# Patient Record
Sex: FEMALE | Race: WHITE | Employment: OTHER | URBAN - METROPOLITAN AREA
[De-identification: names, ages, dates, MRNs, and addresses within clinical notes are randomized per-mention and may not be internally consistent; named-entity substitution may affect disease eponyms.]

---

## 2017-01-24 DIAGNOSIS — E78.5 HYPERLIPIDEMIA: ICD-10-CM

## 2017-01-24 DIAGNOSIS — I25.10 CAD (CORONARY ARTERY DISEASE): Primary | ICD-10-CM

## 2017-01-24 RX ORDER — ROSUVASTATIN CALCIUM 20 MG/1
20 TABLET, COATED ORAL DAILY
Qty: 90 TABLET | Refills: 3 | Status: SHIPPED | OUTPATIENT
Start: 2017-01-24 | End: 2018-01-22

## 2017-01-24 RX ORDER — CLOPIDOGREL BISULFATE 75 MG/1
75 TABLET ORAL DAILY
Qty: 90 TABLET | Refills: 3 | Status: SHIPPED | OUTPATIENT
Start: 2017-01-24 | End: 2018-01-22

## 2017-02-24 ENCOUNTER — TELEPHONE (OUTPATIENT)
Dept: NEUROLOGY | Facility: CLINIC | Age: 77
End: 2017-02-24

## 2017-02-24 NOTE — TELEPHONE ENCOUNTER
Pt called and left a message that she wanted to make an appointment with Dr. Randolph. Called pt and arranged an appointment for May 5th at 9:30am. She said [facility]. She said she has used a walker for about a year. She has problems walking and speaking. She has been to the [facility] but they can't help her. She will bring her MRI disc and clinic notes from the [facility].

## 2017-02-28 ENCOUNTER — OFFICE VISIT (OUTPATIENT)
Dept: FAMILY MEDICINE | Facility: CLINIC | Age: 77
End: 2017-02-28

## 2017-02-28 VITALS
OXYGEN SATURATION: 98 % | HEART RATE: 64 BPM | DIASTOLIC BLOOD PRESSURE: 60 MMHG | BODY MASS INDEX: 29.7 KG/M2 | SYSTOLIC BLOOD PRESSURE: 128 MMHG | TEMPERATURE: 98.1 F | WEIGHT: 184 LBS

## 2017-02-28 DIAGNOSIS — L98.491 CHRONIC SKIN ULCER, LIMITED TO BREAKDOWN OF SKIN (H): Primary | ICD-10-CM

## 2017-02-28 PROCEDURE — 99214 OFFICE O/P EST MOD 30 MIN: CPT | Performed by: FAMILY MEDICINE

## 2017-02-28 RX ORDER — SULFAMETHOXAZOLE/TRIMETHOPRIM 800-160 MG
1 TABLET ORAL 2 TIMES DAILY
Qty: 20 TABLET | Refills: 0 | Status: SHIPPED | OUTPATIENT
Start: 2017-02-28 | End: 2017-03-13

## 2017-02-28 NOTE — MR AVS SNAPSHOT
"              After Visit Summary   2/28/2017    Katherin Fletcher    MRN: [id number]           Patient Information     Date Of Birth          1940        Visit Information        Provider Department      2/28/2017 4:15 PM Airs Del Real MD [facility]        Today's Diagnoses     Chronic skin ulcer, limited to breakdown of skin (H)    -  1       Follow-ups after your visit        Your next 10 appointments already scheduled     Apr 17, 2017  9:45 AM CDT   Return Visit with Renato Erickson MD   [facility] [place] ([facility])    [address] [place] [id number] [phone]            May 05, 2017  9:30 AM CDT   (Arrive by 9:15 AM)   New Ataxia with Joe Randolph MD   [facility] Neu[facility] ([facility] [place])    [address] [address] [phone]              Who to contact     If you have questions or need follow up information about today's clinic visit or your schedule please contact [facility] directly at [phone].  Normal or non-critical lab and imaging results will be communicated to you by Chideohart, letter or phone within 4 business days after the clinic has received the results. If you do not hear from us within 7 days, please contact the clinic through Plut[employer] or phone. If you have a critical or abnormal lab result, we will notify you by phone as soon as possible.  Submit refill requests through XING or call your pharmacy and they will forward the refill request to us. Please allow 3 business days for your refill to be completed.          Additional Information About Your Visit        MyChart Information     XING lets you send messages to your doctor, view your test results, renew your prescriptions, schedule appointments and more. To sign up, go to [email or web address] . Click on \"Log in\" on the left side of the screen, which will " "take you to the Welcome page. Then click on \"Sign up Now\" on the right side of the page.     You will be asked to enter the access code listed below, as well as some personal information. Please follow the directions to create your username and password.     Your access code is: [email or web address]  Expires: 2017  4:38 PM     Your access code will  in 90 days. If you need help or a new code, please call your [place] clinic or [phone].        Care EveryWhere ID     This is your Care EveryWhere ID. This could be used by other organizations to access your [place] medical records  [id number]        Your Vitals Were     Pulse Temperature Pulse Oximetry BMI (Body Mass Index)          64 98.1  F (36.7  C) (Oral) 98% 29.7 kg/m2         Blood Pressure from Last 3 Encounters:   17 128/60   16 160/78   10/18/16 118/70    Weight from Last 3 Encounters:   17 83.5 kg (184 lb)   16 81.2 kg (179 lb)   10/18/16 79.8 kg (176 lb)              Today, you had the following     No orders found for display         Today's Medication Changes          These changes are accurate as of: 17  4:38 PM.  If you have any questions, ask your nurse or doctor.               Start taking these medicines.        Dose/Directions    sulfamethoxazole-trimethoprim 800-160 MG per tablet   Commonly known as:  BACTRIM DS/SEPTRA DS   Used for:  Chronic skin ulcer, limited to breakdown of skin (H)   Started by:  Aris Del Real MD        Dose:  1 tablet   Take 1 tablet by mouth 2 times daily   Quantity:  20 tablet   Refills:  0            Where to get your medicines      These medications were sent to [facility] [address] [place] [address]  [address] [facility] [address]     Phone:  [phone]     sulfamethoxazole-trimethoprim 800-160 MG per tablet                Primary Care Provider Office Phone # Fax #    Aris Del Real [phone]       [place] [facility] [address]  [facility] [address]        Thank you!     Thank you for choosing [facility]  for your care. Our goal is always to provide you with excellent care. Hearing back from our patients is one way we can continue to improve our services. Please take a few minutes to complete the written survey that you may receive in the mail after your visit with us. Thank you!             Your Updated Medication List - Protect others around you: Learn how to safely use, store and throw away your medicines at www.disposemymeds.org.          This list is accurate as of: 2/28/17  4:38 PM.  Always use your most recent med list.                   Brand Name Dispense Instructions for use    Alfalfa 250 MG Tabs      Take by mouth daily       aspirin 81 MG tablet      Take 81 mg by mouth daily       calcium + D 600-200 MG-UNIT Tabs   Generic drug:  calcium carbonate-vitamin D      Take 1 tablet by mouth 2 times daily.       carbidopa-levodopa  MG per tablet    SINEMET    720 tablet    Take 2 tablets by mouth 4 times daily       carvedilol 6.25 MG tablet    COREG    180 tablet    Take 1 tablet (6.25 mg) by mouth 2 times daily (with meals)       clopidogrel 75 MG tablet    PLAVIX    90 tablet    Take 1 tablet (75 mg) by mouth daily       DAILY MULTI Tabs      Take by mouth daily       gabapentin 100 MG capsule    NEURONTIN     Take 100 mg by mouth daily       isosorbide mononitrate 30 MG 24 hr tablet    IMDUR    90 tablet    Take 1 tablet (30 mg) by mouth daily       levothyroxine 112 MCG tablet    SYNTHROID/LEVOTHROID    90 tablet    TAKE 1 TABLET DAILY       nitroglycerin 0.4 MG sublingual tablet    NITROSTAT    25 tablet    DISSOLVE ONE TABLET UNDER THE TONGUE EVERY 5 MINUTES AS NEEDED FOR CHEST PAIN.  DO NOT EXCEED A TOTAL OF 3 DOSES IN 15 MINUTES       omeprazole 20 MG CR capsule    priLOSEC    180 capsule    TAKE 2 CAPSULES DAILY 30 TO 60 MINUTES BEFORE A MEAL       rosuvastatin 20 MG  tablet    CRESTOR    90 tablet    Take 1 tablet (20 mg) by mouth daily       sulfamethoxazole-trimethoprim 800-160 MG per tablet    BACTRIM DS/SEPTRA DS    20 tablet    Take 1 tablet by mouth 2 times daily

## 2017-02-28 NOTE — PROGRESS NOTES
Problem(s) Oriented visit        SUBJECTIVE:                                                    Katherin Fletcher is a 76 year old female who presents to clinic today for the following health issues :      1. Chronic skin ulcer, limited to breakdown of skin (H)  Left ankle anterior has re-opened a lesion ulcer on the skin 1cm by 2.5 cm  For 1 week,  No pain  Getting worse.  Previous MRSA in the foot post surgery         Problem list, Medication list, Allergies, and Medical/Social/Surgical histories reviewed in EPIC and updated as appropriate.   Additional history: as documented    ROS:  General and CV completed and negative except as noted above    Histories:   Patient Active Problem List   Diagnosis     Atrial fibrillation (H)     Long term current use of anticoagulant therapy     CAD (coronary artery disease)     Hyperlipidemia     Hypothyroidism     Health Care Home     MRSA (methicillin resistant Staphylococcus aureus)     Dysarthria     Disturbances of sensation of smell and taste     Neurologic gait disorder     Cerebellar disease     Essential hypertension     Ischemic cardiomyopathy     Past Surgical History   Procedure Laterality Date     Hysterectomy, jesus  32 yo     Cardiac surgery  1992     CABG,stents x2     Carpal tunnel release rt/lt       Release trigger finger       Bunionectomy rt/lt       Open reduction internal fixation ankle       Left Ankle     Arthroscopy shoulder rt/lt       Stent  2006     Stent(aka cardiac)  2008     C cabg, artery-vein, three  1/2009     Vascular surgery       C mra upper extremity wo&w cont         Social History   Substance Use Topics     Smoking status: Former Smoker     Quit date: 1/1/1992     Smokeless tobacco: Never Used     Alcohol use 0.0 - 0.5 oz/week     0 - 1 Standard drinks or equivalent per week      Comment: very rare     Family History   Problem Relation Age of Onset     Unknown/Adopted Mother      Unknown/Adopted Father            OBJECTIVE:                                                     /60  Pulse 64  Temp 98.1  F (36.7  C) (Oral)  Wt 83.5 kg (184 lb)  SpO2 98%  BMI 29.7 kg/m2  Body mass index is 29.7 kg/(m^2).   APPEARANCE: = Relaxed and in no distress  Conj/Eyelids = noninjected and lids and lashes are without inflammation  PERRLA/Irises = Pupils Round Reactive to Light and Irisis without inflammation  Neck = No anterior or posterior adenopathy appreciated.  Thyroid = Not enlarged and no masses felt  Resp effort = Calm regular breathing  Breath Sounds = Good air movement with no rales or rhonchi in any lung fields  Heart Rate, Rythym, & sounds (no Murm)  = Regular rate and rythym with no S3, S4, or murmer appreciated.  Carotid Art's = Pulses full and equal and no bruits appreciated  Abdomen = Soft, nontender, no masses, & bowel sounds in all quadrants  Liver/Spleen = Normal span and no splenomegaly noted  Digits and Nails = FROM in all finger joints, no nail dystrophy  Ext (edema) = No pretibial edema noted or elsewhere  Musculsktl =  Strength and ROM of major joints are within normal limits  SKIN: lesion on the left as above  Recent/Remote Memory = Alert and Oriented x 3  Mood/Affect = Cooperative and interested     ASSESSMENT/PLAN:                                                        Katherin was seen today for other.    Diagnoses and all orders for this visit:    Chronic skin ulcer, limited to breakdown of skin (H)  -     sulfamethoxazole-trimethoprim (BACTRIM DS/SEPTRA DS) 800-160 MG per tablet; Take 1 tablet by mouth 2 times daily    >25 min spent with patient, greater than 50% spent on discussion/education/planning, etc. About The encounter diagnosis was Chronic skin ulcer, limited to breakdown of skin (H).        See Patient Instructions    The following health maintenance items are reviewed in Epic and correct as of today:  Health Maintenance   Topic Date Due     PNEUMOCOCCAL (1 of 2 - PCV13) 05/27/2005     MEDICARE ANNUAL WELLNESS VISIT   05/27/2006     INFLUENZA VACCINE (SYSTEM ASSIGNED)  09/01/2016     FALL RISK ASSESSMENT  01/05/2017     ADVANCE DIRECTIVE PLANNING Q5 YRS (NO INBASKET)  05/22/2017     TETANUS IMMUNIZATION (SYSTEM ASSIGNED)  03/07/2018     LIPID SCREEN Q5 YR FEMALE (SYSTEM ASSIGNED)  07/15/2021     DEXA SCAN SCREENING (SYSTEM ASSIGNED)  Completed       Aris Del Real MD  [facility]  Family Practice  [facility]  [phone]    For any issues my office # is [phone]

## 2017-03-07 ENCOUNTER — TELEPHONE (OUTPATIENT)
Dept: NEUROLOGY | Facility: CLINIC | Age: 77
End: 2017-03-07

## 2017-03-07 NOTE — TELEPHONE ENCOUNTER
Called pt and rescheduled her appointment from May 5th to April 14 at 9:30am.  She also wanted Dr. Randolph to know that [facility] Her last name was Deb.

## 2017-03-13 ENCOUNTER — OFFICE VISIT (OUTPATIENT)
Dept: FAMILY MEDICINE | Facility: CLINIC | Age: 77
End: 2017-03-13

## 2017-03-13 VITALS
BODY MASS INDEX: 28.57 KG/M2 | OXYGEN SATURATION: 98 % | WEIGHT: 177 LBS | HEART RATE: 79 BPM | SYSTOLIC BLOOD PRESSURE: 108 MMHG | DIASTOLIC BLOOD PRESSURE: 62 MMHG

## 2017-03-13 DIAGNOSIS — L98.492 CHRONIC SKIN ULCER, WITH FAT LAYER EXPOSED (H): Primary | ICD-10-CM

## 2017-03-13 PROCEDURE — 99213 OFFICE O/P EST LOW 20 MIN: CPT | Performed by: FAMILY MEDICINE

## 2017-03-13 NOTE — MR AVS SNAPSHOT
After Visit Summary   3/13/2017    Katherin Fletcher    MRN: [id number]           Patient Information     Date Of Birth          1940        Visit Information        Provider Department      3/13/2017 3:30 PM Aris Del Real MD [facility]        Today's Diagnoses     Chronic skin ulcer, with fat layer exposed (H)    -  1       Follow-ups after your visit        Your next 10 appointments already scheduled     Mar 20, 2017  2:45 PM CDT   SHORT with Aris Del Real MD   [facility]    [address] [place] [phone]            Apr 14, 2017  9:30 AM CDT   (Arrive by 9:15 AM)   New Ataxia with Joe Randolph MD   [facility] Neurology ([facility] [place])    [address] [phone]            Apr 17, 2017  9:45 AM CDT   Return Visit with Renato Erickson MD   [facility] ([facility])    [address] [place] [id number] [phone]              Who to contact     If you have questions or need follow up information about today's clinic visit or your schedule please contact [facility] directly at [phone].  Normal or non-critical lab and imaging results will be communicated to you by Hotspur Technologieshart, letter or phone within 4 business days after the clinic has received the results. If you do not hear from us within 7 days, please contact the clinic through MyChart or phone. If you have a critical or abnormal lab result, we will notify you by phone as soon as possible.  Submit refill requests through Shareable Social or call your pharmacy and they will forward the refill request to us. Please allow 3 business days for your refill to be completed.          Additional Information About Your Visit        Shareable Social Information     Shareable Social lets you send messages to your doctor, view  "your test results, renew your prescriptions, schedule appointments and more. To sign up, go to [email or web address] . Click on \"Log in\" on the left side of the screen, which will take you to the Welcome page. Then click on \"Sign up Now\" on the right side of the page.     You will be asked to enter the access code listed below, as well as some personal information. Please follow the directions to create your username and password.     Your access code is: [email or web address]  Expires: 2017  5:38 PM     Your access code will  in 90 days. If you need help or a new code, please call your [place] clinic or [phone].        Care EveryWhere ID     This is your Care EveryWhere ID. This could be used by other organizations to access your [place] medical records  [id number]        Your Vitals Were     Pulse Pulse Oximetry BMI (Body Mass Index)             79 98% 28.57 kg/m2          Blood Pressure from Last 3 Encounters:   17 108/62   17 128/60   16 160/78    Weight from Last 3 Encounters:   17 80.3 kg (177 lb)   17 83.5 kg (184 lb)   16 81.2 kg (179 lb)              Today, you had the following     No orders found for display       Primary Care Provider Office Phone # Fax #    Aris Del Real [phone]       [facility] [address] [place] [address]        Thank you!     Thank you for choosing [facility]  for your care. Our goal is always to provide you with excellent care. Hearing back from our patients is one way we can continue to improve our services. Please take a few minutes to complete the written survey that you may receive in the mail after your visit with us. Thank you!             Your Updated Medication List - Protect others around you: Learn how to safely use, store and throw away your medicines at www.disposemymeds.org.          This list is accurate as of: 3/13/17  5:54 PM.  Always use your most " recent med list.                   Brand Name Dispense Instructions for use    Alfalfa 250 MG Tabs      Take by mouth daily       aspirin 81 MG tablet      Take 81 mg by mouth daily       calcium + D 600-200 MG-UNIT Tabs   Generic drug:  calcium carbonate-vitamin D      Take 1 tablet by mouth 2 times daily.       carbidopa-levodopa  MG per tablet    SINEMET    720 tablet    Take 2 tablets by mouth 4 times daily       carvedilol 6.25 MG tablet    COREG    180 tablet    Take 1 tablet (6.25 mg) by mouth 2 times daily (with meals)       clopidogrel 75 MG tablet    PLAVIX    90 tablet    Take 1 tablet (75 mg) by mouth daily       DAILY MULTI Tabs      Take by mouth daily       gabapentin 100 MG capsule    NEURONTIN     Take 100 mg by mouth daily       isosorbide mononitrate 30 MG 24 hr tablet    IMDUR    90 tablet    Take 1 tablet (30 mg) by mouth daily       levothyroxine 112 MCG tablet    SYNTHROID/LEVOTHROID    90 tablet    TAKE 1 TABLET DAILY       nitroglycerin 0.4 MG sublingual tablet    NITROSTAT    25 tablet    DISSOLVE ONE TABLET UNDER THE TONGUE EVERY 5 MINUTES AS NEEDED FOR CHEST PAIN.  DO NOT EXCEED A TOTAL OF 3 DOSES IN 15 MINUTES       omeprazole 20 MG CR capsule    priLOSEC    180 capsule    TAKE 2 CAPSULES DAILY 30 TO 60 MINUTES BEFORE A MEAL       rosuvastatin 20 MG tablet    CRESTOR    90 tablet    Take 1 tablet (20 mg) by mouth daily

## 2017-03-13 NOTE — PROGRESS NOTES
Problem(s) Oriented visit        SUBJECTIVE:                                                    Katherin Fletcher is a 76 year old female who presents to clinic today for the following health issues :    1. Atrial fibrillation (H)      2. Chronic skin ulcer, with fat layer exposed (H)  Recheck, is a little better, pink surrounding the wound and          Problem list, Medication list, Allergies, and Medical/Social/Surgical histories reviewed in EPIC and updated as appropriate.   Additional history: as documented    ROS:  General and CV completed and negative except as noted above    Histories:   Patient Active Problem List   Diagnosis     Atrial fibrillation (H)     Long term current use of anticoagulant therapy     CAD (coronary artery disease)     Hyperlipidemia     Hypothyroidism     Health Care Home     MRSA (methicillin resistant Staphylococcus aureus)     Dysarthria     Disturbances of sensation of smell and taste     Neurologic gait disorder     Cerebellar disease     Essential hypertension     Ischemic cardiomyopathy     Past Surgical History   Procedure Laterality Date     Hysterectomy, jesus  32 yo     Cardiac surgery  1992     CABG,stents x2     Carpal tunnel release rt/lt       Release trigger finger       Bunionectomy rt/lt       Open reduction internal fixation ankle       Left Ankle     Arthroscopy shoulder rt/lt       Stent  2006     Stent(aka cardiac)  2008     C cabg, artery-vein, three  1/2009     Vascular surgery       C mra upper extremity wo&w cont         Social History   Substance Use Topics     Smoking status: Former Smoker     Quit date: 1/1/1992     Smokeless tobacco: Never Used     Alcohol use 0.0 - 0.5 oz/week     0 - 1 Standard drinks or equivalent per week      Comment: very rare     Family History   Problem Relation Age of Onset     Unknown/Adopted Mother      Unknown/Adopted Father            OBJECTIVE:                                                    /62  Pulse 79  Wt 80.3  kg (177 lb)  SpO2 98%  BMI 28.57 kg/m2  Body mass index is 28.57 kg/(m^2).   APPEARANCE: = Relaxed and in no distress  SKIN: as above, smaller open ulcer     ASSESSMENT/PLAN:                                                        Katherin was seen today for recheck.    Diagnoses and all orders for this visit:    Atrial fibrillation (H)    Chronic skin ulcer, with fat layer exposed (H)        Bandaged with honey based covering and recheck one week.      The following health maintenance items are reviewed in Epic and correct as of today:  Health Maintenance   Topic Date Due     PNEUMOCOCCAL (1 of 2 - PCV13) 05/27/2005     MEDICARE ANNUAL WELLNESS VISIT  05/27/2006     ADVANCE DIRECTIVE PLANNING Q5 YRS (NO INBASKET)  05/22/2017     INFLUENZA VACCINE (SYSTEM ASSIGNED)  09/01/2017     FALL RISK ASSESSMENT  02/28/2018     TETANUS IMMUNIZATION (SYSTEM ASSIGNED)  03/07/2018     LIPID SCREEN Q5 YR FEMALE (SYSTEM ASSIGNED)  07/15/2021     DEXA SCAN SCREENING (SYSTEM ASSIGNED)  Completed       Aris Del Real MD  [facility]  Family Practice  [facility]  [phone]    For any issues my office # is [phone]

## 2017-03-20 ENCOUNTER — OFFICE VISIT (OUTPATIENT)
Dept: FAMILY MEDICINE | Facility: CLINIC | Age: 77
End: 2017-03-20

## 2017-03-20 VITALS
OXYGEN SATURATION: 99 % | BODY MASS INDEX: 29.21 KG/M2 | DIASTOLIC BLOOD PRESSURE: 60 MMHG | SYSTOLIC BLOOD PRESSURE: 128 MMHG | WEIGHT: 181 LBS | HEART RATE: 61 BPM

## 2017-03-20 DIAGNOSIS — I73.9 PERIPHERAL VASCULAR DISEASE OF LOWER EXTREMITY WITH ULCERATION (H): ICD-10-CM

## 2017-03-20 DIAGNOSIS — G62.9 PERIPHERAL POLYNEUROPATHY: Primary | ICD-10-CM

## 2017-03-20 DIAGNOSIS — L97.909 PERIPHERAL VASCULAR DISEASE OF LOWER EXTREMITY WITH ULCERATION (H): ICD-10-CM

## 2017-03-20 DIAGNOSIS — I48.20 CHRONIC ATRIAL FIBRILLATION (H): ICD-10-CM

## 2017-03-20 PROCEDURE — 99213 OFFICE O/P EST LOW 20 MIN: CPT | Performed by: FAMILY MEDICINE

## 2017-03-20 NOTE — PROGRESS NOTES
Problem(s) Oriented visit        SUBJECTIVE:                                                    Katherin Fletcher is a 76 year old female who presents to clinic today for the following health issues :    1. Peripheral polyneuropathy (H)  Ongoing     2. Peripheral vascular disease of lower extremity with ulceration (H)  Here for recheck of wound and has been in hydrocolloidal dressing    3. Chronic atrial fibrillation (H)  stable         Problem list, Medication list, Allergies, and Medical/Social/Surgical histories reviewed in [facility] and updated as appropriate.   Additional history: as documented    ROS:  General and CV completed and negative except as noted above    Histories:   Patient Active Problem List   Diagnosis     Long term current use of anticoagulant therapy     CAD (coronary artery disease)     Hyperlipidemia     Hypothyroidism     Health Care Home     MRSA (methicillin resistant Staphylococcus aureus)     Dysarthria     Disturbances of sensation of smell and taste     Neurologic gait disorder     Cerebellar disease     Essential hypertension     Ischemic cardiomyopathy     Peripheral polyneuropathy (H)     Past Surgical History   Procedure Laterality Date     Hysterectomy, jesus  32 yo     Cardiac surgery  1992     CABG,stents x2     Carpal tunnel release rt/lt       Release trigger finger       Bunionectomy rt/lt       Open reduction internal fixation ankle       Left Ankle     Arthroscopy shoulder rt/lt       Stent  2006     Stent(aka cardiac)  2008     C cabg, artery-vein, three  1/2009     Vascular surgery       C mra upper extremity wo&w cont         Social History   Substance Use Topics     Smoking status: Former Smoker     Quit date: 1/1/1992     Smokeless tobacco: Never Used     Alcohol use 0.0 - 0.5 oz/week     0 - 1 Standard drinks or equivalent per week      Comment: very rare     Family History   Problem Relation Age of Onset     Unknown/Adopted Mother      Unknown/Adopted Father             OBJECTIVE:                                                    /60  Pulse 61  Wt 82.1 kg (181 lb)  SpO2 99%  BMI 29.21 kg/m2  Body mass index is 29.21 kg/(m^2).   Musculsktl =  Strength and ROM of major joints are within normal limits  SKIN: wound is now much smaller   Recent/Remote Memory = Alert and Oriented x 3  NEURO = CN II-XII are tested and no deficits found  Mood/Affect = Cooperative and interested     ASSESSMENT/PLAN:                                                        Katherin was seen today for recheck.    Diagnoses and all orders for this visit:    Peripheral polyneuropathy (H)    Peripheral vascular disease of lower extremity with ulceration (H)    Chronic atrial fibrillation (H)    Other orders  -     Cancel: PNEUMOCOCCAL CONJ VACCINE 13 VALENT IM (PREVNAR 13)        Continue colloidal dressing    The following health maintenance items are reviewed in Epic and correct as of today:  Health Maintenance   Topic Date Due     PNEUMOCOCCAL (1 of 2 - PCV13) 05/27/2005     MEDICARE ANNUAL WELLNESS VISIT  05/27/2006     ADVANCE DIRECTIVE PLANNING Q5 YRS (NO INBASKET)  05/22/2017     INFLUENZA VACCINE (SYSTEM ASSIGNED)  09/01/2017     FALL RISK ASSESSMENT  02/28/2018     TETANUS IMMUNIZATION (SYSTEM ASSIGNED)  03/07/2018     LIPID SCREEN Q5 YR FEMALE (SYSTEM ASSIGNED)  07/15/2021     DEXA SCAN SCREENING (SYSTEM ASSIGNED)  Completed       Airs Del Real MD  [facility]  [facility]  [phone]    For any issues my office # is [phone]

## 2017-03-20 NOTE — MR AVS SNAPSHOT
After Visit Summary   3/20/2017    Katherin Fletcher    MRN: [id number]           Patient Information     Date Of Birth          1940        Visit Information        Provider Department      3/20/2017 2:45 PM Aris Del Real MD [facility]        Today's Diagnoses     Peripheral polyneuropathy (H)    -  1    Peripheral vascular disease of lower extremity with ulceration (H)        Chronic atrial fibrillation (H)           Follow-ups after your visit        Your next 10 appointments already scheduled     Apr 14, 2017  9:30 AM CDT   (Arrive by 9:15 AM)   New Ataxia with Joe Randolph MD   [facility] ([facility])    [address]  3rd Floor  [facility] [id number] [phone]            Apr 17, 2017  9:45 AM CDT   Return Visit with Renato Erickson MD   [facility] PHYSICIANS [facility] ([facility])    [address] [place] [id number] [phone]              Who to contact     If you have questions or need follow up information about today's clinic visit or your schedule please contact [facility] directly at [phone].  Normal or non-critical lab and imaging results will be communicated to you by MyChart, letter or phone within 4 business days after the clinic has received the results. If you do not hear from us within 7 days, please contact the clinic through [employer] or phone. If you have a critical or abnormal lab result, we will notify you by phone as soon as possible.  Submit refill requests through [employer] or call your pharmacy and they will forward the refill request to us. Please allow 3 business days for your refill to be completed.          Additional Information About Your Visit        MyChart Information     [employer] lets you send messages to your doctor, view your test results, renew your prescriptions, schedule appointments and more. To sign up, go  "to [email or web address] . Click on \"Log in\" on the left side of the screen, which will take you to the Welcome page. Then click on \"Sign up Now\" on the right side of the page.     You will be asked to enter the access code listed below, as well as some personal information. Please follow the directions to create your username and password.     Your access code is: [email or web address]  Expires: 2017  5:38 PM     Your access code will  in 90 days. If you need help or a new code, please call your [place] clinic or [phone].        Care EveryWhere ID     This is your Care EveryWhere ID. This could be used by other organizations to access your [place] medical records  [id number]        Your Vitals Were     Pulse Pulse Oximetry BMI (Body Mass Index)             61 99% 29.21 kg/m2          Blood Pressure from Last 3 Encounters:   17 128/60   17 108/62   17 128/60    Weight from Last 3 Encounters:   17 82.1 kg (181 lb)   17 80.3 kg (177 lb)   17 83.5 kg (184 lb)              Today, you had the following     No orders found for display       Primary Care Provider Office Phone # Fax #    Aris Del Real [phone]       [facility] [address] [place] [address]        Thank you!     Thank you for choosing [facility]  for your care. Our goal is always to provide you with excellent care. Hearing back from our patients is one way we can continue to improve our services. Please take a few minutes to complete the written survey that you may receive in the mail after your visit with us. Thank you!             Your Updated Medication List - Protect others around you: Learn how to safely use, store and throw away your medicines at www.disposemymeds.org.          This list is accurate as of: 3/20/17  5:56 PM.  Always use your most recent med list.                   Brand Name Dispense Instructions for use    Alfalfa 250 MG " Tabs      Take by mouth daily       aspirin 81 MG tablet      Take 81 mg by mouth daily       calcium + D 600-200 MG-UNIT Tabs   Generic drug:  calcium carbonate-vitamin D      Take 1 tablet by mouth 2 times daily.       carbidopa-levodopa  MG per tablet    SINEMET    720 tablet    Take 2 tablets by mouth 4 times daily       carvedilol 6.25 MG tablet    COREG    180 tablet    Take 1 tablet (6.25 mg) by mouth 2 times daily (with meals)       clopidogrel 75 MG tablet    PLAVIX    90 tablet    Take 1 tablet (75 mg) by mouth daily       DAILY MULTI Tabs      Take by mouth daily       gabapentin 100 MG capsule    NEURONTIN     Take 100 mg by mouth daily       isosorbide mononitrate 30 MG 24 hr tablet    IMDUR    90 tablet    Take 1 tablet (30 mg) by mouth daily       levothyroxine 112 MCG tablet    SYNTHROID/LEVOTHROID    90 tablet    TAKE 1 TABLET DAILY       nitroglycerin 0.4 MG sublingual tablet    NITROSTAT    25 tablet    DISSOLVE ONE TABLET UNDER THE TONGUE EVERY 5 MINUTES AS NEEDED FOR CHEST PAIN.  DO NOT EXCEED A TOTAL OF 3 DOSES IN 15 MINUTES       omeprazole 20 MG CR capsule    priLOSEC    180 capsule    TAKE 2 CAPSULES DAILY 30 TO 60 MINUTES BEFORE A MEAL       rosuvastatin 20 MG tablet    CRESTOR    90 tablet    Take 1 tablet (20 mg) by mouth daily

## 2017-04-03 ENCOUNTER — PRE VISIT (OUTPATIENT)
Dept: NEUROLOGY | Facility: CLINIC | Age: 77
End: 2017-04-03

## 2017-04-03 NOTE — TELEPHONE ENCOUNTER
1.  Date/reason for appt:4/14/17, Ataxia   2.  Referring provider: Self  3.  Call to patient (Yes / No - short description): No, referred  4.  Previous care at / records requested from:   [facility]- faxed cover sheet.

## 2017-04-04 ENCOUNTER — OFFICE VISIT (OUTPATIENT)
Dept: FAMILY MEDICINE | Facility: CLINIC | Age: 77
End: 2017-04-04

## 2017-04-04 VITALS
BODY MASS INDEX: 29.38 KG/M2 | DIASTOLIC BLOOD PRESSURE: 58 MMHG | HEART RATE: 66 BPM | SYSTOLIC BLOOD PRESSURE: 110 MMHG | OXYGEN SATURATION: 99 % | WEIGHT: 182 LBS

## 2017-04-04 DIAGNOSIS — I73.9 PERIPHERAL VASCULAR DISEASE OF LOWER EXTREMITY WITH ULCERATION (H): Primary | ICD-10-CM

## 2017-04-04 DIAGNOSIS — L97.909 PERIPHERAL VASCULAR DISEASE OF LOWER EXTREMITY WITH ULCERATION (H): Primary | ICD-10-CM

## 2017-04-04 PROCEDURE — 99213 OFFICE O/P EST LOW 20 MIN: CPT | Performed by: FAMILY MEDICINE

## 2017-04-04 RX ORDER — SULFAMETHOXAZOLE/TRIMETHOPRIM 800-160 MG
1 TABLET ORAL 2 TIMES DAILY
Qty: 14 TABLET | Refills: 0 | Status: SHIPPED | OUTPATIENT
Start: 2017-04-04 | End: 2017-04-17

## 2017-04-04 NOTE — MR AVS SNAPSHOT
After Visit Summary   4/4/2017    Katherin Fletcher    MRN: [id number]           Patient Information     Date Of Birth          1940        Visit Information        Provider Department      4/4/2017 1:45 PM Aris Del Real MD [facility]        Today's Diagnoses     Peripheral vascular disease of lower extremity with ulceration (H)    -  1      Care Instructions    Has seen MADHU MARIE MD  In the past.        Follow-ups after your visit        Additional Services     WOUND CARE REFERRAL       Your provider has referred you to: [facility] [place] [phone]   [email or web address]    Reason for referral: Wound care      1. WOC Wound Consult appointment is related to what kind of wound: ulcer of left ankle    2. Location of wound: Lower extremity    3. Reason for referral: Assess and treat as indicated    4. Desired treatment if any:      Please be aware that coverage of these services is subject to the terms and limitations of your health insurance plan.  Call member services at your health plan with any benefit or coverage questions.      Please bring the following with you to your appointment:    (1) Any X-Rays, CTs or MRIs which have been performed.  Contact the facility where they were done to arrange for  prior to your scheduled appointment.    (2) List of current medications   (3) This referral request   (4) Any documents/labs given to you for this referral                  Your next 10 appointments already scheduled     Apr 14, 2017  9:30 AM CDT   (Arrive by 9:15 AM)   New Ataxia with Joe Randolph MD   [facility] ([facility])    [address] [place] [id number] [phone]            Apr 17, 2017  9:45 AM CDT   Return Visit with Renato Erickson MD   [facility] ([facility]  "Clinics)    [address] [place] [id number] [phone]              Who to contact     If you have questions or need follow up information about today's clinic visit or your schedule please contact [facility] directly at [phone].  Normal or non-critical lab and imaging results will be communicated to you by MyChart, letter or phone within 4 business days after the clinic has received the results. If you do not hear from us within 7 days, please contact the clinic through MyChart or phone. If you have a critical or abnormal lab result, we will notify you by phone as soon as possible.  Submit refill requests through [employer] or call your pharmacy and they will forward the refill request to us. Please allow 3 business days for your refill to be completed.          Additional Information About Your Visit        MyChart Information     [employer] lets you send messages to your doctor, view your test results, renew your prescriptions, schedule appointments and more. To sign up, go to [email or web address] . Click on \"Log in\" on the left side of the screen, which will take you to the Welcome page. Then click on \"Sign up Now\" on the right side of the page.     You will be asked to enter the access code listed below, as well as some personal information. Please follow the directions to create your username and password.     Your access code is: [email or web address]  Expires: 2017  5:38 PM     Your access code will  in 90 days. If you need help or a new code, please call your [place] clinic or [phone].        Care EveryWhere ID     This is your Care EveryWhere ID. This could be used by other organizations to access your [place] medical records  [id number]        Your Vitals Were     Pulse Pulse Oximetry BMI (Body Mass Index)             66 99% 29.38 kg/m2          Blood Pressure from Last 3 Encounters:   17 110/58   17 128/60   17 108/62    Weight from " Last 3 Encounters:   04/04/17 82.6 kg (182 lb)   03/20/17 82.1 kg (181 lb)   03/13/17 80.3 kg (177 lb)              We Performed the Following     WOUND CARE REFERRAL          Today's Medication Changes          These changes are accurate as of: 4/4/17  2:14 PM.  If you have any questions, ask your nurse or doctor.               Start taking these medicines.        Dose/Directions    sulfamethoxazole-trimethoprim 800-160 MG per tablet   Commonly known as:  BACTRIM DS/SEPTRA DS   Used for:  Peripheral vascular disease of lower extremity with ulceration (H)   Started by:  Aris Del Real MD        Dose:  1 tablet   Take 1 tablet by mouth 2 times daily   Quantity:  14 tablet   Refills:  0            Where to get your medicines      These medications were sent to [facility] [address] [place] [address] [address] [facility]  [address] [facility] [address]     Phone:  [phone]     sulfamethoxazole-trimethoprim 800-160 MG per tablet                Primary Care Provider Office Phone # Fax #    Aris Del Real [phone]       [facility] [address]  [facility] [address]        Thank you!     Thank you for choosing [facility]  for your care. Our goal is always to provide you with excellent care. Hearing back from our patients is one way we can continue to improve our services. Please take a few minutes to complete the written survey that you may receive in the mail after your visit with us. Thank you!             Your Updated Medication List - Protect others around you: Learn how to safely use, store and throw away your medicines at www.disposemymeds.org.          This list is accurate as of: 4/4/17  2:14 PM.  Always use your most recent med list.                   Brand Name Dispense Instructions for use    Alfalfa 250 MG Tabs      Take by mouth daily       aspirin 81 MG tablet      Take 81 mg by mouth daily       calcium + D 600-200  MG-UNIT Tabs   Generic drug:  calcium carbonate-vitamin D      Take 1 tablet by mouth 2 times daily.       carbidopa-levodopa  MG per tablet    SINEMET    720 tablet    Take 2 tablets by mouth 4 times daily       carvedilol 6.25 MG tablet    COREG    180 tablet    Take 1 tablet (6.25 mg) by mouth 2 times daily (with meals)       clopidogrel 75 MG tablet    PLAVIX    90 tablet    Take 1 tablet (75 mg) by mouth daily       DAILY MULTI Tabs      Take by mouth daily       gabapentin 100 MG capsule    NEURONTIN     Take 100 mg by mouth daily       isosorbide mononitrate 30 MG 24 hr tablet    IMDUR    90 tablet    Take 1 tablet (30 mg) by mouth daily       levothyroxine 112 MCG tablet    SYNTHROID/LEVOTHROID    90 tablet    TAKE 1 TABLET DAILY       nitroglycerin 0.4 MG sublingual tablet    NITROSTAT    25 tablet    DISSOLVE ONE TABLET UNDER THE TONGUE EVERY 5 MINUTES AS NEEDED FOR CHEST PAIN.  DO NOT EXCEED A TOTAL OF 3 DOSES IN 15 MINUTES       omeprazole 20 MG CR capsule    priLOSEC    180 capsule    TAKE 2 CAPSULES DAILY 30 TO 60 MINUTES BEFORE A MEAL       rosuvastatin 20 MG tablet    CRESTOR    90 tablet    Take 1 tablet (20 mg) by mouth daily       sulfamethoxazole-trimethoprim 800-160 MG per tablet    BACTRIM DS/SEPTRA DS    14 tablet    Take 1 tablet by mouth 2 times daily

## 2017-04-04 NOTE — TELEPHONE ENCOUNTER
Records received from [facility].   Included  Office notes: 6/9/16, 5/17/16, 5/12/16(speech pathology consult), 5/11/16, 5/1/16  Radiology reports:MRI thoracic on 6/6/16   MRI cervical on 5/16/16   MRI face orbit neck on 5/16/16   MR spine on 5/12/16  Other: labs, EMG on 5/16/16

## 2017-04-04 NOTE — PROGRESS NOTES
Wound now with a bit of a smell and not heally great with the honey dressing with increased drainage.  Wet to dry dressing applied and wound referal  Assessment/Plan:  Katherin was seen today for wound check and imm/inj.    Diagnoses and all orders for this visit:    Peripheral vascular disease of lower extremity with ulceration (H)  -     WOUND CARE REFERRAL  -     sulfamethoxazole-trimethoprim (BACTRIM DS/SEPTRA DS) 800-160 MG per tablet; Take 1 tablet by mouth 2 times daily    Other orders  -     Cancel: PNEUMOCOCCAL CONJ VACCINE 13 VALENT IM      Aris Del Real MD  [facility]  [phone]

## 2017-04-07 ENCOUNTER — OFFICE VISIT (OUTPATIENT)
Dept: FAMILY MEDICINE | Facility: CLINIC | Age: 77
End: 2017-04-07

## 2017-04-07 VITALS
DIASTOLIC BLOOD PRESSURE: 60 MMHG | BODY MASS INDEX: 29.38 KG/M2 | HEART RATE: 64 BPM | WEIGHT: 182 LBS | SYSTOLIC BLOOD PRESSURE: 124 MMHG

## 2017-04-07 DIAGNOSIS — I73.9 PERIPHERAL VASCULAR DISEASE OF LOWER EXTREMITY WITH ULCERATION (H): Primary | ICD-10-CM

## 2017-04-07 DIAGNOSIS — L97.909 PERIPHERAL VASCULAR DISEASE OF LOWER EXTREMITY WITH ULCERATION (H): Primary | ICD-10-CM

## 2017-04-07 PROCEDURE — 99213 OFFICE O/P EST LOW 20 MIN: CPT | Performed by: FAMILY MEDICINE

## 2017-04-07 NOTE — MR AVS SNAPSHOT
After Visit Summary   4/7/2017    Katherin Fletcher    MRN: [id number]           Patient Information     Date Of Birth          1940        Visit Information        Provider Department      4/7/2017 8:00 AM Aris Del Real MD [facility]        Today's Diagnoses     Peripheral vascular disease of lower extremity with ulceration (H)    -  1       Follow-ups after your visit        Your next 10 appointments already scheduled     Apr 13, 2017  1:00 PM CDT   (Arrive by 12:45 PM)   New Patient Visit with Adrian Arroyo DPM   [facility] ([facility])    [address]  4th Floor  [facility] [address]   [phone]            Apr 14, 2017  9:30 AM CDT   (Arrive by 9:15 AM)   New Ataxia with Joe Randolph MD   [facility] ([facility])    [address]  3rd Floor  [facility] [address] [phone]            Apr 17, 2017  9:45 AM CDT   Return Visit with Renato Erickson MD   [facility] PHYSICIANS [facility] ([facility])    [address] [place] [id number] [phone]              Who to contact     If you have questions or need follow up information about today's clinic visit or your schedule please contact [facility] directly at [phone].  Normal or non-critical lab and imaging results will be communicated to you by MyChart, letter or phone within 4 business days after the clinic has received the results. If you do not hear from us within 7 days, please contact the clinic through MyChart or phone. If you have a critical or abnormal lab result, we will notify you by phone as soon as possible.  Submit refill requests through [employer] or call your pharmacy and they will forward the refill request to us. Please allow 3 business days for your refill to be completed.          Additional Information About Your Visit       "  MyChart Information     [email or web address] lets you send messages to your doctor, view your test results, renew your prescriptions, schedule appointments and more. To sign up, go to [email or web address] . Click on \"Log in\" on the left side of the screen, which will take you to the Welcome page. Then click on \"Sign up Now\" on the right side of the page.     You will be asked to enter the access code listed below, as well as some personal information. Please follow the directions to create your username and password.     Your access code is: [email or web address]  Expires: 2017  5:38 PM     Your access code will  in 90 days. If you need help or a new code, please call your [place] clinic or [phone].        Care EveryWhere ID     This is your Care EveryWhere ID. This could be used by other organizations to access your [place] medical records  [id number]        Your Vitals Were     Pulse BMI (Body Mass Index)                64 29.38 kg/m2           Blood Pressure from Last 3 Encounters:   17 124/60   17 110/58   17 128/60    Weight from Last 3 Encounters:   17 82.6 kg (182 lb)   17 82.6 kg (182 lb)   17 82.1 kg (181 lb)              Today, you had the following     No orders found for display       Primary Care Provider Office Phone # Fax #    Aris Del Real [phone]       [facility] [address]  [facility] [address]        Thank you!     Thank you for choosing [facility]  for your care. Our goal is always to provide you with excellent care. Hearing back from our patients is one way we can continue to improve our services. Please take a few minutes to complete the written survey that you may receive in the mail after your visit with us. Thank you!             Your Updated Medication List - Protect others around you: Learn how to safely use, store and throw away your medicines at www.disposemymeds.org.          This list " is accurate as of: 4/7/17  9:09 AM.  Always use your most recent med list.                   Brand Name Dispense Instructions for use    Alfalfa 250 MG Tabs      Take by mouth daily       aspirin 81 MG tablet      Take 81 mg by mouth daily       calcium + D 600-200 MG-UNIT Tabs   Generic drug:  calcium carbonate-vitamin D      Take 1 tablet by mouth 2 times daily.       carbidopa-levodopa  MG per tablet    SINEMET    720 tablet    TAKE 2 TABLETS FOUR TIMES A DAY       carvedilol 6.25 MG tablet    COREG    180 tablet    Take 1 tablet (6.25 mg) by mouth 2 times daily (with meals)       clopidogrel 75 MG tablet    PLAVIX    90 tablet    Take 1 tablet (75 mg) by mouth daily       DAILY MULTI Tabs      Take by mouth daily       gabapentin 100 MG capsule    NEURONTIN     Take 100 mg by mouth daily       isosorbide mononitrate 30 MG 24 hr tablet    IMDUR    90 tablet    Take 1 tablet (30 mg) by mouth daily       levothyroxine 112 MCG tablet    SYNTHROID/LEVOTHROID    90 tablet    TAKE 1 TABLET DAILY       nitroglycerin 0.4 MG sublingual tablet    NITROSTAT    25 tablet    DISSOLVE ONE TABLET UNDER THE TONGUE EVERY 5 MINUTES AS NEEDED FOR CHEST PAIN.  DO NOT EXCEED A TOTAL OF 3 DOSES IN 15 MINUTES       omeprazole 20 MG CR capsule    priLOSEC    180 capsule    TAKE 2 CAPSULES DAILY 30 TO 60 MINUTES BEFORE A MEAL       rosuvastatin 20 MG tablet    CRESTOR    90 tablet    Take 1 tablet (20 mg) by mouth daily       sulfamethoxazole-trimethoprim 800-160 MG per tablet    BACTRIM DS/SEPTRA DS    14 tablet    Take 1 tablet by mouth 2 times daily

## 2017-04-07 NOTE — PROGRESS NOTES
Wound check until can get into wound care in 6 days.  Wound is full thickness and much  from last time.  We will continue with daily wet to dry until experts can weight in with care.  intructed patient and gave supplies to get through to that visit,  If any worsening she will return in between.  KN

## 2017-04-13 ENCOUNTER — OFFICE VISIT (OUTPATIENT)
Dept: WOUND CARE | Facility: CLINIC | Age: 77
End: 2017-04-13

## 2017-04-13 VITALS
TEMPERATURE: 98.4 F | OXYGEN SATURATION: 94 % | WEIGHT: 181.3 LBS | DIASTOLIC BLOOD PRESSURE: 74 MMHG | HEART RATE: 70 BPM | BODY MASS INDEX: 29.14 KG/M2 | HEIGHT: 66 IN | SYSTOLIC BLOOD PRESSURE: 144 MMHG

## 2017-04-13 DIAGNOSIS — L97.909 ARTERIAL LEG ULCER (H): Primary | ICD-10-CM

## 2017-04-13 DIAGNOSIS — I73.9 PAD (PERIPHERAL ARTERY DISEASE) (H): ICD-10-CM

## 2017-04-13 ASSESSMENT — PAIN SCALES - GENERAL: PAINLEVEL: NO PAIN (0)

## 2017-04-13 NOTE — PROGRESS NOTES
Date of Service: 4/13/2017    Chief Complaint:   Chief Complaint   Patient presents with     WOUND CARE     New Wound Consultation        HPI: Katherin is a 76 year old female who presents today for further evaluation of ulcer of the left anterior leg. Was following with Dr. Aris Del Real who was most recently performing wet-to-dry dressings. He prescribed Katherin Bactrim for signs of local infection.  She relates that this all started a 7 years ago after a total ankle replacement on the left. A nurse at the facility she was recovering in pulled off a dressing and created a wound at the same area. That ultimately healed after 2 years, however the area has opened on and off since then. This latest wound has been open for about a month. She has tried Medihoney on the wound with no success.  I have reviewed cardio's and IM's most recent notes. She has an appointment with cardio next week.     Review of Systems: No n/v/d/f/c/ns/sob/cp    PMH:   Past Medical History:   Diagnosis Date     CAD (coronary artery disease)     s/p CABG 1992 and redo CABG 2009 (LIMA to LAD, SVG to OM and SVG to posterolateral branch of RCA)     Essential hypertension, benign      GERD (gastroesophageal reflux disease)      Hyperlipidaemia      Hyperlipidaemia LDL goal < 130      Ischemic cardiomyopathy      MI (myocardial infarction) (H) 11/1996     Mixed hyperlipidemia      PAD (peripheral artery disease) (H)      Restless legs syndrome      Unspecified hypothyroidism        PSxH:   Past Surgical History:   Procedure Laterality Date     ARTHROSCOPY SHOULDER RT/LT       BUNIONECTOMY RT/LT       C CABG, ARTERY-VEIN, THREE  1/2009     C MRA UPPER EXTREMITY WO&W CONT       CARDIAC SURGERY  1992    CABG,stents x2     CARPAL TUNNEL RELEASE RT/LT       HYSTERECTOMY, DAMIÁN  32 yo     OPEN REDUCTION INTERNAL FIXATION ANKLE      Left Ankle     RELEASE TRIGGER FINGER       STENT  2006     STENT(aka CARDIAC)  2008     VASCULAR SURGERY         Allergies: Ace  "inhibitors; Baclofen; Penicillins; and Nickel    SH:   Social History     Social History     Marital status:      Spouse name: N/A     Number of children: N/A     Years of education: N/A     Occupational History     Not on file.     Social History Main Topics     Smoking status: Former Smoker     Quit date: 1/1/1992     Smokeless tobacco: Never Used     Alcohol use 0.0 - 0.5 oz/week     0 - 1 Standard drinks or equivalent per week      Comment: very rare     Drug use: No     Sexual activity: Not on file     Other Topics Concern     Caffeine Concern Yes     1-2 cups      Sleep Concern No     Stress Concern No     Weight Concern Yes     Special Diet No     Exercise Yes     stairs, walking      Social History Narrative       FH:   Family History   Problem Relation Age of Onset     Unknown/Adopted Mother      Unknown/Adopted Father        Objective:  98.4 70 Data Unavailable 144/74 5' 6\" 181 lbs 4.8 oz  DP pulses are 1/4 on the left and PT is non-palpable on the left. CRT is >3 seconds. Absent pedal hair.   Gross sensation is intact bilaterally.   Equinus not tested bilaterally. No pain with active or passive ROM of the ankle, MTJ, 1st ray, or halluces bilaterally. Decreased left ankle ROM      An arterial wound is noted at left  anterior ankle measuring 2.5cm x 0.4cm x 0.1cm.    Kumar Classification: II - to subcutaneous    Wound base: Pink/Granulation s/p debridement. 100% fibrotic before debridement.      Edges: non-infectious erythema    Drainage: small/serous    Odor: no    Undermining: no    Bone Exposure: No    Clinical Signs of Infection: No    After obtaining patient consent, the wound was irrigated with copious amounts of saline. A curette was then used to debride the wound into subcutaneous tissue. The wound edges were debrided back to healthy, bleeding tissue. The wound base exhibited minimal healthy bleeding. No anesthesia was necessary for the procedure.    Ct angiogram:       IMPRESSION: Moderate " stenoses in the common femoral arteries  bilaterally, moderate stenosis in the distal right superficial femoral  artery, moderate to severe stenoses in the above-knee right popliteal  artery, severe stenosis in the left above knee popliteal artery.    DOMINIK 8/8/16  IMPRESSION:  1. Moderately reduced right ankle-brachial index (0.62) consistent  with moderate arterial insufficiency of the right  2. Mildly reduced ankle brachial index (0.86) on the left consistent  with mild arterial insufficiency  3. The patient was not exercised due to inability to walk. Compared to  the last DOMINIK study on February 26, 2014, there has been significant  change on the right leg      Assessment:   - Peripheral neuropathy  - Peripheral vascular disease  - Arterial wound on the anterior ankle      Plan:  - Pt seen and evaluated  - The fibrotic tissue on the base of the wound was debrided away. Sepideh was applied to the wound base today. She should keep this intact until Sunday. I wrote her instructions. If this is successful, we can continue with this. If not, we can switch to Santyl as an enzymatic debrider to release some more of the fibrotic tissue. Will reassess in a week.   - Cardio is holding off on invasive procedures for now. Will await for their input to see if any revascularization procedures are recommended. Any intervention that increases left AT flow from the popliteal will be beneficial for her healing. However, we can try conventional wound treatments to see if the area will heal.

## 2017-04-13 NOTE — LETTER
4/13/2017       RE: Katherin Fletcher  [address]  [facility] [address]     Dear Colleague,    Thank you for referring your patient, Katherin Fletcher, to the [facility] WOUND C[facility] at [facility]. Please see a copy of my visit note below.    Date of Service: 4/13/2017    Chief Complaint:   Chief Complaint   Patient presents with     WOUND CARE     New Wound Consultation        HPI: Katherin is a 76 year old female who presents today for further evaluation of ulcer of the left anterior leg. Was following with Dr. Aris Del Real who was most recently performing wet-to-dry dressings. He prescribed Katherin Bactrim for signs of local infection.  She relates that this all started a 7 years ago after a total ankle replacement on the left. A nurse at the facility she was recovering in pulled off a dressing and created a wound at the same area. That ultimately healed after 2 years, however the area has opened on and off since then. This latest wound has been open for about a month. She has tried Medihoney on the wound with no success.  I have reviewed cardio's and IM's most recent notes. She has an appointment with cardio next week.     Review of Systems: No n/v/d/f/c/ns/sob/cp    PMH:   Past Medical History:   Diagnosis Date     CAD (coronary artery disease)     s/p CABG 1992 and redo CABG 2009 (LIMA to LAD, SVG to OM and SVG to posterolateral branch of RCA)     Essential hypertension, benign      GERD (gastroesophageal reflux disease)      Hyperlipidaemia      Hyperlipidaemia LDL goal < 130      Ischemic cardiomyopathy      MI (myocardial infarction) (H) 11/1996     Mixed hyperlipidemia      PAD (peripheral artery disease) (H)      Restless legs syndrome      Unspecified hypothyroidism        PSxH:   Past Surgical History:   Procedure Laterality Date     ARTHROSCOPY SHOULDER RT/LT       BUNIONECTOMY RT/LT       C CABG, ARTERY-VEIN, THREE  1/2009     C MRA UPPER EXTREMITY  "WO&W CONT       CARDIAC SURGERY  1992    CABG,stents x2     CARPAL TUNNEL RELEASE RT/LT       HYSTERECTOMY, DAMIÁN  32 yo     OPEN REDUCTION INTERNAL FIXATION ANKLE      Left Ankle     RELEASE TRIGGER FINGER       STENT  2006     STENT(aka CARDIAC)  2008     VASCULAR SURGERY         Allergies: Ace inhibitors; Baclofen; Penicillins; and Nickel    SH:   Social History     Social History     Marital status:      Spouse name: N/A     Number of children: N/A     Years of education: N/A     Occupational History     Not on file.     Social History Main Topics     Smoking status: Former Smoker     Quit date: 1/1/1992     Smokeless tobacco: Never Used     Alcohol use 0.0 - 0.5 oz/week     0 - 1 Standard drinks or equivalent per week      Comment: very rare     Drug use: No     Sexual activity: Not on file     Other Topics Concern     Caffeine Concern Yes     1-2 cups      Sleep Concern No     Stress Concern No     Weight Concern Yes     Special Diet No     Exercise Yes     stairs, walking      Social History Narrative       FH:   Family History   Problem Relation Age of Onset     Unknown/Adopted Mother      Unknown/Adopted Father        Objective:  98.4 70 Data Unavailable 144/74 5' 6\" 181 lbs 4.8 oz  DP pulses are 1/4 on the left and PT is non-palpable on the left. CRT is >3 seconds. Absent pedal hair.   Gross sensation is intact bilaterally.   Equinus not tested bilaterally. No pain with active or passive ROM of the ankle, MTJ, 1st ray, or halluces bilaterally. Decreased left ankle ROM      An arterial wound is noted at left  anterior ankle measuring 2.5cm x 0.4cm x 0.1cm.    Kumar Classification: II - to subcutaneous    Wound base: Pink/Granulation s/p debridement. 100% fibrotic before debridement.      Edges: non-infectious erythema    Drainage: small/serous    Odor: no    Undermining: no    Bone Exposure: No    Clinical Signs of Infection: No    After obtaining patient consent, the wound was irrigated with copious " amounts of saline. A curette was then used to debride the wound into subcutaneous tissue. The wound edges were debrided back to healthy, bleeding tissue. The wound base exhibited minimal healthy bleeding. No anesthesia was necessary for the procedure.    Ct angiogram:       IMPRESSION: Moderate stenoses in the common femoral arteries  bilaterally, moderate stenosis in the distal right superficial femoral  artery, moderate to severe stenoses in the above-knee right popliteal  artery, severe stenosis in the left above knee popliteal artery.    DOMINIK 8/8/16  IMPRESSION:  1. Moderately reduced right ankle-brachial index (0.62) consistent  with moderate arterial insufficiency of the right  2. Mildly reduced ankle brachial index (0.86) on the left consistent  with mild arterial insufficiency  3. The patient was not exercised due to inability to walk. Compared to  the last DOMINIK study on February 26, 2014, there has been significant  change on the right leg      Assessment:   - Peripheral neuropathy  - Peripheral vascular disease  - Arterial wound on the anterior ankle      Plan:  - Pt seen and evaluated  - The fibrotic tissue on the base of the wound was debrided away. Sepideh was applied to the wound base today. She should keep this intact until Sunday. I wrote her instructions. If this is successful, we can continue with this. If not, we can switch to Santyl as an enzymatic debrider to release some more of the fibrotic tissue. Will reassess in a week.   - Cardio is holding off on invasive procedures for now. Will await for their input to see if any revascularization procedures are recommended. Any intervention that increases left AT flow from the popliteal will be beneficial for her healing. However, we can try conventional wound treatments to see if the area will heal.              Again, thank you for allowing me to participate in the care of your patient.      Sincerely,    Adrian Arroyo DPM

## 2017-04-13 NOTE — MR AVS SNAPSHOT
After Visit Summary   4/13/2017    Katherin Fletcher    MRN: [id number]           Patient Information     Date Of Birth          1940        Visit Information        Provider Department      4/13/2017 1:00 PM Adrian Arroyo DPM [facility] Wound Care        Care Instructions    We put a piece of collagen onto the wound today called Sepideh. Keep the Sepideh and the non-stick gauze on top of it intact until Sunday. You can change the outer dressing daily. Keep the area dry  On Sunday, you can shower normally starting Sunday. After the shower, spray the wound with the MicroKlenz and dab dry. After that, apply a wet-to-moist dressing to the area and change this daily.         Follow-ups after your visit        Your next 10 appointments already scheduled     Apr 14, 2017  9:30 AM CDT   (Arrive by 9:15 AM)   New Ataxia with Joe Randolph MD   [facility] Neurology ([facility] [place])    [address] [place] [address]  3rd [facility] [address] [phone]            Apr 17, 2017  9:45 AM CDT   Return Visit with Renato Erickson MD   [facility] PHYSICIANS [facility] ([facility])    [address] [place] [id number] [phone]            Apr 20, 2017  9:00 AM CDT   (Arrive by 8:45 AM)   Return Visit with Adrian Arroyo DPM   [facility] Wound Care ([facility] [place])    [address] [address] [phone]            Apr 27, 2017  3:30 PM CDT   (Arrive by 3:15 PM)   Return Visit with Adrian Arroyo DPM   [facility] Wound Care ([facility] [place])    [address] [place] [address] [phone]              Who to contact     Please call your clinic at [phone] to:    Ask questions about your health    Make or cancel appointments    Discuss your medicines    Learn about your test results    Speak to your  "doctor   If you have compliments or concerns about an experience at your clinic, or if you wish to file a complaint, please contact [facility] Patient Relations at [phone] or email us at [email or web address]         Additional Information About Your Visit        [employer] Information     [employer] is an electronic gateway that provides easy, online access to your medical records. With [employer], you can request a clinic appointment, read your test results, renew a prescription or communicate with your care team.     To sign up for K[employer] visit the website at [email or web address]   You will be asked to enter the access code listed below, as well as some personal information. Please follow the directions to create your username and password.     Your access code is: [email or web address]  Expires: 2017  5:38 PM     Your access code will  in 90 days. If you need help or a new code, please contact your [facility] or call [phone] for assistance.        Care EveryWhere ID     This is your Care EveryWhere ID. This could be used by other organizations to access your [place] medical records  [id number]        Your Vitals Were     Pulse Temperature Height Pulse Oximetry BMI (Body Mass Index)       70 98.4  F (36.9  C) 5' 6\" 94% 29.26 kg/m2        Blood Pressure from Last 3 Encounters:   17 144/74   17 124/60   17 110/58    Weight from Last 3 Encounters:   17 181 lb 4.8 oz   17 182 lb   17 182 lb              Today, you had the following     No orders found for display       Primary Care Provider Office Phone # Fax #    Aris Del Real [phone]       [facility] [address]  [facility] [address]        Thank you!     Thank you for choosing [facility]  for your care. Our goal is always to provide you with excellent care. Hearing back from our patients is " one way we can continue to improve our services. Please take a few minutes to complete the written survey that you may receive in the mail after your visit with us. Thank you!             Your Updated Medication List - Protect others around you: Learn how to safely use, store and throw away your medicines at www.disposemymeds.org.          This list is accurate as of: 4/13/17  1:33 PM.  Always use your most recent med list.                   Brand Name Dispense Instructions for use    Alfalfa 250 MG Tabs      Take by mouth daily       aspirin 81 MG tablet      Take 81 mg by mouth daily       calcium + D 600-200 MG-UNIT Tabs   Generic drug:  calcium carbonate-vitamin D      Take 1 tablet by mouth 2 times daily.       carbidopa-levodopa  MG per tablet    SINEMET    720 tablet    TAKE 2 TABLETS FOUR TIMES A DAY       carvedilol 6.25 MG tablet    COREG    180 tablet    Take 1 tablet (6.25 mg) by mouth 2 times daily (with meals)       clopidogrel 75 MG tablet    PLAVIX    90 tablet    Take 1 tablet (75 mg) by mouth daily       DAILY MULTI Tabs      Take by mouth daily       gabapentin 100 MG capsule    NEURONTIN     Take 100 mg by mouth daily       isosorbide mononitrate 30 MG 24 hr tablet    IMDUR    90 tablet    Take 1 tablet (30 mg) by mouth daily       levothyroxine 112 MCG tablet    SYNTHROID/LEVOTHROID    90 tablet    TAKE 1 TABLET DAILY       nitroglycerin 0.4 MG sublingual tablet    NITROSTAT    25 tablet    DISSOLVE ONE TABLET UNDER THE TONGUE EVERY 5 MINUTES AS NEEDED FOR CHEST PAIN.  DO NOT EXCEED A TOTAL OF 3 DOSES IN 15 MINUTES       omeprazole 20 MG CR capsule    priLOSEC    180 capsule    TAKE 2 CAPSULES DAILY 30 TO 60 MINUTES BEFORE A MEAL       rosuvastatin 20 MG tablet    CRESTOR    90 tablet    Take 1 tablet (20 mg) by mouth daily       sulfamethoxazole-trimethoprim 800-160 MG per tablet    BACTRIM DS/SEPTRA DS    14 tablet    Take 1 tablet by mouth 2 times daily

## 2017-04-13 NOTE — NURSING NOTE
"Chief Complaint   Patient presents with     WOUND CARE     New Wound Consultation       Vitals:    04/13/17 1247   BP: 144/74   BP Location: Left arm   Patient Position: Chair   Cuff Size: Adult Regular   Pulse: 70   Temp: 98.4  F (36.9  C)   SpO2: 94%   Weight: 181 lb 4.8 oz   Height: 5' 6\"       Body mass index is 29.26 kg/(m^2).      Janny Story                          "

## 2017-04-13 NOTE — PATIENT INSTRUCTIONS
We put a piece of collagen onto the wound today called Sepideh. Keep the Sepideh and the non-stick gauze on top of it intact until Sunday. You can change the outer dressing daily. Keep the area dry  On Sunday, you can shower normally starting Sunday. After the shower, spray the wound with the MicroKlenz and dab dry. After that, apply a wet-to-moist dressing to the area and change this daily.

## 2017-04-14 ENCOUNTER — OFFICE VISIT (OUTPATIENT)
Dept: NEUROLOGY | Facility: CLINIC | Age: 77
End: 2017-04-14

## 2017-04-14 VITALS
RESPIRATION RATE: 20 BRPM | HEART RATE: 66 BPM | HEIGHT: 65 IN | BODY MASS INDEX: 30.32 KG/M2 | TEMPERATURE: 97.6 F | DIASTOLIC BLOOD PRESSURE: 65 MMHG | WEIGHT: 182 LBS | SYSTOLIC BLOOD PRESSURE: 133 MMHG | OXYGEN SATURATION: 99 %

## 2017-04-14 DIAGNOSIS — G11.8 SPINOCEREBELLAR ATAXIA (H): Primary | ICD-10-CM

## 2017-04-14 ASSESSMENT — PAIN SCALES - GENERAL: PAINLEVEL: NO PAIN (0)

## 2017-04-14 NOTE — NURSING NOTE
Chief Complaint   Patient presents with     Consult     UMP- ATAXIA, DYSARTRIA AND NEUROPATHY

## 2017-04-14 NOTE — LETTER
"4/14/2017       RE: Katherin Fletcher  [address]  [facility] [address]     Dear Colleague,    Thank you for referring your patient, Katherin Fletcher, to the [facility] NEUROLOGY at [facility]. Please see a copy of my visit note below.    Katherin Fletcher is a 76-year-old woman who is self-referred and is seeking a neurological opinion from the [facility] at the [facility].  She is coherent and her history is given in a chronologic order which is appropriate to the past medical records I had a chance to review.      CHIEF COMPLAINT:  \"I have had trouble with my speech and balance beginning about 2-1/2 or 3 years ago and have worsened slowly since then.  Also, I have fallen more than 30 times in the past 2 years.  I have been told I have dysarthria.\"      HISTORY OF PRESENT ILLNESS:  She provides history, which I will breakdown in a separate categories as follows:   1.  Speech difficulty.  This seemed to occur after she saw a dental hygienist.  She recalls feeling numb \"like Novocain coming out\" on the right side of her face.  Coincidentally, she also lost taste on the right side.  She has been left with a paraesthesia, which is somewhat variable but is always there to some extent.  It is characterized by any sort of pinprick sensation.  She thinks that this difficulty heralded difficulty with her speech.  It has been slow and deliberate, although she states that people have no trouble understanding her.  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  This history goes back to 3-1/2 years ago.   2.  Katherin states that she has had spasms in her legs during which they would stiffen up or jerk suddenly.  This still occurs on an intermittent basis and the event may last nearly 10 minutes or up to 2 hours.  Baclofen has provided no help in this regard.   3.  Imbalance.  This also began about the time that the speech difficulty " "occurred, although seemed to come a little later, perhaps a couple of months.  She has had to go from a cane which she started using 2 years ago down to a walker which she has been using the past year.  She has had a previous injury to her left ankle and has had surgery and so it is quite stiff, which of course interferes with her walking and balance somewhat.  The patient denies any numbness or pain other than at the ankle.  She denies any back or neck problem.   4.  Katherin states that her writing has been slower but still quite legible.   5.  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      The patient is not aware of any inciting factor other than as stated above that might have caused the walking and speech difficulties.  There is no history of any toxic exposure or infectious process or trauma other than to her left ankle.      PAST SURGICAL HISTORY:   1.  Hysterectomy at age 32.   2.  CABG on 2 occasions, the first in 1992 when 2 vessels were bypassed (1 was clogged completely) and then again in 2008 when 3 vessels were stenotic.   3.  Ankle \"replacement\" on the left side 7 years ago.   4.  Carpal tunnel surgery bilaterally.   5.  Stent into one of her left leg arteries accessed via the groin.   6.  Bilateral cataract removal 2 years ago done by Dr. Huggins in [place], [place].      PAST MEDICAL HISTORY:   1.  Mixed hyperlipidemia.   2.  Essential hypertension.   3.  Peripheral artery disease greater in the left lower extremity.   4.  Gastrointestinal reflux disease (GERD).   5.  Hypothyroidism.   6.  MRSA after ankle surgery.  Apparently the resistant bacteria was acquired when she was in the nursing home following the surgery.   7.  Ischemic cardiomyopathy (she had a myocardial infarction in 1996).   8.  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   9.  Peripheral neuropathy, apparently diagnosed at [facility].      ALLERGIES:  Penicillin causes a rash.  Nickel causes for a local reaction.  She also states that she is allergic to baclofen.      TRAUMA:   Fracture of her left ankle which was sustained when she slipped on ice.      HABITS:  She smoked starting at age 19 off and on for about 38-40 years.  She can recall the exact date that she stopped, which was 04/14/1992.  She smoked less than 1 pack a day.  She has infrequent alcohol intake and she has not been using any illicit drugs.      REVIEW OF SYSTEMS:     Cranium:  Denies headaches.  Has no scars.     EYES:   History of cataracts bilaterally, the surgery done 2 years ago.  Denies diplopia.   ENT:  Hearing is fine.  She does have a plugged up feeling in her nose and considerable postnasal drip.  Has had diminished taste, stating that chicken and beef \"taste like straw\".  Swallowing is no problem for her.  She does have some increased salivation.  Interestingly, she has had a dry cough for about 6 years without any history of any infection to precipitate that.     CHEST:   Chest including heart and lungs denies chest pain, palpitations, shortness of breath or hemoptysis.   GASTROINTESTINAL:   Has a history of some GERD.  No ulcer.  She becomes constipated with some medications.  Prunes provide good remedy for that.   GENITOURINARY:  No female problems, no incontinence.   BACK AND NECK:   Denies any difficulties.   EXTREMITIES:  She has a history of narrowing of an artery in her left lower extremity.  Her feet are cold quite a bit of the time, mostly on the left side at night.  She had problems with charley horse during pregnancy.      FAMILY HISTORY:  The patient was adopted and has not been able to obtain any history regarding her family of origin, although she states that if she comes up with $500 she will be able to get more information.      SOCIAL HISTORY:    Katherin was born in a hospital in [place], [place] and she does know her original name " which was Kiko Karan Arvizu.  She went to [employer] in [place] and attended [employer] for 2 years.  At age 20 she moved to the [facility] area in [place].  In , she was  for the first time and her , whose last name was Marjorie, was killed in a car accident at age 27.  Katherin remarried in , a person by the name of Deb (last name) and he  of a heart attack.  Her third marriage was to Caio Fletcher who is 91 years old.  He was in the armed forces doing duty in the Army during World War II, the [place] War and [place] for 4 tours.  He is 100% disabled due to head injury.  He was a sergeant major in the special forces command.  Katherin started ??  at the [employer]  until .  She has had a full snf package including the [employer] and the [employer] Service.      PREVIOUS STUDIES:   The patient was seen at the [facility] approximately a year ago in the Neurology Service.  A thorough workup was done at that time.  Laboratory studies were normal, and this included a CBC with differential.  Sed rate was 15 mm per hour.  B12, folate, iron and vitamin E levels were all normal.  Creatinine was normal at 0.7.  Ceruloplasmin, electrophoresis and serum protein were normal.  Immunofixation was likewise normal.  TSH was normal.  Immunological studies including rheumatoid factor, myeloperoxidase, proteinase, and cyclic citrullinated peptide and ZAIN cascade were all normal.  Finally, heavy metal for copper and zinc was normal.  MRI of the thoracic spine revealed no cord compression or signal change.  Cervical spine showed mild central canal stenosis, especially at C5-C6 but no cord compression.  Brain MRI revealed a mild leukoaraiosis and mild diffuse atrophy but more so in the bilateral temporal parietal lobes.  Somatosensory evoked potentials (SEP) showed impaired conduction in the central  "proprioceptive pathways.      Speech Pathology was consulted and they reported \"ataxic dysarthria manifested by slow rate and irregular articulatory breakdown\".  The impression noted by the [facility] neurologist included gait instability, bilateral lower extremity flexure spasms, spasticity, parkinsonism, ataxia, dysarthria and spondylytic cervical spine stenosis.      PHYSICAL EXAMINATION:   GENERAL PHYSICAL EXAM:     CRANIUM:   No deformities or evidence of trauma.   EYES:  No arcus senilis.  Pupils are equal and reactive.  Visual acuity is 20/20 on the left, but markedly diminished on the right.  The right optic fundus shows a change in the fovea, which is somewhat of a pigmentary change.  I believe that the optic nerves show a mild atrophy.   ENT:  No active inflammation or discharge.   NECK:  Supple, without any areas of tenderness.  Carotid pulsations are equal and there are no bruits.   CHEST:  Heart reveals a regular rhythm without murmur.  Lungs are clear without evidence of rales or rhonchi.   ABDOMEN:  No hepatosplenomegaly.  Aorta does not feel distended.   GENITALIA:  Deferred.   EXTREMITIES:  The left ankle has a small bandage over the anterior aspect.  The patient states that there is a small chronic ulcer there.  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  I cannot detect a left dorsalis pedis pulse, but the right is present at about 2+.  Left ankle range of motion is considerably reduced.   BACK:  No areas of tenderness.  No paraspinal muscle spasm.   SKIN:  Normal except for the ankle area which I did not view personally.   NEUROLOGIC:  Cranial nerves II-XII reveal the following:   I:  There is somewhat diminished smell ability.   II:  Mild uptake atrophy, which is subjective on my part.     III, IV and VI:  Extraocular muscles are normal.  Gaze is normal in all directions.  She has no saccades.  There is a slight decrease in convergence.   V:   Paresthesias on rubbing the skin " of her right cheek.  Otherwise, sensation and muscle strength of the pterygoids is normal.     VII:   No facial weakness.  No asymmetry.     VIII:   Hearing is intact bilaterally.     IX and X:   Gag reflex is good.  The cough is strong.     XI:   Shoulder shrug is normal.     XII:   There is no tongue atrophy nor fibrillations.  There is no tongue weakness.      REFLEXES:  Active and equal at 2+ in the upper extremities and 1+ at the knees and right ankle, but absent on the left.  There is an upgoing toe to plantar stimulation on the left.   MUSCLE TESTING:  Generally normal except for mild weakness of the deltoids bilaterally, 4/5, and the psoas at 4/5 bilaterally.  Tone is normal.  There are no areas of atrophy.   COORDINATION:     Alternating movements in 15 seconds:    TEST:      RIGHT:    LEFT:   Supination/pronation of the forearms  13  14   Finger tapping      26   31    Foot tapping      15  15   Eye blink      18   18      GAIT:   Gait was performed only with the help of a 4-wheeled walker.  She traversed 25 feet in 11 seconds.  Is unable to stand on 1 foot or tandem walk or even walk unassisted.   SENSATION:  Light touch normal throughout except for the right cheek.  Pinprick perception is normal throughout.  Temperature sensation is somewhat variable in the feet, but I do not think this is really reliable.     VIBRATORY SENSE:   Perception of vibration in a 128 Hz tuning fork was 11 seconds at the right great toe and 11 seconds on the left.  Position sense is intact bilaterally.      IMPRESSION:   1.  Spinocerebellar ataxia, uncertain type.   2.  Mild dysarthria with scanning speech and distortion of vowels secondary to #1.   3.  Imbalance with frequent falls secondary to #1.   4.  History of flexure spasms.     5.  I could not confirm Parkinsonism.   6.  Status post bilateral carpal tunnel release.      DISCUSSION:  Mrs. Fletcher has a history fairly typical for a sporadic ataxia, although it could be  hereditary in that she has no family history available to her as she was adopted.  Her children are in their 20s and both are too young probably for any of the ataxia symptoms to appear.  I think that the right facial paresthesias are not related to the ataxic condition and likely her loss of sense of smell and taste are not related.  However, her frequent dry cough is sometimes seen in the ataxias, especially if there is some sensory involvement.  The patient has accommodated her ataxia quite well and is in good spirits and has adjusted to the disability that has resulted from her condition.      PLAN:   1.  I will have Boris Manley, genetic counselor, see the patient when he is available.  He may have to call her by phone.   2.  Molecular testing for a possible mutation and this time I would only do number 5, 8, 11 and 17.   3.  No medications are prescribed as I feel that since she is already using a walker that there is no need for some of the drugs which I have used in the past such as Symmetrel, BuSpar and Chantix.   4.  A note has been written to physical therapy to add balance and gait training to her present program for cardiac rehabilitation.   5.  [facility] to [facility] in 10/2017.  My nurse will be in touch with her about this.      A total of 90 minutes was spent with this patient, with a vast majority dedicated to discussing her symptoms and relationship to the ataxia as well as to setting up a program for managing her disease also.  I also talked with her about psychological adjustment.  She seemed to be satisfied with the encounter.        Smitha Xiong MD      cc:   Aris Del Real MD   [facility]    [address] [place] [address]         SMITHA XIONG MD             D: 2017 18:09   T: 2017 12:14   MT: LINA      Name:     ANA RODRIGUEZ   MRN:      [id number]        Account:      [id number]   :      1940           Service Date: 2017       Document: [id number]

## 2017-04-14 NOTE — MR AVS SNAPSHOT
After Visit Summary   4/14/2017    Katherin Fletcher    MRN: [id number]           Patient Information     Date Of Birth          1940        Visit Information        Provider Department      4/14/2017 9:30 AM Joe Randolph MD [facility] Neurology        Today's Diagnoses     Spinocerebellar ataxia (H)    -  1       Follow-ups after your visit        Additional Services     PT Referral ([facility])       *This therapy referral will be filtered to a centralized scheduling office at [facility] and the patient will receive a call to schedule an appointment at a [facility] location most convenient for them. *     [facility] provides Physical Therapy evaluation and treatment and many specialty services across the [facility] system.  If requesting a specialty program, please choose from the list below.    If you have not heard from the scheduling office within 2 business days, please call [phone] for all locations, with the exception of [place], please call [phone].  Treatment: Evaluation & Treatment  Special Instructions/Modalities: Pt is already going to physical therapy at [facility]. Dr. Ayo Randolph would like to add balance and gait training. Pt has problems falling.   Special Programs: None    Please be aware that coverage of these services is subject to the terms and limitations of your health insurance plan.  Call member services at your health plan with any benefit or coverage questions.      **Note to Provider:  If you are referring outside of [facility] for the therapy appointment, please list the name of the location in the  special instructions  above, print the referral and give to the patient to schedule the appointment.                  Your next 10 appointments already scheduled     Apr 27, 2017  3:30 PM CDT   (Arrive by 3:15 PM)   Return Visit with Adrian Arroyo DPM   [facility] ([facility]  [place])    [address]  [address] [facility] [address]   [phone]            May 03, 2017  3:15 PM CDT   Treatment 45 with Amber Adrian, PT   [facility] Physical Therapy ([facility])    [address]  [address]  [place] [address]   [phone]            May 04, 2017  8:30 AM CDT   Peripheral Angiogram with SHCVR2   [facility] ([facility])    [address]  [place] [address]   [phone]            May 10, 2017 11:00 AM CDT   Treatment 45 with Amber Adrian, PT   [facility] Physical Therapy ([facility])    [address]  [place] [address]   [phone]            May 11, 2017 10:50 AM CDT   Return Visit with Charline Joyner PA-C   [facility]ICI[facility] [place] ([facility])    [address]  [place] [address]   [phone]            May 11, 2017  2:00 PM CDT   (Arrive by 1:45 PM)   Return Visit with Adrian Arroyo DPM   [facility] ([facility])    [address] [address]   [phone]            May 17, 2017  2:15 PM CDT   Treatment 45 with Amber Adrian, PT   [facility] Physical Therapy ([facility])    [address]  [address]  [place] [address]   [phone]            May 24, 2017 11:45 AM CDT   Treatment 45 with Amber Adrian, PT   [facility] Physical Therapy ([facility])    [address]  [address]  [place] [address]   [phone]              Who to contact     Please call your clinic at [phone] to:    Ask questions about your health    Make or cancel appointments    Discuss your medicines    Learn about your test results    Speak to your doctor   If you have compliments or concerns about an experience at your clinic, or if you wish to file a complaint, please contact [facility]  "[place] [facility] Patient Relations at [phone] or email us at [email or web address]         Additional Information About Your Visit        [employer] Information     [employer] is an electronic gateway that provides easy, online access to your medical records. With [employer], you can request a clinic appointment, read your test results, renew a prescription or communicate with your care team.     To sign up for [employer] visit the website at [email or web address]   You will be asked to enter the access code listed below, as well as some personal information. Please follow the directions to create your username and password.     Your access code is: [email or web address]  Expires: 2017  5:38 PM     Your access code will  in 90 days. If you need help or a new code, please contact your [facility] or call [phone] for assistance.        Care EveryWhere ID     This is your Care EveryWhere ID. This could be used by other organizations to access your [facility] medical records  [id number]        Your Vitals Were     Pulse Temperature Respirations Height Pulse Oximetry Breastfeeding?    66 97.6  F (36.4  C) (Oral) 20 1.638 m (5' 4.5\") 99% No    BMI (Body Mass Index)                   30.76 kg/m2            Blood Pressure from Last 3 Encounters:   17 122/64   17 133/65   17 144/74    Weight from Last 3 Encounters:   17 80.7 kg (178 lb)   17 82.6 kg (182 lb)   17 82.2 kg (181 lb 4.8 oz)              We Performed the Following     PT Referral ([place])        Primary Care Provider Office Phone # Fax #    Aris Del Real [phone]       [facility] [address]  [facility] [address]        Thank you!     Thank you for choosing [facility] NEUROLOGY  for your care. Our goal is always to provide you with excellent care. Hearing back from our patients is one way we can continue to improve our services. " Please take a few minutes to complete the written survey that you may receive in the mail after your visit with us. Thank you!             Your Updated Medication List - Protect others around you: Learn how to safely use, store and throw away your medicines at www.disposemymeds.org.          This list is accurate as of: 4/14/17 11:59 PM.  Always use your most recent med list.                   Brand Name Dispense Instructions for use    Alfalfa 250 MG Tabs      Take by mouth daily       aspirin 81 MG tablet      Take 81 mg by mouth daily       calcium + D 600-200 MG-UNIT Tabs   Generic drug:  calcium carbonate-vitamin D      Take 1 tablet by mouth 2 times daily.       carbidopa-levodopa  MG per tablet    SINEMET    720 tablet    TAKE 2 TABLETS FOUR TIMES A DAY       carvedilol 6.25 MG tablet    COREG    180 tablet    Take 1 tablet (6.25 mg) by mouth 2 times daily (with meals)       clopidogrel 75 MG tablet    PLAVIX    90 tablet    Take 1 tablet (75 mg) by mouth daily       DAILY MULTI Tabs      Take by mouth daily       gabapentin 100 MG capsule    NEURONTIN     Take 100 mg by mouth daily       isosorbide mononitrate 30 MG 24 hr tablet    IMDUR    90 tablet    Take 1 tablet (30 mg) by mouth daily       levothyroxine 112 MCG tablet    SYNTHROID/LEVOTHROID    90 tablet    TAKE 1 TABLET DAILY       nitroglycerin 0.4 MG sublingual tablet    NITROSTAT    25 tablet    DISSOLVE ONE TABLET UNDER THE TONGUE EVERY 5 MINUTES AS NEEDED FOR CHEST PAIN.  DO NOT EXCEED A TOTAL OF 3 DOSES IN 15 MINUTES       omeprazole 20 MG CR capsule    priLOSEC    180 capsule    TAKE 2 CAPSULES DAILY 30 TO 60 MINUTES BEFORE A MEAL       rosuvastatin 20 MG tablet    CRESTOR    90 tablet    Take 1 tablet (20 mg) by mouth daily

## 2017-04-17 ENCOUNTER — OFFICE VISIT (OUTPATIENT)
Dept: CARDIOLOGY | Facility: CLINIC | Age: 77
End: 2017-04-17
Attending: INTERNAL MEDICINE
Payer: MEDICARE

## 2017-04-17 VITALS
WEIGHT: 178 LBS | HEIGHT: 65 IN | HEART RATE: 68 BPM | SYSTOLIC BLOOD PRESSURE: 122 MMHG | DIASTOLIC BLOOD PRESSURE: 64 MMHG | BODY MASS INDEX: 29.66 KG/M2

## 2017-04-17 DIAGNOSIS — I25.5 ISCHEMIC CARDIOMYOPATHY: Primary | ICD-10-CM

## 2017-04-17 PROCEDURE — 99214 OFFICE O/P EST MOD 30 MIN: CPT | Performed by: INTERNAL MEDICINE

## 2017-04-17 NOTE — LETTER
4/17/2017    Aris Del Real MD  [facility]   [address]  [facility] [address]    RE: Katherin Duncann Fletcher       Dear Colleague,    I had the pleasure of seeing Katherin Fletcher in the [facility].  Ms. Fletcher is a pleasant 76-year-old female who presents to Cardiology office today for followup after a recent visit to the [facility] up with Dr. Erickson for the evaluation of abnormal ABIs.  At that time, he recommended a peripheral arterial CTA and asked her to follow up in the office following that.      The patient has a quite extensive cardiovascular history.  Her cardiac history includes coronary artery disease with history of 2 prior coronary artery bypass surgeries, 1 in 1992 with a redo in 2009.  She also has known peripheral arterial disease and due to nonhealing wound in 2010, she underwent peripheral angiography initially in January at which time she had an angioplasty to multiple vessels in the left lower extremity along with stenting of the distal left superficial femoral and above-the-knee popliteal artery.  She underwent repeat angiography with PTA to the left below-the-knee popliteal artery and anterior tibial artery in 07/2010.  Unfortunately, she has had a functional decline over the past few years due to nonspecific neurologic  disorder.  Her exertional level has been limited due to this disorder.  Recently, the patient was considering undergoing a surgery on her right foot for hammertoe.  As part of the evaluation, she did undergo ABIs.  This showed a resting DOMINIK of 0.86 on the left side; however, the right side showed an DOMINIK of 0.62.       She does not appear to have any claudication with usual daily activities; however, if she exerts herself a bit more she does notice some cramping in her calves bilaterally.  She also states that she is prone to have charley horses.       I reviewed with her the results of her CT abdominal  "aorta, bilateral iliofemoral system with runoff.  This showed normal abdominal aorta without significant stenosis or any aneurysmal dilatation.  The iliac arteries were patent without any significant stenosis.  She was noted to have moderate stenosis in the common femoral arteries bilaterally, moderate stenosis in the distal right superficial femoral artery, moderate to severe stenosis in the above-the-knee right popliteal artery and severe stenosis in the left popliteal artery.  She was noted to have 3-vessel runoff in the tibial arteries bilaterally. Signal dropout on left AT vs lesion proximally?      For ~ 1 month has had ulcer left anterior shin.  Went to would clinic with minimal improvement.  Returns for evaluation.      PHYSICAL EXAMINATION:   GENERAL:  The patient is a pleasant 76-year-old female who appears her stated age.  She is in no apparent distress.   VITAL SIGNS:  Blood pressure 122/64, pulse 68, height 1.638 m (5' 4.5\"), weight 80.7 kg (178 lb), not currently breastfeeding.  LUNGS:  Clear to auscultation bilaterally.   CARDIAC:  Reveals a regular rate and rhythm, no murmurs appreciated.   EXTREMITIES:  Lower extremities show no evidence of edema.  Her posterior tibial pulses are diminished bilaterally. There is an ulcerated lesion seen on anterior shin near ankle.  Various stages of healing with defined borders.    NEUROLOGIC:  Alert and oriented.     Outpatient Encounter Prescriptions as of 4/17/2017   Medication Sig Dispense Refill     carbidopa-levodopa (SINEMET)  MG per tablet TAKE 2 TABLETS FOUR TIMES A  tablet 0     omeprazole (PRILOSEC) 20 MG CR capsule TAKE 2 CAPSULES DAILY 30 TO 60 MINUTES BEFORE A MEAL 180 capsule 1     clopidogrel (PLAVIX) 75 MG tablet Take 1 tablet (75 mg) by mouth daily 90 tablet 3     rosuvastatin (CRESTOR) 20 MG tablet Take 1 tablet (20 mg) by mouth daily 90 tablet 3     levothyroxine (SYNTHROID, LEVOTHROID) 112 MCG tablet TAKE 1 TABLET DAILY 90 tablet 2 "     aspirin 81 MG tablet Take 81 mg by mouth daily       gabapentin (NEURONTIN) 100 MG capsule Take 100 mg by mouth daily       Multiple Vitamins-Minerals (DAILY MULTI) TABS Take by mouth daily       Alfalfa 250 MG TABS Take by mouth daily       isosorbide mononitrate (IMDUR) 30 MG 24 hr tablet Take 1 tablet (30 mg) by mouth daily 90 tablet 3     carvedilol (COREG) 6.25 MG tablet Take 1 tablet (6.25 mg) by mouth 2 times daily (with meals) 180 tablet 3     nitroglycerin (NITROSTAT) 0.4 MG SL tablet DISSOLVE ONE TABLET UNDER THE TONGUE EVERY 5 MINUTES AS NEEDED FOR CHEST PAIN.  DO NOT EXCEED A TOTAL OF 3 DOSES IN 15 MINUTES 25 tablet 3     calcium carbonate-vitamin D (CALCIUM + D) 600-200 MG-UNIT TABS Take 1 tablet by mouth 2 times daily.       [DISCONTINUED] sulfamethoxazole-trimethoprim (BACTRIM DS/SEPTRA DS) 800-160 MG per tablet Take 1 tablet by mouth 2 times daily 14 tablet 0     No facility-administered encounter medications on file as of 4/17/2017.       PLAN:   1.  The patient will continue her current cardiac medication regimen unchanged.   2.  Discussed various options with patient.  Due to non healing would left shin, have advised angiography and possible PTA of left anterior tibial artery.  Risks/benefits/potential complications discussed and she is willing to proceed.  3.  Return as outpatient on May 4 for Peripheral angiogram and PTA below the knee left side.      Thank you for allowing me to participate in the care of your patient.    Sincerely,     Renato Erickson MD     [facility]

## 2017-04-17 NOTE — MR AVS SNAPSHOT
After Visit Summary   4/17/2017    Katherin Fletcher    MRN: [id number]           Patient Information     Date Of Birth          1940        Visit Information        Provider Department      4/17/2017 9:45 AM Renato Erickson MD [facility] [place]        Today's Diagnoses     PAD (peripheral artery disease) (H)           Follow-ups after your visit        Your next 10 appointments already scheduled     Apr 20, 2017  9:00 AM CDT   (Arrive by 8:45 AM)   Return Visit with Adrian Arroyo DPM   [facility] ([facility])    [address] [place] [id number]   [phone]            Apr 27, 2017  3:30 PM CDT   (Arrive by 3:15 PM)   Return Visit with Adrian Arroyo DPM   [facility] ([facility])    [address] [place] [phone]   [phone]            May 04, 2017  8:30 AM CDT   Peripheral Angiogram with SHCVR2   [facility] ([facility])    [address] [place] [id number] [phone]            May 11, 2017 10:50 AM CDT   Return Visit with Charline Joyner PA-C   [facility] [place] ([facility])    [address] [place] [address] [address] [place] [id number] [phone]              Future tests that were ordered for you today     Open Future Orders        Priority Expected Expires Ordered    Peripheral Angiogram Routine 5/4/2017 4/17/2018 4/17/2017            Who to contact     If you have questions or need follow up information about today's clinic visit or your schedule please contact [facility] [place] directly at [phone].  Normal or non-critical lab and imaging results will be communicated to you by MyChart, letter or phone within 4 business days after the clinic has received the  "results. If you do not hear from us within 7 days, please contact the clinic through [employer] or phone. If you have a critical or abnormal lab result, we will notify you by phone as soon as possible.  Submit refill requests through [employer] or call your pharmacy and they will forward the refill request to us. Please allow 3 business days for your refill to be completed.          Additional Information About Your Visit        [employer] Information     [employer] lets you send messages to your doctor, view your test results, renew your prescriptions, schedule appointments and more. To sign up, go to [email or web address] . Click on \"Log in\" on the left side of the screen, which will take you to the Welcome page. Then click on \"Sign up Now\" on the right side of the page.     You will be asked to enter the access code listed below, as well as some personal information. Please follow the directions to create your username and password.     Your access code is: [email or web address]  Expires: 2017  5:38 PM     Your access code will  in 90 days. If you need help or a new code, please call your [place] clinic or [phone].        Care EveryWhere ID     This is your Care EveryWhere ID. This could be used by other organizations to access your [place] medical records  [id number]        Your Vitals Were     Pulse Height BMI (Body Mass Index)             68 1.638 m (5' 4.5\") 30.08 kg/m2          Blood Pressure from Last 3 Encounters:   17 122/64   17 133/65   17 144/74    Weight from Last 3 Encounters:   17 80.7 kg (178 lb)   17 82.6 kg (182 lb)   17 82.2 kg (181 lb 4.8 oz)              We Performed the Following     Follow-Up with Cardiologist        Primary Care Provider Office Phone # Fax #    Aris Del Real [phone]       [facility] [address]  [facility] [phone]        Thank you!     Thank you for choosing [facility] " [facility] [place]  for your care. Our goal is always to provide you with excellent care. Hearing back from our patients is one way we can continue to improve our services. Please take a few minutes to complete the written survey that you may receive in the mail after your visit with us. Thank you!             Your Updated Medication List - Protect others around you: Learn how to safely use, store and throw away your medicines at www.disposemymeds.org.          This list is accurate as of: 4/17/17  2:22 PM.  Always use your most recent med list.                   Brand Name Dispense Instructions for use    Alfalfa 250 MG Tabs      Take by mouth daily       aspirin 81 MG tablet      Take 81 mg by mouth daily       calcium + D 600-200 MG-UNIT Tabs   Generic drug:  calcium carbonate-vitamin D      Take 1 tablet by mouth 2 times daily.       carbidopa-levodopa  MG per tablet    SINEMET    720 tablet    TAKE 2 TABLETS FOUR TIMES A DAY       carvedilol 6.25 MG tablet    COREG    180 tablet    Take 1 tablet (6.25 mg) by mouth 2 times daily (with meals)       clopidogrel 75 MG tablet    PLAVIX    90 tablet    Take 1 tablet (75 mg) by mouth daily       DAILY MULTI Tabs      Take by mouth daily       gabapentin 100 MG capsule    NEURONTIN     Take 100 mg by mouth daily       isosorbide mononitrate 30 MG 24 hr tablet    IMDUR    90 tablet    Take 1 tablet (30 mg) by mouth daily       levothyroxine 112 MCG tablet    SYNTHROID/LEVOTHROID    90 tablet    TAKE 1 TABLET DAILY       nitroglycerin 0.4 MG sublingual tablet    NITROSTAT    25 tablet    DISSOLVE ONE TABLET UNDER THE TONGUE EVERY 5 MINUTES AS NEEDED FOR CHEST PAIN.  DO NOT EXCEED A TOTAL OF 3 DOSES IN 15 MINUTES       omeprazole 20 MG CR capsule    priLOSEC    180 capsule    TAKE 2 CAPSULES DAILY 30 TO 60 MINUTES BEFORE A MEAL       rosuvastatin 20 MG tablet    CRESTOR    90 tablet    Take 1 tablet (20 mg) by mouth daily

## 2017-04-17 NOTE — PROGRESS NOTES
Cardiology    HISTORY OF PRESENT ILLNESS:  Ms. Fletcher is a pleasant 76-year-old female who presents to Cardiology office today for followup after a recent visit to the [facility] up with Dr. Erickson for the evaluation of abnormal ABIs.  At that time, he recommended a peripheral arterial CTA and asked her to follow up in the office following that.      The patient has a quite extensive cardiovascular history.  Her cardiac history includes coronary artery disease with history of 2 prior coronary artery bypass surgeries, 1 in 1992 with a redo in 2009.  She also has known peripheral arterial disease and due to nonhealing wound in 2010, she underwent peripheral angiography initially in January at which time she had an angioplasty to multiple vessels in the left lower extremity along with stenting of the distal left superficial femoral and above-the-knee popliteal artery.  She underwent repeat angiography with PTA to the left below-the-knee popliteal artery and anterior tibial artery in 07/2010.  Unfortunately, she has had a functional decline over the past few years due to nonspecific neurologic  disorder.  Her exertional level has been limited due to this disorder.  Recently, the patient was considering undergoing a surgery on her right foot for hammertoe.  As part of the evaluation, she did undergo ABIs.  This showed a resting DOMINIK of 0.86 on the left side; however, the right side showed an DOMINIK of 0.62.       She does not appear to have any claudication with usual daily activities; however, if she exerts herself a bit more she does notice some cramping in her calves bilaterally.  She also states that she is prone to have charley horses.       I reviewed with her the results of her CT abdominal aorta, bilateral iliofemoral system with runoff.  This showed normal abdominal aorta without significant stenosis or any aneurysmal dilatation.  The iliac arteries were patent without any significant stenosis.  She was  "noted to have moderate stenosis in the common femoral arteries bilaterally, moderate stenosis in the distal right superficial femoral artery, moderate to severe stenosis in the above-the-knee right popliteal artery and severe stenosis in the left popliteal artery.  She was noted to have 3-vessel runoff in the tibial arteries bilaterally. Signal dropout on left AT vs lesion proximally?      For ~ 1 month has had ulcer left anterior shin.  Went to would clinic with minimal improvement.  Returns for evaluation.      PHYSICAL EXAMINATION:   GENERAL:  The patient is a pleasant 76-year-old female who appears her stated age.  She is in no apparent distress.   VITAL SIGNS:  Blood pressure 122/64, pulse 68, height 1.638 m (5' 4.5\"), weight 80.7 kg (178 lb), not currently breastfeeding.  LUNGS:  Clear to auscultation bilaterally.   CARDIAC:  Reveals a regular rate and rhythm, no murmurs appreciated.   EXTREMITIES:  Lower extremities show no evidence of edema.  Her posterior tibial pulses are diminished bilaterally. There is an ulcerated lesion seen on anterior shin near ankle.  Various stages of healing with defined borders.    NEUROLOGIC:  Alert and oriented.      PLAN:   1.  The patient will continue her current cardiac medication regimen unchanged.   2.  Discussed various options with patient.  Due to non healing would left shin, have advised angiography and possible PTA of left anterior tibial artery.  Risks/benefits/potential complications discussed and she is willing to proceed.  3.  Return as outpatient on May 4 for Peripheral angiogram and PTA below the knee left side.      Renato Erickson MD      "

## 2017-04-19 NOTE — PROGRESS NOTES
"Katherin Fletcher is a 76-year-old woman who is self-referred and is seeking a neurological opinion from the [facility] at the [facility].  She is coherent and her history is given in a chronologic order which is appropriate to the past medical records I had a chance to review.      CHIEF COMPLAINT:  \"I have had trouble with my speech and balance beginning about 2-1/2 or 3 years ago and have worsened slowly since then.  Also, I have fallen more than 30 times in the past 2 years.  I have been told I have dysarthria.\"      HISTORY OF PRESENT ILLNESS:  She provides history, which I will breakdown in a separate categories as follows:   1.  Speech difficulty.  This seemed to occur after she saw a dental hygienist.  She recalls feeling numb \"like Novocain coming out\" on the right side of her face.  Coincidentally, she also lost taste on the right side.  She has been left with a paraesthesia, which is somewhat variable but is always there to some extent.  It is characterized by any sort of pinprick sensation.  She thinks that this difficulty heralded difficulty with her speech.  It has been slow and deliberate, although she states that people have no trouble understanding her.  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  This history goes back to 3-1/2 years ago.   2.  Katherin states that she has had spasms in her legs during which they would stiffen up or jerk suddenly.  This still occurs on an intermittent basis and the event may last nearly 10 minutes or up to 2 hours.  Baclofen has provided no help in this regard.   3.  Imbalance.  This also began about the time that the speech difficulty occurred, although seemed to come a little later, perhaps a couple of months.  She has had to go from a cane which she started using 2 years ago down to a walker which she has been using the past year.  She has had a previous injury to her left ankle and has had surgery and so it is quite stiff, " "which of course interferes with her walking and balance somewhat.  The patient denies any numbness or pain other than at the ankle.  She denies any back or neck problem.   4.  Katherin states that her writing has been slower but still quite legible.   5.  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      The patient is not aware of any inciting factor other than as stated above that might have caused the walking and speech difficulties.  There is no history of any toxic exposure or infectious process or trauma other than to her left ankle.      PAST SURGICAL HISTORY:   1.  Hysterectomy at age 32.   2.  CABG on 2 occasions, the first in 1992 when 2 vessels were bypassed (1 was clogged completely) and then again in 2008 when 3 vessels were stenotic.   3.  Ankle \"replacement\" on the left side 7 years ago.   4.  Carpal tunnel surgery bilaterally.   5.  Stent into one of her left leg arteries accessed via the groin.   6.  Bilateral cataract removal 2 years ago done by Dr. Huggins in [place], [place].      PAST MEDICAL HISTORY:   1.  Mixed hyperlipidemia.   2.  Essential hypertension.   3.  Peripheral artery disease greater in the left lower extremity.   4.  Gastrointestinal reflux disease (GERD).   5.  Hypothyroidism.   6.  MRSA after ankle surgery.  Apparently the resistant bacteria was acquired when she was in the nursing home following the surgery.   7.  Ischemic cardiomyopathy (she had a myocardial infarction in 1996).   8.  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   9.  Peripheral neuropathy, apparently diagnosed at [facility].      ALLERGIES:  Penicillin causes a rash.  Nickel causes for a local reaction.  She also states that she is allergic to baclofen.      TRAUMA:   Fracture of her left ankle which was sustained when she slipped on ice.      HABITS:  She smoked starting at age 19 off and on for about 38-40 " "years.  She can recall the exact date that she stopped, which was 04/14/1992.  She smoked less than 1 pack a day.  She has infrequent alcohol intake and she has not been using any illicit drugs.      REVIEW OF SYSTEMS:     Cranium:  Denies headaches.  Has no scars.     EYES:   History of cataracts bilaterally, the surgery done 2 years ago.  Denies diplopia.   ENT:  Hearing is fine.  She does have a plugged up feeling in her nose and considerable postnasal drip.  Has had diminished taste, stating that chicken and beef \"taste like straw\".  Swallowing is no problem for her.  She does have some increased salivation.  Interestingly, she has had a dry cough for about 6 years without any history of any infection to precipitate that.     CHEST:   Chest including heart and lungs denies chest pain, palpitations, shortness of breath or hemoptysis.   GASTROINTESTINAL:   Has a history of some GERD.  No ulcer.  She becomes constipated with some medications.  Prunes provide good remedy for that.   GENITOURINARY:  No female problems, no incontinence.   BACK AND NECK:   Denies any difficulties.   EXTREMITIES:  She has a history of narrowing of an artery in her left lower extremity.  Her feet are cold quite a bit of the time, mostly on the left side at night.  She had problems with charley horse during pregnancy.      FAMILY HISTORY:  The patient was adopted and has not been able to obtain any history regarding her family of origin, although she states that if she comes up with $500 she will be able to get more information.      SOCIAL HISTORY:    Katherin was born in a hospital in [place], [place] and she does know her original name which was Kiko Arvizu.  She went to [employer] in [place] and attended [employer] for 2 years.  At age 20 she moved [place] the [place] in [place].  In 1962, she was  for the first time and her , whose last name was Marjorie, was " "killed in a car accident at age 27.  Katherin remarried in , a person by the name of Deb (last name) and he  of a heart attack.  Her third marriage was to Caio Fletcher who is 91 years old.  He was in the armed forces doing duty in the [employer] during World War II, the [place] War and [place] for 4 tours.  He is 100% disabled due to head injury.  He was a sergeant major in the special forces command.  Katherin started ??  at the [facility] [employer]  until .  She has had a full group home package including the [employer] and the [employer].      PREVIOUS STUDIES:   The patient was seen at the [facility] approximately a year ago in the Neurology Service.  A thorough workup was done at that time.  Laboratory studies were normal, and this included a CBC with differential.  Sed rate was 15 mm per hour.  B12, folate, iron and vitamin E levels were all normal.  Creatinine was normal at 0.7.  Ceruloplasmin, electrophoresis and serum protein were normal.  Immunofixation was likewise normal.  TSH was normal.  Immunological studies including rheumatoid factor, myeloperoxidase, proteinase, and cyclic citrullinated peptide and ZAIN cascade were all normal.  Finally, heavy metal for copper and zinc was normal.  MRI of the thoracic spine revealed no cord compression or signal change.  Cervical spine showed mild central canal stenosis, especially at C5-C6 but no cord compression.  Brain MRI revealed a mild leukoaraiosis and mild diffuse atrophy but more so in the bilateral temporal parietal lobes.  Somatosensory evoked potentials (SEP) showed impaired conduction in the central proprioceptive pathways.      Speech Pathology was consulted and they reported \"ataxic dysarthria manifested by slow rate and irregular articulatory breakdown\".  The impression noted by the [facility] neurologist included gait instability, bilateral lower extremity flexure spasms, spasticity, " parkinsonism, ataxia, dysarthria and spondylytic cervical spine stenosis.      PHYSICAL EXAMINATION:   GENERAL PHYSICAL EXAM:     CRANIUM:   No deformities or evidence of trauma.   EYES:  No arcus senilis.  Pupils are equal and reactive.  Visual acuity is 20/20 on the left, but markedly diminished on the right.  The right optic fundus shows a change in the fovea, which is somewhat of a pigmentary change.  I believe that the optic nerves show a mild atrophy.   ENT:  No active inflammation or discharge.   NECK:  Supple, without any areas of tenderness.  Carotid pulsations are equal and there are no bruits.   CHEST:  Heart reveals a regular rhythm without murmur.  Lungs are clear without evidence of rales or rhonchi.   ABDOMEN:  No hepatosplenomegaly.  Aorta does not feel distended.   GENITALIA:  Deferred.   EXTREMITIES:  The left ankle has a small bandage over the anterior aspect.  The patient states that there is a small chronic ulcer there.  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  I cannot detect a left dorsalis pedis pulse, but the right is present at about 2+.  Left ankle range of motion is considerably reduced.   BACK:  No areas of tenderness.  No paraspinal muscle spasm.   SKIN:  Normal except for the ankle area which I did not view personally.   NEUROLOGIC:  Cranial nerves II-XII reveal the following:   I:  There is somewhat diminished smell ability.   II:  Mild uptake atrophy, which is subjective on my part.     III, IV and VI:  Extraocular muscles are normal.  Gaze is normal in all directions.  She has no saccades.  There is a slight decrease in convergence.   V:   Paresthesias on rubbing the skin of her right cheek.  Otherwise, sensation and muscle strength of the pterygoids is normal.     VII:   No facial weakness.  No asymmetry.     VIII:   Hearing is intact bilaterally.     IX and X:   Gag reflex is good.  The cough is strong.     XI:   Shoulder shrug is normal.     XII:   There is no  tongue atrophy nor fibrillations.  There is no tongue weakness.      REFLEXES:  Active and equal at 2+ in the upper extremities and 1+ at the knees and right ankle, but absent on the left.  There is an upgoing toe to plantar stimulation on the left.   MUSCLE TESTING:  Generally normal except for mild weakness of the deltoids bilaterally, 4/5, and the psoas at 4/5 bilaterally.  Tone is normal.  There are no areas of atrophy.   COORDINATION:     Alternating movements in 15 seconds:    TEST:      RIGHT:    LEFT:   Supination/pronation of the forearms  13  14   Finger tapping      26   31    Foot tapping      15  15   Eye blink      18   18      GAIT:   Gait was performed only with the help of a 4-wheeled walker.  She traversed 25 feet in 11 seconds.  Is unable to stand on 1 foot or tandem walk or even walk unassisted.   SENSATION:  Light touch normal throughout except for the right cheek.  Pinprick perception is normal throughout.  Temperature sensation is somewhat variable in the feet, but I do not think this is really reliable.     VIBRATORY SENSE:   Perception of vibration in a 128 Hz tuning fork was 11 seconds at the right great toe and 11 seconds on the left.  Position sense is intact bilaterally.      IMPRESSION:   1.  Spinocerebellar ataxia, uncertain type.   2.  Mild dysarthria with scanning speech and distortion of vowels secondary to #1.   3.  Imbalance with frequent falls secondary to #1.   4.  History of flexure spasms.     5.  I could not confirm Parkinsonism.   6.  Status post bilateral carpal tunnel release.      DISCUSSION:  Mrs. Fletcher has a history fairly typical for a sporadic ataxia, although it could be hereditary in that she has no family history available to her as she was adopted.  Her children are in their 20s and both are too young probably for any of the ataxia symptoms to appear.  I think that the right facial paresthesias are not related to the ataxic condition and likely her loss of sense of  smell and taste are not related.  However, her frequent dry cough is sometimes seen in the ataxias, especially if there is some sensory involvement.  The patient has accommodated her ataxia quite well and is in good spirits and has adjusted to the disability that has resulted from her condition.      PLAN:   1.  I will have Boris Manley, genetic counselor, see the patient when he is available.  He may have to call her by phone.   2.  Molecular testing for a possible mutation and this time I would only do number 5, 8, 11 and 17.   3.  No medications are prescribed as I feel that since she is already using a walker that there is no need for some of the drugs which I have used in the past such as Symmetrel, BuSpar and Chantix.   4.  A note has been written to physical therapy to add balance and gait training to her present program for cardiac rehabilitation.   5.  Return to [facility] in 10/2017.  My nurse will be in touch with her about this.      A total of 90 minutes was spent with this patient, with a vast majority dedicated to discussing her symptoms and relationship to the ataxia as well as to setting up a program for managing her disease also.  I also talked with her about psychological adjustment.  She seemed to be satisfied with the encounter.        Smitha Xiong MD      cc:   Aris Del Real MD   [facility]    [address] [place] [address]         SMITHA XIONG MD             D: 2017 18:09   T: 2017 12:14   MT: LINA      Name:     ANA RODRIGUEZ   MRN:      [id number]        Account:      [id number]   :      1940           Service Date: 2017      Document: [id number]

## 2017-04-20 ENCOUNTER — OFFICE VISIT (OUTPATIENT)
Dept: WOUND CARE | Facility: CLINIC | Age: 77
End: 2017-04-20

## 2017-04-20 DIAGNOSIS — L97.909 ARTERIAL LEG ULCER (H): Primary | ICD-10-CM

## 2017-04-20 DIAGNOSIS — G62.9 PERIPHERAL POLYNEUROPATHY: ICD-10-CM

## 2017-04-20 DIAGNOSIS — I25.10 CORONARY ARTERY DISEASE INVOLVING NATIVE CORONARY ARTERY OF NATIVE HEART WITHOUT ANGINA PECTORIS: ICD-10-CM

## 2017-04-20 NOTE — LETTER
4/20/2017       RE: Katherin Fletcher  [address]  [facility] [address]     Dear Colleague,    Thank you for referring your patient, Katherin Fletcher, to the [facility] WOUND [facility]ARE at [facility]. Please see a copy of my visit note below.    Chief Complaint:   Chief Complaint   Patient presents with     WOUND CARE     wound care and check           Allergies   Allergen Reactions     Ace Inhibitors Cough     Baclofen      Penicillins      10/7/14 Hives per patient -- many many years ago       Nickel Swelling and Rash     Other reaction(s): Unknown         Subjective: Katherin is a 76 year old female who presents to the clinic today for a follow up of left leg wound. She has seen cardio since her last visit with me, and a revascularization procedure is scheduled for a couple of weeks from now. She relates that she has been changing the dressing as directed. No new concerns today.     Objective      An arterial wound is noted at left  anterior ankle measuring 2cm x 0.5cm x 0.4cm.    Kumar Classification: II to subcutaneous    Wound base: Yellow/Slough    Edges: intact    Drainage: none/wound bed is dry    Odor: no    Undermining: no    Bone Exposure: No    Clinical Signs of Infection: No    No debridement was indicated on this wound today. The fibrotic tissue was tightly adhered and could not be removed.      Assessment: left anterior ankle arterial wound    Plan:   - Pt seen and evaluated  - The wound could not be debrided today. I rx'sussy Rose to help with the enzymatic debridement between visits. I discussed with her how to use this and she relates that she will be able to perform this. She is scheduled for a revascular procedure. This will be beneficial for wound healing.   - Pt to return to clinic in 1 week      Again, thank you for allowing me to participate in the care of your patient.      Sincerely,    Adrian Arroyo DPM

## 2017-04-20 NOTE — PROGRESS NOTES
Chief Complaint:   Chief Complaint   Patient presents with     WOUND CARE     wound care and check           Allergies   Allergen Reactions     Ace Inhibitors Cough     Baclofen      Penicillins      10/7/14 Hives per patient -- many many years ago       Nickel Swelling and Rash     Other reaction(s): Unknown         Subjective: Katherin is a 76 year old female who presents to the clinic today for a follow up of left leg wound. She has seen cardio since her last visit with me, and a revascularization procedure is scheduled for a couple of weeks from now. She relates that she has been changing the dressing as directed. No new concerns today.     Objective      An arterial wound is noted at left  anterior ankle measuring 2cm x 0.5cm x 0.4cm.    Kumar Classification: II to subcutaneous    Wound base: Yellow/Slough    Edges: intact    Drainage: none/wound bed is dry    Odor: no    Undermining: no    Bone Exposure: No    Clinical Signs of Infection: No    No debridement was indicated on this wound today. The fibrotic tissue was tightly adhered and could not be removed.      Assessment: left anterior ankle arterial wound    Plan:   - Pt seen and evaluated  - The wound could not be debrided today. I rx'sussy Rose to help with the enzymatic debridement between visits. I discussed with her how to use this and she relates that she will be able to perform this. She is scheduled for a revascular procedure. This will be beneficial for wound healing.   - Pt to return to clinic in 1 week

## 2017-04-20 NOTE — MR AVS SNAPSHOT
After Visit Summary   4/20/2017    Katherin Fletcher    MRN: [id number]           Patient Information     Date Of Birth          1940        Visit Information        Provider Department      4/20/2017 9:00 AM Adrian Arroyo DPM [facility]        Today's Diagnoses     Arterial leg ulcer (H)    -  1    Peripheral polyneuropathy (H)        Coronary artery disease involving native coronary artery of native heart without angina pectoris           Follow-ups after your visit        Your next 10 appointments already scheduled     Apr 21, 2017 11:00 AM CDT   Evaluation with Amber Adrian, PT   [facility] ([facility])    [address]  [address]  [place] [address]   [phone]            Apr 27, 2017  3:30 PM CDT   (Arrive by 3:15 PM)   Return Visit with JAYASHREE [facility] ([facility])    [address]  [address] [facility] [id number] [phone]            May 04, 2017  8:30 AM CDT   Peripheral Angiogram with SHCVR2   [facility] ([facility])    [address]  [place] [address]   [phone]            May 11, 2017 10:50 AM CDT   Return Visit with Charline Joyner PA-C   [facility] ([facility])    [address]  [place] [address]   [phone]            May 11, 2017  2:00 PM CDT   (Arrive by 1:45 PM)   [facility] ([facility])    [address]  4th Floor  [facility] [id number] [phone]              Who to contact     Please call your clinic at [phone] to:    Ask questions about your health    Make or cancel appointments    Discuss your medicines    Learn about your test results    Speak to your doctor   If you have compliments or concerns  about an experience at your clinic, or if you wish to file a complaint, please contact [facility] Patient Relations at [phone] or email us at [email or web address]         Additional Information About Your Visit        [employer] Information     [employer] is an electronic gateway that provides easy, online access to your medical records. With [employer], you can request a clinic appointment, read your test results, renew a prescription or communicate with your care team.     To sign up for [employer] visit the website at [email or web address]   You will be asked to enter the access code listed below, as well as some personal information. Please follow the directions to create your username and password.     Your access code is: [email or web address]  Expires: 2017  5:38 PM     Your access code will  in 90 days. If you need help or a new code, please contact your [facility] Clinic or call [phone] for assistance.        Care EveryWhere ID     This is your Care EveryWhere ID. This could be used by other organizations to access your [place] medical records  [id number]         Blood Pressure from Last 3 Encounters:   17 122/64   17 133/65   17 144/74    Weight from Last 3 Encounters:   17 178 lb   17 182 lb   17 181 lb 4.8 oz              Today, you had the following     No orders found for display         Today's Medication Changes          These changes are accurate as of: 17  9:31 AM.  If you have any questions, ask your nurse or doctor.               Start taking these medicines.        Dose/Directions    collagenase ointment   Used for:  Arterial leg ulcer (H)        Apply topically daily Apply a nickel-thickness amount to the wound and cover with a saline moistened gauze. Wrap in a dry gauze. Change daily.   Quantity:  30 g   Refills:  3            Where to get your medicines      Some of these will need  a paper prescription and others can be bought over the counter.  Ask your nurse if you have questions.     Bring a paper prescription for each of these medications     collagenase ointment                Primary Care Provider Office Phone # Fax #    Aris Del Real [phone]       [facility] [address]  [facility] [address]        Thank you!     Thank you for choosing [facility]  for your care. Our goal is always to provide you with excellent care. Hearing back from our patients is one way we can continue to improve our services. Please take a few minutes to complete the written survey that you may receive in the mail after your visit with us. Thank you!             Your Updated Medication List - Protect others around you: Learn how to safely use, store and throw away your medicines at www.disposemymeds.org.          This list is accurate as of: 4/20/17  9:31 AM.  Always use your most recent med list.                   Brand Name Dispense Instructions for use    Alfalfa 250 MG Tabs      Take by mouth daily       aspirin 81 MG tablet      Take 81 mg by mouth daily       calcium + D 600-200 MG-UNIT Tabs   Generic drug:  calcium carbonate-vitamin D      Take 1 tablet by mouth 2 times daily.       carbidopa-levodopa  MG per tablet    SINEMET    720 tablet    TAKE 2 TABLETS FOUR TIMES A DAY       carvedilol 6.25 MG tablet    COREG    180 tablet    Take 1 tablet (6.25 mg) by mouth 2 times daily (with meals)       clopidogrel 75 MG tablet    PLAVIX    90 tablet    Take 1 tablet (75 mg) by mouth daily       collagenase ointment     30 g    Apply topically daily Apply a nickel-thickness amount to the wound and cover with a saline moistened gauze. Wrap in a dry gauze. Change daily.       DAILY MULTI Tabs      Take by mouth daily       gabapentin 100 MG capsule    NEURONTIN     Take 100 mg by mouth daily       isosorbide mononitrate 30 MG 24 hr tablet    IMDUR    90  tablet    Take 1 tablet (30 mg) by mouth daily       levothyroxine 112 MCG tablet    SYNTHROID/LEVOTHROID    90 tablet    TAKE 1 TABLET DAILY       nitroglycerin 0.4 MG sublingual tablet    NITROSTAT    25 tablet    DISSOLVE ONE TABLET UNDER THE TONGUE EVERY 5 MINUTES AS NEEDED FOR CHEST PAIN.  DO NOT EXCEED A TOTAL OF 3 DOSES IN 15 MINUTES       omeprazole 20 MG CR capsule    priLOSEC    180 capsule    TAKE 2 CAPSULES DAILY 30 TO 60 MINUTES BEFORE A MEAL       rosuvastatin 20 MG tablet    CRESTOR    90 tablet    Take 1 tablet (20 mg) by mouth daily

## 2017-04-21 ENCOUNTER — HOSPITAL ENCOUNTER (OUTPATIENT)
Dept: PHYSICAL THERAPY | Facility: CLINIC | Age: 77
Setting detail: THERAPIES SERIES
End: 2017-04-21
Attending: PSYCHIATRY & NEUROLOGY
Payer: MEDICARE

## 2017-04-21 PROCEDURE — 97161 PT EVAL LOW COMPLEX 20 MIN: CPT | Mod: GP | Performed by: PHYSICAL THERAPIST

## 2017-04-21 PROCEDURE — G8978 MOBILITY CURRENT STATUS: HCPCS | Mod: GP,CK | Performed by: PHYSICAL THERAPIST

## 2017-04-21 PROCEDURE — 40000719 ZZHC STATISTIC PT DEPARTMENT NEURO VISIT: Performed by: PHYSICAL THERAPIST

## 2017-04-21 PROCEDURE — G8979 MOBILITY GOAL STATUS: HCPCS | Mod: GP,CK | Performed by: PHYSICAL THERAPIST

## 2017-04-21 PROCEDURE — 97110 THERAPEUTIC EXERCISES: CPT | Mod: GP | Performed by: PHYSICAL THERAPIST

## 2017-04-21 ASSESSMENT — 6 MINUTE WALK TEST (6MWT): TOTAL DISTANCE WALKED (FT): 665

## 2017-04-21 NOTE — PROGRESS NOTES
[facility]        OUTPATIENT PHYSICAL THERAPY FUNCTIONAL EVALUATION  PLAN OF TREATMENT FOR OUTPATIENT REHABILITATION  (COMPLETE FOR INITIAL CLAIMS ONLY)  Patient's Last Name, First Name, M.I.  YOB: 1940  Katherin Fletcher     Provider's Name   [facility]   Medical Record No.  [id number]     Start of Care Date:  04/21/17   Onset Date:   (diagnosed in 2015)   Type:     _X__PT   ____OT  ____SLP Medical Diagnosis:  Spinocerebellar ataxia     PT Diagnosis:  Difficulty with gait Visits from SOC:  1                              __________________________________________________________________________________  Plan of Treatment/Functional Goals:  balance training, gait training, strengthening           GOALS  6MWT  1. Patient will improve distance ambulated by 150 feet with 4WW in order to increase distance to be at least the distance she ambulated at her last PT session in 2015.  06/05/17    WHITMAN  2. Patient will increase score on [facility] Whitman balance assessment by 5 points in order to improve safety and balance during mobility.  06/05/17    Therapy Frequency:  1 time/week   Predicted Duration of Therapy Intervention:  45 days    Amber Adrian, PT                                    I CERTIFY THE NEED FOR THESE SERVICES FURNISHED UNDER        THIS PLAN OF TREATMENT AND WHILE UNDER MY CARE     (Physician co-signature of this document indicates review and certification of the therapy plan).                Certification Date From:  04/21/17   Certification Date To:  06/05/17    Referring Provider:  Dr. Joe Randolph    Initial Assessment  See Epic Evaluation- Start of Care Date: 04/21/17

## 2017-04-21 NOTE — PROGRESS NOTES
04/21/17 1100   Quick Adds   Type of Visit Initial OP PT Evaluation   General Information   Start of Care Date 04/21/17   Referring Physician Dr. Joe Randolph   Orders Evaluate and Treat as Indicated   Order Date 04/14/17   Medical Diagnosis Spinocerebellar ataxia   Onset of illness/injury or Date of Surgery (diagnosed in 2015)   Precautions/Limitations fall precautions   Surgical/Medical history reviewed Yes   Pertinent history of current problem (include personal factors and/or comorbidities that impact the POC) Pt is known to the clinic and therapist, having worked together in 2015 prior to her diagnosis. Pt reports in the past 2 years, 30 falls with her last fall being in the fall of 2016. Symptoms include dysarthria, decreased vision from childhood, leg spasms, TAR 7 years ago, MI 1996 and peripheral neuropathy. Pt referred by neurologist, indicates she has tried to keep up with her exercises but isn't consistent. She now uses a 4WW full time and this has helped her balance dramatically.   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   Prior level of function comment Pt believes she is more safe since using the 4WW full time.   Current Community Support Family/friend caregiver   Patient role/Employment history Retired   Living environment [facility]   Home/Community Accessibility Comments stairs up to bedroom, doesn't go down to basement per 's request.    Current Assistive Devices Four Wheeled Walker   Assistive Devices Comments Uses 4WW at all times except in bathroom becase it doesn't fit through her doorway. She's able to reach for her vanity to assist with balance   Patient/Family Goals Statement I want to feel safer when I move.   Fall Risk Screen   Fall screen completed by PT   Per patient - Fall 2 or more times in past year? Yes   Per patient - Fall with injury in past year? No   Timed Up and Go score (seconds) 19.5   Is patient a fall risk? Yes   Fall screen comments  Reports 30 falls in past 2 years, last fall was last fall 2016, fall when in closet and turned too fast.    System Outcome Measures   AM-PAC  Basic Mobility Score Level  (Lower scores equate to lower levels of function) 56.1   AM-PAC  Daily Activity Score Level  (Lower scores equate to lower levels of function) 60.06   AM-PAC  Applied Cognitive Score Level  (Lower scores equate to lower levels of function) 47.7   Pain   Patient currently in pain No   Cognitive Status Examination   Orientation orientation to person, place and time   Level of Consciousness alert   Follows Commands and Answers Questions 100% of the time   Personal Safety and Judgment intact   Memory intact   Integumentary   Integumentary Comments bilateral edema in legs, going for angiogram on May 4th on legs.    Range of Motion (ROM)   ROM Comment left ankle limited by previous replacement, also dealing with healing wound    Strength   Strength Comments hip ABD B 2/5, hip Ext B 3/5, Ankle PF/DF 5/5 B, Knee Flexion B 3+/5, Knee Ext 5/5, Hip Flexion 3+/5   Bed Mobility   Bed Mobility Comments independent   Transfer Skills   Transfer Comments Heavy reliance on UE and Legs for support   Gait   Gait Comments ambulates with slight step through gait pattern with 4WW, decreased john and pt moves slowly and cautiously.   Gait Special Tests Six Minute Walk Test   Feet 665 Feet   Comments with 4WW. (Decreased from 715 feet at last session on 2015 with SPC)   Balance   Balance Comments Impaired; see Vail score   Balance Special Tests Vail Balance   Score out of 56 14   Balance Special Tests Sit to Stand Reps in 30 Seconds   Reps in 30 seconds 6   Comments with heavy reliance on UEs   Sensory Examination   Sensory Perception Comments Raynauds in fingers, neuropathy in legs up to knees- reports light touch/pin prick impaired/.   Planned Therapy Interventions   Planned Therapy Interventions balance training;gait training;strengthening   Clinical Impression    Criteria for Skilled Therapeutic Interventions Met yes, treatment indicated   PT Diagnosis Difficulty with gait   Influenced by the following impairments fall history, decreased vision   Functional limitations due to impairments balance difficutlies with mobility, dependence on walker for safety   Clinical Presentation Stable/Uncomplicated   Clinical Presentation Rationale progressing as expected   Clinical Decision Making (Complexity) Low complexity   Therapy Frequency 1 time/week   Predicted Duration of Therapy Intervention (days/wks) 45 days   Risk & Benefits of therapy have been explained Yes   Patient, Family & other staff in agreement with plan of care Yes   Goal 1   Goal Identifier 6MWT   Goal Description 1. Patient will improve distance ambulated by 150 feet with 4WW in order to increase distance to be at least the distance she ambulated at her last PT session in 2015.   Target Date 06/05/17   Goal 2   Goal Identifier WHITMAN   Goal Description 2. Patient will increase score on [facility] Whitman balance assessment by 5 points in order to improve safety and balance during mobility.   Target Date 06/05/17   Total Evaluation Time   Total Evaluation Time (Minutes) 25   Therapy Certification   Certification date from 04/21/17   Certification date to 06/05/17   Medical Diagnosis Spinocerebellar ataxia

## 2017-04-27 ENCOUNTER — OFFICE VISIT (OUTPATIENT)
Dept: WOUND CARE | Facility: CLINIC | Age: 77
End: 2017-04-27

## 2017-04-27 DIAGNOSIS — L97.909 ARTERIAL LEG ULCER (H): Primary | ICD-10-CM

## 2017-04-27 DIAGNOSIS — G62.9 PERIPHERAL POLYNEUROPATHY: ICD-10-CM

## 2017-04-27 DIAGNOSIS — I73.9 PAD (PERIPHERAL ARTERY DISEASE) (H): ICD-10-CM

## 2017-04-27 NOTE — PROGRESS NOTES
Chief Complaint:   No chief complaint on file.         Allergies   Allergen Reactions     Ace Inhibitors Cough     Baclofen      Penicillins      10/7/14 Hives per patient -- many many years ago       Nickel Swelling and Rash     Other reaction(s): Unknown       Subjective: Katherin is a 76 year old female who presents to the clinic today for a follow up of left leg wound. She is schedule for ant tibial artery angiography and possible PTA on May 4, 2017. She has been changing the dressing as directed. No new concerns today.     Objective  An arterial wound is noted at left  anterior ankle measuring 1.7cm x 0.3cm x 0.2cm.    Kumar Classification: II to subcutaneous    Wound base: Yellow/Slough  Edges: intact  Drainage: none/wound bed is dry  Odor: no  Undermining: no  Bone Exposure: No  Clinical Signs of Infection: No    No debridement was indicated on this wound today. The fibrotic tissue was tightly adhered and could not be removed.    Assessment: left anterior ankle arterial wound    Plan:   - Pt seen and evaluated  - been performing Santyl daily (enzymatic debridement), which will continue.    Hydrogel and soft dressing applied in clinic today. No debridement necessary  - proceed with vascular procedure (scheduled 5/4/17)  - Pt to return to clinic in 2 week

## 2017-04-27 NOTE — LETTER
4/27/2017       RE: Katherin Fletcher  [address]  [facility] [address]     Dear Colleague,    Thank you for referring your patient, Katherin Fletcher, to the [facility]ARE at [facility]. Please see a copy of my visit note below.    Chief Complaint:   No chief complaint on file.    Allergies   Allergen Reactions     Ace Inhibitors Cough     Baclofen      Penicillins      10/7/14 Hives per patient -- many many years ago       Nickel Swelling and Rash     Other reaction(s): Unknown       Subjective: Katherin is a 76 year old female who presents to the clinic today for a follow up of left leg wound. She is schedule for ant tibial artery angiography and possible PTA on May 4, 2017. She has been changing the dressing as directed. No new concerns today.     Objective  An arterial wound is noted at left  anterior ankle measuring 1.7cm x 0.3cm x 0.2cm.    Kumar Classification: II to subcutaneous    Wound base: Yellow/Slough  Edges: intact  Drainage: none/wound bed is dry  Odor: no  Undermining: no  Bone Exposure: No  Clinical Signs of Infection: No    No debridement was indicated on this wound today. The fibrotic tissue was tightly adhered and could not be removed.    Assessment: left anterior ankle arterial wound    Plan:   - Pt seen and evaluated  - been performing Santyl daily (enzymatic debridement), which will continue.    Hydrogel and soft dressing applied in clinic today. No debridement necessary  - proceed with vascular procedure (scheduled 5/4/17)  - Pt to return to clinic in 2 week    Again, thank you for allowing me to participate in the care of your patient.      Sincerely,    Adrian Arroyo DPM

## 2017-04-27 NOTE — MR AVS SNAPSHOT
After Visit Summary   4/27/2017    Katherin Fletcher    MRN: [id number]           Patient Information     Date Of Birth          1940        Visit Information        Provider Department      4/27/2017 3:30 PM Adrian Arroyo DPM [facility]ound [facility]        Today's Diagnoses     Arterial leg ulcer (H)    -  1    Peripheral polyneuropathy (H)        PAD (peripheral artery disease) (H)           Follow-ups after your visit        Your next 10 appointments already scheduled     May 03, 2017  3:15 PM CDT   Treatment 45 with Amber Adrian, PT   [facility] ([facility])    [address]  [address]  [place] [address]   [phone]            May 04, 2017  8:30 AM CDT   Peripheral Angiogram with SHCVR2   [facility] ([facility])    [address] [place] [address]  [place] [address]   [phone]            May 10, 2017 11:00 AM CDT   Treatment 45 with Amber Adrian, PT   [facility] ([facility])    [address]  [address]  [place] [address]   [phone]            May 11, 2017 10:50 AM CDT   Return Visit with Charline Joyner PA-C   [facility] [place] ([facility])    [address] [place] [address] [address]  [place] [address]   [phone]            May 11, 2017  2:00 PM CDT   (Arrive by 1:45 PM)   Return Visit with JAYASHREE Warren [facility] ([facility])    [address] [facility]  4th Floor  [place] [address]   [phone]            May 17, 2017  2:15 PM CDT   Treatment 45 with Amber Adrian, PT   [facility] ([facility])    [address] [place] [address]  [address]  [place] [address]   [phone]            May 24, 2017 11:45 AM CDT   Treatment 45 with Amber Adrian, PT   [facility] ([facility])    [address] [facility]  [place]  [address]  [facility] [id number] [phone]              Who to contact     Please call your clinic at [phone] to:    Ask questions about your health    Make or cancel appointments    Discuss your medicines    Learn about your test results    Speak to your doctor   If you have compliments or concerns about an experience at your clinic, or if you wish to file a complaint, please contact [facility] Patient Relations at [phone] or email us at [email or web address]         Additional Information About Your Visit        Flashnotes Information     Flashnotes is an electronic gateway that provides easy, online access to your medical records. With Flashnotes, you can request a clinic appointment, read your test results, renew a prescription or communicate with your care team.     To sign up for Flashnotes visit the website at [email or web address]   You will be asked to enter the access code listed below, as well as some personal information. Please follow the directions to create your username and password.     Your access code is: [email or web address]  Expires: 2017  5:38 PM     Your access code will  in 90 days. If you need help or a new code, please contact your [facility] or call [phone] for assistance.        Care EveryWhere ID     This is your Care EveryWhere ID. This could be used by other organizations to access your [place] medical records  [id number]         Blood Pressure from Last 3 Encounters:   17 122/64   17 133/65   17 144/74    Weight from Last 3 Encounters:   17 178 lb   17 182 lb   17 181 lb 4.8 oz              Today, you had the following     No orders found for display       Primary Care Provider Office Phone # Fax #    Aris Del Real [phone]       [place] [facility] [address]  [facility] [address]        Thank you!     Thank you for  choosing [facility]  for your care. Our goal is always to provide you with excellent care. Hearing back from our patients is one way we can continue to improve our services. Please take a few minutes to complete the written survey that you may receive in the mail after your visit with us. Thank you!             Your Updated Medication List - Protect others around you: Learn how to safely use, store and throw away your medicines at www.disposemymeds.org.          This list is accurate as of: 4/27/17  6:29 PM.  Always use your most recent med list.                   Brand Name Dispense Instructions for use    Alfalfa 250 MG Tabs      Take by mouth daily       aspirin 81 MG tablet      Take 81 mg by mouth daily       calcium + D 600-200 MG-UNIT Tabs   Generic drug:  calcium carbonate-vitamin D      Take 1 tablet by mouth 2 times daily.       carbidopa-levodopa  MG per tablet    SINEMET    720 tablet    TAKE 2 TABLETS FOUR TIMES A DAY       carvedilol 6.25 MG tablet    COREG    180 tablet    Take 1 tablet (6.25 mg) by mouth 2 times daily (with meals)       clopidogrel 75 MG tablet    PLAVIX    90 tablet    Take 1 tablet (75 mg) by mouth daily       collagenase ointment     30 g    Apply topically daily Apply a nickel-thickness amount to the wound and cover with a saline moistened gauze. Wrap in a dry gauze. Change daily.       DAILY MULTI Tabs      Take by mouth daily       gabapentin 100 MG capsule    NEURONTIN     Take 100 mg by mouth daily       isosorbide mononitrate 30 MG 24 hr tablet    IMDUR    90 tablet    Take 1 tablet (30 mg) by mouth daily       levothyroxine 112 MCG tablet    SYNTHROID/LEVOTHROID    90 tablet    TAKE 1 TABLET DAILY       nitroglycerin 0.4 MG sublingual tablet    NITROSTAT    25 tablet    DISSOLVE ONE TABLET UNDER THE TONGUE EVERY 5 MINUTES AS NEEDED FOR CHEST PAIN.  DO NOT EXCEED A TOTAL OF 3 DOSES IN 15 MINUTES       omeprazole 20 MG CR capsule    priLOSEC    180 capsule     TAKE 2 CAPSULES DAILY 30 TO 60 MINUTES BEFORE A MEAL       rosuvastatin 20 MG tablet    CRESTOR    90 tablet    Take 1 tablet (20 mg) by mouth daily

## 2017-05-03 ENCOUNTER — HOSPITAL ENCOUNTER (OUTPATIENT)
Dept: PHYSICAL THERAPY | Facility: CLINIC | Age: 77
Setting detail: THERAPIES SERIES
End: 2017-05-03
Attending: PSYCHIATRY & NEUROLOGY
Payer: MEDICARE

## 2017-05-03 DIAGNOSIS — I73.9 PVD (PERIPHERAL VASCULAR DISEASE) (H): Primary | ICD-10-CM

## 2017-05-03 PROCEDURE — 97112 NEUROMUSCULAR REEDUCATION: CPT | Mod: GP | Performed by: PHYSICAL THERAPIST

## 2017-05-03 PROCEDURE — 97110 THERAPEUTIC EXERCISES: CPT | Mod: GP | Performed by: PHYSICAL THERAPIST

## 2017-05-03 PROCEDURE — 40000719 ZZHC STATISTIC PT DEPARTMENT NEURO VISIT: Performed by: PHYSICAL THERAPIST

## 2017-05-03 RX ORDER — POTASSIUM CHLORIDE 1500 MG/1
20 TABLET, EXTENDED RELEASE ORAL
Status: CANCELLED | OUTPATIENT
Start: 2017-05-03

## 2017-05-03 RX ORDER — LIDOCAINE 40 MG/G
CREAM TOPICAL
Status: CANCELLED | OUTPATIENT
Start: 2017-05-03

## 2017-05-03 RX ORDER — SODIUM CHLORIDE 9 MG/ML
INJECTION, SOLUTION INTRAVENOUS CONTINUOUS
Status: CANCELLED | OUTPATIENT
Start: 2017-05-03

## 2017-05-04 ENCOUNTER — HOSPITAL ENCOUNTER (OUTPATIENT)
Facility: CLINIC | Age: 77
Discharge: HOME | End: 2017-05-04
Attending: INTERNAL MEDICINE | Admitting: INTERNAL MEDICINE
Payer: MEDICARE

## 2017-05-04 ENCOUNTER — APPOINTMENT (OUTPATIENT)
Dept: CARDIOLOGY | Facility: CLINIC | Age: 77
End: 2017-05-04
Attending: INTERNAL MEDICINE
Payer: MEDICARE

## 2017-05-04 VITALS
RESPIRATION RATE: 20 BRPM | OXYGEN SATURATION: 97 % | WEIGHT: 183.2 LBS | BODY MASS INDEX: 31.28 KG/M2 | DIASTOLIC BLOOD PRESSURE: 70 MMHG | HEIGHT: 64 IN | SYSTOLIC BLOOD PRESSURE: 139 MMHG | HEART RATE: 80 BPM | TEMPERATURE: 97.7 F

## 2017-05-04 DIAGNOSIS — I25.10 CORONARY ARTERY DISEASE INVOLVING NATIVE CORONARY ARTERY OF NATIVE HEART WITHOUT ANGINA PECTORIS: Primary | ICD-10-CM

## 2017-05-04 LAB
ANION GAP SERPL CALCULATED.3IONS-SCNC: 8 MMOL/L (ref 3–14)
BUN SERPL-MCNC: 12 MG/DL (ref 7–30)
CALCIUM SERPL-MCNC: 8.9 MG/DL (ref 8.5–10.1)
CHLORIDE SERPL-SCNC: 109 MMOL/L (ref 94–109)
CO2 SERPL-SCNC: 26 MMOL/L (ref 20–32)
CREAT SERPL-MCNC: 0.62 MG/DL (ref 0.52–1.04)
ERYTHROCYTE [DISTWIDTH] IN BLOOD BY AUTOMATED COUNT: 14 % (ref 10–15)
GFR SERPL CREATININE-BSD FRML MDRD: NORMAL ML/MIN/1.7M2
GLUCOSE SERPL-MCNC: 91 MG/DL (ref 70–99)
HCT VFR BLD AUTO: 35.9 % (ref 35–47)
HGB BLD-MCNC: 12.2 G/DL (ref 11.7–15.7)
KCT BLD-ACNC: 170 SEC (ref 105–167)
KCT BLD-ACNC: 196 SEC (ref 105–167)
KCT BLD-ACNC: 218 SEC (ref 105–167)
KCT BLD-ACNC: 240 SEC (ref 105–167)
MCH RBC QN AUTO: 29.2 PG (ref 26.5–33)
MCHC RBC AUTO-ENTMCNC: 34 G/DL (ref 31.5–36.5)
MCV RBC AUTO: 86 FL (ref 78–100)
PLATELET # BLD AUTO: 189 10E9/L (ref 150–450)
POTASSIUM SERPL-SCNC: 3.8 MMOL/L (ref 3.4–5.3)
RBC # BLD AUTO: 4.18 10E12/L (ref 3.8–5.2)
SODIUM SERPL-SCNC: 143 MMOL/L (ref 133–144)
WBC # BLD AUTO: 4.7 10E9/L (ref 4–11)

## 2017-05-04 PROCEDURE — 27210910 ZZH NEEDLE CR6

## 2017-05-04 PROCEDURE — 27210998 ZZH ACCESS HEART CATH CR6

## 2017-05-04 PROCEDURE — C1725 CATH, TRANSLUMIN NON-LASER: HCPCS

## 2017-05-04 PROCEDURE — 25000128 H RX IP 250 OP 636: Performed by: INTERNAL MEDICINE

## 2017-05-04 PROCEDURE — C1769 GUIDE WIRE: HCPCS

## 2017-05-04 PROCEDURE — 25000125 ZZHC RX 250: Performed by: INTERNAL MEDICINE

## 2017-05-04 PROCEDURE — 93005 ELECTROCARDIOGRAM TRACING: CPT

## 2017-05-04 PROCEDURE — 27210742 ZZH CATH CR1

## 2017-05-04 PROCEDURE — 99152 MOD SED SAME PHYS/QHP 5/>YRS: CPT

## 2017-05-04 PROCEDURE — 27210894 ZZH CATH CR6

## 2017-05-04 PROCEDURE — A9270 NON-COVERED ITEM OR SERVICE: HCPCS | Mod: GY | Performed by: INTERNAL MEDICINE

## 2017-05-04 PROCEDURE — 37228 ZZHC REVASC TIB/PERON ART, ANGIOPLASTY INIT VESSEL: CPT

## 2017-05-04 PROCEDURE — 40000852 ZZH STATISTIC HEART CATH LAB OR EP LAB

## 2017-05-04 PROCEDURE — 75716 ARTERY X-RAYS ARMS/LEGS: CPT | Mod: 26 | Performed by: INTERNAL MEDICINE

## 2017-05-04 PROCEDURE — 27210787 ZZH MANIFOLD CR2

## 2017-05-04 PROCEDURE — 27210946 ZZH KIT HC TOTES DISP CR8

## 2017-05-04 PROCEDURE — 25000132 ZZH RX MED GY IP 250 OP 250 PS 637: Mod: GY | Performed by: INTERNAL MEDICINE

## 2017-05-04 PROCEDURE — C2623 CATH, TRANSLUMIN, DRUG-COAT: HCPCS

## 2017-05-04 PROCEDURE — 27210890 ZZ H BALLOON (NON-PTA) CR5

## 2017-05-04 PROCEDURE — 27210807 ZZH SHEATH CR6

## 2017-05-04 PROCEDURE — 27211182 ZZH CATH CR15

## 2017-05-04 PROCEDURE — 27210856 ZZH ACCESS HEART CATH CR2

## 2017-05-04 PROCEDURE — 047N3Z1 DILATION OF LEFT POPLITEAL ARTERY USING DRUG-COATED BALLOON, PERCUTANEOUS APPROACH: ICD-10-PCS | Performed by: INTERNAL MEDICINE

## 2017-05-04 PROCEDURE — 75710 ARTERY X-RAYS ARM/LEG: CPT

## 2017-05-04 PROCEDURE — 27210845 ZZH DEVICE INFLATION CR5

## 2017-05-04 PROCEDURE — 85347 COAGULATION TIME ACTIVATED: CPT

## 2017-05-04 PROCEDURE — 85027 COMPLETE CBC AUTOMATED: CPT | Performed by: INTERNAL MEDICINE

## 2017-05-04 PROCEDURE — 99153 MOD SED SAME PHYS/QHP EA: CPT

## 2017-05-04 PROCEDURE — 37224 ZZHC REVASCULARIZE FEM/POP ARTERY, ANGIOPLASTY: CPT | Mod: LT | Performed by: INTERNAL MEDICINE

## 2017-05-04 PROCEDURE — 80048 BASIC METABOLIC PNL TOTAL CA: CPT | Performed by: INTERNAL MEDICINE

## 2017-05-04 PROCEDURE — 40000065 ZZH STATISTIC EKG NON-CHARGEABLE

## 2017-05-04 PROCEDURE — 93010 ELECTROCARDIOGRAM REPORT: CPT | Mod: 76 | Performed by: INTERNAL MEDICINE

## 2017-05-04 PROCEDURE — B41G1ZZ FLUOROSCOPY OF LEFT LOWER EXTREMITY ARTERIES USING LOW OSMOLAR CONTRAST: ICD-10-PCS | Performed by: INTERNAL MEDICINE

## 2017-05-04 PROCEDURE — 27211089 ZZH KIT ACIST INJECTOR CR3

## 2017-05-04 RX ORDER — EPTIFIBATIDE 2 MG/ML
180 INJECTION, SOLUTION INTRAVENOUS EVERY 10 MIN PRN
Status: DISCONTINUED | OUTPATIENT
Start: 2017-05-04 | End: 2017-05-04 | Stop reason: HOSPADM

## 2017-05-04 RX ORDER — NALOXONE HYDROCHLORIDE 0.4 MG/ML
.2-.4 INJECTION, SOLUTION INTRAMUSCULAR; INTRAVENOUS; SUBCUTANEOUS
Status: DISCONTINUED | OUTPATIENT
Start: 2017-05-04 | End: 2017-05-04 | Stop reason: HOSPADM

## 2017-05-04 RX ORDER — CLOPIDOGREL BISULFATE 75 MG/1
75 TABLET ORAL DAILY
Qty: 30 TABLET | Refills: 0 | Status: SHIPPED | OUTPATIENT
Start: 2017-05-05 | End: 2017-05-11

## 2017-05-04 RX ORDER — METOPROLOL TARTRATE 1 MG/ML
5 INJECTION, SOLUTION INTRAVENOUS EVERY 5 MIN PRN
Status: DISCONTINUED | OUTPATIENT
Start: 2017-05-04 | End: 2017-05-04 | Stop reason: HOSPADM

## 2017-05-04 RX ORDER — CARBIDOPA AND LEVODOPA 25; 100 MG/1; MG/1
2 TABLET ORAL 4 TIMES DAILY
Status: DISCONTINUED | OUTPATIENT
Start: 2017-05-04 | End: 2017-05-04 | Stop reason: HOSPADM

## 2017-05-04 RX ORDER — NIFEDIPINE 10 MG/1
10 CAPSULE ORAL
Status: DISCONTINUED | OUTPATIENT
Start: 2017-05-04 | End: 2017-05-04 | Stop reason: HOSPADM

## 2017-05-04 RX ORDER — PROMETHAZINE HYDROCHLORIDE 25 MG/ML
6.25-25 INJECTION, SOLUTION INTRAMUSCULAR; INTRAVENOUS EVERY 4 HOURS PRN
Status: DISCONTINUED | OUTPATIENT
Start: 2017-05-04 | End: 2017-05-04 | Stop reason: HOSPADM

## 2017-05-04 RX ORDER — NITROGLYCERIN 0.4 MG/1
0.4 TABLET SUBLINGUAL EVERY 5 MIN PRN
Status: DISCONTINUED | OUTPATIENT
Start: 2017-05-04 | End: 2017-05-04 | Stop reason: HOSPADM

## 2017-05-04 RX ORDER — PRASUGREL 10 MG/1
10-60 TABLET, FILM COATED ORAL
Status: DISCONTINUED | OUTPATIENT
Start: 2017-05-04 | End: 2017-05-04 | Stop reason: HOSPADM

## 2017-05-04 RX ORDER — HYDROCODONE BITARTRATE AND ACETAMINOPHEN 5; 325 MG/1; MG/1
1-2 TABLET ORAL EVERY 4 HOURS PRN
Status: DISCONTINUED | OUTPATIENT
Start: 2017-05-04 | End: 2017-05-04 | Stop reason: HOSPADM

## 2017-05-04 RX ORDER — PHENYLEPHRINE HCL IN 0.9% NACL 1 MG/10 ML
20-100 SYRINGE (ML) INTRAVENOUS
Status: DISCONTINUED | OUTPATIENT
Start: 2017-05-04 | End: 2017-05-04 | Stop reason: HOSPADM

## 2017-05-04 RX ORDER — DIPHENHYDRAMINE HYDROCHLORIDE 50 MG/ML
25-50 INJECTION INTRAMUSCULAR; INTRAVENOUS
Status: DISCONTINUED | OUTPATIENT
Start: 2017-05-04 | End: 2017-05-04 | Stop reason: HOSPADM

## 2017-05-04 RX ORDER — PROTAMINE SULFATE 10 MG/ML
1-5 INJECTION, SOLUTION INTRAVENOUS
Status: DISCONTINUED | OUTPATIENT
Start: 2017-05-04 | End: 2017-05-04 | Stop reason: HOSPADM

## 2017-05-04 RX ORDER — DEXTROSE MONOHYDRATE 25 G/50ML
12.5-5 INJECTION, SOLUTION INTRAVENOUS EVERY 30 MIN PRN
Status: DISCONTINUED | OUTPATIENT
Start: 2017-05-04 | End: 2017-05-04 | Stop reason: HOSPADM

## 2017-05-04 RX ORDER — HEPARIN SODIUM 1000 [USP'U]/ML
1000-10000 INJECTION, SOLUTION INTRAVENOUS; SUBCUTANEOUS EVERY 5 MIN PRN
Status: DISCONTINUED | OUTPATIENT
Start: 2017-05-04 | End: 2017-05-04 | Stop reason: HOSPADM

## 2017-05-04 RX ORDER — ASPIRIN 81 MG/1
81-324 TABLET, CHEWABLE ORAL
Status: DISCONTINUED | OUTPATIENT
Start: 2017-05-04 | End: 2017-05-04 | Stop reason: HOSPADM

## 2017-05-04 RX ORDER — ONDANSETRON 2 MG/ML
4 INJECTION INTRAMUSCULAR; INTRAVENOUS EVERY 4 HOURS PRN
Status: DISCONTINUED | OUTPATIENT
Start: 2017-05-04 | End: 2017-05-04 | Stop reason: HOSPADM

## 2017-05-04 RX ORDER — VERAPAMIL HYDROCHLORIDE 2.5 MG/ML
1-2.5 INJECTION, SOLUTION INTRAVENOUS
Status: DISCONTINUED | OUTPATIENT
Start: 2017-05-04 | End: 2017-05-04 | Stop reason: HOSPADM

## 2017-05-04 RX ORDER — IODIXANOL 320 MG/ML
220 INJECTION, SOLUTION INTRAVASCULAR ONCE
Status: COMPLETED | OUTPATIENT
Start: 2017-05-04 | End: 2017-05-04

## 2017-05-04 RX ORDER — ATROPINE SULFATE 0.1 MG/ML
.5-1 INJECTION INTRAVENOUS
Status: DISCONTINUED | OUTPATIENT
Start: 2017-05-04 | End: 2017-05-04 | Stop reason: HOSPADM

## 2017-05-04 RX ORDER — GABAPENTIN 100 MG/1
100 CAPSULE ORAL DAILY
Status: DISCONTINUED | OUTPATIENT
Start: 2017-05-04 | End: 2017-05-04 | Stop reason: HOSPADM

## 2017-05-04 RX ORDER — METHYLPREDNISOLONE SODIUM SUCCINATE 125 MG/2ML
125 INJECTION, POWDER, LYOPHILIZED, FOR SOLUTION INTRAMUSCULAR; INTRAVENOUS
Status: DISCONTINUED | OUTPATIENT
Start: 2017-05-04 | End: 2017-05-04 | Stop reason: HOSPADM

## 2017-05-04 RX ORDER — DOPAMINE HYDROCHLORIDE 160 MG/100ML
2-20 INJECTION, SOLUTION INTRAVENOUS CONTINUOUS PRN
Status: DISCONTINUED | OUTPATIENT
Start: 2017-05-04 | End: 2017-05-04 | Stop reason: HOSPADM

## 2017-05-04 RX ORDER — NITROGLYCERIN 5 MG/ML
100-200 VIAL (ML) INTRAVENOUS
Status: DISCONTINUED | OUTPATIENT
Start: 2017-05-04 | End: 2017-05-04 | Stop reason: HOSPADM

## 2017-05-04 RX ORDER — NITROGLYCERIN 20 MG/100ML
.07-2 INJECTION INTRAVENOUS CONTINUOUS PRN
Status: DISCONTINUED | OUTPATIENT
Start: 2017-05-04 | End: 2017-05-04 | Stop reason: HOSPADM

## 2017-05-04 RX ORDER — SODIUM CHLORIDE 9 MG/ML
INJECTION, SOLUTION INTRAVENOUS CONTINUOUS
Status: DISCONTINUED | OUTPATIENT
Start: 2017-05-04 | End: 2017-05-04 | Stop reason: HOSPADM

## 2017-05-04 RX ORDER — LIDOCAINE HYDROCHLORIDE 10 MG/ML
1-10 INJECTION, SOLUTION EPIDURAL; INFILTRATION; INTRACAUDAL; PERINEURAL
Status: COMPLETED | OUTPATIENT
Start: 2017-05-04 | End: 2017-05-04

## 2017-05-04 RX ORDER — POTASSIUM CHLORIDE 29.8 MG/ML
20 INJECTION INTRAVENOUS
Status: DISCONTINUED | OUTPATIENT
Start: 2017-05-04 | End: 2017-05-04 | Stop reason: HOSPADM

## 2017-05-04 RX ORDER — NICARDIPINE HYDROCHLORIDE 2.5 MG/ML
100 INJECTION INTRAVENOUS
Status: DISCONTINUED | OUTPATIENT
Start: 2017-05-04 | End: 2017-05-04 | Stop reason: HOSPADM

## 2017-05-04 RX ORDER — ISOSORBIDE MONONITRATE 30 MG/1
30 TABLET, EXTENDED RELEASE ORAL DAILY
Status: DISCONTINUED | OUTPATIENT
Start: 2017-05-04 | End: 2017-05-04 | Stop reason: HOSPADM

## 2017-05-04 RX ORDER — LIDOCAINE HYDROCHLORIDE 10 MG/ML
30 INJECTION, SOLUTION EPIDURAL; INFILTRATION; INTRACAUDAL; PERINEURAL
Status: DISCONTINUED | OUTPATIENT
Start: 2017-05-04 | End: 2017-05-04 | Stop reason: HOSPADM

## 2017-05-04 RX ORDER — PROTAMINE SULFATE 10 MG/ML
25-100 INJECTION, SOLUTION INTRAVENOUS EVERY 5 MIN PRN
Status: DISCONTINUED | OUTPATIENT
Start: 2017-05-04 | End: 2017-05-04 | Stop reason: HOSPADM

## 2017-05-04 RX ORDER — FUROSEMIDE 10 MG/ML
20-100 INJECTION INTRAMUSCULAR; INTRAVENOUS
Status: DISCONTINUED | OUTPATIENT
Start: 2017-05-04 | End: 2017-05-04 | Stop reason: HOSPADM

## 2017-05-04 RX ORDER — FLUMAZENIL 0.1 MG/ML
0.2 INJECTION, SOLUTION INTRAVENOUS
Status: DISCONTINUED | OUTPATIENT
Start: 2017-05-04 | End: 2017-05-04 | Stop reason: HOSPADM

## 2017-05-04 RX ORDER — LIDOCAINE 40 MG/G
CREAM TOPICAL
Status: DISCONTINUED | OUTPATIENT
Start: 2017-05-04 | End: 2017-05-04 | Stop reason: HOSPADM

## 2017-05-04 RX ORDER — CLOPIDOGREL BISULFATE 75 MG/1
75 TABLET ORAL
Status: DISCONTINUED | OUTPATIENT
Start: 2017-05-04 | End: 2017-05-04 | Stop reason: HOSPADM

## 2017-05-04 RX ORDER — MORPHINE SULFATE 2 MG/ML
1-2 INJECTION, SOLUTION INTRAMUSCULAR; INTRAVENOUS EVERY 5 MIN PRN
Status: DISCONTINUED | OUTPATIENT
Start: 2017-05-04 | End: 2017-05-04 | Stop reason: HOSPADM

## 2017-05-04 RX ORDER — DOBUTAMINE HYDROCHLORIDE 200 MG/100ML
2-20 INJECTION INTRAVENOUS CONTINUOUS PRN
Status: DISCONTINUED | OUTPATIENT
Start: 2017-05-04 | End: 2017-05-04 | Stop reason: HOSPADM

## 2017-05-04 RX ORDER — ALFALFA 250 MG
TABLET ORAL DAILY
Status: DISCONTINUED | OUTPATIENT
Start: 2017-05-04 | End: 2017-05-04

## 2017-05-04 RX ORDER — LEVOTHYROXINE SODIUM 112 UG/1
112 TABLET ORAL DAILY
Status: DISCONTINUED | OUTPATIENT
Start: 2017-05-05 | End: 2017-05-04 | Stop reason: HOSPADM

## 2017-05-04 RX ORDER — SODIUM CHLORIDE 9 MG/ML
INJECTION, SOLUTION INTRAVENOUS CONTINUOUS
Status: ACTIVE | OUTPATIENT
Start: 2017-05-04 | End: 2017-05-04

## 2017-05-04 RX ORDER — NALOXONE HYDROCHLORIDE 0.4 MG/ML
0.4 INJECTION, SOLUTION INTRAMUSCULAR; INTRAVENOUS; SUBCUTANEOUS EVERY 5 MIN PRN
Status: DISCONTINUED | OUTPATIENT
Start: 2017-05-04 | End: 2017-05-04 | Stop reason: HOSPADM

## 2017-05-04 RX ORDER — NALOXONE HYDROCHLORIDE 0.4 MG/ML
.1-.4 INJECTION, SOLUTION INTRAMUSCULAR; INTRAVENOUS; SUBCUTANEOUS
Status: DISCONTINUED | OUTPATIENT
Start: 2017-05-04 | End: 2017-05-04 | Stop reason: HOSPADM

## 2017-05-04 RX ORDER — CLOPIDOGREL BISULFATE 75 MG/1
75 TABLET ORAL DAILY
Status: DISCONTINUED | OUTPATIENT
Start: 2017-05-04 | End: 2017-05-04 | Stop reason: HOSPADM

## 2017-05-04 RX ORDER — POTASSIUM CHLORIDE 7.45 MG/ML
10 INJECTION INTRAVENOUS
Status: DISCONTINUED | OUTPATIENT
Start: 2017-05-04 | End: 2017-05-04 | Stop reason: HOSPADM

## 2017-05-04 RX ORDER — ACETAMINOPHEN 325 MG/1
325-650 TABLET ORAL EVERY 4 HOURS PRN
Status: DISCONTINUED | OUTPATIENT
Start: 2017-05-04 | End: 2017-05-04 | Stop reason: HOSPADM

## 2017-05-04 RX ORDER — ATROPINE SULFATE 0.1 MG/ML
0.5 INJECTION INTRAVENOUS EVERY 5 MIN PRN
Status: DISCONTINUED | OUTPATIENT
Start: 2017-05-04 | End: 2017-05-04 | Stop reason: HOSPADM

## 2017-05-04 RX ORDER — NITROGLYCERIN 5 MG/ML
100-500 VIAL (ML) INTRAVENOUS
Status: DISCONTINUED | OUTPATIENT
Start: 2017-05-04 | End: 2017-05-04 | Stop reason: HOSPADM

## 2017-05-04 RX ORDER — ADENOSINE 3 MG/ML
12-12000 INJECTION, SOLUTION INTRAVENOUS
Status: DISCONTINUED | OUTPATIENT
Start: 2017-05-04 | End: 2017-05-04 | Stop reason: HOSPADM

## 2017-05-04 RX ORDER — LORAZEPAM 2 MG/ML
.5-2 INJECTION INTRAMUSCULAR EVERY 4 HOURS PRN
Status: DISCONTINUED | OUTPATIENT
Start: 2017-05-04 | End: 2017-05-04 | Stop reason: HOSPADM

## 2017-05-04 RX ORDER — FENTANYL CITRATE 50 UG/ML
25-50 INJECTION, SOLUTION INTRAMUSCULAR; INTRAVENOUS
Status: DISCONTINUED | OUTPATIENT
Start: 2017-05-04 | End: 2017-05-04 | Stop reason: HOSPADM

## 2017-05-04 RX ORDER — ASPIRIN 325 MG
325 TABLET ORAL
Status: DISCONTINUED | OUTPATIENT
Start: 2017-05-04 | End: 2017-05-04 | Stop reason: HOSPADM

## 2017-05-04 RX ORDER — EPTIFIBATIDE 2 MG/ML
2 INJECTION, SOLUTION INTRAVENOUS CONTINUOUS PRN
Status: DISCONTINUED | OUTPATIENT
Start: 2017-05-04 | End: 2017-05-04 | Stop reason: HOSPADM

## 2017-05-04 RX ORDER — CLOPIDOGREL BISULFATE 75 MG/1
300-600 TABLET ORAL
Status: COMPLETED | OUTPATIENT
Start: 2017-05-04 | End: 2017-05-04

## 2017-05-04 RX ORDER — ASPIRIN 81 MG/1
81 TABLET ORAL DAILY
Status: DISCONTINUED | OUTPATIENT
Start: 2017-05-04 | End: 2017-05-04 | Stop reason: HOSPADM

## 2017-05-04 RX ORDER — CARVEDILOL 6.25 MG/1
6.25 TABLET ORAL 2 TIMES DAILY WITH MEALS
Status: DISCONTINUED | OUTPATIENT
Start: 2017-05-04 | End: 2017-05-04 | Stop reason: HOSPADM

## 2017-05-04 RX ORDER — POTASSIUM CHLORIDE 1500 MG/1
20 TABLET, EXTENDED RELEASE ORAL
Status: DISCONTINUED | OUTPATIENT
Start: 2017-05-04 | End: 2017-05-04 | Stop reason: HOSPADM

## 2017-05-04 RX ORDER — ENALAPRILAT 1.25 MG/ML
1.25-2.5 INJECTION INTRAVENOUS
Status: DISCONTINUED | OUTPATIENT
Start: 2017-05-04 | End: 2017-05-04 | Stop reason: HOSPADM

## 2017-05-04 RX ORDER — BUPIVACAINE HYDROCHLORIDE 2.5 MG/ML
1-10 INJECTION, SOLUTION EPIDURAL; INFILTRATION; INTRACAUDAL
Status: DISCONTINUED | OUTPATIENT
Start: 2017-05-04 | End: 2017-05-04 | Stop reason: HOSPADM

## 2017-05-04 RX ORDER — SODIUM NITROPRUSSIDE 25 MG/ML
100-200 INJECTION INTRAVENOUS
Status: DISCONTINUED | OUTPATIENT
Start: 2017-05-04 | End: 2017-05-04 | Stop reason: HOSPADM

## 2017-05-04 RX ORDER — ROSUVASTATIN CALCIUM 20 MG/1
20 TABLET, COATED ORAL DAILY
Status: DISCONTINUED | OUTPATIENT
Start: 2017-05-04 | End: 2017-05-04 | Stop reason: HOSPADM

## 2017-05-04 RX ORDER — HYDRALAZINE HYDROCHLORIDE 20 MG/ML
10-20 INJECTION INTRAMUSCULAR; INTRAVENOUS
Status: DISCONTINUED | OUTPATIENT
Start: 2017-05-04 | End: 2017-05-04 | Stop reason: HOSPADM

## 2017-05-04 RX ADMIN — CARBIDOPA AND LEVODOPA 2 TABLET: 25; 100 TABLET ORAL at 11:57

## 2017-05-04 RX ADMIN — Medication 2000 UNITS: at 09:42

## 2017-05-04 RX ADMIN — GABAPENTIN 100 MG: 100 CAPSULE ORAL at 11:56

## 2017-05-04 RX ADMIN — IODIXANOL 220 ML: 320 INJECTION, SOLUTION INTRAVASCULAR at 10:30

## 2017-05-04 RX ADMIN — ISOSORBIDE MONONITRATE 30 MG: 30 TABLET, EXTENDED RELEASE ORAL at 14:06

## 2017-05-04 RX ADMIN — Medication 6000 UNITS: at 09:29

## 2017-05-04 RX ADMIN — FENTANYL CITRATE 50 MCG: 50 INJECTION INTRAMUSCULAR; INTRAVENOUS at 12:59

## 2017-05-04 RX ADMIN — CARBIDOPA AND LEVODOPA 2 TABLET: 25; 100 TABLET ORAL at 19:27

## 2017-05-04 RX ADMIN — CLOPIDOGREL BISULFATE 300 MG: 75 TABLET ORAL at 10:10

## 2017-05-04 RX ADMIN — SODIUM CHLORIDE: 9 INJECTION, SOLUTION INTRAVENOUS at 07:51

## 2017-05-04 RX ADMIN — MIDAZOLAM HYDROCHLORIDE 1 MG: 1 INJECTION, SOLUTION INTRAMUSCULAR; INTRAVENOUS at 12:06

## 2017-05-04 RX ADMIN — FENTANYL CITRATE 50 MCG: 50 INJECTION INTRAMUSCULAR; INTRAVENOUS at 14:11

## 2017-05-04 RX ADMIN — FENTANYL CITRATE 50 MCG: 50 INJECTION INTRAMUSCULAR; INTRAVENOUS at 12:27

## 2017-05-04 RX ADMIN — CARVEDILOL 6.25 MG: 6.25 TABLET, FILM COATED ORAL at 14:06

## 2017-05-04 RX ADMIN — FENTANYL CITRATE 50 MCG: 50 INJECTION, SOLUTION INTRAMUSCULAR; INTRAVENOUS at 08:58

## 2017-05-04 RX ADMIN — LIDOCAINE HYDROCHLORIDE 8 ML: 10 INJECTION, SOLUTION EPIDURAL; INFILTRATION; INTRACAUDAL; PERINEURAL at 09:03

## 2017-05-04 RX ADMIN — MIDAZOLAM HYDROCHLORIDE 1 MG: 1 INJECTION, SOLUTION INTRAMUSCULAR; INTRAVENOUS at 08:58

## 2017-05-04 RX ADMIN — MIDAZOLAM HYDROCHLORIDE 1 MG: 1 INJECTION, SOLUTION INTRAMUSCULAR; INTRAVENOUS at 13:46

## 2017-05-04 RX ADMIN — FENTANYL CITRATE 50 MCG: 50 INJECTION INTRAMUSCULAR; INTRAVENOUS at 11:01

## 2017-05-04 RX ADMIN — HYDRALAZINE HYDROCHLORIDE 10 MG: 20 INJECTION INTRAMUSCULAR; INTRAVENOUS at 10:04

## 2017-05-04 NOTE — PROGRESS NOTES
Late Entry shift assessment-Pt has had continuous leg spasms with inability to control movement throughout recovery time post procedure.  MD aware and states may give Versed and Fentanyl prn. Femoral sheath pulled at 1349 after second ACT--now 170. 30 minutes manual pressure held. Pt sobbing, crying and restless in bed throughout line pull and manual pressure hold.  Given po scheduled medications and Iv sedation to complete pressure hold. Site stable after 30 minutes.

## 2017-05-04 NOTE — DISCHARGE INSTRUCTIONS
AngioplastyDischarge Instructions - Femoral    After you go home:      Have an adult stay with you until tomorrow.    Drink extra fluids for 2 days.    You may resume your normal diet.    No smoking       For 24 hours - due to the sedation you received:    Relax and take it easy.    Do NOT make any important or legal decisions.    Do NOT drive or operate machines at home or at work.    Do NOT drink alcohol.    Care of Groin Puncture Site:      For the first 24 hrs - check the puncture site every 1-2 hours while awake.    For 2 days, when you cough, sneeze, laugh or move your bowels, hold your hand over the puncture site and press firmly.    Remove the bandaid after 24 hours. If there is minor oozing, apply another bandaid and remove it after 12 hours.    It is normal to have a small bruise or pea size lump at the site.    You may shower tomorrow. Do NOT take a bath, or use a hot tub or pool for at least 3 days. Do NOT scrub the site. Do not use lotion or powder near the puncture site.     Activity:            For 2 days:    No stooping or squatting    Do NOT do any heavy activity such as exercise, lifting, or straining.     No housework, yard work or any activity that make you sweat    Do NOT lift more than 10 pounds    Bleeding:      If you start bleeding from the site in your groin, lie down flat and press firmly on/above the site for 10 minutes.     Once bleeding stops, lay flat for 2 hours.     Call [facility] as soon as you can.       Call 911 right away if you have heavy bleeding or bleeding that does not stop.      Medicines:      If you are taking antiplatelet medications such as Plavix, do not stop taking it until you talk to your cardiologist.        Take your medications, unless your provider tells you not to.     If you have stopped any other medicines, check with your provider about when to restart them.    Follow Up Appointments:      Follow up with [facility] Nurse Practitioner at [facility] of  patient preference in 7-10 days.    Cardiac Rehab will contact you for follow up care.    Call the clinic if:      You have increased pain or a large or growing hard lump around the site.    The site is red, swollen, hot or tender.    Blood or fluid is draining from the site.    You have chills or a fever greater than 101 F (38 C).    Your leg turns feels numb, cool or changes color.    You have hives, a rash or unusual itching.    New pain in the back or belly that you cannot control with Tylenol.    Any questions or concerns.      [facility] at [place]:    [phone] UM (7 days a week)

## 2017-05-04 NOTE — PHARMACY-PHARMACOTHERAPY NOTE
"The following home medications were NOT continued on inpatient admission per \"Discontinuation of nonessential home medications during hospitalization\" policy: alfalfa    If a therapeutic holiday is deemed inappropriate per the prescriber, please notify the pharmacist regarding the medication order.    The pharmacist is available to answer any questions and/or concerns the patient may have regarding discontinuation of non-essential medications.    Please ensure that these medications are restarted as needed upon discharge via the medication reconciliation discharge process and included on the discharge medication reconciliation report.    Thank you,  [facility]  "

## 2017-05-04 NOTE — PROGRESS NOTES
Pt returned to [facility] via bed at 1100..complained of back and right groin pain. Attempting to void via bedpan- unable at this time. Pt then began sobbing and crying with leg spasms that are severe and uncontrollable. Given Fentanyl IV and rafal to Dr. Erickson.  Requested MD to reorder home meds for leg spasms. Verbal order received for Fentanyl and Versed IV prn for leg spasms.

## 2017-05-04 NOTE — PROGRESS NOTES
Care Suites Arrival    Reason for Visit: peripheral angiogram  Arrival interventions:none    Pt resting in bed/recyliner, denies additional needs at this time, call light in reach.  Waiting for procedure.

## 2017-05-04 NOTE — IP AVS SNAPSHOT
[place] [address]    [place] [address]    Phone:  [phone]                                       After Visit Summary   5/4/2017    Katherin Fletcher    MRN: [id number]           After Visit Summary Signature Page     I have received my discharge instructions, and my questions have been answered. I have discussed any challenges I see with this plan with the nurse or doctor.    ..........................................................................................................................................  Patient/Patient Representative Signature      ..........................................................................................................................................  Patient Representative Print Name and Relationship to Patient    ..................................................               ................................................  Date                                            Time    ..........................................................................................................................................  Reviewed by Signature/Title    ...................................................              ..............................................  Date                                                            Time

## 2017-05-04 NOTE — IP AVS SNAPSHOT
MRN:[id number]                      After Visit Summary   5/4/2017    Katherin Fletcher    MRN: [id number]           Visit Information        Department      5/4/2017  6:33 AM [facility]          Review of your medicines      UNREVIEWED medicines. Ask your doctor about these medicines        Dose / Directions    Alfalfa 250 MG Tabs        Take by mouth daily   Refills:  0       aspirin 81 MG tablet        Dose:  81 mg   Take 81 mg by mouth daily   Refills:  0       calcium + D 600-200 MG-UNIT Tabs   Used for:  Preventative health care   Generic drug:  calcium carbonate-vitamin D        Dose:  1 tablet   Take 1 tablet by mouth daily   Refills:  0       carbidopa-levodopa  MG per tablet   Commonly known as:  SINEMET   Used for:  Restless legs syndrome, Neurologic gait disorder, Dysarthria, Cerebellar disease        TAKE 2 TABLETS FOUR TIMES A DAY   Quantity:  720 tablet   Refills:  0       carvedilol 6.25 MG tablet   Commonly known as:  COREG   Used for:  Atrial fibrillation (H), HTN (hypertension)        Dose:  6.25 mg   Take 1 tablet (6.25 mg) by mouth 2 times daily (with meals)   Quantity:  180 tablet   Refills:  3       * clopidogrel 75 MG tablet   Commonly known as:  PLAVIX   This may have changed:  Another medication with the same name was added. Make sure you understand how and when to take each.   Used for:  CAD (coronary artery disease)   Ask about: Which instructions should I use?        Dose:  75 mg   Take 1 tablet (75 mg) by mouth daily   Quantity:  90 tablet   Refills:  3       * clopidogrel 75 MG tablet   Commonly known as:  PLAVIX   This may have changed:  You were already taking a medication with the same name, and this prescription was added. Make sure you understand how and when to take each.   Used for:  Coronary artery disease involving native coronary artery of native heart without angina pectoris   Ask about: Which instructions should I use?         Dose:  75 mg   Start taking on:  5/5/2017   Take 1 tablet (75 mg) by mouth daily   Quantity:  30 tablet   Refills:  0       collagenase ointment   Used for:  Arterial leg ulcer (H)        Apply topically daily Apply a nickel-thickness amount to the wound and cover with a saline moistened gauze. Wrap in a dry gauze. Change daily.   Quantity:  30 g   Refills:  3       DAILY MULTI Tabs        Take by mouth daily   Refills:  0       gabapentin 100 MG capsule   Commonly known as:  NEURONTIN        Dose:  100 mg   Take 100 mg by mouth daily   Refills:  0       isosorbide mononitrate 30 MG 24 hr tablet   Commonly known as:  IMDUR   Used for:  CAD (coronary artery disease)        Dose:  30 mg   Take 1 tablet (30 mg) by mouth daily   Quantity:  90 tablet   Refills:  3       levothyroxine 112 MCG tablet   Commonly known as:  SYNTHROID/LEVOTHROID   Used for:  Encounter for medication refill        TAKE 1 TABLET DAILY   Quantity:  90 tablet   Refills:  2       nitroglycerin 0.4 MG sublingual tablet   Commonly known as:  NITROSTAT   Used for:  CAD (coronary artery disease)        DISSOLVE ONE TABLET UNDER THE TONGUE EVERY 5 MINUTES AS NEEDED FOR CHEST PAIN.  DO NOT EXCEED A TOTAL OF 3 DOSES IN 15 MINUTES   Quantity:  25 tablet   Refills:  3       omeprazole 20 MG CR capsule   Commonly known as:  priLOSEC   Used for:  Encounter for medication refill        TAKE 2 CAPSULES DAILY 30 TO 60 MINUTES BEFORE A MEAL   Quantity:  180 capsule   Refills:  1       rosuvastatin 20 MG tablet   Commonly known as:  CRESTOR   Used for:  Hyperlipidemia        Dose:  20 mg   Take 1 tablet (20 mg) by mouth daily   Quantity:  90 tablet   Refills:  3       * Notice:  This list has 2 medication(s) that are the same as other medications prescribed for you. Read the directions carefully, and ask your doctor or other care provider to review them with you.         Where to get your medicines      Some of these will need a paper prescription and others can  be bought over the counter. Ask your nurse if you have questions.     Bring a paper prescription for each of these medications     clopidogrel 75 MG tablet               Prescriptions were sent or printed at these locations (1 Prescription)                   Other Prescriptions                Printed at Department/Unit printer (1 of 1)         clopidogrel (PLAVIX) 75 MG tablet                 Protect others around you: Learn how to safely use, store and throw away your medicines at www.disposemymeds.org.         Follow-ups after your visit        Your next 10 appointments already scheduled     May 10, 2017 11:00 AM CDT   Treatment 45 with Amber Adrian, PT   [facility] ([facility])    [address]  [address]  [facility] [address]   [phone]            May 11, 2017 10:50 AM CDT   Return Visit with Charline Joyner PA-C   [facility] [place] ([facility])    [address] [place] [address] [address]  [facility] [address]   [phone]            May 11, 2017  2:00 PM CDT   (Arrive by 1:45 PM)   Return Visit with Adrian Arroyo DPM   [facility] ([facility])    [address]  4th Floor  [facility] [address] [phone]            May 17, 2017  2:15 PM CDT   Treatment 45 with Amber Adrian, PT   [facility] ([facility])    [address]  [address]  [facility] [address]   [phone]            May 24, 2017 11:45 AM CDT   Treatment 45 with Amber Adrian, PT   [facility] ([facility])    [address] [place] [address]  [address]  [facility] [address]   [phone]               Care Instructions        After Care Instructions     Discharge Instructions - Follow up with [facility] Nurse Practitioner        Follow up with [facility] Nurse Practitioner at [facility] of patient preference in 7-10 days.            Discharge  Instructions - IF on Metformin (Glucophage or Glucovance) or Metformin containing medications       IF on Metformin (Glucophage or Glucovance) or Metformin containing medications , schedule a Basic Metabolic Panel at [facility] or Primary Clinic in 48 - 72 hours post procedure and PRIOR TO resuming the Metformin or Metformin containing medications.  Hold Metformin (Glucophage or Glucovance) or Metformin containing medications until after the Basic Metabolic Panel on the 2nd or 3rd day following the procedure.  May resume after blood draw is complete.                  Further instructions from your care team        AngioplastyDischarge Instructions - Femoral    After you go home:      Have an adult stay with you until tomorrow.    Drink extra fluids for 2 days.    You may resume your normal diet.    No smoking       For 24 hours - due to the sedation you received:    Relax and take it easy.    Do NOT make any important or legal decisions.    Do NOT drive or operate machines at home or at work.    Do NOT drink alcohol.    Care of Groin Puncture Site:      For the first 24 hrs - check the puncture site every 1-2 hours while awake.    For 2 days, when you cough, sneeze, laugh or move your bowels, hold your hand over the puncture site and press firmly.    Remove the bandaid after 24 hours. If there is minor oozing, apply another bandaid and remove it after 12 hours.    It is normal to have a small bruise or pea size lump at the site.    You may shower tomorrow. Do NOT take a bath, or use a hot tub or pool for at least 3 days. Do NOT scrub the site. Do not use lotion or powder near the puncture site.     Activity:            For 2 days:    No stooping or squatting    Do NOT do any heavy activity such as exercise, lifting, or straining.     No housework, yard work or any activity that make you sweat    Do NOT lift more than 10 pounds    Bleeding:      If you start bleeding from the site in your groin, lie down flat and  "press firmly on/above the site for 10 minutes.     Once bleeding stops, lay flat for 2 hours.     Call [facility] as soon as you can.       Call 911 right away if you have heavy bleeding or bleeding that does not stop.      Medicines:      If you are taking antiplatelet medications such as Plavix, do not stop taking it until you talk to your cardiologist.        Take your medications, unless your provider tells you not to.     If you have stopped any other medicines, check with your provider about when to restart them.    Follow Up Appointments:      Follow up with [facility] Nurse Practitioner at [facility] of patient preference in 7-10 days.    Cardiac Rehab will contact you for follow up care.    Call the clinic if:      You have increased pain or a large or growing hard lump around the site.    The site is red, swollen, hot or tender.    Blood or fluid is draining from the site.    You have chills or a fever greater than 101 F (38 C).    Your leg turns feels numb, cool or changes color.    You have hives, a rash or unusual itching.    New pain in the back or belly that you cannot control with Tylenol.    Any questions or concerns.      [facility] [place]:    [phone] [facility] (7 days a week)         Additional Information About Your Visit        [employer] Information     [employer] lets you send messages to your doctor, view your test results, renew your prescriptions, schedule appointments and more. To sign up, go to [email or web address] . Click on \"Log in\" on the left side of the screen, which will take you to the Welcome page. Then click on \"Sign up Now\" on the right side of the page.     You will be asked to enter the access code listed below, as well as some personal information. Please follow the directions to create your username and password.     Your access code is: [email or web address]  Expires: 2017  5:38 PM     Your access code will  in 90 days. If you need help " "or a new code, please call your [facility] clinic or [phone].        Care EveryWhere ID     This is your Care EveryWhere ID. This could be used by other organizations to access your [facility] medical records  [id number]        Your Vitals Were     Blood Pressure Pulse Temperature Respirations Height Weight    139/70 80 97.7  F (36.5  C) (Oral) 20 1.626 m (5' 4\") 83.1 kg (183 lb 3.2 oz)    Pulse Oximetry BMI (Body Mass Index)                100% 31.45 kg/m2           Primary Care Provider Office Phone # Fax #    Aris Del Real [phone]      Thank you!     Thank you for choosing [facility] for your care. Our goal is always to provide you with excellent care. Hearing back from our patients is one way we can continue to improve our services. Please take a few minutes to complete the written survey that you may receive in the mail after you visit with us. Thank you!             Medication List: This is a list of all your medications and when to take them. Check marks below indicate your daily home schedule. Keep this list as a reference.      Medications           Morning Afternoon Evening Bedtime As Needed    Alfalfa 250 MG Tabs   Take by mouth daily                                aspirin 81 MG tablet   Take 81 mg by mouth daily                                calcium + D 600-200 MG-UNIT Tabs   Take 1 tablet by mouth daily   Generic drug:  calcium carbonate-vitamin D                                carbidopa-levodopa  MG per tablet   Commonly known as:  SINEMET   TAKE 2 TABLETS FOUR TIMES A DAY   Last time this was given:  2 tablets on 5/4/2017 11:57 AM                                carvedilol 6.25 MG tablet   Commonly known as:  COREG   Take 1 tablet (6.25 mg) by mouth 2 times daily (with meals)   Last time this was given:  6.25 mg on 5/4/2017  2:06 PM                                collagenase ointment   Apply topically daily Apply a nickel-thickness amount to the wound and cover with a " saline moistened gauze. Wrap in a dry gauze. Change daily.                                DAILY MULTI Tabs   Take by mouth daily                                gabapentin 100 MG capsule   Commonly known as:  NEURONTIN   Take 100 mg by mouth daily   Last time this was given:  100 mg on 5/4/2017 11:56 AM                                isosorbide mononitrate 30 MG 24 hr tablet   Commonly known as:  IMDUR   Take 1 tablet (30 mg) by mouth daily   Last time this was given:  30 mg on 5/4/2017  2:06 PM                                levothyroxine 112 MCG tablet   Commonly known as:  SYNTHROID/LEVOTHROID   TAKE 1 TABLET DAILY                                nitroglycerin 0.4 MG sublingual tablet   Commonly known as:  NITROSTAT   DISSOLVE ONE TABLET UNDER THE TONGUE EVERY 5 MINUTES AS NEEDED FOR CHEST PAIN.  DO NOT EXCEED A TOTAL OF 3 DOSES IN 15 MINUTES                                omeprazole 20 MG CR capsule   Commonly known as:  priLOSEC   TAKE 2 CAPSULES DAILY 30 TO 60 MINUTES BEFORE A MEAL                                rosuvastatin 20 MG tablet   Commonly known as:  CRESTOR   Take 1 tablet (20 mg) by mouth daily                                  ASK your doctor about these medications           Morning Afternoon Evening Bedtime As Needed    * clopidogrel 75 MG tablet   Commonly known as:  PLAVIX   Take 1 tablet (75 mg) by mouth daily   Last time this was given:  300 mg on 5/4/2017 10:10 AM   Ask about: Which instructions should I use?                                * clopidogrel 75 MG tablet   Commonly known as:  PLAVIX   Take 1 tablet (75 mg) by mouth daily   Start taking on:  5/5/2017   Last time this was given:  300 mg on 5/4/2017 10:10 AM   Ask about: Which instructions should I use?                                * Notice:  This list has 2 medication(s) that are the same as other medications prescribed for you. Read the directions carefully, and ask your doctor or other care provider to review them with you.

## 2017-05-04 NOTE — PROGRESS NOTES
Pre procedure plan of care reviewed with pt and spouse prior to sedation.  All questions answered and pt appears to accept and understand.

## 2017-05-05 ENCOUNTER — TELEPHONE (OUTPATIENT)
Dept: CARDIOLOGY | Facility: CLINIC | Age: 77
End: 2017-05-05

## 2017-05-05 LAB — INTERPRETATION ECG - MUSE: NORMAL

## 2017-05-05 NOTE — PROGRESS NOTES
Pt up and ambulating in the halls with stand by assist to bathroom and tolerated activity well.  Denies pain, nausea or lightheadedness. Voiding good amounts post procedure. Right groin site remains stable with activity- no bleeding or hematoma. D/C instructions reviewed and pt appears to accept and understand. All questions answered. Pt aware of medications to take when home and given renewal Plavix prescription for future refills.

## 2017-05-05 NOTE — TELEPHONE ENCOUNTER
Called pt morning after her PTA left popliteal artery with LAURA.  No complications.  Pt has plavix.  Reviewed post-procedure activity restrictions and follow-up appts.

## 2017-05-05 NOTE — PROGRESS NOTES
Care Suites Discharge Summary    Discharge Criteria:   Discharge Criteria met per MD orders: Yes.   Vital signs stable.     Pt demonstrates ability to ambulate safely: Yes.  (See discharge questionnaire for additional information)    Discharge instructions & education:   Discharge instructions reviewed with patient . Patient verbalizes  understanding.   Additional patient education provided:  none.     Medications:   Patient will be discharging on new medications- No. Patient verbalizes reason for use, start date, and side effects NA.    Items returned to patient:   Home and hospital acquired medications returned to patient NA   Listed belongings gathered and returned to patient: Yes    Patient discharged to home via wheelchair with  driving.    Linda Hrebert

## 2017-05-06 LAB — INTERPRETATION ECG - MUSE: NORMAL

## 2017-05-11 ENCOUNTER — OFFICE VISIT (OUTPATIENT)
Dept: CARDIOLOGY | Facility: CLINIC | Age: 77
End: 2017-05-11
Payer: MEDICARE

## 2017-05-11 ENCOUNTER — OFFICE VISIT (OUTPATIENT)
Dept: WOUND CARE | Facility: CLINIC | Age: 77
End: 2017-05-11

## 2017-05-11 VITALS
WEIGHT: 178 LBS | DIASTOLIC BLOOD PRESSURE: 78 MMHG | HEART RATE: 72 BPM | HEIGHT: 65 IN | SYSTOLIC BLOOD PRESSURE: 142 MMHG | BODY MASS INDEX: 29.66 KG/M2

## 2017-05-11 DIAGNOSIS — I25.10 CORONARY ARTERY DISEASE INVOLVING NATIVE CORONARY ARTERY OF NATIVE HEART WITHOUT ANGINA PECTORIS: ICD-10-CM

## 2017-05-11 DIAGNOSIS — I73.9 PAD (PERIPHERAL ARTERY DISEASE) (H): Primary | ICD-10-CM

## 2017-05-11 DIAGNOSIS — I73.9 PAD (PERIPHERAL ARTERY DISEASE) (H): ICD-10-CM

## 2017-05-11 DIAGNOSIS — L97.909 ARTERIAL LEG ULCER (H): Primary | ICD-10-CM

## 2017-05-11 PROCEDURE — 99214 OFFICE O/P EST MOD 30 MIN: CPT | Performed by: PHYSICIAN ASSISTANT

## 2017-05-11 RX ORDER — ISOSORBIDE MONONITRATE 30 MG/1
30 TABLET, EXTENDED RELEASE ORAL DAILY
Qty: 90 TABLET | Refills: 3 | Status: SHIPPED | OUTPATIENT
Start: 2017-05-11 | End: 2018-06-22

## 2017-05-11 NOTE — PROGRESS NOTES
Chief Complaint: No chief complaint on file.         Allergies   Allergen Reactions     Ace Inhibitors Cough     Baclofen      Penicillins      10/7/14 Hives per patient -- many many years ago       Nickel Swelling and Rash     Other reaction(s): Unknown     Subjective: Katherin is a 76 year old female who presents to the clinic today for a follow up of left leg wound. She has been using Santyl daily as instructed. She had a PTA left popliteal artery with drug coated balloon performed on 5/4 by Dr. Erickson. According to patient, he discovered that her previous stent was 90% occluded. No new concerns today.     Objective  An arterial wound is noted at left  anterior ankle measuring 1.8cm x 0.3cm x 0.2cm.     Kumar Classification: II to subcutaneous     Wound base: Yellow/Slough  Edges: intact  Drainage: none/wound bed is dry  Odor: no  Undermining: no  Bone Exposure: No  Clinical Signs of Infection: No     After obtaining patient consent, the wound was irrigated with copious amounts of saline. A curette was then used to debride the wound into subcutaneous tissue of the loose fibrotic tissue. The wound edges were debrided back to healthy, bleeding tissue. The wound base exhibited healthy bleeding.     Assessment: left anterior ankle arterial wound      Plan:   - Pt seen and evaluated  - Wound debrided as described.  - Wound care instructions: After cleaning the wound like normal and patting dry, switch between applying Santyl and MediHoney every other day (ie. Day 1 - Santyl, Day 2 - Medihoney, Day 3 - Santyl, etc.). Wrap with a gauze dressing.  - Pt to return to clinic in 2 weeks

## 2017-05-11 NOTE — NURSING NOTE
Chief Complaint   Patient presents with     WOUND CARE     F/U Wound Check       There were no vitals filed for this visit.    There is no height or weight on file to calculate BMI.      Janny Story

## 2017-05-11 NOTE — PROGRESS NOTES
Please see separate dictation for HPI, PHYSICAL EXAM AND IMPRESSION/PLAN.    CURRENT MEDICATIONS:  Current Outpatient Prescriptions   Medication Sig Dispense Refill     collagenase ointment Apply topically daily Apply a nickel-thickness amount to the wound and cover with a saline moistened gauze. Wrap in a dry gauze. Change daily. 30 g 3     carbidopa-levodopa (SINEMET)  MG per tablet TAKE 2 TABLETS FOUR TIMES A  tablet 0     omeprazole (PRILOSEC) 20 MG CR capsule TAKE 2 CAPSULES DAILY 30 TO 60 MINUTES BEFORE A MEAL (Patient taking differently: one capsule before meals) 180 capsule 1     clopidogrel (PLAVIX) 75 MG tablet Take 1 tablet (75 mg) by mouth daily 90 tablet 3     rosuvastatin (CRESTOR) 20 MG tablet Take 1 tablet (20 mg) by mouth daily 90 tablet 3     levothyroxine (SYNTHROID, LEVOTHROID) 112 MCG tablet TAKE 1 TABLET DAILY 90 tablet 2     aspirin 81 MG tablet Take 81 mg by mouth daily       gabapentin (NEURONTIN) 100 MG capsule Take 100 mg by mouth daily       Multiple Vitamins-Minerals (DAILY MULTI) TABS Take by mouth daily       Alfalfa 250 MG TABS Take by mouth daily       carvedilol (COREG) 6.25 MG tablet Take 1 tablet (6.25 mg) by mouth 2 times daily (with meals) 180 tablet 3     nitroglycerin (NITROSTAT) 0.4 MG SL tablet DISSOLVE ONE TABLET UNDER THE TONGUE EVERY 5 MINUTES AS NEEDED FOR CHEST PAIN.  DO NOT EXCEED A TOTAL OF 3 DOSES IN 15 MINUTES 25 tablet 3     calcium carbonate-vitamin D (CALCIUM + D) 600-200 MG-UNIT TABS Take 1 tablet by mouth daily        [DISCONTINUED] clopidogrel (PLAVIX) 75 MG tablet Take 1 tablet (75 mg) by mouth daily 30 tablet 0     isosorbide mononitrate (IMDUR) 30 MG 24 hr tablet Take 1 tablet (30 mg) by mouth daily (Patient not taking: Reported on 5/11/2017) 90 tablet 3       ALLERGIES:     Allergies   Allergen Reactions     Ace Inhibitors Cough     Baclofen      Penicillins      10/7/14 Hives per patient -- many many years ago       Nickel Swelling and Rash      Other reaction(s): Unknown       PAST MEDICAL HISTORY:  Past Medical History:   Diagnosis Date     CAD (coronary artery disease)     s/p CABG 1992 and redo CABG 2009 (LIMA to LAD, SVG to OM and SVG to posterolateral branch of RCA)     Essential hypertension, benign      GERD (gastroesophageal reflux disease)      Hyperlipidaemia      Hyperlipidaemia LDL goal < 130      Ischemic cardiomyopathy      MI (myocardial infarction) (H) 11/1996     Mixed hyperlipidemia      PAD (peripheral artery disease) (H)      Restless legs syndrome      Unspecified hypothyroidism        PAST SURGICAL HISTORY:  Past Surgical History:   Procedure Laterality Date     ARTHROSCOPY SHOULDER RT/LT       BUNIONECTOMY RT/LT       C CABG, ARTERY-VEIN, THREE  1/2009     C MRA UPPER EXTREMITY WO&W CONT       CARDIAC SURGERY  1992    CABG,stents x2     CARPAL TUNNEL RELEASE RT/LT       HYSTERECTOMY, DAMIÁN  34 yo     OPEN REDUCTION INTERNAL FIXATION ANKLE      Left Ankle     RELEASE TRIGGER FINGER       STENT  2006     STENT(aka CARDIAC)  2008     VASCULAR SURGERY         SOCIAL HISTORY:  Social History     Social History     Marital status:      Spouse name: N/A     Number of children: N/A     Years of education: N/A     Social History Main Topics     Smoking status: Former Smoker     Types: Cigarettes     Quit date: 1/1/1992     Smokeless tobacco: Former User     Alcohol use 0.6 oz/week     1 Standard drinks or equivalent per week      Comment: MAYBE ONCE A YEAR     Drug use: No     Sexual activity: Not Currently     Other Topics Concern     Caffeine Concern Yes     1-2 cups      Sleep Concern No     Stress Concern No     Weight Concern Yes     Special Diet No     Exercise Yes     stairs, walking      Social History Narrative       FAMILY HISTORY:  Family History   Problem Relation Age of Onset     Unknown/Adopted Mother      Unknown/Adopted Father        Review of Systems:  Skin:  Positive for bruising     Eyes:  Positive for glasses     ENT:  Negative      Respiratory:  Positive for cough     Cardiovascular:    Positive for;edema;fatigue    Gastroenterology: Negative      Genitourinary:  Negative      Musculoskeletal:  Positive for arthritis;joint pain;nocturnal cramping neuropathy  Neurologic:  Positive for incoordination;speech problems;stroke not diagnosed  Psychiatric:  Positive for excessive stress;anxiety    Heme/Lymph/Imm:  Positive for allergies    Endocrine:  Positive for thyroid disorder       Reviewed. Remainder of the note dictated.    Charline Joyner PA-C

## 2017-05-11 NOTE — PATIENT INSTRUCTIONS
Thank you for your [facility] visit today. Your provider has recommended the following:    Medication Changes:  No changes. I did refill your Imdur  Recommendations:  ABIs in about 2 months  Follow-up:  See Dr Grayson for cardiology follow up in July or Aug for your annual visit.  See Dr Erickson for vascular follow up in July.    Reminder:  Please bring in all current medications, over the counter supplements and vitamin bottles to your next appointment.            [facility]

## 2017-05-11 NOTE — MR AVS SNAPSHOT
After Visit Summary   5/11/2017    Katherin Fletcher    MRN: [id number]           Patient Information     Date Of Birth          1940        Visit Information        Provider Department      5/11/2017 10:50 AM Charline Joyner PA-C [facility] AT [place]        Today's Diagnoses     PAD (peripheral artery disease) (H)    -  1    Coronary artery disease involving native coronary artery of native heart without angina pectoris          Care Instructions    Thank you for your [facility] visit today. Your provider has recommended the following:    Medication Changes:  No changes. I did refill your Imdur  Recommendations:  ABIs in about 2 months  Follow-up:  See Dr Grayson for cardiology follow up in July or Aug for your annual visit.  See Dr Erickson for vascular follow up in July.    Reminder:  Please bring in all current medications, over the counter supplements and vitamin bottles to your next appointment.            [facility] HEART CARE            Follow-ups after your visit        Additional Services     Follow-Up with Cardiology Specialty Clinic           Follow-Up with Cardiologist                 Your next 10 appointments already scheduled     May 11, 2017  2:00 PM CDT   (Arrive by 1:45 PM)   Return Visit with Adrian Arroyo DPM   [facility] ([facility])    [address]  4th Floor  [facility] [address]   [phone]            May 17, 2017  2:15 PM CDT   Treatment 45 with Amber Adrian, PT   [facility] ([facility])    [address]  [address]  [facility] [address]   [phone]            May 24, 2017 11:45 AM CDT   Treatment 45 with Amber Adrian, PT   [facility] ([facility])    [address] [place] [address]  [address]  [facility] [address]   [phone]              Future tests that were ordered for you today     Open  "Future Orders        Priority Expected Expires Ordered    Follow-Up with Cardiologist Routine 2017    Follow-Up with Cardiology Specialty Clinic Routine 7/3/2017 2019 2017    US DOMINIK Doppler No Exercise Routine 7/3/2017 2018 2017            Who to contact     If you have questions or need follow up information about today's clinic visit or your schedule please contact [facility] PHYS[facility] AT [place] directly at [phone].  Normal or non-critical lab and imaging results will be communicated to you by [employer], letter or phone within 4 business days after the clinic has received the results. If you do not hear from us within 7 days, please contact the clinic through [employer] or phone. If you have a critical or abnormal lab result, we will notify you by phone as soon as possible.  Submit refill requests through [employer] or call your pharmacy and they will forward the refill request to us. Please allow 3 business days for your refill to be completed.          Additional Information About Your Visit        [employer] Information     [employer] lets you send messages to your doctor, view your test results, renew your prescriptions, schedule appointments and more. To sign up, go to [email or web address] . Click on \"Log in\" on the left side of the screen, which will take you to the Welcome page. Then click on \"Sign up Now\" on the right side of the page.     You will be asked to enter the access code listed below, as well as some personal information. Please follow the directions to create your username and password.     Your access code is: [email or web address]  Expires: 2017  5:38 PM     Your access code will  in 90 days. If you need help or a new code, please call your [facility] clinic or [phone].        Care EveryWhere ID     This is your Care EveryWhere ID. This could be used by other organizations to access your [facility] medical records  [id number]   " "     Your Vitals Were     Pulse Height BMI (Body Mass Index)             72 1.638 m (5' 4.5\") 30.08 kg/m2          Blood Pressure from Last 3 Encounters:   05/11/17 142/78   05/04/17 139/70   04/17/17 122/64    Weight from Last 3 Encounters:   05/11/17 80.7 kg (178 lb)   05/04/17 83.1 kg (183 lb 3.2 oz)   04/17/17 80.7 kg (178 lb)                 Today's Medication Changes          These changes are accurate as of: 5/11/17 11:21 AM.  If you have any questions, ask your nurse or doctor.               These medicines have changed or have updated prescriptions.        Dose/Directions    omeprazole 20 MG CR capsule   Commonly known as:  priLOSEC   This may have changed:  See the new instructions.   Used for:  Encounter for medication refill        TAKE 2 CAPSULES DAILY 30 TO 60 MINUTES BEFORE A MEAL   Quantity:  180 capsule   Refills:  1            Where to get your medicines      These medications were sent to [facility] - [address] [address]     Phone:  [phone]     isosorbide mononitrate 30 MG 24 hr tablet                Primary Care Provider Office Phone # Fax #    Aris Del Real [phone]       [facility] [address] [place] [address]        Thank you!     Thank you for choosing [facility] PHYSICIANS HEART AT [place]  for your care. Our goal is always to provide you with excellent care. Hearing back from our patients is one way we can continue to improve our services. Please take a few minutes to complete the written survey that you may receive in the mail after your visit with us. Thank you!             Your Updated Medication List - Protect others around you: Learn how to safely use, store and throw away your medicines at www.disposemymeds.org.          This list is accurate as of: 5/11/17 11:21 AM.  Always use your most recent med list.                   Brand Name " Dispense Instructions for use    Alfalfa 250 MG Tabs      Take by mouth daily       aspirin 81 MG tablet      Take 81 mg by mouth daily       calcium + D 600-200 MG-UNIT Tabs   Generic drug:  calcium carbonate-vitamin D      Take 1 tablet by mouth daily       carbidopa-levodopa  MG per tablet    SINEMET    720 tablet    TAKE 2 TABLETS FOUR TIMES A DAY       carvedilol 6.25 MG tablet    COREG    180 tablet    Take 1 tablet (6.25 mg) by mouth 2 times daily (with meals)       clopidogrel 75 MG tablet    PLAVIX    90 tablet    Take 1 tablet (75 mg) by mouth daily       collagenase ointment     30 g    Apply topically daily Apply a nickel-thickness amount to the wound and cover with a saline moistened gauze. Wrap in a dry gauze. Change daily.       DAILY MULTI Tabs      Take by mouth daily       gabapentin 100 MG capsule    NEURONTIN     Take 100 mg by mouth daily       isosorbide mononitrate 30 MG 24 hr tablet    IMDUR    90 tablet    Take 1 tablet (30 mg) by mouth daily       levothyroxine 112 MCG tablet    SYNTHROID/LEVOTHROID    90 tablet    TAKE 1 TABLET DAILY       nitroglycerin 0.4 MG sublingual tablet    NITROSTAT    25 tablet    DISSOLVE ONE TABLET UNDER THE TONGUE EVERY 5 MINUTES AS NEEDED FOR CHEST PAIN.  DO NOT EXCEED A TOTAL OF 3 DOSES IN 15 MINUTES       omeprazole 20 MG CR capsule    priLOSEC    180 capsule    TAKE 2 CAPSULES DAILY 30 TO 60 MINUTES BEFORE A MEAL       rosuvastatin 20 MG tablet    CRESTOR    90 tablet    Take 1 tablet (20 mg) by mouth daily

## 2017-05-11 NOTE — MR AVS SNAPSHOT
After Visit Summary   5/11/2017    Katherin Fletcher    MRN: [id number]           Patient Information     Date Of Birth          1940        Visit Information        Provider Department      5/11/2017 2:00 PM Adrian Arroyo DPM [facility]        Today's Diagnoses     Arterial leg ulcer (H)    -  1    PAD (peripheral artery disease) (H)           Follow-ups after your visit        Your next 10 appointments already scheduled     May 17, 2017  2:15 PM CDT   Treatment 45 with Amber Adrian, PT   [facility] ([facility])    [address]  [address]  [facility] [address]   [phone]            May 24, 2017 11:45 AM CDT   Treatment 45 with Amber Adrian, PT   [facility] ([facility])    [address]  [facility] [address]   [phone]            Jun 01, 2017 11:00 AM CDT   (Arrive by 10:45 AM)   Return Visit with Adiran Arroyo DPM   [facility] ([facility])    [address]  4th Floor  [facility] [address]   [phone]            Jun 27, 2017 10:00 AM CDT   US DOMINIK DOPPLER NO EXERCISE with SHVUS4   [facility] ([facility])    [address] [place] [address]. [address]  [facility] [address]   [phone]           Please bring a list of your medicines (including vitamins, minerals and over-the-counter drugs). Also, tell your doctor about any allergies you may have. Wear comfortable clothes and leave your valuables at home.  No caffeine or tobacco for 1 hour prior to exam.  Please call the Imaging Department at your exam site with any questions.            Jul 13, 2017 10:15 AM CDT   Return Vascular Visit with Renato Erickson MD   [facility] ([facility])    [address]  [facility] [address]   [phone]            Aug 11,    2:15 PM CDT   Return Visit with Kenton Grayson MD   [facility]YSICI[facility] HE[facility] AT [place] ([facility])    [address] [place] [id number] [phone]              Future tests that were ordered for you today     Open Future Orders        Priority Expected Expires Ordered    Follow-Up with Cardiologist Routine 2017    Follow-Up with Cardiology Specialty Clinic Routine 7/3/2017 2019 2017    US DOMINIK Doppler No Exercise Routine 7/3/2017 2018 2017            Who to contact     Please call your clinic at [phone] to:    Ask questions about your health    Make or cancel appointments    Discuss your medicines    Learn about your test results    Speak to your doctor   If you have compliments or concerns about an experience at your clinic, or if you wish to file a complaint, please contact [facility] Patient Relations at [phone] or email us at [email or web address]         Additional Information About Your Visit        [employer] Information     [employer] is an electronic gateway that provides easy, online access to your medical records. With [employer], you can request a clinic appointment, read your test results, renew a prescription or communicate with your care team.     To sign up for [employer] visit the website at [email or web address]   You will be asked to enter the access code listed below, as well as some personal information. Please follow the directions to create your username and password.     Your access code is: [email or web address]  Expires: 2017  5:38 PM     Your access code will  in 90 days. If you need help or a new code, please contact your [facility] Clinic or call [phone] for assistance.        Care EveryWhere ID     This is your Care EveryWhere ID. This could be used by other organizations to access your [facility]  records  [id number]         Blood Pressure from Last 3 Encounters:   05/11/17 142/78   05/04/17 139/70   04/17/17 122/64    Weight from Last 3 Encounters:   05/11/17 178 lb   05/04/17 183 lb 3.2 oz   04/17/17 178 lb              Today, you had the following     No orders found for display         Today's Medication Changes          These changes are accurate as of: 5/11/17  3:11 PM.  If you have any questions, ask your nurse or doctor.               These medicines have changed or have updated prescriptions.        Dose/Directions    omeprazole 20 MG CR capsule   Commonly known as:  priLOSEC   This may have changed:  See the new instructions.   Used for:  Encounter for medication refill        TAKE 2 CAPSULES DAILY 30 TO 60 MINUTES BEFORE A MEAL   Quantity:  180 capsule   Refills:  1            Where to get your medicines      These medications were sent to [facility] - [address] [address]     Phone:  [phone]     isosorbide mononitrate 30 MG 24 hr tablet                Primary Care Provider Office Phone # Fax #    Aris Del Real [phone]       [facility] [address] [place] [address]        Thank you!     Thank you for choosing [facility]  for your care. Our goal is always to provide you with excellent care. Hearing back from our patients is one way we can continue to improve our services. Please take a few minutes to complete the written survey that you may receive in the mail after your visit with us. Thank you!             Your Updated Medication List - Protect others around you: Learn how to safely use, store and throw away your medicines at www.disposemymeds.org.          This list is accurate as of: 5/11/17  3:11 PM.  Always use your most recent med list.                   Brand Name Dispense Instructions for use    Alfalfa 250 MG Tabs      Take by mouth daily        aspirin 81 MG tablet      Take 81 mg by mouth daily       calcium + D 600-200 MG-UNIT Tabs   Generic drug:  calcium carbonate-vitamin D      Take 1 tablet by mouth daily       carbidopa-levodopa  MG per tablet    SINEMET    720 tablet    TAKE 2 TABLETS FOUR TIMES A DAY       carvedilol 6.25 MG tablet    COREG    180 tablet    Take 1 tablet (6.25 mg) by mouth 2 times daily (with meals)       clopidogrel 75 MG tablet    PLAVIX    90 tablet    Take 1 tablet (75 mg) by mouth daily       collagenase ointment     30 g    Apply topically daily Apply a nickel-thickness amount to the wound and cover with a saline moistened gauze. Wrap in a dry gauze. Change daily.       DAILY MULTI Tabs      Take by mouth daily       gabapentin 100 MG capsule    NEURONTIN     Take 100 mg by mouth daily       isosorbide mononitrate 30 MG 24 hr tablet    IMDUR    90 tablet    Take 1 tablet (30 mg) by mouth daily       levothyroxine 112 MCG tablet    SYNTHROID/LEVOTHROID    90 tablet    TAKE 1 TABLET DAILY       nitroglycerin 0.4 MG sublingual tablet    NITROSTAT    25 tablet    DISSOLVE ONE TABLET UNDER THE TONGUE EVERY 5 MINUTES AS NEEDED FOR CHEST PAIN.  DO NOT EXCEED A TOTAL OF 3 DOSES IN 15 MINUTES       omeprazole 20 MG CR capsule    priLOSEC    180 capsule    TAKE 2 CAPSULES DAILY 30 TO 60 MINUTES BEFORE A MEAL       rosuvastatin 20 MG tablet    CRESTOR    90 tablet    Take 1 tablet (20 mg) by mouth daily

## 2017-05-11 NOTE — LETTER
5/11/2017    Aris Del Real MD  [facility]  [address]  [facility] [address]    RE: Katherin Angelesy       Dear Colleague,    I had the pleasure of seeing Katherni Fletcher in the [facility].    Ms. Fletcher is a pleasant 76-year-old female who presents to Cardiology office today for followup after recent lower extremity PTA.      The patient has extensive cardiovascular history.  Her cardiac history includes coronary artery disease with history of 2 prior coronary artery bypass surgeries.  The first was in 1992.  The second was in 2009.  She also has peripheral arterial disease and underwent peripheral angiography with angioplasty to multiple vessels in the left lower extremity along with stenting of the distal left superficial femoral and above-knee popliteal artery in early 2010.  Then she underwent repeat angiography with PTA to the left below-the-knee popliteal artery and anterior tibial artery in 01/2010.  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  She was evaluated by Dr. Erickson last month after she was noted to have a nonhealing left anterior shin ulcer.  He suggested that she undergo repeat lower extremity angiography with possible PTA.      She underwent peripheral angiography on 05/04.  Unfortunately, the complete procedure note is not available to me, but there is a brief procedure note from that date stating that the patient had PTA of the left popliteal artery with a drug-coated balloon.  She presents today for followup.      Overall, she is feeling well.  She continues to have some uncontrolled movements in her lower legs.  She is unsure if her claudication in the left lower extremity has improved as she really has not pushed herself.  She is following with the Wound Clinic today.  She really does not have any new questions or concerns.  She is taking her  medications as directed but did ask for a refill on her isosorbide today.      CURRENT CARDIAC MEDICATIONS:   1.  Imdur 30 mg daily.   2.  Plavix 75 mg daily.   3.  Crestor 20 mg daily.   4.  Aspirin 81 mg daily.   5.  Carvedilol 6.25 mg b.i.d.   6.  Sublingual nitro p.r.n.      The remainder of her medications, allergies and review of systems were reviewed and as are noted above.      PHYSICAL EXAMINATION:   GENERAL:  The patient is a pleasant 76-year-old female who appears her stated age, in no apparent distress.   VITAL SIGNS:  Her blood pressure is 142/78, pulse 72, weight is 178 pounds, which is stable.   LUNGS:  Clear to auscultation bilaterally.   CARDIAC:  Revealed a regular rate and rhythm, no murmurs appreciated.   ABDOMEN:  Soft, nontender, nondistended.   LOWER EXTREMITIES:  Her left lower extremity is quite swollen.  She has 1-2+ pitting edema in that leg.  I did not unwrap her wound.  The right lower extremity shows no edema.   NEUROLOGIC:  Alert and oriented.      ASSESSMENT:   1.  Peripheral arterial disease with recent PTA to the left popliteal artery with a drug-coated balloon.  Again, the angiogram report is not available to me at this time.  She is on dual antiplatelet therapy.  I did recommend a followup resting DOMINIK and office visit with Dr. Erickson in about 2 months.  She will continue her dual antiplatelet therapy through that time and we will look for recommendations from him in regard to how long he would like her to be on the clopidogrel.   2.  Coronary artery disease with prior coronary artery bypass surgery, initially 1992 with redo in 2009.  She is not having any anginal symptoms.  She continues on an excellent cardiovascular medication regimen.  She has been out of her Imdur for a couple of weeks.  I did refill this today.   3.  Hypertension.  Her blood pressure is borderline elevated today.  Again, she has been out of her Imdur for a couple of weeks.  We will restart this and can  reevaluate her blood pressure.      PLAN:   1.  The patient will continue her current cardiac medication regimen unchanged.   2.  I recommended repeat resting ABIs (the patient cannot exercise due to her ataxia) in approximately 6-8 weeks and follow up with Dr. Erickson.   3.  The patient will also schedule an annual followup with Dr. Grayson, her primary cardiologist, for later this summer.      Thank you for allowing me to participate in the care of this pleasant patient.     Sincerely,    Charline Joyner PA-C     [facility]

## 2017-05-11 NOTE — LETTER
5/11/2017       RE: Katherin Fletcher  [address]  [facility] [address]     Dear Colleague,    Thank you for referring your patient, Katherin Fletcher, to the [facility] WOUND CARE at [facility]. Please see a copy of my visit note below.    Chief Complaint: No chief complaint on file.         Allergies   Allergen Reactions     Ace Inhibitors Cough     Baclofen      Penicillins      10/7/14 Hives per patient -- many many years ago       Nickel Swelling and Rash     Other reaction(s): Unknown     Subjective: Katherin is a 76 year old female who presents to the clinic today for a follow up of left leg wound. She has been using Santyl daily as instructed. She had a PTA left popliteal artery with drug coated balloon performed on 5/4 by Dr. Erickson. According to patient, he discovered that her previous stent was 90% occluded. No new concerns today.     Objective  An arterial wound is noted at left  anterior ankle measuring 1.8cm x 0.3cm x 0.2cm.     Kumar Classification: II to subcutaneous     Wound base: Yellow/Slough  Edges: intact  Drainage: none/wound bed is dry  Odor: no  Undermining: no  Bone Exposure: No  Clinical Signs of Infection: No     After obtaining patient consent, the wound was irrigated with copious amounts of saline. A curette was then used to debride the wound into subcutaneous tissue of the loose fibrotic tissue. The wound edges were debrided back to healthy, bleeding tissue. The wound base exhibited healthy bleeding.     Assessment: left anterior ankle arterial wound      Plan:   - Pt seen and evaluated  - Wound debrided as described.  - Wound care instructions: After cleaning the wound like normal and patting dry, switch between applying Santyl and MediHoney every other day (ie. Day 1 - Santyl, Day 2 - Medihoney, Day 3 - Santyl, etc.). Wrap with a gauze dressing.  - Pt to return to clinic in 2 weeks         Again, thank you for allowing me to  participate in the care of your patient.      Sincerely,    Adrian Arroyo DPM

## 2017-05-12 DIAGNOSIS — Z76.0 ENCOUNTER FOR MEDICATION REFILL: ICD-10-CM

## 2017-05-12 RX ORDER — LEVOTHYROXINE SODIUM 112 UG/1
TABLET ORAL
Qty: 90 TABLET | Refills: 1 | Status: SHIPPED | OUTPATIENT
Start: 2017-05-12 | End: 2017-12-22

## 2017-05-12 NOTE — PROGRESS NOTES
HISTORY OF PRESENT ILLNESS:  Ms. Fletcher is a pleasant 76-year-old female who presents to Cardiology office today for followup after recent lower extremity PTA.      The patient has extensive cardiovascular history.  Her cardiac history includes coronary artery disease with history of 2 prior coronary artery bypass surgeries.  The first was in 1992.  The second was in 2009.  She also has peripheral arterial disease and underwent peripheral angiography with angioplasty to multiple vessels in the left lower extremity along with stenting of the distal left superficial femoral and above-knee popliteal artery in early 2010.  Then she underwent repeat angiography with PTA to the left below-the-knee popliteal artery and anterior tibial artery in 01/2010.  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  She was evaluated by Dr. Erickson last month after she was noted to have a nonhealing left anterior shin ulcer.  He suggested that she undergo repeat lower extremity angiography with possible PTA.      She underwent peripheral angiography on 05/04.  Unfortunately, the complete procedure note is not available to me, but there is a brief procedure note from that date stating that the patient had PTA of the left popliteal artery with a drug-coated balloon.  She presents today for followup.      Overall, she is feeling well.  She continues to have some uncontrolled movements in her lower legs.  She is unsure if her claudication in the left lower extremity has improved as she really has not pushed herself.  She is following with the [facility] today.  She really does not have any new questions or concerns.  She is taking her medications as directed but did ask for a refill on her isosorbide today.      CURRENT CARDIAC MEDICATIONS:   1.  Imdur 30 mg daily.   2.  Plavix 75 mg daily.   3.  Crestor 20 mg daily.   4.  Aspirin 81 mg daily.   5.  Carvedilol 6.25  mg b.i.d.   6.  Sublingual nitro p.r.n.      The remainder of her medications, allergies and review of systems were reviewed and as are noted above.      PHYSICAL EXAMINATION:   GENERAL:  The patient is a pleasant 76-year-old female who appears her stated age, in no apparent distress.   VITAL SIGNS:  Her blood pressure is 142/78, pulse 72, weight is 178 pounds, which is stable.   LUNGS:  Clear to auscultation bilaterally.   CARDIAC:  Revealed a regular rate and rhythm, no murmurs appreciated.   ABDOMEN:  Soft, nontender, nondistended.   LOWER EXTREMITIES:  Her left lower extremity is quite swollen.  She has 1-2+ pitting edema in that leg.  I did not unwrap her wound.  The right lower extremity shows no edema.   NEUROLOGIC:  Alert and oriented.      ASSESSMENT:   1.  Peripheral arterial disease with recent PTA to the left popliteal artery with a drug-coated balloon.  Again, the angiogram report is not available to me at this time.  She is on dual antiplatelet therapy.  I did recommend a followup resting DOMINIK and office visit with Dr. Erickson in about 2 months.  She will continue her dual antiplatelet therapy through that time and we will look for recommendations from him in regard to how long he would like her to be on the clopidogrel.   2.  Coronary artery disease with prior coronary artery bypass surgery, initially 1992 with redo in 2009.  She is not having any anginal symptoms.  She continues on an excellent cardiovascular medication regimen.  She has been out of her Imdur for a couple of weeks.  I did refill this today.   3.  Hypertension.  Her blood pressure is borderline elevated today.  Again, she has been out of her Imdur for a couple of weeks.  We will restart this and can reevaluate her blood pressure.      PLAN:   1.  The patient will continue her current cardiac medication regimen unchanged.   2.  I recommended repeat resting ABIs (the patient cannot exercise due to her ataxia) in approximately 6-8 weeks  and follow up with Dr. Erickson.   3.  The patient will also schedule an annual followup with Dr. Grayson, her primary cardiologist, for later this summer.      Thank you for allowing me to participate in the care of this pleasant patient.      cc:   Aris Del Real MD   [facility] [address] [place]  [address]         TRINY QUIÑONEZ PA-C             D: 2017 12:23   T: 2017 09:35   MT: siva      Name:     ANA RODRIGUEZ   MRN:      [id number]        Account:      [id number]   :      1940           Service Date: 2017      Document: [id number]

## 2017-05-16 DIAGNOSIS — I10 HTN (HYPERTENSION): ICD-10-CM

## 2017-05-16 DIAGNOSIS — I48.91 ATRIAL FIBRILLATION (H): ICD-10-CM

## 2017-05-16 RX ORDER — CARVEDILOL 6.25 MG/1
6.25 TABLET ORAL 2 TIMES DAILY WITH MEALS
Qty: 180 TABLET | Refills: 3 | Status: SHIPPED | OUTPATIENT
Start: 2017-05-16 | End: 2018-05-15

## 2017-05-17 ENCOUNTER — HOSPITAL ENCOUNTER (OUTPATIENT)
Dept: PHYSICAL THERAPY | Facility: CLINIC | Age: 77
Setting detail: THERAPIES SERIES
End: 2017-05-17
Attending: PSYCHIATRY & NEUROLOGY
Payer: MEDICARE

## 2017-05-17 PROCEDURE — 97110 THERAPEUTIC EXERCISES: CPT | Mod: GP | Performed by: PHYSICAL THERAPIST

## 2017-05-17 PROCEDURE — 40000719 ZZHC STATISTIC PT DEPARTMENT NEURO VISIT: Performed by: PHYSICAL THERAPIST

## 2017-05-17 PROCEDURE — 97112 NEUROMUSCULAR REEDUCATION: CPT | Mod: GP | Performed by: PHYSICAL THERAPIST

## 2017-06-01 ENCOUNTER — OFFICE VISIT (OUTPATIENT)
Dept: WOUND CARE | Facility: CLINIC | Age: 77
End: 2017-06-01

## 2017-06-01 DIAGNOSIS — I73.9 PAD (PERIPHERAL ARTERY DISEASE) (H): ICD-10-CM

## 2017-06-01 DIAGNOSIS — L97.909 ARTERIAL LEG ULCER (H): Primary | ICD-10-CM

## 2017-06-01 DIAGNOSIS — G62.9 PERIPHERAL POLYNEUROPATHY: ICD-10-CM

## 2017-06-01 NOTE — LETTER
6/1/2017       RE: Katherin Fletcher  [address]  [facility] [address]     Dear Colleague,    Thank you for referring your patient, Katherin Fletcher, to the [facility]OUND [facility] at [facility]. Please see a copy of my visit note below.    Chief Complaint:   Chief Complaint   Patient presents with     WOUND CARE     Wound Care     Allergies   Allergen Reactions     Ace Inhibitors Cough     Baclofen      Penicillins      10/7/14 Hives per patient -- many many years ago       Nickel Swelling and Rash     Other reaction(s): Unknown     Subjective: Katherin is a 76 year old female who presents to the clinic today for a follow up of left leg wound. She has been using Santyl or medihoney daily as instructed. Minimal drainage. No new concerns today.      Objective  An arterial wound is noted at left  anterior ankle measuring 1.3cm x 0.3cm x 0.2cm.      Kumar Classification: II to subcutaneous  Wound base: Yellow/Slough that is tightly adhered  Edges: intact  Drainage: none/wound bed is dry  Odor: no  Undermining: no  Bone Exposure: No  Clinical Signs of Infection: No     No debridement needed today.     Assessment: left anterior ankle arterial wound       Plan:   - Pt seen and evaluated  - Wound care instructions: After cleaning the wound with saline and patting dry, apply Santyl to the wound, covered by a saline moistened gauze. Wrap with a gauze dressing and secure in place. Perform daily  - Okay to use OTC hydrocortisone cream around the wound for itching, up to one time per day  - Pt to return to clinic in 2 weeks      Again, thank you for allowing me to participate in the care of your patient.      Sincerely,    Adrian Arroyo DPM

## 2017-06-01 NOTE — PROGRESS NOTES
Chief Complaint:   Chief Complaint   Patient presents with     WOUND CARE     Wound Care          Allergies   Allergen Reactions     Ace Inhibitors Cough     Baclofen      Penicillins      10/7/14 Hives per patient -- many many years ago       Nickel Swelling and Rash     Other reaction(s): Unknown     Subjective: Katherin is a 76 year old female who presents to the clinic today for a follow up of left leg wound. She has been using Santyl or medihoney daily as instructed. Minimal drainage. No new concerns today.      Objective  An arterial wound is noted at left  anterior ankle measuring 1.3cm x 0.3cm x 0.2cm.      Kumar Classification: II to subcutaneous  Wound base: Yellow/Slough that is tightly adhered  Edges: intact  Drainage: none/wound bed is dry  Odor: no  Undermining: no  Bone Exposure: No  Clinical Signs of Infection: No     No debridement needed today.     Assessment: left anterior ankle arterial wound       Plan:   - Pt seen and evaluated  - Wound care instructions: After cleaning the wound with saline and patting dry, apply Santyl to the wound, covered by a saline moistened gauze. Wrap with a gauze dressing and secure in place. Perform daily  - Okay to use OTC hydrocortisone cream around the wound for itching, up to one time per day  - Pt to return to clinic in 2 weeks

## 2017-06-01 NOTE — MR AVS SNAPSHOT
After Visit Summary   6/1/2017    Katherin Fletcher    MRN: [id number]           Patient Information     Date Of Birth          1940        Visit Information        Provider Department      6/1/2017 11:00 AM Adrian Arroyo DPM [facility]        Today's Diagnoses     Arterial leg ulcer (H)    -  1    Peripheral polyneuropathy (H)        PAD (peripheral artery disease) (H)           Follow-ups after your visit        Your next 10 appointments already scheduled     Cipriano 15, 2017  3:30 PM CDT   (Arrive by 3:15 PM)   Return Visit with J[facility] ([facility])    [address]  4th [address]  [place] [id number] [phone]            Jun 27, 2017 10:00 AM CDT   US DOMINIK DOPPLER NO EXERCISE with SHVUS4   [facility]    [address]  [address]  [place] [address]   [phone]           Please bring a list of your medicines (including vitamins, minerals and over-the-counter drugs). Also, tell your doctor about any allergies you may have. Wear comfortable clothes and leave your valuables at home.  No caffeine or tobacco for 1 hour prior to exam.  Please call the Imaging Department at your exam site with any questions.            Jul 13, 2017 10:15 AM CDT   Return Vascular Visit with Renato Erickson MD   [facility] [place] ([facility])    [address]  [place] [address] [phone]            Aug 11, 2017  2:15 PM CDT   Return Visit with Kenton Grayson MD   [facility] [place] ([facility])    [address]  [place] [address] [phone]              Who to contact     Please call your clinic at [phone] to:    Ask questions about your health    Make or cancel appointments    Discuss your  medicines    Learn about your test results    Speak to your doctor   If you have compliments or concerns about an experience at your clinic, or if you wish to file a complaint, please contact [facility] Patient Relations at [phone] or email us at [email or web address]         Additional Information About Your Visit        [employer] Information     [employer] is an electronic gateway that provides easy, online access to your medical records. With [employer], you can request a clinic appointment, read your test results, renew a prescription or communicate with your care team.     To sign up for [employer] visit the website at [email or web address]   You will be asked to enter the access code listed below, as well as some personal information. Please follow the directions to create your username and password.     Your access code is: [email or web address]  Expires: 2017  2:27 PM     Your access code will  in 90 days. If you need help or a new code, please contact your [facility] or call [phone] for assistance.        Care EveryWhere ID     This is your Care EveryWhere ID. This could be used by other organizations to access your [facility] medical records  [id number]         Blood Pressure from Last 3 Encounters:   17 142/78   17 139/70   17 122/64    Weight from Last 3 Encounters:   17 178 lb   17 183 lb 3.2 oz   17 178 lb              Today, you had the following     No orders found for display         Today's Medication Changes          These changes are accurate as of: 17  2:27 PM.  If you have any questions, ask your nurse or doctor.               These medicines have changed or have updated prescriptions.        Dose/Directions    omeprazole 20 MG CR capsule   Commonly known as:  priLOSEC   This may have changed:  See the new instructions.   Used for:  Encounter for medication refill        TAKE 2  CAPSULES DAILY 30 TO 60 MINUTES BEFORE A MEAL   Quantity:  180 capsule   Refills:  1                Primary Care Provider Office Phone # Fax #    Aris Del Real [phone]       [facility] [address]  [facility] [address]        Thank you!     Thank you for choosing [facility]  for your care. Our goal is always to provide you with excellent care. Hearing back from our patients is one way we can continue to improve our services. Please take a few minutes to complete the written survey that you may receive in the mail after your visit with us. Thank you!             Your Updated Medication List - Protect others around you: Learn how to safely use, store and throw away your medicines at www.disposemymeds.org.          This list is accurate as of: 6/1/17  2:27 PM.  Always use your most recent med list.                   Brand Name Dispense Instructions for use    Alfalfa 250 MG Tabs      Take by mouth daily       aspirin 81 MG tablet      Take 81 mg by mouth daily       calcium + D 600-200 MG-UNIT Tabs   Generic drug:  calcium carbonate-vitamin D      Take 1 tablet by mouth daily       carbidopa-levodopa  MG per tablet    SINEMET    720 tablet    TAKE 2 TABLETS FOUR TIMES A DAY       carvedilol 6.25 MG tablet    COREG    180 tablet    Take 1 tablet (6.25 mg) by mouth 2 times daily (with meals)       clopidogrel 75 MG tablet    PLAVIX    90 tablet    Take 1 tablet (75 mg) by mouth daily       collagenase ointment     30 g    Apply topically daily Apply a nickel-thickness amount to the wound and cover with a saline moistened gauze. Wrap in a dry gauze. Change daily.       DAILY MULTI Tabs      Take by mouth daily       gabapentin 100 MG capsule    NEURONTIN     Take 100 mg by mouth daily       isosorbide mononitrate 30 MG 24 hr tablet    IMDUR    90 tablet    Take 1 tablet (30 mg) by mouth daily       levothyroxine 112 MCG tablet    SYNTHROID/LEVOTHROID    90  tablet    TAKE 1 TABLET DAILY       nitroglycerin 0.4 MG sublingual tablet    NITROSTAT    25 tablet    DISSOLVE ONE TABLET UNDER THE TONGUE EVERY 5 MINUTES AS NEEDED FOR CHEST PAIN.  DO NOT EXCEED A TOTAL OF 3 DOSES IN 15 MINUTES       omeprazole 20 MG CR capsule    priLOSEC    180 capsule    TAKE 2 CAPSULES DAILY 30 TO 60 MINUTES BEFORE A MEAL       rosuvastatin 20 MG tablet    CRESTOR    90 tablet    Take 1 tablet (20 mg) by mouth daily

## 2017-06-01 NOTE — NURSING NOTE
Chief Complaint   Patient presents with     WOUND CARE     Wound Care       There were no vitals filed for this visit.    There is no height or weight on file to calculate BMI.      Janny Story

## 2017-06-08 ENCOUNTER — ALLIED HEALTH/NURSE VISIT (OUTPATIENT)
Dept: CARDIAC REHAB | Facility: CLINIC | Age: 77
End: 2017-06-08

## 2017-06-15 ENCOUNTER — OFFICE VISIT (OUTPATIENT)
Dept: WOUND CARE | Facility: CLINIC | Age: 77
End: 2017-06-15

## 2017-06-15 DIAGNOSIS — L97.909 ARTERIAL LEG ULCER (H): Primary | ICD-10-CM

## 2017-06-15 DIAGNOSIS — I87.9 VENOUS DISEASE: ICD-10-CM

## 2017-06-15 NOTE — LETTER
6/15/2017       RE: Katherin Fletcher  [address]  [facility] [address]     Dear Colleague,    Thank you for referring your patient, Katherin Fletcher, to the [facility]OUND [facility] at [facility]. Please see a copy of my visit note below.    Chief Complaint:   Chief Complaint   Patient presents with     RECHECK     f/u          Allergies   Allergen Reactions     Ace Inhibitors Cough     Baclofen      Penicillins      10/7/14 Hives per patient -- many many years ago       Nickel Swelling and Rash     Other reaction(s): Unknown     Subjective: Katherin is a 76 year old female who presents to the clinic today for a follow up of left leg wound. She has been using Santyl daily as instructed. Minimal drainage. Notices that both legs are more swollen than usual lately. Has an appointment for DOMINIK coming up soon ordered by Vascular/Cardiology.      Objective  An arterial wound is noted at left  anterior ankle measuring 0.7cm x 0.2cm x 0.2cm.      Kumar Classification: II to subcutaneous  Wound base: Yellow/Slough that is tightly adhered  Edges: intact  Drainage: none/wound bed is dry  Odor: no  Undermining: no  Bone Exposure: No  Clinical Signs of Infection: No      No debridement needed today. Proximal portion of the wound is covered with thin, translucent epidermis.       Assessment: left anterior ankle arterial wound       Plan:   - Pt seen and evaluated  - Wound care instructions: After cleaning the wound with saline and patting dry, apply Santyl to the wound, covered by a saline moistened gauze. Wrap with a gauze dressing and secure in place. Perform daily  - Ordered Venous competency exam, patient to call [facility] to schedule the same day she has her DOMINIK, if possible.  - Pt to return to clinic in 2 weeks      Again, thank you for allowing me to participate in the care of your patient.      Sincerely,    Adrian Arroyo DPM

## 2017-06-15 NOTE — PROGRESS NOTES
Chief Complaint:   Chief Complaint   Patient presents with     RECHECK     f/u          Allergies   Allergen Reactions     Ace Inhibitors Cough     Baclofen      Penicillins      10/7/14 Hives per patient -- many many years ago       Nickel Swelling and Rash     Other reaction(s): Unknown     Subjective: Katherin is a 76 year old female who presents to the clinic today for a follow up of left leg wound. She has been using Santyl daily as instructed. Minimal drainage. Notices that both legs are more swollen than usual lately. Has an appointment for DOMINIK coming up soon ordered by Vascular/Cardiology.      Objective  An arterial wound is noted at left  anterior ankle measuring 0.7cm x 0.2cm x 0.2cm.      Kumar Classification: II to subcutaneous  Wound base: Yellow/Slough that is tightly adhered  Edges: intact  Drainage: none/wound bed is dry  Odor: no  Undermining: no  Bone Exposure: No  Clinical Signs of Infection: No      No debridement needed today. Proximal portion of the wound is covered with thin, translucent epidermis.       Assessment: left anterior ankle arterial wound       Plan:   - Pt seen and evaluated  - Wound care instructions: After cleaning the wound with saline and patting dry, apply Santyl to the wound, covered by a saline moistened gauze. Wrap with a gauze dressing and secure in place. Perform daily  - Ordered Venous competency exam, patient to call [facility] to schedule the same day she has her DOMINIK, if possible.  - Pt to return to clinic in 2 weeks

## 2017-06-15 NOTE — MR AVS SNAPSHOT
After Visit Summary   6/15/2017    Katherin Fletcher    MRN: [id number]           Patient Information     Date Of Birth          1940        Visit Information        Provider Department      6/15/2017 3:30 PM Adrian Arroyo DPM [facility]        Today's Diagnoses     Arterial leg ulcer (H)    -  1    Venous disease           Follow-ups after your visit        Your next 10 appointments already scheduled     Jun 27, 2017 10:00 AM CDT   US DOMINIK DOPPLER NO EXERCISE with SHVUS4   [facility] Ultrasound ([facility])    [address]  [place] [address]   [phone]           Please bring a list of your medicines (including vitamins, minerals and over-the-counter drugs). Also, tell your doctor about any allergies you may have. Wear comfortable clothes and leave your valuables at home.  No caffeine or tobacco for 1 hour prior to exam.  Please call the Imaging Department at your exam site with any questions.            Jun 29, 2017 11:30 AM CDT   (Arrive by 11:15 AM)   Return Visit with JAYASHREE Warren [facility] ([facility] [place])    [address] [place] [address]  [address] [facility] [address]   [phone]            Jul 13, 2017 10:15 AM CDT   Return Vascular Visit with Renato Erickson MD   [facility] PHYSICIANS [facility] [place] ([facility])    [address]  [place] [address]   [phone]            Jul 20, 2017  1:00 PM CDT   (Arrive by 12:45 PM)   Return Visit with JAYASHREE [facility] ([facility] [place])    [address] [place] [address]   [phone]            Aug 03, 2017  1:00 PM CDT   (Arrive by 12:45 PM)   Return Visit with JAYASHREE [facility] ([facility] [place])    [address] [place] [address]  Floor  [facility] [address] [phone]            Aug 11, 2017  2:15 PM CDT   Return Visit with Kenton Grayson MD   [facility] PHYSICIANS HEART AT [place] ([facility])    [address]  [place] [id number] [phone]              Future tests that were ordered for you today     Open Future Orders        Priority Expected Expires Ordered    US Venous Competency Bilateral Routine  6/15/2018 6/15/2017            Who to contact     Please call your clinic at [phone] to:    Ask questions about your health    Make or cancel appointments    Discuss your medicines    Learn about your test results    Speak to your doctor   If you have compliments or concerns about an experience at your clinic, or if you wish to file a complaint, please contact [facility] Patient Relations at [phone] or email us at [email or web address]         Additional Information About Your Visit        [employer] Information     Skype is an electronic gateway that provides easy, online access to your medical records. With Skype, you can request a clinic appointment, read your test results, renew a prescription or communicate with your care team.     To sign up for Skype visit the website at [email or web address]   You will be asked to enter the access code listed below, as well as some personal information. Please follow the directions to create your username and password.     Your access code is: [email or web address]  Expires: 2017  2:27 PM     Your access code will  in 90 days. If you need help or a new code, please contact your [facility] Clinic or call [phone] for assistance.        Care EveryWhere ID     This is your Care EveryWhere ID. This could be used by other organizations to access your [facility] medical records  [id number]         Blood Pressure from Last 3 Encounters:   17 142/78   17  139/70   04/17/17 122/64    Weight from Last 3 Encounters:   05/11/17 178 lb   05/04/17 183 lb 3.2 oz   04/17/17 178 lb                 Today's Medication Changes          These changes are accurate as of: 6/15/17  4:12 PM.  If you have any questions, ask your nurse or doctor.               These medicines have changed or have updated prescriptions.        Dose/Directions    omeprazole 20 MG CR capsule   Commonly known as:  priLOSEC   This may have changed:  See the new instructions.   Used for:  Encounter for medication refill        TAKE 2 CAPSULES DAILY 30 TO 60 MINUTES BEFORE A MEAL   Quantity:  180 capsule   Refills:  1                Primary Care Provider Office Phone # Fax #    Aris Del Real [phone]       [facility] [address]  [facility] [place] [place] [address]        Thank you!     Thank you for choosing [facility]  for your care. Our goal is always to provide you with excellent care. Hearing back from our patients is one way we can continue to improve our services. Please take a few minutes to complete the written survey that you may receive in the mail after your visit with us. Thank you!             Your Updated Medication List - Protect others around you: Learn how to safely use, store and throw away your medicines at www.disposemymeds.org.          This list is accurate as of: 6/15/17  4:12 PM.  Always use your most recent med list.                   Brand Name Dispense Instructions for use    Alfalfa 250 MG Tabs      Take by mouth daily       aspirin 81 MG tablet      Take 81 mg by mouth daily       calcium + D 600-200 MG-UNIT Tabs   Generic drug:  calcium carbonate-vitamin D      Take 1 tablet by mouth daily       carbidopa-levodopa  MG per tablet    SINEMET    720 tablet    TAKE 2 TABLETS FOUR TIMES A DAY       carvedilol 6.25 MG tablet    COREG    180 tablet    Take 1 tablet (6.25 mg) by mouth 2 times daily (with meals)       clopidogrel 75 MG  tablet    PLAVIX    90 tablet    Take 1 tablet (75 mg) by mouth daily       collagenase ointment     30 g    Apply topically daily Apply a nickel-thickness amount to the wound and cover with a saline moistened gauze. Wrap in a dry gauze. Change daily.       DAILY MULTI Tabs      Take by mouth daily       gabapentin 100 MG capsule    NEURONTIN     Take 100 mg by mouth daily       isosorbide mononitrate 30 MG 24 hr tablet    IMDUR    90 tablet    Take 1 tablet (30 mg) by mouth daily       levothyroxine 112 MCG tablet    SYNTHROID/LEVOTHROID    90 tablet    TAKE 1 TABLET DAILY       nitroglycerin 0.4 MG sublingual tablet    NITROSTAT    25 tablet    DISSOLVE ONE TABLET UNDER THE TONGUE EVERY 5 MINUTES AS NEEDED FOR CHEST PAIN.  DO NOT EXCEED A TOTAL OF 3 DOSES IN 15 MINUTES       omeprazole 20 MG CR capsule    priLOSEC    180 capsule    TAKE 2 CAPSULES DAILY 30 TO 60 MINUTES BEFORE A MEAL       rosuvastatin 20 MG tablet    CRESTOR    90 tablet    Take 1 tablet (20 mg) by mouth daily

## 2017-06-26 DIAGNOSIS — R26.9 NEUROLOGIC GAIT DISORDER: ICD-10-CM

## 2017-06-26 DIAGNOSIS — R47.1 DYSARTHRIA: ICD-10-CM

## 2017-06-26 DIAGNOSIS — G25.81 RESTLESS LEGS SYNDROME: ICD-10-CM

## 2017-06-26 DIAGNOSIS — G93.9: ICD-10-CM

## 2017-06-26 RX ORDER — CARBIDOPA AND LEVODOPA 25; 100 MG/1; MG/1
TABLET ORAL
Qty: 720 TABLET | Refills: 3 | Status: SHIPPED | OUTPATIENT
Start: 2017-06-26 | End: 2017-12-20

## 2017-06-27 ENCOUNTER — HOSPITAL ENCOUNTER (OUTPATIENT)
Dept: ULTRASOUND IMAGING | Facility: CLINIC | Age: 77
Discharge: HOME | End: 2017-06-27
Attending: PHYSICIAN ASSISTANT | Admitting: PHYSICIAN ASSISTANT
Payer: MEDICARE

## 2017-06-27 ENCOUNTER — RADIANT APPOINTMENT (OUTPATIENT)
Dept: VASCULAR ULTRASOUND | Facility: CLINIC | Age: 77
End: 2017-06-27
Attending: PODIATRIST
Payer: MEDICARE

## 2017-06-27 DIAGNOSIS — I87.9 VENOUS DISEASE: ICD-10-CM

## 2017-06-27 DIAGNOSIS — I73.9 PAD (PERIPHERAL ARTERY DISEASE) (H): ICD-10-CM

## 2017-06-27 PROCEDURE — 93970 EXTREMITY STUDY: CPT | Mod: 26 | Performed by: INTERNAL MEDICINE

## 2017-06-27 PROCEDURE — 93922 UPR/L XTREMITY ART 2 LEVELS: CPT

## 2017-06-27 PROCEDURE — 93922 UPR/L XTREMITY ART 2 LEVELS: CPT | Mod: 26 | Performed by: INTERNAL MEDICINE

## 2017-06-29 ENCOUNTER — OFFICE VISIT (OUTPATIENT)
Dept: WOUND CARE | Facility: CLINIC | Age: 77
End: 2017-06-29

## 2017-06-29 DIAGNOSIS — I73.9 PAD (PERIPHERAL ARTERY DISEASE) (H): ICD-10-CM

## 2017-06-29 DIAGNOSIS — L97.909 ARTERIAL LEG ULCER (H): Primary | ICD-10-CM

## 2017-06-29 DIAGNOSIS — G62.9 PERIPHERAL POLYNEUROPATHY: ICD-10-CM

## 2017-06-29 NOTE — PROGRESS NOTES
Chief Complaint:   Chief Complaint   Patient presents with     Wound Check     wound check anterior left ankle          Allergies   Allergen Reactions     Ace Inhibitors Cough     Baclofen      Penicillins      10/7/14 Hives per patient -- many many years ago       Nickel Swelling and Rash     Other reaction(s): Unknown         Subjective: Katherin is a 77 year old female who presents to the clinic today for a follow up of left leg wound. She relates to feeling as doing well. She is using the walker as an aide. She continues to change the dressing daily with Santyl. She has her vascular studies performed, however these were not read at the time of this writing.     Objective      An arterial wound is noted at left  anterior leg measuring 3mm x 1mm x 1mm.    Kumar Classification: II    Wound base: Yellow/Slough    Edges: non-infectious erythema    Drainage: slight/serous    Odor: no    Undermining: no    Bone Exposure: No    Clinical Signs of Infection: No    No debridement indicated on this wound today.        Assessment: Left leg arterial wound - healing well.  PAD    Plan:   - Pt seen and evaluated  - The wound continues to contract well with no complications. She should continue using the Santyl and covering the wound until completely healed.   - Pt to return to clinic in 3 weeks.

## 2017-06-29 NOTE — LETTER
6/29/2017       RE: Katherin Fletcher  [address]  [facility] [address]     Dear Colleague,    Thank you for referring your patient, Katherin Fletcher, to the [facility] at [facility]. Please see a copy of my visit note below.    Chief Complaint:   Chief Complaint   Patient presents with     Wound Check     wound check anterior left ankle     Allergies   Allergen Reactions     Ace Inhibitors Cough     Baclofen      Penicillins      10/7/14 Hives per patient -- many many years ago       Nickel Swelling and Rash     Other reaction(s): Unknown     Subjective: Katherin is a 77 year old female who presents to the clinic today for a follow up of left leg wound. She relates to feeling as doing well. She is using the walker as an aide. She continues to change the dressing daily with Santyl. She has her vascular studies performed, however these were not read at the time of this writing.     Objective    An arterial wound is noted at left  anterior leg measuring 3mm x 1mm x 1mm.    Kumar Classification: II    Wound base: Yellow/Slough    Edges: non-infectious erythema    Drainage: slight/serous    Odor: no    Undermining: no    Bone Exposure: No    Clinical Signs of Infection: No    No debridement indicated on this wound today.        Assessment: Left leg arterial wound - healing well.  PAD    Plan:   - Pt seen and evaluated  - The wound continues to contract well with no complications. She should continue using the Santyl and covering the wound until completely healed.   - Pt to return to clinic in 3 weeks.       Again, thank you for allowing me to participate in the care of your patient.      Sincerely,    Adrian Arroyo DPM

## 2017-06-29 NOTE — MR AVS SNAPSHOT
After Visit Summary   6/29/2017    Katherin Fletcher    MRN: [id number]           Patient Information     Date Of Birth          1940        Visit Information        Provider Department      6/29/2017 11:30 AM [facility] DPM [facility]        Today's Diagnoses     Arterial leg ulcer (H)    -  1    Peripheral polyneuropathy (H)        PAD (peripheral artery disease) (H)           Follow-ups after your visit        Your next 10 appointments already scheduled     Jul 13, 2017 10:15 AM CDT   Return Vascular Visit with Renato Erickson MD   [facility] PHYSICIANS HEART AT [place] ([email or web address])    [address] [place] [address] [phone]            Jul 20, 2017  1:00 PM CDT   (Arrive by 12:45 PM)   [facility] ([facility] [place])    [address] [facility]  [address] [facility] [id number] [phone]            Aug 03, 2017  1:00 PM CDT   (Arrive by 12:45 PM)   Return Visit with JAYASHREE Warren [facility] ([facility])    [address]  [address] [facility] [address] [phone]            Aug 11, 2017  2:15 PM CDT   Return Visit with Kenton Grayson MD   [facility] ([email or web address])    [address] [place] [id number] [phone]              Who to contact     Please call your clinic at [phone] to:    Ask questions about your health    Make or cancel appointments    Discuss your medicines    Learn about your test results    Speak to your doctor   If you have compliments or concerns about an experience at your clinic, or if you wish to file a complaint, please contact [facility] Patient Relations at [phone] or email us at [email or web address]         Additional Information  About Your Visit        [employer] Information     [employer] is an electronic gateway that provides easy, online access to your medical records. With [employer], you can request a clinic appointment, read your test results, renew a prescription or communicate with your care team.     To sign up for [employer] visit the website at [email or web address]   You will be asked to enter the access code listed below, as well as some personal information. Please follow the directions to create your username and password.     Your access code is: [email or web address]  Expires: 2017  2:27 PM     Your access code will  in 90 days. If you need help or a new code, please contact your [facility] or call [phone] for assistance.        Care EveryWhere ID     This is your Care EveryWhere ID. This could be used by other organizations to access your [facility] medical records  [id number]         Blood Pressure from Last 3 Encounters:   17 142/78   17 139/70   17 122/64    Weight from Last 3 Encounters:   17 178 lb   17 183 lb 3.2 oz   17 178 lb              Today, you had the following     No orders found for display         Today's Medication Changes          These changes are accurate as of: 17  2:12 PM.  If you have any questions, ask your nurse or doctor.               These medicines have changed or have updated prescriptions.        Dose/Directions    omeprazole 20 MG CR capsule   Commonly known as:  priLOSEC   This may have changed:  See the new instructions.   Used for:  Encounter for medication refill        TAKE 2 CAPSULES DAILY 30 TO 60 MINUTES BEFORE A MEAL   Quantity:  180 capsule   Refills:  1                Primary Care Provider Office Phone # Fax #    Aris Del Real [phone]       [facility] [address]  [facility] [place] [address]        Equal Access to Services     JOSELUIS MADRIGAL AH: Yuly farnsworth  Soceceali, wabellada luqadaha, qaybta kajess epps, carlo winstonstan matthew. So [facility] [phone].    ATENCIÓN: Si harry becerril, tiene a taylor disposición servicios gratuitos de asistencia lingüística. Karen al [phone].    We comply with applicable federal civil rights laws and [place] laws. We do not discriminate on the basis of race, color, national origin, age, disability sex, sexual orientation or gender identity.            Thank you!     Thank you for choosing [facility]  for your care. Our goal is always to provide you with excellent care. Hearing back from our patients is one way we can continue to improve our services. Please take a few minutes to complete the written survey that you may receive in the mail after your visit with us. Thank you!             Your Updated Medication List - Protect others around you: Learn how to safely use, store and throw away your medicines at www.disposemymeds.org.          This list is accurate as of: 6/29/17  2:12 PM.  Always use your most recent med list.                   Brand Name Dispense Instructions for use Diagnosis    Alfalfa 250 MG Tabs      Take by mouth daily        aspirin 81 MG tablet      Take 81 mg by mouth daily        calcium + D 600-200 MG-UNIT Tabs   Generic drug:  calcium carbonate-vitamin D      Take 1 tablet by mouth daily    Preventative health care       carbidopa-levodopa  MG per tablet    SINEMET    720 tablet    TAKE 2 TABLETS FOUR TIMES A DAY    Restless legs syndrome, Neurologic gait disorder, Dysarthria, Cerebellar disease       carvedilol 6.25 MG tablet    COREG    180 tablet    Take 1 tablet (6.25 mg) by mouth 2 times daily (with meals)    Atrial fibrillation (H), HTN (hypertension)       clopidogrel 75 MG tablet    PLAVIX    90 tablet    Take 1 tablet (75 mg) by mouth daily    CAD (coronary artery disease)       collagenase ointment     30 g    Apply topically daily Apply a nickel-thickness amount to  the wound and cover with a saline moistened gauze. Wrap in a dry gauze. Change daily.    Arterial leg ulcer (H)       DAILY MULTI Tabs      Take by mouth daily        gabapentin 100 MG capsule    NEURONTIN     Take 100 mg by mouth daily        isosorbide mononitrate 30 MG 24 hr tablet    IMDUR    90 tablet    Take 1 tablet (30 mg) by mouth daily    Coronary artery disease involving native coronary artery of native heart without angina pectoris       levothyroxine 112 MCG tablet    SYNTHROID/LEVOTHROID    90 tablet    TAKE 1 TABLET DAILY    Encounter for medication refill       nitroGLYcerin 0.4 MG sublingual tablet    NITROSTAT    25 tablet    DISSOLVE ONE TABLET UNDER THE TONGUE EVERY 5 MINUTES AS NEEDED FOR CHEST PAIN.  DO NOT EXCEED A TOTAL OF 3 DOSES IN 15 MINUTES    CAD (coronary artery disease)       omeprazole 20 MG CR capsule    priLOSEC    180 capsule    TAKE 2 CAPSULES DAILY 30 TO 60 MINUTES BEFORE A MEAL    Encounter for medication refill       rosuvastatin 20 MG tablet    CRESTOR    90 tablet    Take 1 tablet (20 mg) by mouth daily    Hyperlipidemia

## 2017-07-13 ENCOUNTER — OFFICE VISIT (OUTPATIENT)
Dept: CARDIOLOGY | Facility: CLINIC | Age: 77
End: 2017-07-13
Attending: PHYSICIAN ASSISTANT
Payer: MEDICARE

## 2017-07-13 VITALS
WEIGHT: 182.1 LBS | SYSTOLIC BLOOD PRESSURE: 153 MMHG | HEART RATE: 60 BPM | HEIGHT: 65 IN | BODY MASS INDEX: 30.34 KG/M2 | DIASTOLIC BLOOD PRESSURE: 83 MMHG

## 2017-07-13 DIAGNOSIS — I25.10 CORONARY ARTERY DISEASE INVOLVING NATIVE CORONARY ARTERY OF NATIVE HEART WITHOUT ANGINA PECTORIS: ICD-10-CM

## 2017-07-13 DIAGNOSIS — I25.5 ISCHEMIC CARDIOMYOPATHY: ICD-10-CM

## 2017-07-13 DIAGNOSIS — I10 ESSENTIAL HYPERTENSION: Primary | ICD-10-CM

## 2017-07-13 DIAGNOSIS — I73.9 PAD (PERIPHERAL ARTERY DISEASE) (H): ICD-10-CM

## 2017-07-13 PROCEDURE — 99214 OFFICE O/P EST MOD 30 MIN: CPT | Performed by: INTERNAL MEDICINE

## 2017-07-13 NOTE — LETTER
7/13/2017    Aris Del Real MD  [facility]   [address]  [facility] [address]    RE: Katherin Duncann Fletcher       Dear Colleague,    I had the pleasure of seeing Katherin Fletcher in the [facility].  Ms. Fletcher is a pleasant 77-year-old female who presents to Cardiology office today for followup after a recent visit to the [facility] up with Dr. Erickson for the evaluation of abnormal ABIs.  At that time, he recommended a peripheral arterial CTA and asked her to follow up in the office following that.      The patient has a quite extensive cardiovascular history.  Her cardiac history includes coronary artery disease with history of 2 prior coronary artery bypass surgeries, 1 in 1992 with a redo in 2009.  She also has known peripheral arterial disease and due to nonhealing wound in 2010, she underwent peripheral angiography initially in January at which time she had an angioplasty to multiple vessels in the left lower extremity along with stenting of the distal left superficial femoral and above-the-knee popliteal artery.  She underwent repeat angiography with PTA to the left below-the-knee popliteal artery and anterior tibial artery in 07/2010.  Unfortunately, she has had a functional decline over the past few years due to nonspecific neurologic  disorder.  Her exertional level has been limited due to this disorder.  Recently, the patient was considering undergoing a surgery on her right foot for hammertoe.  As part of the evaluation, she did undergo ABIs.  This showed a resting DOMINIK of 0.86 on the left side; however, the right side showed an DOMINIK of 0.62.       She does not appear to have any claudication with usual daily activities; however, if she exerts herself a bit more she does notice some cramping in her calves bilaterally.  She also states that she is prone to have charley horses.       I reviewed with her the results of her CT abdominal  aorta, bilateral iliofemoral system with runoff.  This showed normal abdominal aorta without significant stenosis or any aneurysmal dilatation.  The iliac arteries were patent without any significant stenosis.  She was noted to have moderate stenosis in the common femoral arteries bilaterally, moderate stenosis in the distal right superficial femoral artery, moderate to severe stenosis in the above-the-knee right popliteal artery and severe stenosis in the left popliteal artery.  She was noted to have 3-vessel runoff in the tibial arteries bilaterally. Signal dropout on left AT vs lesion proximally?      For ~ 1 month has had ulcer left anterior shin.  Went to would clinic with minimal improvement.   Discussed various options with patient.  Due to non healing would left shin, have advised angiography and possible PTA of left anterior tibial artery.  Risks/benefits/potential complications discussed and she is willing to proceed.    She underwent procedure as outlined below:    Procedures Performed  1.  Abdominal aortogram  2.  Selective left LE angiography  3.  Successful PTA of focal ISR of left popliteal artery with 6 X 40  Impact drug coated balloon    Narrative:    Prepped in usual fashion.  Micropuncture kit utilized and access  gained in right common femoral artery.  6 Senegalese sheath placed.   Omniflush catheter placed in distal abdominal aorta, aortogram with  DSA performed.  Catheter then placed in distal left iliac artery and  LLE angiogram to foot performed with DSA.  Intervention then performed  with placement of a 6 [place]eg[place]e Zack sheath.  Lesion crossed with 0.14  wire.  Predilation of focal ISR performed with 4.0 X 20 mm and 5.0 X  20 mm balloon at nominal pressure.  Following that, a 6 X 40 mm Lulú  pact drug coated balloon utilized for 3 minutes.  No disruption noted  at site and right femoral sheath secured.    Findings:    Abdominal Aortogram:  Normal sized distal aorta.  No aneurysm.  No  significant  disease  Right iliofemoral: normal right external and internal iliac arteries.   Mild right femoral disease of 30% stenosis with moderate  calcification.  Left iliofemoral: normal left external and internal iliac arteries.   Left common femoral with moderate calcification and 20-30% stenosis  Left SFA: patent with scattered disease noted of 30-40% with  calcification. Patent Profunda  Left SFA-Popliteal: stent visualized with distal edge with ISR focal  90% stenosis.  Popliteal with 50%  Below know left side: 2 V runoff with with PT and Peroneal ,occluded  AT.        Following this she had a venous study and repeat DOMINIK as noted below.    1. Mildly reduced right ankle-brachial index (0.83) consistent with  mild arterial insufficiency on the right, improved from prior study  8/8/16 where it was 0.62  2. Low normal ankle brachial index (0.94) on the left, improved from  prior study 8/8/16 where it was 0.82  3. The patient was not exercised due to inability to walk.       Venous study:    Impression:  1) In the deep venous system, there is evidence of reflux in the  common femoral vein above the superficial femoral junction,  bilaterally (2.2 sec on the right, 1 second on the left).  The rest of  the deep femoral venous system was not seen      2. In the superficial venous system there is evidence of reflux at the  SFJ bilaterally (1.5 sec on the right, 1 sec on the left).  No  superficial venous system was seen in the right thigh to proximal calf  and no reflux was seen in the distal venous system on the right.  In  the left venous system, from the thigh to the small saphenous vein,  there was no reflux seen.        Venous Competency Diagnostic Criteria Adopted 11/29/11.     Venous competency criteria defining abnormal reflux duration:  Femoral - popliteal reflux > 1.0 sec.  Deep femoral,deep calf veins, and superficial vein reflux > 0.5 sec.   vein reflux > 0.35 sec.      PHYSICAL EXAMINATION:   GENERAL:   "The patient is a pleasant 76-year-old female who appears her stated age.  She is in no apparent distress.   VITAL SIGNS:  Blood pressure 159/79, pulse 67, height 1.638 m (5' 4.5\"), weight 82.6 kg (182 lb 1.6 oz), not currently breastfeeding.  LUNGS:  Clear to auscultation bilaterally.   CARDIAC:  Reveals a regular rate and rhythm, no murmurs appreciated.   EXTREMITIES:  Lower extremities show no evidence of edema.  Her posterior tibial pulses are diminished bilaterally. There is an ulcerated lesion seen on anterior shin near ankle.  Various stages of healing with defined borders.    NEUROLOGIC:  Alert and oriented.       Recommendations  1.  We are quite pleased with her progress regarding her peripheral arterial disease. She is responded well to her PTA of the left popliteal in-stent restenotic segment.  2.  Improvement in ABIs were noted as well as continued wound healing which we are quite pleased.  3.  Venous competency study was discussed with patient, at this point she would prefer only conservative management.  4.  She has had unfortunately a chronic cough for several years and would like to have an evaluation, we will refer to ENT.  5.  She has a scheduled visit with her long-standing cardiologist Dr. Grayson later this summer, I defer the remainder of her cardiology treatment to my colleague.  6.  Return to vascular clinic p.r.n.  Thank you for allowing me to participate in the care of your patient.    Sincerely,     Renato Erickson MD     [facility]    "

## 2017-07-13 NOTE — MR AVS SNAPSHOT
After Visit Summary   7/13/2017    Katherin Fletcher    MRN: [id number]           Patient Information     Date Of Birth          1940        Visit Information        Provider Department      7/13/2017 10:15 AM Renato Erickson MD [facility] [place]        Today's Diagnoses     Essential hypertension    -  1    PAD (peripheral artery disease) (H)        Ischemic cardiomyopathy        Coronary artery disease involving native coronary artery of native heart without angina pectoris           Follow-ups after your visit        Additional Services     OTOLARYNGOLOGY REFERRAL       Your provider has referred you to: N: [facility] [place] [phone]   [email or web address]    Please be aware that coverage of these services is subject to the terms and limitations of your health insurance plan.  Call member services at your health plan with any benefit or coverage questions.      Please bring the following with you to your appointment:    (1) Any X-Rays, CTs or MRIs which have been performed.  Contact the facility where they were done to arrange for  prior to your scheduled appointment.   (2) List of current medications  (3) This referral request   (4) Any documents/labs given to you for this referral                  Your next 10 appointments already scheduled     Jul 20, 2017  1:00 PM CDT   (Arrive by 12:45 PM)   Return Visit with Adrian Arroyo DPM   [facility])    [address] [place] [address]   [phone]            Aug 03, 2017  1:00 PM CDT   (Arrive by 12:45 PM)   Return Visit with Adrian Arroyo DPM   [facility] ([facility])    [address] [place] [address]   [phone]            Aug 11, 2017  2:15 PM CDT   Return Visit with  "Kenton Grayson MD   [facility] [place] ([facility])    [address] [place] [id number] [phone]              Who to contact     If you have questions or need follow up information about today's clinic visit or your schedule please contact [facility] [place] directly at [phone].  Normal or non-critical lab and imaging results will be communicated to you by Linguastathart, letter or phone within 4 business days after the clinic has received the results. If you do not hear from us within 7 days, please contact the clinic through Linguastathart or phone. If you have a critical or abnormal lab result, we will notify you by phone as soon as possible.  Submit refill requests through [email or web address] or call your pharmacy and they will forward the refill request to us. Please allow 3 business days for your refill to be completed.          Additional Information About Your Visit        LinguastatharXenoport Information     [email or web address] lets you send messages to your doctor, view your test results, renew your prescriptions, schedule appointments and more. To sign up, go to [email or web address] . Click on \"Log in\" on the left side of the screen, which will take you to the Welcome page. Then click on \"Sign up Now\" on the right side of the page.     You will be asked to enter the access code listed below, as well as some personal information. Please follow the directions to create your username and password.     Your access code is: [email or web address]  Expires: 2017  2:27 PM     Your access code will  in 90 days. If you need help or a new code, please call your [facility] clinic or [phone].        Care EveryWhere ID     This is your Care EveryWhere ID. This could be used by other organizations to access your [facility] medical records  [id number]        Your Vitals Were     Pulse Height BMI (Body Mass Index)             60 1.638 m (5' 4.5\") 30.77 " kg/m2          Blood Pressure from Last 3 Encounters:   07/13/17 153/83   05/11/17 142/78   05/04/17 139/70    Weight from Last 3 Encounters:   07/13/17 82.6 kg (182 lb 1.6 oz)   05/11/17 80.7 kg (178 lb)   05/04/17 83.1 kg (183 lb 3.2 oz)              We Performed the Following     Follow-Up with Cardiology Specialty Clinic     OTOLARYNGOLOGY REFERRAL          Today's Medication Changes          These changes are accurate as of: 7/13/17 10:48 AM.  If you have any questions, ask your nurse or doctor.               These medicines have changed or have updated prescriptions.        Dose/Directions    omeprazole 20 MG CR capsule   Commonly known as:  priLOSEC   This may have changed:  See the new instructions.   Used for:  Encounter for medication refill        TAKE 2 CAPSULES DAILY 30 TO 60 MINUTES BEFORE A MEAL   Quantity:  180 capsule   Refills:  1                Primary Care Provider Office Phone # Fax #    Aris Del Real [phone]       [facility] [address] [place] [address]        Equal Access to Services     [facility] Hospital[facility] : Hadii rosio farnsworth Somichelle, waaxda luqlenora, qaybta kaalgordno epps, carlo odonnell . So [facility] [phone].    ATENCIÓN: Si habla español, tiene a tayolr disposición servicios gratuitos de asistencia lingüística. Karen al [phone].    We comply with applicable federal civil rights laws and [place] laws. We do not discriminate on the basis of race, color, national origin, age, disability sex, sexual orientation or gender identity.            Thank you!     Thank you for choosing [facility] [place]  for your care. Our goal is always to provide you with excellent care. Hearing back from our patients is one way we can continue to improve our services. Please take a few minutes to complete the written survey that you may receive in the mail after your visit with us. Thank you!              Your Updated Medication List - Protect others around you: Learn how to safely use, store and throw away your medicines at www.disposemymeds.org.          This list is accurate as of: 7/13/17 10:48 AM.  Always use your most recent med list.                   Brand Name Dispense Instructions for use Diagnosis    Alfalfa 250 MG Tabs      Take by mouth daily        aspirin 81 MG tablet      Take 81 mg by mouth daily        calcium + D 600-200 MG-UNIT Tabs   Generic drug:  calcium carbonate-vitamin D      Take 1 tablet by mouth daily    Preventative health care       carbidopa-levodopa  MG per tablet    SINEMET    720 tablet    TAKE 2 TABLETS FOUR TIMES A DAY    Restless legs syndrome, Neurologic gait disorder, Dysarthria, Cerebellar disease       carvedilol 6.25 MG tablet    COREG    180 tablet    Take 1 tablet (6.25 mg) by mouth 2 times daily (with meals)    Atrial fibrillation (H), HTN (hypertension)       clopidogrel 75 MG tablet    PLAVIX    90 tablet    Take 1 tablet (75 mg) by mouth daily    CAD (coronary artery disease)       collagenase ointment     30 g    Apply topically daily Apply a nickel-thickness amount to the wound and cover with a saline moistened gauze. Wrap in a dry gauze. Change daily.    Arterial leg ulcer (H)       DAILY MULTI Tabs      Take by mouth daily        gabapentin 100 MG capsule    NEURONTIN     Take 100 mg by mouth daily        isosorbide mononitrate 30 MG 24 hr tablet    IMDUR    90 tablet    Take 1 tablet (30 mg) by mouth daily    Coronary artery disease involving native coronary artery of native heart without angina pectoris       levothyroxine 112 MCG tablet    SYNTHROID/LEVOTHROID    90 tablet    TAKE 1 TABLET DAILY    Encounter for medication refill       nitroGLYcerin 0.4 MG sublingual tablet    NITROSTAT    25 tablet    DISSOLVE ONE TABLET UNDER THE TONGUE EVERY 5 MINUTES AS NEEDED FOR CHEST PAIN.  DO NOT EXCEED A TOTAL OF 3 DOSES IN 15 MINUTES    CAD (coronary  artery disease)       omeprazole 20 MG CR capsule    priLOSEC    180 capsule    TAKE 2 CAPSULES DAILY 30 TO 60 MINUTES BEFORE A MEAL    Encounter for medication refill       rosuvastatin 20 MG tablet    CRESTOR    90 tablet    Take 1 tablet (20 mg) by mouth daily    Hyperlipidemia

## 2017-07-13 NOTE — PROGRESS NOTES
Cardiology    HISTORY OF PRESENT ILLNESS:  Ms. Fletcher is a pleasant 77-year-old female who presents to Cardiology office today for followup after a recent visit to the [facility] up with Dr. Erickson for the evaluation of abnormal ABIs.  At that time, he recommended a peripheral arterial CTA and asked her to follow up in the office following that.      The patient has a quite extensive cardiovascular history.  Her cardiac history includes coronary artery disease with history of 2 prior coronary artery bypass surgeries, 1 in 1992 with a redo in 2009.  She also has known peripheral arterial disease and due to nonhealing wound in 2010, she underwent peripheral angiography initially in January at which time she had an angioplasty to multiple vessels in the left lower extremity along with stenting of the distal left superficial femoral and above-the-knee popliteal artery.  She underwent repeat angiography with PTA to the left below-the-knee popliteal artery and anterior tibial artery in 07/2010.  Unfortunately, she has had a functional decline over the past few years due to nonspecific neurologic  disorder.  Her exertional level has been limited due to this disorder.  Recently, the patient was considering undergoing a surgery on her right foot for hammertoe.  As part of the evaluation, she did undergo ABIs.  This showed a resting DOMINIK of 0.86 on the left side; however, the right side showed an DOMINIK of 0.62.       She does not appear to have any claudication with usual daily activities; however, if she exerts herself a bit more she does notice some cramping in her calves bilaterally.  She also states that she is prone to have charley horses.       I reviewed with her the results of her CT abdominal aorta, bilateral iliofemoral system with runoff.  This showed normal abdominal aorta without significant stenosis or any aneurysmal dilatation.  The iliac arteries were patent without any significant stenosis.  She was  noted to have moderate stenosis in the common femoral arteries bilaterally, moderate stenosis in the distal right superficial femoral artery, moderate to severe stenosis in the above-the-knee right popliteal artery and severe stenosis in the left popliteal artery.  She was noted to have 3-vessel runoff in the tibial arteries bilaterally. Signal dropout on left AT vs lesion proximally?      For ~ 1 month has had ulcer left anterior shin.  Went to would clinic with minimal improvement.   Discussed various options with patient.  Due to non healing would left shin, have advised angiography and possible PTA of left anterior tibial artery.  Risks/benefits/potential complications discussed and she is willing to proceed.    She underwent procedure as outlined below:    Procedures Performed  1.  Abdominal aortogram  2.  Selective left LE angiography  3.  Successful PTA of focal ISR of left popliteal artery with 6 X 40  Impact drug coated balloon    Narrative:    Prepped in usual fashion.  Micropuncture kit utilized and access  gained in right common femoral artery.  6 Guatemalan sheath placed.   Omniflush catheter placed in distal abdominal aorta, aortogram with  DSA performed.  Catheter then placed in distal left iliac artery and  LLE angiogram to foot performed with DSA.  Intervention then performed  with placement of a 6 [place]alan Zack sheath.  Lesion crossed with 0.14  wire.  Predilation of focal ISR performed with 4.0 X 20 mm and 5.0 X  20 mm balloon at nominal pressure.  Following that, a 6 X 40 mm Lulú  pact drug coated balloon utilized for 3 minutes.  No disruption noted  at site and right femoral sheath secured.    Findings:    Abdominal Aortogram:  Normal sized distal aorta.  No aneurysm.  No  significant disease  Right iliofemoral: normal right external and internal iliac arteries.   Mild right femoral disease of 30% stenosis with moderate  calcification.  Left iliofemoral: normal left external and internal iliac  "arteries.   Left common femoral with moderate calcification and 20-30% stenosis  Left SFA: patent with scattered disease noted of 30-40% with  calcification. Patent Profunda  Left SFA-Popliteal: stent visualized with distal edge with ISR focal  90% stenosis.  Popliteal with 50%  Below know left side: 2 V runoff with with PT and Peroneal ,occluded  AT.        Following this she had a venous study and repeat DOMINIK as noted below.    1. Mildly reduced right ankle-brachial index (0.83) consistent with  mild arterial insufficiency on the right, improved from prior study  8/8/16 where it was 0.62  2. Low normal ankle brachial index (0.94) on the left, improved from  prior study 8/8/16 where it was 0.82  3. The patient was not exercised due to inability to walk.       Venous study:    Impression:  1) In the deep venous system, there is evidence of reflux in the  common femoral vein above the superficial femoral junction,  bilaterally (2.2 sec on the right, 1 second on the left).  The rest of  the deep femoral venous system was not seen      2. In the superficial venous system there is evidence of reflux at the  SFJ bilaterally (1.5 sec on the right, 1 sec on the left).  No  superficial venous system was seen in the right thigh to proximal calf  and no reflux was seen in the distal venous system on the right.  In  the left venous system, from the thigh to the small saphenous vein,  there was no reflux seen.        Venous Competency Diagnostic Criteria Adopted 11/29/11.     Venous competency criteria defining abnormal reflux duration:  Femoral - popliteal reflux > 1.0 sec.  Deep femoral,deep calf veins, and superficial vein reflux > 0.5 sec.   vein reflux > 0.35 sec.      PHYSICAL EXAMINATION:   GENERAL:  The patient is a pleasant 76-year-old female who appears her stated age.  She is in no apparent distress.   VITAL SIGNS:  Blood pressure 159/79, pulse 67, height 1.638 m (5' 4.5\"), weight 82.6 kg (182 lb 1.6 oz), " not currently breastfeeding.  LUNGS:  Clear to auscultation bilaterally.   CARDIAC:  Reveals a regular rate and rhythm, no murmurs appreciated.   EXTREMITIES:  Lower extremities show no evidence of edema.  Her posterior tibial pulses are diminished bilaterally. There is an ulcerated lesion seen on anterior shin near ankle.  Various stages of healing with defined borders.    NEUROLOGIC:  Alert and oriented.       Recommendations  1.  We are quite pleased with her progress regarding her peripheral arterial disease. She is responded well to her PTA of the left popliteal in-stent restenotic segment.  2.  Improvement in ABIs were noted as well as continued wound healing which we are quite pleased.  3.  Venous competency study was discussed with patient, at this point she would prefer only conservative management.  4.  She has had unfortunately a chronic cough for several years and would like to have an evaluation, we will refer to ENT.  5.  She has a scheduled visit with her long-standing cardiologist Dr. Grayson later this summer, I defer the remainder of her cardiology treatment to my colleague.  6.  Return to vascular clinic p.r.n.        Renato Erickson MD

## 2017-07-20 ENCOUNTER — OFFICE VISIT (OUTPATIENT)
Dept: WOUND CARE | Facility: CLINIC | Age: 77
End: 2017-07-20

## 2017-07-20 DIAGNOSIS — L97.909 ARTERIAL LEG ULCER (H): Primary | ICD-10-CM

## 2017-07-20 DIAGNOSIS — I73.9 PAD (PERIPHERAL ARTERY DISEASE) (H): ICD-10-CM

## 2017-07-20 DIAGNOSIS — R26.9 NEUROLOGIC GAIT DISORDER: ICD-10-CM

## 2017-07-20 DIAGNOSIS — G62.9 PERIPHERAL POLYNEUROPATHY: ICD-10-CM

## 2017-07-20 NOTE — PROGRESS NOTES
Chief Complaint:   Chief Complaint   Patient presents with     WOUND CARE     F/U Wound Check          Allergies   Allergen Reactions     Ace Inhibitors Cough     Baclofen      Penicillins      10/7/14 Hives per patient -- many many years ago       Nickel Swelling and Rash     Other reaction(s): Unknown         Subjective: Katherin is a 77 year old female who presents to the clinic today for a follow up of left leg wound. She relates that she has had her final appointment with vascular and Dr. Erickson's note was reviewed. No more arterial interventions needed as of this time. She does have venous dz, but has elected to treat this conservatively. She relates that she continues to use the Santyl daily. Relates no new concerns or open lesions.     Objective    The previous wound site is noted today. The area is covered with a layer of thin, translucent epidermis throughout. The skin in the immediate vicinity is tensed and shiny 2/2 1+ pitting edema that extends from the foot to the knee. No drainage noted from the area. No s/s of infection are noted. There is some inflammatory erythema surrounding the wound.     Assessment: Left anterior leg wound that is covered with new epithelium.   Venous dz BL.    Plan:   - Pt seen and evaluated  - No need for wound debridement today.  - I would like to decrease the inflammation in the wound. I have recommended that she wrap the area in a couple of layers of Unna-Z followed by a DSD. If she tolerates this well, consider a full multi-layered Unna's boot at the next visit.   - Pt to return to clinic in 1 week.

## 2017-07-20 NOTE — MR AVS SNAPSHOT
After Visit Summary   7/20/2017    Katherin Fletcher    MRN: [id number]           Patient Information     Date Of Birth          1940        Visit Information        Provider Department      7/20/2017 1:00 PM Adrian Arroyo DPM [facility]        Today's Diagnoses     Arterial leg ulcer (H)    -  1    Neurologic gait disorder        Peripheral polyneuropathy (H)        PAD (peripheral artery disease) (H)           Follow-ups after your visit        Your next 10 appointments already scheduled     Jul 27, 2017 11:00 AM CDT   (Arrive by 10:45 AM)   Return Visit with Taty Bullard PA-C   [facility] ([facility])    [address] [place] [address] [phone]            Aug 03, 2017  1:00 PM CDT   (Arrive by 12:45 PM)   Return Visit with Adrian Arroyo DPM   [facility] ([facility])    [address] [place] [id number] [phone]            Aug 11, 2017  2:15 PM CDT   Return Visit with Kenton Grayson MD   [facility] PHYSICIANS [facility] [place] ([facility])    [address] [place] [address] [phone]              Who to contact     Please call your clinic at [phone] to:    Ask questions about your health    Make or cancel appointments    Discuss your medicines    Learn about your test results    Speak to your doctor   If you have compliments or concerns about an experience at your clinic, or if you wish to file a complaint, please contact [facility] Patient Relations at [phone] or email us at [email or web address]         Additional Information About Your Visit        MyChart Information     [employer] is an electronic gateway that provides easy, online access to your medical records. With Swopboard, you can request a clinic appointment, read your test results,  renew a prescription or communicate with your care team.     To sign up for Traackrhart visit the website at [email or web address]   You will be asked to enter the access code listed below, as well as some personal information. Please follow the directions to create your username and password.     Your access code is: [email or web address]  Expires: 2017  2:27 PM     Your access code will  in 90 days. If you need help or a new code, please contact your [facility] or call [phone] for assistance.        Care EveryWhere ID     This is your Care EveryWhere ID. This could be used by other organizations to access your [place] medical records  [id number]         Blood Pressure from Last 3 Encounters:   17 153/83   17 142/78   17 139/70    Weight from Last 3 Encounters:   17 182 lb 1.6 oz   17 178 lb   17 183 lb 3.2 oz              Today, you had the following     No orders found for display         Today's Medication Changes          These changes are accurate as of: 17  1:47 PM.  If you have any questions, ask your nurse or doctor.               These medicines have changed or have updated prescriptions.        Dose/Directions    omeprazole 20 MG CR capsule   Commonly known as:  priLOSEC   This may have changed:  See the new instructions.   Used for:  Encounter for medication refill        TAKE 2 CAPSULES DAILY 30 TO 60 MINUTES BEFORE A MEAL   Quantity:  180 capsule   Refills:  1                Primary Care Provider Office Phone # Fax #    Aris Del Real [phone]       [facility] [address]  [facility] [address]        Equal Access to Services     [facility]: Hadii rosio farnsworth Somichelle, waaxda luqadaha, qaybta kaalmada adekermityada, carlo odonnell . So [facility] [phone].    ATENCIÓN: Si habla español, tiene a taylor disposición servicios gratuitos de asistencia  lingüísticaKhushi Jones al [phone].    We comply with applicable federal civil rights laws and [place] laws. We do not discriminate on the basis of race, color, national origin, age, disability sex, sexual orientation or gender identity.            Thank you!     Thank you for choosing [facility]  for your care. Our goal is always to provide you with excellent care. Hearing back from our patients is one way we can continue to improve our services. Please take a few minutes to complete the written survey that you may receive in the mail after your visit with us. Thank you!             Your Updated Medication List - Protect others around you: Learn how to safely use, store and throw away your medicines at www.disposemymeds.org.          This list is accurate as of: 7/20/17  1:47 PM.  Always use your most recent med list.                   Brand Name Dispense Instructions for use Diagnosis    Alfalfa 250 MG Tabs      Take by mouth daily        aspirin 81 MG tablet      Take 81 mg by mouth daily        calcium + D 600-200 MG-UNIT Tabs   Generic drug:  calcium carbonate-vitamin D      Take 1 tablet by mouth daily    Preventative health care       carbidopa-levodopa  MG per tablet    SINEMET    720 tablet    TAKE 2 TABLETS FOUR TIMES A DAY    Restless legs syndrome, Neurologic gait disorder, Dysarthria, Cerebellar disease       carvedilol 6.25 MG tablet    COREG    180 tablet    Take 1 tablet (6.25 mg) by mouth 2 times daily (with meals)    Atrial fibrillation (H), HTN (hypertension)       clopidogrel 75 MG tablet    PLAVIX    90 tablet    Take 1 tablet (75 mg) by mouth daily    CAD (coronary artery disease)       collagenase ointment     30 g    Apply topically daily Apply a nickel-thickness amount to the wound and cover with a saline moistened gauze. Wrap in a dry gauze. Change daily.    Arterial leg ulcer (H)       DAILY MULTI Tabs      Take by mouth daily        gabapentin 100 MG capsule    NEURONTIN      Take 100 mg by mouth daily        isosorbide mononitrate 30 MG 24 hr tablet    IMDUR    90 tablet    Take 1 tablet (30 mg) by mouth daily    Coronary artery disease involving native coronary artery of native heart without angina pectoris       levothyroxine 112 MCG tablet    SYNTHROID/LEVOTHROID    90 tablet    TAKE 1 TABLET DAILY    Encounter for medication refill       nitroGLYcerin 0.4 MG sublingual tablet    NITROSTAT    25 tablet    DISSOLVE ONE TABLET UNDER THE TONGUE EVERY 5 MINUTES AS NEEDED FOR CHEST PAIN.  DO NOT EXCEED A TOTAL OF 3 DOSES IN 15 MINUTES    CAD (coronary artery disease)       omeprazole 20 MG CR capsule    priLOSEC    180 capsule    TAKE 2 CAPSULES DAILY 30 TO 60 MINUTES BEFORE A MEAL    Encounter for medication refill       rosuvastatin 20 MG tablet    CRESTOR    90 tablet    Take 1 tablet (20 mg) by mouth daily    Hyperlipidemia

## 2017-07-20 NOTE — LETTER
7/20/2017\     RE: Katherin Fletcher  [address]  [facility] [address]     Dear Colleague,    Thank you for referring your patient, Katherin Fletcher, to the [facility] WOUND CARE at [facility]. Please see a copy of my visit note below.    Chief Complaint:   Chief Complaint   Patient presents with     WOUND CARE     F/U Wound Check          Allergies   Allergen Reactions     Ace Inhibitors Cough     Baclofen      Penicillins      10/7/14 Hives per patient -- many many years ago       Nickel Swelling and Rash     Other reaction(s): Unknown         Subjective: Katherin is a 77 year old female who presents to the clinic today for a follow up of left leg wound. She relates that she has had her final appointment with vascular and Dr. Erickson's note was reviewed. No more arterial interventions needed as of this time. She does have venous dz, but has elected to treat this conservatively. She relates that she continues to use the Santyl daily. Relates no new concerns or open lesions.     Objective    The previous wound site is noted today. The area is covered with a layer of thin, translucent epidermis throughout. The skin in the immediate vicinity is tensed and shiny 2/2 1+ pitting edema that extends from the foot to the knee. No drainage noted from the area. No s/s of infection are noted. There is some inflammatory erythema surrounding the wound.     Assessment: Left anterior leg wound that is covered with new epithelium.   Venous dz BL.    Plan:   - Pt seen and evaluated  - No need for wound debridement today.  - I would like to decrease the inflammation in the wound. I have recommended that she wrap the area in a couple of layers of Unna-Z followed by a DSD. If she tolerates this well, consider a full multi-layered Unna's boot at the next visit.   - Pt to return to clinic in 1 week.       Again, thank you for allowing me to participate in the care of your patient.       Sincerely,    Adrian Arroyo DPM

## 2017-07-27 ENCOUNTER — TRANSFERRED RECORDS (OUTPATIENT)
Dept: FAMILY MEDICINE | Facility: CLINIC | Age: 77
End: 2017-07-27

## 2017-07-27 ENCOUNTER — OFFICE VISIT (OUTPATIENT)
Dept: WOUND CARE | Facility: CLINIC | Age: 77
End: 2017-07-27

## 2017-07-27 ENCOUNTER — TRANSFERRED RECORDS (OUTPATIENT)
Dept: CARDIOLOGY | Facility: CLINIC | Age: 77
End: 2017-07-27

## 2017-07-27 DIAGNOSIS — L97.909 ARTERIAL LEG ULCER (H): Primary | ICD-10-CM

## 2017-07-27 DIAGNOSIS — I73.9 PAD (PERIPHERAL ARTERY DISEASE) (H): ICD-10-CM

## 2017-07-27 NOTE — PROGRESS NOTES
Chief Complaint: No chief complaint on file.    Allergies   Allergen Reactions     Ace Inhibitors Cough     Baclofen      Penicillins      10/7/14 Hives per patient -- many many years ago       Nickel Swelling and Rash     Other reaction(s): Unknown     Subjective: Katherin is a 77 year old female who presents to the clinic today for a follow up of left leg wound. She used the Unna-z and DSD daily, she thinks it has improved. No new concerns.     Objective  Wound #1  An arterial wound is noted at left  anterior leg - healed over with overlying scab  Kumar Classification: n/a  Wound base: Scab  Edges: slightly erythematous and raised  Drainage: none/none  Odor: None  Undermining: No  Bone Exposure: No  Clinical Signs of Infection: No     Assessment:   - Left anterior leg wound   - Peripheral venous insufficiency   - Peripheral arterial disease     Plan:   - Pt seen and evaluated  - No need for wound debridement today.  - Valved, multi-layer unna boot applied to LLE today.  - Pt to return to clinic in 1 week.

## 2017-07-27 NOTE — LETTER
7/27/2017       RE: Katherin Fletcher  [address]  [facility] [address]     Dear Colleague,    Thank you for referring your patient, Katherin Fletcher, to the [facility] at [facility]. Please see a copy of my visit note below.    Chief Complaint: No chief complaint on file.    Allergies   Allergen Reactions     Ace Inhibitors Cough     Baclofen      Penicillins      10/7/14 Hives per patient -- many many years ago       Nickel Swelling and Rash     Other reaction(s): Unknown     Subjective: Katherin is a 77 year old female who presents to the clinic today for a follow up of left leg wound. She used the Unna-z and DSD daily, she thinks it has improved. No new concerns.     Objective  Wound #1  An arterial wound is noted at left  anterior leg - healed over with overlying scab  Kumar Classification: n/a  Wound base: Scab  Edges: slightly erythematous and raised  Drainage: none/none  Odor: None  Undermining: No  Bone Exposure: No  Clinical Signs of Infection: No     Assessment:   - Left anterior leg wound   - Peripheral venous insufficiency   - Peripheral arterial disease     Plan:   - Pt seen and evaluated  - No need for wound debridement today.  - Valved, multi-layer unna boot applied to LLE today.  - Pt to return to clinic in 1 week.     Again, thank you for allowing me to participate in the care of your patient.      Sincerely,    Taty Bullard PA-C

## 2017-07-27 NOTE — MR AVS SNAPSHOT
After Visit Summary   7/27/2017    Katherin Fletcher    MRN: [id number]           Patient Information     Date Of Birth          1940        Visit Information        Provider Department      7/27/2017 11:00 AM Taty Bullard PA-C [facility]        Today's Diagnoses     Arterial leg ulcer (H)    -  1    PAD (peripheral artery disease) (H)           Follow-ups after your visit        Your next 10 appointments already scheduled     Aug 03, 2017  1:00 PM CDT   (Arrive by 12:45 PM)   Return Visit with Adrian Arroyo DPM   [facility] ([facility])    [address] [facility]  [address] [id number] [phone]            Aug 11, 2017  2:15 PM CDT   Return Visit with Kenton Grayson MD   [facility]YSICIANS [facility] [place] ([facility])    [address] [place] [id number] [phone]              Who to contact     Please call your clinic at [phone] to:    Ask questions about your health    Make or cancel appointments    Discuss your medicines    Learn about your test results    Speak to your doctor   If you have compliments or concerns about an experience at your clinic, or if you wish to file a complaint, please contact [facility] Patient Relations at [phone] or email us at [email or web address]         Additional Information About Your Visit        MyChart Information     [employer] is an electronic gateway that provides easy, online access to your medical records. With [employer] you can request a clinic appointment, read your test results, renew a prescription or communicate with your care team.     To sign up for Transparent IT Solutionst visit the website at [email or web address]   You will be asked to enter the access code listed below, as well as some personal information. Please follow the directions to create your username and password.     Your  access code is: [email or web address]  Expires: 2017  2:27 PM     Your access code will  in 90 days. If you need help or a new code, please contact your [facility] or call [phone] for assistance.        Care EveryWhere ID     This is your Care EveryWhere ID. This could be used by other organizations to access your [place] medical records  [id number]         Blood Pressure from Last 3 Encounters:   17 153/83   17 142/78   17 139/70    Weight from Last 3 Encounters:   17 182 lb 1.6 oz   17 178 lb   17 183 lb 3.2 oz              We Performed the Following     UNNA BOOT APPLICATION          Today's Medication Changes          These changes are accurate as of: 17 11:41 AM.  If you have any questions, ask your nurse or doctor.               These medicines have changed or have updated prescriptions.        Dose/Directions    omeprazole 20 MG CR capsule   Commonly known as:  priLOSEC   This may have changed:  See the new instructions.   Used for:  Encounter for medication refill        TAKE 2 CAPSULES DAILY 30 TO 60 MINUTES BEFORE A MEAL   Quantity:  180 capsule   Refills:  1                Primary Care Provider Office Phone # Fax #    Aris Del Real [phone]       [facility] [address] [place] [address]        Equal Access to Services     [facility]: Hadii rosio joel hadasho Somichelle, waaxda luqadaha, qaybta kaalmada adekermityaedy, carlo odonnell . So [facility] [phone].    ATENCIÓN: Si habla español, tiene a taylor disposición servicios gratuitos de asistencia lingüística. Llame al [phone].    We comply with applicable federal civil rights laws and [place] laws. We do not discriminate on the basis of race, color, national origin, age, disability sex, sexual orientation or gender identity.            Thank you!     Thank you for choosing [facility]  for your  care. Our goal is always to provide you with excellent care. Hearing back from our patients is one way we can continue to improve our services. Please take a few minutes to complete the written survey that you may receive in the mail after your visit with us. Thank you!             Your Updated Medication List - Protect others around you: Learn how to safely use, store and throw away your medicines at www.disposemymeds.org.          This list is accurate as of: 7/27/17 11:41 AM.  Always use your most recent med list.                   Brand Name Dispense Instructions for use Diagnosis    Alfalfa 250 MG Tabs      Take by mouth daily        aspirin 81 MG tablet      Take 81 mg by mouth daily        calcium + D 600-200 MG-UNIT Tabs   Generic drug:  calcium carbonate-vitamin D      Take 1 tablet by mouth daily    Preventative health care       carbidopa-levodopa  MG per tablet    SINEMET    720 tablet    TAKE 2 TABLETS FOUR TIMES A DAY    Restless legs syndrome, Neurologic gait disorder, Dysarthria, Cerebellar disease       carvedilol 6.25 MG tablet    COREG    180 tablet    Take 1 tablet (6.25 mg) by mouth 2 times daily (with meals)    Atrial fibrillation (H), HTN (hypertension)       clopidogrel 75 MG tablet    PLAVIX    90 tablet    Take 1 tablet (75 mg) by mouth daily    CAD (coronary artery disease)       collagenase ointment     30 g    Apply topically daily Apply a nickel-thickness amount to the wound and cover with a saline moistened gauze. Wrap in a dry gauze. Change daily.    Arterial leg ulcer (H)       DAILY MULTI Tabs      Take by mouth daily        gabapentin 100 MG capsule    NEURONTIN     Take 100 mg by mouth daily        isosorbide mononitrate 30 MG 24 hr tablet    IMDUR    90 tablet    Take 1 tablet (30 mg) by mouth daily    Coronary artery disease involving native coronary artery of native heart without angina pectoris       levothyroxine 112 MCG tablet    SYNTHROID/LEVOTHROID    90 tablet     TAKE 1 TABLET DAILY    Encounter for medication refill       nitroGLYcerin 0.4 MG sublingual tablet    NITROSTAT    25 tablet    DISSOLVE ONE TABLET UNDER THE TONGUE EVERY 5 MINUTES AS NEEDED FOR CHEST PAIN.  DO NOT EXCEED A TOTAL OF 3 DOSES IN 15 MINUTES    CAD (coronary artery disease)       omeprazole 20 MG CR capsule    priLOSEC    180 capsule    TAKE 2 CAPSULES DAILY 30 TO 60 MINUTES BEFORE A MEAL    Encounter for medication refill       rosuvastatin 20 MG tablet    CRESTOR    90 tablet    Take 1 tablet (20 mg) by mouth daily    Hyperlipidemia

## 2017-08-03 ENCOUNTER — OFFICE VISIT (OUTPATIENT)
Dept: WOUND CARE | Facility: CLINIC | Age: 77
End: 2017-08-03

## 2017-08-03 DIAGNOSIS — Z87.2 HISTORY OF ULCER OF LOWER EXTREMITY: Primary | ICD-10-CM

## 2017-08-03 NOTE — MR AVS SNAPSHOT
After Visit Summary   8/3/2017    Katherin Fletcher    MRN: [id number]           Patient Information     Date Of Birth          1940        Visit Information        Provider Department      8/3/2017 1:00 PM Adrian Arroyo DPM [facility]        Today's Diagnoses     History of ulcer of lower extremity    -  1       Follow-ups after your visit        Your next 10 appointments already scheduled     Aug 11, 2017  2:15 PM CDT   Return Visit with Kenton Grayson MD   [facility] PHYSICIANS [facility] AT [place] ([facility])    [address] [place] [id number] [phone]            Sep 07, 2017  1:00 PM CDT   (Arrive by 12:45 PM)   Return Visit with JAYASHREE Warren [facility] ([facility] [place])    [address] [address] [phone]              Who to contact     Please call your clinic at [phone] to:    Ask questions about your health    Make or cancel appointments    Discuss your medicines    Learn about your test results    Speak to your doctor   If you have compliments or concerns about an experience at your clinic, or if you wish to file a complaint, please contact [facility] Patient Relations at [phone] or email us at [email or web address]         Additional Information About Your Visit        MyChart Information     Truverist gives you secure access to your electronic health record. If you see a primary care provider, you can also send messages to your care team and make appointments. If you have questions, please call your primary care clinic.  If you do not have a primary care provider, please call [phone] and they will assist you.      nextsocial is an electronic gateway that provides easy, online access to your medical records. With nextsocial, you can request a clinic appointment, read your test results, renew  a prescription or communicate with your care team.     To access your existing account, please contact your [facility] or call [phone] for assistance.        Care EveryWhere ID     This is your Care EveryWhere ID. This could be used by other organizations to access your [place] medical records  [id number]         Blood Pressure from Last 3 Encounters:   07/13/17 153/83   05/11/17 142/78   05/04/17 139/70    Weight from Last 3 Encounters:   07/13/17 182 lb 1.6 oz   05/11/17 178 lb   05/04/17 183 lb 3.2 oz              Today, you had the following     No orders found for display         Today's Medication Changes          These changes are accurate as of: 8/3/17  2:12 PM.  If you have any questions, ask your nurse or doctor.               These medicines have changed or have updated prescriptions.        Dose/Directions    omeprazole 20 MG CR capsule   Commonly known as:  priLOSEC   This may have changed:  See the new instructions.   Used for:  Encounter for medication refill        TAKE 2 CAPSULES DAILY 30 TO 60 MINUTES BEFORE A MEAL   Quantity:  180 capsule   Refills:  1                Primary Care Provider Office Phone # Fax #    Aris Del Real [phone]       [facility] [address] [place] [address]        Equal Access to Services     JOSELUIS MADRIGAL : Hadii rosio joel hadasho Soomaali, waaxda luqadaha, qaybta kaalmada adeegyada, carlo castro. So [facility] [phone].    ATENCIÓN: Si habla español, tiene a taylor disposición servicios gratuitos de asistencia lingüística. Llame al [phone].    We comply with applicable federal civil rights laws and [place] laws. We do not discriminate on the basis of race, color, national origin, age, disability sex, sexual orientation or gender identity.            Thank you!     Thank you for choosing [facility]  for your care. Our goal is always to provide you  with excellent care. Hearing back from our patients is one way we can continue to improve our services. Please take a few minutes to complete the written survey that you may receive in the mail after your visit with us. Thank you!             Your Updated Medication List - Protect others around you: Learn how to safely use, store and throw away your medicines at www.disposemymeds.org.          This list is accurate as of: 8/3/17  2:12 PM.  Always use your most recent med list.                   Brand Name Dispense Instructions for use Diagnosis    Alfalfa 250 MG Tabs      Take by mouth daily        aspirin 81 MG tablet      Take 81 mg by mouth daily        calcium + D 600-200 MG-UNIT Tabs   Generic drug:  calcium carbonate-vitamin D      Take 1 tablet by mouth daily    Preventative health care       carbidopa-levodopa  MG per tablet    SINEMET    720 tablet    TAKE 2 TABLETS FOUR TIMES A DAY    Restless legs syndrome, Neurologic gait disorder, Dysarthria, Cerebellar disease       carvedilol 6.25 MG tablet    COREG    180 tablet    Take 1 tablet (6.25 mg) by mouth 2 times daily (with meals)    Atrial fibrillation (H), HTN (hypertension)       clopidogrel 75 MG tablet    PLAVIX    90 tablet    Take 1 tablet (75 mg) by mouth daily    CAD (coronary artery disease)       collagenase ointment     30 g    Apply topically daily Apply a nickel-thickness amount to the wound and cover with a saline moistened gauze. Wrap in a dry gauze. Change daily.    Arterial leg ulcer (H)       DAILY MULTI Tabs      Take by mouth daily        gabapentin 100 MG capsule    NEURONTIN     Take 100 mg by mouth daily        isosorbide mononitrate 30 MG 24 hr tablet    IMDUR    90 tablet    Take 1 tablet (30 mg) by mouth daily    Coronary artery disease involving native coronary artery of native heart without angina pectoris       levothyroxine 112 MCG tablet    SYNTHROID/LEVOTHROID    90 tablet    TAKE 1 TABLET DAILY    Encounter for  medication refill       nitroGLYcerin 0.4 MG sublingual tablet    NITROSTAT    25 tablet    DISSOLVE ONE TABLET UNDER THE TONGUE EVERY 5 MINUTES AS NEEDED FOR CHEST PAIN.  DO NOT EXCEED A TOTAL OF 3 DOSES IN 15 MINUTES    CAD (coronary artery disease)       omeprazole 20 MG CR capsule    priLOSEC    180 capsule    TAKE 2 CAPSULES DAILY 30 TO 60 MINUTES BEFORE A MEAL    Encounter for medication refill       rosuvastatin 20 MG tablet    CRESTOR    90 tablet    Take 1 tablet (20 mg) by mouth daily    Hyperlipidemia

## 2017-08-03 NOTE — PROGRESS NOTES
Chief Complaints and History of Present Illnesses   Patient presents with     WOUND CARE     Left leg arterial wound              Allergies   Allergen Reactions     Ace Inhibitors Cough     Baclofen      Penicillins      10/7/14 Hives per patient -- many many years ago       Nickel Swelling and Rash     Other reaction(s): Unknown         Subjective: Katherin is a 77 year old female who presents to the clinic today for a follow up of left leg wound. She was compressed in an Unna's boot over the last week, which she tolerated well with no complications. No new LE complaints today.     Objective    Previous left anterior ankle wound is healed over. There is a scab on the area and no fluctuance is noted. Previously noted erythema on the surrounding skin has decreased since the boot application. No pain noted to the area today.     Assessment: healed left arterial ankle wound.     Plan:   - Pt seen and evaluated  - The wound has healed over. I instructed Katherin to keep the area covered to protect it. She will do this.   - Pt to return to clinic in 1 month or sooner if problems arise.

## 2017-08-03 NOTE — LETTER
8/3/2017       RE: Katherin Fletcher  [address]  [facility] [address]     Dear Colleague,    Thank you for referring your patient, Katherin Fletcher, to the [facility] WOUND C[facility] at [facility]. Please see a copy of my visit note below.    Chief Complaints and History of Present Illnesses   Patient presents with     WOUND CARE     Left leg arterial wound              Allergies   Allergen Reactions     Ace Inhibitors Cough     Baclofen      Penicillins      10/7/14 Hives per patient -- many many years ago       Nickel Swelling and Rash     Other reaction(s): Unknown         Subjective: Katherin is a 77 year old female who presents to the clinic today for a follow up of left leg wound. She was compressed in an Unna's boot over the last week, which she tolerated well with no complications. No new LE complaints today.     Objective    Previous left anterior ankle wound is healed over. There is a scab on the area and no fluctuance is noted. Previously noted erythema on the surrounding skin has decreased since the boot application. No pain noted to the area today.     Assessment: healed left arterial ankle wound.     Plan:   - Pt seen and evaluated  - The wound has healed over. I instructed Katherin to keep the area covered to protect it. She will do this.   - Pt to return to clinic in 1 month or sooner if problems arise.       Again, thank you for allowing me to participate in the care of your patient.      Sincerely,    Adrian Arroyo DPM

## 2017-08-04 DIAGNOSIS — Z76.0 ENCOUNTER FOR MEDICATION REFILL: ICD-10-CM

## 2017-08-11 DIAGNOSIS — E78.5 HYPERLIPIDEMIA: Primary | ICD-10-CM

## 2017-08-23 ENCOUNTER — OFFICE VISIT (OUTPATIENT)
Dept: CARDIOLOGY | Facility: CLINIC | Age: 77
End: 2017-08-23
Attending: PHYSICIAN ASSISTANT
Payer: MEDICARE

## 2017-08-23 VITALS
BODY MASS INDEX: 30.34 KG/M2 | HEIGHT: 64 IN | HEART RATE: 67 BPM | WEIGHT: 177.7 LBS | DIASTOLIC BLOOD PRESSURE: 71 MMHG | SYSTOLIC BLOOD PRESSURE: 140 MMHG

## 2017-08-23 DIAGNOSIS — G93.9: ICD-10-CM

## 2017-08-23 DIAGNOSIS — E78.2 MIXED HYPERLIPIDEMIA: Primary | ICD-10-CM

## 2017-08-23 DIAGNOSIS — I25.10 CORONARY ARTERY DISEASE INVOLVING NATIVE CORONARY ARTERY OF NATIVE HEART WITHOUT ANGINA PECTORIS: ICD-10-CM

## 2017-08-23 DIAGNOSIS — E78.5 HYPERLIPIDEMIA: ICD-10-CM

## 2017-08-23 DIAGNOSIS — I10 ESSENTIAL HYPERTENSION: ICD-10-CM

## 2017-08-23 DIAGNOSIS — G62.9 PERIPHERAL POLYNEUROPATHY: ICD-10-CM

## 2017-08-23 LAB
ALT SERPL W P-5'-P-CCNC: <5 U/L (ref 5–30)
CHOLEST SERPL-MCNC: 143 MG/DL
HDLC SERPL-MCNC: 47 MG/DL
LDLC SERPL CALC-MCNC: 78 MG/DL
NONHDLC SERPL-MCNC: 96 MG/DL
TRIGL SERPL-MCNC: 91 MG/DL

## 2017-08-23 PROCEDURE — 36415 COLL VENOUS BLD VENIPUNCTURE: CPT | Performed by: INTERNAL MEDICINE

## 2017-08-23 PROCEDURE — 99214 OFFICE O/P EST MOD 30 MIN: CPT | Performed by: INTERNAL MEDICINE

## 2017-08-23 PROCEDURE — 80061 LIPID PANEL: CPT | Performed by: INTERNAL MEDICINE

## 2017-08-23 PROCEDURE — 84460 ALANINE AMINO (ALT) (SGPT): CPT | Performed by: INTERNAL MEDICINE

## 2017-08-23 NOTE — PROGRESS NOTES
PMH: ischemic CM, mild MR, paroxysmal A. Fib, CAD (s/p CABG in 1992 w/ re-do in 2009), HTN, HLD, PAD.    8/11/17 Dr. Grayson annual f/u for CAD (s/p CABG in 1992 w/ re-do in 2009), chronic ischemic CM, A.Fib, HTN, HLD, PAD.   Pt last saw Dr. Erickson 7/13/17 for PAD. S/p peripheral angiogram and PTA of left popliteal artery w/ drug coated balloon on 5/4/17.        The patient has a quite extensive cardiovascular history.  Her cardiac history includes coronary artery disease with history of 2 prior coronary artery bypass surgeries, 1 in 1992 with a redo in 2009.  She also has known peripheral arterial disease and due to nonhealing wound in 2010, she underwent peripheral angiography initially in January at which time she had an angioplasty to multiple vessels in the left lower extremity along with stenting of the distal left superficial femoral and above-the-knee popliteal artery.  She underwent repeat angiography with PTA to the left below-the-knee popliteal artery and anterior tibial artery in 07/2010.  Unfortunately, she has had a functional decline over the past few years due to nonspecific neurologic  disorder.  Her exertional level has been limited due to this disorder.  Recently, the patient was considering undergoing a surgery on her right foot for hammertoe.  As part of the evaluation, she did undergo ABIs.  This showed a resting DOMINIK of 0.86 on the left side; however, the right side showed an DOMINIK of 0.62.      HPI and Plan:   See dictation      I recommend continuing current treatment plans in the chart.        No orders of the defined types were placed in this encounter.    No orders of the defined types were placed in this encounter.    Medications Discontinued During This Encounter   Medication Reason     collagenase ointment Therapy completed         Encounter Diagnoses   Name Primary?     Coronary artery disease involving native coronary artery of native heart without angina pectoris      Mixed  hyperlipidemia Yes     Essential hypertension        CURRENT MEDICATIONS:  Current Outpatient Prescriptions   Medication Sig Dispense Refill     omeprazole (PRILOSEC) 20 MG CR capsule TAKE 2 CAPSULES DAILY 30 TO 60 MINUTES BEFORE A MEAL 180 capsule 0     carbidopa-levodopa (SINEMET)  MG per tablet TAKE 2 TABLETS FOUR TIMES A  tablet 3     carvedilol (COREG) 6.25 MG tablet Take 1 tablet (6.25 mg) by mouth 2 times daily (with meals) 180 tablet 3     levothyroxine (SYNTHROID/LEVOTHROID) 112 MCG tablet TAKE 1 TABLET DAILY 90 tablet 1     isosorbide mononitrate (IMDUR) 30 MG 24 hr tablet Take 1 tablet (30 mg) by mouth daily 90 tablet 3     clopidogrel (PLAVIX) 75 MG tablet Take 1 tablet (75 mg) by mouth daily 90 tablet 3     rosuvastatin (CRESTOR) 20 MG tablet Take 1 tablet (20 mg) by mouth daily 90 tablet 3     aspirin 81 MG tablet Take 81 mg by mouth daily       gabapentin (NEURONTIN) 100 MG capsule Take 100 mg by mouth daily       Multiple Vitamins-Minerals (DAILY MULTI) TABS Take by mouth daily       Alfalfa 250 MG TABS Take by mouth daily       nitroglycerin (NITROSTAT) 0.4 MG SL tablet DISSOLVE ONE TABLET UNDER THE TONGUE EVERY 5 MINUTES AS NEEDED FOR CHEST PAIN.  DO NOT EXCEED A TOTAL OF 3 DOSES IN 15 MINUTES 25 tablet 3     calcium carbonate-vitamin D (CALCIUM + D) 600-200 MG-UNIT TABS Take 1 tablet by mouth daily          ALLERGIES     Allergies   Allergen Reactions     Ace Inhibitors Cough     Baclofen      Penicillins      10/7/14 Hives per patient -- many many years ago       Nickel Swelling and Rash     Other reaction(s): Unknown       PAST MEDICAL HISTORY:  Past Medical History:   Diagnosis Date     CAD (coronary artery disease)     s/p CABG 1992 and redo CABG 2009 (LIMA to LAD, SVG to OM and SVG to posterolateral branch of RCA)     Essential hypertension, benign      GERD (gastroesophageal reflux disease)      Hyperlipidaemia      Hyperlipidaemia LDL goal < 130      Ischemic cardiomyopathy       MI (myocardial infarction) (H) 11/1996     Mixed hyperlipidemia      PAD (peripheral artery disease) (H)      Restless legs syndrome      Unspecified hypothyroidism        PAST SURGICAL HISTORY:  Past Surgical History:   Procedure Laterality Date     ARTHROSCOPY SHOULDER RT/LT       BUNIONECTOMY RT/LT       C CABG, ARTERY-VEIN, THREE  1/2009     C MRA UPPER EXTREMITY WO&W CONT       CARDIAC SURGERY  1992    CABG,stents x2     CARPAL TUNNEL RELEASE RT/LT       HYSTERECTOMY, DAMIÁN  34 yo     OPEN REDUCTION INTERNAL FIXATION ANKLE      Left Ankle     RELEASE TRIGGER FINGER       STENT  2006     STENT(aka CARDIAC)  2008     VASCULAR SURGERY         FAMILY HISTORY:  Family History   Problem Relation Age of Onset     Unknown/Adopted Mother      Unknown/Adopted Father        SOCIAL HISTORY:  Social History     Social History     Marital status:      Spouse name: N/A     Number of children: N/A     Years of education: N/A     Social History Main Topics     Smoking status: Former Smoker     Types: Cigarettes     Quit date: 1/1/1992     Smokeless tobacco: Former User     Alcohol use 0.6 oz/week     1 Standard drinks or equivalent per week      Comment: MAYBE ONCE A YEAR     Drug use: No     Sexual activity: Not Currently     Other Topics Concern     Caffeine Concern Yes     1-2 cups      Sleep Concern No     Stress Concern No     Weight Concern Yes     Special Diet No     Exercise Yes     stairs, walking      Social History Narrative         Review of Systems:  Skin:  Negative       Eyes:  Positive for glasses    ENT:  Negative      Respiratory:  Positive for cough     Cardiovascular:  Negative;palpitations;chest pain;lightheadedness;dizziness;syncope or near-syncope;cyanosis;fatigue Positive for;edema    Gastroenterology: Negative   CONSTIPATION  Genitourinary:  Negative      Musculoskeletal:  Positive for joint pain;arthritis;joint stiffness;nocturnal cramping cramping LE  Neurologic:  Positive for local weakness   "  Psychiatric:  Positive for excessive stress    Heme/Lymph/Imm:  Positive for allergies;weight loss    Endocrine:  Positive for thyroid disorder      Physical Exam:  Vitals: /71 (BP Location: Left arm, Cuff Size: Adult Large)  Pulse 67  Ht 1.626 m (5' 4\")  Wt 80.6 kg (177 lb 11.2 oz)  BMI 30.5 kg/m2    Constitutional:  cooperative;alert and oriented;well developed;well nourished        Skin:  warm and dry to the touch        Head:  normocephalic        Eyes:  pupils equal and round;sclera white        ENT:  no pallor or cyanosis        Neck:  carotid pulses are full and equal bilaterally;JVP normal;no carotid bruit        Chest:  clear to auscultation          Cardiac: regular rhythm;no murmurs, gallops or rubs detected                  Abdomen:  abdomen soft;no masses;non-tender        Vascular: not assessed this visit                                        Extremities and Back:  no deformities, clubbing, cyanosis, erythema observed;no edema              Neurological:  affect appropriate, oriented to time, person and place;no gross motor deficits          Recent Lab Results:  LIPID RESULTS:  Lab Results   Component Value Date    CHOL 143 08/23/2017    HDL 47 (L) 08/23/2017    LDL 78 08/23/2017    TRIG 91 08/23/2017    CHOLHDLRATIO 5.7 (H) 07/13/2010       LIVER ENZYME RESULTS:  Lab Results   Component Value Date    AST 20 09/22/2015    ALT <5 (L) 08/23/2017       CBC RESULTS:  Lab Results   Component Value Date    WBC 4.7 05/04/2017    RBC 4.18 05/04/2017    HGB 12.2 05/04/2017    HCT 35.9 05/04/2017    MCV 86 05/04/2017    MCH 29.2 05/04/2017    MCHC 34.0 05/04/2017    RDW 14.0 05/04/2017     05/04/2017       BMP RESULTS:  Lab Results   Component Value Date     05/04/2017    POTASSIUM 3.8 05/04/2017    CHLORIDE 109 05/04/2017    CO2 26 05/04/2017    ANIONGAP 8 05/04/2017    GLC 91 05/04/2017    BUN 12 05/04/2017    CR 0.62 05/04/2017    GFRESTIMATED >90  Non  GFR Calc   " 05/04/2017    GFRESTBLACK >90   GFR Calc   05/04/2017    GALE 8.9 05/04/2017        A1C RESULTS:  Lab Results   Component Value Date    A1C 6.4 (H) 07/13/2010       INR RESULTS:  Lab Results   Component Value Date    INR 0.99 09/12/2013    INR 2.0 12/21/2012           CC  Charline oJyner PAFrancyC  [address] [place] [address]

## 2017-08-23 NOTE — MR AVS SNAPSHOT
After Visit Summary   8/23/2017    Katherin Fletcher    MRN: [id number]           Patient Information     Date Of Birth          1940        Visit Information        Provider Department      8/23/2017 11:15 AM Kenton Grayson MD [facility] ER HEART [facility]        Today's Diagnoses     Mixed hyperlipidemia    -  1    Coronary artery disease involving native coronary artery of native heart without angina pectoris        Essential hypertension        Peripheral polyneuropathy (H)        Cerebellar disease           Follow-ups after your visit        Additional Services     Follow-Up with Cardiologist                 Your next 10 appointments already scheduled     Sep 07, 2017  1:00 PM CDT   (Arrive by 12:45 PM)   Return Visit with Adrian Arroyo DPM   [facility] ([facility] [place])    [address] [place] [address] [phone]              Future tests that were ordered for you today     Open Future Orders        Priority Expected Expires Ordered    Follow-Up with Cardiologist Routine 8/23/2018 8/24/2018 8/23/2017            Who to contact     If you have questions or need follow up information about today's clinic visit or your schedule please contact [facility] AT [place] directly at [phone].  Normal or non-critical lab and imaging results will be communicated to you by MyChart, letter or phone within 4 business days after the clinic has received the results. If you do not hear from us within 7 days, please contact the clinic through MyChart or phone. If you have a critical or abnormal lab result, we will notify you by phone as soon as possible.  Submit refill requests through [employer] or call your pharmacy and they will forward the refill request to us. Please allow 3 business days for your refill to be completed.          Additional Information About Your Visit       "  MyChart Information     Twilio lets you send messages to your doctor, view your test results, renew your prescriptions, schedule appointments and more. To sign up, go to [email or web address] . Click on \"Log in\" on the left side of the screen, which will take you to the Welcome page. Then click on \"Sign up Now\" on the right side of the page.     You will be asked to enter the access code listed below, as well as some personal information. Please follow the directions to create your username and password.     Your access code is: [email or web address]  Expires: 2017 11:46 AM     Your access code will  in 90 days. If you need help or a new code, please call your [place] clinic or [phone].        Care EveryWhere ID     This is your Care EveryWhere ID. This could be used by other organizations to access your [place] medical records  [id number]        Your Vitals Were     Pulse Height BMI (Body Mass Index)             67 1.626 m (5' 4\") 30.5 kg/m2          Blood Pressure from Last 3 Encounters:   17 140/71   17 153/83   17 142/78    Weight from Last 3 Encounters:   17 80.6 kg (177 lb 11.2 oz)   17 82.6 kg (182 lb 1.6 oz)   17 80.7 kg (178 lb)              We Performed the Following     Follow-Up with Cardiologist        Primary Care Provider Office Phone # Fax #    Aris Del Real [phone] [address]  [facility] [address]        Equal Access to Services     [facility]: Hadii rosio farnsworth Somichelle, waaxda luqadaha, qaybta kaalmada supriya, carlo odonnell . So [facility] [phone].    ATENCIÓN: Si habla español, tiene a taylor disposición servicios gratuitos de asistencia lingüística. Llame al [phone].    We comply with applicable federal civil rights laws and [place] laws. We do not discriminate on the basis of race, color, national origin, age, disability sex, sexual orientation or gender " identity.            Thank you!     Thank you for choosing [facility] HEART AT [place]  for your care. Our goal is always to provide you with excellent care. Hearing back from our patients is one way we can continue to improve our services. Please take a few minutes to complete the written survey that you may receive in the mail after your visit with us. Thank you!             Your Updated Medication List - Protect others around you: Learn how to safely use, store and throw away your medicines at www.disposemymeds.org.          This list is accurate as of: 8/23/17 11:46 AM.  Always use your most recent med list.                   Brand Name Dispense Instructions for use Diagnosis    Alfalfa 250 MG Tabs      Take by mouth daily        aspirin 81 MG tablet      Take 81 mg by mouth daily        calcium + D 600-200 MG-UNIT Tabs   Generic drug:  calcium carbonate-vitamin D      Take 1 tablet by mouth daily    Preventative health care       carbidopa-levodopa  MG per tablet    SINEMET    720 tablet    TAKE 2 TABLETS FOUR TIMES A DAY    Restless legs syndrome, Neurologic gait disorder, Dysarthria, Cerebellar disease       carvedilol 6.25 MG tablet    COREG    180 tablet    Take 1 tablet (6.25 mg) by mouth 2 times daily (with meals)    Atrial fibrillation (H), HTN (hypertension)       clopidogrel 75 MG tablet    PLAVIX    90 tablet    Take 1 tablet (75 mg) by mouth daily    CAD (coronary artery disease)       DAILY MULTI Tabs      Take by mouth daily        gabapentin 100 MG capsule    NEURONTIN     Take 100 mg by mouth daily        isosorbide mononitrate 30 MG 24 hr tablet    IMDUR    90 tablet    Take 1 tablet (30 mg) by mouth daily    Coronary artery disease involving native coronary artery of native heart without angina pectoris       levothyroxine 112 MCG tablet    SYNTHROID/LEVOTHROID    90 tablet    TAKE 1 TABLET DAILY    Encounter for medication refill       nitroGLYcerin 0.4 MG  sublingual tablet    NITROSTAT    25 tablet    DISSOLVE ONE TABLET UNDER THE TONGUE EVERY 5 MINUTES AS NEEDED FOR CHEST PAIN.  DO NOT EXCEED A TOTAL OF 3 DOSES IN 15 MINUTES    CAD (coronary artery disease)       omeprazole 20 MG CR capsule    priLOSEC    180 capsule    TAKE 2 CAPSULES DAILY 30 TO 60 MINUTES BEFORE A MEAL    Encounter for medication refill       rosuvastatin 20 MG tablet    CRESTOR    90 tablet    Take 1 tablet (20 mg) by mouth daily    Hyperlipidemia

## 2017-08-23 NOTE — PROGRESS NOTES
HISTORY OF PRESENT ILLNESS:  I had the privilege of seeing Katherin Fletcher today.  She is 77.  She has been slowed down quite a bit by some form of ataxia and peripheral neuropathy that has led her to be somewhat unstable with her walking.  Using good judgment, she does use a walker most of the time.  Still in all, she has had a rare fall, but fortunately no major injuries.      In the past year from a cardiac standpoint, she has absolutely nothing to suggest either angina or heart failure.  Granted, she is not very active to precipitate such symptoms, but she denies chest pain, palpitations, dizziness, shortness of breath, orthopnea or lower leg edema.  Granted she gets a little breathless if she has to really pushes herself, but she rarely needs to do so and controls her lifestyle nicely.      She is slowed down as much as anything by her polyneuropathy and neuromuscular issues.  She sees Dr. Arnold from Neurology.      In the past, she had complained of a chronic cough for a while.  We blamed it on lisinopril.  This was discontinued.  She has continued to have the cough.  I gave her Yohana Geronimo's phone number in [place] and that says that she will likely make an appointment with her.      Also, an issue has been peripheral vascular arterial disease of the lower extremities.  I will not review the multiple interventions she has had, but currently she is not having obvious claudication.      CURRENT MEDICATIONS:  Her medications are rather straightforward and include:    1.  Carvedilol 6.25 mg b.i.d.   2.  Isosorbide-Imdur 30 mg a day.   3.  Plavix 75 mg a day directed at her coronary and peripheral vascular artery disease.   4.  Rosuvastatin 20 mg at bedtime with her LDL in the 60-70 range.   5.  Aspirin 81 mg a day.      She also takes Neurontin 100 mg a day, levothyroxine 112 mcg daily, Prilosec 20 mg a day and then some over-the-counter issues.        REVIEW OF SYSTEMS:  Review of systems is available in Epic.   I endorsed the findings that are currently provided.     HEENT:  Negative.   CARDIAC:  Relatively negative.   HEENT:  Negative.   UPPER GASTROINTESTINAL:  Negative.   LOWER GASTROINTESTINAL:  Positive for some constipation.   GENITOURINARY:  Negative.   MUSCULOSKELETAL:  Positive for chronic achiness of some of her lower leg joints.   NEUROLOGIC:  Positive for the ataxic syndrome noted above.  Otherwise relatively negative.   ENDOCRINE:  Positive for hypothyroidism on levothyroxine.      PHYSICAL EXAMINATION:   GENERAL:  Reveals a bright, alert female.  She has a new partial plate so her speech sounded a little thick, but she says she has been working with learning how to talk with the partial in place.   VITAL SIGNS:  Blood pressure 140/70, heart rate 68 beats a minute.  She weighs 177 pounds, down 5 pounds from 4 months ago.   HEAD:  Normal.   NECK:  She had no neck vein distention or bruits.   HEART:  Regular without gallop or murmur.   LUNGS:  Clear.   ABDOMEN:  Soft without organomegaly, mass or pain.   EXTREMITIES:  Free of edema.      IMPRESSION:   1.  Chronic coronary artery disease post two previous coronary artery bypass surgeries in 1992 and 2009.   2.  Chronic peripheral vascular artery disease of the lower extremities, post left fem-popliteal artery stenting 05/2017.   3.  Treated hypertension.   4.  Treated hyperlipidemia.   5.  Chronic ataxia and neurologic disorder.   6.  Treated mixed hyperlipidemia.      PLAN:  I made no changes.  She says you have been keeping her up on her medicines.  I reviewed each one and indications and benefits.  She is a bright lady and she gets it.  It is always a pleasure to see her.  I did not order any tests.  I did not order any studies.  I did reinforce the need for her to maintain decent nutrition.  Her last serum albumin was 2 years ago, was normal, but she does look a little more frail to me now.  For all of that, I encouraged her to maintain good nutrition.       Over 35 minutes was used in consult and greater than 50% of the time included education and counseling, reviewing old records, imaging, consults, echo, etc.  Currently she is doing well.      cc:   Aris Del Real MD   [facility] [address] [place] [address]         VICKIE RIVERA MD, Merged with [facility]             D: 2017 11:46   T: 2017 15:01   MT: BRIE      Name:     ANA RODRIGUEZ   MRN:      [id number]        Account:      [id number]   :      1940           Service Date: 2017      Document: [id number]

## 2017-08-23 NOTE — LETTER
8/23/2017    Aris Del Real MD  [address]  [facility] [address]      RE: Katherin Bowser Fletcher       Dear Colleague,    I had the pleasure of seeing Katherin Fletcher in the [facility].    I had the privilege of seeing Katherin Fletcher today.  She is 77.  She has been slowed down quite a bit by some form of ataxia and peripheral neuropathy that has led her to be somewhat unstable with her walking.  Using good judgment, she does use a walker most of the time.  Still in all, she has had a rare fall, but fortunately no major injuries.      In the past year from a cardiac standpoint, she has absolutely nothing to suggest either angina or heart failure.  Granted, she is not very active to precipitate such symptoms, but she denies chest pain, palpitations, dizziness, shortness of breath, orthopnea or lower leg edema.  Granted she gets a little breathless if she has to really pushes herself, but she rarely needs to do so and controls her lifestyle nicely.      She is slowed down as much as anything by her polyneuropathy and neuromuscular issues.  She sees Dr. Arnold from Neurology.      In the past, she had complained of a chronic cough for a while.  We blamed it on lisinopril.  This was discontinued.  She has continued to have the cough.  I gave her Yohana Geronimo's phone number in [place] and that says that she will likely make an appointment with her.      Also, an issue has been peripheral vascular arterial disease of the lower extremities.  I will not review the multiple interventions she has had, but currently she is not having obvious claudication.      CURRENT MEDICATIONS:  Her medications are rather straightforward and include:    1.  Carvedilol 6.25 mg b.i.d.   2.  Isosorbide-Imdur 30 mg a day.   3.  Plavix 75 mg a day directed at her coronary and peripheral vascular artery disease.   4.  Rosuvastatin 20 mg at bedtime with her LDL in the 60-70 range.   5.  Aspirin 81  mg a day.      She also takes Neurontin 100 mg a day, levothyroxine 112 mcg daily, Prilosec 20 mg a day and then some over-the-counter issues.        REVIEW OF SYSTEMS:  Review of systems is available in Epic.  I endorsed the findings that are currently provided.     HEENT:  Negative.   CARDIAC:  Relatively negative.   HEENT:  Negative.   UPPER GASTROINTESTINAL:  Negative.   LOWER GASTROINTESTINAL:  Positive for some constipation.   GENITOURINARY:  Negative.   MUSCULOSKELETAL:  Positive for chronic achiness of some of her lower leg joints.   NEUROLOGIC:  Positive for the ataxic syndrome noted above.  Otherwise relatively negative.   ENDOCRINE:  Positive for hypothyroidism on levothyroxine.      PHYSICAL EXAMINATION:   GENERAL:  Reveals a bright, alert female.  She has a new partial plate so her speech sounded a little thick, but she says she has been working with learning how to talk with the partial in place.   VITAL SIGNS:  Blood pressure 140/70, heart rate 68 beats a minute.  She weighs 177 pounds, down 5 pounds from 4 months ago.   HEAD:  Normal.   NECK:  She had no neck vein distention or bruits.   HEART:  Regular without gallop or murmur.   LUNGS:  Clear.   ABDOMEN:  Soft without organomegaly, mass or pain.   EXTREMITIES:  Free of edema.      IMPRESSION:   1.  Chronic coronary artery disease post two previous coronary artery bypass surgeries in 1992 and 2009.   2.  Chronic peripheral vascular artery disease of the lower extremities, post left fem-popliteal artery stenting 05/2017.   3.  Treated hypertension.   4.  Treated hyperlipidemia.   5.  Chronic ataxia and neurologic disorder.   6.  Treated mixed hyperlipidemia.      PLAN:  I made no changes.  She says you have been keeping her up on her medicines.  I reviewed each one and indications and benefits.  She is a bright lady and she gets it.  It is always a pleasure to see her.  I did not order any tests.  I did not order any studies.  I did reinforce the  need for her to maintain decent nutrition.  Her last serum albumin was 2 years ago, was normal, but she does look a little more frail to me now.  For all of that, I encouraged her to maintain good nutrition.      Over 35 minutes was used in consult and greater than 50% of the time included education and counseling, reviewing old records, imaging, consults, echo, etc.  Currently she is doing well.      Again, thank you for allowing me to participate in the care of your patient.      Sincerely,    VICKIE RIVERA MD     [facility]

## 2017-08-29 ENCOUNTER — MYC MEDICAL ADVICE (OUTPATIENT)
Dept: FAMILY MEDICINE | Facility: CLINIC | Age: 77
End: 2017-08-29

## 2017-08-29 DIAGNOSIS — G93.9: ICD-10-CM

## 2017-08-29 DIAGNOSIS — G62.9 PERIPHERAL POLYNEUROPATHY: Primary | ICD-10-CM

## 2017-08-29 DIAGNOSIS — R26.9 NEUROLOGIC GAIT DISORDER: ICD-10-CM

## 2017-08-29 DIAGNOSIS — R43.9 DISTURBANCES OF SENSATION OF SMELL AND TASTE: ICD-10-CM

## 2017-08-30 NOTE — TELEPHONE ENCOUNTER
Per Dr. Del Real referral for neurology entered and faxed with office notes to [place] [facility].  Candy Gaona

## 2017-09-07 ENCOUNTER — OFFICE VISIT (OUTPATIENT)
Dept: WOUND CARE | Facility: CLINIC | Age: 77
End: 2017-09-07

## 2017-09-07 DIAGNOSIS — I73.9 PAD (PERIPHERAL ARTERY DISEASE) (H): ICD-10-CM

## 2017-09-07 DIAGNOSIS — Z87.2 HISTORY OF ULCER OF LOWER EXTREMITY: Primary | ICD-10-CM

## 2017-09-07 NOTE — NURSING NOTE
Chief Complaint   Patient presents with     RECHECK     ankle wound        There were no vitals filed for this visit.    There is no height or weight on file to calculate BMI.    Colton DIAZ

## 2017-09-07 NOTE — MR AVS SNAPSHOT
After Visit Summary   9/7/2017    Katherin Fletcher    MRN: [id number]           Patient Information     Date Of Birth          1940        Visit Information        Provider Department      9/7/2017 1:00 PM Adrian Arroyo DPM [facility] W[facility]        Today's Diagnoses     History of ulcer of lower extremity    -  1    PAD (peripheral artery disease) (H)           Follow-ups after your visit        Who to contact     Please call your clinic at [phone] to:    Ask questions about your health    Make or cancel appointments    Discuss your medicines    Learn about your test results    Speak to your doctor   If you have compliments or concerns about an experience at your clinic, or if you wish to file a complaint, please contact [facility] Patient Relations at [phone] or email us at [email or web address]         Additional Information About Your Visit        MyChart Information     [employer] gives you secure access to your electronic health record. If you see a primary care provider, you can also send messages to your care team and make appointments. If you have questions, please call your primary care clinic.  If you do not have a primary care provider, please call [phone] and they will assist you.      [employer] is an electronic gateway that provides easy, online access to your medical records. With [employer], you can request a clinic appointment, read your test results, renew a prescription or communicate with your care team.     To access your existing account, please contact your [facility] or call [phone] for assistance.        Care EveryWhere ID     This is your Care EveryWhere ID. This could be used by other organizations to access your [place] medical records  [id number]         Blood Pressure from Last 3 Encounters:   08/23/17 140/71   07/13/17 153/83   05/11/17 142/78    Weight from Last 3  Encounters:   08/23/17 177 lb 11.2 oz   07/13/17 182 lb 1.6 oz   05/11/17 178 lb              Today, you had the following     No orders found for display       Primary Care Provider Office Phone # Fax #    Aris Del Real [phone] [address]  [facility] [address]        Equal Access to Services     [facility]: Hadii aad ku hadasho Soomaali, waaxda luqadaha, qaybta kaalmada adeegyada, waxay idiin hayaan adeeg kharash la'aan ah. So [facility] [phone].    ATENCIÓN: Si harry becerril, tiene a taylor disposición servicios gratuitos de asistencia lingüística. Joeame al [phone].    We comply with applicable federal civil rights laws and [place] laws. We do not discriminate on the basis of race, color, national origin, age, disability sex, sexual orientation or gender identity.            Thank you!     Thank you for choosing [facility]  for your care. Our goal is always to provide you with excellent care. Hearing back from our patients is one way we can continue to improve our services. Please take a few minutes to complete the written survey that you may receive in the mail after your visit with us. Thank you!             Your Updated Medication List - Protect others around you: Learn how to safely use, store and throw away your medicines at www.disposemymeds.org.          This list is accurate as of: 9/7/17 11:59 PM.  Always use your most recent med list.                   Brand Name Dispense Instructions for use Diagnosis    Alfalfa 250 MG Tabs      Take by mouth daily        aspirin 81 MG tablet      Take 81 mg by mouth daily        calcium + D 600-200 MG-UNIT Tabs   Generic drug:  calcium carbonate-vitamin D      Take 1 tablet by mouth daily    Preventative health care       carbidopa-levodopa  MG per tablet    SINEMET    720 tablet    TAKE 2 TABLETS FOUR TIMES A DAY    Restless legs syndrome, Neurologic gait disorder, Dysarthria, Cerebellar disease        carvedilol 6.25 MG tablet    COREG    180 tablet    Take 1 tablet (6.25 mg) by mouth 2 times daily (with meals)    Atrial fibrillation (H), HTN (hypertension)       clopidogrel 75 MG tablet    PLAVIX    90 tablet    Take 1 tablet (75 mg) by mouth daily    CAD (coronary artery disease)       DAILY MULTI Tabs      Take by mouth daily        gabapentin 100 MG capsule    NEURONTIN     Take 100 mg by mouth daily        isosorbide mononitrate 30 MG 24 hr tablet    IMDUR    90 tablet    Take 1 tablet (30 mg) by mouth daily    Coronary artery disease involving native coronary artery of native heart without angina pectoris       levothyroxine 112 MCG tablet    SYNTHROID/LEVOTHROID    90 tablet    TAKE 1 TABLET DAILY    Encounter for medication refill       nitroGLYcerin 0.4 MG sublingual tablet    NITROSTAT    25 tablet    DISSOLVE ONE TABLET UNDER THE TONGUE EVERY 5 MINUTES AS NEEDED FOR CHEST PAIN.  DO NOT EXCEED A TOTAL OF 3 DOSES IN 15 MINUTES    CAD (coronary artery disease)       omeprazole 20 MG CR capsule    priLOSEC    180 capsule    TAKE 2 CAPSULES DAILY 30 TO 60 MINUTES BEFORE A MEAL    Encounter for medication refill       rosuvastatin 20 MG tablet    CRESTOR    90 tablet    Take 1 tablet (20 mg) by mouth daily    Hyperlipidemia

## 2017-09-07 NOTE — LETTER
9/7/2017       RE: Katherin Fletcher  [address]  [facility] [address]     Dear Colleague,    Thank you for referring your patient, Katherin Fletcher, to the [facility] at [facility]. Please see a copy of my visit note below.    Chief Complaint:   Chief Complaint   Patient presents with     RECHECK     ankle wound           Allergies   Allergen Reactions     Ace Inhibitors Cough     Baclofen      Penicillins      10/7/14 Hives per patient -- many many years ago       Nickel Swelling and Rash     Other reaction(s): Unknown         Subjective: Katherin is a 77 year old female who presents to the clinic today for a follow up of left anterior leg wound. She relates that since her last visit she has continued to see the wound crest and heel. She relates that she is has no new complaints in her left lower extremity today. She relates overall to feeling well.    Objective    On physical exam today, there was no open wound noted on the area. The area has fully crusted over and is not fluctuant. No new LE lesions noted today.     Assessment: Healed left anterior ankle arterial wound.     Plan:   - Pt seen and evaluated  - No wound is noted today. She can cover the crust for protection. No further tx necessary.   - Pt to return to clinic PRN.       Again, thank you for allowing me to participate in the care of your patient.      Sincerely,    Adrian Arroyo DPM

## 2017-09-08 NOTE — PROGRESS NOTES
Chief Complaint:   Chief Complaint   Patient presents with     RECHECK     ankle wound           Allergies   Allergen Reactions     Ace Inhibitors Cough     Baclofen      Penicillins      10/7/14 Hives per patient -- many many years ago       Nickel Swelling and Rash     Other reaction(s): Unknown         Subjective: Katherin is a 77 year old female who presents to the clinic today for a follow up of left anterior leg wound. She relates that since her last visit she has continued to see the wound crest and heel. She relates that she is has no new complaints in her left lower extremity today. She relates overall to feeling well.    Objective    On physical exam today, there was no open wound noted on the area. The area has fully crusted over and is not fluctuant. No new LE lesions noted today.     Assessment: Healed left anterior ankle arterial wound.     Plan:   - Pt seen and evaluated  - No wound is noted today. She can cover the crust for protection. No further tx necessary.   - Pt to return to clinic PRN.

## 2017-09-11 ENCOUNTER — TELEPHONE (OUTPATIENT)
Dept: FAMILY MEDICINE | Facility: CLINIC | Age: 77
End: 2017-09-11

## 2017-09-11 DIAGNOSIS — Z23 NEED FOR VACCINATION WITH 13-POLYVALENT PNEUMOCOCCAL CONJUGATE VACCINE: Primary | ICD-10-CM

## 2017-09-11 NOTE — TELEPHONE ENCOUNTER
patient called today asking if she needs any pneumonia shots.   In EPIC, prevnar 7 is listed as given in 4/22/2007 (age 67 at that time). No documentation in EPIC notes or [facility] or her paper chart of pneumonia vaccine given on this date.  Plan: per Dr. Del Real - give prevnar 13 now and pneumovax 23 6-12 months later. Patient agreeable. She made lab appt for 9/14 for prevnar and flu shot.   Orders put in chart.  Cornelia Ball RN

## 2017-09-12 ENCOUNTER — TELEPHONE (OUTPATIENT)
Dept: NEUROLOGY | Facility: CLINIC | Age: 77
End: 2017-09-12

## 2017-09-12 NOTE — TELEPHONE ENCOUNTER
Pt called and left a message that she wanted to make an appointment with Dr. Randolph. She said since the spring she is a little more advanced. She has to use her walker all the time, last spring she was not using it much. She is stuttering more when she speaks. She has not had genetic testing done. She wants to make an appointment because Dr. Randolph wanted her to come back in Oct. She was told she would be called on Friday, Sept 15th when Dr. Randolph's November schedule is known.

## 2017-09-14 DIAGNOSIS — Z23 NEED FOR PROPHYLACTIC VACCINATION AND INOCULATION AGAINST INFLUENZA: Primary | ICD-10-CM

## 2017-09-14 PROCEDURE — G0009 ADMIN PNEUMOCOCCAL VACCINE: HCPCS | Performed by: FAMILY MEDICINE

## 2017-09-14 PROCEDURE — 90670 PCV13 VACCINE IM: CPT | Performed by: FAMILY MEDICINE

## 2017-09-14 PROCEDURE — G0008 ADMIN INFLUENZA VIRUS VAC: HCPCS | Performed by: FAMILY MEDICINE

## 2017-09-14 PROCEDURE — 90662 IIV NO PRSV INCREASED AG IM: CPT | Performed by: FAMILY MEDICINE

## 2017-09-14 NOTE — PROGRESS NOTES
Injectable Influenza Immunization Documentation    1.  Are you sick today? (Fever of 100.5 or higher on the day of the clinic)   No    2.  Have you ever had Guillain-Fontana Syndrome within 6 weeks of an influenza vaccionation?  No    3. Do you have a life-threatening allergy to eggs?  No    4. Do you have a life-threatening allergy to a component of the vaccine? May include antibiotics, gelatin or latex.  No     5. Have you ever had a reaction to a dose of flu vaccine that needed immediate medical attention?  No     Form completed by Fredy Gallagher

## 2017-09-15 ENCOUNTER — TELEPHONE (OUTPATIENT)
Dept: NEUROLOGY | Facility: CLINIC | Age: 77
End: 2017-09-15

## 2017-09-15 NOTE — TELEPHONE ENCOUNTER
GENETIC COUNSELING-ataxia clinic  I attempted to reach Katherin at her home and mobile numbers.  She did not answer either number and there was not a voicemail.  Dr. Randolph had wanted me to contact her to discuss genetic testing options.    Boris Manley MS, [facility]  Certified Genetic Counselor

## 2017-09-19 ENCOUNTER — TELEPHONE (OUTPATIENT)
Dept: NEUROLOGY | Facility: CLINIC | Age: 77
End: 2017-09-19

## 2017-09-19 DIAGNOSIS — R27.0 ATAXIA: Primary | ICD-10-CM

## 2017-09-19 NOTE — TELEPHONE ENCOUNTER
GENETIC COUNSELING-Neurology clinic  At the request of Dr. Randolph, I spoke with Katherin and confirmed that she would like to proceed with genetic testing for ataxia. She is adopted and has no medical information about her biological family.  We will begin by excluding the common dominant forms, and if negative, will reflex to sequencing of genes associated with rare forms of ataxia.    Boris Manley MS, [facility]  Certified Genetic Counselor

## 2017-09-20 ENCOUNTER — APPOINTMENT (OUTPATIENT)
Dept: LAB | Facility: CLINIC | Age: 77
End: 2017-09-20
Attending: GENETIC COUNSELOR, MS
Payer: MEDICARE

## 2017-09-20 DIAGNOSIS — R27.0 ATAXIA: ICD-10-CM

## 2017-09-20 PROCEDURE — 81405 MOPATH PROCEDURE LEVEL 6: CPT | Performed by: GENETIC COUNSELOR, MS

## 2017-09-20 PROCEDURE — 81406 MOPATH PROCEDURE LEVEL 7: CPT | Mod: 91 | Performed by: GENETIC COUNSELOR, MS

## 2017-09-20 PROCEDURE — 81479 UNLISTED MOLECULAR PATHOLOGY: CPT | Performed by: GENETIC COUNSELOR, MS

## 2017-09-20 PROCEDURE — 81404 MOPATH PROCEDURE LEVEL 5: CPT | Performed by: GENETIC COUNSELOR, MS

## 2017-09-20 PROCEDURE — 81408 MOPATH PROCEDURE LEVEL 9: CPT | Performed by: GENETIC COUNSELOR, MS

## 2017-09-20 PROCEDURE — 81407 MOPATH PROCEDURE LEVEL 8: CPT | Mod: 91 | Performed by: GENETIC COUNSELOR, MS

## 2017-09-21 ENCOUNTER — TRANSFERRED RECORDS (OUTPATIENT)
Dept: HEALTH INFORMATION MANAGEMENT | Facility: CLINIC | Age: 77
End: 2017-09-21

## 2017-09-21 ENCOUNTER — TRANSFERRED RECORDS (OUTPATIENT)
Dept: FAMILY MEDICINE | Facility: CLINIC | Age: 77
End: 2017-09-21

## 2017-09-25 ENCOUNTER — MYC MEDICAL ADVICE (OUTPATIENT)
Dept: WOUND CARE | Facility: CLINIC | Age: 77
End: 2017-09-25

## 2017-09-25 DIAGNOSIS — R27.0 ATAXIA: Primary | ICD-10-CM

## 2017-09-28 DIAGNOSIS — R27.0 ATAXIA: ICD-10-CM

## 2017-09-29 LAB — COPATH REPORT: NORMAL

## 2017-10-02 NOTE — PROGRESS NOTES
Outpatient Physical Therapy Discharge Note     Patient: Katherin Fletcher  : 1940    Beginning/End Dates of Reporting Period:  17 to 10/2/2017    Referring Provider: Dr. Joe Randolph    Therapy Diagnosis: difficulty with gait, safety     Client Self Report: see eval    Objective Measurements:  Objective Measure: WHITMAN  Details:   Objective Measure: 6MWT  Details: 655 feet with 4WW            Goals:  Goal Identifier 6MWT   Goal Description 1. Patient will improve distance ambulated by 150 feet with 4WW in order to increase distance to be at least the distance she ambulated at her last PT session in .   Target Date 17   Date Met      Progress:     Goal Identifier WHITMAN   Goal Description 2. Patient will increase score on [facility] Whitman balance assessment by 5 points in order to improve safety and balance during mobility.   Target Date 17   Date Met      Progress:     Goals not met    Progress Toward Goals:   Progress this reporting period: being busy with summer schedule and unable to attend ongoing sessions.       Plan:  Discharge from therapy.    Discharge:    Reason for Discharge: Patient chooses to discontinue therapy.  Medicare G-code: Patient did not attend a final scheduled session prior to discharge. Unable to determine discharge functional status.    Equipment Issued: none    Discharge Plan: Patient to continue home program.

## 2017-10-13 ENCOUNTER — TELEPHONE (OUTPATIENT)
Dept: NEUROLOGY | Facility: CLINIC | Age: 77
End: 2017-10-13

## 2017-10-13 DIAGNOSIS — R27.0 ATAXIA: Primary | ICD-10-CM

## 2017-10-13 LAB — LAB SCANNED RESULT: NORMAL

## 2017-10-13 NOTE — TELEPHONE ENCOUNTER
GENETIC COUNSELING-Ataxia clinic  I called Katherin with results of genetic testing for SCA1,2,3,6,7.  These are all normal. I explained that this excludes the most common autosomal dominant forms of ataxia and specifically excludes the two that carry the most significant concern for anticipation/juvenile onset (SCA2,SCA7).  I explained that it is good news to exclude these forms, though I appreciate that it can be frustrating not to have a specific answer.  Katherin indicated that she would like to proceed with additional testing as her daughter is concerned about the possibility that this could be a genetic ataxia. I explained that Dr. Randolph had specifically indicated that he wanted SCA5,8,11,and 17 excluded.  I indicated that SCA5 and 11 could be specifically excluded by our next generation sequencing panel here at the [facility]. The SCA8 and SCA17 tests would both have to be sendouts to [place]. I indicated that we could have these tests run on the blood sample that was drawn previously. I think that we could have a preliminary result of the next generation sequencing testing done by 11/3 and the other two tests (SCA8 and SCA17) would likely take a little longer.    Boris Manley MS, [facility]  Certified Genetic Counselor

## 2017-10-18 ENCOUNTER — OFFICE VISIT (OUTPATIENT)
Dept: ORTHOPEDICS | Facility: CLINIC | Age: 77
End: 2017-10-18

## 2017-10-18 DIAGNOSIS — M20.41 HAMMER TOE OF RIGHT FOOT: Primary | ICD-10-CM

## 2017-10-18 RX ORDER — AZELASTINE 1 MG/ML
1 SPRAY, METERED NASAL PRN
COMMUNITY
Start: 2017-09-25 | End: 2018-05-07

## 2017-10-18 RX ORDER — BENZONATATE 200 MG/1
200 CAPSULE ORAL DAILY PRN
COMMUNITY
Start: 2017-09-22 | End: 2018-03-29

## 2017-10-18 RX ORDER — FLUNISOLIDE 0.25 MG/ML
SOLUTION NASAL
COMMUNITY
Start: 2017-09-25 | End: 2018-05-07

## 2017-10-18 NOTE — MR AVS SNAPSHOT
After Visit Summary   10/18/2017    Katherin Fletcher    MRN: [id number]           Patient Information     Date Of Birth          1940        Visit Information        Provider Department      10/18/2017 1:40 PM Adrian Arroyo DPM [facility]        Care Instructions    Arai Taping Technique          Follow-ups after your visit        Follow-up notes from your care team     Return in about 4 weeks (around 11/15/2017).      Your next 10 appointments already scheduled     Nov 03, 2017 11:00 AM CDT   (Arrive by 10:45 AM)   Return Ataxia with Joe Randolph MD   [facility] ([facility])    [address] [place] [id number] [phone]            Nov 15, 2017  1:00 PM CST   (Arrive by 12:45 PM)   RETURN FOOT/ANKLE with Adrian Arroyo DPM   [facility] ([facility])    [address] [place] [address] [phone]              Who to contact     Please call your clinic at [phone] to:    Ask questions about your health    Make or cancel appointments    Discuss your medicines    Learn about your test results    Speak to your doctor   If you have compliments or concerns about an experience at your clinic, or if you wish to file a complaint, please contact [facility] Patient Relations at [phone] or email us at [email or web address]         Additional Information About Your Visit        [employer] Information     [employer] gives you secure access to your electronic health record. If you see a primary care provider, you can also send messages to your care team and make appointments. If you have questions, please call your primary care clinic.  If you do not have a primary care provider, please call [phone] and they will assist you.      [employer] is an electronic gateway that provides easy, online access  to your medical records. With [employer], you can request a clinic appointment, read your test results, renew a prescription or communicate with your care team.     To access your existing account, please contact your [facility] or call [phone] for assistance.        Care EveryWhere ID     This is your Care EveryWhere ID. This could be used by other organizations to access your [place] medical records  [id number]         Blood Pressure from Last 3 Encounters:   08/23/17 140/71   07/13/17 153/83   05/11/17 142/78    Weight from Last 3 Encounters:   08/23/17 80.6 kg (177 lb 11.2 oz)   07/13/17 82.6 kg (182 lb 1.6 oz)   05/11/17 80.7 kg (178 lb)              Today, you had the following     No orders found for display       Primary Care Provider Office Phone # Fax #    Aris Del Real [phone] [address]  [facility] [address]        Equal Access to Services     [facility]: Hadii aad ku hadasho Soomaali, waaxda luqadaha, qaybta kaalmada adeegyada, waxay idiin hayjimbon larissa khlowell odonnell . So [facility] [phone].    ATENCIÓN: Si habla español, tiene a taylor disposición servicios gratuitos de asistencia lingüística. Llame al [phone].    We comply with applicable federal civil rights laws and [place] laws. We do not discriminate on the basis of race, color, national origin, age, disability, sex, sexual orientation, or gender identity.            Thank you!     Thank you for choosing [facility]  for your care. Our goal is always to provide you with excellent care. Hearing back from our patients is one way we can continue to improve our services. Please take a few minutes to complete the written survey that you may receive in the mail after your visit with us. Thank you!             Your Updated Medication List - Protect others around you: Learn how to safely use, store and throw away your medicines at www.disposemymeds.org.           This list is accurate as of: 10/18/17  2:47 PM.  Always use your most recent med list.                   Brand Name Dispense Instructions for use Diagnosis    Alfalfa 250 MG Tabs      Take by mouth daily        aspirin 81 MG tablet      Take 81 mg by mouth daily        azelastine 0.1 % spray    ASTELIN          benzonatate 200 MG capsule    TESSALON          calcium + D 600-200 MG-UNIT Tabs   Generic drug:  calcium carbonate-vitamin D      Take 1 tablet by mouth daily    Preventative health care       carbidopa-levodopa  MG per tablet    SINEMET    720 tablet    TAKE 2 TABLETS FOUR TIMES A DAY    Restless legs syndrome, Neurologic gait disorder, Dysarthria, Cerebellar disease       carvedilol 6.25 MG tablet    COREG    180 tablet    Take 1 tablet (6.25 mg) by mouth 2 times daily (with meals)    Atrial fibrillation (H), HTN (hypertension)       clopidogrel 75 MG tablet    PLAVIX    90 tablet    Take 1 tablet (75 mg) by mouth daily    CAD (coronary artery disease)       DAILY MULTI Tabs      Take by mouth daily        flunisolide 25 MCG/ACT (0.025%) Soln spray    NASALIDE          gabapentin 100 MG capsule    NEURONTIN     Take 100 mg by mouth daily        isosorbide mononitrate 30 MG 24 hr tablet    IMDUR    90 tablet    Take 1 tablet (30 mg) by mouth daily    Coronary artery disease involving native coronary artery of native heart without angina pectoris       levothyroxine 112 MCG tablet    SYNTHROID/LEVOTHROID    90 tablet    TAKE 1 TABLET DAILY    Encounter for medication refill       nitroGLYcerin 0.4 MG sublingual tablet    NITROSTAT    25 tablet    DISSOLVE ONE TABLET UNDER THE TONGUE EVERY 5 MINUTES AS NEEDED FOR CHEST PAIN.  DO NOT EXCEED A TOTAL OF 3 DOSES IN 15 MINUTES    CAD (coronary artery disease)       omeprazole 20 MG CR capsule    priLOSEC    180 capsule    TAKE 2 CAPSULES DAILY 30 TO 60 MINUTES BEFORE A MEAL    Encounter for medication refill       rosuvastatin 20 MG tablet    CRESTOR     90 tablet    Take 1 tablet (20 mg) by mouth daily    Hyperlipidemia

## 2017-10-18 NOTE — PROGRESS NOTES
Chief Complaint:   Chief Complaint   Patient presents with     RECHECK     Callus, right third toe. Right 2nd toe is overlapping the Right 3rd toe and is causing a callus.           Allergies   Allergen Reactions     Ace Inhibitors Cough     Baclofen      Penicillins      10/7/14 Hives per patient -- many many years ago       Nickel Swelling and Rash     Other reaction(s): Unknown         Subjective: Katherin is a 77 year old female who presents to the clinic today for a follow up of right 2nd digit hammertoe.  Nature: Sharp pain    Location: 2nd and 3rd right digits.     Duration: Decades    Onset: Occurred after 2nd digit was fractured. She hammertoe correction for the resulting deformity and the 2nd digit now sits on the 3rd digit.    Course: Stable    Aggravating/alleviating factors: Walking aggravates.    Previous Treatments: none    Objective    Left anterior ankle wound is healed.  Right 2nd digit has fibular deviation and sits above the 3rd digit. IPJs are very rigid and minimally reducible.       Assessment: right 2nd digit hammertoe - rigid.    Plan:   - Pt seen and evaluated  - We can try a few options in different combinations. I dispensed a toe spacer, Lamb's wool, and a toe sleeve. She can try combinations of these to see if the toe can be forced away from the 3rd toe.   - Pt to return to clinic in 1 month for assessment.

## 2017-10-18 NOTE — NURSING NOTE
Reason For Visit:   Chief Complaint   Patient presents with     RECHECK     Callus, right third toe. Right 2nd toe is overlapping the Right 3rd toe and is causing a callus.        Pain Assessment  Patient Currently in Pain: Yes  Primary Pain Location: Foot (Toes. )  Pain Orientation: Right  Pain Descriptors: Sore  Aggravating Factors: Walking       Current Outpatient Prescriptions   Medication Sig Dispense Refill     flunisolide (NASALIDE) 25 MCG/ACT (0.025%) SOLN spray        benzonatate (TESSALON) 200 MG capsule        azelastine (ASTELIN) 0.1 % spray        omeprazole (PRILOSEC) 20 MG CR capsule TAKE 2 CAPSULES DAILY 30 TO 60 MINUTES BEFORE A MEAL 180 capsule 0     carbidopa-levodopa (SINEMET)  MG per tablet TAKE 2 TABLETS FOUR TIMES A  tablet 3     carvedilol (COREG) 6.25 MG tablet Take 1 tablet (6.25 mg) by mouth 2 times daily (with meals) 180 tablet 3     levothyroxine (SYNTHROID/LEVOTHROID) 112 MCG tablet TAKE 1 TABLET DAILY 90 tablet 1     isosorbide mononitrate (IMDUR) 30 MG 24 hr tablet Take 1 tablet (30 mg) by mouth daily 90 tablet 3     clopidogrel (PLAVIX) 75 MG tablet Take 1 tablet (75 mg) by mouth daily 90 tablet 3     rosuvastatin (CRESTOR) 20 MG tablet Take 1 tablet (20 mg) by mouth daily 90 tablet 3     aspirin 81 MG tablet Take 81 mg by mouth daily       gabapentin (NEURONTIN) 100 MG capsule Take 100 mg by mouth daily       Multiple Vitamins-Minerals (DAILY MULTI) TABS Take by mouth daily       Alfalfa 250 MG TABS Take by mouth daily       nitroglycerin (NITROSTAT) 0.4 MG SL tablet DISSOLVE ONE TABLET UNDER THE TONGUE EVERY 5 MINUTES AS NEEDED FOR CHEST PAIN.  DO NOT EXCEED A TOTAL OF 3 DOSES IN 15 MINUTES 25 tablet 3     calcium carbonate-vitamin D (CALCIUM + D) 600-200 MG-UNIT TABS Take 1 tablet by mouth daily             Allergies   Allergen Reactions     Ace Inhibitors Cough     Baclofen      Penicillins      10/7/14 Hives per patient -- many many years ago       Nickel Swelling  and Rash     Other reaction(s): Unknown

## 2017-10-18 NOTE — LETTER
10/18/2017       RE: Katherin Fletcher  [address]  [place] [facility] [address]     Dear Colleague,    Thank you for referring your patient, Katherin Fletcher, to the [facility] at [facility]. Please see a copy of my visit note below.    Chief Complaint:   Chief Complaint   Patient presents with     RECHECK     Callus, right third toe. Right 2nd toe is overlapping the Right 3rd toe and is causing a callus.           Allergies   Allergen Reactions     Ace Inhibitors Cough     Baclofen      Penicillins      10/7/14 Hives per patient -- many many years ago       Nickel Swelling and Rash     Other reaction(s): Unknown         Subjective: Katherin is a 77 year old female who presents to the clinic today for a follow up of right 2nd digit hammertoe.  Nature: Sharp pain    Location: 2nd and 3rd right digits.     Duration: Decades    Onset: Occurred after 2nd digit was fractured. She hammertoe correction for the resulting deformity and the 2nd digit now sits on the 3rd digit.    Course: Stable    Aggravating/alleviating factors: Walking aggravates.    Previous Treatments: none    Objective    Left anterior ankle wound is healed.  Right 2nd digit has fibular deviation and sits above the 3rd digit. IPJs are very rigid and minimally reducible.       Assessment: right 2nd digit hammertoe - rigid.    Plan:   - Pt seen and evaluated  - We can try a few options in different combinations. I dispensed a toe spacer, Lamb's wool, and a toe sleeve. She can try combinations of these to see if the toe can be forced away from the 3rd toe.   - Pt to return to clinic in 1 month for assessment.       Again, thank you for allowing me to participate in the care of your patient.      Sincerely,    Adrian Arroyo DPM

## 2017-10-23 ENCOUNTER — TRANSFERRED RECORDS (OUTPATIENT)
Dept: FAMILY MEDICINE | Facility: CLINIC | Age: 77
End: 2017-10-23

## 2017-10-26 DIAGNOSIS — R27.0 ATAXIA: ICD-10-CM

## 2017-10-31 ASSESSMENT — ENCOUNTER SYMPTOMS
SNORES LOUDLY: 0
DISTURBANCES IN COORDINATION: 1
BACK PAIN: 0
WEAKNESS: 0
INSOMNIA: 0
TREMORS: 0
WHEEZING: 0
SLEEP DISTURBANCES DUE TO BREATHING: 0
EXERCISE INTOLERANCE: 0
DEPRESSION: 0
COUGH DISTURBING SLEEP: 0
DYSPNEA ON EXERTION: 0
DECREASED CONCENTRATION: 0
HYPERTENSION: 1
ARTHRALGIAS: 0
MUSCLE CRAMPS: 1
HEMOPTYSIS: 0
BLOOD IN STOOL: 0
ORTHOPNEA: 0
MUSCLE WEAKNESS: 1
NAUSEA: 0
MEMORY LOSS: 0
DIARRHEA: 0
LIGHT-HEADEDNESS: 0
NUMBNESS: 0
BOWEL INCONTINENCE: 0
LEG PAIN: 0
STIFFNESS: 1
MYALGIAS: 0
BLOATING: 0
SPUTUM PRODUCTION: 0
COUGH: 1
LOSS OF CONSCIOUSNESS: 0
VOMITING: 0
PANIC: 0
SEIZURES: 0
SPEECH CHANGE: 1
RECTAL PAIN: 0
NERVOUS/ANXIOUS: 1
SHORTNESS OF BREATH: 0
PARALYSIS: 0
HEADACHES: 0
NECK PAIN: 1
HYPOTENSION: 0
JAUNDICE: 0
POSTURAL DYSPNEA: 0
TINGLING: 0
HEARTBURN: 0
SYNCOPE: 0
CONSTIPATION: 1
JOINT SWELLING: 1
DIZZINESS: 1
ABDOMINAL PAIN: 0

## 2017-11-02 DIAGNOSIS — Z76.0 ENCOUNTER FOR MEDICATION REFILL: ICD-10-CM

## 2017-11-03 ENCOUNTER — OFFICE VISIT (OUTPATIENT)
Dept: NEUROLOGY | Facility: CLINIC | Age: 77
End: 2017-11-03

## 2017-11-03 VITALS
DIASTOLIC BLOOD PRESSURE: 80 MMHG | HEIGHT: 64 IN | WEIGHT: 180 LBS | HEART RATE: 86 BPM | SYSTOLIC BLOOD PRESSURE: 153 MMHG | BODY MASS INDEX: 30.73 KG/M2

## 2017-11-03 DIAGNOSIS — R27.0 ATAXIA: Primary | ICD-10-CM

## 2017-11-03 RX ORDER — TIZANIDINE 2 MG/1
2 TABLET ORAL DAILY
Qty: 90 TABLET | Status: SHIPPED | OUTPATIENT
Start: 2017-11-03 | End: 2018-02-13

## 2017-11-03 ASSESSMENT — PAIN SCALES - GENERAL: PAINLEVEL: NO PAIN (0)

## 2017-11-03 NOTE — PROGRESS NOTES
GENETIC COUNSELING-Ataxia clinic  Katherin Fletcher returned to clinic to see Dr. Randolph. I met with her for 15 minutes to review family history and discuss genetic testing results.  I was not able to meet with her at her initial visit with Dr. Randolph, but have been involved in coordinating genetic testing at Dr. Randolph's request.    MEDICAL HISTORY-  Please see Dr. Randolph's clinic notes. Briefly, Katherin reports a history of neurologic symptoms dating back ~3 years.  She has findings of ataxia on exam.    FAMILY HISTORY-see scanned pedigree  1. Katherin is adopted and has no information about neurologic disease in her biological family.    2. Katherin has 3 children, the oldest of which is adopted away    GENETIC COUNSELING-  I reviewed results of genetic testing to date:  1. Testing for common dominant ataxias (SCA1,2,3,6,7) was all normal- which rules out the common dominant forms of ataxia.  2. Testing for several dozen rare forms of ataxia has been completed but is not yet signed out. This testing was done in our lab here at the [facility] and should be signed out as normal in the next few days.  I explained that this rules out several dozen rarer forms of both dominant and recessive ataxia.  3. Dr. Randolph had also recommended genetic testing for SCA8 and SCA17.  These tests were sent to [place] and results are still pending.    I indicated that I will call her with results of the final two tests when they are complete.  I explained that at this point, it seems unlikely that we will establish a genetic diagnosis. I explained that there are two possible explanations for these results: (1) Warrens disease may not be genetic --or-- (2) Katherin could have a genetic diagnosis, but we are not able to identify the specific gene with today's testing.  All questions were answered.    Boris Manley MS, [facility]  Certified Genetic Counselor

## 2017-11-03 NOTE — LETTER
"11/3/2017       RE: Katherin Fletcher  [address]  [facility] [address]     Dear Colleague,    Thank you for referring your patient, Katherin Fletcher, to the [facility] at [facility]. Please see a copy of my visit note below.    Service Date: 11/03/2017      This 77-year-old woman returns for a followup neurological visit, having been seen initially on 04/14/2017.  The report pertaining to that visit has been reviewed for comparison purposes.  The categories itemized in that report are once again gone over with the patient.  They are as follows:    1.  Speech difficulty.  This has progressed a little; namely, slowing is more evident.  She salivates excessively with talking.  Katherin denies choking.    2.  Leg spasms.  A bit worse, occurring around 8:00 to 10:00 p.m.  She may be quietly reading at the time, when a series of \"kicks\" occurs every few seconds, but not simultaneously in both legs.  She does not report associated pain.  She can only stop it with lengthy walking.    3.  Imbalance.  Katherin confirms that she can only ambulate with the use of a walker.  She did use a walking stick for a while and then tried 2 canes, with which she felt very insecure.   4.  Chronic cough.  This condition was mentioned briefly in review of systems at the last visit.  Today she places much more emphasis on the cough because it has persisted for the past 10 years or so and is really quite bothersome.  She has been treated with inhalers (Astelin 2 times per day and Nasalide 4 times per day) and Imdur.  She thinks her cough has improved some with this regimen.        Ms. Fletcher denies bowel or bladder dysfunction.      CURRENT MEDICATIONS:   1.  Alfalfa 250 mg daily.    2.  Aspirin 81 mg daily.    3.  Azelastine hydrochloride (Astelin) 0.1% 4 sprays daily.    4.  Benzonatate (Tessalon) 200 mg daily.    5.  Calcium carbonate/vitamin D 600 mg/200 units daily.   6.  " Carbidopa-levodopa (Sinemet)  two tablets 4 times daily.    7.  Carvedilol (Coreg) 6.25 mg 1 twice a day with meals.    8.  Clopidogrel bisulfate (Plavix) 75 mg daily.    9.  Flunisolide (Nasalide) 25 mcg per actuator daily.    10.  Gabapentin (Neurontin) 100 mg daily.    11.  Isosorbide mononitrate (Imdur) 30 mg daily.    12.  Levothyroxine (Synthroid) *** mcg daily.    13.  Multivites daily.    14.  Nitroglycerin (Nitrostat) 0.4 mg 1 sublingual p.r.n. for chest pain.    15.  Omeprazole (Prilosec) 20 mg b.i.d. 30-60 minutes before meals.    16.  Rosuvastatin calcium (Crestor) 20 mg daily.       PHYSICAL EXAMINATION:    VITAL SIGNS:  Blood pressure 153/80, pulse 86, weight 180 pounds.   GENERAL:  Katherin is a young-appearing, young-acting, pleasant lady who relates easily and well.  She has a mild ataxic dysarthria, but is fully comprehensible.    NEUROLOGIC:  Cranial nerves II through XII are unremarkable.  There is no abnormality of eye movements or facial or tongue atrophy.  Reflexes are 2+ in the upper extremities and at the knees and 1+ at the ankles.  Toe sign is positive on the left and neutral on the right.  Muscle testing shows mild 4/5 weakness of both deltoids and both psoas muscles.  There is a spastic catch found in both quadriceps.  There is no parkinsonian rigidity.  Sensory testing shows that light touch perception is normal.  Position sense is normal.  Vibratory sense is slightly decreased, probably normal for age.  Temperature sensation is quite reduced in the toes of both feet, but normalizes more proximally with 70% of normal noted at the midfoot but normal at the ankles.  This was not detected during my previous examination.  Coordination testing shows very little dysmetria, but there is slowness of alternating movements.  These movements are counted over 15 seconds and include the following:  Supination/pronation of the forearms 13 times (same as before), finger tapping 26 on the right  and 27 on the left versus 26 and 31, respectively, in 2017.  Toe tapping is 17 on the right and 15 on the left versus 15 bilaterally in April.  Eye blink is 18 times in 15 seconds, which is unchanged.  Gait and station testing reveals that the patient is unable to stand unassisted and requires a walker for ambulation.        IMPRESSION:    1.  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     2.  Mild dysarthria secondary to #1.    3.  Marked imbalance, requiring a walker.    4.  Mild distal sensory loss in the lower extremities.    5.  History of flexion spasms.    6.  Status post bilateral carpal tunnel release.       DISCUSSION:  In 2016, Dr. Varma, neurologist of [facility], reported a parkinsonian type gait.  She was already on carbidopa/levodopa, probably prescribed by Handy Pham at the [facility].  Today it seems as though the carbidopa/levodopa has improved her parkinsonian features to the extent that I could not detect any such abnormality.        PLAN:     1.  Return to our clinic in 2018.   2.  Zanaflex 2 mg, 1 at 6:00 p.m., increasing to 4 mg each evening if needed.       E and M code for this visit is 56076.        D: 2018   T: 2018   MT: ALBERTO      Name:     ANA RODRIGUEZ   MRN:      [id number]        Account:      [id number]   :      1940           Service Date: 2017      Document: [id number]       Joe Randolph MD

## 2017-11-03 NOTE — MR AVS SNAPSHOT
After Visit Summary   11/3/2017    Katherin Fletcher    MRN: [id number]           Patient Information     Date Of Birth          1940        Visit Information        Provider Department      11/3/2017 12:00 PM Agusto Manley GC [facility] Neurology        Today's Diagnoses     Ataxia    -  1       Follow-ups after your visit        Your next 10 appointments already scheduled     Nov 15, 2017  1:00 PM CST   (Arrive by 12:45 PM)   RETURN FOOT/ANKLE with Adrian Arroyo DPM   [facility] ([facility])    [address] [place] [address] [phone]              Who to contact     Please call your clinic at [phone] to:    Ask questions about your health    Make or cancel appointments    Discuss your medicines    Learn about your test results    Speak to your doctor   If you have compliments or concerns about an experience at your clinic, or if you wish to file a complaint, please contact [facility] Patient Relations at [phone] or email us at [email or web address]         Additional Information About Your Visit        MyChart Information     [employer] gives you secure access to your electronic health record. If you see a primary care provider, you can also send messages to your care team and make appointments. If you have questions, please call your primary care clinic.  If you do not have a primary care provider, please call [phone] and they will assist you.      [employer] is an electronic gateway that provides easy, online access to your medical records. With [employer], you can request a clinic appointment, read your test results, renew a prescription or communicate with your care team.     To access your existing account, please contact your [facility] or call [phone] for assistance.        Care EveryWhere ID     This is your Care EveryWhere  ID. This could be used by other organizations to access your [place] medical records  [id number]         Blood Pressure from Last 3 Encounters:   11/03/17 153/80   08/23/17 140/71   07/13/17 153/83    Weight from Last 3 Encounters:   11/03/17 81.6 kg (180 lb)   08/23/17 80.6 kg (177 lb 11.2 oz)   07/13/17 82.6 kg (182 lb 1.6 oz)              Today, you had the following     No orders found for display       Primary Care Provider Office Phone # Fax #    Aris Del Real [phone] [address]  [facility] [address]        Equal Access to Services     [facility] Center[facility] : Hadii rosio joel hadasho Somichelle, waaxda luqadaha, qaybta kaalmada adeegyada, carlo odonnell . So [facility] [phone].    ATENCIÓN: Si habla español, tiene a taylor disposición servicios gratuitos de asistencia lingüística. [facility] [phone].    We comply with applicable federal civil rights laws and [place] laws. We do not discriminate on the basis of race, color, national origin, age, disability, sex, sexual orientation, or gender identity.            Thank you!     Thank you for choosing [facility] NEUROLOGY  for your care. Our goal is always to provide you with excellent care. Hearing back from our patients is one way we can continue to improve our services. Please take a few minutes to complete the written survey that you may receive in the mail after your visit with us. Thank you!             Your Updated Medication List - Protect others around you: Learn how to safely use, store and throw away your medicines at www.disposemymeds.org.          This list is accurate as of: 11/3/17 12:03 PM.  Always use your most recent med list.                   Brand Name Dispense Instructions for use Diagnosis    Alfalfa 250 MG Tabs      Take by mouth daily        aspirin 81 MG tablet      Take 81 mg by mouth daily        azelastine 0.1 % spray    ASTELIN          benzonatate 200 MG capsule    TESSALON           calcium + D 600-200 MG-UNIT Tabs   Generic drug:  calcium carbonate-vitamin D      Take 1 tablet by mouth daily    Preventative health care       carbidopa-levodopa  MG per tablet    SINEMET    720 tablet    TAKE 2 TABLETS FOUR TIMES A DAY    Restless legs syndrome, Neurologic gait disorder, Dysarthria, Cerebellar disease       carvedilol 6.25 MG tablet    COREG    180 tablet    Take 1 tablet (6.25 mg) by mouth 2 times daily (with meals)    Atrial fibrillation (H), HTN (hypertension)       clopidogrel 75 MG tablet    PLAVIX    90 tablet    Take 1 tablet (75 mg) by mouth daily    CAD (coronary artery disease)       DAILY MULTI Tabs      Take by mouth daily        flunisolide 25 MCG/ACT (0.025%) Soln spray    NASALIDE          gabapentin 100 MG capsule    NEURONTIN     Take 100 mg by mouth daily        isosorbide mononitrate 30 MG 24 hr tablet    IMDUR    90 tablet    Take 1 tablet (30 mg) by mouth daily    Coronary artery disease involving native coronary artery of native heart without angina pectoris       levothyroxine 112 MCG tablet    SYNTHROID/LEVOTHROID    90 tablet    TAKE 1 TABLET DAILY    Encounter for medication refill       nitroGLYcerin 0.4 MG sublingual tablet    NITROSTAT    25 tablet    DISSOLVE ONE TABLET UNDER THE TONGUE EVERY 5 MINUTES AS NEEDED FOR CHEST PAIN.  DO NOT EXCEED A TOTAL OF 3 DOSES IN 15 MINUTES    CAD (coronary artery disease)       omeprazole 20 MG CR capsule    priLOSEC    180 capsule    TAKE 2 CAPSULES DAILY 30 TO 60 MINUTES BEFORE A MEAL    Encounter for medication refill       rosuvastatin 20 MG tablet    CRESTOR    90 tablet    Take 1 tablet (20 mg) by mouth daily    Hyperlipidemia

## 2017-11-03 NOTE — NURSING NOTE
Pt is here for a follow up exam with Dr. Ayo Randolph. Pt states she does not have headaches but she does have neck aches. She said she does not double vision, she went to the eye doctor on Nov 1st.She said she does not choke when she eats or drinks. She said her arm strength has decreased in the last year. She said she does not have problems with bowel and bladder habits. She said she sometimes has problems sleeping. She said she kicks when she sleeps. She said she falls, the last time she fell was in June of 2017. She was able to get herself up by herself.  She said she uses her walker all the time. She said she is having more leg spasms. She takes sinemet and neurontin at .

## 2017-11-03 NOTE — MR AVS SNAPSHOT
After Visit Summary   11/3/2017    Katherin Fletcher    MRN: [id number]           Patient Information     Date Of Birth          1940        Visit Information        Provider Department      11/3/2017 11:00 AM Joe Randolph MD [facility] Neurology        Today's Diagnoses     Ataxia    -  1       Follow-ups after your visit        Your next 10 appointments already scheduled     Mar 20, 2018  2:20 PM CDT   (Arrive by 2:05 PM)   POST-OP FOOT/ANKLE with Fernando Wiggins MD   [facility] ([facility] [place])    [address] [place] [id number] [phone]            Mar 29, 2018 12:30 PM CDT   PHYSICAL with Aris Del Real MD   [facility] ([facility])    [address] [place] [address] [phone]            May 08, 2018  3:00 PM CDT   (Arrive by 2:45 PM)   RETURN FOOT/ANKLE with Adrian Arroyo DPM   [facility] ([facility])    [address] [place] [address] [phone]              Who to contact     Please call your clinic at [phone] to:    Ask questions about your health    Make or cancel appointments    Discuss your medicines    Learn about your test results    Speak to your doctor            Additional Information About Your Visit        MyChart Information     [employer] gives you secure access to your electronic health record. If you see a primary care provider, you can also send messages to your care team and make appointments. If you have questions, please call your primary care clinic.  If you do not have a primary care provider, please call [phone] and they will assist you.      [employer] is an electronic gateway that provides easy, online access to your medical records. With [employer], you can request a clinic appointment, read your test results, renew a prescription or communicate with your  "care team.     To access your existing account, please contact your [facility] or call [phone] for assistance.        Care EveryWhere ID     This is your Care EveryWhere ID. This could be used by other organizations to access your [place] medical records  [id number]        Your Vitals Were     Pulse Height BMI (Body Mass Index)             86 1.626 m (5' 4\") 30.9 kg/m2          Blood Pressure from Last 3 Encounters:   12/19/17 102/72   12/11/17 135/83   11/03/17 153/80    Weight from Last 3 Encounters:   12/19/17 81.6 kg (180 lb)   12/11/17 81.6 kg (180 lb)   11/03/17 81.6 kg (180 lb)              Today, you had the following     No orders found for display         Today's Medication Changes          These changes are accurate as of 11/3/17 11:59 PM.  If you have any questions, ask your nurse or doctor.               Start taking these medicines.        Dose/Directions    tiZANidine 2 MG tablet   Commonly known as:  ZANAFLEX   Used for:  Ataxia   Started by:  Joe Randolph MD        Dose:  2 mg   Take 1 tablet (2 mg) by mouth daily   Quantity:  90 tablet   Refills:  prn            Where to get your medicines      These medications were sent to [facility] [address] [place] [address]     Phone:  [phone]     tiZANidine 2 MG tablet                Primary Care Provider Office Phone # Fax #    Aris Del Real [phone] [address]  [facility] [address]        Equal Access to Services     [facility] : Hadii rosio joel hadasho Soomaali, waaxda luqadaha, qaybta kaalmada carlo epps. So [facility] [phone].    ATENCIÓN: Si habla español, tiene a taylor disposición servicios gratuitos de asistencia lingüística. Llame al [phone].    We comply with applicable federal civil rights laws and [place] laws. We do not discriminate on the " basis of race, color, national origin, age, disability, sex, sexual orientation, or gender identity.            Thank you!     Thank you for choosing [facility] NEUROLOGY  for your care. Our goal is always to provide you with excellent care. Hearing back from our patients is one way we can continue to improve our services. Please take a few minutes to complete the written survey that you may receive in the mail after your visit with us. Thank you!             Your Updated Medication List - Protect others around you: Learn how to safely use, store and throw away your medicines at www.disposemymeds.org.          This list is accurate as of 11/3/17 11:59 PM.  Always use your most recent med list.                   Brand Name Dispense Instructions for use Diagnosis    Alfalfa 250 MG Tabs      Take 250 mg by mouth every morning        aspirin 81 MG tablet      Take 81 mg by mouth At Bedtime        azelastine 0.1 % spray    ASTELIN          benzonatate 200 MG capsule    TESSALON     Take 200 mg by mouth daily as needed        calcium + D 600-200 MG-UNIT Tabs   Generic drug:  calcium carbonate-vitamin D      Take 1 tablet by mouth At Bedtime    Preventative health care       carvedilol 6.25 MG tablet    COREG    180 tablet    Take 1 tablet (6.25 mg) by mouth 2 times daily (with meals)    Atrial fibrillation (H), HTN (hypertension)       DAILY MULTI Tabs      Take by mouth every morning        flunisolide 25 MCG/ACT (0.025%) Soln spray    NASALIDE          gabapentin 100 MG capsule    NEURONTIN     Take 100 mg by mouth At Bedtime        isosorbide mononitrate 30 MG 24 hr tablet    IMDUR    90 tablet    Take 1 tablet (30 mg) by mouth daily    Coronary artery disease involving native coronary artery of native heart without angina pectoris       nitroGLYcerin 0.4 MG sublingual tablet    NITROSTAT    25 tablet    DISSOLVE ONE TABLET UNDER THE TONGUE EVERY 5 MINUTES AS NEEDED FOR CHEST PAIN.  DO NOT EXCEED A TOTAL OF 3 DOSES IN 15  MINUTES    CAD (coronary artery disease)       tiZANidine 2 MG tablet    ZANAFLEX    90 tablet    Take 1 tablet (2 mg) by mouth daily    Ataxia

## 2017-11-03 NOTE — LETTER
11/3/2017       RE: Katherin Fletcher  [address]  [facility] [address]     Dear Colleague,    Thank you for referring your patient, Katherin Fletcher, to the [facility] NEUROLOGY at [facility]. Please see a copy of my visit note below.    GENETIC COUNSELING-Ataxia clinic  Katherin Fletcher returned to clinic to see Dr. Randolph. I met with her for 15 minutes to review family history and discuss genetic testing results.  I was not able to meet with her at her initial visit with Dr. Randolph, but have been involved in coordinating genetic testing at Dr. Randolph's request.    MEDICAL HISTORY-  Please see Dr. Randolph's clinic notes. Briefly, Katherin reports a history of neurologic symptoms dating back ~3 years.  She has findings of ataxia on exam.    FAMILY HISTORY-see scanned pedigree  1. Katherin is adopted and has no information about neurologic disease in her biological family.    2. Katherin has 3 children, the oldest of which is adopted away    GENETIC COUNSELING-  I reviewed results of genetic testing to date:  1. Testing for common dominant ataxias (SCA1,2,3,6,7) was all normal- which rules out the common dominant forms of ataxia.  2. Testing for several dozen rare forms of ataxia has been completed but is not yet signed out. This testing was done in our lab here at the [facility] and should be signed out as normal in the next few days.  I explained that this rules out several dozen rarer forms of both dominant and recessive ataxia.  3. Dr. Randolph had also recommended genetic testing for SCA8 and SCA17.  These tests were sent to [facility] and results are still pending.    I indicated that I will call her with results of the final two tests when they are complete.  I explained that at this point, it seems unlikely that we will establish a genetic diagnosis. I explained that there are two possible explanations for these results: (1) Warrens disease may not be genetic --or-- (2)  Katherin could have a genetic diagnosis, but we are not able to identify the specific gene with today's testing.  All questions were answered.      Again, thank you for allowing me to participate in the care of your patient.      Sincerely,    Agusto Manley GC

## 2017-11-03 NOTE — LETTER
"11/3/2017    RE: Katherin Fletcher  [address]  [facility] [address]       Service Date: 11/03/2017      This 77-year-old woman returns for a followup neurological visit, having been seen initially on 04/14/2017.  The report pertaining to that visit has been reviewed for comparison purposes.  The categories itemized in that report are once again gone over with the patient.  They are as follows:    1.  Speech difficulty.  This has progressed a little; namely, slowing is more evident.  She salivates excessively with talking.  Katherin denies choking.    2.  Leg spasms.  A bit worse, occurring around 8:00 to 10:00 p.m.  She may be quietly reading at the time, when a series of \"kicks\" occurs every few seconds, but not simultaneously in both legs.  She does not report associated pain.  She can only stop it with lengthy walking.    3.  Imbalance.  Katherin confirms that she can only ambulate with the use of a walker.  She did use a walking stick for a while and then tried 2 canes, with which she felt very insecure.   4.  Chronic cough.  This condition was mentioned briefly in review of systems at the last visit.  Today she places much more emphasis on the cough because it has persisted for the past 10 years or so and is really quite bothersome.  She has been treated with inhalers (Astelin 2 times per day and Nasalide 4 times per day) and Imdur.  She thinks her cough has improved some with this regimen.        Ms. Fletcher denies bowel or bladder dysfunction.      CURRENT MEDICATIONS:   1.  Alfalfa 250 mg daily.    2.  Aspirin 81 mg daily.    3.  Azelastine hydrochloride (Astelin) 0.1% 4 sprays daily.    4.  Benzonatate (Tessalon) 200 mg daily.    5.  Calcium carbonate/vitamin D 600 mg/200 units daily.   6.  Carbidopa-levodopa (Sinemet)  two tablets 4 times daily.    7.  Carvedilol (Coreg) 6.25 mg 1 twice a day with meals.    8.  Clopidogrel bisulfate (Plavix) 75 mg daily.    9.  Flunisolide (Nasalide) 25 " mcg per actuator daily.    10.  Gabapentin (Neurontin) 100 mg daily.    11.  Isosorbide mononitrate (Imdur) 30 mg daily.    12.  Levothyroxine (Synthroid) 112 mcg daily.    13.  Multivites daily.    14.  Nitroglycerin (Nitrostat) 0.4 mg 1 sublingual p.r.n. for chest pain.    15.  Omeprazole (Prilosec) 20 mg b.i.d. 30-60 minutes before meals.    16.  Rosuvastatin calcium (Crestor) 20 mg daily.       PHYSICAL EXAMINATION:    VITAL SIGNS:  Blood pressure 153/80, pulse 86, weight 180 pounds.   GENERAL:  Katherin is a young-appearing, young-acting, pleasant lady who relates easily and well.  She has a mild ataxic dysarthria, but is fully comprehensible.    NEUROLOGIC:  Cranial nerves II through XII are unremarkable.  There is no abnormality of eye movements or facial or tongue atrophy.  Reflexes are 2+ in the upper extremities and at the knees and 1+ at the ankles.  Toe sign is positive on the left and neutral on the right.  Muscle testing shows mild 4/5 weakness of both deltoids and both psoas muscles.  There is a spastic catch found in both quadriceps.  There is no parkinsonian rigidity.  Sensory testing shows that light touch perception is normal.  Position sense is normal.  Vibratory sense is slightly decreased, probably normal for age.  Temperature sensation is quite reduced in the toes of both feet, but normalizes more proximally with 70% of normal noted at the midfoot but normal at the ankles.  This was not detected during my previous examination.  Coordination testing shows very little dysmetria, but there is slowness of alternating movements.  These movements are counted over 15 seconds and include the following:  Supination/pronation of the forearms 13 times (same as before), finger tapping 26 on the right and 27 on the left versus 26 and 31, respectively, in 04/2017.  Toe tapping is 17 on the right and 15 on the left versus 15 bilaterally in April.  Eye blink is 18 times in 15 seconds, which is unchanged.  Gait  and station testing reveals that the patient is unable to stand unassisted and requires a walker for ambulation.        IMPRESSION:    1.  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     2.  Mild dysarthria secondary to #1.    3.  Marked imbalance, requiring a walker.    4.  Mild distal sensory loss in the lower extremities.    5.  History of flexion spasms.    6.  Status post bilateral carpal tunnel release.       DISCUSSION:  In 2016, Dr. Varma, neurologist of [facility], reported a parkinsonian type gait.  She was already on carbidopa/levodopa, probably prescribed by Handy Pham at the [facility].  Today it seems as though the carbidopa/levodopa has improved her parkinsonian features to the extent that I could not detect any such abnormality.        PLAN:     1.  Return to our clinic in 2018.   2.  Zanaflex 2 mg, 1 at 6:00 p.m., increasing to 4 mg each evening if needed.       E and M code for this visit is 64748.         SMITHA XIONG MD        D: 2018   T: 2018   MT: ALBERTO    Name:     ANA RODRIGUEZ   MRN:      [id number]        Account:      [id number]   :      1940           Service Date: 2017    Document: [id number]

## 2017-11-05 LAB — COPATH REPORT: NORMAL

## 2017-11-10 ENCOUNTER — HOSPITAL ENCOUNTER (OUTPATIENT)
Dept: GENERAL RADIOLOGY | Facility: CLINIC | Age: 77
Discharge: HOME | End: 2017-11-10
Attending: INTERNAL MEDICINE | Admitting: INTERNAL MEDICINE
Payer: MEDICARE

## 2017-11-10 DIAGNOSIS — J44.9 CHRONIC OBSTRUCTIVE PULMONARY DISEASE, UNSPECIFIED COPD TYPE (H): ICD-10-CM

## 2017-11-10 DIAGNOSIS — K21.9 GASTROESOPHAGEAL REFLUX DISEASE WITHOUT ESOPHAGITIS: ICD-10-CM

## 2017-11-10 DIAGNOSIS — R94.2 ABNORMAL RESULTS OF PULMONARY FUNCTION STUDIES: ICD-10-CM

## 2017-11-10 DIAGNOSIS — R05.9 COUGH: ICD-10-CM

## 2017-11-10 PROCEDURE — 71020 XR CHEST 2 VW: CPT

## 2017-11-15 ENCOUNTER — OFFICE VISIT (OUTPATIENT)
Dept: ORTHOPEDICS | Facility: CLINIC | Age: 77
End: 2017-11-15

## 2017-11-15 DIAGNOSIS — M20.42 HAMMERTOES OF BOTH FEET: Primary | ICD-10-CM

## 2017-11-15 DIAGNOSIS — M20.41 HAMMERTOES OF BOTH FEET: Primary | ICD-10-CM

## 2017-11-15 NOTE — TELEPHONE ENCOUNTER
APPT INFO    Date /Time: 11/28/17 5pm   Reason for Appt: Juana Nolan Foot   Ref Provider/Clinic: Dr. Arroyo   Are there internal records? If yes, list: YES -     [facility] -- imaging in pacs   Patient Contact (Y/N) & Call Details: No - internal referral   Action: --

## 2017-11-15 NOTE — MR AVS SNAPSHOT
After Visit Summary   11/15/2017    Katherin Fletcher    MRN: [id number]           Patient Information     Date Of Birth          1940        Visit Information        Provider Department      11/15/2017 1:00 PM Adrian Arroyo DPM [facility]        Today's Diagnoses     Hammertoes of both feet    -  1       Follow-ups after your visit        Additional Services     ORTHOPEDICS ADULT REFERRAL       Your provider has referred you to: [facility]: Orthopaedic Clinic [facility] [phone]   [email or web address]      Dr. Wiggins    Please be aware that coverage of these services is subject to the terms and limitations of your health insurance plan.  Call member services at your health plan with any benefit or coverage questions.      Please bring the following to your appointment:    >>   Any x-rays, CTs or MRIs which have been performed.  Contact the facility where they were done to arrange for  prior to your scheduled appointment.    >>   List of current medications   >>   This referral request   >>   Any documents/labs given to you for this referral                  Your next 10 appointments already scheduled     Nov 28, 2017  5:00 PM CST   (Arrive by 4:45 PM)   NEW FOOT/ANKLE with Fernando Wiggins MD   [facility] ([facility])    [address] [place] [address] [phone]              Who to contact     Please call your clinic at [phone] to:    Ask questions about your health    Make or cancel appointments    Discuss your medicines    Learn about your test results    Speak to your doctor   If you have compliments or concerns about an experience at your clinic, or if you wish to file a complaint, please contact [facility] Patient Relations at [phone] or email us at [email or web address]         Additional  Information About Your Visit        About[employer]Workhart Information     [employer] gives you secure access to your electronic health record. If you see a primary care provider, you can also send messages to your care team and make appointments. If you have questions, please call your primary care clinic.  If you do not have a primary care provider, please call [phone] and they will assist you.      [employer] is an electronic gateway that provides easy, online access to your medical records. With [employer], you can request a clinic appointment, read your test results, renew a prescription or communicate with your care team.     To access your existing account, please contact your [facility] or call [phone] for assistance.        Care EveryWhere ID     This is your Care EveryWhere ID. This could be used by other organizations to access your [facility] medical records  [id number]         Blood Pressure from Last 3 Encounters:   11/03/17 153/80   08/23/17 140/71   07/13/17 153/83    Weight from Last 3 Encounters:   11/03/17 81.6 kg (180 lb)   08/23/17 80.6 kg (177 lb 11.2 oz)   07/13/17 82.6 kg (182 lb 1.6 oz)              We Performed the Following     ORTHOPEDICS ADULT REFERRAL        Primary Care Provider Office Phone # Fax #    Aris Del Real [phone] [address]  [facility] [address]        Equal Access to Services     [facility]: Hadii aad ku hadasho Soomaali, waaxda luqadaha, qaybta kaalmada adeegyada, carlo odonnell . So [facility] [phone].    ATENCIÓN: Si habla español, tiene a taylor disposición servicios gratuitos de asistencia lingüística. Llame al [phone].    We comply with applicable federal civil rights laws and [place] laws. We do not discriminate on the basis of race, color, national origin, age, disability, sex, sexual orientation, or gender identity.            Thank you!     Thank you for choosing [facility]  for your care. Our goal is always to provide you with excellent care. Hearing back from our patients is one way we can continue to improve our services. Please take a few minutes to complete the written survey that you may receive in the mail after your visit with us. Thank you!             Your Updated Medication List - Protect others around you: Learn how to safely use, store and throw away your medicines at www.disposemymeds.org.          This list is accurate as of: 11/15/17  1:35 PM.  Always use your most recent med list.                   Brand Name Dispense Instructions for use Diagnosis    Alfalfa 250 MG Tabs      Take by mouth daily        aspirin 81 MG tablet      Take 81 mg by mouth daily        azelastine 0.1 % spray    ASTELIN          benzonatate 200 MG capsule    TESSALON          calcium + D 600-200 MG-UNIT Tabs   Generic drug:  calcium carbonate-vitamin D      Take 1 tablet by mouth daily    Preventative health care       carbidopa-levodopa  MG per tablet    SINEMET    720 tablet    TAKE 2 TABLETS FOUR TIMES A DAY    Restless legs syndrome, Neurologic gait disorder, Dysarthria, Cerebellar disease       carvedilol 6.25 MG tablet    COREG    180 tablet    Take 1 tablet (6.25 mg) by mouth 2 times daily (with meals)    Atrial fibrillation (H), HTN (hypertension)       clopidogrel 75 MG tablet    PLAVIX    90 tablet    Take 1 tablet (75 mg) by mouth daily    CAD (coronary artery disease)       DAILY MULTI Tabs      Take by mouth daily        flunisolide 25 MCG/ACT (0.025%) Soln spray    NASALIDE          gabapentin 100 MG capsule    NEURONTIN     Take 100 mg by mouth daily        isosorbide mononitrate 30 MG 24 hr tablet    IMDUR    90 tablet    Take 1 tablet (30 mg) by mouth daily    Coronary artery disease involving native coronary artery of native heart without angina pectoris       levothyroxine 112 MCG tablet    SYNTHROID/LEVOTHROID    90 tablet    TAKE 1 TABLET DAILY     Encounter for medication refill       nitroGLYcerin 0.4 MG sublingual tablet    NITROSTAT    25 tablet    DISSOLVE ONE TABLET UNDER THE TONGUE EVERY 5 MINUTES AS NEEDED FOR CHEST PAIN.  DO NOT EXCEED A TOTAL OF 3 DOSES IN 15 MINUTES    CAD (coronary artery disease)       omeprazole 20 MG CR capsule    priLOSEC    180 capsule    TAKE 2 CAPSULES DAILY 30 TO 60 MINUTES BEFORE A MEAL    Encounter for medication refill       rosuvastatin 20 MG tablet    CRESTOR    90 tablet    Take 1 tablet (20 mg) by mouth daily    Hyperlipidemia       tiZANidine 2 MG tablet    ZANAFLEX    90 tablet    Take 1 tablet (2 mg) by mouth daily    Ataxia

## 2017-11-15 NOTE — LETTER
11/15/2017       RE: Katherin Fletcher  [address]  [facility] [address]     Dear Colleague,    Thank you for referring your patient, Katherin Fletcher, to the [facility] at [facility]. Please see a copy of my visit note below.    Chief Complaint:   Chief Complaint   Patient presents with     RECHECK     Follow up. Callus, right foot. Over lapping toes. Pt stated that it is very sore. Pt stated that she couldnt wear the toe spacer because it hurt.           Allergies   Allergen Reactions     Ace Inhibitors Cough     Baclofen      Penicillins      10/7/14 Hives per patient -- many many years ago       Nickel Swelling and Rash     Other reaction(s): Unknown         Subjective: Katherin is a 77 year old female who presents to the clinic today for a follow up of right foot hammertoes. She relates that she tried multiple different combinations of the lambswool, Budin splint, toe spacers, and silicone sleeves. She was in end of these work to help decrease the pain as she is having in the right third toe. She relates she would like to consider having the toe surgically corrected.    Objective    Physical exam is unchanged since last visit. Chest hammertoes of the third, fourth, and fifth digits on the right foot. The second digit is somewhat straight but is deviated laterally. This has been previous to corrected. Pain is noted to the distal aspect of the right third digit where tyloma is noted today. No interdigital lesions today.    Assessment: Right third hammertoe causing pain.    Plan:   - Pt seen and evaluated  - She has tried conservative treatment for the toes. She wants to stay active, and would like to talk to Dr. Wiggins about possibly having the digit surgically corrected. I have put in the referral for her.   - Pt to return to clinic in 3 months for follow up.       Again, thank you for allowing me to participate in the care of your patient.       Sincerely,    Adrian Arroyo DPM

## 2017-11-15 NOTE — PROGRESS NOTES
Chief Complaint:   Chief Complaint   Patient presents with     RECHECK     Follow up. Callus, right foot. Over lapping toes. Pt stated that it is very sore. Pt stated that she couldnt wear the toe spacer because it hurt.           Allergies   Allergen Reactions     Ace Inhibitors Cough     Baclofen      Penicillins      10/7/14 Hives per patient -- many many years ago       Nickel Swelling and Rash     Other reaction(s): Unknown         Subjective: Katherin is a 77 year old female who presents to the clinic today for a follow up of right foot hammertoes. She relates that she tried multiple different combinations of the lambswool, Budin splint, toe spacers, and silicone sleeves. She was in end of these work to help decrease the pain as she is having in the right third toe. She relates she would like to consider having the toe surgically corrected.    Objective    Physical exam is unchanged since last visit. Chest hammertoes of the third, fourth, and fifth digits on the right foot. The second digit is somewhat straight but is deviated laterally. This has been previous to corrected. Pain is noted to the distal aspect of the right third digit where tyloma is noted today. No interdigital lesions today.    Assessment: Right third hammertoe causing pain.    Plan:   - Pt seen and evaluated  - She has tried conservative treatment for the toes. She wants to stay active, and would like to talk to Dr. Wiggins about possibly having the digit surgically corrected. I have put in the referral for her.   - Pt to return to clinic in 3 months for follow up.

## 2017-11-15 NOTE — NURSING NOTE
Reason For Visit:   Chief Complaint   Patient presents with     RECHECK     Follow up. Callus, right foot. Over lapping toes. Pt stated that it is very sore. Pt stated that she couldnt wear the toe spacer because it hurt.        Pain Assessment  Patient Currently in Pain: Denies             Current Outpatient Prescriptions   Medication Sig Dispense Refill     tiZANidine (ZANAFLEX) 2 MG tablet Take 1 tablet (2 mg) by mouth daily 90 tablet prn     omeprazole (PRILOSEC) 20 MG CR capsule TAKE 2 CAPSULES DAILY 30 TO 60 MINUTES BEFORE A MEAL 180 capsule 0     flunisolide (NASALIDE) 25 MCG/ACT (0.025%) SOLN spray        benzonatate (TESSALON) 200 MG capsule        azelastine (ASTELIN) 0.1 % spray        carbidopa-levodopa (SINEMET)  MG per tablet TAKE 2 TABLETS FOUR TIMES A  tablet 3     carvedilol (COREG) 6.25 MG tablet Take 1 tablet (6.25 mg) by mouth 2 times daily (with meals) 180 tablet 3     levothyroxine (SYNTHROID/LEVOTHROID) 112 MCG tablet TAKE 1 TABLET DAILY 90 tablet 1     isosorbide mononitrate (IMDUR) 30 MG 24 hr tablet Take 1 tablet (30 mg) by mouth daily 90 tablet 3     clopidogrel (PLAVIX) 75 MG tablet Take 1 tablet (75 mg) by mouth daily 90 tablet 3     rosuvastatin (CRESTOR) 20 MG tablet Take 1 tablet (20 mg) by mouth daily 90 tablet 3     aspirin 81 MG tablet Take 81 mg by mouth daily       gabapentin (NEURONTIN) 100 MG capsule Take 100 mg by mouth daily       Multiple Vitamins-Minerals (DAILY MULTI) TABS Take by mouth daily       Alfalfa 250 MG TABS Take by mouth daily       nitroglycerin (NITROSTAT) 0.4 MG SL tablet DISSOLVE ONE TABLET UNDER THE TONGUE EVERY 5 MINUTES AS NEEDED FOR CHEST PAIN.  DO NOT EXCEED A TOTAL OF 3 DOSES IN 15 MINUTES 25 tablet 3     calcium carbonate-vitamin D (CALCIUM + D) 600-200 MG-UNIT TABS Take 1 tablet by mouth daily             Allergies   Allergen Reactions     Ace Inhibitors Cough     Baclofen      Penicillins      10/7/14 Hives per patient -- many many  years ago       Nickel Swelling and Rash     Other reaction(s): Unknown

## 2017-11-16 ASSESSMENT — ENCOUNTER SYMPTOMS
EXERCISE INTOLERANCE: 0
SYNCOPE: 0
BACK PAIN: 0
SINUS CONGESTION: 1
MYALGIAS: 0
MEMORY LOSS: 0
ORTHOPNEA: 0
SINUS PAIN: 0
SPEECH CHANGE: 1
HYPOTENSION: 0
NECK MASS: 0
DIZZINESS: 1
NUMBNESS: 0
INSOMNIA: 0
PANIC: 0
NERVOUS/ANXIOUS: 1
ARTHRALGIAS: 0
TINGLING: 0
SLEEP DISTURBANCES DUE TO BREATHING: 0
WEAKNESS: 1
PARALYSIS: 0
MUSCLE WEAKNESS: 0
DECREASED CONCENTRATION: 0
MUSCLE CRAMPS: 1
LEG PAIN: 0
TASTE DISTURBANCE: 1
JOINT SWELLING: 1
SORE THROAT: 0
HOARSE VOICE: 1
PALPITATIONS: 0
LOSS OF CONSCIOUSNESS: 0
DISTURBANCES IN COORDINATION: 1
LIGHT-HEADEDNESS: 0
NECK PAIN: 1
STIFFNESS: 1
SEIZURES: 0
HYPERTENSION: 1
TROUBLE SWALLOWING: 0
DEPRESSION: 0
TREMORS: 0
HEADACHES: 0
SMELL DISTURBANCE: 0

## 2017-11-20 DIAGNOSIS — M20.41 HAMMERTOE OF RIGHT FOOT: Primary | ICD-10-CM

## 2017-11-28 ENCOUNTER — PRE VISIT (OUTPATIENT)
Dept: ORTHOPEDICS | Facility: CLINIC | Age: 77
End: 2017-11-28

## 2017-11-28 ENCOUNTER — OFFICE VISIT (OUTPATIENT)
Dept: ORTHOPEDICS | Facility: CLINIC | Age: 77
End: 2017-11-28

## 2017-11-28 DIAGNOSIS — M25.571 PAIN IN JOINT, ANKLE AND FOOT, RIGHT: Primary | ICD-10-CM

## 2017-11-28 NOTE — MR AVS SNAPSHOT
After Visit Summary   11/28/2017    Katherin Fletcher    MRN: [id number]           Patient Information     Date Of Birth          1940        Visit Information        Provider Department      11/28/2017 5:00 PM Fernando Wiggins MD [facility]        Today's Diagnoses     Pain in joint, ankle and foot, right    -  1       Follow-ups after your visit        Your next 10 appointments already scheduled     Dec 11, 2017 11:00 AM CST   (Arrive by 10:45 AM)   PAC EVALUATION with  Pac Ella 1   [facility] ([facility])    [address] [place] [address]  4th [facility] [address]   [phone]            Dec 11, 2017 12:00 PM CST   (Arrive by 11:45 AM)   PAC RN ASSESSMENT with Landon Pac Rn   [facility] [place] ([facility])    [address] [place] [address]  4th [facility] [address]   [phone]            Dec 11, 2017 12:30 PM CST   (Arrive by 12:15 PM)   PAC Anesthesia Consult with  Pac Anesthesiologist   [facility] ([facility])    [address] [place] [address]  4th [facility] [address]   [phone]            Dec 19, 2017   Procedure with Fernando Wiggins MD   [facility] ([facility])    [address] [place] [address]  5th Floor  [facility] [address]   [phone]           Located in the Clinics and Surgery Center at [address] [place] [address].   parking is very convenient and highly recommended.  is a $6 flat rate fee.  Both  and self parkers should enter the main arrival plaza from [facility]; parking attendants will direct you based on your parking preference.            Jan 03, 2018 11:00 AM CST   (Arrive by 10:45 AM)   Post-Op with Landon U Ortho Nurse   [facility] ([facility])    [address] [place] [address]  [address]  4th [facility] [id number] [phone]            Jan 30, 2018  4:50 PM CST   (Arrive by 4:35 PM)   POST-OP FOOT/ANKLE with Fernando Wiggins MD   [facility] ([facility])    [address] [facility]  4th [facility] [id number] [phone]              Who to contact     Please call your clinic at [phone] to:    Ask questions about your health    Make or cancel appointments    Discuss your medicines    Learn about your test results    Speak to your doctor   If you have compliments or concerns about an experience at your clinic, or if you wish to file a complaint, please contact [facility] Patient Relations at [phone] or email us at [email or web address]         Additional Information About Your Visit        [employer] Information     [employer] gives you secure access to your electronic health record. If you see a primary care provider, you can also send messages to your care team and make appointments. If you have questions, please call your primary care clinic.  If you do not have a primary care provider, please call [phone] and they will assist you.      [employer] is an electronic gateway that provides easy, online access to your medical records. With [employer], you can request a clinic appointment, read your test results, renew a prescription or communicate with your care team.     To access your existing account, please contact your [facility] or call [phone] for assistance.        Care EveryWhere ID     This is your Care EveryWhere ID. This could be used by other organizations to access your [facility] medical records  [id number]         Blood Pressure from Last 3 Encounters:   11/03/17 153/80   08/23/17 140/71   07/13/17 153/83    Weight from Last 3 Encounters:   11/03/17 81.6 kg (180 lb)   08/23/17 80.6 kg (177 lb 11.2 oz)   07/13/17 82.6 kg (182 lb 1.6  oz)              We Performed the Following     Jackeline-Operative Worksheet (Wiggins)        Primary Care Provider Office Phone # Fax #    Aris Del Real [phone] [address]  [facility] [address]        Equal Access to Services     [facility] : Hadii aad ku hadasho Soomaali, waaxda luqadaha, qaybta kaalmada adeegyada, waxay jerald carlosn mikykermit watters lairishstan . So [facility] [phone].    ATENCIÓN: Si habla español, tiene a taylor disposición servicios gratuitos de asistencia lingüística. Joeadán al [phone].    We comply with applicable federal civil rights laws and [place] laws. We do not discriminate on the basis of race, color, national origin, age, disability, sex, sexual orientation, or gender identity.            Thank you!     Thank you for choosing [facility]  for your care. Our goal is always to provide you with excellent care. Hearing back from our patients is one way we can continue to improve our services. Please take a few minutes to complete the written survey that you may receive in the mail after your visit with us. Thank you!             Your Updated Medication List - Protect others around you: Learn how to safely use, store and throw away your medicines at www.disposemymeds.org.          This list is accurate as of: 11/28/17 11:59 PM.  Always use your most recent med list.                   Brand Name Dispense Instructions for use Diagnosis    Alfalfa 250 MG Tabs      Take by mouth daily        aspirin 81 MG tablet      Take 81 mg by mouth daily        azelastine 0.1 % spray    ASTELIN          benzonatate 200 MG capsule    TESSALON          calcium + D 600-200 MG-UNIT Tabs   Generic drug:  calcium carbonate-vitamin D      Take 1 tablet by mouth daily    Preventative health care       carbidopa-levodopa  MG per tablet    SINEMET    720 tablet    TAKE 2 TABLETS FOUR TIMES A DAY    Restless legs syndrome, Neurologic gait disorder, Dysarthria,  Cerebellar disease       carvedilol 6.25 MG tablet    COREG    180 tablet    Take 1 tablet (6.25 mg) by mouth 2 times daily (with meals)    Atrial fibrillation (H), HTN (hypertension)       clopidogrel 75 MG tablet    PLAVIX    90 tablet    Take 1 tablet (75 mg) by mouth daily    CAD (coronary artery disease)       DAILY MULTI Tabs      Take by mouth daily        flunisolide 25 MCG/ACT (0.025%) Soln spray    NASALIDE          gabapentin 100 MG capsule    NEURONTIN     Take 100 mg by mouth daily        isosorbide mononitrate 30 MG 24 hr tablet    IMDUR    90 tablet    Take 1 tablet (30 mg) by mouth daily    Coronary artery disease involving native coronary artery of native heart without angina pectoris       levothyroxine 112 MCG tablet    SYNTHROID/LEVOTHROID    90 tablet    TAKE 1 TABLET DAILY    Encounter for medication refill       nitroGLYcerin 0.4 MG sublingual tablet    NITROSTAT    25 tablet    DISSOLVE ONE TABLET UNDER THE TONGUE EVERY 5 MINUTES AS NEEDED FOR CHEST PAIN.  DO NOT EXCEED A TOTAL OF 3 DOSES IN 15 MINUTES    CAD (coronary artery disease)       omeprazole 20 MG CR capsule    priLOSEC    180 capsule    TAKE 2 CAPSULES DAILY 30 TO 60 MINUTES BEFORE A MEAL    Encounter for medication refill       rosuvastatin 20 MG tablet    CRESTOR    90 tablet    Take 1 tablet (20 mg) by mouth daily    Hyperlipidemia       tiZANidine 2 MG tablet    ZANAFLEX    90 tablet    Take 1 tablet (2 mg) by mouth daily    Ataxia

## 2017-11-28 NOTE — NURSING NOTE
Reason For Visit:   Chief Complaint   Patient presents with     Musculoskeletal Problem     Right foot hammertoes           Pain Assessment  Patient Currently in Pain: Yes  0-10 Pain Scale: 4  Primary Pain Location: Foot  Pain Orientation: Right  Alleviating Factors: Rest  Aggravating Factors: Walking

## 2017-11-28 NOTE — PROGRESS NOTES
CHIEF COMPLAINT:  Right foot second and third toe deformities.      HISTORY OF PRESENT ILLNESS:  Mrs. Fletcher is a 77-year-old who presents today for evaluation of her right foot at the request of Dr. Arroyo.      The patient reports to have had deformity of the second toe after undergoing some procedure that did not go as expected.  She reports to have some lateral deviation of the tip of the second toe which apparently is butting heads against the third toe which also presents as a slight deformity since she had some trauma to it.      The patient has tried a variety of conservative treatment options without any significant success.  Reports to have daily pain and daily discomfort and presents today for discussion of treatment options at the request of Dr. Arroyo.      Denies to have any problems on the opposite foot.      PAST MEDICAL HISTORY:  Quite extensive, which was reviewed on today's visit.      PAST SURGICAL HISTORY:  Also quite extensive, which was reviewed on today's visit.      DRUG ALLERGIES:  Reviewed.      CURRENT MEDICATIONS:  Reviewed.      PHYSICAL EXAMINATION:  On today's visit, she presents as an extremely pleasant female in no apparent distress with a height of 5 feet 4 inches and a weight of 180 pounds.  Denies to have any constitutional symptoms.      On today's visit, she presented with full range of motion of the right ankle, hindfoot and midfoot joints.  CMS is intact.  Skin intact.  Presented with a fixed laterally deviated DIP joint of the second toe as well as a rigid hammertoe deformity for the third toe.  The remainder of the toes seem to be well-aligned.      IMAGING:  Plain x-rays were reviewed today which were significant for showing a valgus arthrodesis of the DIP joint for the second toe.  She also presents with typical deformity for a hammertoe on the third toe by radiographic findings.      ASSESSMENT:  Right second and third toe deformities.      PLAN:  I discussed with the  patient that given the fact that she presented with a rigid deformities, my recommendation is to proceed with a corrective surgery with pinning of the second and third toes.  I discussed with her the most likely postoperative course and complications which include but are not limited to infection, bleeding, nerve damage, residual pain, nonunion, stiffness and thickening of the toes.      All questions were answered.  The patient was pleased with the discussion.  Patient does have a strong enough support system at home to proceed with the surgery without any special preparations.      TT 30 minutes, CT 20 minutes.

## 2017-11-29 NOTE — NURSING NOTE
Teaching Flowsheet   Relevant Diagnosis: Right 2nd and 3rd hammer toes   Teaching Topic: Pre-op Right 2nd and 3rd toe pinning      Person(s) involved in teaching:   Patient     Motivation Level:  Asks Questions: Yes  Eager to Learn: Yes  Cooperative: Yes  Receptive (willing/able to accept information): Yes  Any cultural factors/Religion beliefs that may influence understanding or compliance? No       Patient demonstrates understanding of the following:  Reason for the appointment, diagnosis and treatment plan: Yes  Knowledge of proper use of medications and conditions for which they are ordered (with special attention to potential side effects or drug interactions): Yes  Which situations necessitate calling provider and whom to contact: Yes       Teaching Concerns Addressed:   Comments: Hx of MRSA. CAD, ischemic cardiomyopathy, stents, PAD. Patient takes Plavix and ASA she will discuss medication plan with cardiology.      Proper use and care of post op cam boot (medical equip, care aids, etc.): Yes  Nutritional needs and diet plan: Yes  Pain management techniques: Yes  Wound Care: Yes  How and/when to access community resources: NA     Instructional Materials Used/Given: pre-op packet, antiseptic soap, post-operative foot and ankle surgery instructions.      Time spent with patient: 12 minutes.

## 2017-12-05 ENCOUNTER — TELEPHONE (OUTPATIENT)
Dept: ORTHOPEDICS | Facility: CLINIC | Age: 77
End: 2017-12-05

## 2017-12-05 NOTE — TELEPHONE ENCOUNTER
Called patient to confirm her surgery with Dr. Wiggins on 12/19/17. Patient was instructed to arrive at 11:30am that morning, and will see PAC for her pre-op on 12/11/18.

## 2017-12-06 ENCOUNTER — ANESTHESIA EVENT (OUTPATIENT)
Dept: SURGERY | Facility: AMBULATORY SURGERY CENTER | Age: 77
End: 2017-12-06

## 2017-12-07 ENCOUNTER — TELEPHONE (OUTPATIENT)
Dept: CARDIOLOGY | Facility: CLINIC | Age: 77
End: 2017-12-07

## 2017-12-07 NOTE — TELEPHONE ENCOUNTER
5 days prior to surgery,  Stay on low dose ASA    JE      Patient notified of recommendations Via My Chart.

## 2017-12-07 NOTE — TELEPHONE ENCOUNTER
I am having surgery on my right foot on December 19th. It is a procedure to correct 2 of my toes at the [facility] outpatient ortho by Dr Wiggins.  My question is: how long before this surgery should I stop taking Plavix?  Dr. Wiggins was asking.     Katherin Fletcher      Plavix 75 mg a day directed at her coronary and peripheral vascular artery disease.

## 2017-12-11 ENCOUNTER — OFFICE VISIT (OUTPATIENT)
Dept: SURGERY | Facility: CLINIC | Age: 77
End: 2017-12-11

## 2017-12-11 ENCOUNTER — ALLIED HEALTH/NURSE VISIT (OUTPATIENT)
Dept: SURGERY | Facility: CLINIC | Age: 77
End: 2017-12-11

## 2017-12-11 VITALS
SYSTOLIC BLOOD PRESSURE: 135 MMHG | TEMPERATURE: 97.7 F | HEIGHT: 64 IN | DIASTOLIC BLOOD PRESSURE: 83 MMHG | RESPIRATION RATE: 16 BRPM | OXYGEN SATURATION: 97 % | WEIGHT: 180 LBS | BODY MASS INDEX: 30.73 KG/M2 | HEART RATE: 72 BPM

## 2017-12-11 DIAGNOSIS — Z01.818 PREOP EXAMINATION: Primary | ICD-10-CM

## 2017-12-11 ASSESSMENT — LIFESTYLE VARIABLES: TOBACCO_USE: 1

## 2017-12-11 NOTE — MR AVS SNAPSHOT
After Visit Summary   2017    Katherin Fletcher    MRN: [id number]           Patient Information     Date Of Birth          1940        Visit Information        Provider Department      2017 12:00 PM Rn, [facility]        Care Instructions    Preparing for Your Surgery      Name:  Katherin Fletcher   MRN:  [id number]   :  1940   Today's Date:  2017     Arriving for surgery:  Surgery date:    Arrival time:  11:30  Please come to:     [facility]  [address] [place] [id number]     Parking is available in front of the Clinics and Surgery Center building from 5:30AM to 8:00PM.  -  Proceed to the 5th floor to check into the Ambulatory Surgery Center.              >> There will be patient concierges on the 1st and 5th floor, for assistance or an escort, if you would like.              >> Please call [phone] with any questions.    What can I eat or drink?  -  You may have solid food or milk products until 8 hours prior to your surgery--do not eat food after 5:00AM on the morning of surgery   -  You may have water, apple juice or 7up/Sprite until 2 hours prior to your surgery--OK to have CLEAR liquids until 11:00AM on the morning of surgery     Which medicines can I take?  -  Do NOT take these medications in the morning, the day of surgery:    Stop Plavix 5 days prior to surgery   Stop vitamins/supplements/minerals one week prior to surgery       -  Please take these medications the day of surgery:    Aspirin 81 mg   Omeprazole  Tessalon--benzonatate  Sinemet  Carvedilol  Levothyroxine  Nasal sprays         How do I prepare myself?  -  Take two showers: one the night before surgery; and one the morning of surgery.         Use Scrubcare or Hibiclens to wash from neck down.  You may use your own shampoo and conditioner. No other hair products.   -  Do NOT use lotion, powder, deodorant,  or antiperspirant the day of your surgery.  -  Do NOT wear any makeup, fingernail polish or jewelry.  -Do not bring your own medications to the hospital, except for inhalers and eye drops.  -  Bring your ID and insurance card.    Questions or Concerns:  If you have questions or concerns, please call the  Preoperative Assessment Center, Monday-Friday 7AM-7PM:  [phone]            AFTER YOUR SURGERY  Breathing exercises   Breathing exercises help you recover faster. Take deep breaths and let the air out slowly. This will:     Help you wake up after surgery.    Help prevent complications like pneumonia.  Preventing complications will help you go home sooner.   We may give you a breathing device (incentive spirometer) to encourage you to breathe deeply.   Nausea and vomiting   You may feel sick to your stomach after surgery; if so, let your nurse know.    Pain control:  After surgery, you may have pain. Our goal is to help you manage your pain. Pain medicine will help you feel comfortable enough to do activities that will help you heal.  These activities may include breathing exercises, walking and physical therapy.   To help your health care team treat your pain we will ask: 1) If you have pain  2) where it is located 3) describe your pain in your words  Methods of pain control include medications given by mouth, vein or by nerve block for some surgeries.  We may give you a pain control pump that will:  1) Deliver the medicine through a tube placed in your vein  2) Control the amount of medicine you receive  3) Allow you to push a button to deliver a dose of pain medicine  Sequential Compression Device (SCD) or Pneumo Boots:  You may need to wear SCD S on your legs or feet. These are wraps connected to a machine that pumps in air and releases it. The repeated pumping helps prevent blood clots from forming.           Follow-ups after your visit        Your next 10 appointments already scheduled     Dec 11, 2017  12:30 PM CST   (Arrive by 12:15 PM)   PAC Anesthesia Consult with  Pac Anesthesiologist   [facility] ([facility])    [address] [place] [address]  4th [facility] [id number] [phone]            Dec 11, 2017 12:45 PM CST   LAB with  LAB   [facility] ([facility])    [address] [place] [id number] [phone]           Please do not eat 10-12 hours before your appointment if you are coming in fasting for labs on lipids, cholesterol, or glucose (sugar). This does not apply to pregnant women. Water, hot tea and black coffee (with nothing added) are okay. Do not drink other fluids, diet soda or chew gum.            Dec 19, 2017   Procedure with Fernando Wiggins MD   [facility] ([facility])    [address] [place] [id number] [phone]           Located in the Clinics and Surgery Center at [address] [place] [address].   parking is very convenient and highly recommended.  is a $6 flat rate fee.  Both  and self parkers should enter the main arrival plaza from [facility]; parking attendants will direct you based on your parking preference.            Jan 03, 2018 11:00 AM CST   (Arrive by 10:45 AM)   Post-Op with  U Ortho Nurse   [facility] ([facility])    [address] [place] [id number] [phone]            Jan 30, 2018  4:50 PM CST   (Arrive by 4:35 PM)   POST-OP FOOT/ANKLE with Fernando Wiggins MD   [facility] ([facility])    [address] [place] [id number] [phone]              Who to contact     Please call your clinic at [phone] to:    Ask questions about your health    Make or cancel appointments    Discuss your  medicines    Learn about your test results    Speak to your doctor   If you have compliments or concerns about an experience at your clinic, or if you wish to file a complaint, please contact [facility] Patient Relations at [phone] or email us at [email or web address]         Additional Information About Your Visit        [email or web address] Information     [email or web address] gives you secure access to your electronic health record. If you see a primary care provider, you can also send messages to your care team and make appointments. If you have questions, please call your primary care clinic.  If you do not have a primary care provider, please call [phone] and they will assist you.      L[employer] is an electronic gateway that provides easy, online access to your medical records. With [employer], you can request a clinic appointment, read your test results, renew a prescription or communicate with your care team.     To access your existing account, please contact your [facility] or call [phone] for assistance.        Care EveryWhere ID     This is your Care EveryWhere ID. This could be used by other organizations to access your [facility] medical records  [id number]         Blood Pressure from Last 3 Encounters:   12/11/17 135/83   11/03/17 153/80   08/23/17 140/71    Weight from Last 3 Encounters:   12/11/17 81.6 kg (180 lb)   11/03/17 81.6 kg (180 lb)   08/23/17 80.6 kg (177 lb 11.2 oz)              Today, you had the following     No orders found for display         Today's Medication Changes          These changes are accurate as of: 12/11/17 12:00 PM.  If you have any questions, ask your nurse or doctor.               These medicines have changed or have updated prescriptions.        Dose/Directions    clopidogrel 75 MG tablet   Commonly known as:  PLAVIX   This may have changed:  when to take this   Used for:  CAD (coronary artery disease)        Dose:   75 mg   Take 1 tablet (75 mg) by mouth daily   Quantity:  90 tablet   Refills:  3       isosorbide mononitrate 30 MG 24 hr tablet   Commonly known as:  IMDUR   This may have changed:  when to take this   Used for:  Coronary artery disease involving native coronary artery of native heart without angina pectoris        Dose:  30 mg   Take 1 tablet (30 mg) by mouth daily   Quantity:  90 tablet   Refills:  3       levothyroxine 112 MCG tablet   Commonly known as:  SYNTHROID/LEVOTHROID   This may have changed:  See the new instructions.   Used for:  Encounter for medication refill        TAKE 1 TABLET DAILY   Quantity:  90 tablet   Refills:  1       rosuvastatin 20 MG tablet   Commonly known as:  CRESTOR   This may have changed:  when to take this   Used for:  Hyperlipidemia        Dose:  20 mg   Take 1 tablet (20 mg) by mouth daily   Quantity:  90 tablet   Refills:  3       tiZANidine 2 MG tablet   Commonly known as:  ZANAFLEX   This may have changed:  when to take this   Used for:  Ataxia        Dose:  2 mg   Take 1 tablet (2 mg) by mouth daily   Quantity:  90 tablet   Refills:  prn                Primary Care Provider Office Phone # Fax #    Aris Del Real [phone] [address]  [facility] [id number]        Equal Access to Services     JOSELUIS MADRIGAL AH: Hadii rosio joel hadasho Soomaali, waaxda luqadaha, qaybta kaalmada adeegyada, carlo castro. So [facility] [phone].    ATENCIÓN: Si habla español, tiene a taylor disposición servicios gratuitos de asistencia lingüística. Llame al [phone].    We comply with applicable federal civil rights laws and [place] laws. We do not discriminate on the basis of race, color, national origin, age, disability, sex, sexual orientation, or gender identity.            Thank you!     Thank you for choosing [facility]  for your care. Our goal is always to provide you with excellent care. Hearing back  from our patients is one way we can continue to improve our services. Please take a few minutes to complete the written survey that you may receive in the mail after your visit with us. Thank you!             Your Updated Medication List - Protect others around you: Learn how to safely use, store and throw away your medicines at www.disposemymeds.org.          This list is accurate as of: 12/11/17 12:00 PM.  Always use your most recent med list.                   Brand Name Dispense Instructions for use Diagnosis    Alfalfa 250 MG Tabs      Take 250 mg by mouth every morning        aspirin 81 MG tablet      Take 81 mg by mouth At Bedtime        azelastine 0.1 % spray    ASTELIN          benzonatate 200 MG capsule    TESSALON     Take 200 mg by mouth daily as needed        calcium + D 600-200 MG-UNIT Tabs   Generic drug:  calcium carbonate-vitamin D      Take 1 tablet by mouth At Bedtime    Preventative health care       carbidopa-levodopa  MG per tablet    SINEMET    720 tablet    TAKE 2 TABLETS FOUR TIMES A DAY    Restless legs syndrome, Neurologic gait disorder, Dysarthria, Cerebellar disease       carvedilol 6.25 MG tablet    COREG    180 tablet    Take 1 tablet (6.25 mg) by mouth 2 times daily (with meals)    Atrial fibrillation (H), HTN (hypertension)       clopidogrel 75 MG tablet    PLAVIX    90 tablet    Take 1 tablet (75 mg) by mouth daily    CAD (coronary artery disease)       DAILY MULTI Tabs      Take by mouth every morning        flunisolide 25 MCG/ACT (0.025%) Soln spray    NASALIDE          gabapentin 100 MG capsule    NEURONTIN     Take 100 mg by mouth At Bedtime        isosorbide mononitrate 30 MG 24 hr tablet    IMDUR    90 tablet    Take 1 tablet (30 mg) by mouth daily    Coronary artery disease involving native coronary artery of native heart without angina pectoris       levothyroxine 112 MCG tablet    SYNTHROID/LEVOTHROID    90 tablet    TAKE 1 TABLET DAILY    Encounter for medication  refill       nitroGLYcerin 0.4 MG sublingual tablet    NITROSTAT    25 tablet    DISSOLVE ONE TABLET UNDER THE TONGUE EVERY 5 MINUTES AS NEEDED FOR CHEST PAIN.  DO NOT EXCEED A TOTAL OF 3 DOSES IN 15 MINUTES    CAD (coronary artery disease)       omeprazole 20 MG CR capsule    priLOSEC    180 capsule    TAKE 2 CAPSULES DAILY 30 TO 60 MINUTES BEFORE A MEAL    Encounter for medication refill       rosuvastatin 20 MG tablet    CRESTOR    90 tablet    Take 1 tablet (20 mg) by mouth daily    Hyperlipidemia       tiZANidine 2 MG tablet    ZANAFLEX    90 tablet    Take 1 tablet (2 mg) by mouth daily    Ataxia       VITAMIN B-12 PO      Take by mouth every morning

## 2017-12-11 NOTE — ANESTHESIA PREPROCEDURE EVALUATION
Anesthesia Evaluation     . Pt has had prior anesthetic. Type: General and MAC    No history of anesthetic complications          ROS/MED HX    ENT/Pulmonary:     (+)tobacco use, Past use , . .    Neurologic:     (+)other neuro neurodegenerative condition-ataxia, spasticity, rigidity    Cardiovascular: Comment: S/p left fem-pop arterial stent 5/2017    (+) Dyslipidemia, hypertension-Peripheral Vascular Disease-- Other, -past MI,CABG-date: 1992,2009, . Taking blood thinners Pt has received instructions: Instructions Given to patient: Hold Plavix 5 days, remain on ASA. CHF Last EF: 45% . . :. . Previous cardiac testing Echodate:2014results:Interpretation Summary  The visual ejection fraction is estimated at 45%. There are regional wall motion abnormalities as specified.  The left ventricle is normal in size.  There is normal left ventricular wall thickness.  Left ventricular systolic function is mildly reduced.  The visual ejection fraction is estimated at 45%.  E by E prime ratio is greater than 15, that likely suggests increased left   ventricular filling pressures.  Grade II left ventricular diastolic dysfunction is noted.  There are regional wall motion abnormalities as specified.  There is severe mid-distal anteroseptal, distal anterior  and apical   hypokinesis.  There is prominent trabeculation at the apex but no clear thrombus. There is   a false chord.     Right Ventricle  The right ventricle is normal size.  The right ventricular systolic function is normal.     Atria  The left atrium is mildly dilated.  The right atrium is mildly dilated.     Mitral Valve  There is mild mitral annular calcification.  The mitral valve leaflets are mildly thickened.  There is mild (1+) mitral regurgitation.     Tricuspid Valve  Normal tricuspid valve.  There is trace tricuspid regurgitation.  Right ventricular systolic pressure could not be approximated due to   inadequate tricuspid regurgitation.     Aortic Valve  The  aortic valve is trileaflet.  There is mild trileaflet aortic sclerosis.  No aortic regurgitation is present.  No hemodynamically significant valvular aortic stenosis.     Pulmonic Valve  The pulmonic valve is not well visualized.  There is mild (1+) pulmonic valvular regurgitation.  Normal pulmonic valve velocity.     Vessels  The aortic root is normal size.  The inferior vena cava is not dilated.     Pericardium  There is no pericardial effusion.       date: results:ECG reviewed date:5/2017 results:Sinus bradycardia, ST and T wave abnormality   date: results:          METS/Exercise Tolerance:  1 - Eating, dressing   Hematologic:  - neg hematologic  ROS       Musculoskeletal:  - neg musculoskeletal ROS       GI/Hepatic:     (+) GERD Asymptomatic on medication,       Renal/Genitourinary:  - ROS Renal section negative       Endo:     (+) thyroid problem hypothyroidism, .      Psychiatric:  - neg psychiatric ROS       Infectious Disease:   (+) MRSA,       Malignancy:      - no malignancy   Other:    (+) No chance of pregnancy C-spine cleared: N/A, H/O Chronic Pain,other significant disability Other (comment)                   Physical Exam      Airway   Mallampati: II  TM distance: >3 FB  Neck ROM: limited    Dental   (+) missing    Cardiovascular   Rhythm and rate: regular and normal      Pulmonary    breath sounds clear to auscultation    Other findings: For further details of assessment, testing, and physical exam please see H and P completed on same date.           PAC Discussion and Assessment    ASA Classification: 3  Case is suitable for: ASC  Anesthetic techniques and relevant risks discussed: Regional and MAC with GA as backup  Invasive monitoring and risk discussed: No  Types:   Possibility and Risk of blood transfusion discussed: No  NPO instructions given:   Additional anesthetic preparation and risks discussed:   Needs early admission to pre-op area:   Other:     PAC Resident/NP Anesthesia  Assessment:  Katherin Fletcher is a 77 year old female scheduled to undergo right second and third toe pinning with Dr. Wiggins on 12/19/17. She has the following specific operative considerations:   - RCRI : 6.6% risk of major adverse cardiac event.   - VTE risk: 0.5%  - DAVID # of risks 2/8 = Low risk  - Risk of PONV score = 3.  If > 2, anti-emetic intervention recommended. If 3 or > anti emetic intervention recommended with two or more meds    Last GA for fem-pop stent 5/2017. No history of problems with anesthesia.       --Right toe deformity with pain. Above procedure now planned with MAC and digital block per Dr. Wiggins. Final counseling and decisions by Anesthesia on DOS.   --HLD. Rosuvastin. HYPERTENSION. Will take Coreg and Imdur on DOS. Will hold Plavix for 5 days prior to surgery but will remain on ASA. CAD, s/p CAB in 1992 and 2009. PVD, s/p left fem-popliteal stent in 5/2017. Is followed by Cardiology, last seen on 8/23/17 and considered stable. All testing above. Activity limited.    --Former smoker. 11 pack year history. Denies pulmonary symptoms.    --Neurodegenerative condition, unclear etiology. Symptoms of spasticity, ataxia and rigidity. Will take Sinemet on DOS. Gabapentin at HS. Walks with walker. Fall risk.   --GERD. Will take Omeprazole on DOS.   --Hypothyroidism. Will take Synthroid on DOS.       Patient was discussed with Dr Davidson.        Reviewed and Signed by PAC Mid-Level Provider/Resident  Mid-Level Provider/Resident: EVONNE Whitney, CNS  Date: 12/11/17  Time: 1:00pm    Attending Anesthesiologist Anesthesia Assessment:  Patient seen, records reviewed and case discussed with ZOHAIB; details as above.  78 yo for repair of hammer toes.  Patient with CAD, s/p CABG x2 ('92 and '09) now stable on carvedilol with EF 45% in 2014, PVD s/p stenting of L fem-pop, chronic ataxia and neuropathy for which she takes Sinemet.  No recent GA.  Patient is able to lie flat and is amenable to MAC with GA as  back up.    Her co-morbidities are stable and she appears to be in reasonable condition for proposed procedure without further evaluation or change in medical management.    Violeta Davidson MD  December 11, 2017      Anesthesiologist:   Date:   Time:   Pass/Fail:   Disposition:     PAC Pharmacist Assessment:        Pharmacist:   Date:   Time:      Anesthesia Plan      History & Physical Review  History and physical reviewed and following examination; no interval change.    ASA Status:  3 .    NPO Status:  > 8 hours    Plan for MAC with Intravenous induction. Maintenance will be TIVA.    PONV prophylaxis:  Ondansetron (or other 5HT-3)       Postoperative Care  Postoperative pain management:  Oral pain medications.      Consents  Anesthetic plan, risks, benefits and alternatives discussed with:  Patient..                          .

## 2017-12-11 NOTE — PATIENT INSTRUCTIONS
Preparing for Your Surgery      Name:  Katherin Fletcher   MRN:  [id number]   :  1940   Today's Date:  2017     Arriving for surgery:  Surgery date:    Arrival time:  11:30  Please come to:     [facility]  [address] [place] [id number]     Parking is available in front of the Clinics and Surgery Center building from 5:30AM to 8:00PM.  -  Proceed to the 5th floor to check into the Ambulatory Surgery Center.              >> There will be patient concierges on the 1st and 5th floor, for assistance or an escort, if you would like.              >> Please call [phone] with any questions.    What can I eat or drink?  -  You may have solid food or milk products until 8 hours prior to your surgery--do not eat food after 5:00AM on the morning of surgery   -  You may have water, apple juice or 7up/Sprite until 2 hours prior to your surgery--OK to have CLEAR liquids until 11:00AM on the morning of surgery     Which medicines can I take?  -  Do NOT take these medications in the morning, the day of surgery:    Stop Plavix 5 days prior to surgery   Stop vitamins/supplements/minerals one week prior to surgery       -  Please take these medications the day of surgery:    Aspirin 81 mg   Omeprazole  Tessalon--benzonatate  Sinemet  Carvedilol  Levothyroxine  Nasal sprays         How do I prepare myself?  -  Take two showers: one the night before surgery; and one the morning of surgery.         Use Scrubcare or Hibiclens to wash from neck down.  You may use your own shampoo and conditioner. No other hair products.   -  Do NOT use lotion, powder, deodorant, or antiperspirant the day of your surgery.  -  Do NOT wear any makeup, fingernail polish or jewelry.  -Do not bring your own medications to the hospital, except for inhalers and eye drops.  -  Bring your ID and insurance card.    Questions or Concerns:  If you have questions or concerns, please call  the  Preoperative Assessment Center, Monday-Friday 7AM-7PM:  [phone]            AFTER YOUR SURGERY  Breathing exercises   Breathing exercises help you recover faster. Take deep breaths and let the air out slowly. This will:     Help you wake up after surgery.    Help prevent complications like pneumonia.  Preventing complications will help you go home sooner.   We may give you a breathing device (incentive spirometer) to encourage you to breathe deeply.   Nausea and vomiting   You may feel sick to your stomach after surgery; if so, let your nurse know.    Pain control:  After surgery, you may have pain. Our goal is to help you manage your pain. Pain medicine will help you feel comfortable enough to do activities that will help you heal.  These activities may include breathing exercises, walking and physical therapy.   To help your health care team treat your pain we will ask: 1) If you have pain  2) where it is located 3) describe your pain in your words  Methods of pain control include medications given by mouth, vein or by nerve block for some surgeries.  We may give you a pain control pump that will:  1) Deliver the medicine through a tube placed in your vein  2) Control the amount of medicine you receive  3) Allow you to push a button to deliver a dose of pain medicine  Sequential Compression Device (SCD) or Pneumo Boots:  You may need to wear SCD S on your legs or feet. These are wraps connected to a machine that pumps in air and releases it. The repeated pumping helps prevent blood clots from forming.

## 2017-12-11 NOTE — H&P
Pre-Operative H & P     CC:  Preoperative exam to assess for increased cardiopulmonary risk while undergoing surgery and anesthesia.    Date of Encounter: 12/11/2017  Primary Care Physician:  Aris Del Real Mague Fletcher is a 77 year old female who presents for pre-operative H & P in preparation for right second and third toe pinning with Dr. Wiggins on 12/19/17 at [facility]. History is obtained from the patient.     Patient with deformity of toes on her right foot causing callous and pain. Consult with Dr. Wiggins with above procedure planned.      Her history is otherwise significant for a neurodegenerative condition with symptoms of spasticity, rigidity and ataxia. She also has peripheral neuropathy. She currently walks with a walker. She is on Gabapentin and Sinemet and is followed by outside Neurologist, last seen on 10/23/17.    She also has a significant cardiovascular history to include CAD, s/p CAB X 2 in 1992, and CAB X 3 in 2009, plus peripheral vascular disease, s/p left fem-popliteal artery stenting in 5/2017.  She is followed by Dr. Grayson, last seen on 8/23/17 and considered stable.     Past Medical History  Past Medical History:   Diagnosis Date     CAD (coronary artery disease)     s/p CABG 1992 and redo CABG 2009 (LIMA to LAD, SVG to OM and SVG to posterolateral branch of RCA)     Essential hypertension, benign      GERD (gastroesophageal reflux disease)      Hyperlipidaemia      Hyperlipidaemia LDL goal < 130      Ischemic cardiomyopathy      MI (myocardial infarction) 11/1996     Mixed hyperlipidemia      PAD (peripheral artery disease) (H)      Restless legs syndrome      Unspecified hypothyroidism        Past Surgical History  Past Surgical History:   Procedure Laterality Date     ARTHROSCOPY SHOULDER RT/LT       BUNIONECTOMY RT/LT       C CABG, ARTERY-VEIN, THREE  1/2009     C CARDIAC SURG PROCEDURE UNLIST       C HAND/FINGER SURGERY UNLISTED       C  MRA UPPER EXTREMITY WO&W CONT       C SHOULDER SURG PROC UNLISTED       C STOMACH SURGERY PROCEDURE UNLISTED       CARDIAC SURGERY  1992    CABG,stents x2     CARPAL TUNNEL RELEASE RT/LT       HC VASCULAR SURGERY PROCEDURE UNLIST       HYSTERECTOMY, DAMIÁN  32 yo     OPEN REDUCTION INTERNAL FIXATION ANKLE      Left Ankle     RELEASE TRIGGER FINGER       STENT  2006     STENT(aka CARDIAC)  2008     VASCULAR SURGERY         Hx of Blood transfusions/reactions: Denies.      Hx of abnormal bleeding or anti-platelet use: ASA 81 mg daily, Plavix 75 mg daily.    Menstrual history: No LMP recorded. Patient has had a hysterectomy.    Steroid use in the last year: Denies.     Personal or FH with difficulty with Anesthesia:  Denies.    Prior to Admission Medications  Current Outpatient Prescriptions   Medication Sig Dispense Refill     Cyanocobalamin (VITAMIN B-12 PO) Take by mouth every morning       tiZANidine (ZANAFLEX) 2 MG tablet Take 1 tablet (2 mg) by mouth daily (Patient taking differently: Take 2 mg by mouth every evening ) 90 tablet prn     omeprazole (PRILOSEC) 20 MG CR capsule TAKE 2 CAPSULES DAILY 30 TO 60 MINUTES BEFORE A MEAL 180 capsule 0     flunisolide (NASALIDE) 25 MCG/ACT (0.025%) SOLN spray        benzonatate (TESSALON) 200 MG capsule Take 200 mg by mouth daily as needed        azelastine (ASTELIN) 0.1 % spray        carbidopa-levodopa (SINEMET)  MG per tablet TAKE 2 TABLETS FOUR TIMES A  tablet 3     carvedilol (COREG) 6.25 MG tablet Take 1 tablet (6.25 mg) by mouth 2 times daily (with meals) 180 tablet 3     levothyroxine (SYNTHROID/LEVOTHROID) 112 MCG tablet TAKE 1 TABLET DAILY (Patient taking differently: TAKE 1 TABLET DAILY IN THE MORNING) 90 tablet 1     isosorbide mononitrate (IMDUR) 30 MG 24 hr tablet Take 1 tablet (30 mg) by mouth daily (Patient taking differently: Take 30 mg by mouth every morning ) 90 tablet 3     clopidogrel (PLAVIX) 75 MG tablet Take 1 tablet (75 mg) by mouth daily  (Patient taking differently: Take 75 mg by mouth At Bedtime ) 90 tablet 3     rosuvastatin (CRESTOR) 20 MG tablet Take 1 tablet (20 mg) by mouth daily (Patient taking differently: Take 20 mg by mouth At Bedtime ) 90 tablet 3     aspirin 81 MG tablet Take 81 mg by mouth At Bedtime        gabapentin (NEURONTIN) 100 MG capsule Take 100 mg by mouth At Bedtime        Multiple Vitamins-Minerals (DAILY MULTI) TABS Take by mouth every morning        Alfalfa 250 MG TABS Take 250 mg by mouth every morning        calcium carbonate-vitamin D (CALCIUM + D) 600-200 MG-UNIT TABS Take 1 tablet by mouth At Bedtime        nitroglycerin (NITROSTAT) 0.4 MG SL tablet DISSOLVE ONE TABLET UNDER THE TONGUE EVERY 5 MINUTES AS NEEDED FOR CHEST PAIN.  DO NOT EXCEED A TOTAL OF 3 DOSES IN 15 MINUTES 25 tablet 3       Allergies  Allergies   Allergen Reactions     Ace Inhibitors Cough     Baclofen      Penicillins      10/7/14 Hives per patient -- many many years ago       Nickel Swelling and Rash     Other reaction(s): Unknown       Social History  Social History     Social History     Marital status:      Spouse name: N/A     Number of children: N/A     Years of education: N/A     Occupational History     Not on file.     Social History Main Topics     Smoking status: Former Smoker     Packs/day: 0.50     Years: 22.00     Types: Cigarettes     Quit date: 4/15/1992     Smokeless tobacco: Never Used     Alcohol use 0.6 oz/week     1 Standard drinks or equivalent per week      Comment: MAYBE ONCE A YEAR     Drug use: No     Sexual activity: Not Currently     Partners: Male     Other Topics Concern     Caffeine Concern Yes     1-2 cups      Sleep Concern No     Stress Concern No     Weight Concern Yes     Special Diet No     Exercise Yes     stairs, walking      Social History Narrative       Family History  Family History   Problem Relation Age of Onset     Unknown/Adopted Mother      Unknown/Adopted Father        Review of Systems  The  "complete review of systems is negative other than noted in the HPI or here.   Constitutional: Denies fever, chills, weight loss.  Skin: Stubbed right big toe with some bleeding and tenderness.  HEENT: Wears glasses for vision. Denies swallowing difficulty.  Respiratory: Denies cough or shortness of breath. Denies concern for DAVID. Able to lay flat.  CV: Denies chest pain or irregular HR. Limited activity due to gait instability.  GI: Denies abdominal pain or bowel issues.  : Denies dysuria.  Neuro: Difficulty speaking.     Temp: 97.7  F (36.5  C) Temp src: Oral BP: 135/83 Pulse: 72   Resp: 16 SpO2: 97 %         180 lbs 0 oz  5' 4\"   Body mass index is 30.9 kg/(m^2).       Physical Exam  Constitutional: Awake, alert, cooperative, no apparent distress, and appears stated age.  Eyes: Pupils equal, round and reactive to light, extra ocular muscles intact, sclera clear, conjunctiva normal. Glasses on.  HENT: Normocephalic, oral pharynx with moist mucus membranes, good dentition. No goiter appreciated.   Respiratory: Clear to auscultation bilaterally, no crackles or wheezing. No cough or obvious dyspnea.  Cardiovascular: Regular rate and rhythm, normal S1 and S2, and no murmur noted. Carotids +2, no bruits. Mild bilateral lower leg edema. Palpable pulses to radial  DP and PT arteries.   GI: Normal bowel sounds, soft, non-distended, non-tender, no masses palpated. Difficult exam due to obese abdomen.  Lymph/Hematologic: No cervical lymphadenopathy and no supraclavicular lymphadenopathy.  Genitourinary:  Deferred.   Skin: Warm and dry.  No rashes at anticipated surgical site. Right big toe is has mildly reddened tissues but with no signs of infection.  Musculoskeletal: Mildly limited neck extension.There is no redness, warmth, or swelling of the joints. Gross motor strength is limited.    Neurologic: Awake, alert, oriented to name, place and time. Difficult speech at time.  Neuropsychiatric: Calm, cooperative. Normal " affect.     Labs: (personally reviewed)  Lab Results   Component Value Date    WBC 4.7 05/04/2017     Lab Results   Component Value Date    RBC 4.18 05/04/2017     Lab Results   Component Value Date    HGB 12.2 05/04/2017     Lab Results   Component Value Date    HCT 35.9 05/04/2017     Lab Results   Component Value Date    MCV 86 05/04/2017     Lab Results   Component Value Date    MCH 29.2 05/04/2017     Lab Results   Component Value Date    MCHC 34.0 05/04/2017     Lab Results   Component Value Date    RDW 14.0 05/04/2017     Lab Results   Component Value Date     05/04/2017     Last Basic Metabolic Panel:  Lab Results   Component Value Date     05/04/2017      Lab Results   Component Value Date    POTASSIUM 3.8 05/04/2017     Lab Results   Component Value Date    CHLORIDE 109 05/04/2017     Lab Results   Component Value Date    GALE 8.9 05/04/2017     Lab Results   Component Value Date    CO2 26 05/04/2017     Lab Results   Component Value Date    BUN 12 05/04/2017     Lab Results   Component Value Date    CR 0.62 05/04/2017     Lab Results   Component Value Date    GLC 91 05/04/2017     EKG: Personally reviewed 5/2017 Sinus bradycardia, ST and T wave abnormality  Cardiac echo: 2014  Interpretation Summary  The visual ejection fraction is estimated at 45%. There are regional wall   motion abnormalities as specified.  The left ventricle is normal in size.  There is normal left ventricular wall thickness.  Left ventricular systolic function is mildly reduced.  The visual ejection fraction is estimated at 45%.  E by E prime ratio is greater than 15, that likely suggests increased left   ventricular filling pressures.  Grade II left ventricular diastolic dysfunction is noted.  There are regional wall motion abnormalities as specified.  There is severe mid-distal anteroseptal, distal anterior  and apical   hypokinesis.  There is prominent trabeculation at the apex but no clear thrombus. There is   a false  chord.     Right Ventricle  The right ventricle is normal size.  The right ventricular systolic function is normal.     Atria  The left atrium is mildly dilated.  The right atrium is mildly dilated.     Mitral Valve  There is mild mitral annular calcification.  The mitral valve leaflets are mildly thickened.  There is mild (1+) mitral regurgitation.     Tricuspid Valve  Normal tricuspid valve.  There is trace tricuspid regurgitation.  Right ventricular systolic pressure could not be approximated due to   inadequate tricuspid regurgitation.     Aortic Valve  The aortic valve is trileaflet.  There is mild trileaflet aortic sclerosis.  No aortic regurgitation is present.  No hemodynamically significant valvular aortic stenosis.     Pulmonic Valve  The pulmonic valve is not well visualized.  There is mild (1+) pulmonic valvular regurgitation.  Normal pulmonic valve velocity.     Vessels  The aortic root is normal size.  The inferior vena cava is not dilated.     Pericardium  There is no pericardial effusion.       11/28/17 XRAY toes, right  IMPRESSION: Osteotomy changes in the right great toe proximal phalanx,  and in the right first metatarsal, otherwise no acute bone abnormality  Noted.    11/10/17 XRAY chest  IMPRESSION: Mild linear atelectasis or fibrosis of the left midlung.  Midline sternal wires in place. The right lung is clear. No pleural  effusions or pneumothorax. Mild cardiomegaly.     Outside records reviewed from: Care Everywhere    ASSESSMENT and PLAN  Katherin Fletcher is a 77 year old female scheduled to undergo right second and third toe pinning with Dr. Wiggins on 12/19/17. She has the following specific operative considerations:   - RCRI : 6.6% risk of major adverse cardiac event.   - Anesthesia considerations:  Refer to PAC assessment in anesthesia records  - VTE risk: 0.5%  - DAVID # of risks 2/8 = Low risk  - Risk of PONV score = 3.  If > 2, anti-emetic intervention recommended. If 3 or > anti  emetic intervention recommended with two or more meds     --Right toe deformity with pain. Above procedure now planned with MAC and digital block per Dr. Wiggins. Final counseling and decisions by Anesthesia on DOS.   --HLD. Rosuvastin. HYPERTENSION. Will take Coreg and Imdur on DOS. Will hold Plavix for 5 days prior to surgery but will remain on ASA. CAD, s/p CAB in 1992 and 2009. PVD, s/p left fem-popliteal stent in 5/2017. Is followed by Cardiology, last seen on 8/23/17 and considered stable. All testing above. Activity limited.    --Former smoker. 11 pack year history. Denies pulmonary symptoms.    --Neurodegenerative condition, unclear etiology. Symptoms of spasticity, ataxia and rigidity. Will take Sinemet on DOS. Gabapentin at HS. Walks with walker. Fall risk.   --GERD. Will take Omeprazole on DOS.   --Hypothyroidism. Will take Synthroid on DOS.     Arrival time, NPO, shower and medication instructions provided by nursing staff today. Preparing For Your Surgery handout given.       Patient was discussed with Dr Davidson.    EVONNE Mejias CNS  Preoperative Assessment Center  [facility]  Phone: [phone]  Fax: [phone]

## 2017-12-17 LAB
LAB SCANNED RESULT: NORMAL
LAB SCANNED RESULT: NORMAL

## 2017-12-19 ENCOUNTER — TELEPHONE (OUTPATIENT)
Dept: NEUROLOGY | Facility: CLINIC | Age: 77
End: 2017-12-19

## 2017-12-19 ENCOUNTER — HOSPITAL ENCOUNTER (OUTPATIENT)
Facility: AMBULATORY SURGERY CENTER | Age: 77
End: 2017-12-19
Attending: ORTHOPAEDIC SURGERY
Payer: OTHER GOVERNMENT

## 2017-12-19 ENCOUNTER — SURGERY (OUTPATIENT)
Age: 77
End: 2017-12-19

## 2017-12-19 ENCOUNTER — ANESTHESIA (OUTPATIENT)
Dept: SURGERY | Facility: AMBULATORY SURGERY CENTER | Age: 77
End: 2017-12-19

## 2017-12-19 ENCOUNTER — RADIANT APPOINTMENT (OUTPATIENT)
Dept: RADIOLOGY | Facility: AMBULATORY SURGERY CENTER | Age: 77
End: 2017-12-19
Attending: ORTHOPAEDIC SURGERY
Payer: OTHER GOVERNMENT

## 2017-12-19 VITALS
SYSTOLIC BLOOD PRESSURE: 102 MMHG | DIASTOLIC BLOOD PRESSURE: 72 MMHG | OXYGEN SATURATION: 98 % | HEIGHT: 64 IN | WEIGHT: 180 LBS | RESPIRATION RATE: 14 BRPM | BODY MASS INDEX: 30.73 KG/M2 | HEART RATE: 69 BPM | TEMPERATURE: 96 F

## 2017-12-19 DIAGNOSIS — R52 PAIN: ICD-10-CM

## 2017-12-19 DIAGNOSIS — M20.41 HAMMER TOE OF RIGHT FOOT: Primary | ICD-10-CM

## 2017-12-19 DEVICE — IMP WIRE KIRSCHNER 1.6MM 0.062X9" SGL END TROCAR KM71-153: Type: IMPLANTABLE DEVICE | Site: FOOT | Status: FUNCTIONAL

## 2017-12-19 RX ORDER — LIDOCAINE HYDROCHLORIDE 10 MG/ML
INJECTION, SOLUTION EPIDURAL; INFILTRATION; INTRACAUDAL; PERINEURAL PRN
Status: DISCONTINUED | OUTPATIENT
Start: 2017-12-19 | End: 2017-12-19 | Stop reason: HOSPADM

## 2017-12-19 RX ORDER — BUPIVACAINE HYDROCHLORIDE 2.5 MG/ML
INJECTION, SOLUTION INFILTRATION; PERINEURAL PRN
Status: DISCONTINUED | OUTPATIENT
Start: 2017-12-19 | End: 2017-12-19 | Stop reason: HOSPADM

## 2017-12-19 RX ORDER — ACETAMINOPHEN 325 MG/1
975 TABLET ORAL ONCE
Status: COMPLETED | OUTPATIENT
Start: 2017-12-19 | End: 2017-12-19

## 2017-12-19 RX ORDER — ACETAMINOPHEN 325 MG/1
650 TABLET ORAL
Status: DISCONTINUED | OUTPATIENT
Start: 2017-12-19 | End: 2017-12-20 | Stop reason: HOSPADM

## 2017-12-19 RX ORDER — SODIUM CHLORIDE, SODIUM LACTATE, POTASSIUM CHLORIDE, CALCIUM CHLORIDE 600; 310; 30; 20 MG/100ML; MG/100ML; MG/100ML; MG/100ML
INJECTION, SOLUTION INTRAVENOUS CONTINUOUS
Status: DISCONTINUED | OUTPATIENT
Start: 2017-12-19 | End: 2017-12-20 | Stop reason: HOSPADM

## 2017-12-19 RX ORDER — ONDANSETRON 4 MG/1
4 TABLET, ORALLY DISINTEGRATING ORAL EVERY 30 MIN PRN
Status: DISCONTINUED | OUTPATIENT
Start: 2017-12-19 | End: 2017-12-20 | Stop reason: HOSPADM

## 2017-12-19 RX ORDER — NALOXONE HYDROCHLORIDE 0.4 MG/ML
.1-.4 INJECTION, SOLUTION INTRAMUSCULAR; INTRAVENOUS; SUBCUTANEOUS
Status: DISCONTINUED | OUTPATIENT
Start: 2017-12-19 | End: 2017-12-20 | Stop reason: HOSPADM

## 2017-12-19 RX ORDER — PROPOFOL 10 MG/ML
INJECTION, EMULSION INTRAVENOUS PRN
Status: DISCONTINUED | OUTPATIENT
Start: 2017-12-19 | End: 2017-12-19

## 2017-12-19 RX ORDER — PROPOFOL 10 MG/ML
INJECTION, EMULSION INTRAVENOUS CONTINUOUS PRN
Status: DISCONTINUED | OUTPATIENT
Start: 2017-12-19 | End: 2017-12-19

## 2017-12-19 RX ORDER — LIDOCAINE HYDROCHLORIDE 20 MG/ML
INJECTION, SOLUTION INFILTRATION; PERINEURAL PRN
Status: DISCONTINUED | OUTPATIENT
Start: 2017-12-19 | End: 2017-12-19

## 2017-12-19 RX ORDER — HYDROCODONE BITARTRATE AND ACETAMINOPHEN 5; 325 MG/1; MG/1
1-2 TABLET ORAL EVERY 4 HOURS PRN
Qty: 30 TABLET | Refills: 0 | Status: SHIPPED | OUTPATIENT
Start: 2017-12-19 | End: 2018-01-05

## 2017-12-19 RX ORDER — HYDROCODONE BITARTRATE AND ACETAMINOPHEN 5; 325 MG/1; MG/1
1 TABLET ORAL
Status: DISCONTINUED | OUTPATIENT
Start: 2017-12-19 | End: 2017-12-20 | Stop reason: HOSPADM

## 2017-12-19 RX ORDER — FENTANYL CITRATE 50 UG/ML
25-50 INJECTION, SOLUTION INTRAMUSCULAR; INTRAVENOUS
Status: DISCONTINUED | OUTPATIENT
Start: 2017-12-19 | End: 2017-12-20 | Stop reason: HOSPADM

## 2017-12-19 RX ORDER — ONDANSETRON 4 MG/1
4 TABLET, ORALLY DISINTEGRATING ORAL
Status: DISCONTINUED | OUTPATIENT
Start: 2017-12-19 | End: 2017-12-20 | Stop reason: HOSPADM

## 2017-12-19 RX ORDER — ONDANSETRON 2 MG/ML
4 INJECTION INTRAMUSCULAR; INTRAVENOUS EVERY 30 MIN PRN
Status: DISCONTINUED | OUTPATIENT
Start: 2017-12-19 | End: 2017-12-20 | Stop reason: HOSPADM

## 2017-12-19 RX ORDER — SODIUM CHLORIDE, SODIUM LACTATE, POTASSIUM CHLORIDE, CALCIUM CHLORIDE 600; 310; 30; 20 MG/100ML; MG/100ML; MG/100ML; MG/100ML
INJECTION, SOLUTION INTRAVENOUS CONTINUOUS
Status: DISCONTINUED | OUTPATIENT
Start: 2017-12-19 | End: 2017-12-19 | Stop reason: HOSPADM

## 2017-12-19 RX ADMIN — PROPOFOL 100 MCG/KG/MIN: 10 INJECTION, EMULSION INTRAVENOUS at 12:59

## 2017-12-19 RX ADMIN — LIDOCAINE HYDROCHLORIDE 10 ML: 10 INJECTION, SOLUTION EPIDURAL; INFILTRATION; INTRACAUDAL; PERINEURAL at 13:07

## 2017-12-19 RX ADMIN — LIDOCAINE HYDROCHLORIDE 50 MG: 20 INJECTION, SOLUTION INFILTRATION; PERINEURAL at 12:59

## 2017-12-19 RX ADMIN — PROPOFOL 50 MG: 10 INJECTION, EMULSION INTRAVENOUS at 12:57

## 2017-12-19 RX ADMIN — ACETAMINOPHEN 975 MG: 325 TABLET ORAL at 12:01

## 2017-12-19 RX ADMIN — SODIUM CHLORIDE, SODIUM LACTATE, POTASSIUM CHLORIDE, CALCIUM CHLORIDE: 600; 310; 30; 20 INJECTION, SOLUTION INTRAVENOUS at 12:01

## 2017-12-19 RX ADMIN — BUPIVACAINE HYDROCHLORIDE 10 ML: 2.5 INJECTION, SOLUTION INFILTRATION; PERINEURAL at 13:07

## 2017-12-19 NOTE — IP AVS SNAPSHOT
[facility]    [address] [place] [id number]    Phone:  [phone]    Fax:  [phone]                                       After Visit Summary   12/19/2017    Katherin Fletcher    MRN: [id number]           After Visit Summary Signature Page     I have received my discharge instructions, and my questions have been answered. I have discussed any challenges I see with this plan with the nurse or doctor.    ..........................................................................................................................................  Patient/Patient Representative Signature      ..........................................................................................................................................  Patient Representative Print Name and Relationship to Patient    ..................................................               ................................................  Date                                            Time    ..........................................................................................................................................  Reviewed by Signature/Title    ...................................................              ..............................................  Date                                                            Time

## 2017-12-19 NOTE — ANESTHESIA POSTPROCEDURE EVALUATION
Patient: Katherin Fletcher    Procedure(s):  Right 2nd and 3rd toe pinning - Wound Class: I-Clean    Diagnosis:Right Second and Third Hammer Toes  Diagnosis Additional Information: No value filed.    Anesthesia Type:  MAC    Note:  Anesthesia Post Evaluation    Patient location during evaluation: PACU  Patient participation: Able to fully participate in evaluation  Level of consciousness: awake and alert  Pain management: adequate  Airway patency: patent  Cardiovascular status: hemodynamically stable  Respiratory status: nonlabored ventilation, spontaneous ventilation and room air  Hydration status: euvolemic  PONV: none     Anesthetic complications: None          Last vitals:  Vitals:    12/19/17 1345 12/19/17 1400 12/19/17 1415   BP: 112/51 115/64 102/72   Pulse:      Resp: 14 14 14   Temp:  35.6  C (96  F) 35.6  C (96  F)   SpO2: 99% 98% 98%         Electronically Signed By: Kenton Muñiz MD  December 19, 2017  3:12 PM

## 2017-12-19 NOTE — DISCHARGE INSTRUCTIONS
"[facility]  Home Care Following Anesthesia  For 24 hours after surgery:  1. Get plenty of rest.  A responsible adult must stay with you for at least 24 hours after you leave the surgery center.  2. Do not drive or use heavy equipment.  If you have weakness or tingling, don't drive or use heavy equipment until this feeling goes away.   3. Do not drink alcohol.   4. Avoid strenuous or risky activities.  Ask for help when climbing stairs.  5. You may feel lightheaded.  IF so, sit for a few minutes before standing.  Have someone help you get up.   6. If you have nausea (feel sick to your stomach): Drink only clear liquids such as apple juice, ginger ale, broth or 7-Up.  Rest may also help.  Be sure to drink enough fluids.  Move to a regular diet as you feel able.   7. You may have a slight fever.  Call the doctor if your fever is over 100 F (37.7 C) (taken under the tongue) or lasts longer than 24 hours.  8. You may have a dry mouth, a sore throat, muscle aches or trouble sleeping. These should go away after 24 hours.  9. Do not make important or legal decisions.        Today you received a Marcaine or bupivacaine block to numb the nerves near your surgery site.  This is a block using local anesthetic or \"numbing\" medication injected around the nerves to anesthetize or \"numb\" the area supplied by those nerves.  This block is injected into the muscle layer near your surgical site.  The medication may numb the location where you had surgery for 6-18 hours, but may last up to 24 hours.  If your surgical site is an arm or leg you should be careful with your affected limb, since it is possible to injure your limb without being aware of it due to the numbing.  Until full feeling returns, you should guard against bumping or hitting your limb, and avoid extreme hot or cold temperatures on the skin.  As the block wears off, the feeling will return as a tingling or prickly sensation near your " surgical site.  You will experience more discomfort from your incision as the feeling returns.  You may want to take a pain pill (a narcotic or Tylenol if this was prescribed by your surgeon) when you start to experience mild pain before the pain beccomes more severe.  If your pain medications do not control your pain you should notifiy your surgeon.    Tips for taking pain medications  To get the best pain relief possible, remember these points:    Take pain medications as directed, before pain becomes severe.    Pain medication can upset your stomach: taking it with food may help.    Constipation is a common side effect of pain medication. Drink plenty of  fluids.    Eat foods high in fiber. Take a stool softener if recommended by your doctor or pharmacist.    Do not drink alcohol, drive or operate machinery while taking pain medications.    Ask about other ways to control pain, such as with heat, ice or relaxation.    Tylenol/Acetaminophen Consumption  To help encourage the safe use of acetaminophen, the makers of TYLENOL  have lowered the maximum daily dose for single-ingredient Extra Strength TYLENOL  (acetaminophen) products sold in the [place] from 8 pills per day (4,000 mg) to 6 pills per day (3,000 mg). The dosing interval has also changed from 2 pills every 4-6 hours to 2 pills every 6 hours.    If you feel your pain relief is insufficient, you may take Tylenol/Acetaminophen in addition to your narcotic pain medication.     Be careful not to exceed 3,000 mg of Tylenol/Acetaminophen in a 24 hour period from all sources.    If you are taking extra strength Tylenol/acetaminophen (500 mg), the maximum dose is 6 tablets in 24 hours.    If you are taking regular strength acetaminophen (325 mg), the maximum dose is 9 tablets in 24 hours.    Call a doctor for any of the followin. Signs of infection (fever, growing tenderness at the surgery site, a large amount of drainage or bleeding, severe pain, foul-smelling  "drainage, redness, swelling).  2. It has been over 8 to 10 hours since surgery and you are still not able to urinate (pass water).  3. Headache for over 24 hours.  4. Numbness, tingling or weakness the day after surgery (if you had spinal anesthesia).  Your doctor is:       Dr. Fernando Wiggins, Orthopaedics: [phone]               Or dial [phone] and ask for the resident on call for:  Orthopaedics  For emergency care, call the:  [facility] Emergency Department: [phone] (TTY for hearing impaired: [phone])    Safety Tips for Using Crutches    Crutch Fit:    Assume good standing posture with shoulders relaxed and crutch tips 6-8 inches out from the side of the foot.    The underarm pad should fall 2-3 fingers width below the armpit.    The handgrip is positioned level with the wrist to allow 30  flexion at the elbow.    Safety Tips:    Bear weight on your hands, not on your armpits.    Do not add extra padding to the underarm pad. This will, in effect, lengthen the crutches and increase risk of nerve injury.    Wear flat, properly fitting shoes. Do not walk in stocking feet, high heels or slippers.    Household hazards:  --Throw rugs should be removed from floors.  --Stairs should be cleared of obstacles.  --Use extra caution on slippery, highly polished, littered or uneven floor surfaces.  --Check for electric cords.    Check crutch tips for excessive wear and keep wing nuts tight.    While walking, look forward with  head up  and  eyes open.  Take equal length steps.    Use BOTH crutches.    Stairs Sequence:    UP: \"Good\" leg first, followed by  bad  leg, then crutches.    DOWN: Crutches, followed by  bad  leg, \"good\" leg.     Walking with Crutches:    Move both crutches forward at the same time.    Non-Weight Bearing (NWB):  Hold the involved leg up and swing through the crutches with the involved leg. The involved leg does not touch the floor.    Toe Touch Weight Bearing (TTWB): Move the involved " leg forward. Rest it lightly on the floor for balance only. Step through the crutches with the uninvolved leg.    Partial Weight Bearing (PWB): Move the involved leg forward. Step down the weight of the leg only.  Step through the crutches with the uninvolved leg.    Weight Bearing As Tolerated (WBAT): Move the involved leg forward. Put as much pressure through the involved leg as you can tolerate comfortably. Then step through the crutches with the uninvolved leg.

## 2017-12-19 NOTE — IP AVS SNAPSHOT
MRN:[id number]                      After Visit Summary   12/19/2017    Katherin Fletcher    MRN: [id number]           Thank you!     Thank you for choosing [facility] for your care. Our goal is always to provide you with excellent care. Hearing back from our patients is one way we can continue to improve our services. Please take a few minutes to complete the written survey that you may receive in the mail after you visit with us. Thank you!        Patient Information     Date Of Birth          1940        About your hospital stay     You were admitted on:  December 19, 2017 [facility]    You were discharged on:  December 19, 2017       Who to Call     For medical emergencies, please call 911.  For non-urgent questions about your medical care, please call your primary care provider or clinic, [phone]  For questions related to your surgery, please call your surgery clinic        Attending Provider     Provider Fernando Ugarte MD Orthopedics       Primary Care Provider Office Phone # Fax #    Aris Del Real [phone]      After Care Instructions      Diet as Tolerated       Return to diet before surgery, unless instructed otherwise.            Discharge Instructions       Review outpatient procedure discharge instructions with patient as directed by Provider            Dressing Change       Change dressing on third day after surgery.            Ice to affected area       Ice pack to surgical site every 15 minutes per hour for 24 hours            No driving or operating machinery        until the day after procedure            Notify Provider       For signs and symptoms of infection: Fever greater than 101, redness, swelling, heat at site, drainage, pus.            Return to clinic       Return to clinic in 2 weeks            Shower        Cover dressing if dressing is not going to be  changed today            Weight bearing - As tolerated       Wear cast boot at all times, except for hygiene            Wound care       Do not immerse wound in water until sutures removed                  Your next 10 appointments already scheduled     Jan 03, 2018 11:00 AM CST   (Arrive by 10:45 AM)   Post-Op with Landon SHAW Ortho Nurse   [facility] ([facility])    [address] [place] [id number] [phone]            Jan 30, 2018  4:50 PM CST   (Arrive by 4:35 PM)   POST-OP FOOT/ANKLE with Fernando Wiggins MD   [facility] ([facility])    [address] [place] [id number] [phone]              Further instructions from your care team       [facility]  Home Care Following Anesthesia  For 24 hours after surgery:  1. Get plenty of rest.  A responsible adult must stay with you for at least 24 hours after you leave the surgery center.  2. Do not drive or use heavy equipment.  If you have weakness or tingling, don't drive or use heavy equipment until this feeling goes away.   3. Do not drink alcohol.   4. Avoid strenuous or risky activities.  Ask for help when climbing stairs.  5. You may feel lightheaded.  IF so, sit for a few minutes before standing.  Have someone help you get up.   6. If you have nausea (feel sick to your stomach): Drink only clear liquids such as apple juice, ginger ale, broth or 7-Up.  Rest may also help.  Be sure to drink enough fluids.  Move to a regular diet as you feel able.   7. You may have a slight fever.  Call the doctor if your fever is over 100 F (37.7 C) (taken under the tongue) or lasts longer than 24 hours.  8. You may have a dry mouth, a sore throat, muscle aches or trouble sleeping. These should go away after 24 hours.  9. Do not make important or legal decisions.        Today you received a Marcaine or  "bupivacaine block to numb the nerves near your surgery site.  This is a block using local anesthetic or \"numbing\" medication injected around the nerves to anesthetize or \"numb\" the area supplied by those nerves.  This block is injected into the muscle layer near your surgical site.  The medication may numb the location where you had surgery for 6-18 hours, but may last up to 24 hours.  If your surgical site is an arm or leg you should be careful with your affected limb, since it is possible to injure your limb without being aware of it due to the numbing.  Until full feeling returns, you should guard against bumping or hitting your limb, and avoid extreme hot or cold temperatures on the skin.  As the block wears off, the feeling will return as a tingling or prickly sensation near your surgical site.  You will experience more discomfort from your incision as the feeling returns.  You may want to take a pain pill (a narcotic or Tylenol if this was prescribed by your surgeon) when you start to experience mild pain before the pain beccomes more severe.  If your pain medications do not control your pain you should notifiy your surgeon.    Tips for taking pain medications  To get the best pain relief possible, remember these points:    Take pain medications as directed, before pain becomes severe.    Pain medication can upset your stomach: taking it with food may help.    Constipation is a common side effect of pain medication. Drink plenty of  fluids.    Eat foods high in fiber. Take a stool softener if recommended by your doctor or pharmacist.    Do not drink alcohol, drive or operate machinery while taking pain medications.    Ask about other ways to control pain, such as with heat, ice or relaxation.    Tylenol/Acetaminophen Consumption  To help encourage the safe use of acetaminophen, the makers of TYLENOL  have lowered the maximum daily dose for single-ingredient Extra Strength TYLENOL  (acetaminophen) products sold " in the [place] from 8 pills per day (4,000 mg) to 6 pills per day (3,000 mg). The dosing interval has also changed from 2 pills every 4-6 hours to 2 pills every 6 hours.    If you feel your pain relief is insufficient, you may take Tylenol/Acetaminophen in addition to your narcotic pain medication.     Be careful not to exceed 3,000 mg of Tylenol/Acetaminophen in a 24 hour period from all sources.    If you are taking extra strength Tylenol/acetaminophen (500 mg), the maximum dose is 6 tablets in 24 hours.    If you are taking regular strength acetaminophen (325 mg), the maximum dose is 9 tablets in 24 hours.    Call a doctor for any of the followin. Signs of infection (fever, growing tenderness at the surgery site, a large amount of drainage or bleeding, severe pain, foul-smelling drainage, redness, swelling).  2. It has been over 8 to 10 hours since surgery and you are still not able to urinate (pass water).  3. Headache for over 24 hours.  4. Numbness, tingling or weakness the day after surgery (if you had spinal anesthesia).  Your doctor is:       Dr. Fernando Wiggins, Orthopaedics: [phone]               Or dial [phone] and ask for the resident on call for:  Orthopaedics  For emergency care, call the:  [facility]: [phone] (TTY for hearing impaired: [phone])    Safety Tips for Using Crutches    Crutch Fit:    Assume good standing posture with shoulders relaxed and crutch tips 6-8 inches out from the side of the foot.    The underarm pad should fall 2-3 fingers width below the armpit.    The handgrip is positioned level with the wrist to allow 30  flexion at the elbow.    Safety Tips:    Bear weight on your hands, not on your armpits.    Do not add extra padding to the underarm pad. This will, in effect, lengthen the crutches and increase risk of nerve injury.    Wear flat, properly fitting shoes. Do not walk in stocking feet, high heels or slippers.    Household  "hazards:  --Throw rugs should be removed from floors.  --Stairs should be cleared of obstacles.  --Use extra caution on slippery, highly polished, littered or uneven floor surfaces.  --Check for electric cords.    Check crutch tips for excessive wear and keep wing nuts tight.    While walking, look forward with  head up  and  eyes open.  Take equal length steps.    Use BOTH crutches.    Stairs Sequence:    UP: \"Good\" leg first, followed by  bad  leg, then crutches.    DOWN: Crutches, followed by  bad  leg, \"good\" leg.     Walking with Crutches:    Move both crutches forward at the same time.    Non-Weight Bearing (NWB):  Hold the involved leg up and swing through the crutches with the involved leg. The involved leg does not touch the floor.    Toe Touch Weight Bearing (TTWB): Move the involved leg forward. Rest it lightly on the floor for balance only. Step through the crutches with the uninvolved leg.    Partial Weight Bearing (PWB): Move the involved leg forward. Step down the weight of the leg only.  Step through the crutches with the uninvolved leg.    Weight Bearing As Tolerated (WBAT): Move the involved leg forward. Put as much pressure through the involved leg as you can tolerate comfortably. Then step through the crutches with the uninvolved leg.                  Pending Results     No orders found from 12/17/2017 to 12/20/2017.            Admission Information     Date & Time Provider Department Dept. Phone    12/19/2017 Fernando Wiggins MD [facility] [phone]      Your Vitals Were     Blood Pressure Pulse Temperature Respirations Height Weight    131/74 69 98.1  F (36.7  C) (Oral) 16 1.626 m (5' 4\") 81.6 kg (180 lb)    Pulse Oximetry BMI (Body Mass Index)                93% 30.9 kg/m2          [employer] Information     [employer] gives you secure access to your electronic health record. If you see a primary care provider, you can also send messages to your care team and " make appointments. If you have questions, please call your primary care clinic.  If you do not have a primary care provider, please call [phone] and they will assist you.      [employer] is an electronic gateway that provides easy, online access to your medical records. With [employer], you can request a clinic appointment, read your test results, renew a prescription or communicate with your care team.     To access your existing account, please contact your [facility] or call [phone] for assistance.        Care EveryWhere ID     This is your Care EveryWhere ID. This could be used by other organizations to access your [facility] medical records  [id number]        Equal Access to Services     JOSELUIS MADRIGAL : Yuly Owens, wilmer phoenix, jeovanny epps, carlo castro. So [facility] [phone].    ATENCIÓN: Si habla español, tiene a taylor disposición servicios gratuitos de asistencia lingüística. [facility] [phone].    We comply with applicable federal civil rights laws and [place] laws. We do not discriminate on the basis of race, color, national origin, age, disability, sex, sexual orientation, or gender identity.               Review of your medicines      START taking        Dose / Directions    HYDROcodone-acetaminophen 5-325 MG per tablet   Commonly known as:  NORCO   Used for:  Hammer toe of right foot        Dose:  1-2 tablet   Take 1-2 tablets by mouth every 4 hours as needed for other (Moderate to Severe Pain)   Quantity:  30 tablet   Refills:  0         CONTINUE these medicines which may have CHANGED, or have new prescriptions. If we are uncertain of the size of tablets/capsules you have at home, strength may be listed as something that might have changed.        Dose / Directions    clopidogrel 75 MG tablet   Commonly known as:  PLAVIX   This may have changed:  when to take this   Used for:  CAD (coronary artery  disease)        Dose:  75 mg   Take 1 tablet (75 mg) by mouth daily   Quantity:  90 tablet   Refills:  3       isosorbide mononitrate 30 MG 24 hr tablet   Commonly known as:  IMDUR   This may have changed:  when to take this   Used for:  Coronary artery disease involving native coronary artery of native heart without angina pectoris        Dose:  30 mg   Take 1 tablet (30 mg) by mouth daily   Quantity:  90 tablet   Refills:  3       levothyroxine 112 MCG tablet   Commonly known as:  SYNTHROID/LEVOTHROID   This may have changed:  See the new instructions.   Used for:  Encounter for medication refill        TAKE 1 TABLET DAILY   Quantity:  90 tablet   Refills:  1       rosuvastatin 20 MG tablet   Commonly known as:  CRESTOR   This may have changed:  when to take this   Used for:  Hyperlipidemia        Dose:  20 mg   Take 1 tablet (20 mg) by mouth daily   Quantity:  90 tablet   Refills:  3       tiZANidine 2 MG tablet   Commonly known as:  ZANAFLEX   This may have changed:  when to take this   Used for:  Ataxia        Dose:  2 mg   Take 1 tablet (2 mg) by mouth daily   Quantity:  90 tablet   Refills:  prn         CONTINUE these medicines which have NOT CHANGED        Dose / Directions    Alfalfa 250 MG Tabs        Dose:  250 mg   Take 250 mg by mouth every morning   Refills:  0       aspirin 81 MG tablet        Dose:  81 mg   Take 81 mg by mouth At Bedtime   Refills:  0       azelastine 0.1 % spray   Commonly known as:  ASTELIN        Refills:  0       benzonatate 200 MG capsule   Commonly known as:  TESSALON        Dose:  200 mg   Take 200 mg by mouth daily as needed   Refills:  0       calcium + D 600-200 MG-UNIT Tabs   Used for:  Preventative health care   Generic drug:  calcium carbonate-vitamin D        Dose:  1 tablet   Take 1 tablet by mouth At Bedtime   Refills:  0       carbidopa-levodopa  MG per tablet   Commonly known as:  SINEMET   Used for:  Restless legs syndrome, Neurologic gait disorder,  Dysarthria, Cerebellar disease        TAKE 2 TABLETS FOUR TIMES A DAY   Quantity:  720 tablet   Refills:  3       carvedilol 6.25 MG tablet   Commonly known as:  COREG   Used for:  Atrial fibrillation (H), HTN (hypertension)        Dose:  6.25 mg   Take 1 tablet (6.25 mg) by mouth 2 times daily (with meals)   Quantity:  180 tablet   Refills:  3       DAILY MULTI Tabs        Take by mouth every morning   Refills:  0       flunisolide 25 MCG/ACT (0.025%) Soln spray   Commonly known as:  NASALIDE        Refills:  0       gabapentin 100 MG capsule   Commonly known as:  NEURONTIN        Dose:  100 mg   Take 100 mg by mouth At Bedtime   Refills:  0       nitroGLYcerin 0.4 MG sublingual tablet   Commonly known as:  NITROSTAT   Used for:  CAD (coronary artery disease)        DISSOLVE ONE TABLET UNDER THE TONGUE EVERY 5 MINUTES AS NEEDED FOR CHEST PAIN.  DO NOT EXCEED A TOTAL OF 3 DOSES IN 15 MINUTES   Quantity:  25 tablet   Refills:  3       omeprazole 20 MG CR capsule   Commonly known as:  priLOSEC   Used for:  Encounter for medication refill        TAKE 2 CAPSULES DAILY 30 TO 60 MINUTES BEFORE A MEAL   Quantity:  180 capsule   Refills:  0       VITAMIN B-12 PO        Take by mouth every morning   Refills:  0            Where to get your medicines      Some of these will need a paper prescription and others can be bought over the counter. Ask your nurse if you have questions.     Bring a paper prescription for each of these medications     HYDROcodone-acetaminophen 5-325 MG per tablet                Protect others around you: Learn how to safely use, store and throw away your medicines at www.disposemymeds.org.             Medication List: This is a list of all your medications and when to take them. Check marks below indicate your daily home schedule. Keep this list as a reference.      Medications           Morning Afternoon Evening Bedtime As Needed    Alfalfa 250 MG Tabs   Take 250 mg by mouth every morning                                 aspirin 81 MG tablet   Take 81 mg by mouth At Bedtime                                azelastine 0.1 % spray   Commonly known as:  ASTELIN                                benzonatate 200 MG capsule   Commonly known as:  TESSALON   Take 200 mg by mouth daily as needed                                calcium + D 600-200 MG-UNIT Tabs   Take 1 tablet by mouth At Bedtime   Generic drug:  calcium carbonate-vitamin D                                carbidopa-levodopa  MG per tablet   Commonly known as:  SINEMET   TAKE 2 TABLETS FOUR TIMES A DAY                                carvedilol 6.25 MG tablet   Commonly known as:  COREG   Take 1 tablet (6.25 mg) by mouth 2 times daily (with meals)                                clopidogrel 75 MG tablet   Commonly known as:  PLAVIX   Take 1 tablet (75 mg) by mouth daily                                DAILY MULTI Tabs   Take by mouth every morning                                flunisolide 25 MCG/ACT (0.025%) Soln spray   Commonly known as:  NASALIDE                                gabapentin 100 MG capsule   Commonly known as:  NEURONTIN   Take 100 mg by mouth At Bedtime                                HYDROcodone-acetaminophen 5-325 MG per tablet   Commonly known as:  NORCO   Take 1-2 tablets by mouth every 4 hours as needed for other (Moderate to Severe Pain)                                isosorbide mononitrate 30 MG 24 hr tablet   Commonly known as:  IMDUR   Take 1 tablet (30 mg) by mouth daily                                levothyroxine 112 MCG tablet   Commonly known as:  SYNTHROID/LEVOTHROID   TAKE 1 TABLET DAILY                                nitroGLYcerin 0.4 MG sublingual tablet   Commonly known as:  NITROSTAT   DISSOLVE ONE TABLET UNDER THE TONGUE EVERY 5 MINUTES AS NEEDED FOR CHEST PAIN.  DO NOT EXCEED A TOTAL OF 3 DOSES IN 15 MINUTES                                omeprazole 20 MG CR capsule   Commonly known as:  priLOSEC   TAKE 2 CAPSULES DAILY 30  TO 60 MINUTES BEFORE A MEAL                                rosuvastatin 20 MG tablet   Commonly known as:  CRESTOR   Take 1 tablet (20 mg) by mouth daily                                tiZANidine 2 MG tablet   Commonly known as:  ZANAFLEX   Take 1 tablet (2 mg) by mouth daily                                VITAMIN B-12 PO   Take by mouth every morning

## 2017-12-19 NOTE — ANESTHESIA CARE TRANSFER NOTE
Patient: Katherin Fletcher    Procedure(s):  Right 2nd and 3rd toe pinning - Wound Class: I-Clean    Diagnosis: Right Second and Third Hammer Toes  Diagnosis Additional Information: No value filed.    Anesthesia Type:   MAC     Note:  Airway :Nasal Cannula  Patient transferred to:PACU  Comments: Taken to PACU   Patient sleeping Vitals stable.  Report to [facility] Report: Identifed the Patient, Identified the Reponsible Provider, Reviewed the pertinent medical history, Discussed the surgical course, Reviewed Intra-OP anesthesia mangement and issues during anesthesia, Set expectations for post-procedure period and Allowed opportunity for questions and acknowledgement of understanding      Vitals: (Last set prior to Anesthesia Care Transfer)    CRNA VITALS  12/19/2017 1310 - 12/19/2017 1341      12/19/2017             Resp Rate (set): 10                Electronically Signed By: EVONNE Galindo CRNA  December 19, 2017  1:41 PM

## 2017-12-19 NOTE — TELEPHONE ENCOUNTER
GENETIC COUNSELING-Ataxia clinic  I spoke with Katherin and indicated that genetic testing for SCA8 and SCA17 were both normal.  These two tests had been recommended by Dr. Randolph as part of his evluation.  Katherin had previously tested negative for the most common autosomal dominant ataxias (SCA1,2,3,6,7) and had tested negative for our next generation sequencing panel of hereditary ataxias and spastic paraplegias.  She asked what the next step would be. I indicated that many of our patients never receive a specific genetic diagnosis and so it is possible that we could do exhaustive genetic testing and still not uncover a genetic etiology.  I explained that it may be worthwhile to check in with time to see if the cost/diagnostic yield of exome sequencing in a [facility] patient with later onset improves with time.    Boris jennings MS, [facility]  Certified Genetic Counselor

## 2017-12-20 DIAGNOSIS — R47.1 DYSARTHRIA: ICD-10-CM

## 2017-12-20 DIAGNOSIS — G93.9: ICD-10-CM

## 2017-12-20 DIAGNOSIS — R26.9 NEUROLOGIC GAIT DISORDER: ICD-10-CM

## 2017-12-20 DIAGNOSIS — G25.81 RESTLESS LEGS SYNDROME: ICD-10-CM

## 2017-12-21 RX ORDER — CARBIDOPA AND LEVODOPA 25; 100 MG/1; MG/1
TABLET ORAL
Qty: 720 TABLET | Refills: 3 | Status: SHIPPED | OUTPATIENT
Start: 2017-12-21 | End: 2018-11-19

## 2017-12-27 NOTE — TELEPHONE ENCOUNTER
Patient called and asked me to cancel prescription with [facility] and move to [facility].  [facility] did not have enough in stock.    Miguel Allen,   [facility]  [phone]

## 2018-01-04 ENCOUNTER — MYC MEDICAL ADVICE (OUTPATIENT)
Dept: NEUROLOGY | Facility: CLINIC | Age: 78
End: 2018-01-04

## 2018-01-04 NOTE — OP NOTE
DATE OF PROCEDURE:  12/19/2017      PREOPERATIVE DIAGNOSIS:  Right second and third hammertoe deformity.      POSTOPERATIVE DIAGNOSIS:  Right second and third hammertoe deformity.      PROCEDURES:  Right second and third hammertoe corrective surgery with pinning.      SURGEON:  Fernando Wiggins MD      COMPLICATIONS:  None.      DRAINS:  None.      ESTIMATED BLOOD LOSS:  Less than 20 mL.      ANESTHESIA:  General endotracheal.      INDICATIONS:  Please refer to clinic notes for further details regarding the indications of Ms. Fletcher's case.        PROCEDURE NOTE:  The following dictation is to the best of my recollection as original dictation has been lost in the system.      After successful induction of general endotracheal anesthesia, the patient was placed supine on the operating table.  The right lower extremity was prepped and draped in sterile fashion, and after exsanguination by gravity, tourniquet cuff was inflated to 300 mmHg on the proximal third of the right thigh.      Preoperative antibiotics were administered to the patient prior to arrival to the OR.      A pause for the cause was performed according to the institution's policy which confirmed the laterality of the procedure.      We proceeded with an incision along the dorsal aspect of the PIP joint in an elliptical fashion.  We proceeded with a transection of the condyles of the proximal phalanx as well as the base of the middle phalanx.  Subsequently to this, we confirmed to have excellent correction of the toe, and we proceeded with a retrograde pinning of the toe, which was advanced into the second metatarsal head.      The third toe was approached in exactly similar fashion with similar incisions and similar fixation.      We confirmed under fluoroscopic examination and 3 views of the right foot to have excellent reduction of the PIP joints for the toes as well as location of the hardware.  These images were sent to PACS for definitive  documentation.      Tourniquet cuff was deflated.  Satisfactory hemostasis was accomplished.  Wound was closed in layers.  Sterile dressings were applied.  The patient was placed in a short CAM Walker and transferred in stable condition to PACU.      PLAN:  The patient will remain weightbearing as tolerated with the use of the CAM Walker.  The CAM Walker will be utilized at all times except for hygiene for the first 6 weeks from surgery.  At 2 weeks from surgery, she will return to clinic.  Sutures will be removed if indicated, and the pin sites will be inspected.      The patient will proceed with a second pin site inspection at 4 weeks from surgery.      The patient will be reevaluated at 6 weeks from surgery, and at that time, 3 views of the right foot will be obtained prior to pulling the pins.         CHERRI MORENO MD             D: 2018 13:12   T: 2018 13:44   MT: siva      Name:     ANA RODRIGUEZ   MRN:      [id number]        Account:        [id number]   :      1940           Procedure Date: 2017      Document: [id number]

## 2018-01-05 ENCOUNTER — OFFICE VISIT (OUTPATIENT)
Dept: ORTHOPEDICS | Facility: CLINIC | Age: 78
End: 2018-01-05
Payer: OTHER GOVERNMENT

## 2018-01-05 DIAGNOSIS — Z09 SURGICAL FOLLOW-UP CARE: Primary | ICD-10-CM

## 2018-01-05 NOTE — MR AVS SNAPSHOT
After Visit Summary   1/5/2018    Katherin Fletcher    MRN: [id number]           Patient Information     Date Of Birth          1940        Visit Information        Provider Department      1/5/2018 11:00 AM Nurse, [facility]        Today's Diagnoses     Surgical follow-up care    -  1       Follow-ups after your visit        Your next 10 appointments already scheduled     Jan 19, 2018 11:00 AM CST   (Arrive by 10:45 AM)   Post-Op with [facility] Nurse   [facility] ([facility])    [address] [place] [address] [phone]            Jan 30, 2018  4:50 PM CST   (Arrive by 4:35 PM)   POST-OP FOOT/ANKLE with Fernando Wiggins MD   [facility] ([facility])    [address] [place] [address] [phone]              Who to contact     Please call your clinic at [phone] to:    Ask questions about your health    Make or cancel appointments    Discuss your medicines    Learn about your test results    Speak to your doctor   If you have compliments or concerns about an experience at your clinic, or if you wish to file a complaint, please contact [facility] Patient Relations at [phone] or email us at [email or web address]         Additional Information About Your Visit        MyChart Information     [employer] gives you secure access to your electronic health record. If you see a primary care provider, you can also send messages to your care team and make appointments. If you have questions, please call your primary care clinic.  If you do not have a primary care provider, please call [phone] and they will assist you.      [employer] is an electronic gateway that provides easy, online access to your medical records. With [employer], you can request a clinic appointment, read your test  results, renew a prescription or communicate with your care team.     To access your existing account, please contact your [facility] or call [phone] for assistance.        Care EveryWhere ID     This is your Care EveryWhere ID. This could be used by other organizations to access your [place] medical records  [id number]         Blood Pressure from Last 3 Encounters:   12/19/17 102/72   12/11/17 135/83   11/03/17 153/80    Weight from Last 3 Encounters:   12/19/17 81.6 kg (180 lb)   12/11/17 81.6 kg (180 lb)   11/03/17 81.6 kg (180 lb)              Today, you had the following     No orders found for display         Today's Medication Changes          These changes are accurate as of: 1/5/18 11:47 AM.  If you have any questions, ask your nurse or doctor.               These medicines have changed or have updated prescriptions.        Dose/Directions    clopidogrel 75 MG tablet   Commonly known as:  PLAVIX   This may have changed:  when to take this   Used for:  CAD (coronary artery disease)        Dose:  75 mg   Take 1 tablet (75 mg) by mouth daily   Quantity:  90 tablet   Refills:  3       isosorbide mononitrate 30 MG 24 hr tablet   Commonly known as:  IMDUR   This may have changed:  when to take this   Used for:  Coronary artery disease involving native coronary artery of native heart without angina pectoris        Dose:  30 mg   Take 1 tablet (30 mg) by mouth daily   Quantity:  90 tablet   Refills:  3       rosuvastatin 20 MG tablet   Commonly known as:  CRESTOR   This may have changed:  when to take this   Used for:  Hyperlipidemia        Dose:  20 mg   Take 1 tablet (20 mg) by mouth daily   Quantity:  90 tablet   Refills:  3       tiZANidine 2 MG tablet   Commonly known as:  ZANAFLEX   This may have changed:  when to take this   Used for:  Ataxia        Dose:  2 mg   Take 1 tablet (2 mg) by mouth daily   Quantity:  90 tablet   Refills:  prn                Primary Care  Provider Office Phone # Fax #    Aris Del Real [phone] [address]  [facility] [address]        Equal Access to Services     GEORGIERHONDA ROHAN : Yuly rosio joel anao Soceceali, waaxda luqadaha, qaybta kaalmada supriya, carlo winstonstan matthew. So [facility] [phone].    ATENCIÓN: Si habla español, tiene a taylor disposición servicios gratuitos de asistencia lingüística. Joeame al [phone].    We comply with applicable federal civil rights laws and [place] laws. We do not discriminate on the basis of race, color, national origin, age, disability, sex, sexual orientation, or gender identity.            Thank you!     Thank you for choosing [facility]  for your care. Our goal is always to provide you with excellent care. Hearing back from our patients is one way we can continue to improve our services. Please take a few minutes to complete the written survey that you may receive in the mail after your visit with us. Thank you!             Your Updated Medication List - Protect others around you: Learn how to safely use, store and throw away your medicines at www.disposemymeds.org.          This list is accurate as of: 1/5/18 11:47 AM.  Always use your most recent med list.                   Brand Name Dispense Instructions for use Diagnosis    Alfalfa 250 MG Tabs      Take 250 mg by mouth every morning        aspirin 81 MG tablet      Take 81 mg by mouth At Bedtime        azelastine 0.1 % spray    ASTELIN          benzonatate 200 MG capsule    TESSALON     Take 200 mg by mouth daily as needed        calcium + D 600-200 MG-UNIT Tabs   Generic drug:  calcium carbonate-vitamin D      Take 1 tablet by mouth At Bedtime    Preventative health care       carbidopa-levodopa  MG per tablet    SINEMET    720 tablet    TAKE 2 TABLETS FOUR TIMES A DAY    Restless legs syndrome, Neurologic gait disorder, Dysarthria, Cerebellar disease       carvedilol  6.25 MG tablet    COREG    180 tablet    Take 1 tablet (6.25 mg) by mouth 2 times daily (with meals)    Atrial fibrillation (H), HTN (hypertension)       clopidogrel 75 MG tablet    PLAVIX    90 tablet    Take 1 tablet (75 mg) by mouth daily    CAD (coronary artery disease)       DAILY MULTI Tabs      Take by mouth every morning        flunisolide 25 MCG/ACT (0.025%) Soln spray    NASALIDE          gabapentin 100 MG capsule    NEURONTIN     Take 100 mg by mouth At Bedtime        isosorbide mononitrate 30 MG 24 hr tablet    IMDUR    90 tablet    Take 1 tablet (30 mg) by mouth daily    Coronary artery disease involving native coronary artery of native heart without angina pectoris       levothyroxine 112 MCG tablet    SYNTHROID/LEVOTHROID    90 tablet    TAKE 1 TABLET DAILY    Encounter for medication refill       nitroGLYcerin 0.4 MG sublingual tablet    NITROSTAT    25 tablet    DISSOLVE ONE TABLET UNDER THE TONGUE EVERY 5 MINUTES AS NEEDED FOR CHEST PAIN.  DO NOT EXCEED A TOTAL OF 3 DOSES IN 15 MINUTES    CAD (coronary artery disease)       omeprazole 20 MG CR capsule    priLOSEC    180 capsule    TAKE 2 CAPSULES DAILY 30 TO 60 MINUTES BEFORE A MEAL    Encounter for medication refill       rosuvastatin 20 MG tablet    CRESTOR    90 tablet    Take 1 tablet (20 mg) by mouth daily    Hyperlipidemia       tiZANidine 2 MG tablet    ZANAFLEX    90 tablet    Take 1 tablet (2 mg) by mouth daily    Ataxia       VITAMIN B-12 PO      Take by mouth every morning

## 2018-01-05 NOTE — PROGRESS NOTES
Reason for visit:    Katherin Fletcher came in to the clinic for a two week post op check.    Her surgery was done 12/19/17 by Dr Wiggins.  She had Right second and third hammertoe corrective surgery with pinning.      Assessment:    Katherin came into the clinic in post op cam boot WBAT    The Surgical wounds were exposed and found to be well-healed and without evidence of infection; so the sutures were removed. Pins to second and third toes were without evidence of infection. The pin to her second toe has advanced out of her toe about 0.5 cm.  She is instructed on limiting her walking and the speed at which she walks to ensure the pin does not advance any further out.  She verbalized understanding of this.     She denies any pain. Sensation intact.      Plan:     She was placed in her cam walker.  She was told to limit her walking but she is WBAT. She should wear her boot at all times except for hygiene.  She will return to clinic in 2 weeks for a pin site check.      She has an appointment to see Dr. Wiggins at that time Dr. Wiggins will determine further restrictions.    She has our phone number and will call with questions or problems.      Answers for HPI/ROS submitted by the patient on 12/30/2017   General Symptoms: No  Skin Symptoms: No  HENT Symptoms: No  EYE SYMPTOMS: No  HEART SYMPTOMS: No  LUNG SYMPTOMS: No  INTESTINAL SYMPTOMS: No  URINARY SYMPTOMS: No  GYNECOLOGIC SYMPTOMS: No  BREAST SYMPTOMS: No  SKELETAL SYMPTOMS: No  BLOOD SYMPTOMS: No  NERVOUS SYSTEM SYMPTOMS: No  MENTAL HEALTH SYMPTOMS: No

## 2018-01-08 DIAGNOSIS — Z79.01 LONG TERM CURRENT USE OF ANTICOAGULANT THERAPY: Primary | ICD-10-CM

## 2018-01-08 LAB — INR PPP: 1

## 2018-01-08 PROCEDURE — 36415 COLL VENOUS BLD VENIPUNCTURE: CPT | Performed by: FAMILY MEDICINE

## 2018-01-08 PROCEDURE — 85610 PROTHROMBIN TIME: CPT | Performed by: FAMILY MEDICINE

## 2018-01-08 NOTE — LETTER
[facility]  [address] [place] [id number] [phone]      January 8, 2018      Katherin Fletcher  MRN: [id number]  Female, 77 year old, 1940  [address]  [facility] [address]        [facility] 1/8/2018   INR 1.0    OTHER   LOCATION Clinic       Sincerely,    Aris Del Real M.D.

## 2018-01-19 ENCOUNTER — OFFICE VISIT (OUTPATIENT)
Dept: ORTHOPEDICS | Facility: CLINIC | Age: 78
End: 2018-01-19
Payer: OTHER GOVERNMENT

## 2018-01-19 DIAGNOSIS — Z09 SURGICAL FOLLOW-UP CARE: Primary | ICD-10-CM

## 2018-01-19 NOTE — PROGRESS NOTES
The patient came into clinic today for a pin check of her right 2nd and 3rd toes. The pin in the 2nd toe appears to still be in post surgical position. The pin in the 3rd toe does not looked to have advanced any further out of the toe compared to the visit on 1/5/18. The patient was instructed again to limit walking. The patient asked if she could remove the CAM walker while sleeping. She was instructed to wear the CAM walker at all times except for hygiene.     The patient will follow up with Dr. Wiggins on 1/30/18. The patient should contact our office with any questions or concerns. She was instructed to contact our office is she notices the pin advancing further from the toe. The patient states she understands.

## 2018-01-19 NOTE — MR AVS SNAPSHOT
After Visit Summary   1/19/2018    Katherin Fletcher    MRN: [id number]           Patient Information     Date Of Birth          1940        Visit Information        Provider Department      1/19/2018 11:00 AM Nurse, [facility]        Today's Diagnoses     Surgical follow-up care    -  1       Follow-ups after your visit        Your next 10 appointments already scheduled     Jan 30, 2018  4:50 PM CST   (Arrive by 4:35 PM)   POST-OP FOOT/ANKLE with Fernando Wiggins MD   [facility] ([facility])    [address] [address] [phone]            Mar 29, 2018 12:30 PM CDT   PHYSICAL with Aris Del Real MD   [facility]    [address] [place] [address] [phone]              Who to contact     Please call your clinic at [phone] to:    Ask questions about your health    Make or cancel appointments    Discuss your medicines    Learn about your test results    Speak to your doctor   If you have compliments or concerns about an experience at your clinic, or if you wish to file a complaint, please contact [facility] Patient Relations at [phone] or email us at [email or web address]         Additional Information About Your Visit        MyChart Information     [employer] gives you secure access to your electronic health record. If you see a primary care provider, you can also send messages to your care team and make appointments. If you have questions, please call your primary care clinic.  If you do not have a primary care provider, please call [phone] and they will assist you.      [employer] is an electronic gateway that provides easy, online access to your medical records. With [employer], you can request a clinic appointment, read your test results, renew a prescription or communicate  with your care team.     To access your existing account, please contact your [facility] or call [phone] for assistance.        Care EveryWhere ID     This is your Care EveryWhere ID. This could be used by other organizations to access your [place] medical records  [id number]         Blood Pressure from Last 3 Encounters:   12/19/17 102/72   12/11/17 135/83   11/03/17 153/80    Weight from Last 3 Encounters:   12/19/17 81.6 kg (180 lb)   12/11/17 81.6 kg (180 lb)   11/03/17 81.6 kg (180 lb)              Today, you had the following     No orders found for display         Today's Medication Changes          These changes are accurate as of: 1/19/18 11:46 AM.  If you have any questions, ask your nurse or doctor.               These medicines have changed or have updated prescriptions.        Dose/Directions    clopidogrel 75 MG tablet   Commonly known as:  PLAVIX   This may have changed:  when to take this   Used for:  CAD (coronary artery disease)        Dose:  75 mg   Take 1 tablet (75 mg) by mouth daily   Quantity:  90 tablet   Refills:  3       isosorbide mononitrate 30 MG 24 hr tablet   Commonly known as:  IMDUR   This may have changed:  when to take this   Used for:  Coronary artery disease involving native coronary artery of native heart without angina pectoris        Dose:  30 mg   Take 1 tablet (30 mg) by mouth daily   Quantity:  90 tablet   Refills:  3       rosuvastatin 20 MG tablet   Commonly known as:  CRESTOR   This may have changed:  when to take this   Used for:  Hyperlipidemia        Dose:  20 mg   Take 1 tablet (20 mg) by mouth daily   Quantity:  90 tablet   Refills:  3       tiZANidine 2 MG tablet   Commonly known as:  ZANAFLEX   This may have changed:  when to take this   Used for:  Ataxia        Dose:  2 mg   Take 1 tablet (2 mg) by mouth daily   Quantity:  90 tablet   Refills:  prn                Primary Care Provider Office Phone # Fax #    Aris Last  MD Gt [phone]       [address]  [facility] [address]        Equal Access to Services     JOSELUIS MADRIGAL : Yuly rosio joel daja Owens, wajessica guerolenora, qasanchezta kakimda supriya, carlo brendamamta castro. So [facility] [phone].    ATENCIÓN: Si habla español, tiene a taylor disposición servicios gratuitos de asistencia lingüística. Llame al [phone].    We comply with applicable federal civil rights laws and [place] laws. We do not discriminate on the basis of race, color, national origin, age, disability, sex, sexual orientation, or gender identity.            Thank you!     Thank you for choosing [facility]  for your care. Our goal is always to provide you with excellent care. Hearing back from our patients is one way we can continue to improve our services. Please take a few minutes to complete the written survey that you may receive in the mail after your visit with us. Thank you!             Your Updated Medication List - Protect others around you: Learn how to safely use, store and throw away your medicines at www.disposemymeds.org.          This list is accurate as of: 1/19/18 11:46 AM.  Always use your most recent med list.                   Brand Name Dispense Instructions for use Diagnosis    Alfalfa 250 MG Tabs      Take 250 mg by mouth every morning        aspirin 81 MG tablet      Take 81 mg by mouth At Bedtime        azelastine 0.1 % spray    ASTELIN          benzonatate 200 MG capsule    TESSALON     Take 200 mg by mouth daily as needed        calcium + D 600-200 MG-UNIT Tabs   Generic drug:  calcium carbonate-vitamin D      Take 1 tablet by mouth At Bedtime    Preventative health care       carbidopa-levodopa  MG per tablet    SINEMET    720 tablet    TAKE 2 TABLETS FOUR TIMES A DAY    Restless legs syndrome, Neurologic gait disorder, Dysarthria, Cerebellar disease       carvedilol 6.25 MG tablet    COREG    180 tablet    Take 1  tablet (6.25 mg) by mouth 2 times daily (with meals)    Atrial fibrillation (H), HTN (hypertension)       clopidogrel 75 MG tablet    PLAVIX    90 tablet    Take 1 tablet (75 mg) by mouth daily    CAD (coronary artery disease)       DAILY MULTI Tabs      Take by mouth every morning        flunisolide 25 MCG/ACT (0.025%) Soln spray    NASALIDE          gabapentin 100 MG capsule    NEURONTIN     Take 100 mg by mouth At Bedtime        isosorbide mononitrate 30 MG 24 hr tablet    IMDUR    90 tablet    Take 1 tablet (30 mg) by mouth daily    Coronary artery disease involving native coronary artery of native heart without angina pectoris       levothyroxine 112 MCG tablet    SYNTHROID/LEVOTHROID    90 tablet    TAKE 1 TABLET DAILY    Encounter for medication refill       nitroGLYcerin 0.4 MG sublingual tablet    NITROSTAT    25 tablet    DISSOLVE ONE TABLET UNDER THE TONGUE EVERY 5 MINUTES AS NEEDED FOR CHEST PAIN.  DO NOT EXCEED A TOTAL OF 3 DOSES IN 15 MINUTES    CAD (coronary artery disease)       omeprazole 20 MG CR capsule    priLOSEC    180 capsule    TAKE 2 CAPSULES DAILY 30 TO 60 MINUTES BEFORE A MEAL    Encounter for medication refill       rosuvastatin 20 MG tablet    CRESTOR    90 tablet    Take 1 tablet (20 mg) by mouth daily    Hyperlipidemia       tiZANidine 2 MG tablet    ZANAFLEX    90 tablet    Take 1 tablet (2 mg) by mouth daily    Ataxia       VITAMIN B-12 PO      Take by mouth every morning

## 2018-01-22 DIAGNOSIS — I25.10 CAD (CORONARY ARTERY DISEASE): ICD-10-CM

## 2018-01-22 DIAGNOSIS — E78.5 HYPERLIPIDEMIA: ICD-10-CM

## 2018-01-22 RX ORDER — CLOPIDOGREL BISULFATE 75 MG/1
75 TABLET ORAL DAILY
Qty: 90 TABLET | Refills: 2 | Status: SHIPPED | OUTPATIENT
Start: 2018-01-22 | End: 2018-09-13

## 2018-01-22 RX ORDER — ROSUVASTATIN CALCIUM 20 MG/1
20 TABLET, COATED ORAL DAILY
Qty: 90 TABLET | Refills: 2 | Status: SHIPPED | OUTPATIENT
Start: 2018-01-22 | End: 2018-09-13

## 2018-01-29 DIAGNOSIS — Z09 SURGICAL FOLLOW-UP CARE: Primary | ICD-10-CM

## 2018-01-30 ENCOUNTER — OFFICE VISIT (OUTPATIENT)
Dept: ORTHOPEDICS | Facility: CLINIC | Age: 78
End: 2018-01-30
Payer: OTHER GOVERNMENT

## 2018-01-30 ENCOUNTER — TRANSFERRED RECORDS (OUTPATIENT)
Dept: FAMILY MEDICINE | Facility: CLINIC | Age: 78
End: 2018-01-30

## 2018-01-30 ENCOUNTER — RADIANT APPOINTMENT (OUTPATIENT)
Dept: GENERAL RADIOLOGY | Facility: CLINIC | Age: 78
End: 2018-01-30
Attending: ORTHOPAEDIC SURGERY
Payer: OTHER GOVERNMENT

## 2018-01-30 DIAGNOSIS — Z09 SURGICAL FOLLOW-UP CARE: ICD-10-CM

## 2018-01-30 DIAGNOSIS — M25.571 PAIN IN JOINT, ANKLE AND FOOT, RIGHT: Primary | ICD-10-CM

## 2018-01-30 NOTE — MR AVS SNAPSHOT
After Visit Summary   1/30/2018    Katherin Fletcher    MRN: [id number]           Patient Information     Date Of Birth          1940        Visit Information        Provider Department      1/30/2018 4:50 PM Fernando Wiggins MD [facility]        Today's Diagnoses     Pain in joint, ankle and foot, right    -  1       Follow-ups after your visit        Follow-up notes from your care team     Return in about 7 weeks (around 3/20/2018).      Your next 10 appointments already scheduled     Feb 06, 2018  3:20 PM CST   (Arrive by 3:05 PM)   RETURN FOOT/ANKLE with Adrian Arroyo DPM   [facility] ([facility])    [address] [place] [address] [phone]            Mar 20, 2018  2:20 PM CDT   (Arrive by 2:05 PM)   POST-OP FOOT/ANKLE with Fernando Wiggins MD   [facility] ([facility])    [address] [place] [address] [phone]            Mar 29, 2018 12:30 PM CDT   [facility] with Aris Del [facility] MD   [facility]    [address] [place] [address] [phone]              Who to contact     Please call your clinic at [phone] to:    Ask questions about your health    Make or cancel appointments    Discuss your medicines    Learn about your test results    Speak to your doctor   If you have compliments or concerns about an experience at your clinic, or if you wish to file a complaint, please contact [facility] Patient Relations at [phone] or email us at [email or web address]         Additional Information About Your Visit        MyChart Information     MyChart gives you secure access to your electronic health record. If you see a primary care provider, you can also send messages to your care team and make appointments.  If you have questions, please call your primary care clinic.  If you do not have a primary care provider, please call [phone] and they will assist you.      [employer] is an electronic gateway that provides easy, online access to your medical records. With [employer], you can request a clinic appointment, read your test results, renew a prescription or communicate with your care team.     To access your existing account, please contact your [facility] or call [phone] for assistance.        Care EveryWhere ID     This is your Care EveryWhere ID. This could be used by other organizations to access your [place] medical records  [id number]         Blood Pressure from Last 3 Encounters:   12/19/17 102/72   12/11/17 135/83   11/03/17 153/80    Weight from Last 3 Encounters:   12/19/17 81.6 kg (180 lb)   12/11/17 81.6 kg (180 lb)   11/03/17 81.6 kg (180 lb)              Today, you had the following     No orders found for display         Today's Medication Changes          These changes are accurate as of 1/30/18  5:30 PM.  If you have any questions, ask your nurse or doctor.               These medicines have changed or have updated prescriptions.        Dose/Directions    isosorbide mononitrate 30 MG 24 hr tablet   Commonly known as:  IMDUR   This may have changed:  when to take this   Used for:  Coronary artery disease involving native coronary artery of native heart without angina pectoris        Dose:  30 mg   Take 1 tablet (30 mg) by mouth daily   Quantity:  90 tablet   Refills:  3       tiZANidine 2 MG tablet   Commonly known as:  ZANAFLEX   This may have changed:  when to take this   Used for:  Ataxia        Dose:  2 mg   Take 1 tablet (2 mg) by mouth daily   Quantity:  90 tablet   Refills:  prn                Primary Care Provider Office Phone # Fax #    Aris Del Real [phone] [address]  [facility] [address]        Equal Access to  Services     [facility]: Hadii rosio joel daja Rubyali, waaxda luqadaha, qaybta kaalmada mikyeveliaedy, carlo raylakiatiffany odonnell . So [facility] [phone].    ATENCIÓN: Si spencerla jerrica, tiene a taylor disposición servicios gratuitos de asistencia lingüística. Llame al [phone].    We comply with applicable federal civil rights laws and [place] laws. We do not discriminate on the basis of race, color, national origin, age, disability, sex, sexual orientation, or gender identity.            Thank you!     Thank you for choosing [facility]  for your care. Our goal is always to provide you with excellent care. Hearing back from our patients is one way we can continue to improve our services. Please take a few minutes to complete the written survey that you may receive in the mail after your visit with us. Thank you!             Your Updated Medication List - Protect others around you: Learn how to safely use, store and throw away your medicines at www.disposemymeds.org.          This list is accurate as of 1/30/18  5:30 PM.  Always use your most recent med list.                   Brand Name Dispense Instructions for use Diagnosis    Alfalfa 250 MG Tabs      Take 250 mg by mouth every morning        aspirin 81 MG tablet      Take 81 mg by mouth At Bedtime        azelastine 0.1 % spray    ASTELIN          benzonatate 200 MG capsule    TESSALON     Take 200 mg by mouth daily as needed        calcium + D 600-200 MG-UNIT Tabs   Generic drug:  calcium carbonate-vitamin D      Take 1 tablet by mouth At Bedtime    Preventative health care       carbidopa-levodopa  MG per tablet    SINEMET    720 tablet    TAKE 2 TABLETS FOUR TIMES A DAY    Restless legs syndrome, Neurologic gait disorder, Dysarthria, Cerebellar disease       carvedilol 6.25 MG tablet    COREG    180 tablet    Take 1 tablet (6.25 mg) by mouth 2 times daily (with meals)    Atrial fibrillation (H), HTN (hypertension)        clopidogrel 75 MG tablet    PLAVIX    90 tablet    Take 1 tablet (75 mg) by mouth daily    CAD (coronary artery disease)       DAILY MULTI Tabs      Take by mouth every morning        flunisolide 25 MCG/ACT (0.025%) Soln spray    NASALIDE          gabapentin 100 MG capsule    NEURONTIN     Take 100 mg by mouth At Bedtime        isosorbide mononitrate 30 MG 24 hr tablet    IMDUR    90 tablet    Take 1 tablet (30 mg) by mouth daily    Coronary artery disease involving native coronary artery of native heart without angina pectoris       levothyroxine 112 MCG tablet    SYNTHROID/LEVOTHROID    90 tablet    TAKE 1 TABLET DAILY    Encounter for medication refill       nitroGLYcerin 0.4 MG sublingual tablet    NITROSTAT    25 tablet    DISSOLVE ONE TABLET UNDER THE TONGUE EVERY 5 MINUTES AS NEEDED FOR CHEST PAIN.  DO NOT EXCEED A TOTAL OF 3 DOSES IN 15 MINUTES    CAD (coronary artery disease)       omeprazole 20 MG CR capsule    priLOSEC    180 capsule    TAKE 2 CAPSULES DAILY 30 TO 60 MINUTES BEFORE A MEAL    Encounter for medication refill       rosuvastatin 20 MG tablet    CRESTOR    90 tablet    Take 1 tablet (20 mg) by mouth daily    Hyperlipidemia       tiZANidine 2 MG tablet    ZANAFLEX    90 tablet    Take 1 tablet (2 mg) by mouth daily    Ataxia       VITAMIN B-12 PO      Take by mouth every morning

## 2018-01-30 NOTE — LETTER
1/30/2018       RE: Katherin Fletcher  [address]  [facility] [address]     Dear Colleague,    Thank you for referring your patient, Katherin Fletcher, to the [facility] at [facility]. Please see a copy of my visit note below.    CHIEF COMPLAINT:  Right second and third toe pinning performed on 12/19/2017.      HISTORY OF PRESENT ILLNESS:  Ms. Fletcher presents today for further followup.  Denies to have any problems or complaints.  Reports to be compliant.      PHYSICAL EXAMINATION:  On today's visit, she presents with excellent alignment of the right second and third toes.  The pin sites have been clean.  CMS is intact.      RADIOGRAPHIC EVALUATION:  Three views of the right foot were obtained today which were significant for showing excellent alignment of the second toe with early arthrodesis across the PIP joints.  Hardware is intact and in place.  Alignment is excellent.      ASSESSMENT:  Status post right second and third toe pinning.      PLAN:  I discussed with the patient that at this point we can proceed with pulling of the pins.  This was performed in the office setting without any complications.  The patient tolerated well the procedure.        The patient will proceed with the use of regular shoes and activity as tolerated.  The patient declined any further physical therapy.      The patient will return to clinic in 7 weeks from today.  At that time, no x-rays will be obtained.        All questions were answered.  The patient was pleased with the discussion.         Again, thank you for allowing me to participate in the care of your patient.      Sincerely,    Fernando Wiggins MD

## 2018-01-30 NOTE — PROGRESS NOTES
CHIEF COMPLAINT:  Right second and third toe pinning performed on 12/19/2017.      HISTORY OF PRESENT ILLNESS:  Ms. Fletcher presents today for further followup.  Denies to have any problems or complaints.  Reports to be compliant.      PHYSICAL EXAMINATION:  On today's visit, she presents with excellent alignment of the right second and third toes.  The pin sites have been clean.  CMS is intact.      RADIOGRAPHIC EVALUATION:  Three views of the right foot were obtained today which were significant for showing excellent alignment of the second toe with early arthrodesis across the PIP joints.  Hardware is intact and in place.  Alignment is excellent.      ASSESSMENT:  Status post right second and third toe pinning.      PLAN:  I discussed with the patient that at this point we can proceed with pulling of the pins.  This was performed in the office setting without any complications.  The patient tolerated well the procedure.        The patient will proceed with the use of regular shoes and activity as tolerated.  The patient declined any further physical therapy.      The patient will return to clinic in 7 weeks from today.  At that time, no x-rays will be obtained.        All questions were answered.  The patient was pleased with the discussion.

## 2018-01-30 NOTE — NURSING NOTE
Reason For Visit:   Chief Complaint   Patient presents with     Surgical Followup     right 2nd and 3rd toe pinning. DOS 12/19/17        Pain Assessment  Patient Currently in Pain: No               HEIGHT: Data Unavailable, WEIGHT: 0 lbs 0 oz, BMI: There is no height or weight on file to calculate BMI.      Current Outpatient Prescriptions   Medication Sig Dispense Refill     clopidogrel (PLAVIX) 75 MG tablet Take 1 tablet (75 mg) by mouth daily 90 tablet 2     rosuvastatin (CRESTOR) 20 MG tablet Take 1 tablet (20 mg) by mouth daily 90 tablet 2     levothyroxine (SYNTHROID/LEVOTHROID) 112 MCG tablet TAKE 1 TABLET DAILY 90 tablet 1     carbidopa-levodopa (SINEMET)  MG per tablet TAKE 2 TABLETS FOUR TIMES A  tablet 3     Cyanocobalamin (VITAMIN B-12 PO) Take by mouth every morning       tiZANidine (ZANAFLEX) 2 MG tablet Take 1 tablet (2 mg) by mouth daily (Patient taking differently: Take 2 mg by mouth every evening ) 90 tablet prn     omeprazole (PRILOSEC) 20 MG CR capsule TAKE 2 CAPSULES DAILY 30 TO 60 MINUTES BEFORE A MEAL 180 capsule 0     flunisolide (NASALIDE) 25 MCG/ACT (0.025%) SOLN spray        benzonatate (TESSALON) 200 MG capsule Take 200 mg by mouth daily as needed        azelastine (ASTELIN) 0.1 % spray        carvedilol (COREG) 6.25 MG tablet Take 1 tablet (6.25 mg) by mouth 2 times daily (with meals) 180 tablet 3     isosorbide mononitrate (IMDUR) 30 MG 24 hr tablet Take 1 tablet (30 mg) by mouth daily (Patient taking differently: Take 30 mg by mouth every morning ) 90 tablet 3     aspirin 81 MG tablet Take 81 mg by mouth At Bedtime        gabapentin (NEURONTIN) 100 MG capsule Take 100 mg by mouth At Bedtime        Multiple Vitamins-Minerals (DAILY MULTI) TABS Take by mouth every morning        Alfalfa 250 MG TABS Take 250 mg by mouth every morning        nitroglycerin (NITROSTAT) 0.4 MG SL tablet DISSOLVE ONE TABLET UNDER THE TONGUE EVERY 5 MINUTES AS NEEDED FOR CHEST PAIN.  DO NOT EXCEED  A TOTAL OF 3 DOSES IN 15 MINUTES 25 tablet 3     calcium carbonate-vitamin D (CALCIUM + D) 600-200 MG-UNIT TABS Take 1 tablet by mouth At Bedtime             Allergies   Allergen Reactions     Ace Inhibitors Cough     Baclofen      Penicillins      10/7/14 Hives per patient -- many many years ago       Nickel Swelling and Rash     Other reaction(s): Unknown

## 2018-01-31 DIAGNOSIS — Z79.899 ENCOUNTER FOR LONG-TERM (CURRENT) USE OF MEDICATIONS: Primary | ICD-10-CM

## 2018-01-31 NOTE — TELEPHONE ENCOUNTER
omeprazole (PRILOSEC) 20 MG CR capsule   LAST  Med check 6/25/15   last labs(for RX) 5/4/17  Next  appt scheduled =  Has 3/29/17 betty Josue MA

## 2018-02-02 ENCOUNTER — TELEPHONE (OUTPATIENT)
Dept: NEUROLOGY | Facility: CLINIC | Age: 78
End: 2018-02-02

## 2018-02-06 ENCOUNTER — OFFICE VISIT (OUTPATIENT)
Dept: ORTHOPEDICS | Facility: CLINIC | Age: 78
End: 2018-02-06
Payer: OTHER GOVERNMENT

## 2018-02-06 DIAGNOSIS — B35.1 DERMATOPHYTOSIS OF NAIL: ICD-10-CM

## 2018-02-06 DIAGNOSIS — I73.9 PVD (PERIPHERAL VASCULAR DISEASE) (H): Primary | ICD-10-CM

## 2018-02-06 NOTE — MR AVS SNAPSHOT
After Visit Summary   2/6/2018    Katherin Fletcher    MRN: [id number]           Patient Information     Date Of Birth          1940        Visit Information        Provider Department      2/6/2018 3:20 PM Adrian Arroyo DPM [facility]         Follow-ups after your visit        Your next 10 appointments already scheduled     Mar 20, 2018  2:20 PM CDT   (Arrive by 2:05 PM)   POST-OP FOOT/ANKLE with Fernando Wiggins MD   [facility] ([facility])    [address] [place] [address] [phone]            Mar 29, 2018 12:30 PM CDT   PHYSICAL with Aris Del Real MD   [facility]    [address] [place] [address] [phone]            May 08, 2018  3:00 PM CDT   (Arrive by 2:45 PM)   RETURN FOOT/ANKLE with Adrian Arroyo DPM   [facility] ([facility])    [address] [place] [address] [phone]              Who to contact     Please call your clinic at [phone] to:    Ask questions about your health    Make or cancel appointments    Discuss your medicines    Learn about your test results    Speak to your doctor   If you have compliments or concerns about an experience at your clinic, or if you wish to file a complaint, please contact [facility] Patient Relations at [phone] or email us at [email or web address]         Additional Information About Your Visit        MyChart Information     [employer] gives you secure access to your electronic health record. If you see a primary care provider, you can also send messages to your care team and make appointments. If you have questions, please call your primary care clinic.  If you do not have a primary care provider, please call [phone] and they will assist you.       [employer] is an electronic gateway that provides easy, online access to your medical records. With [employer], you can request a clinic appointment, read your test results, renew a prescription or communicate with your care team.     To access your existing account, please contact your [facility] or call [phone] for assistance.        Care EveryWhere ID     This is your Care EveryWhere ID. This could be used by other organizations to access your [place] medical records  [id number]         Blood Pressure from Last 3 Encounters:   12/19/17 102/72   12/11/17 135/83   11/03/17 153/80    Weight from Last 3 Encounters:   12/19/17 81.6 kg (180 lb)   12/11/17 81.6 kg (180 lb)   11/03/17 81.6 kg (180 lb)              Today, you had the following     No orders found for display         Today's Medication Changes          These changes are accurate as of 2/6/18  3:49 PM.  If you have any questions, ask your nurse or doctor.               These medicines have changed or have updated prescriptions.        Dose/Directions    isosorbide mononitrate 30 MG 24 hr tablet   Commonly known as:  IMDUR   This may have changed:  when to take this   Used for:  Coronary artery disease involving native coronary artery of native heart without angina pectoris        Dose:  30 mg   Take 1 tablet (30 mg) by mouth daily   Quantity:  90 tablet   Refills:  3       tiZANidine 2 MG tablet   Commonly known as:  ZANAFLEX   This may have changed:  when to take this   Used for:  Ataxia        Dose:  2 mg   Take 1 tablet (2 mg) by mouth daily   Quantity:  90 tablet   Refills:  prn                Primary Care Provider Office Phone # Fax #    Aris Del Real [phone] [address]  [facility] [address]        Equal Access to Services     JOSELUIS MADRIGAL AH: Yuly Owens, wajessica lumerline, qaybta shannanalgordon epps, carlo castro. So [facility]  [phone].    ATENCIÓN: Si harry becerril, tiene a taylor disposición servicios gratuitos de asistencia lingüística. Karen mendoza [phone].    We comply with applicable federal civil rights laws and [place] laws. We do not discriminate on the basis of race, color, national origin, age, disability, sex, sexual orientation, or gender identity.            Thank you!     Thank you for choosing [facility]  for your care. Our goal is always to provide you with excellent care. Hearing back from our patients is one way we can continue to improve our services. Please take a few minutes to complete the written survey that you may receive in the mail after your visit with us. Thank you!             Your Updated Medication List - Protect others around you: Learn how to safely use, store and throw away your medicines at www.disposemymeds.org.          This list is accurate as of 2/6/18  3:49 PM.  Always use your most recent med list.                   Brand Name Dispense Instructions for use Diagnosis    Alfalfa 250 MG Tabs      Take 250 mg by mouth every morning        aspirin 81 MG tablet      Take 81 mg by mouth At Bedtime        azelastine 0.1 % spray    ASTELIN          benzonatate 200 MG capsule    TESSALON     Take 200 mg by mouth daily as needed        calcium + D 600-200 MG-UNIT Tabs   Generic drug:  calcium carbonate-vitamin D      Take 1 tablet by mouth At Bedtime    Preventative health care       carbidopa-levodopa  MG per tablet    SINEMET    720 tablet    TAKE 2 TABLETS FOUR TIMES A DAY    Restless legs syndrome, Neurologic gait disorder, Dysarthria, Cerebellar disease       carvedilol 6.25 MG tablet    COREG    180 tablet    Take 1 tablet (6.25 mg) by mouth 2 times daily (with meals)    Atrial fibrillation (H), HTN (hypertension)       clopidogrel 75 MG tablet    PLAVIX    90 tablet    Take 1 tablet (75 mg) by mouth daily    CAD (coronary artery disease)       DAILY MULTI Tabs      Take by  mouth every morning        flunisolide 25 MCG/ACT (0.025%) Soln spray    NASALIDE          gabapentin 100 MG capsule    NEURONTIN     Take 100 mg by mouth At Bedtime        isosorbide mononitrate 30 MG 24 hr tablet    IMDUR    90 tablet    Take 1 tablet (30 mg) by mouth daily    Coronary artery disease involving native coronary artery of native heart without angina pectoris       levothyroxine 112 MCG tablet    SYNTHROID/LEVOTHROID    90 tablet    TAKE 1 TABLET DAILY    Encounter for medication refill       nitroGLYcerin 0.4 MG sublingual tablet    NITROSTAT    25 tablet    DISSOLVE ONE TABLET UNDER THE TONGUE EVERY 5 MINUTES AS NEEDED FOR CHEST PAIN.  DO NOT EXCEED A TOTAL OF 3 DOSES IN 15 MINUTES    CAD (coronary artery disease)       omeprazole 20 MG CR capsule    priLOSEC    180 capsule    TAKE 2 CAPSULES DAILY 30 TO 60 MINUTES BEFORE A MEAL    Encounter for long-term (current) use of medications       rosuvastatin 20 MG tablet    CRESTOR    90 tablet    Take 1 tablet (20 mg) by mouth daily    Hyperlipidemia       tiZANidine 2 MG tablet    ZANAFLEX    90 tablet    Take 1 tablet (2 mg) by mouth daily    Ataxia       VITAMIN B-12 PO      Take by mouth every morning

## 2018-02-06 NOTE — LETTER
2/6/2018       RE: Katherin Fletcher  [address]  [facility] [address]     Dear Colleague,    Thank you for referring your patient, Katherin Fletcher, to the [facility] at [facility]. Please see a copy of my visit note below.    Chief Complaint:   Chief Complaint   Patient presents with     RECHECK     3 month follow up. Toe nail trimming. Pt stated that her right  2nd and 3rd toes are tender. Right 2nd and 3rd toe pinning, DOS: 12- by Dr. Wiggins.           Allergies   Allergen Reactions     Ace Inhibitors Cough     Baclofen      Penicillins      10/7/14 Hives per patient -- many many years ago       Nickel Swelling and Rash     Other reaction(s): Unknown         Subjective: Katherin is a 77 year old female who presents to the clinic today for a follow up of painful, thickened, ingrown nails on both of the feet.  She relates that her hammertoe surgery on the right second and third digits which she had with Dr. Wiggins has healed well.  He recently pulled the K wires from her toes.  She relates that she is walking normally and without pain.  She is gone back to wearing her normal shoe.     Objective    PT and DP pulses were 1/4 bilaterally.  Capillary refill time is greater than 3 seconds.  Diminished pedal hair.  Gross sensation is diminished bilaterally.  Nails are thickened, discolored, brittle, with subungual debris consistent with  onychomycosis ×10 .  Equinus is noted bilaterally.  No open wounds are noted.  The K wire sites are noted to be healed.    Assessment: PVD  Onychomycosis    Plan:   - Pt seen and evaluated  - Nails were debrided ×10.  - She will continue to ambulate to her tolerance.  - Pt to return to clinic in 3 months.      Again, thank you for allowing me to participate in the care of your patient.      Sincerely,    Adrian Arroyo DPM

## 2018-02-06 NOTE — NURSING NOTE
Reason For Visit:   Chief Complaint   Patient presents with     RECHECK     3 month follow up. Toe nail trimming. Pt stated that her right  2nd and 3rd toes are tender. Right 2nd and 3rd toe pinning, DOS: 12- by Dr. Wiggins.        Pain Assessment  Patient Currently in Pain: Yes (Pt stated that she is having a little pain because the pins were taken out last week. )  Primary Pain Location:  (2nd and 3rd toe. )  Pain Orientation: Right  Pain Descriptors: Tender           Current Outpatient Prescriptions   Medication Sig Dispense Refill     omeprazole (PRILOSEC) 20 MG CR capsule TAKE 2 CAPSULES DAILY 30 TO 60 MINUTES BEFORE A MEAL 180 capsule 1     clopidogrel (PLAVIX) 75 MG tablet Take 1 tablet (75 mg) by mouth daily 90 tablet 2     rosuvastatin (CRESTOR) 20 MG tablet Take 1 tablet (20 mg) by mouth daily 90 tablet 2     levothyroxine (SYNTHROID/LEVOTHROID) 112 MCG tablet TAKE 1 TABLET DAILY 90 tablet 1     carbidopa-levodopa (SINEMET)  MG per tablet TAKE 2 TABLETS FOUR TIMES A  tablet 3     Cyanocobalamin (VITAMIN B-12 PO) Take by mouth every morning       tiZANidine (ZANAFLEX) 2 MG tablet Take 1 tablet (2 mg) by mouth daily (Patient taking differently: Take 2 mg by mouth every evening ) 90 tablet prn     flunisolide (NASALIDE) 25 MCG/ACT (0.025%) SOLN spray        benzonatate (TESSALON) 200 MG capsule Take 200 mg by mouth daily as needed        azelastine (ASTELIN) 0.1 % spray        carvedilol (COREG) 6.25 MG tablet Take 1 tablet (6.25 mg) by mouth 2 times daily (with meals) 180 tablet 3     isosorbide mononitrate (IMDUR) 30 MG 24 hr tablet Take 1 tablet (30 mg) by mouth daily (Patient taking differently: Take 30 mg by mouth every morning ) 90 tablet 3     aspirin 81 MG tablet Take 81 mg by mouth At Bedtime        gabapentin (NEURONTIN) 100 MG capsule Take 100 mg by mouth At Bedtime        Multiple Vitamins-Minerals (DAILY MULTI) TABS Take by mouth every morning        Alfalfa 250 MG TABS Take 250  mg by mouth every morning        nitroglycerin (NITROSTAT) 0.4 MG SL tablet DISSOLVE ONE TABLET UNDER THE TONGUE EVERY 5 MINUTES AS NEEDED FOR CHEST PAIN.  DO NOT EXCEED A TOTAL OF 3 DOSES IN 15 MINUTES 25 tablet 3     calcium carbonate-vitamin D (CALCIUM + D) 600-200 MG-UNIT TABS Take 1 tablet by mouth At Bedtime             Allergies   Allergen Reactions     Ace Inhibitors Cough     Baclofen      Penicillins      10/7/14 Hives per patient -- many many years ago       Nickel Swelling and Rash     Other reaction(s): Unknown

## 2018-02-07 NOTE — PROGRESS NOTES
Chief Complaint:   Chief Complaint   Patient presents with     RECHECK     3 month follow up. Toe nail trimming. Pt stated that her right  2nd and 3rd toes are tender. Right 2nd and 3rd toe pinning, DOS: 12- by Dr. Wiggins.           Allergies   Allergen Reactions     Ace Inhibitors Cough     Baclofen      Penicillins      10/7/14 Hives per patient -- many many years ago       Nickel Swelling and Rash     Other reaction(s): Unknown         Subjective: Katherin is a 77 year old female who presents to the clinic today for a follow up of painful, thickened, ingrown nails on both of the feet.  She relates that her hammertoe surgery on the right second and third digits which she had with Dr. Wiggins has healed well.  He recently pulled the K wires from her toes.  She relates that she is walking normally and without pain.  She is gone back to wearing her normal shoe.     Objective    PT and DP pulses were 1/4 bilaterally.  Capillary refill time is greater than 3 seconds.  Diminished pedal hair.  Gross sensation is diminished bilaterally.  Nails are thickened, discolored, brittle, with subungual debris consistent with  onychomycosis ×10.  Equinus is noted bilaterally.  No open wounds are noted.  The K wire sites are noted to be healed.    Assessment: PVD  Onychomycosis    Plan:   - Pt seen and evaluated  - Nails were debrided ×10.  - She will continue to ambulate to her tolerance.  - Pt to return to clinic in 3 months.

## 2018-02-07 NOTE — PROGRESS NOTES
"Service Date: 11/03/2017      This 77-year-old woman returns for a followup neurological visit, having been seen initially on 04/14/2017.  The report pertaining to that visit has been reviewed for comparison purposes.  The categories itemized in that report are once again gone over with the patient.  They are as follows:    1.  Speech difficulty.  This has progressed a little; namely, slowing is more evident.  She salivates excessively with talking.  Katherin denies choking.    2.  Leg spasms.  A bit worse, occurring around 8:00 to 10:00 p.m.  She may be quietly reading at the time, when a series of \"kicks\" occurs every few seconds, but not simultaneously in both legs.  She does not report associated pain.  She can only stop it with lengthy walking.    3.  Imbalance.  Katherin confirms that she can only ambulate with the use of a walker.  She did use a walking stick for a while and then tried 2 canes, with which she felt very insecure.   4.  Chronic cough.  This condition was mentioned briefly in review of systems at the last visit.  Today she places much more emphasis on the cough because it has persisted for the past 10 years or so and is really quite bothersome.  She has been treated with inhalers (Astelin 2 times per day and Nasalide 4 times per day) and Imdur.  She thinks her cough has improved some with this regimen.        Ms. Fletcher denies bowel or bladder dysfunction.      CURRENT MEDICATIONS:   1.  Alfalfa 250 mg daily.    2.  Aspirin 81 mg daily.    3.  Azelastine hydrochloride (Astelin) 0.1% 4 sprays daily.    4.  Benzonatate (Tessalon) 200 mg daily.    5.  Calcium carbonate/vitamin D 600 mg/200 units daily.   6.  Carbidopa-levodopa (Sinemet)  two tablets 4 times daily.    7.  Carvedilol (Coreg) 6.25 mg 1 twice a day with meals.    8.  Clopidogrel bisulfate (Plavix) 75 mg daily.    9.  Flunisolide (Nasalide) 25 mcg per actuator daily.    10.  Gabapentin (Neurontin) 100 mg daily.    11.  Isosorbide " mononitrate (Imdur) 30 mg daily.    12.  Levothyroxine (Synthroid) 112 mcg daily.    13.  Multivites daily.    14.  Nitroglycerin (Nitrostat) 0.4 mg 1 sublingual p.r.n. for chest pain.    15.  Omeprazole (Prilosec) 20 mg b.i.d. 30-60 minutes before meals.    16.  Rosuvastatin calcium (Crestor) 20 mg daily.       PHYSICAL EXAMINATION:    VITAL SIGNS:  Blood pressure 153/80, pulse 86, weight 180 pounds.   GENERAL:  Katherin is a young-appearing, young-acting, pleasant lady who relates easily and well.  She has a mild ataxic dysarthria, but is fully comprehensible.    NEUROLOGIC:  Cranial nerves II through XII are unremarkable.  There is no abnormality of eye movements or facial or tongue atrophy.  Reflexes are 2+ in the upper extremities and at the knees and 1+ at the ankles.  Toe sign is positive on the left and neutral on the right.  Muscle testing shows mild 4/5 weakness of both deltoids and both psoas muscles.  There is a spastic catch found in both quadriceps.  There is no parkinsonian rigidity.  Sensory testing shows that light touch perception is normal.  Position sense is normal.  Vibratory sense is slightly decreased, probably normal for age.  Temperature sensation is quite reduced in the toes of both feet, but normalizes more proximally with 70% of normal noted at the midfoot but normal at the ankles.  This was not detected during my previous examination.  Coordination testing shows very little dysmetria, but there is slowness of alternating movements.  These movements are counted over 15 seconds and include the following:  Supination/pronation of the forearms 13 times (same as before), finger tapping 26 on the right and 27 on the left versus 26 and 31, respectively, in 04/2017.  Toe tapping is 17 on the right and 15 on the left versus 15 bilaterally in April.  Eye blink is 18 times in 15 seconds, which is unchanged.  Gait and station testing reveals that the patient is unable to stand unassisted and requires  a walker for ambulation.        IMPRESSION:    1.  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     2.  Mild dysarthria secondary to #1.    3.  Marked imbalance, requiring a walker.    4.  Mild distal sensory loss in the lower extremities.    5.  History of flexion spasms.    6.  Status post bilateral carpal tunnel release.       DISCUSSION:  In 2016, Dr. Varma, neurologist of [facility], reported a parkinsonian type gait.  She was already on carbidopa/levodopa, probably prescribed by Handy Pham at the [facility].  Today it seems as though the carbidopa/levodopa has improved her parkinsonian features to the extent that I could not detect any such abnormality.        PLAN:     1.  Return to our clinic in 2018.   2.  Zanaflex 2 mg, 1 at 6:00 p.m., increasing to 4 mg each evening if needed.       E and M code for this visit is 40002.         SMITHA XIONG MD             D: 2018   T: 2018   MT: ALBERTO      Name:     ANA RODRIGUEZ   MRN:      [id number]        Account:      [id number]   :      1940           Service Date: 2017      Document: [id number]

## 2018-02-08 ENCOUNTER — MEDICAL CORRESPONDENCE (OUTPATIENT)
Dept: HEALTH INFORMATION MANAGEMENT | Facility: CLINIC | Age: 78
End: 2018-02-08

## 2018-02-13 ENCOUNTER — TELEPHONE (OUTPATIENT)
Dept: NEUROLOGY | Facility: CLINIC | Age: 78
End: 2018-02-13

## 2018-02-13 NOTE — TELEPHONE ENCOUNTER
Called pt to discuss leg spasms. She said she took tizanidine for about 1 and 1/2 weeks and she felt like she had a bad hang over in the morning. She doesn't like the feeling and stopped taking it. She has had baclofen in the past but didn't think that helped the leg spasms. Hert spasms happen most often in the evening. The pain doesn't go away, it is muscle cramps and she has t walk it off to get rid of the discomfort. She also has excessive saliva and the bobbing of the head that continues. She also said she does take 100 mg. Of Neurontin before she goes to bed but that does not make the spasms go away either.  Pt was told that Dr. Randolph would be called and she would be called back. Pt uses [facility] pharmacy in [place].

## 2018-02-13 NOTE — TELEPHONE ENCOUNTER
Called pt back and told her Dr. Ayo Randolph wants her to try baclofen again. He prescribed baclofen 10mg. Bid for 1 week and then baclofen 10mg. Tid for one week and then call this writer.  She also is scheduled to see Dr. Randolph on March 16 at 9:00.  Prescription called to [facility] in [place], [place]  Pt is allergic to baclofen,  DC'd and ordered robaxin 750mg. Tid.  Called pt and gave this information.

## 2018-02-25 ENCOUNTER — MYC MEDICAL ADVICE (OUTPATIENT)
Dept: FAMILY MEDICINE | Facility: CLINIC | Age: 78
End: 2018-02-25

## 2018-02-26 ENCOUNTER — OFFICE VISIT (OUTPATIENT)
Dept: FAMILY MEDICINE | Facility: CLINIC | Age: 78
End: 2018-02-26

## 2018-02-26 VITALS
SYSTOLIC BLOOD PRESSURE: 132 MMHG | RESPIRATION RATE: 16 BRPM | HEART RATE: 71 BPM | DIASTOLIC BLOOD PRESSURE: 84 MMHG | OXYGEN SATURATION: 98 % | WEIGHT: 171 LBS | BODY MASS INDEX: 29.35 KG/M2 | TEMPERATURE: 97.4 F

## 2018-02-26 DIAGNOSIS — J01.01 ACUTE RECURRENT MAXILLARY SINUSITIS: Primary | ICD-10-CM

## 2018-02-26 PROCEDURE — 99213 OFFICE O/P EST LOW 20 MIN: CPT | Performed by: FAMILY MEDICINE

## 2018-02-26 RX ORDER — AZITHROMYCIN 250 MG/1
TABLET, FILM COATED ORAL
Qty: 6 TABLET | Refills: 0 | Status: SHIPPED | OUTPATIENT
Start: 2018-02-26 | End: 2018-03-20

## 2018-02-26 ASSESSMENT — ANXIETY QUESTIONNAIRES
6. BECOMING EASILY ANNOYED OR IRRITABLE: NOT AT ALL
3. WORRYING TOO MUCH ABOUT DIFFERENT THINGS: NOT AT ALL
1. FEELING NERVOUS, ANXIOUS, OR ON EDGE: NOT AT ALL
IF YOU CHECKED OFF ANY PROBLEMS ON THIS QUESTIONNAIRE, HOW DIFFICULT HAVE THESE PROBLEMS MADE IT FOR YOU TO DO YOUR WORK, TAKE CARE OF THINGS AT HOME, OR GET ALONG WITH OTHER PEOPLE: NOT DIFFICULT AT ALL
7. FEELING AFRAID AS IF SOMETHING AWFUL MIGHT HAPPEN: NOT AT ALL
2. NOT BEING ABLE TO STOP OR CONTROL WORRYING: NOT AT ALL
GAD7 TOTAL SCORE: 0
5. BEING SO RESTLESS THAT IT IS HARD TO SIT STILL: NOT AT ALL

## 2018-02-26 ASSESSMENT — PATIENT HEALTH QUESTIONNAIRE - PHQ9: 5. POOR APPETITE OR OVEREATING: NOT AT ALL

## 2018-02-26 NOTE — PROGRESS NOTES
SUBJECTIVE:   Katherin Fletcher  is a 77 year old  year old female presenting with a complaint of facial pain/pressure.  Location: bilateral  Quality: stabbing  Severity: moderate to severe  Duration: 1 1/2 weeks  Associated symptoms: cough  and facial pain/pressure.        Current Outpatient Prescriptions:      omeprazole (PRILOSEC) 20 MG CR capsule, TAKE 2 CAPSULES DAILY 30 TO 60 MINUTES BEFORE A MEAL, Disp: 180 capsule, Rfl: 1     clopidogrel (PLAVIX) 75 MG tablet, Take 1 tablet (75 mg) by mouth daily, Disp: 90 tablet, Rfl: 2     rosuvastatin (CRESTOR) 20 MG tablet, Take 1 tablet (20 mg) by mouth daily, Disp: 90 tablet, Rfl: 2     levothyroxine (SYNTHROID/LEVOTHROID) 112 MCG tablet, TAKE 1 TABLET DAILY, Disp: 90 tablet, Rfl: 1     carbidopa-levodopa (SINEMET)  MG per tablet, TAKE 2 TABLETS FOUR TIMES A DAY, Disp: 720 tablet, Rfl: 3     Cyanocobalamin (VITAMIN B-12 PO), Take by mouth every morning, Disp: , Rfl:      flunisolide (NASALIDE) 25 MCG/ACT (0.025%) SOLN spray, , Disp: , Rfl:      benzonatate (TESSALON) 200 MG capsule, Take 200 mg by mouth daily as needed , Disp: , Rfl:      azelastine (ASTELIN) 0.1 % spray, , Disp: , Rfl:      carvedilol (COREG) 6.25 MG tablet, Take 1 tablet (6.25 mg) by mouth 2 times daily (with meals), Disp: 180 tablet, Rfl: 3     isosorbide mononitrate (IMDUR) 30 MG 24 hr tablet, Take 1 tablet (30 mg) by mouth daily (Patient taking differently: Take 30 mg by mouth every morning ), Disp: 90 tablet, Rfl: 3     aspirin 81 MG tablet, Take 81 mg by mouth At Bedtime , Disp: , Rfl:      gabapentin (NEURONTIN) 100 MG capsule, Take 100 mg by mouth At Bedtime , Disp: , Rfl:      Multiple Vitamins-Minerals (DAILY MULTI) TABS, Take by mouth every morning , Disp: , Rfl:      Alfalfa 250 MG TABS, Take 250 mg by mouth every morning , Disp: , Rfl:      nitroglycerin (NITROSTAT) 0.4 MG SL tablet, DISSOLVE ONE TABLET UNDER THE TONGUE EVERY 5 MINUTES AS NEEDED FOR CHEST PAIN.  DO NOT EXCEED A  TOTAL OF 3 DOSES IN 15 MINUTES, Disp: 25 tablet, Rfl: 3     calcium carbonate-vitamin D (CALCIUM + D) 600-200 MG-UNIT TABS, Take 1 tablet by mouth At Bedtime , Disp: , Rfl:    History reviewed    ROS:  EYES:  no vision changes, no redness or irritation, no discharge or mattering  ENT: no ear pain, no nose , no mouth or throat problems, no dysphagia, no hoarseness  ROS otherwise negative    OBJECTIVE  :/60  Wt 71.215 kg (157 lb)  APPEARANCE: = Nrl  Conj/Eyelids = Nrl  PERRLA/Irises = Nrl  Ears/Nose = bilateral maxillary  Ear canals and TM's = Nrl  Lips/Teeth/Gums = Nrl  Oropharynx = Nrl  Neck = Nrl  Resp effort = Nrl  Breath Sounds = Nrl  Heart Rate, Rythym, & sounds (no Murm)  = Nrl  SKIN absent significant rashes or lesions = Nrl  Recent/Remote Memory = Nrl  Mood/Affect = Nrl    ASSESSMENT:  1. Acute recurrent maxillary sinusitis  nikki Del Real  [facility]  [phone]

## 2018-02-26 NOTE — TELEPHONE ENCOUNTER
2/26/18 950 am   Called patient and scheduled with Dr. Del Real for this morning at 1145am.  Cornelia Ball RN

## 2018-02-26 NOTE — MR AVS SNAPSHOT
After Visit Summary   2/26/2018    Katherin Fletcher    MRN: [id number]           Patient Information     Date Of Birth          1940        Visit Information        Provider Department      2/26/2018 11:45 AM Aris Del Real MD [facility]        Today's Diagnoses     Acute recurrent maxillary sinusitis    -  1       Follow-ups after your visit        Your next 10 appointments already scheduled     Mar 16, 2018  9:30 AM CDT   (Arrive by 9:15 AM)   Return Ataxia with Joe Randolph MD   [facility] ([facility])    [address] [place] [id number] [phone]            Mar 20, 2018  2:20 PM CDT   (Arrive by 2:05 PM)   POST-OP FOOT/ANKLE with Fernando Wiggins MD   [facility] ([facility])    [address] [place] [id number] [phone]            Mar 29, 2018 12:30 PM CDT   PHYSICAL with Aris Del Real MD   [facility]    [address] [place] [address] [phone]            May 08, 2018  3:00 PM CDT   (Arrive by 2:45 PM)   RETURN FOOT/ANKLE with Adrian Arroyo DPM   [facility] ([facility])    [address] [place] [address] [id number] [phone]              Who to contact     If you have questions or need follow up information about today's clinic visit or your schedule please contact [facility] directly at [phone].  Normal or non-critical lab and imaging results will be communicated to you by MyChart, letter or phone within 4 business days after the clinic has received the results. If you do not hear from us within 7 days, please contact the clinic through MyChart or phone. If you have a critical or abnormal lab result, we will notify you by phone as soon as possible.  Submit refill requests  through [employer] or call your pharmacy and they will forward the refill request to us. Please allow 3 business days for your refill to be completed.          Additional Information About Your Visit        [employer] Information     [employer] gives you secure access to your electronic health record. If you see a primary care provider, you can also send messages to your care team and make appointments. If you have questions, please call your primary care clinic.  If you do not have a primary care provider, please call [phone] and they will assist you.        Care EveryWhere ID     This is your Care EveryWhere ID. This could be used by other organizations to access your [facility] medical records  [id number]        Your Vitals Were     Pulse Temperature Respirations Pulse Oximetry BMI (Body Mass Index)       71 97.4  F (36.3  C) 16 98% 29.35 kg/m2        Blood Pressure from Last 3 Encounters:   02/26/18 132/84   12/19/17 102/72   12/11/17 135/83    Weight from Last 3 Encounters:   02/26/18 77.6 kg (171 lb)   12/19/17 81.6 kg (180 lb)   12/11/17 81.6 kg (180 lb)              Today, you had the following     No orders found for display         Today's Medication Changes          These changes are accurate as of 2/26/18 12:17 PM.  If you have any questions, ask your nurse or doctor.               Start taking these medicines.        Dose/Directions    azithromycin 250 MG tablet   Commonly known as:  ZITHROMAX   Used for:  Acute recurrent maxillary sinusitis   Started by:  Aris Del Real MD        Two tablets first day, then one tablet daily for four days.   Quantity:  6 tablet   Refills:  0         These medicines have changed or have updated prescriptions.        Dose/Directions    isosorbide mononitrate 30 MG 24 hr tablet   Commonly known as:  IMDUR   This may have changed:  when to take this   Used for:  Coronary artery disease involving native coronary artery of native heart without angina pectoris        Dose:   30 mg   Take 1 tablet (30 mg) by mouth daily   Quantity:  90 tablet   Refills:  3            Where to get your medicines      These medications were sent to [facility] [address] [place] [address] [address] [facility]  [address] [facility] [address]     Phone:  [phone]     azithromycin 250 MG tablet                Primary Care Provider Office Phone # Fax #    Aris Del Real [phone] [address]  [facility] [address]        Equal Access to Services     [facility]: Hadii aad ku hadasho Soomaali, waaxda luqadaha, qaybta kaalmada adeegyada, waxay idiin hayjimbon larissa kharatiffany odonnell . So [facility] [phone].    ATENCIÓN: Si habla español, tiene a taylor disposición servicios gratuitos de asistencia lingüística. [facility] [phone].    We comply with applicable federal civil rights laws and [place] laws. We do not discriminate on the basis of race, color, national origin, age, disability, sex, sexual orientation, or gender identity.            Thank you!     Thank you for choosing [facility]  for your care. Our goal is always to provide you with excellent care. Hearing back from our patients is one way we can continue to improve our services. Please take a few minutes to complete the written survey that you may receive in the mail after your visit with us. Thank you!             Your Updated Medication List - Protect others around you: Learn how to safely use, store and throw away your medicines at www.disposemymeds.org.          This list is accurate as of 2/26/18 12:17 PM.  Always use your most recent med list.                   Brand Name Dispense Instructions for use Diagnosis    Alfalfa 250 MG Tabs      Take 250 mg by mouth every morning        aspirin 81 MG tablet      Take 81 mg by mouth At Bedtime        azelastine 0.1 % spray    ASTELIN          azithromycin 250 MG tablet    ZITHROMAX    6 tablet    Two tablets first day, then one  tablet daily for four days.    Acute recurrent maxillary sinusitis       benzonatate 200 MG capsule    TESSALON     Take 200 mg by mouth daily as needed        calcium + D 600-200 MG-UNIT Tabs   Generic drug:  calcium carbonate-vitamin D      Take 1 tablet by mouth At Bedtime    Preventative health care       carbidopa-levodopa  MG per tablet    SINEMET    720 tablet    TAKE 2 TABLETS FOUR TIMES A DAY    Restless legs syndrome, Neurologic gait disorder, Dysarthria, Cerebellar disease       carvedilol 6.25 MG tablet    COREG    180 tablet    Take 1 tablet (6.25 mg) by mouth 2 times daily (with meals)    Atrial fibrillation (H), HTN (hypertension)       clopidogrel 75 MG tablet    PLAVIX    90 tablet    Take 1 tablet (75 mg) by mouth daily    CAD (coronary artery disease)       DAILY MULTI Tabs      Take by mouth every morning        flunisolide 25 MCG/ACT (0.025%) Soln spray    NASALIDE          gabapentin 100 MG capsule    NEURONTIN     Take 100 mg by mouth At Bedtime        isosorbide mononitrate 30 MG 24 hr tablet    IMDUR    90 tablet    Take 1 tablet (30 mg) by mouth daily    Coronary artery disease involving native coronary artery of native heart without angina pectoris       levothyroxine 112 MCG tablet    SYNTHROID/LEVOTHROID    90 tablet    TAKE 1 TABLET DAILY    Encounter for medication refill       nitroGLYcerin 0.4 MG sublingual tablet    NITROSTAT    25 tablet    DISSOLVE ONE TABLET UNDER THE TONGUE EVERY 5 MINUTES AS NEEDED FOR CHEST PAIN.  DO NOT EXCEED A TOTAL OF 3 DOSES IN 15 MINUTES    CAD (coronary artery disease)       omeprazole 20 MG CR capsule    priLOSEC    180 capsule    TAKE 2 CAPSULES DAILY 30 TO 60 MINUTES BEFORE A MEAL    Encounter for long-term (current) use of medications       rosuvastatin 20 MG tablet    CRESTOR    90 tablet    Take 1 tablet (20 mg) by mouth daily    Hyperlipidemia       VITAMIN B-12 PO      Take by mouth every morning

## 2018-02-27 ENCOUNTER — HOSPITAL ENCOUNTER (EMERGENCY)
Facility: CLINIC | Age: 78
Discharge: HOME | End: 2018-02-27
Attending: EMERGENCY MEDICINE | Admitting: EMERGENCY MEDICINE
Payer: MEDICARE

## 2018-02-27 ENCOUNTER — APPOINTMENT (OUTPATIENT)
Dept: CT IMAGING | Facility: CLINIC | Age: 78
End: 2018-02-27
Attending: EMERGENCY MEDICINE
Payer: MEDICARE

## 2018-02-27 ENCOUNTER — APPOINTMENT (OUTPATIENT)
Dept: GENERAL RADIOLOGY | Facility: CLINIC | Age: 78
End: 2018-02-27
Attending: EMERGENCY MEDICINE
Payer: MEDICARE

## 2018-02-27 VITALS
DIASTOLIC BLOOD PRESSURE: 86 MMHG | TEMPERATURE: 98 F | HEART RATE: 80 BPM | OXYGEN SATURATION: 97 % | SYSTOLIC BLOOD PRESSURE: 138 MMHG | RESPIRATION RATE: 17 BRPM | WEIGHT: 170 LBS | BODY MASS INDEX: 27.32 KG/M2 | HEIGHT: 66 IN
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
## 2018-02-27 DIAGNOSIS — S20.222A CONTUSION OF LEFT SIDE OF BACK, INITIAL ENCOUNTER: ICD-10-CM

## 2018-02-27 LAB
ANION GAP SERPL CALCULATED.3IONS-SCNC: 4 MMOL/L (ref 3–14)
APTT PPP: 30 SEC (ref 22–37)
BASOPHILS # BLD AUTO: 0 10E9/L (ref 0–0.2)
BASOPHILS NFR BLD AUTO: 0.2 %
BUN SERPL-MCNC: 15 MG/DL (ref 7–30)
CALCIUM SERPL-MCNC: 9.3 MG/DL (ref 8.5–10.1)
CHLORIDE SERPL-SCNC: 106 MMOL/L (ref 94–109)
CO2 SERPL-SCNC: 30 MMOL/L (ref 20–32)
CREAT SERPL-MCNC: 0.76 MG/DL (ref 0.52–1.04)
DIFFERENTIAL METHOD BLD: NORMAL
EOSINOPHIL # BLD AUTO: 0.1 10E9/L (ref 0–0.7)
EOSINOPHIL NFR BLD AUTO: 1.7 %
ERYTHROCYTE [DISTWIDTH] IN BLOOD BY AUTOMATED COUNT: 13.7 % (ref 10–15)
GFR SERPL CREATININE-BSD FRML MDRD: 74 ML/MIN/1.7M2
GLUCOSE SERPL-MCNC: 96 MG/DL (ref 70–99)
HCT VFR BLD AUTO: 37.1 % (ref 35–47)
HGB BLD-MCNC: 12.8 G/DL (ref 11.7–15.7)
IMM GRANULOCYTES # BLD: 0 10E9/L (ref 0–0.4)
IMM GRANULOCYTES NFR BLD: 0.4 %
INR PPP: 1.06 (ref 0.86–1.14)
INTERPRETATION ECG - MUSE: NORMAL
LYMPHOCYTES # BLD AUTO: 1.9 10E9/L (ref 0.8–5.3)
LYMPHOCYTES NFR BLD AUTO: 39.8 %
MCH RBC QN AUTO: 29.4 PG (ref 26.5–33)
MCHC RBC AUTO-ENTMCNC: 34.5 G/DL (ref 31.5–36.5)
MCV RBC AUTO: 85 FL (ref 78–100)
MONOCYTES # BLD AUTO: 0.5 10E9/L (ref 0–1.3)
MONOCYTES NFR BLD AUTO: 10.2 %
NEUTROPHILS # BLD AUTO: 2.2 10E9/L (ref 1.6–8.3)
NEUTROPHILS NFR BLD AUTO: 47.7 %
NRBC # BLD AUTO: 0 10*3/UL
NRBC BLD AUTO-RTO: 0 /100
PLATELET # BLD AUTO: 190 10E9/L (ref 150–450)
POTASSIUM SERPL-SCNC: 3.7 MMOL/L (ref 3.4–5.3)
RBC # BLD AUTO: 4.35 10E12/L (ref 3.8–5.2)
SODIUM SERPL-SCNC: 140 MMOL/L (ref 133–144)
WBC # BLD AUTO: 4.7 10E9/L (ref 4–11)

## 2018-02-27 PROCEDURE — 71045 X-RAY EXAM CHEST 1 VIEW: CPT

## 2018-02-27 PROCEDURE — 72100 X-RAY EXAM L-S SPINE 2/3 VWS: CPT

## 2018-02-27 PROCEDURE — 85730 THROMBOPLASTIN TIME PARTIAL: CPT | Performed by: EMERGENCY MEDICINE

## 2018-02-27 PROCEDURE — 72072 X-RAY EXAM THORAC SPINE 3VWS: CPT

## 2018-02-27 PROCEDURE — 93005 ELECTROCARDIOGRAM TRACING: CPT

## 2018-02-27 PROCEDURE — 70450 CT HEAD/BRAIN W/O DYE: CPT

## 2018-02-27 PROCEDURE — 85610 PROTHROMBIN TIME: CPT | Performed by: EMERGENCY MEDICINE

## 2018-02-27 PROCEDURE — 99285 EMERGENCY DEPT VISIT HI MDM: CPT | Mod: 25

## 2018-02-27 PROCEDURE — 72125 CT NECK SPINE W/O DYE: CPT

## 2018-02-27 PROCEDURE — 85025 COMPLETE CBC W/AUTO DIFF WBC: CPT | Performed by: EMERGENCY MEDICINE

## 2018-02-27 PROCEDURE — 80048 BASIC METABOLIC PNL TOTAL CA: CPT | Performed by: EMERGENCY MEDICINE

## 2018-02-27 ASSESSMENT — ENCOUNTER SYMPTOMS
HEADACHES: 0
WEAKNESS: 0
BACK PAIN: 1
NECK PAIN: 0
NUMBNESS: 0

## 2018-02-27 ASSESSMENT — ANXIETY QUESTIONNAIRES: GAD7 TOTAL SCORE: 0

## 2018-02-27 ASSESSMENT — PATIENT HEALTH QUESTIONNAIRE - PHQ9: SUM OF ALL RESPONSES TO PHQ QUESTIONS 1-9: 0

## 2018-02-27 NOTE — ED AVS SNAPSHOT
Emergency Department    [address] [place] [address]    Phone:  [phone]    Fax:  [phone]                                       Katherin Fletcher   MRN: [id number]    Department:   Emergency Department   Date of Visit:  2/27/2018           After Visit Summary Signature Page     I have received my discharge instructions, and my questions have been answered. I have discussed any challenges I see with this plan with the nurse or doctor.    ..........................................................................................................................................  Patient/Patient Representative Signature      ..........................................................................................................................................  Patient Representative Print Name and Relationship to Patient    ..................................................               ................................................  Date                                            Time    ..........................................................................................................................................  Reviewed by Signature/Title    ...................................................              ..............................................  Date                                                            Time

## 2018-02-27 NOTE — ED AVS SNAPSHOT
Emergency Department    [address] [facility] [address]    Phone:  [phone]    Fax:  [phone]                                       Katherin Fletcher   MRN: [id number]    Department:   Emergency Department   Date of Visit:  2/27/2018           Patient Information     Date Of Birth          1940        Your diagnoses for this visit were:     Fall down stairs, initial encounter     Contusion of left side of back, initial encounter        You were seen by Judith Lewis MD.      Follow-up Information     Follow up with Aris Del Real MD.    Specialty:  Family Practice    Why:  As needed, If symptoms worsen    Contact information:    [address]  [facility] [address] [phone]          Follow up with  Emergency Department.    Specialty:  EMERGENCY MEDICINE    Why:  As needed, If symptoms worsen    Contact information:    [address] [facility] [address] [phone]        Discharge Instructions         Back Contusion  You have a contusion to your back. A contusion is also called a bruise. There is swelling and some bleeding under the skin. The skin may be purplish. You may have muscle aching and stiffness in the area of the bruise. There are no broken bones.  Contusions heal on their own, without further treatment. However, pain and skin discoloration may take weeks to months to go away.   Home care    Rest. Avoid heavy lifting, strenuous exertion, or any activity that causes pain.    Ice the area to reduce pain and swelling. Put ice cubes in a plastic bag or use a cold pack. (Wrap the cold source in a thin towel. Don't place it directly on your skin.) Ice the injured area for 20 minutes every 1 to 2 hours the first day. Continue with ice packs 3 to 4 times a day for the next 2 days, then as needed for the relief of pain and swelling.    Take any prescribed pain medicine. If none was prescribed, take  acetaminophen, ibuprofen, or naproxen to control pain, unless you have other medical conditions that prevent taking these medicines. If you are unsure about medicines, ask your healthcare provider before you leave the hospital.  Follow-up care  Follow up with your healthcare provider, or as directed. Call if you are not better in 1 to 2 weeks.  When to seek medical advice  Call your healthcare provider for any of the following:    New or worsening pain    Increased swelling around the bruise    Pain spreads to one or both legs    Weakness or numbness in one or both legs     Loss of bowel or bladder control    Numbness in the groin or genital area    Fever of 100.4 F (38 C) or higher, or as directed by your healthcare provider  Date Last Reviewed: 7/1/2017 2000-2017 [employer]. [address] [place] [address]. All rights reserved. This information is not intended as a substitute for professional medical care. Always follow your healthcare professional's instructions.          Future Appointments        Provider Department Dept Phone Center    3/16/2018 9:30 AM Joe Randolph MD [facility] Neurology [phone] [facility]    3/20/2018 2:20 PM Fernando Wiggins MD [facility] Orthopaedic [facility] [phone] [facility]    3/29/2018 12:30 PM Aris Del Real MD [facility] [phone] [facility]    5/8/2018 3:00 PM Adrian Arroyo DPM [facility] [phone] [facility]      24 Hour Appointment Hotline       To make an appointment at any [facility], call [phone] ([phone]). If you don't have a family doctor or clinic, we will help you find one. [facility] clinics are conveniently located to serve the needs of you and your family.             Review of your medicines      Our records show that you are taking the medicines listed below. If these are incorrect, please call your family doctor or clinic.        Dose / Directions Last dose taken    Alfalfa  250 MG Tabs   Dose:  250 mg        Take 250 mg by mouth every morning   Refills:  0        aspirin 81 MG tablet   Dose:  81 mg        Take 81 mg by mouth At Bedtime   Refills:  0        azelastine 0.1 % spray   Commonly known as:  ASTELIN        Refills:  0        azithromycin 250 MG tablet   Commonly known as:  ZITHROMAX   Quantity:  6 tablet        Two tablets first day, then one tablet daily for four days.   Refills:  0        benzonatate 200 MG capsule   Commonly known as:  TESSALON   Dose:  200 mg        Take 200 mg by mouth daily as needed   Refills:  0        calcium + D 600-200 MG-UNIT Tabs   Dose:  1 tablet   Generic drug:  calcium carbonate-vitamin D        Take 1 tablet by mouth At Bedtime   Refills:  0        carbidopa-levodopa  MG per tablet   Commonly known as:  SINEMET   Quantity:  720 tablet        TAKE 2 TABLETS FOUR TIMES A DAY   Refills:  3        carvedilol 6.25 MG tablet   Commonly known as:  COREG   Dose:  6.25 mg   Quantity:  180 tablet        Take 1 tablet (6.25 mg) by mouth 2 times daily (with meals)   Refills:  3        clopidogrel 75 MG tablet   Commonly known as:  PLAVIX   Dose:  75 mg   Quantity:  90 tablet        Take 1 tablet (75 mg) by mouth daily   Refills:  2        DAILY MULTI Tabs        Take by mouth every morning   Refills:  0        flunisolide 25 MCG/ACT (0.025%) Soln spray   Commonly known as:  NASALIDE        Refills:  0        gabapentin 100 MG capsule   Commonly known as:  NEURONTIN   Dose:  100 mg        Take 100 mg by mouth At Bedtime   Refills:  0        isosorbide mononitrate 30 MG 24 hr tablet   Commonly known as:  IMDUR   Dose:  30 mg   Quantity:  90 tablet        Take 1 tablet (30 mg) by mouth daily   Refills:  3        levothyroxine 112 MCG tablet   Commonly known as:  SYNTHROID/LEVOTHROID   Quantity:  90 tablet        TAKE 1 TABLET DAILY   Refills:  1        nitroGLYcerin 0.4 MG sublingual tablet   Commonly known as:  NITROSTAT   Quantity:  25 tablet         DISSOLVE ONE TABLET UNDER THE TONGUE EVERY 5 MINUTES AS NEEDED FOR CHEST PAIN.  DO NOT EXCEED A TOTAL OF 3 DOSES IN 15 MINUTES   Refills:  3        omeprazole 20 MG CR capsule   Commonly known as:  priLOSEC   Quantity:  180 capsule        TAKE 2 CAPSULES DAILY 30 TO 60 MINUTES BEFORE A MEAL   Refills:  1        rosuvastatin 20 MG tablet   Commonly known as:  CRESTOR   Dose:  20 mg   Quantity:  90 tablet        Take 1 tablet (20 mg) by mouth daily   Refills:  2        VITAMIN B-12 PO        Take by mouth every morning   Refills:  0                Procedures and tests performed during your visit     Basic metabolic panel    CBC with platelets differential    CT Cervical Spine w/o Contrast    CT Head w/o Contrast    EKG 12 lead    INR    Lumbar spine XR, 2-3 views    PTT    Peripheral IV catheter    Thoracic spine XR, 3 views    XR Chest 1 View      Orders Needing Specimen Collection     None      Pending Results     Date and Time Order Name Status Description    2/27/2018 2220 XR Chest 1 View Preliminary     2/27/2018 2220 Lumbar spine XR, 2-3 views Preliminary     2/27/2018 2220 Thoracic spine XR, 3 views Preliminary     2/27/2018 2220 CT Cervical Spine w/o Contrast Preliminary     2/27/2018 2220 CT Head w/o Contrast Preliminary             Pending Culture Results     No orders found from 2/25/2018 to 2/28/2018.            Pending Results Instructions     If you had any lab results that were not finalized at the time of your Discharge, you can call the ED Lab Result RN at [phone]. You will be contacted by this team for any positive Lab results or changes in treatment. The nurses are available 7 days a week from 10A to 6:30P.  You can leave a message 24 hours per day and they will return your call.        Test Results From Your Hospital Stay        2/27/2018 11:19 PM      Narrative     CT HEAD W/O CONTRAST  2/27/2018 10:59 PM     HISTORY: Fall down entire flight of stairs, on Plavix.    TECHNIQUE: Axial images of  the head and coronal reformations without  IV contrast material. Radiation dose for this scan was reduced using  automated exposure control, adjustment of the mA and/or kV according  to patient size, or iterative reconstruction technique.    COMPARISON: 3/23/2016.    FINDINGS: Mild cortical atrophy. No intracranial hemorrhage, mass or  mass effect.  No acute infarct identified. The ventricles are  symmetric. No shift of midline structures. No skull fractures. Mild  mucosal thickening in multiple paranasal sinuses.        Impression     IMPRESSION: No acute intracranial findings. No significant change.         2/27/2018 11:18 PM      Narrative     CT CERVICAL SPINE W/O CONTRAST  2/27/2018 11:00 PM     HISTORY: Trauma, pain; fracture, patient fell down entire flight of  stairs, on Plavix.    TECHNIQUE: Axial images through cervical spine without contrast.  Sagittal and coronal reformatted images. Radiation dose for this scan  was reduced using automated exposure control, adjustment of the mA  and/or kV according to patient size, or iterative reconstruction  technique.    COMPARISON: None.    FINDINGS: No acute fractures. Alignment is within normal limits.  Moderate degenerative and hypertrophic changes in the mid and lower  cervical spine. Degenerative disc disease at the C5-6 and C6-7  interspaces. Mild disc bulges at a few levels, most prominent at the  C3-4 interspace. No significant central canal narrowing.        Impression     IMPRESSION:  1. No acute fractures or alignment abnormality.  2. Degenerative changes.         2/27/2018 11:23 PM      Narrative     XR THORACIC SPINE 3 VW  2/27/2018 11:08 PM     INDICATION: Fall with midline TTP.    COMPARISON: 3/23/2016.        Impression     IMPRESSION: Moderate degenerative changes. Kyphosis. Slight anterior  wedging of a lower thoracic vertebral body is stable. Otherwise  negative.         2/27/2018 11:24 PM      Narrative     XR LUMBAR SPINE 2-3 VIEWS  2/27/2018  11:08 PM     INDICATION: Fall with midline TTP.    COMPARISON: 3/23/2016.        Impression     IMPRESSION: No acute fractures. Mild anterior wedging of T11, stable.  Mild degenerative changes including the lower lumbar facet joints.  Slight curve convex to the left. Alignment is otherwise within normal  limits. Vascular calcification. No significant change.               2/27/2018 10:39 PM      Component Results     Component Value Ref Range & Units Status    WBC 4.7 4.0 - 11.0 10e9/L Final    RBC Count 4.35 3.8 - 5.2 10e12/L Final    Hemoglobin 12.8 11.7 - 15.7 g/dL Final    Hematocrit 37.1 35.0 - 47.0 % Final    MCV 85 78 - 100 fl Final    MCH 29.4 26.5 - 33.0 pg Final    MCHC 34.5 31.5 - 36.5 g/dL Final    RDW 13.7 10.0 - 15.0 % Final    Platelet Count 190 150 - 450 10e9/L Final    Diff Method Automated Method  Final    % Neutrophils 47.7 % Final    % Lymphocytes 39.8 % Final    % Monocytes 10.2 % Final    % Eosinophils 1.7 % Final    % Basophils 0.2 % Final    % Immature Granulocytes 0.4 % Final    Nucleated RBCs 0 0 /100 Final    Absolute Neutrophil 2.2 1.6 - 8.3 10e9/L Final    Absolute Lymphocytes 1.9 0.8 - 5.3 10e9/L Final    Absolute Monocytes 0.5 0.0 - 1.3 10e9/L Final    Absolute Eosinophils 0.1 0.0 - 0.7 10e9/L Final    Absolute Basophils 0.0 0.0 - 0.2 10e9/L Final    Abs Immature Granulocytes 0.0 0 - 0.4 10e9/L Final    Absolute Nucleated RBC 0.0  Final         2/27/2018 10:55 PM      Component Results     Component Value Ref Range & Units Status    Sodium 140 133 - 144 mmol/L Final    Potassium 3.7 3.4 - 5.3 mmol/L Final    Chloride 106 94 - 109 mmol/L Final    Carbon Dioxide 30 20 - 32 mmol/L Final    Anion Gap 4 3 - 14 mmol/L Final    Glucose 96 70 - 99 mg/dL Final    Urea Nitrogen 15 7 - 30 mg/dL Final    Creatinine 0.76 0.52 - 1.04 mg/dL Final    GFR Estimate 74 >60 mL/min/1.7m2 Final    Non  GFR Calc    GFR Estimate If Black 90 >60 mL/min/1.7m2 Final    African American GFR Calc     Calcium 9.3 8.5 - 10.1 mg/dL Final         2/27/2018 10:49 PM      Component Results     Component Value Ref Range & Units Status    INR 1.06 0.86 - 1.14 Final         2/27/2018 10:49 PM      Component Results     Component Value Ref Range & Units Status    PTT 30 22 - 37 sec Final         2/27/2018 11:25 PM      Narrative     XR CHEST 1 VW  2/27/2018 11:09 PM     INDICATION: Left scapula TTP status post fall.    COMPARISON: 11/10/2017.        Impression     IMPRESSION: Sternotomy. Heart size near the upper limits of normal.  Minimal linear atelectasis or scarring left midlung. Lungs otherwise  clear. No pneumothorax. No obvious scapula abnormality.                Clinical Quality Measure: Blood Pressure Screening     Your blood pressure was checked while you were in the emergency department today. The last reading we obtained was  BP: (!) 134/98 . Please read the guidelines below about what these numbers mean and what you should do about them.  If your systolic blood pressure (the top number) is less than 120 and your diastolic blood pressure (the bottom number) is less than 80, then your blood pressure is normal. There is nothing more that you need to do about it.  If your systolic blood pressure (the top number) is 120-139 or your diastolic blood pressure (the bottom number) is 80-89, your blood pressure may be higher than it should be. You should have your blood pressure rechecked within a year by a primary care provider.  If your systolic blood pressure (the top number) is 140 or greater or your diastolic blood pressure (the bottom number) is 90 or greater, you may have high blood pressure. High blood pressure is treatable, but if left untreated over time it can put you at risk for heart attack, stroke, or kidney failure. You should have your blood pressure rechecked by a primary care provider within the next 4 weeks.  If your provider in the emergency department today gave you specific instructions to  follow-up with your doctor or provider even sooner than that, you should follow that instruction and not wait for up to 4 weeks for your follow-up visit.        Thank you for choosing [facility]       Thank you for choosing [facility] for your care. Our goal is always to provide you with excellent care. Hearing back from our patients is one way we can continue to improve our services. Please take a few minutes to complete the written survey that you may receive in the mail after you visit with us. Thank you!        [employer] Information     [employer] gives you secure access to your electronic health record. If you see a primary care provider, you can also send messages to your care team and make appointments. If you have questions, please call your primary care clinic.  If you do not have a primary care provider, please call [phone] and they will assist you.        Care EveryWhere ID     This is your Care EveryWhere ID. This could be used by other organizations to access your [facility] medical records  [id number]        Equal Access to Services     JOSELUIS MADRIGAL : Yuly Owens, wilmer phoenix, jeovanny epps, carlo castro. So [facility] [phone].    ATENCIÓN: Si habla español, tiene a taylor disposición servicios gratuitos de asistencia lingüística. Llame al [phone].    We comply with applicable federal civil rights laws and [place] laws. We do not discriminate on the basis of race, color, national origin, age, disability, sex, sexual orientation, or gender identity.            After Visit Summary       This is your record. Keep this with you and show to your community pharmacist(s) and doctor(s) at your next visit.

## 2018-02-28 NOTE — DISCHARGE INSTRUCTIONS
Back Contusion  You have a contusion to your back. A contusion is also called a bruise. There is swelling and some bleeding under the skin. The skin may be purplish. You may have muscle aching and stiffness in the area of the bruise. There are no broken bones.  Contusions heal on their own, without further treatment. However, pain and skin discoloration may take weeks to months to go away.   Home care    Rest. Avoid heavy lifting, strenuous exertion, or any activity that causes pain.    Ice the area to reduce pain and swelling. Put ice cubes in a plastic bag or use a cold pack. (Wrap the cold source in a thin towel. Don't place it directly on your skin.) Ice the injured area for 20 minutes every 1 to 2 hours the first day. Continue with ice packs 3 to 4 times a day for the next 2 days, then as needed for the relief of pain and swelling.    Take any prescribed pain medicine. If none was prescribed, take acetaminophen, ibuprofen, or naproxen to control pain, unless you have other medical conditions that prevent taking these medicines. If you are unsure about medicines, ask your healthcare provider before you leave the hospital.  Follow-up care  Follow up with your healthcare provider, or as directed. Call if you are not better in 1 to 2 weeks.  When to seek medical advice  Call your healthcare provider for any of the following:    New or worsening pain    Increased swelling around the bruise    Pain spreads to one or both legs    Weakness or numbness in one or both legs     Loss of bowel or bladder control    Numbness in the groin or genital area    Fever of 100.4 F (38 C) or higher, or as directed by your healthcare provider  Date Last Reviewed: 7/1/2017 2000-2017 [employer]. [address] [place] [address], [place] [address]. All rights reserved. This information is not intended as a substitute for professional medical care. Always follow your healthcare professional's instructions.

## 2018-02-28 NOTE — ED PROVIDER NOTES
History     Chief Complaint:  Fall    HPI   Katherin Fletcher is a 77 year old female, taking Plavix and a baby aspirin daily,who presents following a fall. The patient reports that she was at home this evening when she experienced a fall, falling down approximately 16 stairs. She reports that this fall was due to muscle spasm in her leg. She believes that she tumbled down the stairs. Here in the emergency department the patient complains of some mid back pain. She denies having any neck pain, head ache, numbness, weakness, or having lost conscious at any point. Of note the patient is currently taking Azithromycin for a sinus infection.    Allergies:  Ace inhibitors  Baclofen  Penicillins  Nickel     Medications:    Azithromycin  Omeprazole  Plavix  Crestor  Levothyroxine  Nasalide  Tessalon  Carvedilol  Gabapentin    Past Medical History:    CAD  Hypertension  GERD  Hyperlipidemia  Ischemic cardiomyopathy  MI   Mixed hyperlipidemia  PAD  Restless leg syndrome  Hypothyroidism    Past Surgical History:    Arthroscopy shoulder bilateral  Bunionectomy bilateral  CABG  Cardiac surgery  Hand/finger surgery  Shoulder process unlisted  Carpal tunnel release bilateral  Vascular surgery   Hysterectomy DAMI[place]N  Open reduction internal fixation ankle  Release trigger finger  Repair hammer toe  Cardiac stent placement  Vascular surgery    Family History:    The patient denies any relevant family medical history.     Social History:  The patient was accompanied to the ED by her  and daughter.  Smoking Status: Former  Smokeless Tobacco: No  Alcohol Use: Yes   Marital Status:   [2]    Review of Systems   Musculoskeletal: Positive for back pain. Negative for neck pain.   Neurological: Negative for syncope, weakness, numbness and headaches.   All other systems reviewed and are negative.    Physical Exam   Vitals:  Patient Vitals for the past 24 hrs:   BP Temp Temp src Pulse Heart Rate Resp SpO2 Height Weight  "  02/27/18 2350 138/86 - - 80 - 17 97 % - -   02/27/18 2330 138/86 - - - - - 97 % - -   02/27/18 2226 (!) 134/98 98  F (36.7  C) Oral - 83 18 96 % 1.676 m (5' 6\") 77.1 kg (170 lb)        Physical Exam  General/Appearance: appears stated age, well-groomed, appears comfortable  Eyes: EOMI, no scleral injection, no icterus  ENT: MMM  Neck: c-collar in place  Cardiovascular: RRR, nl S1S2, no m/r/g, 2+ pulses in all 4 extremities, cap refill <2sec  Respiratory: CTAB, good air movement throughout, no wheezes/rhonchi/rales, no increased WOB, no retractions  Back: no lesions, mild L scapula ttp, no midline c/t/l ttp  GI: abd soft, non-distended, nttp,  no HSM, no rebound, no guarding, nl BS  MSK: LOVE, good tone, no bony abnormality  Skin: warm and well-perfused, no rash, no edema, no ecchymosis, nl turgor  Neuro: GCS 15, alert and oriented, no gross focal neuro deficits  Psych: interacts appropriately  Heme: no petechia, no purpura, no active bleeding        Emergency Department Course     ECG:  ECG taken at 2221, ECG read at 2230  Atrial fibrillation. Septal infarct, age undetermined.Abnormal ECG  Rate 99 bpm. AL interval *. QRS duration 82. QT/QTc 366/469. P-R-T axes *, 68, 116.      Imaging:  Radiology findings were communicated with the patient who voiced understanding of the findings.  XR chest 1 view:  IMPRESSION: Sternotomy. Heart size near the upper limits of normal.  Minimal linear atelectasis or scarring left midlung. Lungs otherwise  clear. No pneumothorax. No obvious scapula abnormality.  Reading per radiology.     Lumbar spine XR, 2-3 views:  IMPRESSION: No acute fractures. Mild anterior wedging of T11, stable.  Mild degenerative changes including the lower lumbar facet joints.  Slight curve convex to the left. Alignment is otherwise within normal  limits. Vascular calcification. No significant change.  Reading per radiology.     Thoracic spine XR, 20-3 views:  IMPRESSION: Moderate degenerative changes. " Kyphosis. Slight anterior  wedging of a lower thoracic vertebral body is stable. Otherwise  Negative.  Reading per radiology.     CT cervical spine w/o contrast:  IMPRESSION:  1. No acute fractures or alignment abnormality.  2. Degenerative changes.  Reading per radiology.     CT head w/o contrast:  IMPRESSION: No acute intracranial findings. No significant change.  Reading per radiology.     Laboratory:  Laboratory findings were communicated with the patient who voiced understanding of the findings.  CBC: AWNL (WBC 4.7, HGB 12.8, )   BMP: AWNL (Creatinine 0.76)   INR: 1.06   PTT:  30    Emergency Department Course:  Nursing notes and vitals reviewed.  I performed an exam of the patient as documented above.   IV was inserted and blood was drawn for laboratory testing, results above.  The patient was sent for a XR & CTwhile in the emergency department, results above.      I discussed the treatment plan with the patient. They expressed understanding of this plan and consented to discharge. They will be discharged home with instructions for care and follow up. In addition, the patient will return to the emergency department if their symptoms persist, worsen, if new symptoms arise or if there is any concern.  All questions were answered.     I personally reviewed the laboratory results with the patient and answered all related questions prior to discharge.    Impression & Plan      Medical Decision Making:  This patient is a 77-year-old female, on Plavix, with a mechanical fall.  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  This unfortunately occurred of the top of the stairs and she fell subsequently down 16 stairs.  She did hit her head.  C-collar was placed prophylactically although she does not have any neck tenderness.  CT head and C-spine were negative.  X-rays of the lumbar and thoracic spine, as well as scapula where she did have some mild tenderness to palpation with  within normal limits.  She is able to ambulate.  At this point I suspect contusions but no other serious injury.    Diagnosis:    ICD-10-CM    1. Fall down stairs, initial encounter W10.8XXA    2. Contusion of left side of back, initial encounter S20.222A         Disposition:   Discharged    CMS Diagnoses: None     Scribe Disclosure:  I, Kristopher FigueroaMaria Antonia, am serving as a scribe at 10:10 PM on 2/27/2018 to document services personally performed by Judith Lewis MD, based on my observations and the provider's statements to me.    EMERGENCY DEPARTMENT       Judith Lewis MD  02/28/18 0030

## 2018-03-16 ENCOUNTER — OFFICE VISIT (OUTPATIENT)
Dept: NEUROLOGY | Facility: CLINIC | Age: 78
End: 2018-03-16
Payer: OTHER GOVERNMENT

## 2018-03-16 VITALS
SYSTOLIC BLOOD PRESSURE: 127 MMHG | HEART RATE: 73 BPM | WEIGHT: 170 LBS | HEIGHT: 66 IN | DIASTOLIC BLOOD PRESSURE: 69 MMHG | BODY MASS INDEX: 27.32 KG/M2

## 2018-03-16 DIAGNOSIS — R25.2 SPASTICITY: Primary | ICD-10-CM

## 2018-03-16 RX ORDER — TIZANIDINE 2 MG/1
TABLET ORAL
Qty: 90 TABLET | Status: SHIPPED | OUTPATIENT
Start: 2018-03-16 | End: 2018-04-20

## 2018-03-16 NOTE — NURSING NOTE
Pt has been wearing a collar for 3 weeks, she can't hold her head up without bobbing her head. She said she is not having headaches. She said she is not having double or blurred vision. She said she does not choke when she eats or drinks. She doesn't notice any difference in her arms. She said she doesn't take anything for the arthritis in her arms and hands and legs and feet. She said she has some problems with constipation once in a while. She uses over the counter meds for constipation and that helps the problem. She said she is not in a wheel chair at home but she is for this appointment. She uses a walker and is very slow but she can get around. She said on Feb 27th she fell 13 steps landing on her butt, her right foot was up. She did not break any bones. At this time she is not ready to have someone help her in her home. She is very sad today, she said she isn't always like this. Pt is very sad that she doesn't know much about her disease or anything that can help her. She does not take any meds for depression and she doesn't want to take anything either.

## 2018-03-16 NOTE — MR AVS SNAPSHOT
After Visit Summary   3/16/2018    Katherin Fletcher    MRN: [id number]           Patient Information     Date Of Birth          1940        Visit Information        Provider Department      3/16/2018 9:30 AM Joe Randolph MD [facility] Neurology        Today's Diagnoses     Spasticity    -  1       Follow-ups after your visit        Your next 10 appointments already scheduled     Apr 04, 2018 10:00 AM CDT   LAB with RF LAB   [facility] ([facility])    [address] [place] [address] [phone]           Please do not eat 10-12 hours before your appointment if you are coming in fasting for labs on lipids, cholesterol, or glucose (sugar). This does not apply to pregnant women. Water, hot tea and black coffee (with nothing added) are okay. Do not drink other fluids, diet soda or chew gum.            May 08, 2018  3:00 PM CDT   (Arrive by 2:45 PM)   RETURN FOOT/ANKLE with Adrian Arroyo DPM   [facility] ([facility])    [address] [place] [address] [phone]              Future tests that were ordered for you today     Open Future Orders        Priority Expected Expires Ordered    TSH ([facility]) Routine 4/5/2018 6/27/2018 3/29/2018    Lipid Panel ([facility]) Routine  5/29/2018 3/29/2018            Who to contact     Please call your clinic at [phone] to:    Ask questions about your health    Make or cancel appointments    Discuss your medicines    Learn about your test results    Speak to your doctor            Additional Information About Your Visit        MyChart Information     [employer] gives you secure access to your electronic health record. If you see a primary care provider, you can also send messages to your care team and make appointments. If you have questions, please call your primary care clinic.  If you do not have a primary care provider, please call  "[phone] and they will assist you.      [employer] is an electronic gateway that provides easy, online access to your medical records. With [employer], you can request a clinic appointment, read your test results, renew a prescription or communicate with your care team.     To access your existing account, please contact your [facility] or call [phone] for assistance.        Care EveryWhere ID     This is your Care EveryWhere ID. This could be used by other organizations to access your [place] medical records  [id number]        Your Vitals Were     Pulse Height BMI (Body Mass Index)             73 1.676 m (5' 6\") 27.44 kg/m2          Blood Pressure from Last 3 Encounters:   No data found for BP    Weight from Last 3 Encounters:   No data found for Wt              Today, you had the following     No orders found for display         Today's Medication Changes          These changes are accurate as of 3/16/18 11:59 PM.  If you have any questions, ask your nurse or doctor.               Start taking these medicines.        Dose/Directions    tiZANidine 2 MG tablet   Commonly known as:  ZANAFLEX   Used for:  Spasticity   Started by:  Joe Randolph MD        Take 2mg at 8pm for 1 week, then take 2mg. at 8am and 8pm for one week, then take 2mg at 8am, 2pm and 8pm thereafter   Quantity:  90 tablet   Refills:  prn         These medicines have changed or have updated prescriptions.        Dose/Directions    isosorbide mononitrate 30 MG 24 hr tablet   Commonly known as:  IMDUR   This may have changed:  when to take this   Used for:  Coronary artery disease involving native coronary artery of native heart without angina pectoris        Dose:  30 mg   Take 1 tablet (30 mg) by mouth daily   Quantity:  90 tablet   Refills:  3            Where to get your medicines      Some of these will need a paper prescription and others can be bought over the counter.  Ask your nurse if you have " questions.     Bring a paper prescription for each of these medications     tiZANidine 2 MG tablet                Primary Care Provider Office Phone # Fax #    Aris Del Real [phone] [address]  [facility] [address]        Equal Access to Services     [facility] R[facility] : Hadii aad ku hadasho Soomaali, waaxda luqadaha, qaybta kaalmada adeegyada, waxay jerald hayjimbostan dias glendatiffany castro. So [facility] [phone].    ATENCIÓN: Si habla español, tiene a taylor disposición servicios gratuitos de asistencia lingüística. Joeadán al [phone].    We comply with applicable federal civil rights laws and [place] laws. We do not discriminate on the basis of race, color, national origin, age, disability, sex, sexual orientation, or gender identity.            Thank you!     Thank you for choosing [facility] NEUROLOGY  for your care. Our goal is always to provide you with excellent care. Hearing back from our patients is one way we can continue to improve our services. Please take a few minutes to complete the written survey that you may receive in the mail after your visit with us. Thank you!             Your Updated Medication List - Protect others around you: Learn how to safely use, store and throw away your medicines at www.disposemymeds.org.          This list is accurate as of 3/16/18 11:59 PM.  Always use your most recent med list.                   Brand Name Dispense Instructions for use Diagnosis    Alfalfa 250 MG Tabs      Take 250 mg by mouth every morning        aspirin 81 MG tablet      Take 81 mg by mouth At Bedtime        azelastine 0.1 % spray    ASTELIN     Spray 1 spray into both nostrils as needed        azithromycin 250 MG tablet    ZITHROMAX    6 tablet    Two tablets first day, then one tablet daily for four days.    Acute recurrent maxillary sinusitis       benzonatate 200 MG capsule    TESSALON     Take 200 mg by mouth daily as needed        calcium + D 600-200 MG-UNIT Tabs    Generic drug:  calcium carbonate-vitamin D      Take 1 tablet by mouth At Bedtime    Preventative health care       carbidopa-levodopa  MG per tablet    SINEMET    720 tablet    TAKE 2 TABLETS FOUR TIMES A DAY    Restless legs syndrome, Neurologic gait disorder, Dysarthria, Cerebellar disease       carvedilol 6.25 MG tablet    COREG    180 tablet    Take 1 tablet (6.25 mg) by mouth 2 times daily (with meals)    Atrial fibrillation (H), HTN (hypertension)       clopidogrel 75 MG tablet    PLAVIX    90 tablet    Take 1 tablet (75 mg) by mouth daily    CAD (coronary artery disease)       DAILY MULTI Tabs      Take by mouth every morning        flunisolide 25 MCG/ACT (0.025%) Soln spray    NASALIDE          gabapentin 100 MG capsule    NEURONTIN     Take 100 mg by mouth At Bedtime        isosorbide mononitrate 30 MG 24 hr tablet    IMDUR    90 tablet    Take 1 tablet (30 mg) by mouth daily    Coronary artery disease involving native coronary artery of native heart without angina pectoris       levothyroxine 112 MCG tablet    SYNTHROID/LEVOTHROID    90 tablet    TAKE 1 TABLET DAILY    Encounter for medication refill       nitroGLYcerin 0.4 MG sublingual tablet    NITROSTAT    25 tablet    DISSOLVE ONE TABLET UNDER THE TONGUE EVERY 5 MINUTES AS NEEDED FOR CHEST PAIN.  DO NOT EXCEED A TOTAL OF 3 DOSES IN 15 MINUTES    CAD (coronary artery disease)       omeprazole 20 MG CR capsule    priLOSEC    180 capsule    TAKE 2 CAPSULES DAILY 30 TO 60 MINUTES BEFORE A MEAL    Encounter for long-term (current) use of medications       rosuvastatin 20 MG tablet    CRESTOR    90 tablet    Take 1 tablet (20 mg) by mouth daily    Hyperlipidemia       tiZANidine 2 MG tablet    ZANAFLEX    90 tablet    Take 2mg at 8pm for 1 week, then take 2mg. at 8am and 8pm for one week, then take 2mg at 8am, 2pm and 8pm thereafter    Spasticity       VITAMIN B-12 PO      Take by mouth every morning

## 2018-03-16 NOTE — NURSING NOTE
"Chief Complaint   Patient presents with     RECHECK     RETURN ATAXIA       Initial /69  Pulse 73  Ht 1.676 m (5' 6\")  Wt 77.1 kg (170 lb)  BMI 27.44 kg/m2 Estimated body mass index is 27.44 kg/(m^2) as calculated from the following:    Height as of this encounter: 1.676 m (5' 6\").    Weight as of this encounter: 77.1 kg (170 lb).  Medication Reconciliation: kera Jalloh      "

## 2018-03-16 NOTE — LETTER
3/16/2018       RE: Katherin Fletcher  [address]  [facility] [address]     Dear Colleague,    Thank you for referring your patient, Katherin Fletcher, to the [facility] NEUROLOGY at [facility]. Please see a copy of my visit note below.    No notes on file    Again, thank you for allowing me to participate in the care of your patient.      Sincerely,    Joe Randolph MD

## 2018-03-16 NOTE — LETTER
Date:March 30, 2018      Provider requested that no letter be sent. Do not send.       [facility] Health Information

## 2018-03-20 ENCOUNTER — OFFICE VISIT (OUTPATIENT)
Dept: ORTHOPEDICS | Facility: CLINIC | Age: 78
End: 2018-03-20
Payer: OTHER GOVERNMENT

## 2018-03-20 DIAGNOSIS — M25.571 PAIN IN JOINT, ANKLE AND FOOT, RIGHT: Primary | ICD-10-CM

## 2018-03-20 DIAGNOSIS — L60.0 ONYCHOCRYPTOSIS: Primary | ICD-10-CM

## 2018-03-20 NOTE — PROGRESS NOTES
CHIEF COMPLAINT:  Status post right second and third toe pinning performed on 12/19/2017.      HISTORY OF PRESENT ILLNESS:  Ms. Fletcher presents today for further followup.  Denies to have any problems or complaints.  Reports to be compliant and doing well.      PHYSICAL EXAMINATION:  On today's visit, she presents with excellent alignment of the right second and third toes.  There are no signs of infection or inflammation.  There is minimum swelling.  There is allegedly some stiffness for the PIP joint but overall excellent alignment.      ASSESSMENT:  Status post right second and third toe pinning.      PLAN:  I discussed with the patient that she is making progress according to the plan.  She can proceed with activity as tolerated.  She has no activity restrictions.      On today's visit, she commented on having an ingrown nail for the great toe, and she will be referred to Dr. Arroyo to proceed with a chemical partial nail matrix ablation on the right great toe.      All questions were answered.      TT 15 minutes, CT 10 minutes

## 2018-03-20 NOTE — LETTER
3/20/2018       RE: Katherin Fletcher  [address]  [facility] [address]     Dear Colleague,    Thank you for referring your patient, Katherin Fletcher, to the [facility] ORTHOPAEDIC [facility]INIC at [facility]. Please see a copy of my visit note below.    Chief Complaints and History of Present Illnesses   Patient presents with     RECHECK     right hallux nail            Allergies   Allergen Reactions     Ace Inhibitors Cough     Baclofen      Penicillins      10/7/14 Hives per patient -- many many years ago       Nickel Swelling and Rash     Other reaction(s): Unknown         Subjective: Katherin is a 77 year old female who presents to the clinic today for a follow up of right hallux nail. She relates that she has had a tough last few months. She fell down 13 stairs a few weeks ago. 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 She relates that the right hallux is hurting her today. I have performed a slantback on this previously. She was seen by Dr Wiggins before this and she was referred back to me to discuss treatment of the nail.      Objective    Right lateral hallux nail is incurvated and painful with compression. No erythema, edema, or s/s of infection to the area. No pain noted with palpation of the most proximal portion of the nail fold.     Assessment: Right lateral hallux onychocryptosis. No infection.     Plan:   - Pt seen and evaluated  - I discussed the treatment options with Katherin. I have recommended that we perform a partial nail avulsion, however she does not want this performed today. She would like another slantback performed today and I did this. She will reappoint to have the toe anesthetized and avulsed.   - Pt to return to clinic at next scheduled appointment.       Sincerely,    Adrian Arroyo DPM

## 2018-03-20 NOTE — NURSING NOTE
Reason For Visit:   Chief Complaint   Patient presents with     RECHECK     Right 2nd/3rd toe pinnings. DOS 12/19/17           Pain Assessment  Patient Currently in Pain: No

## 2018-03-20 NOTE — MR AVS SNAPSHOT
After Visit Summary   3/20/2018    Katherin Fletcher    MRN: [id number]           Patient Information     Date Of Birth          1940        Visit Information        Provider Department      3/20/2018 2:20 PM Fernando Wiggins MD [facility]        Today's Diagnoses     Pain in joint, ankle and foot, right    -  1       Follow-ups after your visit        Your next 10 appointments already scheduled     Mar 29, 2018 12:30 PM CDT   PHYSICAL with Aris Del Real MD   [facility] ([facility])    [address] [place] [address] [phone]            May 08, 2018  3:00 PM CDT   (Arrive by 2:45 PM)   RETURN FOOT/ANKLE with Adrian Arroyo DPM   [facility] ([facility] [place])    [address] [address] [phone]              Who to contact     Please call your clinic at [phone] to:    Ask questions about your health    Make or cancel appointments    Discuss your medicines    Learn about your test results    Speak to your doctor            Additional Information About Your Visit        MyChart Information     [employer] gives you secure access to your electronic health record. If you see a primary care provider, you can also send messages to your care team and make appointments. If you have questions, please call your primary care clinic.  If you do not have a primary care provider, please call [phone] and they will assist you.      [employer] is an electronic gateway that provides easy, online access to your medical records. With [employer], you can request a clinic appointment, read your test results, renew a prescription or communicate with your care team.     To access your existing account, please contact your [facility] or call [phone] for assistance.        Care EveryWhere ID     This is your Care EveryWhere ID.  This could be used by other organizations to access your [place] medical records  [id number]         Blood Pressure from Last 3 Encounters:   03/16/18 127/69   02/27/18 138/86   02/26/18 132/84    Weight from Last 3 Encounters:   03/16/18 77.1 kg (170 lb)   02/27/18 77.1 kg (170 lb)   02/26/18 77.6 kg (171 lb)              Today, you had the following     No orders found for display         Today's Medication Changes          These changes are accurate as of 3/20/18 11:59 PM.  If you have any questions, ask your nurse or doctor.               These medicines have changed or have updated prescriptions.        Dose/Directions    isosorbide mononitrate 30 MG 24 hr tablet   Commonly known as:  IMDUR   This may have changed:  when to take this   Used for:  Coronary artery disease involving native coronary artery of native heart without angina pectoris        Dose:  30 mg   Take 1 tablet (30 mg) by mouth daily   Quantity:  90 tablet   Refills:  3         Stop taking these medicines if you haven't already. Please contact your care team if you have questions.     azithromycin 250 MG tablet   Commonly known as:  ZITHROMAX   Stopped by:  Fernando Wiggins MD           gabapentin 100 MG capsule   Commonly known as:  NEURONTIN   Stopped by:  Fernando Wiggins MD                    Primary Care Provider Office Phone # Fax #    Aris Berhane Del Real [phone] [address] [place] [address]        Equal Access to Services     [facility] AH: Hadii rosio Owens, waaxda luqadaha, qaybta kaalmada adeegyada, wax jerald cervantes [facility] castro. So [facility] [phone].    ATENCIÓN: Si habla español, tiene a taylor disposición servicios gratuitos de asistencia lingüística. Llame al [phone].    We comply with applicable federal civil rights laws and [place] laws. We do not discriminate on the basis of race, color, national origin, age, disability, sex, sexual orientation,  or gender identity.            Thank you!     Thank you for choosing [facility] ORTHOPAEDIC CLINIC  for your care. Our goal is always to provide you with excellent care. Hearing back from our patients is one way we can continue to improve our services. Please take a few minutes to complete the written survey that you may receive in the mail after your visit with us. Thank you!             Your Updated Medication List - Protect others around you: Learn how to safely use, store and throw away your medicines at www.disposemymeds.org.          This list is accurate as of 3/20/18 11:59 PM.  Always use your most recent med list.                   Brand Name Dispense Instructions for use Diagnosis    Alfalfa 250 MG Tabs      Take 250 mg by mouth every morning        aspirin 81 MG tablet      Take 81 mg by mouth At Bedtime        azelastine 0.1 % spray    ASTELIN          benzonatate 200 MG capsule    TESSALON     Take 200 mg by mouth daily as needed        calcium + D 600-200 MG-UNIT Tabs   Generic drug:  calcium carbonate-vitamin D      Take 1 tablet by mouth At Bedtime    Preventative health care       carbidopa-levodopa  MG per tablet    SINEMET    720 tablet    TAKE 2 TABLETS FOUR TIMES A DAY    Restless legs syndrome, Neurologic gait disorder, Dysarthria, Cerebellar disease       carvedilol 6.25 MG tablet    COREG    180 tablet    Take 1 tablet (6.25 mg) by mouth 2 times daily (with meals)    Atrial fibrillation (H), HTN (hypertension)       clopidogrel 75 MG tablet    PLAVIX    90 tablet    Take 1 tablet (75 mg) by mouth daily    CAD (coronary artery disease)       DAILY MULTI Tabs      Take by mouth every morning        flunisolide 25 MCG/ACT (0.025%) Soln spray    NASALIDE          isosorbide mononitrate 30 MG 24 hr tablet    IMDUR    90 tablet    Take 1 tablet (30 mg) by mouth daily    Coronary artery disease involving native coronary artery of native heart without angina pectoris       levothyroxine 112 MCG  tablet    SYNTHROID/LEVOTHROID    90 tablet    TAKE 1 TABLET DAILY    Encounter for medication refill       nitroGLYcerin 0.4 MG sublingual tablet    NITROSTAT    25 tablet    DISSOLVE ONE TABLET UNDER THE TONGUE EVERY 5 MINUTES AS NEEDED FOR CHEST PAIN.  DO NOT EXCEED A TOTAL OF 3 DOSES IN 15 MINUTES    CAD (coronary artery disease)       omeprazole 20 MG CR capsule    priLOSEC    180 capsule    TAKE 2 CAPSULES DAILY 30 TO 60 MINUTES BEFORE A MEAL    Encounter for long-term (current) use of medications       rosuvastatin 20 MG tablet    CRESTOR    90 tablet    Take 1 tablet (20 mg) by mouth daily    Hyperlipidemia       tiZANidine 2 MG tablet    ZANAFLEX    90 tablet    Take 2mg at 8pm for 1 week, then take 2mg. at 8am and 8pm for one week, then take 2mg at 8am, 2pm and 8pm thereafter    Spasticity       VITAMIN B-12 PO      Take by mouth every morning

## 2018-03-20 NOTE — PROGRESS NOTES
Chief Complaints and History of Present Illnesses   Patient presents with     RECHECK     right hallux nail            Allergies   Allergen Reactions     Ace Inhibitors Cough     Baclofen      Penicillins      10/7/14 Hives per patient -- many many years ago       Nickel Swelling and Rash     Other reaction(s): Unknown         Subjective: Katherin is a 77 year old female who presents to the clinic today for a follow up of right hallux nail. She relates that she has had a tough last few months. She fell down 13 stairs a few weeks ago. 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 She relates that the right hallux is hurting her today. I have performed a slantback on this previously. She was seen by Dr Wiggins before this and she was referred back to me to discuss treatment of the nail.      Objective    Right lateral hallux nail is incurvated and painful with compression. No erythema, edema, or s/s of infection to the area. No pain noted with palpation of the most proximal portion of the nail fold.     Assessment: Right lateral hallux onychocryptosis. No infection.     Plan:   - Pt seen and evaluated  - I discussed the treatment options with Katherin. I have recommended that we perform a partial nail avulsion, however she does not want this performed today. She would like another slantback performed today and I did this. She will reappoint to have the toe anesthetized and avulsed.   - Pt to return to clinic at next scheduled appointment.

## 2018-03-20 NOTE — LETTER
3/20/2018       RE: Katherin Fletcher  [address]  [facility] [facility] [address]     Dear Colleague,    Thank you for referring your patient, Katherin Fletcher, to the [facility] ORTHOPAEDIC CLINIC at [facility]. Please see a copy of my visit note below.    CHIEF COMPLAINT:  Status post right second and third toe pinning performed on 12/19/2017.      HISTORY OF PRESENT ILLNESS:  Ms. Fletcher presents today for further followup.  Denies to have any problems or complaints.  Reports to be compliant and doing well.      PHYSICAL EXAMINATION:  On today's visit, she presents with excellent alignment of the right second and third toes.  There are no signs of infection or inflammation.  There is minimum swelling.  There is allegedly some stiffness for the PIP joint but overall excellent alignment.      ASSESSMENT:  Status post right second and third toe pinning.      PLAN:  I discussed with the patient that she is making progress according to the plan.  She can proceed with activity as tolerated.  She has no activity restrictions.      On today's visit, she commented on having an ingrown nail for the great toe, and she will be referred to Dr. Arroyo to proceed with a chemical partial nail matrix ablation on the right great toe.      All questions were answered.      TT 15 minutes, CT 10 minutes       Fernando Wiggins MD

## 2018-03-20 NOTE — MR AVS SNAPSHOT
After Visit Summary   3/20/2018    Katherin Fletcher    MRN: [id number]           Patient Information     Date Of Birth          1940        Visit Information        Provider Department      3/20/2018 3:00 PM Adrian Arroyo DPM [facility]        Today's Diagnoses     Onychocryptosis    -  1       Follow-ups after your visit        Your next 10 appointments already scheduled     Mar 29, 2018 12:30 PM CDT   PHYSICAL with Aris Del Real MD   [facility]    [address] [place] [address] [phone]            May 08, 2018  3:00 PM CDT   (Arrive by 2:45 PM)   RETURN FOOT/ANKLE with JAYASHREE Warren [facility] ([facility])    [address] [address] [phone]              Who to contact     Please call your clinic at [phone] to:    Ask questions about your health    Make or cancel appointments    Discuss your medicines    Learn about your test results    Speak to your doctor            Additional Information About Your Visit        MyChart Information     [facility] gives you secure access to your electronic health record. If you see a primary care provider, you can also send messages to your care team and make appointments. If you have questions, please call your primary care clinic.  If you do not have a primary care provider, please call [phone] and they will assist you.      [facility] is an electronic gateway that provides easy, online access to your medical records. With [facility], you can request a clinic appointment, read your test results, renew a prescription or communicate with your care team.     To access your existing account, please contact your [facility] or call [phone] for assistance.        Care EveryWhere ID     This is your Care EveryWhere ID. This could be used  by other organizations to access your [place] medical records  [id number]         Blood Pressure from Last 3 Encounters:   03/16/18 127/69   02/27/18 138/86   02/26/18 132/84    Weight from Last 3 Encounters:   03/16/18 77.1 kg (170 lb)   02/27/18 77.1 kg (170 lb)   02/26/18 77.6 kg (171 lb)              Today, you had the following     No orders found for display         Today's Medication Changes          These changes are accurate as of 3/20/18  4:01 PM.  If you have any questions, ask your nurse or doctor.               These medicines have changed or have updated prescriptions.        Dose/Directions    isosorbide mononitrate 30 MG 24 hr tablet   Commonly known as:  IMDUR   This may have changed:  when to take this   Used for:  Coronary artery disease involving native coronary artery of native heart without angina pectoris        Dose:  30 mg   Take 1 tablet (30 mg) by mouth daily   Quantity:  90 tablet   Refills:  3         Stop taking these medicines if you haven't already. Please contact your care team if you have questions.     azithromycin 250 MG tablet   Commonly known as:  ZITHROMAX   Stopped by:  Fernando Wiggins MD           gabapentin 100 MG capsule   Commonly known as:  NEURONTIN   Stopped by:  Fernando Wiggins MD                    Primary Care Provider Office Phone # Fax #    Aris Del Real [phone] [address] [facility] [address]        Equal Access to Services     [facility] AH: Hadii rosio joel hadasho Somichelle, waaxda luqadaha, qaybta kaalmada supriya, carlo castro. So [facility] [phone].    ATENCIÓN: Si habla español, tiene a taylor disposición servicios gratuitos de asistencia lingüística. Llame al [phone].    We comply with applicable federal civil rights laws and [place] laws. We do not discriminate on the basis of race, color, national origin, age, disability, sex, sexual orientation, or gender  identity.            Thank you!     Thank you for choosing [facility]  for your care. Our goal is always to provide you with excellent care. Hearing back from our patients is one way we can continue to improve our services. Please take a few minutes to complete the written survey that you may receive in the mail after your visit with us. Thank you!             Your Updated Medication List - Protect others around you: Learn how to safely use, store and throw away your medicines at www.disposemymeds.org.          This list is accurate as of 3/20/18  4:01 PM.  Always use your most recent med list.                   Brand Name Dispense Instructions for use Diagnosis    Alfalfa 250 MG Tabs      Take 250 mg by mouth every morning        aspirin 81 MG tablet      Take 81 mg by mouth At Bedtime        azelastine 0.1 % spray    ASTELIN          benzonatate 200 MG capsule    TESSALON     Take 200 mg by mouth daily as needed        calcium + D 600-200 MG-UNIT Tabs   Generic drug:  calcium carbonate-vitamin D      Take 1 tablet by mouth At Bedtime    Preventative health care       carbidopa-levodopa  MG per tablet    SINEMET    720 tablet    TAKE 2 TABLETS FOUR TIMES A DAY    Restless legs syndrome, Neurologic gait disorder, Dysarthria, Cerebellar disease       carvedilol 6.25 MG tablet    COREG    180 tablet    Take 1 tablet (6.25 mg) by mouth 2 times daily (with meals)    Atrial fibrillation (H), HTN (hypertension)       clopidogrel 75 MG tablet    PLAVIX    90 tablet    Take 1 tablet (75 mg) by mouth daily    CAD (coronary artery disease)       DAILY MULTI Tabs      Take by mouth every morning        flunisolide 25 MCG/ACT (0.025%) Soln spray    NASALIDE          isosorbide mononitrate 30 MG 24 hr tablet    IMDUR    90 tablet    Take 1 tablet (30 mg) by mouth daily    Coronary artery disease involving native coronary artery of native heart without angina pectoris       levothyroxine 112 MCG tablet     SYNTHROID/LEVOTHROID    90 tablet    TAKE 1 TABLET DAILY    Encounter for medication refill       nitroGLYcerin 0.4 MG sublingual tablet    NITROSTAT    25 tablet    DISSOLVE ONE TABLET UNDER THE TONGUE EVERY 5 MINUTES AS NEEDED FOR CHEST PAIN.  DO NOT EXCEED A TOTAL OF 3 DOSES IN 15 MINUTES    CAD (coronary artery disease)       omeprazole 20 MG CR capsule    priLOSEC    180 capsule    TAKE 2 CAPSULES DAILY 30 TO 60 MINUTES BEFORE A MEAL    Encounter for long-term (current) use of medications       rosuvastatin 20 MG tablet    CRESTOR    90 tablet    Take 1 tablet (20 mg) by mouth daily    Hyperlipidemia       tiZANidine 2 MG tablet    ZANAFLEX    90 tablet    Take 2mg at 8pm for 1 week, then take 2mg. at 8am and 8pm for one week, then take 2mg at 8am, 2pm and 8pm thereafter    Spasticity       VITAMIN B-12 PO      Take by mouth every morning

## 2018-03-29 ENCOUNTER — OFFICE VISIT (OUTPATIENT)
Dept: FAMILY MEDICINE | Facility: CLINIC | Age: 78
End: 2018-03-29

## 2018-03-29 VITALS
SYSTOLIC BLOOD PRESSURE: 120 MMHG | RESPIRATION RATE: 12 BRPM | HEIGHT: 64 IN | WEIGHT: 164.4 LBS | HEART RATE: 84 BPM | DIASTOLIC BLOOD PRESSURE: 76 MMHG | BODY MASS INDEX: 28.07 KG/M2

## 2018-03-29 DIAGNOSIS — I10 ESSENTIAL HYPERTENSION: ICD-10-CM

## 2018-03-29 DIAGNOSIS — I73.9 PAD (PERIPHERAL ARTERY DISEASE) (H): ICD-10-CM

## 2018-03-29 DIAGNOSIS — E03.4 HYPOTHYROIDISM DUE TO ACQUIRED ATROPHY OF THYROID: ICD-10-CM

## 2018-03-29 DIAGNOSIS — Z00.00 MEDICARE ANNUAL WELLNESS VISIT, SUBSEQUENT: Primary | ICD-10-CM

## 2018-03-29 DIAGNOSIS — Z71.89 ADVANCED DIRECTIVES, COUNSELING/DISCUSSION: ICD-10-CM

## 2018-03-29 DIAGNOSIS — E78.2 MIXED HYPERLIPIDEMIA: ICD-10-CM

## 2018-03-29 DIAGNOSIS — I25.5 ISCHEMIC CARDIOMYOPATHY: ICD-10-CM

## 2018-03-29 DIAGNOSIS — R26.9 NEUROLOGIC GAIT DISORDER: ICD-10-CM

## 2018-03-29 DIAGNOSIS — G62.9 PERIPHERAL POLYNEUROPATHY: ICD-10-CM

## 2018-03-29 PROCEDURE — G0439 PPPS, SUBSEQ VISIT: HCPCS | Performed by: FAMILY MEDICINE

## 2018-03-29 PROCEDURE — 99214 OFFICE O/P EST MOD 30 MIN: CPT | Mod: 25 | Performed by: FAMILY MEDICINE

## 2018-03-29 RX ORDER — METHOCARBAMOL 750 MG/1
1500 TABLET, FILM COATED ORAL 3 TIMES DAILY
COMMUNITY
Start: 2018-03-28 | End: 2018-05-07

## 2018-03-29 NOTE — MR AVS SNAPSHOT
"              After Visit Summary   3/29/2018    Katherin Fletcher    MRN: [id number]           Patient Information     Date Of Birth          1940        Visit Information        Provider Department      3/29/2018 12:30 PM Aris Del Real MD [facility]        Today's Diagnoses     Mixed hyperlipidemia    -  1    Medicare annual wellness visit, subsequent        Advanced directives, counseling/discussion        Hypothyroidism due to acquired atrophy of thyroid        Neurologic gait disorder        Essential hypertension        Peripheral polyneuropathy        PAD (peripheral artery disease) (H)        Ischemic cardiomyopathy          Care Instructions    Thanks for coming in to see me today!    Your next visit with us should be scheduled for Follow up in 6 months.    Listed next are the medical health Maintenance items we are tracking for your care and when they are next due (or overdue!)  Our goal is 100% \"up to date.\" Keep this list handy to call and schedule what you are due for in the future!    Health Maintenance   Topic Date Due     MEDICARE ANNUAL WELLNESS VISIT  05/27/1958     ADVANCE DIRECTIVE PLANNING Q5 YRS  05/22/2017     INFLUENZA VACCINE (SYSTEM ASSIGNED)  09/01/2018     PNEUMOCOCCAL (2 of 2 - PPSV23) 09/14/2018     FALL RISK ASSESSMENT  02/26/2019     LIPID SCREEN Q5 YR FEMALE (SYSTEM ASSIGNED)  08/23/2022     TETANUS IMMUNIZATION (SYSTEM ASSIGNED)  06/04/2026     DEXA SCAN SCREENING (SYSTEM ASSIGNED)  Completed         Preventive Health Recommendations    Female Ages 65 +    Yearly exam:     See your health care provider every year in order to  o Review health changes.   o Discuss preventive care.    o Review your medicines if your doctor has prescribed any.      You no longer need a yearly Pap test unless you've had an abnormal Pap test in the past 10 years. If you have vaginal symptoms, such as bleeding or discharge, be sure to talk with your provider about a Pap " test.      Every 1 to 2 years, have a mammogram.  If you are over 69, talk with your health care provider about whether or not you want to continue having screening mammograms.      Every 10 years, have a colonoscopy. Or, have a yearly FIT test (stool test). These exams will check for colon cancer.       Have a cholesterol test every 5 years, or more often if your doctor advises it.       Have a diabetes test (fasting glucose) every three years. If you are at risk for diabetes, you should have this test more often.       At age 65, have a bone density scan (DEXA) to check for osteoporosis (brittle bone disease).    Shots:    Get a flu shot each year.    Get a tetanus shot every 10 years.    Talk to your doctor about your pneumonia vaccines. There are now two you should receive - Pneumovax (PPSV 23) and Prevnar (PCV 13).    Talk to your doctor about the shingles vaccine.    Talk to your doctor about the hepatitis B vaccine.    Nutrition:     Eat at least 5 servings of fruits and vegetables each day.      Eat whole-grain bread, whole-wheat pasta and brown rice instead of white grains and rice.      Talk to your provider about Calcium and Vitamin D.     Lifestyle    Exercise at least 150 minutes a week (30 minutes a day, 5 days a week). This will help you control your weight and prevent disease.      Limit alcohol to one drink per day.      No smoking.       Wear sunscreen to prevent skin cancer.       See your dentist twice a year for an exam and cleaning.      See your eye doctor every 1 to 2 years to screen for conditions such as glaucoma, macular degeneration and cataracts.    At your visit today, we discussed your risk for falls and preventive options.    Fall Prevention  Falls often occur due to slipping, tripping or losing your balance. Millions of people fall every year and injure themselves. Here are ways to reduce your risk of falling again.    Think about your fall, was there anything that caused your fall  that can be fixed, removed, or replaced?    Make your home safe by keeping walkways clear of objects you may trip over.    Use non-slip pads under rugs. Do not use area rugs or small throw rugs.    Use non-slip mats in bathtubs and showers.    Install handrails and lights on staircases.    Do not walk in poorly lit areas.    Do not stand on chairs or wobbly ladders.    Use caution when reaching overhead or looking upward. This position can cause a loss of balance.    Be sure your shoes fit properly, have non-slip bottoms and are in good condition.     Wear shoes both inside and out. Avoid going barefoot or wearing slippers.    Be cautious when going up and down stairs, curbs, and when walking on uneven sidewalks.    If your balance is poor, consider using a cane or walker.    If your fall was related to alcohol use, stop or limit alcohol intake.     If your fall was related to use of sleeping medicines, talk to your doctor about this. You may need to reduce your dosage at bedtime if you awaken during the night to go to the bathroom.      To reduce the need for nighttime bathroom trips:    Avoid drinking fluids for several hours before going to bed    Empty your bladder before going to bed    Men can keep a urinal at the bedside    Stay as active as you can. Balance, flexibility, strength, and endurance all come from exercise. They all play a role in preventing falls. Ask your healthcare provider which types of activity are right for you.    Get your vision checked on a regular basis.    If you have pets, know where they are before you stand up or walk so you don't trip over them.    Use night lights.  Date Last Reviewed: 11/5/2015 2000-2017 [employer]. [address] [place] [address], [place] [address]. All rights reserved. This information is not intended as a substitute for professional medical care. Always follow your healthcare professional's instructions.                  Follow-ups after your visit    "     Your next 10 appointments already scheduled     May 08, 2018  3:00 PM CDT   (Arrive by 2:45 PM)   RETURN FOOT/ANKLE with Adrian Arroyo DPM   [facility] ([facility])    [address] [id number] [phone]              Future tests that were ordered for you today     Open Future Orders        Priority Expected Expires Ordered    TSH ([facility]) Routine 4/5/2018 6/27/2018 3/29/2018    Lipid Panel ([facility]) Routine  5/29/2018 3/29/2018            Who to contact     If you have questions or need follow up information about today's clinic visit or your schedule please contact [facility] directly at [phone].  Normal or non-critical lab and imaging results will be communicated to you by MyChart, letter or phone within 4 business days after the clinic has received the results. If you do not hear from us within 7 days, please contact the clinic through [facility] [facility] or phone. If you have a critical or abnormal lab result, we will notify you by phone as soon as possible.  Submit refill requests through [email or web address] ([employer]) or call your pharmacy and they will forward the refill request to us. Please allow 3 business days for your refill to be completed.          Additional Information About Your Visit        [email or web address] Information     [email or web address] [facility] gives you secure access to your electronic health record. If you see a primary care provider, you can also send messages to your care team and make appointments. If you have questions, please call your primary care clinic.  If you do not have a primary care provider, please call [phone] and they will assist you.        Care EveryWhere ID     This is your Care EveryWhere ID. This could be used by other organizations to access your [place] medical records  [id number]        Your Vitals Were     Pulse Respirations Height BMI (Body Mass Index)          84 12 1.619 m (5' 3.75\") 28.44 kg/m2         " Blood Pressure from Last 3 Encounters:   03/29/18 120/76   03/16/18 127/69   02/27/18 138/86    Weight from Last 3 Encounters:   03/29/18 74.6 kg (164 lb 6.4 oz)   03/16/18 77.1 kg (170 lb)   02/27/18 77.1 kg (170 lb)              We Performed the Following     DNR/DNI          Today's Medication Changes          These changes are accurate as of 3/29/18  1:14 PM.  If you have any questions, ask your nurse or doctor.               These medicines have changed or have updated prescriptions.        Dose/Directions    isosorbide mononitrate 30 MG 24 hr tablet   Commonly known as:  IMDUR   This may have changed:  when to take this   Used for:  Coronary artery disease involving native coronary artery of native heart without angina pectoris        Dose:  30 mg   Take 1 tablet (30 mg) by mouth daily   Quantity:  90 tablet   Refills:  3       omeprazole 20 MG CR capsule   Commonly known as:  priLOSEC   This may have changed:  See the new instructions.   Used for:  Encounter for long-term (current) use of medications        TAKE 2 CAPSULES DAILY 30 TO 60 MINUTES BEFORE A MEAL   Quantity:  180 capsule   Refills:  1         Stop taking these medicines if you haven't already. Please contact your care team if you have questions.     benzonatate 200 MG capsule   Commonly known as:  TESSALON   Stopped by:  Aris Del Real MD                    Primary Care Provider Office Phone # Fax #    Aris Del Real [phone] [address] [place] [address]        Equal Access to Services     [facility] AH: Hadii aad ku hadasho Soomaali, waaxda luqadaha, qaybta kaalmada adeegyada, waxay idiin hayjimbon larissa odonnell . So [facility] [phone].    ATENCIÓN: Si habla español, tiene a taylor disposición servicios gratuitos de asistencia lingüística. Llame al [phone].    We comply with applicable federal civil rights laws and [place] laws. We do not discriminate on the basis of race, color, national  origin, age, disability, sex, sexual orientation, or gender identity.            Thank you!     Thank you for choosing [facility]  for your care. Our goal is always to provide you with excellent care. Hearing back from our patients is one way we can continue to improve our services. Please take a few minutes to complete the written survey that you may receive in the mail after your visit with us. Thank you!             Your Updated Medication List - Protect others around you: Learn how to safely use, store and throw away your medicines at www.disposemymeds.org.          This list is accurate as of 3/29/18  1:14 PM.  Always use your most recent med list.                   Brand Name Dispense Instructions for use Diagnosis    Alfalfa 250 MG Tabs      Take 250 mg by mouth every morning        aspirin 81 MG tablet      Take 81 mg by mouth At Bedtime        azelastine 0.1 % spray    ASTELIN     Spray 1 spray into both nostrils as needed        calcium + D 600-200 MG-UNIT Tabs   Generic drug:  calcium carbonate-vitamin D      Take 1 tablet by mouth At Bedtime    Preventative health care       carbidopa-levodopa  MG per tablet    SINEMET    720 tablet    TAKE 2 TABLETS FOUR TIMES A DAY    Restless legs syndrome, Neurologic gait disorder, Dysarthria, Cerebellar disease       carvedilol 6.25 MG tablet    COREG    180 tablet    Take 1 tablet (6.25 mg) by mouth 2 times daily (with meals)    Atrial fibrillation (H), HTN (hypertension)       clopidogrel 75 MG tablet    PLAVIX    90 tablet    Take 1 tablet (75 mg) by mouth daily    CAD (coronary artery disease)       DAILY MULTI Tabs      Take by mouth every morning        flunisolide 25 MCG/ACT (0.025%) Soln spray    NASALIDE          isosorbide mononitrate 30 MG 24 hr tablet    IMDUR    90 tablet    Take 1 tablet (30 mg) by mouth daily    Coronary artery disease involving native coronary artery of native heart without angina pectoris       levothyroxine 112  MCG tablet    SYNTHROID/LEVOTHROID    90 tablet    TAKE 1 TABLET DAILY    Encounter for medication refill       methocarbamol 750 MG tablet    ROBAXIN     Take 1,500 mg by mouth 3 times daily FOR ATAXIA        nitroGLYcerin 0.4 MG sublingual tablet    NITROSTAT    25 tablet    DISSOLVE ONE TABLET UNDER THE TONGUE EVERY 5 MINUTES AS NEEDED FOR CHEST PAIN.  DO NOT EXCEED A TOTAL OF 3 DOSES IN 15 MINUTES    CAD (coronary artery disease)       omeprazole 20 MG CR capsule    priLOSEC    180 capsule    TAKE 2 CAPSULES DAILY 30 TO 60 MINUTES BEFORE A MEAL    Encounter for long-term (current) use of medications       rosuvastatin 20 MG tablet    CRESTOR    90 tablet    Take 1 tablet (20 mg) by mouth daily    Hyperlipidemia       tiZANidine 2 MG tablet    ZANAFLEX    90 tablet    Take 2mg at 8pm for 1 week, then take 2mg. at 8am and 8pm for one week, then take 2mg at 8am, 2pm and 8pm thereafter    Spasticity       VITAMIN B-12 PO      Take by mouth every morning

## 2018-03-29 NOTE — PATIENT INSTRUCTIONS
"Thanks for coming in to see me today!    Your next visit with us should be scheduled for Follow up in 6 months.    Listed next are the medical health Maintenance items we are tracking for your care and when they are next due (or overdue!)  Our goal is 100% \"up to date.\" Keep this list handy to call and schedule what you are due for in the future!    Health Maintenance   Topic Date Due     MEDICARE ANNUAL WELLNESS VISIT  05/27/1958     ADVANCE DIRECTIVE PLANNING Q5 YRS  05/22/2017     INFLUENZA VACCINE (SYSTEM ASSIGNED)  09/01/2018     PNEUMOCOCCAL (2 of 2 - PPSV23) 09/14/2018     FALL RISK ASSESSMENT  02/26/2019     LIPID SCREEN Q5 YR FEMALE (SYSTEM ASSIGNED)  08/23/2022     TETANUS IMMUNIZATION (SYSTEM ASSIGNED)  06/04/2026     DEXA SCAN SCREENING (SYSTEM ASSIGNED)  Completed         Preventive Health Recommendations    Female Ages 65 +    Yearly exam:     See your health care provider every year in order to  o Review health changes.   o Discuss preventive care.    o Review your medicines if your doctor has prescribed any.      You no longer need a yearly Pap test unless you've had an abnormal Pap test in the past 10 years. If you have vaginal symptoms, such as bleeding or discharge, be sure to talk with your provider about a Pap test.      Every 1 to 2 years, have a mammogram.  If you are over 69, talk with your health care provider about whether or not you want to continue having screening mammograms.      Every 10 years, have a colonoscopy. Or, have a yearly FIT test (stool test). These exams will check for colon cancer.       Have a cholesterol test every 5 years, or more often if your doctor advises it.       Have a diabetes test (fasting glucose) every three years. If you are at risk for diabetes, you should have this test more often.       At age 65, have a bone density scan (DEXA) to check for osteoporosis (brittle bone disease).    Shots:    Get a flu shot each year.    Get a tetanus shot every 10 " years.    Talk to your doctor about your pneumonia vaccines. There are now two you should receive - Pneumovax (PPSV 23) and Prevnar (PCV 13).    Talk to your doctor about the shingles vaccine.    Talk to your doctor about the hepatitis B vaccine.    Nutrition:     Eat at least 5 servings of fruits and vegetables each day.      Eat whole-grain bread, whole-wheat pasta and brown rice instead of white grains and rice.      Talk to your provider about Calcium and Vitamin D.     Lifestyle    Exercise at least 150 minutes a week (30 minutes a day, 5 days a week). This will help you control your weight and prevent disease.      Limit alcohol to one drink per day.      No smoking.       Wear sunscreen to prevent skin cancer.       See your dentist twice a year for an exam and cleaning.      See your eye doctor every 1 to 2 years to screen for conditions such as glaucoma, macular degeneration and cataracts.    At your visit today, we discussed your risk for falls and preventive options.    Fall Prevention  Falls often occur due to slipping, tripping or losing your balance. Millions of people fall every year and injure themselves. Here are ways to reduce your risk of falling again.    Think about your fall, was there anything that caused your fall that can be fixed, removed, or replaced?    Make your home safe by keeping walkways clear of objects you may trip over.    Use non-slip pads under rugs. Do not use area rugs or small throw rugs.    Use non-slip mats in bathtubs and showers.    Install handrails and lights on staircases.    Do not walk in poorly lit areas.    Do not stand on chairs or wobbly ladders.    Use caution when reaching overhead or looking upward. This position can cause a loss of balance.    Be sure your shoes fit properly, have non-slip bottoms and are in good condition.     Wear shoes both inside and out. Avoid going barefoot or wearing slippers.    Be cautious when going up and down stairs, curbs, and  when walking on uneven sidewalks.    If your balance is poor, consider using a cane or walker.    If your fall was related to alcohol use, stop or limit alcohol intake.     If your fall was related to use of sleeping medicines, talk to your doctor about this. You may need to reduce your dosage at bedtime if you awaken during the night to go to the bathroom.      To reduce the need for nighttime bathroom trips:    Avoid drinking fluids for several hours before going to bed    Empty your bladder before going to bed    Men can keep a urinal at the bedside    Stay as active as you can. Balance, flexibility, strength, and endurance all come from exercise. They all play a role in preventing falls. Ask your healthcare provider which types of activity are right for you.    Get your vision checked on a regular basis.    If you have pets, know where they are before you stand up or walk so you don't trip over them.    Use night lights.  Date Last Reviewed: 11/5/2015 2000-2017 [employer]. [address] [place] [address], [place] [address]. All rights reserved. This information is not intended as a substitute for professional medical care. Always follow your healthcare professional's instructions.

## 2018-03-29 NOTE — PROGRESS NOTES
SUBJECTIVE:   Katherin Fletcher is a 77 year old female who presents for Preventive Visit.  Rash under both breast, groin creases    Are you in the first 12 months of your Medicare Part B coverage?  No    Healthy Habits:    Do you get at least three servings of calcium containing foods daily (dairy, green leafy vegetables, etc.)? NO, BUT TAKES SUPPLEMENTS - MV, CALCIUM WITH D    Amount of exercise or daily activities, outside of work: 0 day(s) per week    Problems taking medications regularly No    Medication side effects: No- but experimenting with neurology medication per Dr Randolph     Have you had an eye exam in the past two years? yes    Do you see a dentist twice per year? yes    Do you have sleep apnea, excessive snoring or daytime drowsiness?no      Ability to successfully perform activities of daily living: Yes, no assistance needed    Home safety:  none identified     Hearing impairment: No  2  HEARING FREQUENCY TESTED BOTH EARS:Failed  Right Ear/Left Ear      500 Hz: passed/failed: pass    1000 Hz: Passed   2000 Hz: Passed   4000 Hz: Passed  Puja Josue MA 3/29/2018            Fall risk:  Fallen 2 or more times in the past year?: Yes (feb 2018)  Any fall with injury in the past year?: Yes        COGNITIVE SCREEN  1) Repeat 3 items (Banana, Sunrise, Chair)    2) Clock draw: ABNORMAL wrong time  3) 3 item recall: Recalls 3 objects  Results: 3 items recalled: COGNITIVE IMPAIRMENT LESS LIKELY    Mini-CogTM Copyright S Judah. Licensed by the author for use in [facility]; reprinted with permission ([email or web address]). All rights reserved.            Rash under breasts & in groin creases    Reviewed and updated as needed this visit by clinical staff  Tobacco  Allergies  Meds         Reviewed and updated as needed this visit by Provider        Social History   Substance Use Topics     Smoking status: Former Smoker     Packs/day: 0.50     Years: 22.00     Types: Cigarettes     Quit date:  4/15/1992     Smokeless tobacco: Never Used     Alcohol use 0.6 oz/week     1 Standard drinks or equivalent per week      Comment: MAYBE ONCE A YEAR       If you drink alcohol do you typically have >3 drinks per day or >7 drinks per week? No                        Today's PHQ-2 Score:   PHQ-2 ( 1999 [employer]) 3/29/2018 1/5/2016   Q1: Little interest or pleasure in doing things 1 0   Q2: Feeling down, depressed or hopeless 1 0   PHQ-2 Score 2 0     Depression:  Has known history of depression.  Has been on medication for this.  The patient does not report any significant side effects of this medication.  The prior symptoms leading to the original diagnosis and decision to start medication therapy are better.     Appetite is stable.  Sleeping patterns are stable.  No reported thoughts of suicide or homicide.  Last PHQ-9 score on record=   PHQ-9 SCORE 2/26/2018   Total Score 0     History of hypothyroidism.  On replacement therapy.  She has not experienced any significant side effects of this medication.   The patient denies of fatigue, weight changes, heat/cold intolerance, hair changes, nail changes, bowel changes.  TSH   Date Value Ref Range Status   01/07/2009 3.81 0.4 - 5.0 mU/L Final   09/15/2008 0.29 (L) 0.4 - 5.0 mU/L Final   05/14/2008 1.30 mcU/mL      TSH UIU/ML   Date Value Ref Range Status   03/13/2014 1.46 0.25 - 5.0 uIU/mL Final   07/23/2013 0.91 0.25 - 5.0 uIU/mL Final   04/09/2013 0.10 (A) 0.25 - 5.0 uIU/mL Final       Blood presure remains well controlled when checked out of clinic.    Reviewed last 6 BP readings in chart:  BP Readings from Last 6 Encounters:   03/29/18 120/76   03/16/18 127/69   02/27/18 138/86   02/26/18 132/84   12/19/17 102/72   12/11/17 135/83       she has not experienced any significant side effects from medications for hypertension.    NO active cardiac complaints or symptoms with exercise.    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    Wound on the right foot has healed.    Cardiomyopathy, has not progressed    Do you feel safe in your environment - Yes    Do you have a Health Care Directive?: Yes: Advance Directive has been received and scanned.    Current providers sharing in care for this patient include:   Patient Care Team:  Aris Del Real MD as PCP - General (Family Practice)  Agutso Andrade MD as MD (Student in organized health care education/training program)  Lina Ruano, RN as Nurse Coordinator (Neurology)  Frida Stevens, RN as Nurse Coordinator (Neurology)  Travon Driver MD as MD (Neurology)  Precious Black LPN as LPN    The following health maintenance items are reviewed in Epic and correct as of today:  Health Maintenance   Topic Date Due     MEDICARE ANNUAL WELLNESS VISIT  05/27/1958     ADVANCE DIRECTIVE PLANNING Q5 YRS  05/22/2017     INFLUENZA VACCINE (SYSTEM ASSIGNED)  09/01/2018     PNEUMOCOCCAL (2 of 2 - PPSV23) 09/14/2018     FALL RISK ASSESSMENT  02/26/2019     LIPID SCREEN Q5 YR FEMALE (SYSTEM ASSIGNED)  08/23/2022     TETANUS IMMUNIZATION (SYSTEM ASSIGNED)  06/04/2026     DEXA SCAN SCREENING (SYSTEM ASSIGNED)  Completed     BP Readings from Last 3 Encounters:   03/29/18 120/76   03/16/18 127/69   02/27/18 138/86    Wt Readings from Last 3 Encounters:   03/29/18 74.6 kg (164 lb 6.4 oz)   03/16/18 77.1 kg (170 lb)   02/27/18 77.1 kg (170 lb)                  Patient Active Problem List   Diagnosis     Long term current use of anticoagulant therapy     CAD (coronary artery disease)     Hyperlipidemia     Hypothyroidism     Health Care Home     MRSA (methicillin resistant Staphylococcus aureus)     Dysarthria     Disturbances of sensation of smell and taste     Neurologic gait disorder     Cerebellar disease     Essential hypertension     Ischemic cardiomyopathy     Peripheral polyneuropathy     Arterial leg ulcer (H)     PAD (peripheral artery disease) (H)  "    Encounter for long-term (current) use of medications     Past Surgical History:   Procedure Laterality Date     ARTHROSCOPY SHOULDER RT/LT       BUNIONECTOMY RT/LT       C CABG, ARTERY-VEIN, THREE  1/2009     C CARDIAC SURG PROCEDURE UNLIST       C HAND/FINGER SURGERY UNLISTED       C MRA UPPER EXTREMITY WO&W CONT       C SHOULDER SURG PROC UNLISTED       C STOMACH SURGERY PROCEDURE UNLISTED       CARDIAC SURGERY  1992    CABG,stents x2     CARPAL TUNNEL RELEASE RT/LT       HC VASCULAR SURGERY PROCEDURE UNLIST       HYSTERECTOMY, DAMIÁN  34 yo     OPEN REDUCTION INTERNAL FIXATION ANKLE      Left Ankle     RELEASE TRIGGER FINGER       REPAIR HAMMER TOE Right 12/19/2017    Procedure: REPAIR HAMMER TOE;  Right 2nd and 3rd toe pinning;  Surgeon: Fernando Wiggins MD;  Location: [facility] OR     STENT  2006     STENT(aka CARDIAC)  2008     VASCULAR SURGERY         Social History   Substance Use Topics     Smoking status: Former Smoker     Packs/day: 0.50     Years: 22.00     Types: Cigarettes     Quit date: 4/15/1992     Smokeless tobacco: Never Used     Alcohol use 0.6 oz/week     1 Standard drinks or equivalent per week      Comment: MAYBE ONCE A YEAR     Family History   Problem Relation Age of Onset     Unknown/Adopted Mother      Unknown/Adopted Father            Pneumonia Vaccine:Adults age 65+ who received their first dose of Pneumovax (PPSV23) prior to age 65 years: Should be given PCV 13 > 1 year after their most recent PPSV23 AND should be given a another dose of PPSV23 > 5 years after their most recent dose of PPSV23    ROS:  Constitutional, HEENT, cardiovascular, pulmonary, GI, , musculoskeletal, neuro, skin, endocrine and psych systems are negative, except as otherwise noted.    OBJECTIVE:   /76  Pulse 84  Resp 12  Ht 1.619 m (5' 3.75\")  Wt 74.6 kg (164 lb 6.4 oz)  BMI 28.44 kg/m2 Estimated body mass index is 28.44 kg/(m^2) as calculated from the following:    Height as of this encounter: 1.619 " "m (5' 3.75\").    Weight as of this encounter: 74.6 kg (164 lb 6.4 oz).  EXAM:   GENERAL APPEARANCE: alert, no distress, frail and fatigued  EYES: Eyes grossly normal to inspection, PERRL and conjunctivae and sclerae normal  HENT: ear canals and TM's normal, nose and mouth without ulcers or lesions, oropharynx clear and oral mucous membranes moist  NECK: no adenopathy, no asymmetry, masses, or scars and thyroid normal to palpation  RESP: lungs clear to auscultation - no rales, rhonchi or wheezes  BREAST: normal without masses, tenderness or nipple discharge and no palpable axillary masses or adenopathy  CV: regular rate and rhythm, normal S1 S2, no S3 or S4, no murmur, click or rub, no peripheral edema and peripheral pulses strong  ABDOMEN: soft, nontender, no hepatosplenomegaly, no masses and bowel sounds normal  MS: no musculoskeletal defects are noted and gait is age appropriate without ataxia  SKIN: no suspicious lesions or rashes and fungal rash under the breast  NEURO: Normal strength and tone, sensory exam grossly normal, mentation intact and speech normal  PSYCH: mentation appears normal and affect normal/bright    ASSESSMENT / PLAN:   Katherin was seen today for physical and hearing screening.  Medicare annual wellness visit, subsequent    Diagnoses and all orders for this visit:    Mixed hyperlipidemia  -     Lipid Panel ([facility]); Future  Discussed current lipid results, previous results (if available) current guidelines (NCEP) for treatment and goals for lipids.  Discussed lifestyle modification, dietary changes (low fat, low simple carb) and regular aerobic exercise.  Discussed the link between dysmetabolic syndrome and impaired glucose tolerance seen in certain patterns of lipids.  Briefly discussed medication used for lipid lowering, including the statins are their possible side effects of myalgias, rhabdomyolysis, and liver toxicity.        Advanced directives, counseling/discussion  -     " "DNR/DNI  After a long discussion we reviewed that while she  hopes to live happily for many more years, she clearly understands and wants DNR/DNI in the event of her  demise.     Hypothyroidism due to acquired atrophy of thyroid  -     TSH ([facility]); Future  Discussed signs and symptoms of hypo and hyperthyroidism.  Reviewed treatment options.   Recommended absolute medication compliance to avoid adding any additionial variance to the labs.    Neurologic gait disorder  Use the walker    Essential hypertension  Discussed hypertension in detail including JNC and American Heart Association guidelines for blood pressure goals.  Discussed indication for treatment and treatment options.  Discussed the importance for aggressive management of HTN to prevent vascular complications later.  Recommended lower fat, lower carbohydrate, and lower sodium (<2000 mg)diet.  Discussed required intervals for follow up on HTN, lab studies, and the need to aggresive management of other cardiac disease risk factors.  Recommened pt. follow their blood pressures outside the clinic to ensure that BPs are remaining within guidelines, and to contact me if the readings are not within guidelines so we can adjust treatment as needed.    Peripheral polyneuropathy  stable  PAD (peripheral artery disease) (H)  No new wounds  Ischemic cardiomyopathy    Other orders  -     Cancel: Vitamin D  25-Hydroxy ([facility])        End of Life Planning:  Patient currently has an advanced directive: DNR/DNI.  Practitioner is supportive of decision.    COUNSELING:  Reviewed preventive health counseling, as reflected in patient instructions       Healthy diet/nutrition       Vision screening       Hearing screening        Estimated body mass index is 28.44 kg/(m^2) as calculated from the following:    Height as of this encounter: 1.619 m (5' 3.75\").    Weight as of this encounter: 74.6 kg (164 lb 6.4 oz).       reports that she quit smoking about 25 years ago. Her " smoking use included Cigarettes. She has a 11.00 pack-year smoking history. She has never used smokeless tobacco.      Appropriate preventive services were discussed with this patient, including applicable screening as appropriate for cardiovascular disease, diabetes, osteopenia/osteoporosis, and glaucoma.  As appropriate for age/gender, discussed screening for colorectal cancer, prostate cancer, breast cancer, and cervical cancer. Checklist reviewing preventive services available has been given to the patient.    Reviewed patients plan of care and provided an AVS. The Basic Care Plan (routine screening as documented in Health Maintenance) for Katherin meets the Care Plan requirement. This Care Plan has been established and reviewed with the Patient.    Counseling Resources:  ATP IV Guidelines  Pooled Cohorts Equation Calculator  Breast Cancer Risk Calculator  FRAX Risk Assessment  ICSI Preventive Guidelines  Dietary Guidelines for Americans, 2010  USDA's MyPlate  ASA Prophylaxis  Lung CA Screening    Aris Del Real MD  [facility]

## 2018-03-29 NOTE — PROGRESS NOTES
"Service Date: 03/16/2018      SUBJECTIVE:  Katherin Fletcher returns to the [facility] having been seen on 2 previous occasions, the first time for initial consultation on 04/14/2017 and then for a followup visit on 11/03/2017.  As this examiner entered the room where she was to be examined, I noted that she was crying with tears just flowing.  When asked what was wrong.  She replied that everything was going bad for her.  On 02/27/2017 around 8:00 p.m. in the evening she fell when she was ascending the stairs at her home.  She was just getting ready for a trip to [place] and was about to obtain her suitcases from the upper level.  Apparently her left foot and leg went into a spasm, as she was attempting to put it on the landing.  She has an arrangement whereby the steps are closed from the downstairs by a door, then she has 13 steps to a landing and then 2 more steps to the second floor level.  The left leg suddenly jerked out, causing her to fall backward and she fell down all 13 steps and banged into the door which was closed.  She sustained a number of bruises but did not know how severe the injury was until she arrived at [facility] via ambulance, which was called when her  dialed 911.  Imaging included a CT of her head, neck and entire back.  There was no fracture of any bone nor any evidence of vertebral injury per se.  Katherin states that she has never fallen down stairs before and she has come to the realization that she just cannot get along at home any longer since her , Caio, is 89 years old.  She is unable to predict when the legs will spasm, sometimes just 1 does and sometimes both.  This is not such a problem when she is in bed but tends to be worse in the late afternoon into the early evening, especially about 10:00 p.m.  Much of the time her \"feet do not want to move; they stick.  I have fallen that way 5 or 6 times.\"      I noted that she was wearing a cervical collar and was " thinking that this was because of her fall, but that was not the case.  She states that she uses the collar to hold her head up; otherwise, the chin comes down on her chest.      The last time I saw her in November I prescribed Zanaflex 2 mg at bedtime to be increased to 4 mg if needed.  This prescription was sent online to [facility] in [place] but the pharmacist stated that no order was received from us.  Thereafter, she just continued on carbidopa/levodopa (Sinemet) which had been prescribed by Dr. Aris Ledezma, her primary care physician.  She states that Sinemet did initially help her, but now she experiences spasms about half an hour after taking the medication.      In spite of her bruises and negative response to it, she was able to tolerate the trip to [place], [place].  She stayed with a longtime friend and was gone a total of 10 days.      The patient also reported on a number of issues that she has dealt with some of which were addressed in the previous visits.  They are as follows:     1. Speech is slurred more than ever, but she thinks that this is due to a dental problem.  Her partials do not fit well and she thinks that her dysarthria is primarily a result of that.     2. Imbalance she has fallen at home before and so now uses a walker.  She does not want to use a wheelchair.     3. Chronic cough.  This has not been as bothersome as previously.  She has not had to use a nasal spray for about a month so this is not a real issue.   4. Head bobbing.  This occurs for a while and then her head flexes forward with her chin on her chest.  The neck collar has definitely helped her in this respect.  It is not comfortable for her, but she can tolerate.     5. The patient is feeling downhearted and frustrated but really does not describe the usual symptoms of depression such as sleeplessness and lack of energy or lack of interest in her surroundings or what is happening in the world.      It should be  noted that when she saw me previously it was reported that she is allergic to baclofen, although no records are available that gives credence to her history of allergy to baclofen.      I reviewed her current medications with Katherin Fletcher and there have been no changes as far as I can ascertain.  As mentioned, she did not start the Zanaflex because it was not ordered through [facility].  The reader is referred to the encounter of 11/03/2017.      When asked about any other new problems the patient denied that there are any.      PHYSICAL EXAMINATION:  Blood pressure 153/80, pulse 86, weight 180 pounds, height 5 feet 4 inches.  Katherin is a very pleasant lady who through tears was able to give a good history and eventually she settled down and I asked that her  be brought into the room so that he could have further understands the issue with her having an inability to care for herself at home with his being 89 years old and 100% disabled service connected wise.  She denied any particular pain at the time that I examined her.  No cardiac or lung auscultation was performed.  The neurologic examination revealed cranial nerves II-XII to be unremarkable.  Eye movements were normal without nystagmus and she could converge well.  There was no facial weakness or atrophy or numbness.  The tongue was midline and the palate elevated well.  There was no glossal atrophy.  Reflexes 2+ in the upper extremities and knees and 1+ at the ankles.  Toes were upgoing to plantar stimulation on the left and neutral on the right as before.  Muscle testing revealed 4/5 weakness of both deltoids and the hip flexor muscles.  There was a spastic catch in both quadriceps.  No Parkinsonian rigidity was identified.  Sensory testing revealed normal light touch and position sense.  Vibratory sense was slightly decreased in her toes, likely normal for her age.  Temperature sensation was quite reduced in the toes of both feet, normalizing at  about the midfoot and more so at the ankles.  Coordination testing showed virtually no dysmetria but there was a slowness of movements.  The rapidity of the alternating movements was not counted on this occasion.  The patient was unable to stand unassisted and required a walker for ambulation; hence I could not test gait and station adequately as I do with most patients.  Further, it should be mentioned that her Speech shows a slight slurring, but she is 100% intelligible.      IMPRESSION:   1. 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   2. Genetic type not ascertained as she was adopted and has no knowledge of her family history medically speaking.   3. 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   4. Marked imbalance associated with the ataxia and spasticity requiring a walker.   5. 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   6. History of poorly controlled flexure spasms which are her main problem.   7. Status post bilateral carpal tunnel release.      DISCUSSION:  This patient finds herself in a dilemma.  She notes she cannot live at home without assistance and her  is unable to provide that.  What it really came down to was that she could not see any way in which she could continue to navigate stairs.  We then suggested the possibility of obtaining a chair lift, even though there is at 90 degree turn at the first landing.  I have talked to her , Caio, who also uses a cane and is at risk for falling about checking with the [employer] about obtaining a chair lift for the stairway under the ausp[facility].  He said that [facility] will [facility] a  as well and that this person could help him arrange for specialized equipment such as the lift.  After this discussion both of them seemed to be somewhat relieved, feeling that they could still stay home and  not have someone come in and provide constant care.  In addition, we discussed controlling her muscle spasms in her legs as this is a major problem and could be a major cause of falls, particularly.  We have not yet tried Zanaflex, so I told the patient that I would reorder it.      PLAN:     1. Zanaflex 2 mg at 8:00 p.m. for 1 week, then 2 mg at 8:00 a.m. and 8:00 p.m. for 1 week, then 2 mg at 8:00 a.m., 2:00 p.m. and 8:00 p.m. thereafter.  Ninety pills are to be dispensed.     2. Discontinue Sinemet after the Zanaflex has been started.     3. Stop gabapentin as the patient really does not have any pain and it probably is not useful for the spasticity.   4. Return for a followup visit in late April or late May.   5. Arrangements for a chair lift will be the responsibility of the patient and her .  Hopefully, accomplished through the [employer].      A total of 45 minutes were spent with patient, during which time I obtained a history which was provided while she was very, very sad and crying.  Upon her  entering the room, a great deal of time was spent discussing the nature of her illness and what to expect with respect to the spasticity and the need for a better home environment so she is not falling down the stairs and that her cares are being met.  We then discussed how a chairlift could be obtained and came up with a plan as noted above.        Joe Xiong MD       E&M CODE:  [id number]         JOE XIONG MD             D: 2018   T: 2018   MT: tb      Name:     ANA RODRIGUEZ   MRN:      [id number]        Account:      [id number]   :      1940           Service Date: 2018      Document: [id number]

## 2018-03-29 NOTE — PROGRESS NOTES
2018             Aris Del Real MD   [facility]    [address] [place] [address]      RE: Katherin Fletcher   MRN: [id number]   : 1940      Dear Dr. Del Real:      I have had the pleasure of participating in the care of Ms. Katherin Fletcher on 3 occasions, the first time in 2017 and most recently on 2018.  This is a very nice lady, a retired , whom I knew when I also worked at the [facility] [employer] and have always found her to be a very pleasant lady.  Unfortunately, she has developed a progressive neurologic defect characterized by ataxia and spasticity, which undoubtedly are due to degeneration of the spinal cord and cerebellum.  She does have a mild ataxic dysarthria and mild incoordination of her upper extremities.  However, her gait disturbance is of major impact.  You probably know that she fell backward down her stairs at home in February, and although being bruised, she was more injured emotionally due to the fact that she is now realizing that she cannot stay at home without some kind of help.  Her , Caio, is 89 years old and is totally disabled himself, this being a service-connected disability.  The main problem, of course, is falling, and I suggested that they obtain a chairlift, which could help her to go up and down to and from her bedroom at home.  This seemed like a good idea to her and her .        In addition, I had prescribed Zanaflex when I saw her previously in November, but somehow or another the order never got through to [facility], and so the patient has continued on the medications you have prescribed, namely Sinemet and gabapentin.  When I saw her then on , I ordered Zanaflex, which she obtained.  Unfortunately, after her first dose in the evening, she noted that her spasms were worse than ever.  After a couple days of this, she called me, and then I suggested that she change it to dosing  in the morning, and then if that worked to add on dosing in the midday and evening.  Unfortunately, that particular idea was met with failure as the previous one was, and I have determined that Zanaflex (tizanidine) is not appropriate for her.  I went and discussed with her the problem that she has had with taking baclofen, which was prescribed by Dr. Pham of the [facility] on 06/23/2016.  Her chart indicates that she is allergic to baclofen, but the patient cannot recall that at all and really does not know why she stopped it.  I contacted the pharmacist at [facility] in [place], and likewise no one there was aware of the possibility of an allergy to baclofen.  Hence, I elected to start her on methocarbamol (Robaxin) 2 in the morning at 6 a.m., 2 at 10 a.m. and 2 at 4 p.m.  The patient states that she will start this tomorrow and call me after the 4 p.m. dose and let me know how this has worked.        In the long run, I really would like to try her on the baclofen, as it is probably the drug of choice.  She agreed to contact Dr. Pham or even go to his office in the [facility] and try to obtain copies of her records that pertain to her visits with Dr. Pham.  If there are no records indicating allergy, I will then judiciously start her on low-dose baclofen and see how she does.        If she does indeed have an allergy to baclofen, we are backed up into a corner somewhat, but I do feel that we might be successful with some of the other muscle relaxants, such as Norflex or cyclobenzaprine.  She is being very cooperative throughout all of this fiasco, and I feel bad that we cannot help her more immediately.        I do appreciate the honor of participating in the care of this very camila patient.  I would be happy to answer any questions you would have about her and can be reached through my cell phone, which is [phone].  Hopefully, that would be unnecessary, but I would be happy to  speak with you.  Thank you again.      Sincerely,      Joe Xiong MD      Not edited or signed.  A signed copy will follow         Sincerely,      JOE XIONG MD             D: 2018   T: 2018   MT: elizabeth      Name:     ANA RODRIGUEZ   MRN:      [id number]        Account:      [id number]   :      1940      Document: [id number]

## 2018-04-05 NOTE — TELEPHONE ENCOUNTER
----- Message from Gabriela Madison RN sent at 9/19/2017 11:10 AM CDT -----  Boris,  I just called Katherin to arrange for her to see Dr. Randolph on Nov. 3. I told her you had tried to call her on NOv 15 on both numbers and that there is no voice mail. She said there is voice mail on both numbers, so guess you can try again when you have a minute.  Thanks  Brooke   [id number] Critical Care - 30 to 74 minutes

## 2018-04-06 DIAGNOSIS — E78.2 MIXED HYPERLIPIDEMIA: ICD-10-CM

## 2018-04-06 DIAGNOSIS — E03.4 HYPOTHYROIDISM DUE TO ACQUIRED ATROPHY OF THYROID: ICD-10-CM

## 2018-04-06 PROCEDURE — 36415 COLL VENOUS BLD VENIPUNCTURE: CPT | Performed by: FAMILY MEDICINE

## 2018-04-06 NOTE — LETTER
[facility] [address] [place]  [address]  Phone: [phone]    April 12, 2018      Katherin Fletcher  [address] [facility] [address]              Dear Katherin,    I am writing to report that your included test results are within expected ranges. I do not suggest that we make any changes at this time    Sincerely,     Aris Del Real M.D.    Results for orders placed or performed in visit on 04/06/18   Lipid Panel ([facility])   Result Value Ref Range    Cholesterol 153 100 - 199 mg/dL    Triglycerides 170 (H) 0 - 149 mg/dL    HDL Cholesterol 48 >39 mg/dL    VLDL Cholesterol Wilder 34 5 - 40 mg/dL    LDL Cholesterol Calculated 71 0 - 99 mg/dL    LDL/HDL Ratio 1.5 0.0 - 3.2 ratio    Narrative    Performed at:  01 - [facility] [address], [place], [place]  [id number]  : Roberto Jurado MD, Phone:  [phone]   TSH ([facility])   Result Value Ref Range    TSH 1.320 0.450 - 4.500 uIU/mL    Narrative    Performed at:  01 - [facility] [address], [place], [place]  [id number]  : Roberto Jurado MD, Phone:  [phone]

## 2018-04-07 LAB
CHOLEST SERPL-MCNC: 153 MG/DL (ref 100–199)
HDLC SERPL-MCNC: 48 MG/DL
LDL/HDL RATIO: 1.5 RATIO (ref 0–3.2)
LDLC SERPL CALC-MCNC: 71 MG/DL (ref 0–99)
TRIGL SERPL-MCNC: 170 MG/DL (ref 0–149)
TSH BLD-ACNC: 1.32 UIU/ML (ref 0.45–4.5)
VLDLC SERPL CALC-MCNC: 34 MG/DL (ref 5–40)

## 2018-04-12 NOTE — PROGRESS NOTES
Dear Katherin,   I am writing to report that your included test results are within expected ranges. I do not suggest that we make any changes at this time.    Aris Del Real M.D.

## 2018-04-20 ENCOUNTER — OFFICE VISIT (OUTPATIENT)
Dept: NEUROLOGY | Facility: CLINIC | Age: 78
End: 2018-04-20
Payer: OTHER GOVERNMENT

## 2018-04-20 ENCOUNTER — CARE COORDINATION (OUTPATIENT)
Dept: PHYSICAL MEDICINE AND REHAB | Facility: CLINIC | Age: 78
End: 2018-04-20

## 2018-04-20 VITALS
HEIGHT: 64 IN | WEIGHT: 165 LBS | BODY MASS INDEX: 28.17 KG/M2 | DIASTOLIC BLOOD PRESSURE: 79 MMHG | HEART RATE: 77 BPM | SYSTOLIC BLOOD PRESSURE: 125 MMHG

## 2018-04-20 DIAGNOSIS — M53.82 NECK MUSCLE WEAKNESS: Primary | ICD-10-CM

## 2018-04-20 DIAGNOSIS — M62.838 MUSCLE SPASM OF LEFT LOWER EXTREMITY: ICD-10-CM

## 2018-04-20 ASSESSMENT — PAIN SCALES - GENERAL: PAINLEVEL: NO PAIN (0)

## 2018-04-20 NOTE — LETTER
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  I last saw her on 03/07/2018.  At that time she was very discouraged because she had fallen down her stairs at home and was discouraged because of the fact that she could really no longer care for herself at home, even though her  is there, too, but he is 93 years old and is totally disabled.      At the time of her visit in March, I discussed the possibility of her using antispasmodic drugs to control the leg spasms.  Her records indicated that she was allergic to baclofen, but the patient could not recollect that at all.  I also checked with her pharmacist, and there was no record of her being allergic to baclofen, and I felt that this was a significant bit of information since she has obtained her medications at that pharmacy for a number of years.  Therefore, I tried her on baclofen, and then when she called me in late March, she was taking 2 tablets 3 times a day, but if anything, she would feel worse after each dose.  For example, on Monday, 04/02, she had spasms at 8:30 a.m. and 7:30 p.m.  With the second set of spasms, she took Sinemet, which she has taken before, and this helped her.  Then on 04/03, she had spasms lasting from about 7:00-10:00 in the morning.  Taking 2 Sinemet helped her.  Then at 6:15, she again had onset of spasms and took 2 more Sinemet, but her spasms lasted until about 10:00 p.m.  Then on 04/04, she had spasms at about 7:00 in the morning lasting 1/2 hour and then again at 9:00 when she took Sinemet.  Then at 4:30, the spasms recurred, and so at 5:00 she took 2 Sinemet with some relief.      Katherin has also found  "that walking helps the spasms, but she cannot be up on her feet all the time because of her imbalance, and she needs to use a walker.  One night, her spasms lasted more than 8 hours.      There seems to be a pattern to her spasms as noted in the followin.  They usually occur after getting up.   2.  They are relieved with Sinemet.   3.  Baclofen has not helped and possibly has made her feel worse.   4.  There is no associated pain per se, but the spasms are very uncomfortable.   5.  She has no spasms in her arms or hands and not even in her thighs.  It is usually just in her calves and down into her feet.  Katherin has not tried sitting in a tub of warm water, but she might be willing to do that.      The particular reason for her visit today was at my behest.  (Incidentally, she was brought here by her daughter, Ruchi Choi.)  Katherin had called me yesterday stating that she had fallen on her buttocks on Tuesday, 2018.  She was in her kitchen and was reaching for a towel to cover her face because of a sneeze coming on.  She stated, \"I landed on my butt.\"  Other than having a bruised tailbone, she really did not have any other injury.  During the rest of the day, she had her usual problem with spasms and even was able to go shopping in the early evening.  Then on  (2 days ago), she awoke with the following feeling that she described.  \"My legs felt like they had pantyhose on, and the skin felt like there was a swarm of little gnats flying around the legs.\"  There was no pain as such with this sensation, but it was very disconcerting.  This lasted all day.  With respect to her spasms, she had them in the mid morning and took 2 Sinemet, and the spasms went away in 1/2 hour.  Again, in the evening she took 2 Sinemet when she had spasms, and she was better in an hour.  Then yesterday she had the same symptoms and called me about 9:30 in the morning, and I then suggested that she come to the [facility] and then visit with me, and we could determine what to do.  At that time she stated that [place] [facility] [place] [facility], but then said that she could call [employer], hoping that she could get a ride therewith.  However, her daughter, Ruchi, came through in the clutch.        Today her legs still feel like she has tight pantyhose on them, but she does not have the little fleeting feeling of gnat wings flying against her legs.  However, her feet feel a little strange as though she has a spongy, cushiony feeling under them when she stands on them.  She awoke with spasms at 1:00 in the morning and took 2 Sinemet, and the spasms were gone in 1/2 hour.  Again, at 10:30 this morning, she had the same thing.      I reviewed the medication list with Katherin, and there has been no appreciable change since her last visit.  She is still takin.  Carbidopa/levodopa 25/100 mg tablets as needed.   2.  Coreg.     3.  Plavix.     4.  Vitamin B12.     5.  Synthroid.     6.  Nitrostat p.r.n.   7.  Prilosec 20 mg  Capsule 2 daily 30 to 60 minutes a c.    8.  Crestor 20 mg daily.        Incidentally, she is not allergic to baclofen as proved by her taking it for a couple of weeks without any reaction, and so this note in the records that she is allergic to baclofen should be stricken from her records.      PHYSICAL EXAMINATION:  Once again, Katherin is bright and alert, very intelligent and displaying good memory for remote and recent events.  Attention and concentration are normal.  There is no evidence of a thinking disorder.  She has no trouble relating and today is not sobbing as she was doing at her last visit.      VITAL SIGNS:  Blood pressure 125/78, pulse 77 beats per minute, weight 165 pounds, height 5 feet 4 inches, body mass index 28.6.      I attended initially to her complaint of soreness on her buttock, and I found that she experienced tenderness on pressure over the coccyx, but she had  no pain on the lumbosacral area or the sacroiliac area, and the remainder of her back was unremarkable.  However, I removed her neck collar, which she had worn each time I saw her before, and did a careful examination of her neck muscles.  There was no problem with neck flexion or rotation.  The sternocleidomastoid muscles were normal bilaterally.  However, neck extension was considerably weakened.  In fact, she absolutely could not hold her head up, and her chin would fall to her chest.  Therefore, I replaced the collar.      Then I turned my attention to the neurologic examination.  Her cranial nerves were again unremarkable.  No significant nystagmus was seen.  There was no facial weakness or numbness.  Tongue was midline, and the palate elevated well.  The reflexes were 2+ in the upper extremities and knees and 1+ at the right ankle and decreased on the left.  (She had a previous fracture involving the left ankle, and it was largely fused because of a surgical intervention to stabilize that joint.)  On plantar stimulation, the toes were upgoing bilaterally (positive Babinski).  She also had 2 beats of ankle clonus on the right side, and this could not be adequately tested on the left.  Muscle testing revealed good strength in the upper and lower extremities with no atrophy seen.  However, she had considerable increase in tone in her lower extremities.  There was noted to be a spastic catch of the quadriceps muscles when I suddenly flexed her leg at the knee.  The cerebellar functioning was mildly impaired with some slurring of speech.  However, she was 100% intelligible.  Rapid alternating movements were diminished in speed in the upper extremities.  This included finger tapping and rapid alternating movements of the forearms.  There was no irregularity seen, however.  She had slight past pointing on finger-to-nose testing.      Sensory testing revealed position and vibratory sense to be normal for her age.  She  "had diminished pinprick perception in her feet about 50% of normal, but improving to what she considered a normal sensation at about the mid calf.  Likewise, the temperature sensation was diminished distally and gradually improved as the cool tuning fork was passed more proximally.      I did not attempt to have the patient walk today, but the reader is referred to previous reports at the time of her previous visits.      IMPRESSION:   1.  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   2.  Recent \"pratfall.\"   3.  Bruised coccyx.   4.  Possible spinal cord \"concussion,\" for lack of a better word, as she is showing improvement.   5.  Posterior neck muscle weakness, which could involve a number of muscles, including the trapezii, the semispinalis capitis, the obliquus capitis superior and the rectus capitis posterior major and minor.  To some extent, the longissimus capitis can be involved with posterior neck weakness.   6.  Mild dysarthria associated with cerebellar degeneration.   7.  Marked imbalance secondary to #1, i.e., the spastic ataxia.      PLAN:   1.  Continue Sinemet as needed.   2.  The patient is referred to Physical Medicine and Rehabilitation (PM&R) for opinions as to the role that therapy might play in her neck muscle weakness and also her gait.  The physicians in PM&R could decide to do an EMG involving the posterior neck muscles and elsewhere as determined necessary.   3.  The patient is to call me personally on 04/25/2018 regarding these new symptoms with which she presented today.   4.  [facility] on [facility]2018.      A total of 45 minutes was spent with the patient and her daughter, Ruchi, taking the history, which was rather extensive this time, and reviewing her medication use and what effects that it had on her and then explaining the nature of the paresthesias that she was experiencing in her lower extremities.  Greater than 50% of this time " was dedicated to this counseling.      Code for this established patient is 35622.         JOE XIONG MD             D: 2018   T: 2018   MT: elizabeth      Name:     ANA RODRIGUEZ   MRN:      [id number]        Account:      [id number]   :      1940           Service Date: 2018      Document: [id number]        Joe Xiong MD

## 2018-04-20 NOTE — NURSING NOTE
Que is here for a follow up from when she fell on Tuesday April 17th. She fell in her kitchen and she was not able to get herself up. She call 911 and they had to get her up off the floor. She did not go to the doctor after the fall.. Pt wears a collar on her neck because she can't hold her head up without it. Pt does not have headaches. When she fell she hit her butt on the floor. She does not have doubled or blurred vision. She said she coughs when she eats and sometimes she coughs when her food is rally dry. She said she has extra saliva all the time. She said her arms are weaker than they did before. She said she doesn't have problems with bowel or bladder habits. She said she fell because she lost her balance. Que is able to walk around her house if she uses her walker.

## 2018-04-20 NOTE — MR AVS SNAPSHOT
After Visit Summary   4/20/2018    Katherin Fletcher    MRN: [id number]           Patient Information     Date Of Birth          1940        Visit Information        Provider Department      4/20/2018 1:00 PM Joe Randolph MD [facility] Neurology        Today's Diagnoses     Neck muscle weakness    -  1    Leg muscle spasm           Follow-ups after your visit        Additional Services     PHYSIATRY REFERRAL       Your provider has referred you to: [facility]: [facility] - [place] [phone]   [email or web address]     Please note these locations do not prescribe narcotics or manage chronic pain.    Referral to PM and R for leg spasms, consider botox or other medications  Also Posterior neck muscle weakness, pt uses soft collar because she can't hold her head up    Please be aware that coverage of these services is subject to the terms and limitations of your health insurance plan.  Call member services at your health plan with any benefit or coverage questions.      Please bring the following to your appointment:  >>   Any x-rays, CTs or MRIs which have been performed.  Contact the facility where they were done to arrange for  prior to your scheduled appointment.    >>   List of current medications   >>   This referral request   >>   Any documents/labs given to you for this referral                  Your next 10 appointments already scheduled     Jun 01, 2018 10:30 AM CDT   (Arrive by 10:15 AM)   Return Ataxia with Joe Randolph MD   [facility] Neurology ([facility] [place])    [address] [place] [id number] [phone]            Jul 03, 2018  4:10 PM CDT   (Arrive by 3:55 PM)   New Botox with Darline Celestin MD   [facility] Physical Medicine and Rehabilitation ([facility])    [address] [place] [id number] [phone]            Aug 22, 2018  1:00  "PM CDT   (Arrive by 12:45 PM)   RETURN FOOT/ANKLE with Taty Bullard PA-C   [facility] ([facility] [place])    [address]  [address]  [facility] [address] [phone]            Sep 13, 2018  3:15 PM CDT   Return Visit with Kenton Grayson MD   [facility] ([facility])    [address]  [facility] [address] [phone] OPT 2              Who to contact     Please call your clinic at [phone] to:    Ask questions about your health    Make or cancel appointments    Discuss your medicines    Learn about your test results    Speak to your doctor            Additional Information About Your Visit        [employer] Information     [employer] gives you secure access to your electronic health record. If you see a primary care provider, you can also send messages to your care team and make appointments. If you have questions, please call your primary care clinic.  If you do not have a primary care provider, please call [phone] and they will assist you.      [employer] is an electronic gateway that provides easy, online access to your medical records. With [employer], you can request a clinic appointment, read your test results, renew a prescription or communicate with your care team.     To access your existing account, please contact your [facility] or call [phone] for assistance.        Care EveryWhere ID     This is your Care EveryWhere ID. This could be used by other organizations to access your [place] medical records  [id number]        Your Vitals Were     Pulse Height BMI (Body Mass Index)             77 1.619 m (5' 3.75\") 28.54 kg/m2          Blood Pressure from Last 3 Encounters:   05/16/18 112/60   05/10/18 127/62   04/20/18 125/79    Weight from Last 3 Encounters:   05/16/18 73.5 kg (162 lb)   05/10/18 71 kg (156 lb 9.6 oz)   04/20/18 74.8 kg (165 lb)            "   We Performed the Following     PHYSIATRY REFERRAL          Today's Medication Changes          These changes are accurate as of 4/20/18 11:59 PM.  If you have any questions, ask your nurse or doctor.               These medicines have changed or have updated prescriptions.        Dose/Directions    isosorbide mononitrate 30 MG 24 hr tablet   Commonly known as:  IMDUR   This may have changed:  when to take this   Used for:  Coronary artery disease involving native coronary artery of native heart without angina pectoris        Dose:  30 mg   Take 1 tablet (30 mg) by mouth daily   Quantity:  90 tablet   Refills:  3       omeprazole 20 MG CR capsule   Commonly known as:  priLOSEC   This may have changed:  See the new instructions.   Used for:  Encounter for long-term (current) use of medications        TAKE 2 CAPSULES DAILY 30 TO 60 MINUTES BEFORE A MEAL   Quantity:  180 capsule   Refills:  1         Stop taking these medicines if you haven't already. Please contact your care team if you have questions.     tiZANidine 2 MG tablet   Commonly known as:  ZANAFLEX   Stopped by:  Joe Randolph MD                    Primary Care Provider Office Phone # Fax #    Aris Del Real [phone] [address] [place] [address]        Equal Access to Services     [facility] : Hadii rosio ku hadasho Soceceali, waaxda luqadaha, qaybta kaalmada adeegyada, carlo castro. So [facility] [phone].    ATENCIÓN: Si habla español, tiene a taylor disposición servicios gratuitos de asistencia lingüística. Karen al [phone].    We comply with applicable federal civil rights laws and [place] laws. We do not discriminate on the basis of race, color, national origin, age, disability, sex, sexual orientation, or gender identity.            Thank you!     Thank you for choosing [facility] NEUROLOGY  for your care. Our goal is always to provide you with excellent care. Hearing back  from our patients is one way we can continue to improve our services. Please take a few minutes to complete the written survey that you may receive in the mail after your visit with us. Thank you!             Your Updated Medication List - Protect others around you: Learn how to safely use, store and throw away your medicines at www.disposemymeds.org.          This list is accurate as of 4/20/18 11:59 PM.  Always use your most recent med list.                   Brand Name Dispense Instructions for use Diagnosis    Alfalfa 250 MG Tabs      Take 250 mg by mouth every morning        aspirin 81 MG tablet      Take 81 mg by mouth At Bedtime        calcium + D 600-200 MG-UNIT Tabs   Generic drug:  calcium carbonate-vitamin D      Take 1 tablet by mouth At Bedtime    Preventative health care       carbidopa-levodopa  MG per tablet    SINEMET    720 tablet    TAKE 2 TABLETS FOUR TIMES A DAY    Restless legs syndrome, Neurologic gait disorder, Dysarthria, Cerebellar disease       clopidogrel 75 MG tablet    PLAVIX    90 tablet    Take 1 tablet (75 mg) by mouth daily    CAD (coronary artery disease)       DAILY MULTI Tabs      Take 1 tablet by mouth every morning        isosorbide mononitrate 30 MG 24 hr tablet    IMDUR    90 tablet    Take 1 tablet (30 mg) by mouth daily    Coronary artery disease involving native coronary artery of native heart without angina pectoris       levothyroxine 112 MCG tablet    SYNTHROID/LEVOTHROID    90 tablet    TAKE 1 TABLET DAILY    Encounter for medication refill       nitroGLYcerin 0.4 MG sublingual tablet    NITROSTAT    25 tablet    DISSOLVE ONE TABLET UNDER THE TONGUE EVERY 5 MINUTES AS NEEDED FOR CHEST PAIN.  DO NOT EXCEED A TOTAL OF 3 DOSES IN 15 MINUTES    CAD (coronary artery disease)       omeprazole 20 MG CR capsule    priLOSEC    180 capsule    TAKE 2 CAPSULES DAILY 30 TO 60 MINUTES BEFORE A MEAL    Encounter for long-term (current) use of medications       rosuvastatin 20  MG tablet    CRESTOR    90 tablet    Take 1 tablet (20 mg) by mouth daily    Hyperlipidemia       VITAMIN B-12 PO      Take 1 tablet by mouth every morning

## 2018-04-20 NOTE — LETTER
4/20/2018       RE: Katherin Fletcher  [address]  [facility] [address]     Dear Colleague,    Thank you for referring your patient, Katherin Fletcher, to the [facility] NEUROLOGY at [facility]. Please see a copy of my visit note below.    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  I last saw her on 03/07/2018.  At that time she was very discouraged because she had fallen down her stairs at home and was discouraged because of the fact that she could really no longer care for herself at home, even though her  is there, too, but he is 93 years old and is totally disabled.      At the time of her visit in March, I discussed the possibility of her using antispasmodic drugs to control the leg spasms.  Her records indicated that she was allergic to baclofen, but the patient could not recollect that at all.  I also checked with her pharmacist, and there was no record of her being allergic to baclofen, and I felt that this was a significant bit of information since she has obtained her medications at that pharmacy for a number of years.  Therefore, I tried her on baclofen, and then when she called me in late March, she was taking 2 tablets 3 times a day, but if anything, she would feel worse after each dose.  For example, on Monday, 04/02, she had spasms at 8:30 a.m. and 7:30 p.m.  With the second set of spasms, she took Sinemet, which she has taken before, and this helped her.  Then on 04/03, she had spasms lasting from about 7:00-10:00 in the morning.  Taking 2 Sinemet helped her.  Then at 6:15, she again had onset of spasms and took 2 more Sinemet, but her spasms lasted until about 10:00 p.m.  Then on 04/04, she had spasms at about  "7:00 in the morning lasting 1/2 hour and then again at 9:00 when she took Sinemet.  Then at 4:30, the spasms recurred, and so at 5:00 she took 2 Sinemet with some relief.      Katherin has also found that walking helps the spasms, but she cannot be up on her feet all the time because of her imbalance, and she needs to use a walker.  One night, her spasms lasted more than 8 hours.      There seems to be a pattern to her spasms as noted in the followin.  They usually occur after getting up.   2.  They are relieved with Sinemet.   3.  Baclofen has not helped and possibly has made her feel worse.   4.  There is no associated pain per se, but the spasms are very uncomfortable.   5.  She has no spasms in her arms or hands and not even in her thighs.  It is usually just in her calves and down into her feet.  Katherin has not tried sitting in a tub of warm water, but she might be willing to do that.      The particular reason for her visit today was at my behest.  (Incidentally, she was brought here by her daughter, Ruchi Choi.)  Katherin had called me yesterday stating that she had fallen on her buttocks on 2018.  She was in her kitchen and was reaching for a towel to cover her face because of a sneeze coming on.  She stated, \"I landed on my butt.\"  Other than having a bruised tailbone, she really did not have any other injury.  During the rest of the day, she had her usual problem with spasms and even was able to go shopping in the early evening.  Then on  (2 days ago), she awoke with the following feeling that she described.  \"My legs felt like they had pantyhose on, and the skin felt like there was a swarm of little gnats flying around the legs.\"  There was no pain as such with this sensation, but it was very disconcerting.  This lasted all day.  With respect to her spasms, she had them in the mid morning and took 2 Sinemet, and the spasms went away in 1/2 hour.  Again, in the evening " she took 2 Sinemet when she had spasms, and she was better in an hour.  Then yesterday she had the same symptoms and called me about 9:30 in the morning, and I then suggested that she come to the [facility] and then visit with me, and we could determine what to do.  At that time she stated that [place], but then said that she could call [employer], hoping that she could get a ride therewith.  However, her daughter, Ruchi, came through in the clutch.        Today her legs still feel like she has tight pantyhose on them, but she does not have the little fleeting feeling of gnat wings flying against her legs.  However, her feet feel a little strange as though she has a spongy, cushiony feeling under them when she stands on them.  She awoke with spasms at 1:00 in the morning and took 2 Sinemet, and the spasms were gone in 1/2 hour.  Again, at 10:30 this morning, she had the same thing.      I reviewed the medication list with Katherin, and there has been no appreciable change since her last visit.  She is still takin.  Carbidopa/levodopa 25/100 mg tablets as needed.   2.  Coreg.     3.  Plavix.     4.  Vitamin B12.     5.  Synthroid.     6.  Nitrostat p.r.n.   7.  Prilosec 20 mg *** capsule.    8.  Crestor 20 mg daily.        Incidentally, she is not allergic to baclofen as proved by her taking it for a couple of weeks without any reaction, and so this note in the records that she is allergic to baclofen should be stricken from her records.      PHYSICAL EXAMINATION:  Once again, Katherin is bright and alert, very intelligent and displaying good memory for remote and recent events.  Attention and concentration are normal.  There is no evidence of a thinking disorder.  She has no trouble relating and today is not sobbing as she was doing at her last visit.      VITAL SIGNS:  Blood pressure 125/78, pulse 77 beats per minute, weight 165 pounds, height 5 feet 4 inches, body  mass index 28.6.      I attended initially to her complaint of soreness on her buttock, and I found that she experienced tenderness on pressure over the coccyx, but she had no pain on the lumbosacral area or the sacroiliac area, and the remainder of her back was unremarkable.  However, I removed her neck collar, which she had worn each time I saw her before, and did a careful examination of her neck muscles.  There was no problem with neck flexion or rotation.  The sternocleidomastoid muscles were normal bilaterally.  However, neck extension was considerably weakened.  In fact, she absolutely could not hold her head up, and her chin would fall to her chest.  Therefore, I replaced the collar.      Then I turned my attention to the neurologic examination.  Her cranial nerves were again unremarkable.  No significant nystagmus was seen.  There was no facial weakness or numbness.  Tongue was midline, and the palate elevated well.  The reflexes were 2+ in the upper extremities and knees and 1+ at the right ankle and decreased on the left.  (She had a previous fracture involving the left ankle, and it was largely fused because of a surgical intervention to stabilize that joint.)  On plantar stimulation, the toes were upgoing bilaterally (positive Babinski).  She also had 2 beats of ankle clonus on the right side, and this could not be adequately tested on the left.  Muscle testing revealed good strength in the upper and lower extremities with no atrophy seen.  However, she had considerable increase in tone in her lower extremities.  There was noted to be a spastic catch of the quadriceps muscles when I suddenly flexed her leg at the knee.  The cerebellar functioning was mildly impaired with some slurring of speech.  However, she was 100% intelligible.  Rapid alternating movements were diminished in speed in the upper extremities.  This included finger tapping and rapid alternating movements of the forearms.  There was no  "irregularity seen, however.  She had slight past pointing on finger-to-nose testing.      Sensory testing revealed position and vibratory sense to be normal for her age.  She had diminished pinprick perception in her feet about 50% of normal, but improving to what she considered a normal sensation at about the mid calf.  Likewise, the temperature sensation was diminished distally and gradually improved as the *** was passed more proximally.      I did not attempt to have the patient walk today, but the reader is referred to previous reports at the time of her previous visits.      IMPRESSION:   1.  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   2.  Recent \"pratfall.\"   3.  Bruised coccyx.   4.  Possible spinal cord \"concussion,\" for lack of a better word, as she is showing improvement.   5.  Posterior neck muscle weakness, which could involve a number of muscles, including the trapezii, the semispinalis capitis, the obliquus capitis superior and the rectus capitis posterior major and minor.  To some extent, the longissimus capitis can be involved with posterior neck weakness.   6.  Mild dysarthria associated with cerebellar degeneration.   7.  Marked imbalance secondary to #1, i.e., the spastic ataxia.      PLAN:   1.  Continue Sinemet as needed.   2.  The patient is referred to Physical Medicine and Rehabilitation (PM&R) for opinions as to the role that therapy might play in her neck muscle weakness and also her gait.  The physicians in PM&R could decide to do an EMG involving the posterior neck muscles and elsewhere as determined necessary.   3.  The patient is to call me personally on 04/25/2018 regarding these new symptoms with which she presented today.   4.  Return to [facility] on 06/01/2018.      A total of 45 minutes was spent with the patient and her daughter, Ruchi, taking the history, which was rather extensive this time, and reviewing her medication use and what " effects that it had on her and then explaining the nature of the paresthesias that she was experiencing in her lower extremities.  Greater than 50% of this time was dedicated to this counseling.      Code for this established patient is 55975.         SMITHA XIONG MD             D: 2018   T: 2018   MT: elizabeth      Name:     ANA RODRIGUEZ   MRN:      [id number]        Account:      [id number]   :      1940           Service Date: 2018      Document: [id number]

## 2018-04-24 NOTE — PROGRESS NOTES
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  I last saw her on 03/07/2018.  At that time she was very discouraged because she had fallen down her stairs at home and was discouraged because of the fact that she could really no longer care for herself at home, even though her  is there, too, but he is 93 years old and is totally disabled.      At the time of her visit in March, I discussed the possibility of her using antispasmodic drugs to control the leg spasms.  Her records indicated that she was allergic to baclofen, but the patient could not recollect that at all.  I also checked with her pharmacist, and there was no record of her being allergic to baclofen, and I felt that this was a significant bit of information since she has obtained her medications at that pharmacy for a number of years.  Therefore, I tried her on baclofen, and then when she called me in late March, she was taking 2 tablets 3 times a day, but if anything, she would feel worse after each dose.  For example, on Monday, 04/02, she had spasms at 8:30 a.m. and 7:30 p.m.  With the second set of spasms, she took Sinemet, which she has taken before, and this helped her.  Then on 04/03, she had spasms lasting from about 7:00-10:00 in the morning.  Taking 2 Sinemet helped her.  Then at 6:15, she again had onset of spasms and took 2 more Sinemet, but her spasms lasted until about 10:00 p.m.  Then on 04/04, she had spasms at about 7:00 in the morning lasting 1/2 hour and then again at 9:00 when she took Sinemet.  Then at 4:30, the spasms recurred, and so at 5:00 she took 2 Sinemet with some relief.      Katherin has also found that walking helps the spasms, but she cannot be up on her feet all the time because of her  "imbalance, and she needs to use a walker.  One night, her spasms lasted more than 8 hours.      There seems to be a pattern to her spasms as noted in the followin.  They usually occur after getting up.   2.  They are relieved with Sinemet.   3.  Baclofen has not helped and possibly has made her feel worse.   4.  There is no associated pain per se, but the spasms are very uncomfortable.   5.  She has no spasms in her arms or hands and not even in her thighs.  It is usually just in her calves and down into her feet.  Katherin has not tried sitting in a tub of warm water, but she might be willing to do that.      The particular reason for her visit today was at my behest.  (Incidentally, she was brought here by her daughter, Ruchi Choi.)  Katherin had called me yesterday stating that she had fallen on her buttocks on Tuesday, 2018.  She was in her kitchen and was reaching for a towel to cover her face because of a sneeze coming on.  She stated, \"I landed on my butt.\"  Other than having a bruised tailbone, she really did not have any other injury.  During the rest of the day, she had her usual problem with spasms and even was able to go shopping in the early evening.  Then on Wednesday,  (2 days ago), she awoke with the following feeling that she described.  \"My legs felt like they had pantyhose on, and the skin felt like there was a swarm of little gnats flying around the legs.\"  There was no pain as such with this sensation, but it was very disconcerting.  This lasted all day.  With respect to her spasms, she had them in the mid morning and took 2 Sinemet, and the spasms went away in 1/2 hour.  Again, in the evening she took 2 Sinemet when she had spasms, and she was better in an hour.  Then yesterday she had the same symptoms and called me about 9:30 in the morning, and I then suggested that she come to the [facility] and then visit with me, and we could determine what to do.  At that time she " stated that she did not have any [place] getting [place] [employer], but then said that she could call [employer], hoping that she could get a ride therewith.  However, her daughter, Ruchi, came through in the clutch.        Today her legs still feel like she has tight pantyhose on them, but she does not have the little fleeting feeling of gnat wings flying against her legs.  However, her feet feel a little strange as though she has a spongy, cushiony feeling under them when she stands on them.  She awoke with spasms at 1:00 in the morning and took 2 Sinemet, and the spasms were gone in 1/2 hour.  Again, at 10:30 this morning, she had the same thing.      I reviewed the medication list with Katherin, and there has been no appreciable change since her last visit.  She is still takin.  Carbidopa/levodopa 25/100 mg tablets as needed.   2.  Coreg.     3.  Plavix.     4.  Vitamin B12.     5.  Synthroid.     6.  Nitrostat p.r.n.   7.  Prilosec 20 mg  Capsule 2 daily 30 to 60 minutes a c.    8.  Crestor 20 mg daily.        Incidentally, she is not allergic to baclofen as proved by her taking it for a couple of weeks without any reaction, and so this note in the records that she is allergic to baclofen should be stricken from her records.      PHYSICAL EXAMINATION:  Once again, Katherin is bright and alert, very intelligent and displaying good memory for remote and recent events.  Attention and concentration are normal.  There is no evidence of a thinking disorder.  She has no trouble relating and today is not sobbing as she was doing at her last visit.      VITAL SIGNS:  Blood pressure 125/78, pulse 77 beats per minute, weight 165 pounds, height 5 feet 4 inches, body mass index 28.6.      I attended initially to her complaint of soreness on her buttock, and I found that she experienced tenderness on pressure over the coccyx, but she had no pain on the lumbosacral area or the sacroiliac area, and the remainder of her back was  unremarkable.  However, I removed her neck collar, which she had worn each time I saw her before, and did a careful examination of her neck muscles.  There was no problem with neck flexion or rotation.  The sternocleidomastoid muscles were normal bilaterally.  However, neck extension was considerably weakened.  In fact, she absolutely could not hold her head up, and her chin would fall to her chest.  Therefore, I replaced the collar.      Then I turned my attention to the neurologic examination.  Her cranial nerves were again unremarkable.  No significant nystagmus was seen.  There was no facial weakness or numbness.  Tongue was midline, and the palate elevated well.  The reflexes were 2+ in the upper extremities and knees and 1+ at the right ankle and decreased on the left.  (She had a previous fracture involving the left ankle, and it was largely fused because of a surgical intervention to stabilize that joint.)  On plantar stimulation, the toes were upgoing bilaterally (positive Babinski).  She also had 2 beats of ankle clonus on the right side, and this could not be adequately tested on the left.  Muscle testing revealed good strength in the upper and lower extremities with no atrophy seen.  However, she had considerable increase in tone in her lower extremities.  There was noted to be a spastic catch of the quadriceps muscles when I suddenly flexed her leg at the knee.  The cerebellar functioning was mildly impaired with some slurring of speech.  However, she was 100% intelligible.  Rapid alternating movements were diminished in speed in the upper extremities.  This included finger tapping and rapid alternating movements of the forearms.  There was no irregularity seen, however.  She had slight past pointing on finger-to-nose testing.      Sensory testing revealed position and vibratory sense to be normal for her age.  She had diminished pinprick perception in her feet about 50% of normal, but improving to what  "she considered a normal sensation at about the mid calf.  Likewise, the temperature sensation was diminished distally and gradually improved as the cool tuning fork was passed more proximally.      I did not attempt to have the patient walk today, but the reader is referred to previous reports at the time of her previous visits.      IMPRESSION:   1.  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   2.  Recent \"pratfall.\"   3.  Bruised coccyx.   4.  Possible spinal cord \"concussion,\" for lack of a better word, as she is showing improvement.   5.  Posterior neck muscle weakness, which could involve a number of muscles, including the trapezii, the semispinalis capitis, the obliquus capitis superior and the rectus capitis posterior major and minor.  To some extent, the longissimus capitis can be involved with posterior neck weakness.   6.  Mild dysarthria associated with cerebellar degeneration.   7.  Marked imbalance secondary to #1, i.e., the spastic ataxia.      PLAN:   1.  Continue Sinemet as needed.   2.  The patient is referred to Physical Medicine and Rehabilitation (PM&R) for opinions as to the role that therapy might play in her neck muscle weakness and also her gait.  The physicians in PM&R could decide to do an EMG involving the posterior neck muscles and elsewhere as determined necessary.   3.  The patient is to call me personally on 04/25/2018 regarding these new symptoms with which she presented today.   4.  Return to [facility] on 06/01/2018.      A total of 45 minutes was spent with the patient and her daughter, Ruchi, taking the history, which was rather extensive this time, and reviewing her medication use and what effects that it had on her and then explaining the nature of the paresthesias that she was experiencing in her lower extremities.  Greater than 50% of this time was dedicated to this counseling.      Code for this established patient is 64073.       "   SMITHA XIONG MD             D: 2018   T: 2018   MT: elizabeth      Name:     ANA RODRIGUEZ   MRN:      [id number]        Account:      [id number]   :      1940           Service Date: 2018      Document: [id number]

## 2018-04-26 DIAGNOSIS — M62.838 MUSCLE SPASMS OF BOTH LOWER EXTREMITIES: Primary | ICD-10-CM

## 2018-05-07 ENCOUNTER — APPOINTMENT (OUTPATIENT)
Dept: CT IMAGING | Facility: CLINIC | Age: 78
End: 2018-05-07
Attending: EMERGENCY MEDICINE
Payer: MEDICARE

## 2018-05-07 ENCOUNTER — HOSPITAL ENCOUNTER (OUTPATIENT)
Facility: CLINIC | Age: 78
Setting detail: OBSERVATION
Discharge: HOME HEALTH | End: 2018-05-10
Attending: EMERGENCY MEDICINE | Admitting: INTERNAL MEDICINE
Payer: MEDICARE

## 2018-05-07 DIAGNOSIS — I63.9 CEREBROVASCULAR ACCIDENT (CVA), UNSPECIFIED MECHANISM (H): ICD-10-CM

## 2018-05-07 LAB
ALBUMIN SERPL-MCNC: 3.7 G/DL (ref 3.4–5)
ALP SERPL-CCNC: 74 U/L (ref 40–150)
ALT SERPL W P-5'-P-CCNC: 8 U/L (ref 0–50)
ANION GAP SERPL CALCULATED.3IONS-SCNC: 4 MMOL/L (ref 3–14)
ANION GAP SERPL CALCULATED.3IONS-SCNC: 9 MMOL/L (ref 3–14)
APTT PPP: 30 SEC (ref 22–37)
AST SERPL W P-5'-P-CCNC: 23 U/L (ref 0–45)
BASOPHILS # BLD AUTO: 0 10E9/L (ref 0–0.2)
BASOPHILS NFR BLD AUTO: 0.2 %
BILIRUB DIRECT SERPL-MCNC: 0.1 MG/DL (ref 0–0.2)
BILIRUB SERPL-MCNC: 0.7 MG/DL (ref 0.2–1.3)
BUN SERPL-MCNC: 10 MG/DL (ref 7–30)
BUN SERPL-MCNC: 12 MG/DL (ref 7–30)
CALCIUM SERPL-MCNC: 9.3 MG/DL (ref 8.5–10.1)
CALCIUM SERPL-MCNC: 9.6 MG/DL (ref 8.5–10.1)
CHLORIDE SERPL-SCNC: 108 MMOL/L (ref 94–109)
CHLORIDE SERPL-SCNC: 111 MMOL/L (ref 94–109)
CHOLEST SERPL-MCNC: 133 MG/DL
CO2 SERPL-SCNC: 23 MMOL/L (ref 20–32)
CO2 SERPL-SCNC: 29 MMOL/L (ref 20–32)
CREAT SERPL-MCNC: 0.69 MG/DL (ref 0.52–1.04)
CREAT SERPL-MCNC: 0.78 MG/DL (ref 0.52–1.04)
DIFFERENTIAL METHOD BLD: NORMAL
EOSINOPHIL # BLD AUTO: 0.1 10E9/L (ref 0–0.7)
EOSINOPHIL NFR BLD AUTO: 1.2 %
ERYTHROCYTE [DISTWIDTH] IN BLOOD BY AUTOMATED COUNT: 14.1 % (ref 10–15)
ERYTHROCYTE [DISTWIDTH] IN BLOOD BY AUTOMATED COUNT: 14.2 % (ref 10–15)
GFR SERPL CREATININE-BSD FRML MDRD: 71 ML/MIN/1.7M2
GFR SERPL CREATININE-BSD FRML MDRD: 82 ML/MIN/1.7M2
GLUCOSE SERPL-MCNC: 113 MG/DL (ref 70–99)
GLUCOSE SERPL-MCNC: 130 MG/DL (ref 70–99)
HBA1C MFR BLD: 6.1 % (ref 0–6.4)
HCT VFR BLD AUTO: 37.8 % (ref 35–47)
HCT VFR BLD AUTO: 38.1 % (ref 35–47)
HDLC SERPL-MCNC: 55 MG/DL
HGB BLD-MCNC: 12.7 G/DL (ref 11.7–15.7)
HGB BLD-MCNC: 12.9 G/DL (ref 11.7–15.7)
IMM GRANULOCYTES # BLD: 0 10E9/L (ref 0–0.4)
IMM GRANULOCYTES NFR BLD: 0.2 %
INR PPP: 1 (ref 0.86–1.14)
INTERPRETATION ECG - MUSE: NORMAL
INTERPRETATION ECG - MUSE: NORMAL
LDLC SERPL CALC-MCNC: 54 MG/DL
LYMPHOCYTES # BLD AUTO: 2.4 10E9/L (ref 0.8–5.3)
LYMPHOCYTES NFR BLD AUTO: 47.5 %
MCH RBC QN AUTO: 28.6 PG (ref 26.5–33)
MCH RBC QN AUTO: 28.7 PG (ref 26.5–33)
MCHC RBC AUTO-ENTMCNC: 33.6 G/DL (ref 31.5–36.5)
MCHC RBC AUTO-ENTMCNC: 33.9 G/DL (ref 31.5–36.5)
MCV RBC AUTO: 85 FL (ref 78–100)
MCV RBC AUTO: 85 FL (ref 78–100)
MONOCYTES # BLD AUTO: 0.4 10E9/L (ref 0–1.3)
MONOCYTES NFR BLD AUTO: 8.8 %
NEUTROPHILS # BLD AUTO: 2.1 10E9/L (ref 1.6–8.3)
NEUTROPHILS NFR BLD AUTO: 42.1 %
NONHDLC SERPL-MCNC: 78 MG/DL
NRBC # BLD AUTO: 0 10*3/UL
NRBC BLD AUTO-RTO: 0 /100
PLATELET # BLD AUTO: 192 10E9/L (ref 150–450)
PLATELET # BLD AUTO: 212 10E9/L (ref 150–450)
POTASSIUM SERPL-SCNC: 3.8 MMOL/L (ref 3.4–5.3)
POTASSIUM SERPL-SCNC: 3.9 MMOL/L (ref 3.4–5.3)
PROT SERPL-MCNC: 7.4 G/DL (ref 6.8–8.8)
RBC # BLD AUTO: 4.43 10E12/L (ref 3.8–5.2)
RBC # BLD AUTO: 4.51 10E12/L (ref 3.8–5.2)
SODIUM SERPL-SCNC: 141 MMOL/L (ref 133–144)
SODIUM SERPL-SCNC: 143 MMOL/L (ref 133–144)
TRIGL SERPL-MCNC: 121 MG/DL
WBC # BLD AUTO: 5 10E9/L (ref 4–11)
WBC # BLD AUTO: 7.1 10E9/L (ref 4–11)

## 2018-05-07 PROCEDURE — 80076 HEPATIC FUNCTION PANEL: CPT | Performed by: INTERNAL MEDICINE

## 2018-05-07 PROCEDURE — 12000000 ZZH R&B MED SURG/OB

## 2018-05-07 PROCEDURE — 80048 BASIC METABOLIC PNL TOTAL CA: CPT | Performed by: INTERNAL MEDICINE

## 2018-05-07 PROCEDURE — 80061 LIPID PANEL: CPT | Performed by: INTERNAL MEDICINE

## 2018-05-07 PROCEDURE — A9270 NON-COVERED ITEM OR SERVICE: HCPCS | Mod: GY | Performed by: INTERNAL MEDICINE

## 2018-05-07 PROCEDURE — 25000128 H RX IP 250 OP 636: Performed by: EMERGENCY MEDICINE

## 2018-05-07 PROCEDURE — 96374 THER/PROPH/DIAG INJ IV PUSH: CPT

## 2018-05-07 PROCEDURE — 85610 PROTHROMBIN TIME: CPT | Performed by: EMERGENCY MEDICINE

## 2018-05-07 PROCEDURE — A9270 NON-COVERED ITEM OR SERVICE: HCPCS | Mod: GY | Performed by: PHYSICIAN ASSISTANT

## 2018-05-07 PROCEDURE — 25000132 ZZH RX MED GY IP 250 OP 250 PS 637: Mod: GY | Performed by: EMERGENCY MEDICINE

## 2018-05-07 PROCEDURE — A9270 NON-COVERED ITEM OR SERVICE: HCPCS | Mod: GY | Performed by: EMERGENCY MEDICINE

## 2018-05-07 PROCEDURE — 25000128 H RX IP 250 OP 636: Performed by: INTERNAL MEDICINE

## 2018-05-07 PROCEDURE — 85027 COMPLETE CBC AUTOMATED: CPT | Performed by: INTERNAL MEDICINE

## 2018-05-07 PROCEDURE — 25000132 ZZH RX MED GY IP 250 OP 250 PS 637: Mod: GY | Performed by: INTERNAL MEDICINE

## 2018-05-07 PROCEDURE — 83036 HEMOGLOBIN GLYCOSYLATED A1C: CPT | Performed by: INTERNAL MEDICINE

## 2018-05-07 PROCEDURE — 25000132 ZZH RX MED GY IP 250 OP 250 PS 637: Mod: GY | Performed by: PHYSICIAN ASSISTANT

## 2018-05-07 PROCEDURE — 80048 BASIC METABOLIC PNL TOTAL CA: CPT | Performed by: EMERGENCY MEDICINE

## 2018-05-07 PROCEDURE — 99223 1ST HOSP IP/OBS HIGH 75: CPT | Mod: AI | Performed by: INTERNAL MEDICINE

## 2018-05-07 PROCEDURE — 85730 THROMBOPLASTIN TIME PARTIAL: CPT | Performed by: EMERGENCY MEDICINE

## 2018-05-07 PROCEDURE — 96376 TX/PRO/DX INJ SAME DRUG ADON: CPT

## 2018-05-07 PROCEDURE — 36415 COLL VENOUS BLD VENIPUNCTURE: CPT | Performed by: INTERNAL MEDICINE

## 2018-05-07 PROCEDURE — 99285 EMERGENCY DEPT VISIT HI MDM: CPT | Mod: 25

## 2018-05-07 PROCEDURE — 85025 COMPLETE CBC W/AUTO DIFF WBC: CPT | Performed by: EMERGENCY MEDICINE

## 2018-05-07 PROCEDURE — 93005 ELECTROCARDIOGRAM TRACING: CPT

## 2018-05-07 PROCEDURE — 70450 CT HEAD/BRAIN W/O DYE: CPT

## 2018-05-07 PROCEDURE — 25000125 ZZHC RX 250: Performed by: EMERGENCY MEDICINE

## 2018-05-07 PROCEDURE — 93005 ELECTROCARDIOGRAM TRACING: CPT | Mod: 76

## 2018-05-07 PROCEDURE — 96375 TX/PRO/DX INJ NEW DRUG ADDON: CPT

## 2018-05-07 RX ORDER — CARVEDILOL 6.25 MG/1
6.25 TABLET ORAL 2 TIMES DAILY WITH MEALS
Status: DISCONTINUED | OUTPATIENT
Start: 2018-05-07 | End: 2018-05-10 | Stop reason: HOSPADM

## 2018-05-07 RX ORDER — ONDANSETRON 2 MG/ML
4 INJECTION INTRAMUSCULAR; INTRAVENOUS EVERY 6 HOURS PRN
Status: DISCONTINUED | OUTPATIENT
Start: 2018-05-07 | End: 2018-05-10 | Stop reason: HOSPADM

## 2018-05-07 RX ORDER — IOPAMIDOL 755 MG/ML
120 INJECTION, SOLUTION INTRAVASCULAR ONCE
Status: DISCONTINUED | OUTPATIENT
Start: 2018-05-07 | End: 2018-05-07 | Stop reason: CLARIF

## 2018-05-07 RX ORDER — ONDANSETRON 4 MG/1
4 TABLET, ORALLY DISINTEGRATING ORAL EVERY 6 HOURS PRN
Status: DISCONTINUED | OUTPATIENT
Start: 2018-05-07 | End: 2018-05-10 | Stop reason: HOSPADM

## 2018-05-07 RX ORDER — ASPIRIN 81 MG/1
81 TABLET, CHEWABLE ORAL AT BEDTIME
Status: DISCONTINUED | OUTPATIENT
Start: 2018-05-07 | End: 2018-05-08

## 2018-05-07 RX ORDER — PROCHLORPERAZINE 25 MG
12.5 SUPPOSITORY, RECTAL RECTAL EVERY 12 HOURS PRN
Status: DISCONTINUED | OUTPATIENT
Start: 2018-05-07 | End: 2018-05-07

## 2018-05-07 RX ORDER — HYDRALAZINE HYDROCHLORIDE 20 MG/ML
10-20 INJECTION INTRAMUSCULAR; INTRAVENOUS
Status: DISCONTINUED | OUTPATIENT
Start: 2018-05-07 | End: 2018-05-10 | Stop reason: HOSPADM

## 2018-05-07 RX ORDER — ACETAMINOPHEN 650 MG/1
650 SUPPOSITORY RECTAL EVERY 4 HOURS PRN
Status: DISCONTINUED | OUTPATIENT
Start: 2018-05-07 | End: 2018-05-10 | Stop reason: HOSPADM

## 2018-05-07 RX ORDER — LABETALOL HYDROCHLORIDE 5 MG/ML
10-40 INJECTION, SOLUTION INTRAVENOUS EVERY 10 MIN PRN
Status: DISCONTINUED | OUTPATIENT
Start: 2018-05-07 | End: 2018-05-10 | Stop reason: HOSPADM

## 2018-05-07 RX ORDER — ACETAMINOPHEN 325 MG/1
650 TABLET ORAL EVERY 4 HOURS PRN
Status: DISCONTINUED | OUTPATIENT
Start: 2018-05-07 | End: 2018-05-10 | Stop reason: HOSPADM

## 2018-05-07 RX ORDER — METOPROLOL TARTRATE 1 MG/ML
5 INJECTION, SOLUTION INTRAVENOUS ONCE
Status: COMPLETED | OUTPATIENT
Start: 2018-05-07 | End: 2018-05-07

## 2018-05-07 RX ORDER — LORAZEPAM 2 MG/ML
0.5 INJECTION INTRAMUSCULAR ONCE
Status: COMPLETED | OUTPATIENT
Start: 2018-05-07 | End: 2018-05-07

## 2018-05-07 RX ORDER — CARBIDOPA AND LEVODOPA 25; 100 MG/1; MG/1
2 TABLET ORAL 4 TIMES DAILY PRN
Status: CANCELLED | OUTPATIENT
Start: 2018-05-07

## 2018-05-07 RX ORDER — SODIUM CHLORIDE 9 MG/ML
INJECTION, SOLUTION INTRAVENOUS CONTINUOUS
Status: ACTIVE | OUTPATIENT
Start: 2018-05-07 | End: 2018-05-08

## 2018-05-07 RX ORDER — ASPIRIN 300 MG/1
300 SUPPOSITORY RECTAL ONCE
Status: DISCONTINUED | OUTPATIENT
Start: 2018-05-07 | End: 2018-05-07

## 2018-05-07 RX ORDER — ROSUVASTATIN CALCIUM 20 MG/1
20 TABLET, COATED ORAL EVERY EVENING
Status: DISCONTINUED | OUTPATIENT
Start: 2018-05-07 | End: 2018-05-10 | Stop reason: HOSPADM

## 2018-05-07 RX ORDER — HYDROMORPHONE HYDROCHLORIDE 1 MG/ML
.3-.5 INJECTION, SOLUTION INTRAMUSCULAR; INTRAVENOUS; SUBCUTANEOUS EVERY 4 HOURS PRN
Status: DISCONTINUED | OUTPATIENT
Start: 2018-05-07 | End: 2018-05-10 | Stop reason: HOSPADM

## 2018-05-07 RX ORDER — NALOXONE HYDROCHLORIDE 0.4 MG/ML
.1-.4 INJECTION, SOLUTION INTRAMUSCULAR; INTRAVENOUS; SUBCUTANEOUS
Status: DISCONTINUED | OUTPATIENT
Start: 2018-05-07 | End: 2018-05-10 | Stop reason: HOSPADM

## 2018-05-07 RX ORDER — METOPROLOL TARTRATE 1 MG/ML
5 INJECTION, SOLUTION INTRAVENOUS EVERY 6 HOURS PRN
Status: DISCONTINUED | OUTPATIENT
Start: 2018-05-07 | End: 2018-05-10 | Stop reason: HOSPADM

## 2018-05-07 RX ORDER — CARBIDOPA AND LEVODOPA 25; 100 MG/1; MG/1
2 TABLET, EXTENDED RELEASE ORAL ONCE
Status: DISCONTINUED | OUTPATIENT
Start: 2018-05-07 | End: 2018-05-07

## 2018-05-07 RX ORDER — ACETAMINOPHEN 325 MG/1
650 TABLET ORAL EVERY 4 HOURS PRN
Status: DISCONTINUED | OUTPATIENT
Start: 2018-05-07 | End: 2018-05-07

## 2018-05-07 RX ORDER — ASPIRIN 300 MG/1
300 SUPPOSITORY RECTAL ONCE
Status: COMPLETED | OUTPATIENT
Start: 2018-05-07 | End: 2018-05-07

## 2018-05-07 RX ORDER — AMOXICILLIN 250 MG
2 CAPSULE ORAL 2 TIMES DAILY PRN
Status: DISCONTINUED | OUTPATIENT
Start: 2018-05-07 | End: 2018-05-10 | Stop reason: HOSPADM

## 2018-05-07 RX ORDER — LEVOTHYROXINE SODIUM 112 UG/1
112 TABLET ORAL DAILY
Status: DISCONTINUED | OUTPATIENT
Start: 2018-05-08 | End: 2018-05-10 | Stop reason: HOSPADM

## 2018-05-07 RX ORDER — CLOPIDOGREL BISULFATE 75 MG/1
75 TABLET ORAL EVERY EVENING
Status: DISCONTINUED | OUTPATIENT
Start: 2018-05-07 | End: 2018-05-10 | Stop reason: HOSPADM

## 2018-05-07 RX ORDER — NITROGLYCERIN 0.4 MG/1
0.4 TABLET SUBLINGUAL EVERY 5 MIN PRN
Status: DISCONTINUED | OUTPATIENT
Start: 2018-05-07 | End: 2018-05-10 | Stop reason: HOSPADM

## 2018-05-07 RX ORDER — LORAZEPAM 2 MG/ML
0.5 INJECTION INTRAMUSCULAR
Status: COMPLETED | OUTPATIENT
Start: 2018-05-07 | End: 2018-05-07

## 2018-05-07 RX ORDER — POLYETHYLENE GLYCOL 3350 17 G/17G
17 POWDER, FOR SOLUTION ORAL DAILY PRN
Status: DISCONTINUED | OUTPATIENT
Start: 2018-05-07 | End: 2018-05-10 | Stop reason: HOSPADM

## 2018-05-07 RX ORDER — PROCHLORPERAZINE MALEATE 5 MG
5 TABLET ORAL EVERY 6 HOURS PRN
Status: DISCONTINUED | OUTPATIENT
Start: 2018-05-07 | End: 2018-05-07

## 2018-05-07 RX ORDER — AMOXICILLIN 250 MG
1 CAPSULE ORAL 2 TIMES DAILY PRN
Status: DISCONTINUED | OUTPATIENT
Start: 2018-05-07 | End: 2018-05-10 | Stop reason: HOSPADM

## 2018-05-07 RX ORDER — CARBIDOPA AND LEVODOPA 25; 100 MG/1; MG/1
2 TABLET, ORALLY DISINTEGRATING ORAL 4 TIMES DAILY PRN
Status: DISCONTINUED | OUTPATIENT
Start: 2018-05-07 | End: 2018-05-10 | Stop reason: HOSPADM

## 2018-05-07 RX ADMIN — CARBIDOPA AND LEVODOPA 2 TABLET: 25; 100 TABLET, ORALLY DISINTEGRATING ORAL at 18:44

## 2018-05-07 RX ADMIN — ASPIRIN 300 MG: 300 SUPPOSITORY RECTAL at 16:16

## 2018-05-07 RX ADMIN — LORAZEPAM 0.5 MG: 2 INJECTION INTRAMUSCULAR; INTRAVENOUS at 15:53

## 2018-05-07 RX ADMIN — SODIUM CHLORIDE: 9 INJECTION, SOLUTION INTRAVENOUS at 18:54

## 2018-05-07 RX ADMIN — LORAZEPAM 0.5 MG: 2 INJECTION INTRAMUSCULAR; INTRAVENOUS at 16:14

## 2018-05-07 RX ADMIN — LORAZEPAM 0.5 MG: 2 INJECTION INTRAMUSCULAR; INTRAVENOUS at 16:48

## 2018-05-07 RX ADMIN — METOPROLOL TARTRATE 5 MG: 5 INJECTION, SOLUTION INTRAVENOUS at 17:42

## 2018-05-07 RX ADMIN — ACETAMINOPHEN 650 MG: 650 SUPPOSITORY RECTAL at 21:35

## 2018-05-07 ASSESSMENT — ENCOUNTER SYMPTOMS
WEAKNESS: 1
SPEECH DIFFICULTY: 1
NUMBNESS: 1
HEADACHES: 0

## 2018-05-07 ASSESSMENT — VISUAL ACUITY
OU: BLURRED VISION
OU: BLURRED VISION

## 2018-05-07 ASSESSMENT — ACTIVITIES OF DAILY LIVING (ADL): ADLS_ACUITY_SCORE: 12

## 2018-05-07 NOTE — ED NOTES
MRI called informing that the pt was not able to hold legs still and was kicking while setting up for the MRI scan. MD notified and ordered 0.5mg ativan.

## 2018-05-07 NOTE — PROVIDER NOTIFICATION
"Text to MD  \"ED called us and pt current in [facility] .  Do you want them to address before transfer or will you order meds we can give. \"   "

## 2018-05-07 NOTE — ED NOTES
"[facility]  ED Nurse Handoff Report    ED Chief complaint: Numbness (Sudden onset of R sided numbness )      ED Diagnosis:   Final diagnoses:   Cerebrovascular accident (CVA), unspecified mechanism (H)       Code Status: DNR / DNI    Allergies:   Allergies   Allergen Reactions     Ace Inhibitors Cough     Baclofen Other (See Comments)     Constipation , tiredness     Penicillins      10/7/14 Hives per patient -- many many years ago       Nickel Swelling and Rash     Other reaction(s): Unknown       Activity level - Baseline/Home:  Independent, with walker    Activity Level - Current:   Unable to Assess, pt has not been out of bed.      Needed?: No    Isolation: Yes  Infection: MRSA    Bariatric?: No    Vital Signs:   Vitals:    05/07/18 1454   BP: (!) 172/91   Resp: 16   Temp: 98.6  F (37  C)   TempSrc: Oral   SpO2: 99%   Weight: 74.4 kg (164 lb)   Height: 1.626 m (5' 4\")       Cardiac Rhythm: ,        Pain level:      Is this patient confused?: No     Patient Report: Initial Complaint: Katherin Fletcher is a 77 year old female on Plavix for s/p bypass stents and with a medical histiry of a-fib who presents with numbness. Pt developed a sudden onset of numbness to the R side of her face, arm, and leg. Pt states she also has trouble speaking and has \"thick speech\".   Focused Assessment: Cardiac: WDL, denies CP  Resp: WDL, denies SOB  Neuro: A&Ox4, R facial, arm, leg numbness/tingling. Difficulty with speech. Weakness.   Tests Performed: Labs, CT, MRI  Abnormal Results: See scans  Treatments provided: ASA 300mg, ativan 1mg    Family Comments: Family at bedside.     OBS brochure/video discussed/provided to patient: No    ED Medications:   Medications   aspirin Suppository 300 mg (300 mg Rectal Given 5/7/18 1616)   LORazepam (ATIVAN) injection 0.5 mg (0.5 mg Intravenous Given 5/7/18 1553)   LORazepam (ATIVAN) injection 0.5 mg (0.5 mg Intravenous Given 5/7/18 1614)       Drips infusing?:  " No    For the majority of the shift this patient was Green.   Interventions performed were N/A.    Severe Sepsis OR Septic Shock Diagnosis Present: No      ED NURSE PHONE NUMBER: *[id number]

## 2018-05-07 NOTE — CONSULTS
Neurology Stroke Consult    Katherin Fletcher  [id number]  1940    hospitals  Katherin Fletcher is a 77-year-old woman with past medical history of multiple vascular risk factors status post CABG ×2 also has peripheral vascular disease and currently is on Plavix, history of atrial fibrillation was on Coumadin which was discontinued by her cardiologist, presented to the emergency room for evaluation of right sided numbness.  The patient experienced sudden onset of right face arm and leg numbness started approximately 2 PM.  She was with her  that time.  No other focal neurological deficits were noticed.  On initial evaluation in the emergency room her NIH stroke scale was 1, she had CT scan of the head without any acute intracranial abnormalities.    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     Cerebellar disease 06/25/2015     Essential hypertension 09/22/2015     Ischemic cardiomyopathy      Peripheral polyneuropathy 03/20/2017     Arterial leg ulcer (H) 04/13/2017     PAD (peripheral artery disease) (H) 04/13/2017     Encounter for long-term (current) use of medications 01/31/2018     Resolved Ambulatory Problems     Diagnosis Date Noted     No Resolved Ambulatory Problems     Past Medical History:   Diagnosis Date     CAD (coronary artery disease)      Essential hypertension, benign      GERD (gastroesophageal reflux disease)      Hyperlipidaemia      Hyperlipidaemia LDL goal < 130      Ischemic cardiomyopathy      MI (myocardial infarction) 11/1996     Mixed  hyperlipidemia      PAD (peripheral artery disease) (H)      Restless legs syndrome      Unspecified hypothyroidism          Medications:   (Not in a hospital admission).    Allergies:   Allergies   Allergen Reactions     Ace Inhibitors Cough     Baclofen Other (See Comments)     Constipation , tiredness     Penicillins      10/7/14 Hives per patient -- many many years ago       Nickel Swelling and Rash     Other reaction(s): Unknown   .    Family History:   Family History   Problem Relation Age of Onset     Unknown/Adopted Mother      Unknown/Adopted Father    .    Social History:   Social History   Substance Use Topics     Smoking status: Former Smoker     Packs/day: 0.50     Years: 22.00     Types: Cigarettes     Quit date: 4/15/1992     Smokeless tobacco: Never Used     Alcohol use 0.6 oz/week     1 Standard drinks or equivalent per week      Comment: MAYBE ONCE A YEAR   .        ROS:  The 10 point Review of Systems is negative other than noted in the HPI or here.     PHYSICAL EXAMINATION  Vital Signs:  B/P: 111/67,  T: 97.9,  P: Data Unavailable,  R: 24    MENTAL STATUS:  Alert, oriented x3.  Speech fluent with normal naming, repetition, comprehension.  Good right-left orientation, body part naming, calculation skills.  Pupils are equal, round, reactive to light.  Extraocular movements full.  Visual fields full.  Facial sensation, movement normal.  Palate moves symmetrically.  Tongue midline.  Sternocleidomastoid and trapezius strength intact.  Neck strength was normal.  No bruits.   NEUROLOGIC:  Motor 5/5.  Reflexes 2/4.  Toe signs downgoing.  Good finger-nose-finger, fine finger movement, heel-shin maneuver, sensation to light touch, position sense and double simultaneous stimulation.  Labs  Most Recent 3 CBC's:  Recent Labs   Lab Test  05/07/18   1502  02/27/18   2223  05/04/17   0725   WBC  5.0  4.7  4.7   HGB  12.7  12.8  12.2   MCV  85  85  86   PLT  192  190  189      Most Recent 3 BMP's:  Recent Labs    Lab Test  05/07/18   1502  02/27/18   2223  05/04/17   0725   NA  141  140  143   POTASSIUM  3.9  3.7  3.8   CHLORIDE  108  106  109   CO2  29  30  26   BUN  10  15  12   CR  0.69  0.76  0.62   ANIONGAP  4  4  8   GALE  9.3  9.3  8.9   GLC  130*  96  91     Most Recent 2 LFT's:  Recent Labs   Lab Test  08/23/17   1032  07/15/16   0903  09/22/15   1458  04/09/13   1649   AST   --    --   20  20   ALT  <5*  <5*  9  19   ALKPHOS   --    --   85  79   BILITOTAL   --    --   0.6  0.5     Most Recent INR's and Anticoagulation Dosing History:  Anticoagulation Dose History     Recent Dosing and Labs Latest Ref Rng & Units 11/9/2012 12/7/2012 12/21/2012 9/12/2013 1/8/2018 2/27/2018 5/7/2018    INR 0.86 - 1.14 1.9 3.2 2.0 0.99 1.0 1.06 1.00        Most Recent 3 Troponin's:No lab results found.  Most Recent Cholesterol Panel:  Recent Labs   Lab Test  04/06/18   1011   CHOL  153   LDL  71   HDL  48   TRIG  170*     Most Recent 6 Bacteria Isolates From Any Culture (See EPIC Reports for Culture Details):  Recent Labs   Lab Test  09/12/13   1030  09/23/10   0900  08/26/10   0930  06/24/10   0900  04/19/10   1137   CULT  No growth  Light growth Methicillin resistant Staphylococcus aureus (MRSA)  No growth  Moderate growth Methicillin resistant Staphylococcus aureus (MRSA)  Heavy growth Pasteurella multocida  Heavy growth Peptostreptococcus species     Most Recent TSH, T4 and A1c Labs:  Recent Labs   Lab Test  07/13/10   1405   A1C  6.4*     Results for orders placed or performed during the hospital encounter of 05/07/18   CT Head w/o Contrast    Narrative    CT SCAN OF THE HEAD WITHOUT CONTRAST   5/7/2018 3:15 PM     HISTORY: Stroke. Right facial droop and slurred speech began at 1400  hours.    TECHNIQUE:  Axial images of the head and coronal reformations without  IV contrast material. Radiation dose for this scan was reduced using  automated exposure control, adjustment of the mA and/or kV according  to patient size, or  iterative reconstruction technique.    COMPARISON: 2/27/2018    FINDINGS:  There is generalized atrophy of the brain.  There is low  attenuation in the white matter of the cerebral hemispheres consistent  with sequelae of small vessel ischemic disease. There is no evidence  of intracranial hemorrhage, mass, acute infarct or anomaly.     There are bilateral intraocular lens implants. There is no evidence of  trauma.       Impression    IMPRESSION:   1. No acute abnormality.  2. Atrophy of the brain.  White matter changes consistent with  sequelae of small vessel ischemic disease. This is unchanged.    CAYDEN JEAN MD       STROKE DATA    Stroke Code:  Time called:  05/07/2018 1500  Time patient seen:  05/07/2018 1510  Onset of symptoms:  05/07/2018 1400  Last known normal (pt's baseline):  05/07/2018 1400    TPA treatment:  Not given due to minor / isolated / quickly resolving stroke symptoms.     National Institutes of Health Stroke Scale (at presentation)  NIHSS done at:  time patient seen      Score    Level of consciousness:  (0)   Alert, keenly responsive     LOC questions:  (0)   Answers both questions correctly    LOC commands:  (0)   Performs both tasks correctly    Best gaze:  (0)   Normal    Visual:  (0)   No visual loss    Facial palsy:  (0)   Normal symmetrical movements    Motor arm (left):  (0)   No drift    Motor arm (right):  (0)   No drift    Motor leg (left):  (0)   No drift    Motor leg (right):  (0)   No drift    Limb ataxia:  (0)   Absent    Sensory:  (1)   Mild to moderate sensory loss    Best language:  (0)   Normal- no aphasia    Dysarthria:  (0)   Normal    Extinction and inattention:  (0)   No abnormality        NIHSS Total Score:  1             ASSESSMENT & RECOMMENDATIONS     Ms Smith is a 77-year-old woman with past medical history as mentioned above with multiple vascular risk factors including CAD, PVD, atrial fibrillation who is currently on Plavix presented with right sided hemibody  numbness.  CT head in the emergency room did not show any acute intracranial abnormalities.  She is not at candidate for IV TPA given very minor symptoms.    Plan:  - MRI brain with MRA head and neck  -Transthoracic echocardiogram  -Continue Plavix  -Anticoagulation plan to be determined  -Admit as observation    Plan discussed with Dr. Bhargav Mares  Fellow

## 2018-05-07 NOTE — ED NOTES
MRI called informing that pt was unable to hold still for the scan. Pt would not hold legs still and urinated on the table. MD notified.

## 2018-05-07 NOTE — PROGRESS NOTES
RECEIVING UNIT ED HANDOFF REVIEW    ED Nurse Handoff Report was reviewed by: Radha Schmitz on May 7, 2018 at 5:22 PM

## 2018-05-07 NOTE — ED NOTES
Bed: ED03  Expected date:   Expected time:   Means of arrival:   Comments:  [facility] [place] [facility] - 428 - 77F numbness eta 6344

## 2018-05-07 NOTE — ED PROVIDER NOTES
History     Chief Complaint:  Numbness    HPI   Katherin Fletcher is a 77 year old female on Plavix for s/p bypass stents and with a medical history of a-fib who presents with numbness. The patient reports that at 1400 hours she suddenly developed numbness to her lips and right side of face, thick speech with troubled talking, and right sided weakness, prompting the patient to present by EMS. She describes her numbness as if she is using topical numbing solution. She denies headache or any other symptoms, as well as a history of stroke.     Allergies:  Ace Inhibitors  Baclofen  Penicillins    Medications:    Alfalfa Tabs  Aspirin 81 mg  Botox  Calcium + D  Sinemet  Coreg  Plavix  Vitamin B12  Nasalide  IMDUR  Synthroid  Robaxin  Multi-Vitamins  Nitrostat  Prilosec  Crestor    Past Medical History:    Encounter for long-term (current) use of medications  Arterial leg ulcer  peripheral artery disease  Peripheral polyneuropathy  Ischemic cardiomyopathy  Essential hypertension  Neurologic gait disorder  Cerebellar disease  Dysarthria  Disturbances of sensation of smell and taste  MRSA  Health Care Home  CAD  Mixed hyperlipidemia  Unspecified hypothyroidism  Long term current use of anticoagulant therapy  Restless legs syndrome  GERD  MI    Past Surgical History:    Arthroscopy Shoulder Bilaterally  Bunionectomy Bilaterally  C CABG, Artery-Vein, Three  C Cardiac Surg Procedures Unlisted  C Hand/Finger Surgery Unlisted  C MRA Upper Extremity WO&W Cont  C Shoulder Surg Proc Unlisted  C Stomach Surgery Procedure Unlisted  CABG, Stents x2  Carpal Tunnel Release Bilaterally  HC Vascular Surgery Procedure Unlist  Hysterectomy, DAMIÁN  ORIF, Left Ankle  Release Trigger Finger  Repair Hammer Toe  Stent  Cardiac Stent    Family History:    History reviewed. No pertinent family history.     Social History:  Marital Status:     Tobacco Status: Former 1/2 PPD Smoker of 22 Years, Quit 1992  Alcohol Use: 1 Standard Drink  "Equivalents Per Week    Review of Systems   Neurological: Positive for speech difficulty, weakness and numbness. Negative for headaches.   All other systems reviewed and are negative.    Physical Exam     Patient Vitals for the past 24 hrs:   BP Temp Temp src Heart Rate Resp SpO2 Height Weight   05/07/18 1944 149/73 97.6  F (36.4  C) Oral 97 15 93 % - -   05/07/18 1815 (!) 168/104 97.7  F (36.5  C) Oral 90 16 93 % - -   05/07/18 1757 - - - 112 16 - - -   05/07/18 1730 (!) 166/96 - - 96 - - - -   05/07/18 1600 156/87 - - - - - - -   05/07/18 1530 (!) 169/92 - - - - - - -   05/07/18 1454 (!) 172/91 98.6  F (37  C) Oral 66 16 99 % 1.626 m (5' 4\") 74.4 kg (164 lb)     Physical Exam  General: Appears anxious  Eyes:  The pupils are equal and round    Conjunctivae and sclerae are normal  ENT:    Moist mucous membranes  Neck:  Normal range of motion  CV:  Regular rate and rhythm    Skin warm and well perfused   Resp:  Lungs are clear    Non-labored    No rales    No wheezing   GI:  Abdomen is soft, there is no rigidity    No distension    No rebound tenderness     No abdominal tenderness  MS:  Normal muscular tone  Skin:  No rash or acute skin lesions noted  Neuro:   Awake, alert.      Speech seems slightly thick    Face is symmetric.     Moves all extremities though possible slight weakness on right arm against resistance    SILT on R/L UE and LE    Decreased sensation on right side of face    EOMI    Oriented x3  Psych: Normal affect.  Appropriate interactions.    Emergency Department Course   ECG:  ECG (15:40:19):  Rate 81 bpm. OH interval 188. QRS duration 80. QT/QTc 384/446. P-R-T axes 71  61  121. Interpretation: Sinus Rhythm with Premature Supraventricular Complexes and with Occasional Premature Ventricular Complexes, Cannot Rule Out Anterior Infract (Age Undetermined), Abnormal ECG. Interpreted at 1544 by Katie Turcios MD on  05/07/2018.  ECG (17:39:11):  Rate 129 bpm. OH interval *. QRS duration 84. " QT/QTc 288/421. P-R-T axes *  64  203. Interpretation: Undetermined Rhythm; Cannot Rule Out Anterior Infract, Age Undetermined; Marked ST Abnormality, Possible Inferior Subendocardial Injury; Abnormal ECG. Note: Now likely in Atrial Fibrillation.  Interpreted at 1740 by Katie Turcios MD on 05/07/2018.    Imaging:  Radiographic findings were communicated with the patient who voiced understanding of the findings.  CT-scan Head w/o contrast:  IMPRESSION:   1. No acute abnormality.  2. Atrophy of the brain.  White matter changes consistent with  sequelae of small vessel ischemic disease. This is unchanged.  As read by Radiology.     Laboratory:  BMP: Glucose 130 (H) o/w WNL (Creatinine 0.69)  CBC: AWNL (WBC 5.0, HGB 12.7, )   INR: 1.00  Partial Thromboplastin Time: 30    Interventions:  1553: Ativan 0.5 mg IV  1614: Ativan 0.5 mg IV  1616: Aspirin Suppository 300 mg Rectal  1648: Ativan 0.5 mg IV  1742: Metoprolol 5 mg IV    Emergency Department Course:  The patient arrived in the emergency department via EMS.  Past medical records, nursing notes, and vitals reviewed.  1459: I performed an exam of the patient and obtained history, as documented above.   1459: Code Stroke called  IV inserted and blood drawn. The patient was placed on cardiac monitoring.  The patient was sent for a Head CT while in the emergency department, findings above.  Findings and plan explained to the Patient who consents to admission.   1545: Discussed the patient with Dr. Kaufman of Hospitalist Service, who will admit the patient to a hospital bed for further monitoring, evaluation, and treatment.   1710: Patient unable to complete MRI due to unable to keep legs still after total of 1.5 mg IV ativan given. Supposed to complete speech swallow assessment before oral medications. Will send to floor, can hopefully clear speech swallow and then take her sinemet. Hopefully can then complete MRI. Dr. Kaufman made aware of this.  1716: I  discussed the case with Dr. Kaufman of Hospitalist Service regarding the patient.  1746: Appear to be gong in and out of a-fib with heart rates of 130. Given metoprolol and heart rate improved. Dr. Kaufman and [facility] [place] made aware    Impression & Plan    CMS Diagnoses: The patient has stroke symptoms:           ED Stroke specific documentation           NIHSS PDF          Protocol PDF     Patient last known well time: 2 pm  ED Provider first to bedside at: 1459  Patient was not treated with TPA due to the following reason(s):  Mild stroke symptoms ( NIHSS < 4 and not globally aphasic)    National Institutes of Health Stroke Scale (Baseline)  Time Performed: 1459      Score    Level of consciousness: (0)   Alert, keenly responsive    LOC questions: (0)   Answers both questions correctly    LOC commands: (0)   Performs both tasks correctly    Best gaze: (0)   Normal    Visual: (0)   No visual loss    Facial palsy: (0)   Normal symmetrical movements    Motor arm (left): (0)   No drift    Motor arm (right): (0)   No drift    Motor leg (left): (0)   No drift    Motor leg (right): (0)   No drift    Limb ataxia: (0)   Absent    Sensory: (1)   Mild to moderate sensory loss    Best language: (0)   Normal- no aphasia    Dysarthria: (1)   Mild to moderate dysarthria    Extinction and inattention: (0)   No abnormality        Total Score:  2        Stroke Mimics were considered (including migraine headache, seizure disorder, hypoglycemia (or hyperglycemia), head or spinal trauma, CNS infection, Toxin ingestion and shock state (e.g. sepsis) .      National Institutes of Health Stroke Scale  Time Performed: 1515  Total Score: 2  (unchanged from last stroke score)    Medical Decision Making:  Katherin Fletcher is a 77 year old female who presents to the emergency department with numbness. Patient complaining of numbness to the right side of her body, as well as her speech seems slightly dysarthric. Code stroke was called. Patient  was evaluated by Neurology here in the emergency department. Head CT did not show any bleed. They were unable to place a good IV after multiple attempts in CT for the angio studies, so Neurology recommended MRI brain and MRI Angio studies instead. Not a TPA candidate given minimal symptoms, and Neurology felt that the MRI studies could be done after admission as these do not need to be done immediately. Attempted to obtain MRIs while in ED as MRI available, but due to her restless legs was unable to keep still after 1.5 mg IV Ativan. She was requesting her Sinemet but given my concern her stroke, supposed to pass swallow study first before being given oral medications. While in the emergency department shortly before going to hospital bed, it appeared that she was going in and out of atrial fibrillation so was given IV metoprolol. Dr. Kaufman and Neuro Floor made aware of this. She has a history of atrial fibrillation. Given rectal aspirin in concern of stroke. After she hopefully passes the swallow study, the sinemet will be given and then they will hopefully be able to perform the MRIs this evening.      Diagnosis:    ICD-10-CM    1. Cerebrovascular accident (CVA), unspecified mechanism (H) I63.9      Disposition:  Admitted to Dr. Kaufman of Hospitalist Service    Celestine Mahoney  5/7/2018    EMERGENCY DEPARTMENT  I, Celestine Mahoney, am serving as a scribe at 2:59 PM on 5/7/2018 to document services personally performed by Katie Turcios MD based on my observations and the provider's statements to me.         Katie Turcios MD  05/07/18 [id number]

## 2018-05-07 NOTE — PHARMACY-ADMISSION MEDICATION HISTORY
Admission medication history interview status for the 5/7/2018  admission is complete. See EPIC admission navigator for prior to admission medications     Medication history source reliability:Good    Actions taken by pharmacist (provider contacted, etc):None     Additional medication history information not noted on PTA med list :None    Medication reconciliation/reorder completed by provider prior to medication history? Yes    Time spent in this activity: 20 minutes    Prior to Admission medications    Medication Sig Last Dose Taking? Auth Provider   Alfalfa 250 MG TABS Take 250 mg by mouth every morning  5/7/2018 at am Yes Reported, Patient   aspirin 81 MG tablet Take 81 mg by mouth At Bedtime  5/6/2018 at pm Yes Reported, Patient   calcium carbonate-vitamin D (CALCIUM + D) 600-200 MG-UNIT TABS Take 1 tablet by mouth At Bedtime  5/6/2018 at Unknown time Yes Aris Del Real MD   carbidopa-levodopa (SINEMET)  MG per tablet TAKE 2 TABLETS FOUR TIMES A DAY  Patient taking differently: Take 2 tablets by mouth 4 times daily as needed (legs) Takes as needed throughout the day, up to 4 times daily prn Yes Aris Del Real MD   carvedilol (COREG) 6.25 MG tablet Take 1 tablet (6.25 mg) by mouth 2 times daily (with meals) 5/7/2018 at am Yes Kenton Grayson MD   clopidogrel (PLAVIX) 75 MG tablet Take 1 tablet (75 mg) by mouth daily  Patient taking differently: Take 75 mg by mouth every evening  5/6/2018 at pm Yes Kenton Grayson MD   Cyanocobalamin (VITAMIN B-12 PO) Take 1 tablet by mouth every morning  5/7/2018 at am Yes Reported, Patient   isosorbide mononitrate (IMDUR) 30 MG 24 hr tablet Take 1 tablet (30 mg) by mouth daily 5/7/2018 at am Yes Charline Joyner PA-C   levothyroxine (SYNTHROID/LEVOTHROID) 112 MCG tablet TAKE 1 TABLET DAILY 5/7/2018 at am Yes Aris Del Real MD   Multiple Vitamins-Minerals (DAILY MULTI) TABS Take 1 tablet by mouth every morning  5/7/2018 at am Yes Reported,  Patient   nitroglycerin (NITROSTAT) 0.4 MG SL tablet DISSOLVE ONE TABLET UNDER THE TONGUE EVERY 5 MINUTES AS NEEDED FOR CHEST PAIN.  DO NOT EXCEED A TOTAL OF 3 DOSES IN 15 MINUTES prn Yes Kenton Grayson MD   omeprazole (PRILOSEC) 20 MG CR capsule TAKE 2 CAPSULES DAILY 30 TO 60 MINUTES BEFORE A MEAL  Patient taking differently: TAKE 2 CAPSULES HS 5/6/2018 at hs Yes Aris Del Real MD   rosuvastatin (CRESTOR) 20 MG tablet Take 1 tablet (20 mg) by mouth daily 5/6/2018 at pm Yes Kenton Grayson MD   botulinum toxin type A (BOTOX) 100 units injection Inject 200 Units into the muscle every 3 months   Darline Celestin MD

## 2018-05-07 NOTE — ED NOTES
Pt sitting on MRI table dangling and kicking legs. Pt tearful requesting her carbadopa-levadopa. Pt told we would be giving another dose of ativan to help with restlessness and anxiety. Pt agreed. Pt educated on the importance of the MRI. MD notified on pt condition.

## 2018-05-07 NOTE — IP AVS SNAPSHOT
[facility]    [address]    [place] [address]    Phone:  [phone]                                       After Visit Summary   5/7/2018    Katherin Fletcher    MRN: [id number]           After Visit Summary Signature Page     I have received my discharge instructions, and my questions have been answered. I have discussed any challenges I see with this plan with the nurse or doctor.    ..........................................................................................................................................  Patient/Patient Representative Signature      ..........................................................................................................................................  Patient Representative Print Name and Relationship to Patient    ..................................................               ................................................  Date                                            Time    ..........................................................................................................................................  Reviewed by Signature/Title    ...................................................              ..............................................  Date                                                            Time

## 2018-05-07 NOTE — IP AVS SNAPSHOT
MRN:[id number]                      After Visit Summary   5/7/2018    Katherin Fletcher    MRN: [id number]           Thank you!     Thank you for choosing [facility] for your care. Our goal is always to provide you with excellent care. Hearing back from our patients is one way we can continue to improve our services. Please take a few minutes to complete the written survey that you may receive in the mail after you visit with us. Thank you!        Patient Information     Date Of Birth          1940        Designated Caregiver       Most Recent Value    Caregiver    Will someone help with your care after discharge? yes    Name of designated caregiver Nicholas    Phone number of caregiver [phone]    Caregiver address [address] [place] [address] [place] [address]      About your hospital stay     You were admitted on:  May 7, 2018 You last received care in the:  [place] [address] [facility]    You were discharged on:  May 10, 2018        Reason for your hospital stay       For numbness and tingling of right side ,possible transient ischemic attack                  Who to Call     For medical emergencies, please call 911.  For non-urgent questions about your medical care, please call your primary care provider or clinic, [phone]          Attending Provider     Provider Specialty    Katie Turcios MD Emergency Medicine    [facility], Dhaval Hardy MD Internal Medicine    Suze Hussein MD Internal Medicine       Primary Care Provider Office Phone # Fax #    Aris Del Real [phone]      After Care Instructions     Activity       Your activity upon discharge: activity as tolerated            Diet       Follow this diet upon discharge: Orders Placed This Encounter      Room Service      Snacks/Supplements Adult: Ensure Plus (Adult); Between Meals      Combination Diet Mechanical/Dental Soft Diet; Thin Liquids (water, ice chips, juice, milk, gelatin,  ice cream, etc)            Discharge Instructions       Follow up with neurology as scheduled, follow up with primary care physician                  Follow-up Appointments     Follow-up and recommended labs and tests        Followup with Neurology in 4 weeks:     Scheduled for **May 30th at 11:30am with Dr. Pham**  [facility], [phone]  [address] [place] [address], [address], [place] [address]            Follow-up and recommended labs and tests        Follow up with primary care provider, Aris Del Real, (see appointment below Dr Corrales) within 7 days for hospital follow- up.  The following labs/tests are recommended: cbc in 1 week.                  Your next 10 appointments already scheduled     May 16, 2018 11:15 AM CDT   Office Visit with Halina [facility]rales MD   [facility]    [address] [place] [address] [phone]           Bring a current list of meds and any records pertaining to this visit. For Physicals, please bring immunization records and any forms needing to be filled out. Please arrive 10 minutes early to complete paperwork.            Jun 01, 2018 10:30 AM CDT   (Arrive by 10:15 AM)   Return Ataxia with Joe Randolph MD   [facility] ([facility])    [address] [place] [address] [phone]            Jul 03, 2018  4:10 PM CDT   (Arrive by 3:55 PM)   New Botox with Darline Celestin MD   [facility] ([facility])    [address] [address] [phone]              Additional Services     Home Care OT Referral for Hospital Discharge       OT to eval and treat    Your provider has ordered home care - occupational therapy. If you have not been contacted within 2 days of your discharge please call [phone]            Home Care PT Referral for Hospital Discharge        PT to eval and treat    Your provider has ordered home care - physical therapy. If you have not been contacted within 2 days of your discharge please call [phone]            Home Care SLP Referral for Hospital Discharge       SLP to eval and treat    Your provider has ordered home care - speech therapy. If you have not been contacted within 2 days of your discharge please call [phone]            Home Care Social Service Referral for Hospital Discharge        to follow up on her therapies , need for tcu     Your provider has ordered home care - . If you have not been contacted within 2 days of your discharge please call [phone]            Home care nursing referral       Your provider has ordered home care nursing services. If you have not been contacted within 2 days of your discharge please call [phone].                  Further instructions from your care team       Your risk factors for stroke or TIA (transient ischemic attack):    Your Risk Factors Your Results Normal Ranges   High blood pressure BP Readings from Last 1 Encounters:   05/10/18 129/76    Less than 120/80   Cholesterol              Total Lab Results   Component Value Date    CHOL 133 05/07/2018      Less than 150    Triglycerides   Lab Results   Component Value Date    TRIG 121 05/07/2018    Less than 150   LDL Lab Results   Component Value Date    LDL 54 05/07/2018       Less than 70   HDL Lab Results   Component Value Date    HDL 55 05/07/2018            Greater than 40 (men)  Greater than 50 (women)   Diabetes   Recent Labs  Lab 05/08/18  1230   GLC 97    Fasting blood glucose    Smoking/tobacco use  Quit smoking and tobacco   Overweight  Lose 1-2 pounds a week   Lack of exercise  30 minutes moderate activity each day   Other risk factors include carotid (neck) artery disease, atrial fibrillation and stress. You may be on new medicine to treat high blood pressure, cholesterol, diabetes or  atrial fibrillation.    Understanding Stroke Booklet given to patient. Please refer to booklet for further information.    Stroke warning signs and symptoms - CALL 911 right away for:  - Sudden numbness or weakness in the face, arm or leg (often on one side of the body).  - Sudden confusion or trouble understanding what is going on.  - Sudden blurred or decreased vision in one or both eyes.  - Sudden trouble speaking, loss of balance, dizziness or problems with coordination.  - Sudden, severe headache for no reason.  - Fainting or seizures.  - Symptoms may go away then come back suddenly.          A follow-up appointment was scheduled for you [see above].  It is very important to go to it--bring these papers and your insurance card with you.  At the visit, talk about your hospital stay.  Tell your doctor how you feel.  Show your new list of medications.  Your doctor will update records, make sure you are still doing OK, and decide if any tests or medication changes are needed.    + Dr. Del Real is booked solid, so it was necessary to schedule you with his new partner, Dr. Corrales, for hospital followup.     Your doctor has ordered home care to help you after your hospital stay.  They will contact you regarding your first visit.  This service will be provided by [facility].  If you have not received a call within 48 hours of discharge, please call them at [phone].        Pending Results     No orders found from 5/5/2018 to 5/8/2018.            Statement of Approval     Ordered          05/10/18 0929  I have reviewed and agree with all the recommendations and orders detailed in this document.  EFFECTIVE NOW     Approved and electronically signed by:  Suze Hussein MD             Admission Information     Date & Time Provider Department Dept. Phone    5/7/2018 Suze Hussein MD [address] [facility] [phone]      Your Vitals Were     Blood Pressure Temperature Respirations Height  "Weight Pulse Oximetry    127/62 (BP Location: Right arm) 98.1  F (36.7  C) (Oral) 17 1.626 m (5' 4\") 71 kg (156 lb 9.6 oz) 98%    BMI (Body Mass Index)                   26.88 kg/m2           [employer] Information     [employer] gives you secure access to your electronic health record. If you see a primary care provider, you can also send messages to your care team and make appointments. If you have questions, please call your primary care clinic.  If you do not have a primary care provider, please call [phone] and they will assist you.        Care EveryWhere ID     This is your Care EveryWhere ID. This could be used by other organizations to access your [facility] medical records  [id number]        Equal Access to Services     JOSELUIS MADRIGAL : Yuly Owens, wilmer phoenix, jeovanny epps, carlo castro. So [facility] [phone].    ATENCIÓN: Si habla español, tiene a taylor disposición servicios gratuitos de asistencia lingüística. Llame al [phone].    We comply with applicable federal civil rights laws and [place] laws. We do not discriminate on the basis of race, color, national origin, age, disability, sex, sexual orientation, or gender identity.               Review of your medicines      START taking        Dose / Directions    apixaban ANTICOAGULANT 5 MG tablet   Commonly known as:  ELIQUIS   Used for:  Cerebrovascular accident (CVA), unspecified mechanism (H)        Dose:  5 mg   Take 1 tablet (5 mg) by mouth 2 times daily   Quantity:  60 tablet   Refills:  1         CONTINUE these medicines which may have CHANGED, or have new prescriptions. If we are uncertain of the size of tablets/capsules you have at home, strength may be listed as something that might have changed.        Dose / Directions    carbidopa-levodopa  MG per tablet   Commonly known as:  SINEMET   This may have changed:    - how much to take  - how to take this  - when to take this  - " reasons to take this  - additional instructions   Used for:  Restless legs syndrome, Neurologic gait disorder, Dysarthria, Cerebellar disease        TAKE 2 TABLETS FOUR TIMES A DAY   Quantity:  720 tablet   Refills:  3       clopidogrel 75 MG tablet   Commonly known as:  PLAVIX   This may have changed:  when to take this   Used for:  CAD (coronary artery disease)        Dose:  75 mg   Take 1 tablet (75 mg) by mouth daily   Quantity:  90 tablet   Refills:  2       omeprazole 20 MG CR capsule   Commonly known as:  priLOSEC   This may have changed:  See the new instructions.   Used for:  Encounter for long-term (current) use of medications        TAKE 2 CAPSULES DAILY 30 TO 60 MINUTES BEFORE A MEAL   Quantity:  180 capsule   Refills:  1         CONTINUE these medicines which have NOT CHANGED        Dose / Directions    Alfalfa 250 MG Tabs        Dose:  250 mg   Take 250 mg by mouth every morning   Refills:  0       botulinum toxin type A 100 units injection   Commonly known as:  BOTOX   Used for:  Muscle spasms of both lower extremities        Dose:  200 Units   Inject 200 Units into the muscle every 3 months   Quantity:  200 Units   Refills:  4       calcium + D 600-200 MG-UNIT Tabs   Used for:  Preventative health care   Generic drug:  calcium carbonate-vitamin D        Dose:  1 tablet   Take 1 tablet by mouth At Bedtime   Refills:  0       carvedilol 6.25 MG tablet   Commonly known as:  COREG   Used for:  Atrial fibrillation (H), HTN (hypertension)        Dose:  6.25 mg   Take 1 tablet (6.25 mg) by mouth 2 times daily (with meals)   Quantity:  180 tablet   Refills:  3       DAILY MULTI Tabs        Dose:  1 tablet   Take 1 tablet by mouth every morning   Refills:  0       isosorbide mononitrate 30 MG 24 hr tablet   Commonly known as:  IMDUR   Used for:  Coronary artery disease involving native coronary artery of native heart without angina pectoris        Dose:  30 mg   Take 1 tablet (30 mg) by mouth daily    Quantity:  90 tablet   Refills:  3       levothyroxine 112 MCG tablet   Commonly known as:  SYNTHROID/LEVOTHROID   Used for:  Encounter for medication refill        TAKE 1 TABLET DAILY   Quantity:  90 tablet   Refills:  1       nitroGLYcerin 0.4 MG sublingual tablet   Commonly known as:  NITROSTAT   Used for:  CAD (coronary artery disease)        DISSOLVE ONE TABLET UNDER THE TONGUE EVERY 5 MINUTES AS NEEDED FOR CHEST PAIN.  DO NOT EXCEED A TOTAL OF 3 DOSES IN 15 MINUTES   Quantity:  25 tablet   Refills:  3       rosuvastatin 20 MG tablet   Commonly known as:  CRESTOR   Used for:  Hyperlipidemia        Dose:  20 mg   Take 1 tablet (20 mg) by mouth daily   Quantity:  90 tablet   Refills:  2       VITAMIN B-12 PO        Dose:  1 tablet   Take 1 tablet by mouth every morning   Refills:  0         STOP taking     aspirin 81 MG tablet                Where to get your medicines      These medications were sent to [facility] [place], [place] - [address]  [address], [place] [address]     Phone:  [phone]     apixaban ANTICOAGULANT 5 MG tablet                Protect others around you: Learn how to safely use, store and throw away your medicines at www.disposemymeds.org.             Medication List: This is a list of all your medications and when to take them. Check marks below indicate your daily home schedule. Keep this list as a reference.      Medications           Morning Afternoon Evening Bedtime As Needed    Alfalfa 250 MG Tabs   Take 250 mg by mouth every morning   Next Dose Due:  Resume as taken prior to hospitalization                                apixaban ANTICOAGULANT 5 MG tablet   Commonly known as:  ELIQUIS   Take 1 tablet (5 mg) by mouth 2 times daily   Last time this was given:  5 mg on 5/10/2018  8:03 AM   Next Dose Due:  This evening 5/10/18                                      botulinum toxin type A 100 units injection   Commonly known as:  BOTOX   Inject 200 Units  into the muscle every 3 months   Next Dose Due:  Resume as taken prior to hospitalization                                calcium + D 600-200 MG-UNIT Tabs   Take 1 tablet by mouth At Bedtime   Generic drug:  calcium carbonate-vitamin D   Next Dose Due:  Resume as taken prior to hospitalization                                carbidopa-levodopa  MG per tablet   Commonly known as:  SINEMET   TAKE 2 TABLETS FOUR TIMES A DAY                                   carvedilol 6.25 MG tablet   Commonly known as:  COREG   Take 1 tablet (6.25 mg) by mouth 2 times daily (with meals)   Last time this was given:  6.25 mg on 5/10/2018  8:03 AM                                clopidogrel 75 MG tablet   Commonly known as:  PLAVIX   Take 1 tablet (75 mg) by mouth daily   Last time this was given:  75 mg on 5/9/2018  7:11 PM   Next Dose Due:  This evening 5/10/18                                   DAILY MULTI Tabs   Take 1 tablet by mouth every morning   Next Dose Due:  Resume as taken prior to hospitalization                                isosorbide mononitrate 30 MG 24 hr tablet   Commonly known as:  IMDUR   Take 1 tablet (30 mg) by mouth daily   Next Dose Due:  Resume as taken prior to hospitalization                                levothyroxine 112 MCG tablet   Commonly known as:  SYNTHROID/LEVOTHROID   TAKE 1 TABLET DAILY   Last time this was given:  112 mcg on 5/10/2018  8:03 AM   Next Dose Due:  Tomorrow 5/11/18                                   nitroGLYcerin 0.4 MG sublingual tablet   Commonly known as:  NITROSTAT   DISSOLVE ONE TABLET UNDER THE TONGUE EVERY 5 MINUTES AS NEEDED FOR CHEST PAIN.  DO NOT EXCEED A TOTAL OF 3 DOSES IN 15 MINUTES   Next Dose Due:  As needed                                   omeprazole 20 MG CR capsule   Commonly known as:  priLOSEC   TAKE 2 CAPSULES DAILY 30 TO 60 MINUTES BEFORE A MEAL   Last time this was given:  40 mg on 5/9/2018 10:27 PM                                rosuvastatin 20 MG tablet    Commonly known as:  CRESTOR   Take 1 tablet (20 mg) by mouth daily   Last time this was given:  20 mg on 5/9/2018  7:11 PM   Next Dose Due:  This evening 5/10/18                                   VITAMIN B-12 PO   Take 1 tablet by mouth every morning   Next Dose Due:  Resume as taken prior to hospitalization                                          More Information        Apixaban Oral tablet  What is this medicine?  APIXABAN (a PIX a ban) is an anticoagulant (blood thinner). It is used to lower the chance of stroke in people with a medical condition called atrial fibrillation. It is also used to treat or prevent blood clots in the lungs or in the veins.  This medicine may be used for other purposes; ask your health care provider or pharmacist if you have questions.  What should I tell my health care provider before I take this medicine?  They need to know if you have any of these conditions:    bleeding disorders    bleeding in the brain    blood in your stools (black or tarry stools) or if you have blood in your vomit    history of stomach bleeding    kidney disease    liver disease    mechanical heart valve    an unusual or allergic reaction to apixaban, other medicines, foods, dyes, or preservatives    pregnant or trying to get pregnant    breast-feeding  How should I use this medicine?  Take this medicine by mouth with a glass of water. Follow the directions on the prescription label. You can take it with or without food. If it upsets your stomach, take it with food. Take your medicine at regular intervals. Do not take it more often than directed. Do not stop taking except on your doctor's advice. Stopping this medicine may increase your risk of a blot clot. Be sure to refill your prescription before you run out of medicine.  Talk to your pediatrician regarding the use of this medicine in children. Special care may be needed.  Overdosage: If you think you have taken too much of this medicine contact a poison  control center or emergency room at once.  NOTE: This medicine is only for you. Do not share this medicine with others.  What if I miss a dose?  If you miss a dose, take it as soon as you can. If it is almost time for your next dose, take only that dose. Do not take double or extra doses.  What may interact with this medicine?  This medicine may interact with the following:    aspirin and aspirin-like medicines    certain medicines for fungal infections like ketoconazole and itraconazole    certain medicines for seizures like carbamazepine and phenytoin    certain medicines that treat or prevent blood clots like warfarin, enoxaparin, and dalteparin    clarithromycin    NSAIDs, medicines for pain and inflammation, like ibuprofen or naproxen    rifampin    ritonavir    Repton's wort  This list may not describe all possible interactions. Give your health care provider a list of all the medicines, herbs, non-prescription drugs, or dietary supplements you use. Also tell them if you smoke, drink alcohol, or use illegal drugs. Some items may interact with your medicine.  What should I watch for while using this medicine?  Notify your doctor or health care professional and seek emergency treatment if you develop breathing problems; changes in vision; chest pain; severe, sudden headache; pain, swelling, warmth in the leg; trouble speaking; sudden numbness or weakness of the face, arm, or leg. These can be signs that your condition has gotten worse.  If you are going to have surgery, tell your doctor or health care professional that you are taking this medicine.  Tell your health care professional that you use this medicine before you have a spinal or epidural procedure. Sometimes people who take this medicine have bleeding problems around the spine when they have a spinal or epidural procedure. This bleeding is very rare. If you have a spinal or epidural procedure while on this medicine, call your health care professional  immediately if you have back pain, numbness or tingling (especially in your legs and feet), muscle weakness, paralysis, or loss of bladder or bowel control.  Avoid sports and activities that might cause injury while you are using this medicine. Severe falls or injuries can cause unseen bleeding. Be careful when using sharp tools or knives. Consider using an electric razor. Take special care brushing or flossing your teeth. Report any injuries, bruising, or red spots on the skin to your doctor or health care professional.  What side effects may I notice from receiving this medicine?  Side effects that you should report to your doctor or health care professional as soon as possible:    allergic reactions like skin rash, itching or hives, swelling of the face, lips, or tongue    signs and symptoms of bleeding such as bloody or black, tarry stools; red or dark-brown urine; spitting up blood or brown material that looks like coffee grounds; red spots on the skin; unusual bruising or bleeding from the eye, gums, or nose  This list may not describe all possible side effects. Call your doctor for medical advice about side effects. You may report side effects to FDA at 2-616-FDA-0402.  Where should I keep my medicine?  Keep out of the reach of children.  Store at room temperature between 20 and 25 degrees C (68 and 77 degrees F). Throw away any unused medicine after the expiration date.  NOTE: This sheet is a summary. It may not cover all possible information. If you have questions about this medicine, talk to your doctor, pharmacist, or health care provider.  NOTE:This sheet is a summary. It may not cover all possible information. If you have questions about this medicine, talk to your doctor, pharmacist, or health care provider. Copyright  2016 Gold Standard

## 2018-05-08 ENCOUNTER — APPOINTMENT (OUTPATIENT)
Dept: CARDIOLOGY | Facility: CLINIC | Age: 78
End: 2018-05-08
Attending: INTERNAL MEDICINE
Payer: MEDICARE

## 2018-05-08 ENCOUNTER — APPOINTMENT (OUTPATIENT)
Dept: PHYSICAL THERAPY | Facility: CLINIC | Age: 78
End: 2018-05-08
Attending: INTERNAL MEDICINE
Payer: MEDICARE

## 2018-05-08 ENCOUNTER — APPOINTMENT (OUTPATIENT)
Dept: OCCUPATIONAL THERAPY | Facility: CLINIC | Age: 78
End: 2018-05-08
Attending: INTERNAL MEDICINE
Payer: MEDICARE

## 2018-05-08 ENCOUNTER — APPOINTMENT (OUTPATIENT)
Dept: MRI IMAGING | Facility: CLINIC | Age: 78
End: 2018-05-08
Attending: EMERGENCY MEDICINE
Payer: MEDICARE

## 2018-05-08 ENCOUNTER — APPOINTMENT (OUTPATIENT)
Dept: SPEECH THERAPY | Facility: CLINIC | Age: 78
End: 2018-05-08
Attending: INTERNAL MEDICINE
Payer: MEDICARE

## 2018-05-08 PROBLEM — R47.81 SLURRED SPEECH: Status: ACTIVE | Noted: 2018-05-08

## 2018-05-08 LAB
ANION GAP SERPL CALCULATED.3IONS-SCNC: 4 MMOL/L (ref 3–14)
BASOPHILS # BLD AUTO: 0 10E9/L (ref 0–0.2)
BASOPHILS NFR BLD AUTO: 0.5 %
BUN SERPL-MCNC: 11 MG/DL (ref 7–30)
CALCIUM SERPL-MCNC: 9.5 MG/DL (ref 8.5–10.1)
CHLORIDE SERPL-SCNC: 110 MMOL/L (ref 94–109)
CO2 SERPL-SCNC: 28 MMOL/L (ref 20–32)
CREAT SERPL-MCNC: 0.65 MG/DL (ref 0.52–1.04)
DIFFERENTIAL METHOD BLD: NORMAL
EOSINOPHIL # BLD AUTO: 0.1 10E9/L (ref 0–0.7)
EOSINOPHIL NFR BLD AUTO: 1.1 %
ERYTHROCYTE [DISTWIDTH] IN BLOOD BY AUTOMATED COUNT: 14.4 % (ref 10–15)
GFR SERPL CREATININE-BSD FRML MDRD: 88 ML/MIN/1.7M2
GLUCOSE SERPL-MCNC: 97 MG/DL (ref 70–99)
HCT VFR BLD AUTO: 37.3 % (ref 35–47)
HGB BLD-MCNC: 12.4 G/DL (ref 11.7–15.7)
IMM GRANULOCYTES # BLD: 0 10E9/L (ref 0–0.4)
IMM GRANULOCYTES NFR BLD: 0 %
LYMPHOCYTES # BLD AUTO: 2.1 10E9/L (ref 0.8–5.3)
LYMPHOCYTES NFR BLD AUTO: 47.9 %
MCH RBC QN AUTO: 28.5 PG (ref 26.5–33)
MCHC RBC AUTO-ENTMCNC: 33.2 G/DL (ref 31.5–36.5)
MCV RBC AUTO: 86 FL (ref 78–100)
MONOCYTES # BLD AUTO: 0.4 10E9/L (ref 0–1.3)
MONOCYTES NFR BLD AUTO: 9.5 %
NEUTROPHILS # BLD AUTO: 1.8 10E9/L (ref 1.6–8.3)
NEUTROPHILS NFR BLD AUTO: 41 %
NRBC # BLD AUTO: 0 10*3/UL
NRBC BLD AUTO-RTO: 0 /100
PLATELET # BLD AUTO: 180 10E9/L (ref 150–450)
POTASSIUM SERPL-SCNC: 3.5 MMOL/L (ref 3.4–5.3)
RBC # BLD AUTO: 4.35 10E12/L (ref 3.8–5.2)
SODIUM SERPL-SCNC: 142 MMOL/L (ref 133–144)
WBC # BLD AUTO: 4.4 10E9/L (ref 4–11)

## 2018-05-08 PROCEDURE — 27210995 ZZH RX 272: Performed by: INTERNAL MEDICINE

## 2018-05-08 PROCEDURE — 99215 OFFICE O/P EST HI 40 MIN: CPT | Performed by: PSYCHIATRY & NEUROLOGY

## 2018-05-08 PROCEDURE — 97116 GAIT TRAINING THERAPY: CPT | Mod: GP | Performed by: PHYSICAL THERAPIST

## 2018-05-08 PROCEDURE — 40000225 ZZH STATISTIC SLP WARD VISIT: Performed by: SPEECH-LANGUAGE PATHOLOGIST

## 2018-05-08 PROCEDURE — 92526 ORAL FUNCTION THERAPY: CPT | Mod: GN | Performed by: SPEECH-LANGUAGE PATHOLOGIST

## 2018-05-08 PROCEDURE — 70544 MR ANGIOGRAPHY HEAD W/O DYE: CPT

## 2018-05-08 PROCEDURE — 99225 ZZC SUBSEQUENT OBSERVATION CARE,LEVEL II: CPT | Performed by: INTERNAL MEDICINE

## 2018-05-08 PROCEDURE — 25000132 ZZH RX MED GY IP 250 OP 250 PS 637: Mod: GY | Performed by: PHYSICIAN ASSISTANT

## 2018-05-08 PROCEDURE — 25000132 ZZH RX MED GY IP 250 OP 250 PS 637: Mod: GY | Performed by: STUDENT IN AN ORGANIZED HEALTH CARE EDUCATION/TRAINING PROGRAM

## 2018-05-08 PROCEDURE — 97535 SELF CARE MNGMENT TRAINING: CPT | Mod: GO

## 2018-05-08 PROCEDURE — 40000193 ZZH STATISTIC PT WARD VISIT: Performed by: PHYSICAL THERAPIST

## 2018-05-08 PROCEDURE — 85025 COMPLETE CBC W/AUTO DIFF WBC: CPT | Performed by: INTERNAL MEDICINE

## 2018-05-08 PROCEDURE — 99207 ZZC CDG-CODE CATEGORY CHANGED: CPT | Performed by: INTERNAL MEDICINE

## 2018-05-08 PROCEDURE — 99207 ZZC CDG-MDM COMPONENT: MEETS LOW - DOWN CODED: CPT | Performed by: INTERNAL MEDICINE

## 2018-05-08 PROCEDURE — 97110 THERAPEUTIC EXERCISES: CPT | Mod: GP,XU | Performed by: PHYSICAL THERAPIST

## 2018-05-08 PROCEDURE — 97166 OT EVAL MOD COMPLEX 45 MIN: CPT | Mod: GO

## 2018-05-08 PROCEDURE — A9585 GADOBUTROL INJECTION: HCPCS | Performed by: INTERNAL MEDICINE

## 2018-05-08 PROCEDURE — A9270 NON-COVERED ITEM OR SERVICE: HCPCS | Mod: GY | Performed by: INTERNAL MEDICINE

## 2018-05-08 PROCEDURE — A9270 NON-COVERED ITEM OR SERVICE: HCPCS | Mod: GY | Performed by: PHYSICIAN ASSISTANT

## 2018-05-08 PROCEDURE — 36415 COLL VENOUS BLD VENIPUNCTURE: CPT | Performed by: INTERNAL MEDICINE

## 2018-05-08 PROCEDURE — 40000133 ZZH STATISTIC OT WARD VISIT

## 2018-05-08 PROCEDURE — 70549 MR ANGIOGRAPH NECK W/O&W/DYE: CPT

## 2018-05-08 PROCEDURE — 70553 MRI BRAIN STEM W/O & W/DYE: CPT

## 2018-05-08 PROCEDURE — 25000128 H RX IP 250 OP 636: Performed by: INTERNAL MEDICINE

## 2018-05-08 PROCEDURE — 92610 EVALUATE SWALLOWING FUNCTION: CPT | Mod: GN | Performed by: SPEECH-LANGUAGE PATHOLOGIST

## 2018-05-08 PROCEDURE — 25000132 ZZH RX MED GY IP 250 OP 250 PS 637: Mod: GY | Performed by: INTERNAL MEDICINE

## 2018-05-08 PROCEDURE — 97162 PT EVAL MOD COMPLEX 30 MIN: CPT | Mod: GP | Performed by: PHYSICAL THERAPIST

## 2018-05-08 PROCEDURE — 80048 BASIC METABOLIC PNL TOTAL CA: CPT | Performed by: INTERNAL MEDICINE

## 2018-05-08 PROCEDURE — 25500064 ZZH RX 255 OP 636: Performed by: INTERNAL MEDICINE

## 2018-05-08 PROCEDURE — 93306 TTE W/DOPPLER COMPLETE: CPT | Mod: 26 | Performed by: INTERNAL MEDICINE

## 2018-05-08 PROCEDURE — G0378 HOSPITAL OBSERVATION PER HR: HCPCS

## 2018-05-08 PROCEDURE — A9270 NON-COVERED ITEM OR SERVICE: HCPCS | Mod: GY | Performed by: STUDENT IN AN ORGANIZED HEALTH CARE EDUCATION/TRAINING PROGRAM

## 2018-05-08 RX ORDER — GADOBUTROL 604.72 MG/ML
10 INJECTION INTRAVENOUS ONCE
Status: COMPLETED | OUTPATIENT
Start: 2018-05-08 | End: 2018-05-08

## 2018-05-08 RX ORDER — ACYCLOVIR 200 MG/1
60 CAPSULE ORAL ONCE
Status: COMPLETED | OUTPATIENT
Start: 2018-05-08 | End: 2018-05-08

## 2018-05-08 RX ADMIN — CLOPIDOGREL 75 MG: 75 TABLET, FILM COATED ORAL at 19:33

## 2018-05-08 RX ADMIN — CARBIDOPA AND LEVODOPA 2 TABLET: 25; 100 TABLET, ORALLY DISINTEGRATING ORAL at 03:52

## 2018-05-08 RX ADMIN — APIXABAN 5 MG: 5 TABLET, FILM COATED ORAL at 21:42

## 2018-05-08 RX ADMIN — ACETAMINOPHEN 650 MG: 650 SUPPOSITORY RECTAL at 02:32

## 2018-05-08 RX ADMIN — CARBIDOPA AND LEVODOPA 2 TABLET: 25; 100 TABLET, ORALLY DISINTEGRATING ORAL at 21:43

## 2018-05-08 RX ADMIN — CARBIDOPA AND LEVODOPA 2 TABLET: 25; 100 TABLET, ORALLY DISINTEGRATING ORAL at 15:41

## 2018-05-08 RX ADMIN — HUMAN ALBUMIN MICROSPHERES AND PERFLUTREN 9 ML: 10; .22 INJECTION, SOLUTION INTRAVENOUS at 11:38

## 2018-05-08 RX ADMIN — CARVEDILOL 6.25 MG: 6.25 TABLET, FILM COATED ORAL at 19:31

## 2018-05-08 RX ADMIN — LEVOTHYROXINE SODIUM 112 MCG: 112 TABLET ORAL at 11:03

## 2018-05-08 RX ADMIN — SODIUM CHLORIDE 60 ML: 9 INJECTION, SOLUTION INTRAMUSCULAR; INTRAVENOUS; SUBCUTANEOUS at 09:10

## 2018-05-08 RX ADMIN — CARBIDOPA AND LEVODOPA 2 TABLET: 25; 100 TABLET, ORALLY DISINTEGRATING ORAL at 10:03

## 2018-05-08 RX ADMIN — OMEPRAZOLE 40 MG: 20 CAPSULE, DELAYED RELEASE ORAL at 21:41

## 2018-05-08 RX ADMIN — CARVEDILOL 6.25 MG: 6.25 TABLET, FILM COATED ORAL at 11:03

## 2018-05-08 RX ADMIN — ROSUVASTATIN CALCIUM 20 MG: 20 TABLET, FILM COATED ORAL at 19:33

## 2018-05-08 RX ADMIN — GADOBUTROL 10 ML: 604.72 INJECTION INTRAVENOUS at 09:10

## 2018-05-08 RX ADMIN — ACETAMINOPHEN 650 MG: 325 TABLET ORAL at 23:18

## 2018-05-08 ASSESSMENT — VISUAL ACUITY
OU: BLURRED VISION

## 2018-05-08 ASSESSMENT — ACTIVITIES OF DAILY LIVING (ADL)
WHICH_OF_THE_ABOVE_FUNCTIONAL_RISKS_HAD_A_RECENT_ONSET_OR_CHANGE?: AMBULATION
ADLS_ACUITY_SCORE: 14
ADLS_ACUITY_SCORE: 13
ADLS_ACUITY_SCORE: 14
ADLS_ACUITY_SCORE: 14

## 2018-05-08 NOTE — PLAN OF CARE
Problem: Patient Care Overview  Goal: Plan of Care/Patient Progress Review  Outcome: No Change  A&Ox4. Neuros intact, except slurred speech, R pupil slightly larger than L and blurred vision in R eye, R droop and decreased sensation, mild hemiparesis in RUE. VSS. Tele Afib w/ CVR. NPO until speech eval. Up with A1 w/ GB and walker. C/o bilat leg pain r/t restless legs, relieved w/ PRN carbidopa/levadopa ODT. Plan for SLP/PT/OT in AM.

## 2018-05-08 NOTE — PLAN OF CARE
Problem: Patient Care Overview  Goal: Plan of Care/Patient Progress Review  Outcome: No Change  Pt arrived [facility] from ED at 1830. A&O. Neuros: slight droop, decreased sensation to R cheek, Blurred vision R eye, R arm numbness, RUE 4/5 strength. Tele afib with CVR with runs of RVR-no PRN meds needed. NPO. Up with strong assist of 1, GB, W. Pt BLE very restless/painful (baseline issue for pt)- relived with Sinemet at home and walking. Parcopa given and assist w/ ambulation/standing initiated as able, no improvement in s/s thus far. Unable to complete MRI due to restless legs, will reattempt if any improvement is shown. Plan for SLP/OT/PT/Neuro consult tomorrow.

## 2018-05-08 NOTE — PLAN OF CARE
Problem: Patient Care Overview  Goal: Plan of Care/Patient Progress Review  PT:  Discharge Planner PT   Patient plan for discharge: none specified, lives in 2 story house,  is 89 also uses WW for walking, plan in place to install chair lifts pending approval by [facility]  Current status: Pt admitted with facial droop, slurred speech and R side weakness. Previously mod indep with mobility using WW, indep with most ADLs, supportive family.  Pt requiring min A for bed mobility, slow to transition into sitting, leaning off to the right upon elevating trunk with posterior loss of balance, min A to return to midline sitting and min A/mod A for transfers with WW depending on seat height, pt braces LE strongly against seated surface to compensate for B hip weakness. Pt ambulated 75ft with WW and min A, ataxic, dec step length, unequal step length with reduced gait speed and with pathway deviation to the R. Pt reporting increase unsteadiness walking compared to baseline.  Barriers to return to prior living situation: stairs to bedroom, need for assistance with mobility  Recommendations for discharge: pending progress with therapy pt will need to be mod independent with mobility and able to navigate stairs at her prior baseline in order to discharge safely to home: if patient unable to perform stairs safely will need to consider ARU to facilitate return to prior level of independence in order to return to home  Rationale for recommendations: decreased mobility compared to baseline, pt will benefit from skilled intervention to promote increasing strength and return of indep mobility       Entered by: Claire Dean 05/08/2018 12:52 PM

## 2018-05-08 NOTE — UTILIZATION REVIEW
"  Admission Status; Secondary Review Determination         Under the authority of the Utilization Management Committee, the utilization review process indicated a secondary review on the above patient.  The review outcome is based on review of the medical records, discussions with staff, and applying clinical experience noted on the date of the review.          (x) Observation Status Appropriate - This patient does not meet hospital inpatient criteria and is placed in observation status. If this patient's primary payer is Medicare and was admitted as an inpatient, Condition Code 44 should be used and patient status changed to \"observation\".     RATIONALE FOR DETERMINATION   77-year-old female patient with history including hypertension, cardiac disease, CHF, atrial fibrillation, peripheral arterial disease and restless leg syndrome who presents with right-sided numbness, weakness as well as speech impairment.   MRI of the brain did not reveal evidence of acute stroke, discussed with the attending physician, concern for possible TIA or symptoms from previous stroke. The severity of illness, intensity of service provided, expected LOS and risk for adverse outcome make the care appropriate for further observation; however, doesn't meet criteria for hospital inpatient admission. Dr. Hussein  notified of this determination.    This document was produced using voice recognition software.      The information on this document is developed by the utilization review team in order for the business office to ensure compliance.  This only denotes the appropriateness of proper admission status and does not reflect the quality of care rendered.         The definitions of Inpatient Status and Observation Status used in making the determination above are those provided in the CMS Coverage Manual, Chapter 1 and Chapter 6, section 70.4.      Sincerely,     SALONI IBRAHIM MD    System Medical Director  Utilization " Management  [facility].

## 2018-05-08 NOTE — H&P
"Admitted:     05/07/2018      PRIMARY CARE PHYSICIAN:  Aris Del Real MD.        CHIEF COMPLAINT:  Right-sided numbness and speech impairment.        HISTORY OF PRESENT ILLNESS:  Ms. Katherin Fletcher is a 77-year-old female patient with history noted below, including hypertension, coronary artery disease, CHF, atrial fibrillation, peripheral arterial disease, dyslipidemia, restless leg syndrome and hypothyroidism who presents with the above complaints.  The patient was in her usual state of health up until earlier today at around 2:00 p.m. when she developed abrupt onset of numbness to her lips and right side of her face with \"thick\" speech.  She also noted some weakness on the right as well.  She called paramedics and she was brought to [facility] for evaluation.      The patient seen in ER and vitals recorded and showed temperature of 98.6, heart rate 66, blood pressure 132/91, respiration rate 16, O2 saturation 99% on room air.  Code stroke was called.  The patient was seen by neurology.  Underwent CT imaging that did not show any acute findings.  There was great difficulty obtaining appropriate access for CT angiogram and these imaging studies were eventually aborted and was not felt that she was a TPA or narrow interventional candidate.  MRI and MRA imaging has been ordered but overall given her symptomatology and concerns about stroke, request for admission was made.  The patient denies any chest pain or shortness breath.  No abdominal pain or bloody stools, no nausea or vomiting.  No fevers or chills.      PAST MEDICAL HISTORY:   1.  Hypertension.   2.  Coronary artery disease with CHF.  History of multiple cardiac stents.  Underwent CABG in 1992.  CABG consisting of LIMA to LAD and saphenous vein graft to OM1.  Had a redo CABG in 01/2009 consisting of LIMA to LAD, vein graft to OM and vein graft to posterolateral artery of the RCA.  She denies any further acute cardiac issues or interventions since that time.  " Last echocardiogram from 10/2014 showed left EF of 45% as well as wall motion abnormalities and grade 2 left ventricular diastolic dysfunction.  Follows with Dr. Grayson.   3.  Hypertension.   4.  Atrial fibrillation.     5.  Peripheral arterial disease.  She has had a history of left lower extremity intervention/stenting 01/2010.  Subsequent left lower extremity angioplasty 01/2010.   6.  Dyslipidemia.   7.  Hypothyroidism.  8.  Restless leg syndrome/lower extremity muscle spasms.  Has been seen by Neurology and has received Botox injections in the past.  9.  History of left foot/ankle chronic wound by multiple surgeries including skin graft in 2010.      PAST SURGICAL HISTORY:   1.  Status post carpal tunnel release.   2.  Status post hysterectomy.   3.  Status post stomach surgery.   4.  Status post ORIF, left ankle fracture.     5.  Status post CABG in 2002 consisting of LIMA to LAD and vein graft to OM.   6.  Status post right third finger trigger finger release 2004.   7.  Status post left ring finger trigger finger release 2005.     8.  Status post right shoulder surgery 2007.   9.  Status post redo CABG in 2009 consisting of LIMA to LAD, vein graft to OM and vein graft to posterolateral artery of the RCA.   10.  Status post left ankle surgery/hardware removed 2009.   11.  Status post left ankle wound surgeries 04/2010, 06/2010 including skin grafting.   12.  Status post right second and third hammertoe surgeries 12/2017.      ALLERGIES:  ACE INHIBITORS, BACLOFEN, PENICILLINS AND NICKEL.      HOME MEDICATIONS:   1.  Alfalfa 250 mg a day.   2.  Aspirin 81 mg a day.     3.  Astelin nasal spray 0.1% spray each naris b.i.d.   4.  Botulism toxin 100 units every 3 months.   5.  Calcium carbonate with vitamin D 600 mg/200 units 1 tablet each day at bedtime.     6.  Carbidopa/levodopa 2 tablets 4 times a day.   7.  Coreg 6.25 mg b.i.d. with meals.   8.  Plavix 75 mg a day.   9.  Cyanocobalamin every morning.   10.   Flunisolide nasal spray.   11.  Isosorbide mononitrate 30 mg every morning.   12.  Levothyroxine 112 mcg a day.   13.  Robaxin 1500 mg 3 times a day.   14.  Multivitamin daily.   15.  Nitroglycerin sublingually p.r.n.   16.  Omeprazole 40 mg every day.   17.  Simvastatin 20 mg a day.      SOCIAL HISTORY:  The patient does not smoke or drink.  She is .  Has 2 children, formerly worked as a  at a clinic.      FAMILY HISTORY:  Reviewed and not felt to be contributory.        REVIEW OF SYSTEMS:  As in HPI.  Otherwise 10 point review of systems was negative.     PHYSICAL EXAMINATION:   VITAL SIGNS:  Temperature 98.6, heart rate 66, blood pressure 172/91, respiration rate 16, O2 saturation 99% on room air.   GENERAL:  Alert and oriented the setting of the patient who is lying in bed.  She is tearful as she is having a flare of her left lower extremity restless legs.  Otherwise, she is appropriate and follows commands.   HEENT:  Pupils equal, round, reactive.  No scleral icterus.  There is some conjunctival injection.  Oropharynx reveals no gross erythema.  There are dry mucous membranes.   NECK:  No bruits or JVD.   HEART:  Fairly regular rate and rhythm without murmurs, rubs or gallops.   LUNGS:  Diminished at the bases, no crackles or wheezes.   ABDOMEN:  Soft, nontender, nondistended, positive bowel sounds, no femoral bruits.   EXTREMITIES:  Show trace 1+ edema with trace pedal edema as well.  No significant knee joint swelling or skin rash.   NEUROLOGIC:  The patient's speech has slight slowness and mild dysarthria.  Otherwise, follows commands.  No facial droop, some slight numbness on her right side still present.      LABORATORY DATA:  BMP is normal.  Glucose 130.  CBC is normal.  INR is normal.  Head CT as above.  EKG, which I personally reviewed, showed sinus rhythm with occasional PVCs, nothing acutely ischemic.      ASSESSMENT AND  PLAN:  Ms. Katherin Fletcher is a 77-year-old female patient  with history including hypertension, coronary artery disease, CHF, atrial fibrillation, peripheral arterial disease and restless leg syndrome who presents with right-sided numbness, weakness as well as speech impairment.    1.  Suspected TIA/stroke.  Code stroke was called.  Head CT was negative, but CT head with contrast and CT angiogram of head and neck unable to be performed given inability to obtain appropriate access.  The patient was seen by Neurology and not felt that she would be a TPA or neurointerventional candidate regardless.  At the time, MRI and MRA imaging are ordered.  We will monitor on telemetry.  Order echocardiogram and therapy evaluation.  Continue aspirin and Plavix, but given known history of atrial fibrillation, she may benefit from being on anticoagulation, but defer to Neurology who will be consulted formally.    2.  Hypertension with worsening secondary to the above.  Continue prior to admission Coreg but hold with hold parameters.  We will order p.r.n. IV medications including hydralazine and labetalol per TIA/stroke protocol.     3.  Coronary artery disease with congestive heart failure.  Known history of coronary artery disease with prior stents and coronary artery bypass graft as well as history of congestive heart failure.  Last echocardiogram showing left ventricular ejection fraction of 45% with grade 2 diastolic dysfunction.  We will continue aspirin, Plavix, Coreg and rosuvastatin, but hold isosorbide mononitrate for now.  Continue p.r.n. Nitroglycerin.    4.  Atrial fibrillation history.  Currently appears to be in sinus rhythm.  Continue aspirin, Plavix and Coreg.  Consider anticoagulation as above and await Neurology recommendations.      5.  Peripheral vascular disease.  Continue aspirin, Plavix.    6.  Restless leg syndrome.  Continue carbidopa-levodopa.    7.  GERD.  Continue omeprazole.     8.  Hypothyroidism.  Continue levothyroxine.    9.  Prophylaxis.  Pneumoboots and  ambulation.      CODE STATUS:  FULL CODE.         ALONZO HERRERA JR., MD             D: 2018   T: 2018   MT: BERTHA      Name:     ANA RODRIGUEZ   MRN:      [id number]        Account:      [id number]   :      1940        Admitted:     2018                   Document: [id number]

## 2018-05-08 NOTE — PROGRESS NOTES
"CLINICAL NUTRITION SERVICES  -  ASSESSMENT NOTE      Recommendations Ordered by Registered Dietitian (RD):  Boost [place] BID at 10 am and 2 pm     MALNUTRITION:  % Weight Loss: Does not meet criteria, intentional   % Intake: Does not meet criteria, intentional   Subcutaneous Fat Loss:  Upper arm region moderate depletion  Muscle Loss:  None observed - exam limited  Fluid Retention:  None noted    Malnutrition Diagnosis: Does not meet two of the above criteria diagnosing for malnutrition at this time          REASON FOR ASSESSMENT  Katherin Fletcher is a 77 year old female seen by Registered Dietitian for Admission Nutrition Risk Screen - Unintentional wt loss of 10# or more in the past 2 months       NUTRITION HISTORY  Information obtained from patient:  - Pt reports following a regular diet at home consuming meals BID w/ Boost Supplements daily. Typical intake includes:   Breakfast: Coffee, fruit, muffin   Lunch: Skips   Dinner: Meat, vegetable and starch    Does not snack  - Pt states that she feels she has lost 70# over the past 1.5 yrs, partially intentionally (says she has always been \"a bigger lady\" and had wanted to lose weight).When asked about this weight loss, pt shrugged and said \"I don't know how it happened... I did cut out all snacks in the past year.\"     Information obtained from chart:  - Signs of stroke upon admit, now w/ facial drooping and slurred speech    CURRENT NUTRITION ORDERS  Diet Order:     Mechanical/Dental Soft   - per SLP bedside swallow 5/8    Current Intake/Tolerance:  Pt reports a good appetite. Lunch tray was delivered during our visit, which consisted of a soft fruit and yogurt tray, a muffin, and coffee. Pt says that it is hard for her to imagine chewing meat with her current state.    Pt states that she feels content with her weight right now and does not plan to lose any more. (see wt trending below)    PHYSICAL FINDINGS  Observed  Subcutaneous fat loss - see maln' " "section below  Obtained from Chart/Interdisciplinary Team  None noted    ANTHROPOMETRICS  Height: 5' 4\"  Weight: 162 lbs 4.8 oz (73.6 kg)  BMI: 27.86 kg/(m^2)  Weight Status:  Overweight BMI 25-29.9  IBW: 54.5 kg  % IBW: 135%  Weight History: wt trending downwards. Over the past 6 months, pt has lost 18# or 10% body wt. Over the past 3 months, pt has lost 9# or 5% body wt. Pt states that some of this has been intentional.  Wt Readings from Last 10 Encounters:   05/08/18 73.6 kg (162 lb 4.8 oz)   04/20/18 74.8 kg (165 lb)   03/29/18 74.6 kg (164 lb 6.4 oz)   03/16/18 77.1 kg (170 lb)   02/27/18 77.1 kg (170 lb)   02/26/18 77.6 kg (171 lb)   12/19/17 81.6 kg (180 lb)   12/11/17 81.6 kg (180 lb)   11/03/17 81.6 kg (180 lb)   08/23/17 80.6 kg (177 lb 11.2 oz)     Pt reports a UBW of 160# recently. She states that she weighed 225# 2 years ago.     LABS  Labs reviewed    MEDICATIONS  Medications reviewed    ASSESSED NUTRITION NEEDS PER APPROVED PRACTICE GUIDELINES:  Dosing Weight: 59 kg - adjusted body wt for overwt  Estimated Energy Needs: 6765-2268 kcals (25-30 Kcal/Kg)  Justification: maintenance  Estimated Protein Needs: 70-88 grams protein (1.2-1.5 g pro/Kg)  Justification: preservation of lean body mass  Estimated Fluid Needs: 2813-1785 mL (1 mL/Kcal)  Justification: per provider pending fluid status    MALNUTRITION:  % Weight Loss: Does not meet criteria, intentional   % Intake: Does not meet criteria, intentional   Subcutaneous Fat Loss:  Upper arm region moderate depletion  Muscle Loss:  None observed - exam limited  Fluid Retention:  None noted    Malnutrition Diagnosis: Does not meet two of the above criteria diagnosing for malnutrition at this time    NUTRITION DIAGNOSIS:  Predicted suboptimal nutrient intake (calorie) related to previously cutting out snacks from diet with a partially intentional weight loss of 10% in 6 months.       NUTRITION INTERVENTIONS  Recommendations / Nutrition Prescription  Continue " diet per SLP  Add Boost strawberry BID at 10 am and 2 pm    Implementation  Nutrition education: Provided education on intake for weight maintenance. Discussed consuming 2-3 meals/day with Boost supplements as desired by patient   Medical Food Supplement: Boost supplements BID between meals    Nutrition Goals  Pt to consume % of 2 meals and 2 supplements daily.       MONITORING AND EVALUATION:  Progress towards goals will be monitored and evaluated per protocol and Practice Guidelines              Frida Cornell Dietitian

## 2018-05-08 NOTE — PROGRESS NOTES
" 05/08/18 1434   Quick Adds   Type of Visit Initial Occupational Therapy Evaluation   Living Environment   Lives With spouse   Living Arrangements house   Home Accessibility stairs to enter home;stairs within home   Transportation Available family or friend will provide   Self-Care   Dominant Hand right   Usual Activity Tolerance moderate   Current Activity Tolerance fair   Regular Exercise no   Equipment Currently Used at Home grab bar;raised toilet;walker, rolling  (4ww)   Functional Level Prior   Ambulation 1-->assistive equipment   Transferring 1-->assistive equipment   Toileting 1-->assistive equipment   Bathing 1-->assistive equipment   Dressing 0-->independent   Eating 0-->independent   Communication 0-->understands/communicates without difficulty   Swallowing 0-->swallows foods/liquids without difficulty   Cognition 0 - no cognition issues reported   Fall history within last six months yes   Number of times patient has fallen within last six months (\" a few\")   Prior Functional Level Comment Pt indep meal prep, meds, finances, spouse does laundry, daughter assists pt with cleaning tasks. Pt uses both a walk-in shower and tub/bath combo.    General Information   Onset of Illness/Injury or Date of Surgery - Date 05/07/18   Referring Physician Dr. Dhaval Kaufman   Patient/Family Goals Statement \"regain my strength\"   Additional Occupational Profile Info/Pertinent History of Current Problem 76yo female admitted with slurred speech, R facial droop, RUE mild hemiparesis, decresed cuity R eye and tele afib w/CVR. Work up for stroke (CT and MRI) negative for acute changes and pt status changed to observation. PMH includes CAD, CABG, stenting, CHF, HTN, PAD, Afib.   Precautions/Limitations fall precautions   Cognitive Status Examination   Orientation orientation to person, place and time   Level of Consciousness alert   Able to Follow Commands WNL/WFL   Personal Safety (Cognitive) WNL/WFL   Visual Perception   Visual " Perception No deficits were identified  (pt reports visual issue resolved)   Visual Perception Comments Will further assess   Pain Assessment   Patient Currently in Pain No   Range of Motion (ROM)   ROM Quick Adds No deficits were identified   Strength   Strength Comments R harsha 3/5, L 4/5   Hand Strength   Hand Strength Comments B  strength WFL and equal   Coordination   Coordination Comments No deficits noted with dominant affected R hand coordination. Able to feed herself, manipulate grooming tools and cell phone without difficulty.   Mobility   Bed Mobility Comments SBA with bed flat no rail   Transfer Skill: Bed to Chair/Chair to Bed   Level of [place]: Bed to Chair contact guard   Physical Assist/Nonphysical Assist: Bed to Chair 1 person assist   Assistive Device - Transfer Skill Bed to Chair Chair to Bed Rehab Eval rolling walker   Transfer Skill: Sit to Stand   Level of [place]: Sit/Stand contact guard   Physical Assist/Nonphysical Assist: Sit/Stand 1 person assist   Assistive Device for Transfer: Sit/Stand rolling walker   Transfer Skill: Toilet Transfer   Level of [place]: Toilet contact guard   Physical Assist/Nonphysical Assist: Toilet 1 person assist   Assistive Device rolling walker;grab bars   Toilet Transfer Skill Comments Toilet seat height here in hospital much lower than than at pt's home (family verified)   Balance   Balance Comments Amb in room with FWW CGA no LOB   Upper Body Dressing   Level of [place]: Dress Upper Body independent   Physical Assist/Nonphysical Assist: Dress Upper Body set-up required  (seated)   Lower Body Dressing   Level of [place]: Dress Lower Body minimum assist (75% patients effort)   Physical Assist/Nonphysical Assist: Dress Lower Body verbal cues  (A when standing to pull up clothing only, can don I seated)   Grooming   Level of [place]: Grooming independent   Physical Assist/Nonphysical Assist: Grooming set-up required  (seated EOB)  "  Eating/Self Feeding   Level of [place]: Eating independent   Physical Assist/Nonphysical Assist: Eating set-up required  (sitting up in bed)   Activities of Daily Living Analysis   Impairments Contributing to Impaired Activities of Daily Living balance impaired;strength decreased   General Therapy Interventions   Planned Therapy Interventions ADL retraining;IADL retraining;transfer training;strengthening;home program guidelines   Clinical Impression   Criteria for Skilled Therapeutic Interventions Met yes, treatment indicated   OT Diagnosis decreased ADL/IADL performance   Influenced by the following impairments fatigue, general weakness, RUE weakness, balance   Assessment of Occupational Performance 1-3 Performance Deficits   Identified Performance Deficits functional mobility, household mgmt   Clinical Decision Making (Complexity) Moderate complexity   Predicted Duration of Therapy Intervention (days/wks) 2 days   Anticipated Discharge Disposition Acute Rehabilitation Facility   Risks and Benefits of Treatment have been explained. Yes   Patient, Family & other staff in agreement with plan of care Yes   [facility] TM \"6 Clicks\"   2016, Trustees of [facility], under license to [employer]  All rights reserved.   6 Clicks Short Forms Daily Activity Inpatient Short Form   [facility]  \"6 Clicks\" Daily Activity Inpatient Short Form   1. Putting on and taking off regular lower body clothing? 3 - A Little   2. Bathing (including washing, rinsing, drying)? 3 - A Little   3. Toileting, which includes using toilet, bedpan or urinal? 4 - None   4. Putting on and taking off regular upper body clothing? 4 - None   5. Taking care of personal grooming such as brushing teeth? 4 - None   6. Eating meals? 4 - None   Daily Activity Raw Score (Score out of 24.Lower scores equate to lower levels of function) 22   Total Evaluation Time   Total Evaluation Time (Minutes) 15     "

## 2018-05-08 NOTE — PROGRESS NOTES
Care Coordination:    Noted recommendation and sent ARU referral to provide heads up, assess ability to admit to ARU.      Linda Rooney RN, BSN  [facility] Care Coordinator   Mobile Phone: [phone]

## 2018-05-08 NOTE — PROGRESS NOTES
"Neuroscience Intensive Care Progress Note    2018    24 hour events:  Neurologically stable, MRI this AM negative. TTE shows apical akinesis but known from previous echo.     24 Hour Vital Signs Summary:  Temperatures:  Current - Temp: 97.7  F (36.5  C); Max - Temp  Av.9  F (36.6  C)  Min: 97.3  F (36.3  C)  Max: 98.6  F (37  C)  Respiration range: Resp  Av.9  Min: 15  Max: 18  Pulse range: No Data Recorded  Blood pressure range: Systolic (24hrs), Av , Min:104 , Max:172   ; Diastolic (24hrs), Av, Min:58, Max:104    Pulse oximetry range: SpO2  Av.9 %  Min: 93 %  Max: 99 %    Ventilator Settings  Resp: 18      Intake/Output Summary (Last 24 hours) at 18 1433  Last data filed at 18 0800   Gross per 24 hour   Intake          1714.75 ml   Output              600 ml   Net          1114.75 ml       BP - Mean:  [109-118] 116    Current Medications:    aspirin  81 mg Oral At Bedtime     carvedilol  6.25 mg Oral BID w/meals     clopidogrel  75 mg Oral QPM     levothyroxine  112 mcg Oral Daily     omeprazole  40 mg Oral At Bedtime     rosuvastatin  20 mg Oral QPM       PRN Medications:  acetaminophen **OR** acetaminophen, carbidopa-levodopa, hydrALAZINE, HYDROmorphone, labetalol, - MEDICATION INSTRUCTIONS -, melatonin, metoprolol, naloxone, nitroGLYcerin, ondansetron **OR** ondansetron, polyethylene glycol, senna-docusate **OR** senna-docusate    Infusions:    - MEDICATION INSTRUCTIONS -         Allergies   Allergen Reactions     Ace Inhibitors Cough     Baclofen Other (See Comments)     Constipation , tiredness     Penicillins      10/7/14 Hives per patient -- many many years ago       Nickel Swelling and Rash     Other reaction(s): Unknown       Physical Examination:  /68 (BP Location: Right arm)  Temp 97.7  F (36.5  C) (Oral)  Resp 18  Ht 1.626 m (5' 4\")  Wt 73.6 kg (162 lb 4.8 oz)  SpO2 99%  BMI 27.86 kg/m2  MENTAL STATUS:  Alert, oriented x3.  Speech fluent with normal " naming, repetition, comprehension.  Good right-left orientation, body part naming, calculation skills. Pupils are equal, round, reactive to light.  Extraocular movements full - Right eye strabismus OLD.  Visual fields full.  Facial sensation still subjectively diminished on right side.  Palate moves symmetrically.  Tongue midline.  Sternocleidomastoid and trapezius strength intact.  Neck strength was normal.  No bruits.   NEUROLOGIC:  Motor 5/5.  Reflexes 2/4.  Toe signs downgoing.  Good finger-nose-finger, fine finger movement, heel-shin maneuver, sensation to light touch, position sense and double simultaneous stimulation.      Labs/Studies:  Recent Labs   Lab Test  05/08/18   1230  05/07/18   2138  05/07/18   1502   NA  142  143  141   POTASSIUM  3.5  3.8  3.9   CHLORIDE  110*  111*  108   CO2  28  23  29   ANIONGAP  4  9  4   GLC  97  113*  130*   BUN  11  12  10   CR  0.65  0.78  0.69   GALE  9.5  9.6  9.3   WBC  4.4  7.1  5.0   RBC  4.35  4.51  4.43   HGB  12.4  12.9  12.7   PLT  180  212  192       Recent Labs   Lab Test  05/07/18   1502  02/27/18   2223 01/08/18   07/13/10   0633   INR  1.00  1.06  1.0   < >  1.65*   PTT  30  30   --    --   37    < > = values in this interval not displayed.         No lab results found in last 7 days.        Imaging:  reviewed    Assessment/Plan  Ms Fletcher is a 77-year-old woman with past medical history as mentioned above with multiple vascular risk factors including CAD, PVD, atrial fibrillation who is currently on Plavix presented with right sided hemibody numbness.  CT head in the emergency room did not show any acute intracranial abnormalities.     Following admission her brain MRI doses not show evidence of stroke, also no significant stenosis of intra or extra cranial vessels noted. Her echo is remarkable for apical akinesis which has been noted before.    Her anticoagulation was discontinued in the past because she was thought to have increased risk of fall. However,  given her symptoms and presentation while she was in A fib in addition to known apical akinesis, she probably needs anticoagulation for future stroke prevention. I called her cardiologist Dr. Michael and discussed her case.     The risk and benefit of anticoagulation and Coumadin vs NOAC medications discussed with patient who agreed on starting NOAC.     Plan:  -Continue Plavix  -Discontinue Aspirin  -Start Apixaban 5 mg BID  - Can be discharged per PT/OT/Speech recommendation      Plan discussed with Dr. Bhargav Mares  Fellow

## 2018-05-08 NOTE — PLAN OF CARE
Problem: Stroke (Ischemic) (Adult)  Goal: Signs and Symptoms of Listed Potential Problems Will be Absent, Minimized or Managed (Stroke)  Signs and symptoms of listed potential problems will be absent, minimized or managed by discharge/transition of care (reference Stroke (Ischemic) (Adult) CPG).   A&O x4. Neuros intact except slight RUE weakness, tingling in R thump and pointer, R facial droop, numbness and SS.  Spastic restless BLE. VSS. Tele Afib with CVR. Mechanical soft diet. Voiding adequately. Up A1/gb/walker.Drequently complains of restless painless legs, PRN meds given with relief. MRI/MRA echo completed today. Discharge pending. Continue to monitor.

## 2018-05-08 NOTE — PROGRESS NOTES
05/08/18 1010   General Information   Onset Date 05/07/18   Start of Care Date 05/08/18   Referring Physician Dr. Kaufman    Patient Profile Review/OT: Additional Occupational Profile Info See Profile for full history and prior level of function   Patient/Family Goals Statement Patient wants to eat and drink.    Swallowing Evaluation Bedside swallow evaluation   Behaviorial Observations Alert   Mode of current nutrition NPO   Respiratory Status Room air   Comments Per neurology note:  Katherin Fletcher is a 77-year-old woman with past medical history of multiple vascular risk factors status post CABG ×2 also has peripheral vascular disease and currently is on Plavix, history of atrial fibrillation was on Coumadin which was discontinued by her cardiologist, presented to the emergency room for evaluation of right sided numbness.  The patient experienced sudden onset of right face arm and leg numbness started approximately 2 PM.  She was with her  that time.  No other focal neurological deficits were noticed.  On initial evaluation in the emergency room her NIH stroke scale was 1, she had CT scan of the head without any acute intracranial abnormalities.   Clinical Swallow Evaluation   Oral Musculature generally intact   Structural Abnormalities none present   Dentition upper dentures   Mucosal Quality dry   Mandibular Strength and Mobility intact   Oral Labial Strength and Mobility impaired retraction;impaired pursing   Lingual Strength and Mobility impaired protrusion  (Deviates to the right. Slow movement.)   Velar Elevation impaired   Buccal Strength and Mobility impaired   Laryngeal Function Cough;Throat clear;Swallow;Voicing initiated;Dry swallow palpated   Additional Documentation Yes   Swallow Eval   Feeding Assistance set up only required   Clinical Swallow Eval: Thin Liquid Texture Trial   Mode of Presentation, Thin Liquids cup;self-fed   Volume of Liquid or Food Presented 6 oz of water   Oral Phase of  Swallow Premature pharyngeal entry   Pharyngeal Phase of Swallow impaired;reduction in laryngeal movement;wet vocal quality after swallow   Diagnostic Statement Premature entry with intermittent slight vocal changes.    Clinical Swallow Eval: Nectar Thick Liquid Texture Trial   Mode of Presentation, Nectar cup;self-fed   Volume of Nectar Presented 2 oz of water   Oral Phase, Nectar Premature pharyngeal entry   Pharyngeal Phase, Nectar impaired;reduction in laryngeal movement   Diagnostic Statement No significant differance from thins.    Clinical Swallow Eval: Puree Solid Texture Trial   Mode of Presentation, Puree spoon;self-fed   Volume of Puree Presented 4 oz of apple sauce   Oral Phase, Puree Poor AP movement;Residue in oral cavity;Premature pharyngeal entry   Oral Residue, Puree right anterior lateral sulci   Pharyngeal Phase, Puree impaired;reduction in laryngeal movement;repeated swallows   Diagnostic Statement No overt Sx of aspiration.   Clinical Swallow Eval: Solid Food Texture Trial   Mode of Presentation, Solid self-fed   Volume of Solid Food Presented 1 mike cracker   Oral Phase, Solid Poor AP movement;Residue in oral cavity;Premature pharyngeal entry   Oral Residue, Solid mid posterior tongue;right anterior lateral sulci   Pharyngeal Phase, Solid impaired;reduction in laryngeal movement;repeated swallows   Diagnostic Statement Reduced mastication and mild oral residue that clears with an additional swallow. Oral phase deficits.    Swallow Compensations   Swallow Compensations Alternate viscosity of consistencies;Pacing;Reduce amounts;Multiple swallow   Results Suspect silent aspiration;Oral difficulties only   Esophageal Phase of Swallow   Patient reports or presents with symptoms of esophageal dysphagia Yes   Esophageal comments History of GERDS   General Therapy Interventions   Planned Therapy Interventions Dysphagia Treatment   Dysphagia treatment Modified diet education;Instruction of safe  swallow strategies   Swallow Eval: Clinical Impressions   Skilled Criteria for Therapy Intervention Skilled criteria met.  Treatment indicated.   Functional Assessment Scale (FAS) 4   Treatment Diagnosis Mild to moderate oral and pharyngeal dysphagia   Diet texture recommendations Thin liquids  (Mechanical dental)   Recommended Feeding/Eating Techniques alternate between small bites and sips of food/liquid;check mouth frequently for oral residue/pocketing;hard swallow w/ each bite or sip;maintain upright posture during/after eating for 30 mins;no straws;small sips/bites   Therapy Frequency 5 times/wk   Predicted Duration of Therapy Intervention (days/wks) 5 days   Anticipated Discharge Disposition home w/ home health   Risks and Benefits of Treatment have been explained. Yes   Patient, family and/or staff in agreement with Plan of Care Yes   Clinical Impression Comments Patient presents with mild to moderate oral and pharyngeal dysphagia at bedside secondary to TIA/CVA , MRI is pending. Mild to moderate ataxic/spastic dysarthria in conversation. She feels that her tongue still feels thick, but speech is nearing her baseline. Mild oral motor deficits with fairly good ROM but decreased speed and strength. She demonstrated premature entry of thin liquids and suspect possible penetration with audible swallows. No immediate Sx of aspiration via the cup with single swallows. Nectar thick liquids via the cup again were audible and similiar to thins. Pureed textures she had reduced bolus coordination and AP transport without overt Sx of aspiration. Mastication was slow and prolonged for a solid. Mild oral residue on the base of tongue and right lateral sulci. She was able to clear with an additional swallow or liquid rinse. Patient is at risk for aspiration and should be monitored intermittently with meals.      Total Evaluation Time   Total Evaluation Time (Minutes) 20

## 2018-05-08 NOTE — PLAN OF CARE
Problem: Patient Care Overview  Goal: Plan of Care/Patient Progress Review  OT eval completed on 76yo female admitted with onset R-sided weakness, R facial droop, slurred speech, thought to have TIA/CVA and undergoing work up.  ADDM: work up completed and no acute changes noted with CT or MRI and pt changed to observation status.      Discharge Planner OT   Patient plan for discharge: did not state, open to therapy recommendations  Current status: Pt A & O x 3, also to month, LYNDSEY, year, reason for hospitalization, recent events and able to give acccurate baseline info (verified by daughter and spouse who were present). Pt performed bed mob with bed flat no rail SBA, sit-stand and amb in room with FWW CGA and no LOB. She was able to get socks on indep seated EOB and get clothing over feet however required Min A when standing to pull up as unsteady on feet without use of UE support stumbling to R but did not fall. Performed toilet transfer using both hands on L grab bar with increased effort however her bathroom set-up at home more supportive as has higher toilet seat, and UE supports on both sides. Pt fatigued quickly unable to tolerate standing at sink for grooming tasks. She was able to perform grooming tasks with set up seated EOB and feed herself using dominant R hand without difficulty. Pt does have proximal weakness in RUE (3/5 strength) however distally WNL,  strong, coordination intact.   Barriers to return to prior living situation: stairs to bedroom, tub to step over (although does have walk-in shower avail), current level of general weakness/fatigue and pt does cooking and household tasks indep at baseline.   Recommendations for discharge: ARU  Rationale for recommendations: pt needs to be indep with stairs and daily tasks to return home, will benefit from continued therapies for strengthening to advance balance, mobility, ADL/IADL skills prior to return home. Anticipate pt will make quick progress to  be able to return home w/family assist. If does not qualify for ARU, recommend home health therapies and supv with ambulatory tasks.        Entered by: Ab Zamora 05/08/2018 3:06 PM

## 2018-05-08 NOTE — PLAN OF CARE
Problem: Patient Care Overview  Goal: Plan of Care/Patient Progress Review  Discharge Planner SLP   Patient plan for discharge: Patient did not state.  Current status: Bedside swallow evaluation completed. Patient presents with mild to moderate oral and pharyngeal dysphagia at bedside secondary to TIA/CVA , MRI is pending. Mild to moderate ataxic/spastic dysarthria in conversation. She feels that her tongue still feels thick, but speech is nearing her baseline. Mild oral motor deficits with fairly good ROM but decreased speed and strength. She demonstrated premature entry of thin liquids and suspect possible penetration with audible swallows. No immediate Sx of aspiration via the cup with single swallows. Nectar thick liquids via the cup again were audible and similiar to thins. Pureed textures she had reduced bolus coordination and AP transport without overt Sx of aspiration. Mastication was slow and prolonged for a solid. Mild oral residue on the base of tongue and right lateral sulci. She was able to clear with an additional swallow or liquid rinse. Patient is at risk for aspiration and should be monitored intermittently with meals. Recommend: 1. Mechanical/dental soft diet and thin liquids. 2. Up in chair for all meals, no straws, small bites/sips, double swallow and alternate liquids/solids every couple of bites. 3. SLP will f/u for diet tolerance and swallow strategies. Hold diet if overt Sx of aspiration present.   Barriers to return to prior living situation: Acuity of her illness.   Recommendations for discharge: Pending further work up.   Rationale for recommendations: Likely will meet swallow goals as and IP. If patient does not feel speech is back to her baseline may benefit from short term ST at the next level of care.        Entered by: Soni Davis 05/08/2018 11:11 AM

## 2018-05-08 NOTE — PLAN OF CARE
Problem: Patient Care Overview  Goal: Plan of Care/Patient Progress Review  Outcome: No Change  [id number] Patient A/Ox4. VSS on RA.. Pain managed with suppository Tylenol. Patient denies SOB, nausea, tingling. Neuo's slight droop, slurred speech, right cheek and chin numbness, rt blurred vision, RUE thumb and pointer finger numbness, BLE baseline spastic/restless/painfull/ataxia. Up with 1 -2 + walker + gait belt. NPO. Plan MRI if able to get legs to settle down, SLP/PT/OT/Neuro.

## 2018-05-08 NOTE — PROGRESS NOTES
Cross Cover:    Paged regarding Tylenol suppository.  -Order placed.      Domingo Suarez PA-C  [phone]

## 2018-05-08 NOTE — PROGRESS NOTES
[facility]    Hospitalist Progress Note    Assessment & Plan    Ms. Katherin Fletcher is a 77-year-old female patient with history including hypertension, coronary artery disease, CHF, atrial fibrillation, peripheral arterial disease and restless leg syndrome who presents with right-sided numbness, weakness as well as speech impairment.     1.  Suspected TIA/stroke.      Head CT was negative, but CT head with contrast and CT angiogram of head and neck unable to be performed given inability to obtain appropriate access.  The patient was seen by Neurology and not felt that she would be a TPA or neurointerventional candidate regardless.   -MRI and MRA imaging -no cerebrovascular accident    -Continue aspirin and Plavix, but given known history of atrial fibrillation, she may benefit from being on anticoagulation.neurology discussing with cardiology   -continue therapies, notes noted   -disposition ?tcu      2. Hypertension  -continue Coreg but hold with hold parameters  -continue  p.r.n. IV medications including hydralazine and labetalol per TIA/stroke protocol.      3.  Coronary artery disease with congestive heart failure.   - Known history of coronary artery disease with prior stents and coronary artery bypass graft as well as history of congestive heart failure.   - We will continue aspirin, Plavix, Coreg and rosuvastatin, but hold isosorbide mononitrate for now.  Continue p.r.n. Nitroglycerin.  -repeat echocardiogram noted, no new changes      4.  Atrial fibrillation history.    -Currently appears to be in sinus rhythm.  Continue aspirin, Plavix and Coreg.  Consider anticoagulation as above and await Neurology recommendations.       5.  Peripheral vascular disease.  Continue aspirin, Plavix.     6.  Restless leg syndrome.  Continue carbidopa-levodopa.     7.  GERD.  Continue omeprazole.      8.  Hypothyroidism.  Continue levothyroxine.    DVT Prophylaxis:scd   Code Status: Full Code     Disposition:  Expected discharge in 24 hours     Suze Hussein MD  Text Page   (7am to 6pm)    Interval History   Patient  back from mri , she has numbness on her fingers on right hand, she feels that her speech is still not baseline.     -Data reviewed today: I reviewed all new labs and imaging results over the last 24 hours.reviewed mri results   Physical Exam   Temp: 97.7  F (36.5  C) Temp src: Oral BP: 136/68   Heart Rate: 81 Resp: 18 SpO2: 99 % O2 Device: None (Room air)    Vitals:    05/07/18 1454 05/08/18 0501   Weight: 74.4 kg (164 lb) 73.6 kg (162 lb 4.8 oz)     Vital Signs with Ranges  Temp:  [97.3  F (36.3  C)-98.6  F (37  C)] 97.7  F (36.5  C)  Heart Rate:  [] 81  Resp:  [15-18] 18  BP: (104-169)/() 136/68  SpO2:  [93 %-99 %] 99 %  I/O last 3 completed shifts:  In: 1714.75 [I.V.:1714.75]  Out: 600 [Urine:600]    Constitutional: Awake, still has slurred speech   Respiratory: Clear to auscultation bilaterally, no crackles or wheezing  Cardiovascular: Regular rate and rhythm, normal S1 and S2, and no murmur noted  GI: Normal bowel sounds, soft, non-distended, non-tender  Skin/Integumen: No rashes, no cyanosis, no edema  Neuro : right sided weakness+/numbness    Medications     - MEDICATION INSTRUCTIONS -         aspirin  81 mg Oral At Bedtime     carvedilol  6.25 mg Oral BID w/meals     clopidogrel  75 mg Oral QPM     levothyroxine  112 mcg Oral Daily     omeprazole  40 mg Oral At Bedtime     rosuvastatin  20 mg Oral QPM       Data     Recent Labs  Lab 05/08/18  1230 05/07/18  2138 05/07/18  1502   WBC 4.4 7.1 5.0   HGB 12.4 12.9 12.7   MCV 86 85 85    212 192   INR  --   --  1.00    143 141   POTASSIUM 3.5 3.8 3.9   CHLORIDE 110* 111* 108   CO2 28 23 29   BUN 11 12 10   CR 0.65 0.78 0.69   ANIONGAP 4 9 4   GALE 9.5 9.6 9.3   GLC 97 113* 130*   ALBUMIN  --  3.7  --    PROTTOTAL  --  7.4  --    BILITOTAL  --  0.7  --    ALKPHOS  --  74  --    ALT  --  8  --    AST  --  23  --        Recent  Labs  Lab 05/08/18  1230 05/07/18  2138 05/07/18  1502   GLC 97 113* 130*       Imaging:   Recent Results (from the past 24 hour(s))   CT Head w/o Contrast    Narrative    CT SCAN OF THE HEAD WITHOUT CONTRAST   5/7/2018 3:15 PM     HISTORY: Stroke. Right facial droop and slurred speech began at 1400  hours.    TECHNIQUE:  Axial images of the head and coronal reformations without  IV contrast material. Radiation dose for this scan was reduced using  automated exposure control, adjustment of the mA and/or kV according  to patient size, or iterative reconstruction technique.    COMPARISON: 2/27/2018    FINDINGS:  There is generalized atrophy of the brain.  There is low  attenuation in the white matter of the cerebral hemispheres consistent  with sequelae of small vessel ischemic disease. There is no evidence  of intracranial hemorrhage, mass, acute infarct or anomaly.     There are bilateral intraocular lens implants. There is no evidence of  trauma.       Impression    IMPRESSION:   1. No acute abnormality.  2. Atrophy of the brain.  White matter changes consistent with  sequelae of small vessel ischemic disease. This is unchanged.    CAYDEN JEAN MD   MR Head w/o Contrast Angiogram    Narrative    MR ANGIOGRAM OF THE HEAD WITHOUT CONTRAST   5/8/2018 8:43 AM     COMPARISON: None.    HISTORY:  Right sided symptoms, numbness/weakness.     TECHNIQUE:  3D time-of-flight MR angiogram of the head without  contrast. MIP reconstruction of all MR angiographic data was  performed.    FINDINGS:  The P1 segment of the left posterior cerebral artery is not  visualized and is likely hypoplastic or congenitally absent. The left  posterior cerebral artery is supplied by the patent left posterior  communicating artery. The bilateral distal internal carotid, basilar,  bilateral anterior cerebral, bilateral middle cerebral and bilateral  posterior cerebral arteries are patent and unremarkable with no  evidence for cerebral artery  stenosis or aneurysm. The posterior  communicating artery on the left is patent and unremarkable.       Impression    IMPRESSION:  Normal MR angiogram of the head.      AMIRA ZAMARRIPA MD   MRA Neck w/o & w Contrast Angiogram    Narrative    MRA NECK WITHOUT AND WITH CONTRAST  5/8/2018 9:20 AM     COMPARISON: None.    HISTORY:  Right-sided symptoms, numbness/weakness.    TECHNIQUE: 2D time-of-flight MR angiogram of the neck without contrast  and 3D MR angiogram of the neck with 10 mL Gadavist gadolinium IV  contrast.  MIP reconstruction of all MR angiographic data was  performed. Estimates of carotid stenoses are made relative to the  distal internal carotid artery diameters except as noted.    FINDINGS:    Carotids: The common carotid arteries bilaterally are widely patent.  The cervical internal carotid arteries bilaterally are patent without  stenosis.    Vertebrals: The vertebral arteries bilaterally are patent without  stenosis and demonstrate antegrade flow.    Aortic arch: The arteries as they arise from the aortic arch are  normally arranged with no evidence for stenosis.      Impression    IMPRESSION:  Normal MR angiogram of the neck.     AMIRA ZAMARRIPA MD   MR Brain w/o & w Contrast    Narrative    MRI OF THE BRAIN WITHOUT CONTRAST 5/8/2018 9:20 AM     COMPARISON: Head CT 5/7/2018.    HISTORY: Right-sided symptoms, numbness/weakness.      TECHNIQUE:   Brain: Axial diffusion-weighted with ADC map, T2-weighted with fat  saturation, T1-weighted and turboFLAIR and coronal T1-weighted images  of the brain were obtained without intravenous contrast.     FINDINGS: There is minimal diffuse cerebral volume loss. There are a  few tiny scattered focal areas of abnormal T2 signal hyperintensity in  the cerebral white matter bilaterally that are consistent with sequela  of chronic small vessel ischemic disease.    The ventricles and basal cisterns are within normal limits in  configuration given the degree of cerebral  volume loss. There is no  midline shift. There are no extra-axial fluid collections. There is no  evidence for stroke or acute intracranial hemorrhage.    There is no sinusitis or mastoiditis.      Impression    IMPRESSION: Diffuse cerebral volume loss and cerebral white matter  changes consistent with chronic small vessel ischemic disease. No  evidence for acute intracranial pathology.    AMIRA ZAMARRIPA MD

## 2018-05-09 ENCOUNTER — APPOINTMENT (OUTPATIENT)
Dept: OCCUPATIONAL THERAPY | Facility: CLINIC | Age: 78
End: 2018-05-09
Payer: MEDICARE

## 2018-05-09 ENCOUNTER — APPOINTMENT (OUTPATIENT)
Dept: PHYSICAL THERAPY | Facility: CLINIC | Age: 78
End: 2018-05-09
Payer: MEDICARE

## 2018-05-09 ENCOUNTER — APPOINTMENT (OUTPATIENT)
Dept: SPEECH THERAPY | Facility: CLINIC | Age: 78
End: 2018-05-09
Payer: MEDICARE

## 2018-05-09 PROCEDURE — A9270 NON-COVERED ITEM OR SERVICE: HCPCS | Mod: GY | Performed by: INTERNAL MEDICINE

## 2018-05-09 PROCEDURE — 25000132 ZZH RX MED GY IP 250 OP 250 PS 637: Mod: GY | Performed by: INTERNAL MEDICINE

## 2018-05-09 PROCEDURE — 40000225 ZZH STATISTIC SLP WARD VISIT: Performed by: SPEECH-LANGUAGE PATHOLOGIST

## 2018-05-09 PROCEDURE — 25000132 ZZH RX MED GY IP 250 OP 250 PS 637: Mod: GY | Performed by: PHYSICIAN ASSISTANT

## 2018-05-09 PROCEDURE — A9270 NON-COVERED ITEM OR SERVICE: HCPCS | Mod: GY | Performed by: PHYSICIAN ASSISTANT

## 2018-05-09 PROCEDURE — G0378 HOSPITAL OBSERVATION PER HR: HCPCS

## 2018-05-09 PROCEDURE — 99207 ZZC CDG-CODE CATEGORY CHANGED: CPT | Performed by: INTERNAL MEDICINE

## 2018-05-09 PROCEDURE — 97535 SELF CARE MNGMENT TRAINING: CPT | Mod: GO,XU | Performed by: OCCUPATIONAL THERAPY ASSISTANT

## 2018-05-09 PROCEDURE — 40000133 ZZH STATISTIC OT WARD VISIT: Performed by: OCCUPATIONAL THERAPY ASSISTANT

## 2018-05-09 PROCEDURE — 97530 THERAPEUTIC ACTIVITIES: CPT | Mod: GP | Performed by: PHYSICAL THERAPIST

## 2018-05-09 PROCEDURE — 25000132 ZZH RX MED GY IP 250 OP 250 PS 637: Mod: GY | Performed by: STUDENT IN AN ORGANIZED HEALTH CARE EDUCATION/TRAINING PROGRAM

## 2018-05-09 PROCEDURE — 92526 ORAL FUNCTION THERAPY: CPT | Mod: GN | Performed by: SPEECH-LANGUAGE PATHOLOGIST

## 2018-05-09 PROCEDURE — 97116 GAIT TRAINING THERAPY: CPT | Mod: GP | Performed by: PHYSICAL THERAPIST

## 2018-05-09 PROCEDURE — 99225 ZZC SUBSEQUENT OBSERVATION CARE,LEVEL II: CPT | Performed by: INTERNAL MEDICINE

## 2018-05-09 PROCEDURE — A9270 NON-COVERED ITEM OR SERVICE: HCPCS | Mod: GY | Performed by: STUDENT IN AN ORGANIZED HEALTH CARE EDUCATION/TRAINING PROGRAM

## 2018-05-09 PROCEDURE — 97530 THERAPEUTIC ACTIVITIES: CPT | Mod: GO | Performed by: OCCUPATIONAL THERAPY ASSISTANT

## 2018-05-09 PROCEDURE — 40000193 ZZH STATISTIC PT WARD VISIT: Performed by: PHYSICAL THERAPIST

## 2018-05-09 RX ORDER — OXYCODONE HYDROCHLORIDE 5 MG/1
5 TABLET ORAL EVERY 4 HOURS PRN
Status: DISCONTINUED | OUTPATIENT
Start: 2018-05-09 | End: 2018-05-10 | Stop reason: HOSPADM

## 2018-05-09 RX ADMIN — CLOPIDOGREL 75 MG: 75 TABLET, FILM COATED ORAL at 19:11

## 2018-05-09 RX ADMIN — CARBIDOPA AND LEVODOPA 2 TABLET: 25; 100 TABLET, ORALLY DISINTEGRATING ORAL at 11:46

## 2018-05-09 RX ADMIN — OMEPRAZOLE 40 MG: 20 CAPSULE, DELAYED RELEASE ORAL at 22:27

## 2018-05-09 RX ADMIN — APIXABAN 5 MG: 5 TABLET, FILM COATED ORAL at 22:27

## 2018-05-09 RX ADMIN — CARVEDILOL 6.25 MG: 6.25 TABLET, FILM COATED ORAL at 08:27

## 2018-05-09 RX ADMIN — ACETAMINOPHEN 650 MG: 325 TABLET ORAL at 19:11

## 2018-05-09 RX ADMIN — APIXABAN 5 MG: 5 TABLET, FILM COATED ORAL at 08:27

## 2018-05-09 RX ADMIN — CARBIDOPA AND LEVODOPA 2 TABLET: 25; 100 TABLET, ORALLY DISINTEGRATING ORAL at 05:22

## 2018-05-09 RX ADMIN — CARVEDILOL 6.25 MG: 6.25 TABLET, FILM COATED ORAL at 18:22

## 2018-05-09 RX ADMIN — LEVOTHYROXINE SODIUM 112 MCG: 112 TABLET ORAL at 08:27

## 2018-05-09 RX ADMIN — ACETAMINOPHEN 650 MG: 325 TABLET ORAL at 22:27

## 2018-05-09 RX ADMIN — ROSUVASTATIN CALCIUM 20 MG: 20 TABLET, FILM COATED ORAL at 19:11

## 2018-05-09 RX ADMIN — ACETAMINOPHEN 650 MG: 325 TABLET ORAL at 12:52

## 2018-05-09 RX ADMIN — CARBIDOPA AND LEVODOPA 2 TABLET: 25; 100 TABLET, ORALLY DISINTEGRATING ORAL at 19:11

## 2018-05-09 RX ADMIN — OXYCODONE HYDROCHLORIDE 5 MG: 5 TABLET ORAL at 14:14

## 2018-05-09 ASSESSMENT — VISUAL ACUITY
OU: BLURRED VISION

## 2018-05-09 NOTE — PROGRESS NOTES
[facility]    Hospitalist Progress Note    Assessment & Plan    Ms. Katherin Fletcher is a 77-year-old female patient with history including hypertension, coronary artery disease, CHF, atrial fibrillation, peripheral arterial disease and restless leg syndrome who presents with right-sided numbness, weakness as well as speech impairment.     1.  Suspected TIA/stroke.      Head CT was negative, but CT head with contrast and CT angiogram of head and neck unable to be performed given inability to obtain appropriate access.  The patient was seen by Neurology and not felt that she would be a TPA or neurointerventional candidate regardless.   -MRI and MRA imaging -no cerebrovascular accident    -Continue  Plavix, but given known history of atrial fibrillation, she was started on apixaban and aspirin stopped   -continue therapies, notes noted   -disposition ?tcu vs aru     2. Hypertension  -continue Coreg but hold with hold parameters  -continue  p.r.n. IV medications including hydralazine and labetalol per TIA/stroke protocol.      3.  Coronary artery disease with congestive heart failure.   - Known history of coronary artery disease with prior stents and coronary artery bypass graft as well as history of congestive heart failure.   - We will continue  Plavix, Coreg and rosuvastatin, but hold isosorbide mononitrate for now.  Continue p.r.n. Nitroglycerin.  -repeat echocardiogram noted, no new changes   -aspirin stopped and apixaban started 5/8     4.  Atrial fibrillation history.    -Currently appears to be in sinus rhythm.  Continue  Plavix and Coreg.   -started on apixaban   -aspirin stopped 5/8     5.  Peripheral vascular disease.  Continue aspirin, Plavix.     6.  Restless leg syndrome.  Continue carbidopa-levodopa.  Uncontrolled , continue prn pain medications      7.  GERD.  Continue omeprazole.      8.  Hypothyroidism.  Continue levothyroxine.    DVT Prophylaxis:scd   Code Status: Full Code      Disposition: Expected discharge in 24 hours   Discussed with  Social work  And patient  , pending aru input regarding admission.  Suze Hussein MD  Text Page   (7am to 6pm)    Interval History   Complaints of  bilateral lower extremity pain, tolerating diet well, physical therapy  And occupational therapy  discussed acute rehab, she lives at home with her 89 yo , currently ambulating with a walker.didnt sleep well due to pain.    -Data reviewed today: I reviewed all new labs and imaging results over the last 24 hours.reviewed mri results   Physical Exam   Temp: 98  F (36.7  C) Temp src: Oral BP: 109/59   Heart Rate: 60 Resp: 18 SpO2: 94 % O2 Device: None (Room air)    Vitals:    05/07/18 1454 05/08/18 0501   Weight: 74.4 kg (164 lb) 73.6 kg (162 lb 4.8 oz)     Vital Signs with Ranges  Temp:  [97.4  F (36.3  C)-98.3  F (36.8  C)] 98  F (36.7  C)  Heart Rate:  [55-81] 60  Resp:  [18-20] 18  BP: (105-137)/(59-75) 109/59  SpO2:  [94 %-99 %] 94 %  I/O last 3 completed shifts:  In: 360 [P.O.:360]  Out: 450 [Urine:450]    Constitutional: Awake,speech better   Respiratory: Clear to auscultation bilaterally, no crackles or wheezing  Cardiovascular: Regular rate and rhythm, normal S1 and S2, and no murmur noted  GI: Normal bowel sounds, soft, non-distended, non-tender  Skin/Integumen: No rashes, no cyanosis, no edema  Neuro : right sided weakness+/numbness    Medications     - MEDICATION INSTRUCTIONS -         apixaban ANTICOAGULANT  5 mg Oral BID     carvedilol  6.25 mg Oral BID w/meals     clopidogrel  75 mg Oral QPM     levothyroxine  112 mcg Oral Daily     omeprazole  40 mg Oral At Bedtime     rosuvastatin  20 mg Oral QPM       Data     Recent Labs  Lab 05/08/18  1230 05/07/18  2138 05/07/18  1502   WBC 4.4 7.1 5.0   HGB 12.4 12.9 12.7   MCV 86 85 85    212 192   INR  --   --  1.00    143 141   POTASSIUM 3.5 3.8 3.9   CHLORIDE 110* 111* 108   CO2 28 23 29   BUN 11 12 10   CR 0.65 0.78 0.69    ANIONGAP 4 9 4   GALE 9.5 9.6 9.3   GLC 97 113* 130*   ALBUMIN  --  3.7  --    PROTTOTAL  --  7.4  --    BILITOTAL  --  0.7  --    ALKPHOS  --  74  --    ALT  --  8  --    AST  --  23  --        Recent Labs  Lab 05/08/18  1230 05/07/18  2138 05/07/18  1502   GLC 97 113* 130*       Imaging:   No results found for this or any previous visit (from the past 24 hour(s)).

## 2018-05-09 NOTE — PLAN OF CARE
Problem: Patient Care Overview  Goal: Plan of Care/Patient Progress Review  Outcome: Improving  A&Ox4. Neuros: R facial numbness, slight L droop (baseline?), blurred vision back to baseline, BLE spasticity improved w/ PRN carbidopa/levidopa. VSS. Tele NSR. [facility] soft diet. Up with A1 w/ GB and walker. Denies pain. Plan possible D/C to TCU.

## 2018-05-09 NOTE — PROGRESS NOTES
05/08/18 1235   Quick Adds   Type of Visit Initial PT Evaluation   Living Environment   Lives With spouse   Living Arrangements house   Home Accessibility stairs to enter home;stairs within home  (ramp present)   Number of Stairs Within Home 14   Living Environment Comment ramp present to enter, pending chair lift install   Self-Care   Usual Activity Tolerance moderate   Current Activity Tolerance fair   Regular Exercise no   Equipment Currently Used at Home grab bar;walker, rolling   Functional Level Prior   Ambulation 1-->assistive equipment   Transferring 1-->assistive equipment   Toileting 1-->assistive equipment   Bathing 1-->assistive equipment   Dressing 0-->independent   Eating 0-->independent   Communication 0-->understands/communicates without difficulty   Swallowing 0-->swallows foods/liquids without difficulty   Cognition 0 - no cognition issues reported   Fall history within last six months yes   Number of times patient has fallen within last six months 4   Which of the above functional risks had a recent onset or change? ambulation   General Information   Onset of Illness/Injury or Date of Surgery - Date 05/07/18   Referring Physician Dhaval Kaufman MD   Patient/Family Goals Statement open to recommendations   Pertinent History of Current Problem (include personal factors and/or comorbidities that impact the POC) Admited with R side weakness, slurred speech, facial droop   Precautions/Limitations fall precautions   Cognitive Status Examination   Orientation orientation to person, place and time   Level of Consciousness alert   Follows Commands and Answers Questions able to follow single-step instructions;100% of the time   Personal Safety and Judgment at risk behaviors demonstrated   Memory intact   Posture    Posture Kyphosis;Forward head position   Posture Comments soft cervical collar worn when upright   Range of Motion (ROM)   ROM Comment L ankle to neutral, Bilat tight HS, dec cervical  "extension   Strength   Strength Comments fair cervical extension strength, 3-/5 R HF, 3+/5 L HF, remaining LE strength -4/5   Bed Mobility   Bed Mobility Comments min A   Transfer Skills   Transfer Comments min A w/ WW from bedside   Gait   Gait Comments min A w/ WW ataxic at baseline but increased per patient, R side deviation with assist to correct, dec step length and unequal foot clearance   Balance   Balance Comments impaired at baseline, 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   General Therapy Interventions   Planned Therapy Interventions balance training;bed mobility training;gait training;strengthening;transfer training   Clinical Impression   Criteria for Skilled Therapeutic Intervention yes, treatment indicated   PT Diagnosis difficulty walking   Influenced by the following impairments impaired gait, decreased indep w/ transfes   Functional limitations due to impairments weakness, balance   Clinical Presentation Evolving/Changing   Clinical Presentation Rationale clinical observation   Clinical Decision Making (Complexity) Moderate complexity   Therapy Frequency` daily   Predicted Duration of Therapy Intervention (days/wks) 3 days   Anticipated Discharge Disposition Transitional Care Facility;Acute Rehabilitation Facility;Home with Assist   Risk & Benefits of therapy have been explained Yes   Patient, Family & other staff in agreement with plan of care Yes   [facility] [employer] \"6 Clicks\"   2016, Trustees of [facility], under license to [employer].  All rights reserved.   6 Clicks Short Forms Basic Mobility Inpatient Short Form   [facility] [employer]  \"6 Clicks\" V.2 Basic Mobility Inpatient Short Form   1. Turning from your back to your side while in a flat bed without using bedrails? 3 - A Little   2. Moving from lying on your back to sitting on the side of a flat bed without using bedrails? 3 - A Little   3. Moving to and from a bed to a chair (including a wheelchair)? 3 - A " Little   4. Standing up from a chair using your arms (e.g., wheelchair, or bedside chair)? 3 - A Little   5. To walk in hospital room? 3 - A Little   6. Climbing 3-5 steps with a railing? 2 - A Lot   Basic Mobility Raw Score (Score out of 24.Lower scores equate to lower levels of function) 17   Total Evaluation Time   Total Evaluation Time (Minutes) 15

## 2018-05-09 NOTE — PLAN OF CARE
Problem: Patient Care Overview  Goal: Plan of Care/Patient Progress Review  Discharge Planner SLP   Patient plan for discharge: Patient would like to go home.   Current status:  Patient seen for brief swallow treatment session secondary to pain. Patient's speech noted to have improved from yesterday. She was willing to have a small snack. Thin liquids there was premature spillage but improved from yesterday. No overt Sx of aspiration via the cup. She was able to tolerate pureed textures without difficulty. Improvement noted in her mastication of a solid with minimal oral residue. No overt Sx of aspiration with small amount consumed. Recommend: 1. Continue on the mechanical/dental soft with thin liquids. 2. Up in a chair for all meals, small bites/sips, no straws, alternate liquid/solids. 3. SLP will f/u for diet tolerance across a meal and advancement as indicated.   Barriers to return to prior living situation: Safety  Recommendations for discharge: ARC or TCU patient would like to go home with home care.   Rationale for recommendations: Patient May need short term ST at discharge if swallow goals not met and complete a speech language evaluation as indicated if not back to her baseline.        Entered by: Soni Davis 05/09/2018 2:35 PM

## 2018-05-09 NOTE — PROGRESS NOTES
Care Coordination:    Noted NOAC (Apixaban 5mg BID) has been discussed with patient.      Left VM for discharge pharmacy liaison to check if this is covered w/pt's insurance.   + did not receive callback as of 1200  + called d/c pharmacy directly [phone] to request benefit check.    ADDENDUM 1:   Discharge pharmacy called back; copay for Apixaban is $28/mo for this patient.   Also, ARU called back; pt does not qualify for ARU.     ADDENDUM 2:   Scheduled followups for this patient, and entered on AVS.     Followup with Neurology in 4 weeks (clarified followup timing with Ayala Steele NP):   Scheduled for **May 30th at 11:30am with Dr. Pham**   [facility], [phone]   [address] [place] [address], [address], [place] [address]     Followup with PCP within 1 week:   May 16, 2018 11:15 AM CDT   Office Visit with Halina Corrales MD   [facility]   + Dr. Del Real is booked solid, so it was necessary to schedule you with his new partner, Dr. Corrales, for hospital followup.     SW sent [facility] referral; I entered contact information on AVS:   Your doctor has ordered home care to help you after your hospital stay.  They will contact you regarding your first visit.  This service will be provided by [facility].  If you have not received a call within 48 hours of discharge, please call them at [phone].    Linda Rooney RN, BSN  [facility] Care Coordinator   Mobile Phone: [phone]

## 2018-05-09 NOTE — PROGRESS NOTES
Called about bilateral lower extremity pain, there is no swelling, appears neuropathic, patient  Already on anticoagulation ,so no dopplers done for lower extremity , pain medications adjusted.

## 2018-05-09 NOTE — PLAN OF CARE
Problem: Patient Care Overview  Goal: Plan of Care/Patient Progress Review  Discharge Planner OT   Patient plan for discharge: open to therapy  Current status: Pt sitting up at EOB upon OT arrival, pt completed sit to stand CGA, amb with FWW Simi to/from bathroom, toilet transfer with grab bar Simi, CGA standing at sink for hygiene task, pt amb down to tub room, increase fatigue with distance and required increase seated rest prior to completing tub transfer, tub transfer with grab bars and Simi. CGA to complete transfer to sit up in chair.   Barriers to return to prior living situation: stairs to bedroom, tub to step over (although does have walk-in shower avail), current level of general weakness/fatigue and pt does cooking and household tasks indep at baseline  Recommendations for discharge: ARU per plan established by the Occupational Therapist  Rationale for recommendations: pt needs to be indep with stairs and daily tasks to return home, will benefit from continued therapies for strengthening to advance balance, mobility, ADL/IADL skills prior to return home. Anticipate pt will make quick progress to be able to return home w/family assist. If does not qualify for ARU, recommend home health therapies and supv with ambulatory tasks.        Entered by: Sharita Peralta 05/09/2018 11:43 AM

## 2018-05-09 NOTE — PROVIDER NOTIFICATION
"Text page sent to hospitalist, \"FYI patient having increase pain/restlessness in BLE. Gave PRN Tylenol and Parcopa. Any other options?\" Awaiting call back/orders    Addendeum 13:54 second page:\" FYI patient complains of very painful Bilat legs/tearful very restless. PRN meds given. Any other options or orders?\"    Addendum: 13:58 MD returned call. Placed orders.  "

## 2018-05-09 NOTE — PLAN OF CARE
Problem: Patient Care Overview  Goal: Plan of Care/Patient Progress Review  Outcome: No Change  A&O x4. Slight left facial droop, dysarthria, blurred vision in right eye, numbness on right side of face, RUE weakness, numbness in first and second digits of right hand, intermittent spastic/ataxic BLE, otherwise Neuros intact. VSS. Tele A-fib CVR. Mechanical/dental soft diet with thin liquids. Up with one, gait belt and walker. Patient received two doses of carbidopa-levodopa for BLE spasticity with some relief, walking also provides some relief. Denies pain. Plan for nursing to continue to monitor, discharge pending.

## 2018-05-09 NOTE — PROGRESS NOTES
Care Transition Initial Assessment -   Reason For Consult: discharge planning  Met with: PATIENT    Active Problems:    Stroke (H)    Slurred speech       DATA  Lives With: spouse  Living Arrangements: house  Description of Support System: Supportive, Involved  Who is your support system?: , Children  Support Assessment: Adequate family and caregiver support, Adequate social supports.   Identified issues/concerns regarding health management: Need for increased services at time of discharge.     Transportation Available: family or friend will provide    ASSESSMENT  Cognitive Status:  Awake, alert, oriented  Concerns to be addressed: Discharge planning.    SW reviewed chart and met with patient to discuss discharge planning.  Patient was admitted as Observation 5/7/18 with Stroke/Slurred Speech.  Anticipated discharge date: 5/9/18.  SW introduced self and role.  Patient confirmed she resides in her 2 story [place] home with her spouse, Nicholas.  Patient stated their daughter comes once per week to clean and do laundry.  Patient reports she still does the cooking.  We discussed current therapy recommendation for ARU, however, patient may not be a candidate.  As patient does have Medicare and [employer] [facility], SW explained we could reach out to [facility] contracted TCU's to see if they will pay for a short stay.  Patient stated she prefers to go home with Home Care.  Patient reports her spouse, who is 92, can help with walking, but not able to assist with transfers. Therapist entered the room during SW visit, and is aware of patient's preference to d/c home w/Home Care.  SW told patient we will review Physical Therapy notes after this morning's session and proceed accordingly. Patient agreed with plan.  SW will continue to follow and assist as needed.     PLAN  Financial costs for the patient includes: Possible private pay TCU .  Patient given options and choices for discharge:  Yes .  Patient/family is agreeable  "to the plan?  YES  Patient Goals and Preferences: Discharge to home with Home Care .  Patient anticipates discharging to:  Home .    Continue to assist with discharge planning as needed to ensure a safe discharge.    SANDOVAL Lu    UPDATE@0217: RANJEET reviewed current PT/OT notes and met with patient once again. Patient confirms she wishes to discharge to home with Home Care.  Spouse was present in room and confirmed the plan, stating \"patient makes her own decisions\" and also stated \"I cannot afford private pay TCU\".  RANJEET did tell spouse the VA may possibly waive the Medicare stay (pt is in Observation), however, again patient stated she wishes to go home with Home Care, choosing [facility].  RANJEET update staff who will alert physician for final approval.  Daughter was contacted by patient, stating she can transport patient home at  1700 (5pm).    UPDATE@0937:  RANJEET updated patient will not discharge until tomorrow, per physician and RN staff. Patient contacted daughter.  Will continue to follow and send HC referral at time of d/c.   "

## 2018-05-09 NOTE — PLAN OF CARE
Problem: Patient Care Overview  Goal: Plan of Care/Patient Progress Review  Discharge Planner PT   Patient plan for discharge: Pt wanting to return home with assist of   Current status: Pt completes sit<>stand with CGA if chair is stabilized or Min Ax1 if chair not stabilized as it tends to scoot back on her with pt unable to lean fwd enough for good stand. CGA amb for 150' with FWW, fatigue and a couple minor balance deficits noted. Pt has hx of frequent falls. Reports leg spasms that cause her to collapse at times at home.    Barriers to return to prior living situation: Some dependence with mobility, stairs to enter, availability of assistance at home  Recommendations for discharge: TCU, if pt unable/unwilling to use TCU would recommend home with assist of spouse and home care PT   Rationale for recommendations: Pt was previously Mod I with mobility. Currently demonstrates some dependence d/t decreased stability with mobility and could benefit from TCU to continuing addressing deficits for safe return home. If this is not a possibility for this pt, pt would benefit from 24 hr assist at home with home care PT.        Entered by: Radha Vanessa 05/09/2018 11:43 AM

## 2018-05-10 ENCOUNTER — APPOINTMENT (OUTPATIENT)
Dept: SPEECH THERAPY | Facility: CLINIC | Age: 78
End: 2018-05-10
Payer: MEDICARE

## 2018-05-10 ENCOUNTER — APPOINTMENT (OUTPATIENT)
Dept: OCCUPATIONAL THERAPY | Facility: CLINIC | Age: 78
End: 2018-05-10
Payer: MEDICARE

## 2018-05-10 VITALS
SYSTOLIC BLOOD PRESSURE: 127 MMHG | WEIGHT: 156.6 LBS | BODY MASS INDEX: 26.73 KG/M2 | DIASTOLIC BLOOD PRESSURE: 62 MMHG | HEIGHT: 64 IN | OXYGEN SATURATION: 98 % | TEMPERATURE: 98.1 F | RESPIRATION RATE: 17 BRPM

## 2018-05-10 PROCEDURE — 97530 THERAPEUTIC ACTIVITIES: CPT | Mod: GO,XU | Performed by: OCCUPATIONAL THERAPY ASSISTANT

## 2018-05-10 PROCEDURE — A9270 NON-COVERED ITEM OR SERVICE: HCPCS | Mod: GY | Performed by: PHYSICIAN ASSISTANT

## 2018-05-10 PROCEDURE — 92526 ORAL FUNCTION THERAPY: CPT | Mod: GN | Performed by: SPEECH-LANGUAGE PATHOLOGIST

## 2018-05-10 PROCEDURE — 25000132 ZZH RX MED GY IP 250 OP 250 PS 637: Mod: GY | Performed by: PHYSICIAN ASSISTANT

## 2018-05-10 PROCEDURE — G0378 HOSPITAL OBSERVATION PER HR: HCPCS

## 2018-05-10 PROCEDURE — 40000225 ZZH STATISTIC SLP WARD VISIT: Performed by: SPEECH-LANGUAGE PATHOLOGIST

## 2018-05-10 PROCEDURE — 99207 ZZC CDG-CODE CATEGORY CHANGED: CPT | Performed by: INTERNAL MEDICINE

## 2018-05-10 PROCEDURE — 40000133 ZZH STATISTIC OT WARD VISIT: Performed by: OCCUPATIONAL THERAPY ASSISTANT

## 2018-05-10 PROCEDURE — A9270 NON-COVERED ITEM OR SERVICE: HCPCS | Mod: GY | Performed by: INTERNAL MEDICINE

## 2018-05-10 PROCEDURE — 25000132 ZZH RX MED GY IP 250 OP 250 PS 637: Mod: GY | Performed by: STUDENT IN AN ORGANIZED HEALTH CARE EDUCATION/TRAINING PROGRAM

## 2018-05-10 PROCEDURE — 25000132 ZZH RX MED GY IP 250 OP 250 PS 637: Mod: GY | Performed by: INTERNAL MEDICINE

## 2018-05-10 PROCEDURE — 99217 ZZC OBSERVATION CARE DISCHARGE: CPT | Performed by: INTERNAL MEDICINE

## 2018-05-10 PROCEDURE — 97535 SELF CARE MNGMENT TRAINING: CPT | Mod: GO | Performed by: OCCUPATIONAL THERAPY ASSISTANT

## 2018-05-10 PROCEDURE — A9270 NON-COVERED ITEM OR SERVICE: HCPCS | Mod: GY | Performed by: STUDENT IN AN ORGANIZED HEALTH CARE EDUCATION/TRAINING PROGRAM

## 2018-05-10 RX ADMIN — CARBIDOPA AND LEVODOPA 2 TABLET: 25; 100 TABLET, ORALLY DISINTEGRATING ORAL at 08:03

## 2018-05-10 RX ADMIN — CARBIDOPA AND LEVODOPA 2 TABLET: 25; 100 TABLET, ORALLY DISINTEGRATING ORAL at 13:55

## 2018-05-10 RX ADMIN — APIXABAN 5 MG: 5 TABLET, FILM COATED ORAL at 08:03

## 2018-05-10 RX ADMIN — LEVOTHYROXINE SODIUM 112 MCG: 112 TABLET ORAL at 08:03

## 2018-05-10 RX ADMIN — CARBIDOPA AND LEVODOPA 2 TABLET: 25; 100 TABLET, ORALLY DISINTEGRATING ORAL at 01:39

## 2018-05-10 RX ADMIN — CARVEDILOL 6.25 MG: 6.25 TABLET, FILM COATED ORAL at 08:03

## 2018-05-10 RX ADMIN — ACETAMINOPHEN 650 MG: 325 TABLET ORAL at 12:12

## 2018-05-10 ASSESSMENT — VISUAL ACUITY
OU: NORMAL ACUITY;GLASSES

## 2018-05-10 NOTE — PLAN OF CARE
Problem: Patient Care Overview  Goal: Plan of Care/Patient Progress Review  Outcome: Improving  Patient d/c home via transport from son,  Bill also present. All discharge medications and instructions reviewed and given, questions answered. PIV removed. All belongings sent with patient at time of discharge.

## 2018-05-10 NOTE — PLAN OF CARE
Problem: Stroke (Ischemic) (Adult)  Goal: Signs and Symptoms of Listed Potential Problems Will be Absent, Minimized or Managed (Stroke)  Signs and symptoms of listed potential problems will be absent, minimized or managed by discharge/transition of care (reference Stroke (Ischemic) (Adult) CPG).   Outcome: Improving  A&O x4. Neuros R facial numbness, minimal slurred speech. VSS.  IV SL. Tele NSR. Mechanical soft diet. Thin liquids. Up with A/1/w/gb. C/o restless legs, better now.  Plan to d/c home tomorrow with home care.

## 2018-05-10 NOTE — PLAN OF CARE
Problem: Patient Care Overview  Goal: Plan of Care/Patient Progress Review  Outcome: Improving  Suspected TIA/stroke. A&O x4 VSS on RA, soft BPs.Neuros intact except decreased sensation R side of face, minimal slurred speech. SL. Tele SR-Afib CVR. Restless legs controlled with PRN carbidopa-levodopa x1. Denied pain. Ax1 walker/gb. Mechanical soft diet. Neuro signed off. D/C home today with home care.

## 2018-05-10 NOTE — PLAN OF CARE
Problem: Patient Care Overview  Goal: Plan of Care/Patient Progress Review    **Patient noted to be discharging to home today with Home PT and assist of spouse. Goals not met; Defer further PT to homecare.     Physical Therapy Discharge Summary    Reason for therapy discharge:    Discharged to home with home therapy.    Progress towards therapy goal(s). See goals on Care Plan in [facility] electronic health record for goal details.  Goals not met.  Barriers to achieving goals:   discharge from facility.    Therapy recommendation(s)Home PT to progress independence with mobility; assist for all mobility from family until patient independent  Continued therapy is recommended.  Rationale/Recommendations:  Home PT to progress independence with mobility; assist for all mobility from family until patient independent.

## 2018-05-10 NOTE — DISCHARGE INSTRUCTIONS
Your risk factors for stroke or TIA (transient ischemic attack):    Your Risk Factors Your Results Normal Ranges   High blood pressure BP Readings from Last 1 Encounters:   05/10/18 129/76    Less than 120/80   Cholesterol              Total Lab Results   Component Value Date    CHOL 133 05/07/2018      Less than 150    Triglycerides   Lab Results   Component Value Date    TRIG 121 05/07/2018    Less than 150   LDL Lab Results   Component Value Date    LDL 54 05/07/2018       Less than 70   HDL Lab Results   Component Value Date    HDL 55 05/07/2018            Greater than 40 (men)  Greater than 50 (women)   Diabetes   Recent Labs  Lab 05/08/18  1230   GLC 97    Fasting blood glucose    Smoking/tobacco use  Quit smoking and tobacco   Overweight  Lose 1-2 pounds a week   Lack of exercise  30 minutes moderate activity each day   Other risk factors include carotid (neck) artery disease, atrial fibrillation and stress. You may be on new medicine to treat high blood pressure, cholesterol, diabetes or atrial fibrillation.    Understanding Stroke Booklet given to patient. Please refer to booklet for further information.    Stroke warning signs and symptoms - CALL 911 right away for:  - Sudden numbness or weakness in the face, arm or leg (often on one side of the body).  - Sudden confusion or trouble understanding what is going on.  - Sudden blurred or decreased vision in one or both eyes.  - Sudden trouble speaking, loss of balance, dizziness or problems with coordination.  - Sudden, severe headache for no reason.  - Fainting or seizures.  - Symptoms may go away then come back suddenly.          A follow-up appointment was scheduled for you [see above].  It is very important to go to it--bring these papers and your insurance card with you.  At the visit, talk about your hospital stay.  Tell your doctor how you feel.  Show your new list of medications.  Your doctor will update records, make sure you are still doing  OK, and decide if any tests or medication changes are needed.    + Dr. Del Real is booked solid, so it was necessary to schedule you with his new partner, Dr. Corrales, for hospital followup.     Your doctor has ordered home care to help you after your hospital stay.  They will contact you regarding your first visit.  This service will be provided by [facility].  If you have not received a call within 48 hours of discharge, please call them at [phone].

## 2018-05-10 NOTE — PLAN OF CARE
Problem: Patient Care Overview  Goal: Plan of Care/Patient Progress Review  Discharge Planner SLP   Patient plan for discharge: Going home with .   Current status: Patient with mild oral and pharyngeal dysphagia and now is at her baseline. No overt Sx of aspiration across a meal/snack. Recommend: 1. Advance to regular diet and thin liquids. 2. Upright, no straws, small bites/sips. 3. No further skilled intervention is needed.   Barriers to return to prior living situation: None per speech perspective.   Recommendations for discharge: Per patient she is going home with home care.   Rationale for recommendations: No further skilled ST needs.     Speech Language Therapy Discharge Summary    Reason for therapy discharge:    Discharged to home with home therapy.    Progress towards therapy goal(s). See goals on Care Plan in [facility] electronic health record for goal details.  Goals met    Therapy recommendation(s):    No further therapy is recommended.           Entered by: Soni Davis 05/10/2018 9:46 AM

## 2018-05-10 NOTE — PLAN OF CARE
Problem: Patient Care Overview  Goal: Plan of Care/Patient Progress Review  Discharge Planner OT   Patient plan for discharge: open to therapy  Current status: Pt SBA sit to stand amb with FWW SBA to/from bathroom, independent standing at sink for ADL task, pt completed LB dressing independent, pt wanting to wait for UE dressing until tele monitor was off, anticipate pt will be independent.   Barriers to return to prior living situation: stairs to bedroom, tub to step over (although does have walk-in shower avail), current level of general weakness/fatigue and pt does cooking and household tasks indep at baseline  Recommendations for discharge: ARU per plan established by the Occupational Therapist however pt will be discharging to home with family assist and home care  Rationale for recommendations: pt will have assist from family and home care        Entered by: Sharita Peralta 05/10/2018 8:25 AM

## 2018-05-10 NOTE — PLAN OF CARE
Problem: Patient Care Overview  Goal: Plan of Care/Patient Progress Review  Outcome: Improving  A&Ox4. Neuros intact ex delayed response at times, no slurred speech noted, decreased sensation on the R face. VSS. Tele SR--a.fib CVR. Mechanical soft diet. Up with SBA. Denies pain. Restless legs, carvidopa given x1. Plan to discharge this afternoon via son.

## 2018-05-10 NOTE — DISCHARGE SUMMARY
"[facility]    Discharge Summary  Hospitalist    Date of Admission:  5/7/2018  Date of Discharge:  5/10/2018  2:06 PM  Discharging Provider: Suze Hussein MD    Discharge Diagnoses   Stroke /TIA    History of Present Illness   Ms. Katherin Fletcher is a 77-year-old female patient with history noted below, including hypertension, coronary artery disease, CHF, atrial fibrillation, peripheral arterial disease, dyslipidemia, restless leg syndrome and hypothyroidism who presents with the above complaints.  The patient was in her usual state of health up until earlier today at around 2:00 p.m. when she developed abrupt onset of numbness to her lips and right side of her face with \"thick\" speech.  She also noted some weakness on the right as well.  She called paramedics and she was brought to [facility] for evaluation.  The patient seen in ER and vitals recorded and showed temperature of 98.6, heart rate 66, blood pressure 132/91, respiration rate 16, O2 saturation 99% on room air.  Code stroke was called.  The patient was seen by neurology.  Underwent CT imaging that did not show any acute findings.  There was great difficulty obtaining appropriate access for CT angiogram and these imaging studies were eventually aborted and was not felt that she was a TPA or narrow interventional candidate.  MRI and MRA imaging has been ordered but overall given her symptomatology and concerns about stroke, request for admission was made.   Hospital Course   Katherin Fletcher was admitted on 5/7/2018.  The following problems were addressed during her hospitalization:    Active Problems:    Stroke (H)    Slurred speech  1.  Suspected TIA/stroke.      Head CT was negative, but CT head with contrast and CT angiogram of head and neck unable to be performed given inability to obtain appropriate access.  The patient was seen by Neurology and not felt that she would be a TPA or neurointerventional candidate regardless.   -MRI and " MRA imaging -no cerebrovascular accident    -Continue  Plavix, but given known history of atrial fibrillation, she was started on apixaban and aspirin stopped .  -plan to discharge  home with home health care and home physical therapy  , occupational therapy   and speech evaluation.   2. Atrial fibrillation history.    Was on sinus rhythm here ,started on apixaban and  aspirin stopped 5/8, continue coreg and plavix.  She had exacerbation/flare of her restless leg syndrome which is currently under control with the levodopa carbidopa.  Blood pressure stable during her stay here and her home medications were continued with the parameters.  # Discharge Pain Plan:   - During her hospitalization, Katherin experienced pain due to restless leg syndrome, arthritis.  The pain plan for discharge was discussed with Katherin and the plan was created in a collaborative fashion.    No new pain medications prescribed.    Suze Hussein MD    Significant Results and Procedures       Pending Results   These results will be followed up by   Unresulted Labs Ordered in the Past 30 Days of this Admission     No orders found from 3/8/2018 to 5/8/2018.          Code Status   Full Code       Primary Care Physician   Aris Del Real    Physical Exam   Temp: 97.9  F (36.6  C) Temp src: Oral BP: 129/76   Heart Rate: 59 Resp: 16 SpO2: 97 % O2 Device: None (Room air)    Vitals:    05/07/18 1454 05/08/18 0501 05/10/18 0500   Weight: 74.4 kg (164 lb) 73.6 kg (162 lb 4.8 oz) 71 kg (156 lb 9.6 oz)     Vital Signs with Ranges  Temp:  [97.7  F (36.5  C)-98.8  F (37.1  C)] 97.9  F (36.6  C)  Heart Rate:  [52-80] 59  Resp:  [16-18] 16  BP: ()/(47-76) 129/76  SpO2:  [93 %-97 %] 97 %  I/O last 3 completed shifts:  In: 390 [P.O.:390]  Out: -     The patient was examined on the day of discharge.    Discharge Disposition   Admited to home care:    Discharged to home  Condition at discharge: Stable    Consultations This Hospital Stay   NEUROLOGY IP  CONSULT  PHYSICAL THERAPY ADULT IP CONSULT  OCCUPATIONAL THERAPY ADULT IP CONSULT  SPEECH LANGUAGE PATH ADULT IP CONSULT  SWALLOW EVAL SPEECH PATH AT BEDSIDE IP CONSULT  SMOKING CESSATION PROGRAM IP CONSULT  SOCIAL WORK IP CONSULT  SOCIAL WORK IP CONSULT    Time Spent on this Encounter   I, Suze Hussein, personally saw the patient today and spent greater than 30 minutes discharging this patient.    Discharge Orders     Home Care PT Referral for Hospital Discharge     Home Care OT Referral for Hospital Discharge     Home Care SLP Referral for Hospital Discharge     Home Care Social Service Referral for Hospital Discharge     Follow-up and recommended labs and tests    Followup with Neurology in 4 weeks:     Scheduled for **May 30th at 11:30am with Dr. Pham**  [facility], [phone]  [address], [address], [place] [address]     Reason for your hospital stay   For numbness and tingling of right side ,possible transient ischemic attack     Follow-up and recommended labs and tests    Follow up with primary care provider, Aris Del Real, within 7 days for hospital follow- up.  The following labs/tests are recommended: cbc in 1 week.     Activity   Your activity upon discharge: activity as tolerated     Discharge Instructions   Follow up with neurology as scheduled, follow up with primary care physician     MD face to face encounter   Documentation of Face to Face and Certification for Home Health Services    I certify that patient: Katherin Fletcher is under my care and that I, or a nurse practitioner or physician's assistant working with me, had a face-to-face encounter that meets the physician face-to-face encounter requirements with this patient on: 5/10/2018.    This encounter with the patient was in whole, or in part, for the following medical condition, which is the primary reason for home health care: deconditioning, new numbness  and tingling of right side of the body, dysphagia  .    I certify that, based on my findings, the following services are medically necessary home health services: social work  , Occupational Therapy, Physical Therapy and Speech Language Therapy.    My clinical findings support the need for the above services because: Occupational Therapy Services are needed to assess and treat cognitive ability and address ADL safety due to impairment in ambulation ., Physical Therapy Services are needed to assess and treat the following functional impairments: deconditioning . and Speech Therapy Services are needed to assess and treat impairments in language and/or swallow functions due to dysphagia .    Further, I certify that my clinical findings support that this patient is homebound (i.e. absences from home require considerable and taxing effort and are for medical reasons or Gnosticism services or infrequently or of short duration when for other reasons) because: Requires assistance of another person or specialized equipment to access medical services because patient: Is unable to walk greater than 100 feet without rest...    Based on the above findings. I certify that this patient is confined to the home and needsphysical therapy and/or speech therapy.  The patient is under my care, and I have initiated the establishment of the plan of care.  This patient will be followed by a physician who will periodically review the plan of care.  Physician/Provider to provide follow up care: Aris Del Real    Attending hospital physician (the Medicare certified [place] provider): Suze Hussein  Physician Signature: See electronic signature associated with these discharge orders.  Date: 5/10/2018     Full Code     Diet   Follow this diet upon discharge: Orders Placed This Encounter     Room Service     Snacks/Supplements Adult: Ensure Plus (Adult); Between Meals     Combination Diet Mechanical/Dental Soft Diet; Thin Liquids (water, ice chips, juice, milk, gelatin, ice cream, etc)        Discharge Medications   Current Discharge Medication List      START taking these medications    Details   apixaban ANTICOAGULANT (ELIQUIS) 5 MG tablet Take 1 tablet (5 mg) by mouth 2 times daily  Qty: 60 tablet, Refills: 1    Associated Diagnoses: Cerebrovascular accident (CVA), unspecified mechanism (H)         CONTINUE these medications which have NOT CHANGED    Details   Alfalfa 250 MG TABS Take 250 mg by mouth every morning       calcium carbonate-vitamin D (CALCIUM + D) 600-200 MG-UNIT TABS Take 1 tablet by mouth At Bedtime     Associated Diagnoses: [facility] health care      carbidopa-levodopa (SINEMET)  MG per tablet TAKE 2 TABLETS FOUR TIMES A DAY  Qty: 720 tablet, Refills: 3    Associated Diagnoses: Restless legs syndrome; Neurologic gait disorder; Dysarthria; Cerebellar disease      carvedilol (COREG) 6.25 MG tablet Take 1 tablet (6.25 mg) by mouth 2 times daily (with meals)  Qty: 180 tablet, Refills: 3    Associated Diagnoses: Atrial fibrillation (H); HTN (hypertension)      clopidogrel (PLAVIX) 75 MG tablet Take 1 tablet (75 mg) by mouth daily  Qty: 90 tablet, Refills: 2    Associated Diagnoses: CAD (coronary artery disease)      Cyanocobalamin (VITAMIN B-12 PO) Take 1 tablet by mouth every morning       isosorbide mononitrate (IMDUR) 30 MG 24 hr tablet Take 1 tablet (30 mg) by mouth daily  Qty: 90 tablet, Refills: 3    Associated Diagnoses: Coronary artery disease involving native coronary artery of native heart without angina pectoris      levothyroxine (SYNTHROID/LEVOTHROID) 112 MCG tablet TAKE 1 TABLET DAILY  Qty: 90 tablet, Refills: 1    Associated Diagnoses: Encounter for medication refill      Multiple Vitamins-Minerals (DAILY MULTI) TABS Take 1 tablet by mouth every morning       nitroglycerin (NITROSTAT) 0.4 MG SL tablet DISSOLVE ONE TABLET UNDER THE TONGUE EVERY 5 MINUTES AS NEEDED FOR CHEST PAIN.  DO NOT EXCEED A TOTAL OF 3 DOSES IN 15 MINUTES  Qty: 25 tablet, Refills: 3     Associated Diagnoses: CAD (coronary artery disease)      omeprazole (PRILOSEC) 20 MG CR capsule TAKE 2 CAPSULES DAILY 30 TO 60 MINUTES BEFORE A MEAL  Qty: 180 capsule, Refills: 1    Associated Diagnoses: Encounter for long-term (current) use of medications      rosuvastatin (CRESTOR) 20 MG tablet Take 1 tablet (20 mg) by mouth daily  Qty: 90 tablet, Refills: 2    Associated Diagnoses: Hyperlipidemia      botulinum toxin type A (BOTOX) 100 units injection Inject 200 Units into the muscle every 3 months  Qty: 200 Units, Refills: 4    Comments: Total annual dose not to exceed 1000 units Botox.  Clinic administered medication.  Frequency may exceed 4x/year.     Dx Code M62.838 - 42444, 04485, 96288, 67793, 39303, 71493    EMG 46206  Associated Diagnoses: Muscle spasms of both lower extremities         STOP taking these medications       aspirin 81 MG tablet Comments:   Reason for Stopping:             Allergies   Allergies   Allergen Reactions     Ace Inhibitors Cough     Baclofen Other (See Comments)     Constipation , tiredness     Penicillins      10/7/14 Hives per patient -- many many years ago       Nickel Swelling and Rash     Other reaction(s): Unknown     Data   Most Recent 3 CBC's:  Recent Labs   Lab Test  05/08/18   1230  05/07/18 2138 05/07/18   1502   WBC  4.4  7.1  5.0   HGB  12.4  12.9  12.7   MCV  86  85  85   PLT  180  212  192      Most Recent 3 BMP's:  Recent Labs   Lab Test  05/08/18   1230  05/07/18 2138  05/07/18   1502   NA  142  143  141   POTASSIUM  3.5  3.8  3.9   CHLORIDE  110*  111*  108   CO2  28  23  29   BUN  11  12  10   CR  0.65  0.78  0.69   ANIONGAP  4  9  4   GALE  9.5  9.6  9.3   GLC  97  113*  130*     Most Recent 2 LFT's:  Recent Labs   Lab Test  05/07/18 2138  08/23/17   1032   09/22/15   1458   AST  23   --    --   20   ALT  8  <5*   < >  9   ALKPHOS  74   --    --   85   BILITOTAL  0.7   --    --   0.6    < > = values in this interval not displayed.     Most Recent INR's  and Anticoagulation Dosing History:  Anticoagulation Dose History     Recent Dosing and Labs Latest Ref Rng & Units 11/9/2012 12/7/2012 12/21/2012 9/12/2013 1/8/2018 2/27/2018 5/7/2018    INR 0.86 - 1.14 1.9 3.2 2.0 0.99 1.0 1.06 1.00        Most Recent 3 Troponin's:No lab results found.  Most Recent Cholesterol Panel:  Recent Labs   Lab Test  05/07/18   2138   CHOL  133   LDL  54   HDL  55   TRIG  121     Most Recent 6 Bacteria Isolates From Any Culture (See EPIC Reports for Culture Details):  Recent Labs   Lab Test  09/12/13   1030  09/23/10   0900  08/26/10   0930  06/24/10   0900  04/19/10   1137   CULT  No growth  Light growth Methicillin resistant Staphylococcus aureus (MRSA)  No growth  Moderate growth Methicillin resistant Staphylococcus aureus (MRSA)  Heavy growth Pasteurella multocida  Heavy growth Peptostreptococcus species     Most Recent TSH, T4 and A1c Labs:  Recent Labs   Lab Test  05/07/18   2138   A1C  6.1     Results for orders placed or performed during the hospital encounter of 05/07/18   CT Head w/o Contrast    Narrative    CT SCAN OF THE HEAD WITHOUT CONTRAST   5/7/2018 3:15 PM     HISTORY: Stroke. Right facial droop and slurred speech began at 1400  hours.    TECHNIQUE:  Axial images of the head and coronal reformations without  IV contrast material. Radiation dose for this scan was reduced using  automated exposure control, adjustment of the mA and/or kV according  to patient size, or iterative reconstruction technique.    COMPARISON: 2/27/2018    FINDINGS:  There is generalized atrophy of the brain.  There is low  attenuation in the white matter of the cerebral hemispheres consistent  with sequelae of small vessel ischemic disease. There is no evidence  of intracranial hemorrhage, mass, acute infarct or anomaly.     There are bilateral intraocular lens implants. There is no evidence of  trauma.       Impression    IMPRESSION:   1. No acute abnormality.  2. Atrophy of the brain.  White  matter changes consistent with  sequelae of small vessel ischemic disease. This is unchanged.    CAYDEN JEAN MD   MR Head w/o Contrast Angiogram    Narrative    MR ANGIOGRAM OF THE HEAD WITHOUT CONTRAST   5/8/2018 8:43 AM     COMPARISON: None.    HISTORY:  Right sided symptoms, numbness/weakness.     TECHNIQUE:  3D time-of-flight MR angiogram of the head without  contrast. MIP reconstruction of all MR angiographic data was  performed.    FINDINGS:  The P1 segment of the left posterior cerebral artery is not  visualized and is likely hypoplastic or congenitally absent. The left  posterior cerebral artery is supplied by the patent left posterior  communicating artery. The bilateral distal internal carotid, basilar,  bilateral anterior cerebral, bilateral middle cerebral and bilateral  posterior cerebral arteries are patent and unremarkable with no  evidence for cerebral artery stenosis or aneurysm. The posterior  communicating artery on the left is patent and unremarkable.       Impression    IMPRESSION:  Normal MR angiogram of the head.      AMIRA ZAMARRIPA MD   MR Brain w/o & w Contrast    Narrative    MRI OF THE BRAIN WITHOUT CONTRAST 5/8/2018 9:20 AM     COMPARISON: Head CT 5/7/2018.    HISTORY: Right-sided symptoms, numbness/weakness.      TECHNIQUE:   Brain: Axial diffusion-weighted with ADC map, T2-weighted with fat  saturation, T1-weighted and turboFLAIR and coronal T1-weighted images  of the brain were obtained without intravenous contrast.     FINDINGS: There is minimal diffuse cerebral volume loss. There are a  few tiny scattered focal areas of abnormal T2 signal hyperintensity in  the cerebral white matter bilaterally that are consistent with sequela  of chronic small vessel ischemic disease.    The ventricles and basal cisterns are within normal limits in  configuration given the degree of cerebral volume loss. There is no  midline shift. There are no extra-axial fluid collections. There is no  evidence  for stroke or acute intracranial hemorrhage.    There is no sinusitis or mastoiditis.      Impression    IMPRESSION: Diffuse cerebral volume loss and cerebral white matter  changes consistent with chronic small vessel ischemic disease. No  evidence for acute intracranial pathology.    AMIRA ZAMARRIPA MD   MRA Neck w/o & w Contrast Angiogram    Narrative    MRA NECK WITHOUT AND WITH CONTRAST  5/8/2018 9:20 AM     COMPARISON: None.    HISTORY:  Right-sided symptoms, numbness/weakness.    TECHNIQUE: 2D time-of-flight MR angiogram of the neck without contrast  and 3D MR angiogram of the neck with 10 mL Gadavist gadolinium IV  contrast.  MIP reconstruction of all MR angiographic data was  performed. Estimates of carotid stenoses are made relative to the  distal internal carotid artery diameters except as noted.    FINDINGS:    Carotids: The common carotid arteries bilaterally are widely patent.  The cervical internal carotid arteries bilaterally are patent without  stenosis.    Vertebrals: The vertebral arteries bilaterally are patent without  stenosis and demonstrate antegrade flow.    Aortic arch: The arteries as they arise from the aortic arch are  normally arranged with no evidence for stenosis.      Impression    IMPRESSION:  Normal MR angiogram of the neck.     AMIRA ZAMARRIPA MD

## 2018-05-10 NOTE — PROGRESS NOTES
Plan to discharge today. HC notified - orders in for RN/PT/OT/SLP/SW. PCP and Neuro follow up has been scheduled.  Followup with Neurology in 4 weeks:     Scheduled for **May 30th at 11:30am with Dr. Pham**   [facility], [phone]   [address] [place] [address], [address], [place] [address]     May 16, 2018 11:15 AM CDT   Office Visit with Halina Corrales MD   [facility] ([facility])    [address] [place] [address] [phone]              Bring a current list of meds and any records pertaining to this visit. For Physicals, please bring immunization records and any forms needing to be filled out. Please arrive 10 minutes early to complete paperwork.              Jun 01, 2018 10:30 AM CDT   (Arrive by 10:15 AM)   Return Ataxia with Joe Randolph MD   [facility] Neurology ([facility])    [address] [place] [address] [id number] [phone]              Jul 03, 2018  4:10 PM CDT   (Arrive by 3:55 PM)   New Botox with Darline Celestin MD   [facility] Physical Medicine and Rehabilitation ([facility])    [address] [place] [address] [id number] [phone]

## 2018-05-11 PROCEDURE — G0180 MD CERTIFICATION HHA PATIENT: HCPCS | Performed by: FAMILY MEDICINE

## 2018-05-12 ENCOUNTER — MEDICAL CORRESPONDENCE (OUTPATIENT)
Dept: FAMILY MEDICINE | Facility: CLINIC | Age: 78
End: 2018-05-12

## 2018-05-15 ENCOUNTER — MEDICAL CORRESPONDENCE (OUTPATIENT)
Dept: FAMILY MEDICINE | Facility: CLINIC | Age: 78
End: 2018-05-15

## 2018-05-15 DIAGNOSIS — I10 BENIGN ESSENTIAL HYPERTENSION: Primary | ICD-10-CM

## 2018-05-15 DIAGNOSIS — I10 HTN (HYPERTENSION): ICD-10-CM

## 2018-05-15 DIAGNOSIS — I48.91 ATRIAL FIBRILLATION (H): ICD-10-CM

## 2018-05-15 RX ORDER — CARVEDILOL 6.25 MG/1
6.25 TABLET ORAL 2 TIMES DAILY WITH MEALS
Qty: 180 TABLET | Refills: 3 | Status: SHIPPED | OUTPATIENT
Start: 2018-05-15 | End: 2018-09-13

## 2018-05-16 ENCOUNTER — OFFICE VISIT (OUTPATIENT)
Dept: FAMILY MEDICINE | Facility: CLINIC | Age: 78
End: 2018-05-16

## 2018-05-16 VITALS
WEIGHT: 162 LBS | DIASTOLIC BLOOD PRESSURE: 60 MMHG | SYSTOLIC BLOOD PRESSURE: 112 MMHG | RESPIRATION RATE: 16 BRPM | HEART RATE: 73 BPM | OXYGEN SATURATION: 97 % | BODY MASS INDEX: 27.81 KG/M2

## 2018-05-16 DIAGNOSIS — R47.81 SLURRED SPEECH: ICD-10-CM

## 2018-05-16 DIAGNOSIS — I25.10 CORONARY ARTERY DISEASE INVOLVING NATIVE CORONARY ARTERY OF NATIVE HEART WITHOUT ANGINA PECTORIS: ICD-10-CM

## 2018-05-16 DIAGNOSIS — G62.9 PERIPHERAL POLYNEUROPATHY: ICD-10-CM

## 2018-05-16 DIAGNOSIS — M53.82 NECK MUSCLE WEAKNESS: ICD-10-CM

## 2018-05-16 DIAGNOSIS — E78.2 MIXED HYPERLIPIDEMIA: ICD-10-CM

## 2018-05-16 DIAGNOSIS — Z09 HOSPITAL DISCHARGE FOLLOW-UP: Primary | ICD-10-CM

## 2018-05-16 DIAGNOSIS — Z79.01 LONG TERM CURRENT USE OF ANTICOAGULANT THERAPY: ICD-10-CM

## 2018-05-16 DIAGNOSIS — I73.9 PAD (PERIPHERAL ARTERY DISEASE) (H): ICD-10-CM

## 2018-05-16 DIAGNOSIS — Z86.73 HISTORY OF TIA (TRANSIENT ISCHEMIC ATTACK) AND STROKE: ICD-10-CM

## 2018-05-16 DIAGNOSIS — G31.9 NEURODEGENERATIVE DISORDER (H): ICD-10-CM

## 2018-05-16 DIAGNOSIS — R47.1 DYSARTHRIA: ICD-10-CM

## 2018-05-16 DIAGNOSIS — I10 ESSENTIAL HYPERTENSION: ICD-10-CM

## 2018-05-16 DIAGNOSIS — R26.9 NEUROLOGIC GAIT DISORDER: ICD-10-CM

## 2018-05-16 LAB
% GRANULOCYTES: 49.7 % (ref 42.2–75.2)
ERYTHROCYTE [SEDIMENTATION RATE] IN BLOOD: 18 MM/HR (ref 0–20)
HCT VFR BLD AUTO: 37.6 % (ref 35–46)
HEMOGLOBIN: 12.5 G/DL (ref 11.8–15.5)
LYMPHOCYTES NFR BLD AUTO: 40.6 % (ref 20.5–51.1)
MCH RBC QN AUTO: 28.3 PG (ref 27–31)
MCHC RBC AUTO-ENTMCNC: 33.4 G/DL (ref 33–37)
MCV RBC AUTO: 84.9 FL (ref 80–100)
MONOCYTES NFR BLD AUTO: 9.7 % (ref 1.7–9.3)
PLATELET # BLD AUTO: 204 K/UL (ref 140–450)
RBC # BLD AUTO: 4.43 X10/CMM (ref 3.7–5.2)
WBC # BLD AUTO: 3.6 X10/CMM (ref 3.8–11)

## 2018-05-16 PROCEDURE — 85025 COMPLETE CBC W/AUTO DIFF WBC: CPT | Performed by: FAMILY MEDICINE

## 2018-05-16 PROCEDURE — 99496 TRANSJ CARE MGMT HIGH F2F 7D: CPT | Performed by: FAMILY MEDICINE

## 2018-05-16 PROCEDURE — 36415 COLL VENOUS BLD VENIPUNCTURE: CPT | Performed by: FAMILY MEDICINE

## 2018-05-16 PROCEDURE — 85651 RBC SED RATE NONAUTOMATED: CPT | Performed by: FAMILY MEDICINE

## 2018-05-16 NOTE — PROGRESS NOTES
"[facility]  Family Medicine  Office Visit Note  DOS: 18  : 1940    SUBJECTIVE:                                                      Patient presents for Hospital Followup Visit:    Hospital:  [facility]      Date of Admission: 18  Date of Discharge: 5/10/18  Reason(s) for Admission: Stroke, Slurred Speech            Problems taking medications regularly:  None       Medication changes since discharge:    Started Eliquis in addtn to Plavix. Stopped Aspirin       Problems adhering to non-medication therapy:  None    Summary of hospitalization:  [facility] discharge summary reviewed  Diagnostic Tests/Treatments reviewed.  Follow up needed: CBC for dual anticoag.  Other Healthcare Providers Involved in Patient s Care:         Homecare, Specialist appointment - Sees Dr. Dr. George Pham , Physical Therapy, Imaging and C-spine MRI ordered by me today.  Update since discharge: stable. Home PT results are not available to me today.      Pt admitted to [facility] w/ acute onset of 'onset of numbness to her lips and right side of her face with \"thick\" speech.' Symptoms concerning for CVA vs. TIA. Admitted for evaluation.    CT brain negative for hemorrhagic CVA.    MRI/MRA of brain and neck were negative for evidence of an acute stroke.    Pt was discharged to home w/ home care RN, PT and speech therapy.    OT rx'd - appt this afternoon.     Overall, feels back at baseline. No known residual major deficits from acute N&T symptoms. No change in muscle strength, however, hx significant for ataxia and dysarthria at baseline. Reports some residual \"mush mouth\", worse at end of day.    Eating OK. Dental work in process. Chewing is OK.    No CP, N/t. Has known arthritis.  is not as good as desired.    Tough stick in hospital.    No bleeding problems on anticoagulation tx.     She sees Dr. Handy Pham at [facility] for a neurodegenerative disease with exact dx yet " "unknown. Known wide based gait, frequent falls, RLS, weakness. No urinary or fecal incontinence. See prior scanned notes in Epic. Last visit 10/23/2017. Other consults notes in Epic zack noting 3/3/2016 and 6/23/2016 visits and prior w/u at [facility]. She has not seen Dr. Pham in several months. Has an appt coming up 5/30/18.   He rx Sinemet for her - she thinks is helpful. Now taking QID w/ an occasional 5th dose if she is have a bad day.     She is wearing a soft c-collar today. States she fit herself with this b/c her neck muscles have been getting progressively weaker and more difficult to hold her head up. Getting worse over the last 6 months. Head falls forward when collar off per pt. Also reports some loss of  strength in each hand - she has occasional pain. She attributes much of this to arthritis.    She has not alerted Dr. Pham yet to this new concern w/ her neck yet. Has old MRI.   She has RLS - has orders for Botox injections to her legs for restless legs from Dr. Randolph at [facility], but has not had any injections yet.    Had a dental hygienist w/ a new procedure and a new technique. Pt suspects she hit a nerve and lost taste buds on tongue and had some numbness in her mouth.   Residual speech difficulty after TIA. Has \"mush mouth\"  at night.    Large buildup thick saliva - getting worse over the last year. Now drooling at night in sleep.    She has applied for [employer] services but has not heard back yet on the status of her application but has not yet called to investigate.     Has some visual changes, she thinks related to her baseline ataxia. Occasionally sees a \"film\" over her eyes. She has not yet seen her eye doctor about this. Needs new eye doctor.     She has a cardiac hx significant for CAD w/ CABG in 1992 and PAD.    Taking Coreg, isosorbide mononitrate. Has rx for nitro but has not used.    Taking Crestor for lipids. No reports of chest pain.    Former smoker - 22+ pack years.    She is " continuing to take Crestor for hyperlipidemia.     He has hypothyroidism. Is on replacement. Last TSH 4/2018 was wnl. Takes her med daily on empty stomach.     ROS:  Constitutional, neuro, ENT, endocrine, pulmonary, cardiac, gastrointestinal, genitourinary, musculoskeletal, integument and psychiatric systems are negative, except as otherwise noted.  Specifically notes her  strength has been decreasing. She suspects it could be due to OA. Frequent falls. No cognitive concerns from pt.     Patient Active Problem List   Diagnosis     Long term current use of anticoagulant therapy     CAD (coronary artery disease)     Hyperlipidemia     Health Care Home     MRSA (methicillin resistant Staphylococcus aureus)     Dysarthria     Disturbances of sensation of smell and taste     Neurologic gait disorder     Cerebellar disease     Essential hypertension     Ischemic cardiomyopathy     Peripheral polyneuropathy     Arterial leg ulcer (H)     PAD (peripheral artery disease) (H)     Encounter for long-term (current) use of medications     Stroke (H)     Slurred speech     Neurodegenerative disorder     History of TIA (transient ischemic attack) and stroke     Past Medical History:   Diagnosis Date     CAD (coronary artery disease)     s/p CABG 1992 and redo CABG 2009 (LIMA to LAD, SVG to OM and SVG to posterolateral branch of RCA)     Chronic cough     Non-productive. No known definite etiology. Is beeing seen by [facility].      Essential hypertension, benign      GERD (gastroesophageal reflux disease)      History of TIA (transient ischemic attack) and stroke 5/7/2018    R sided facial N&T.      Hyperlipidaemia      Hyperlipidaemia LDL goal < 130      Ischemic cardiomyopathy      MI (myocardial infarction) 11/1996     Mixed hyperlipidemia      Neurodegenerative disorder     Further detailed dx unknown. Managed by Dr. George Pham - [facility].      PAD (peripheral artery disease) (H)      Restless legs syndrome       Unspecified hypothyroidism      Past Surgical History:   Procedure Laterality Date     ARTHROSCOPY SHOULDER RT/LT       BUNIONECTOMY RT/LT       C CABG, ARTERY-VEIN, THREE  1/2009     C CARDIAC SURG PROCEDURE UNLIST       C HAND/FINGER SURGERY UNLISTED       C MRA UPPER EXTREMITY WO&W CONT       C SHOULDER SURG PROC UNLISTED       C STOMACH SURGERY PROCEDURE UNLISTED       CARDIAC SURGERY  1992    CABG,stents x2     CARPAL TUNNEL RELEASE RT/LT       HC VASCULAR SURGERY PROCEDURE UNLIST       HYSTERECTOMY, DAMIÁN  34 yo     OPEN REDUCTION INTERNAL FIXATION ANKLE Left 2015    Left Ankle     RELEASE TRIGGER FINGER       REPAIR HAMMER TOE Right 12/19/2017    Procedure: REPAIR HAMMER TOE;  Right 2nd and 3rd toe pinning;  Surgeon: Fernando Wiggins MD;  Location: [facility] OR     STENT  2006     STENT(aka CARDIAC)  2008     VASCULAR SURGERY       Family History   Problem Relation Age of Onset     Unknown/Adopted Mother      Unknown/Adopted Father      Social History     Social History     Marital status:      Spouse name: N/A     Number of children: N/A     Years of education: N/A     Occupational History     Not on file.     Social History Main Topics     Smoking status: Former Smoker     Packs/day: 0.50     Years: 22.00     Types: Cigarettes     Quit date: 4/15/1992     Smokeless tobacco: Never Used     Alcohol use 0.6 oz/week     1 Standard drinks or equivalent per week      Comment: MAYBE ONCE A YEAR     Drug use: No     Sexual activity: Not Currently     Partners: Male     Other Topics Concern     Caffeine Concern Yes     1-2 cups      Sleep Concern No     Stress Concern No     Weight Concern Yes     Special Diet No     Exercise Yes     stairs, walking      Social History Narrative     Former [employer] employee. 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     OBJECTIVE:                                                    /60  Pulse 73  Resp 16  Wt 73.5 kg (162 lb)  SpO2 97%  BMI 27.81 kg/m2  GENERAL  APPEARANCE: healthy, alert, no distress and cooperative. Well dressed; neatly groomed. Cognitively grossly intact w/ sharp wit.   EYES: Eyes grossly normal to inspection, PERRL and conjunctivae and sclerae normal  HENT: ear canals and TM's normal and nose and mouth without ulcers or lesions  NECK: Wearing a soft c-collar loosely. No tremor in her neck at rest. No adenopathy, no asymmetry, masses. No goiter.   RESP: lungs clear to auscultation - no rales, rhonchi or wheezes  CV: regular rates and rhythm and normal S1 S2, no S3 or S4  LYMPHATICS: no cervical adenopathy  MS: extremities show mild hypertrophic IP joints B hands, has difficulty making a tight fist with either hand secondary to muscle weakness vs pain - unable to measure objectively here. No gross deformities noted. B LE peripheral pulses decreased/absent.   ORTHO: Cervical Spine Exam: Inspection: normal cervical lordosis  Tender:  None w/ palpation of c-spine and t-spine. Muscles are tight throughout. Wearing c-collar.   Range of Motion:  Decreased ROM of neck w/ flex/ex and rotation. Spurling's test is indeterminate.    Strength: 4/5 throughout neck, BUE and BLE.   SKIN: no suspicious lesions or rashes; warm and well perfused. She has multiple needle sticks from prior blood draws w/ small hematomas present on B forearms. No ecchymosis.   NEURO: DTRs diminished at brachioradialis, absent at patella. Mentation intact, dysarthria  Present and cranial nerves II-XII grossly intact although shoulder shrugging is weak. Gait is slow and cautious. Speech is slowed w/ some word slurring present. She is able to sit in exam chair w/o having gross motor movement of BLE.     ASSESSMENT/PLAN:                                                      Katherin was seen today for hospital f/u.    Diagnoses and all orders for this visit:    Hospital discharge follow-up  History of TIA (transient ischemic attack) and stroke   Appears to be back at baseline. Remains high risk due  to CAD, PAD.     Neurodegenerative disorder  Neurologic gait disorder  Peripheral polyneuropathy  PAD (peripheral artery disease) (H)  Frequent falls   Has had extensive w/u, exact dx unavailable. She is a little frustrated no dx.    She will see Dr. Pham in Neurology as planned for follow-up.     Neck muscle weakness  -     Sed Rate [place] ([facility]) - eval for Temp arteritis  -     [facility] c-spine - question if advancing central canal stenosis could account for her muscle and nerve conditions w/ possible impingement of nerve fibers from LE? I am concerned that her condition has deteriorated such that she feels she needs a soft c-collar for support. Am also concerned that may limit her ability to see her feet and contribute to a fall.     Dysarthria  Slurred speech   Has orders for speech home care.     Coronary artery disease involving native coronary artery of native heart without angina pectoris  Essential hypertension   Controlled.    BP is well controlled. Continue current meds. Watch for bleeding.     Long term current use of anticoagulant therapy  -     CBC with Diff/Plt ([facility])   She is tolerating double anti-coagulation for now. Monitor.     Mixed hyperlipidemia   Continue statin to for cardiovascular prevention.     Post Discharge Medication Reconciliation: discharge medications reconciled, continue medications without change.  Issues to address: Issues identified were:   new neck muscle weakness - related to neurodegenerative dz or c-spine?.  Plan of care communicated with patient    RTC prn. Will need to be seen in clinic regularly for ongoing home care recert, although I expect will soon no longer be considered home bound.     AVS Instructions  Please make an appointment with an eye doctor for your vision changes. [facility] is close by if that is convenient for you - next to the library in [place] at [facility] and [place].     Please call [employer] to determine of the  "isamar.     Halina \"Sandi\" MD Antoni  [facility]  Office: [phone]  "

## 2018-05-16 NOTE — MR AVS SNAPSHOT
After Visit Summary   5/16/2018    Katherin Fletcher    MRN: [id number]           Patient Information     Date Of Birth          1940        Visit Information        Provider Department      5/16/2018 11:15 AM Halina Corrales MD [facility]        Today's Diagnoses     Hospital discharge follow-up    -  1    Neck muscle weakness        Other specified transient cerebral ischemias        Dysarthria        Long term current use of anticoagulant therapy          Care Instructions    Please make an appointment with an eye doctor for your vision changes. [facility] is close by if that is convenient for you - next to the library in [place] at [facility] and [place].     Please call [employer] to determine of the application.           Follow-ups after your visit        Additional Services     RADIOLOGY REFERRAL       Referral to [facility]  Phone: [phone]  Fax: [phone]  Reason for referral: MRI Neck w/o contrast for weakened neck muscles - pt states cannot hold her head up w/o soft c-collar.                  Your next 10 appointments already scheduled     Jun 01, 2018 10:30 AM CDT   (Arrive by 10:15 AM)   Return Ataxia with Joe Randolph MD   [facility] ([facility])    [address] [place] [id number] [phone]            Jul 03, 2018  4:10 PM CDT   (Arrive by 3:55 PM)   New Botox with Darline Celestin MD   [facility] ([facility])    [address] [place] [id number] [phone]              Who to contact     If you have questions or need follow up information about today's clinic visit or your schedule please contact [facility] directly at [phone].  Normal or non-critical lab and imaging results will be communicated to you by MyChart, letter or phone within 4 business days after the clinic has  received the results. If you do not hear from us within 7 days, please contact the clinic through [employer] or phone. If you have a critical or abnormal lab result, we will notify you by phone as soon as possible.  Submit refill requests through [employer] or call your pharmacy and they will forward the refill request to us. Please allow 3 business days for your refill to be completed.          Additional Information About Your Visit        [employer] Information     [employer] gives you secure access to your electronic health record. If you see a primary care provider, you can also send messages to your care team and make appointments. If you have questions, please call your primary care clinic.  If you do not have a primary care provider, please call [phone] and they will assist you.        Care EveryWhere ID     This is your Care EveryWhere ID. This could be used by other organizations to access your [place] medical records  [id number]        Your Vitals Were     Pulse Respirations Pulse Oximetry BMI (Body Mass Index)          73 16 97% 27.81 kg/m2         Blood Pressure from Last 3 Encounters:   05/16/18 112/60   05/10/18 127/62   04/20/18 125/79    Weight from Last 3 Encounters:   05/16/18 73.5 kg (162 lb)   05/10/18 71 kg (156 lb 9.6 oz)   04/20/18 74.8 kg (165 lb)              We Performed the Following     CBC with Diff/Plt (G)     RADIOLOGY REFERRAL     Sed Rate Jose Alfredoren (G)          Today's Medication Changes          These changes are accurate as of 5/16/18 11:48 AM.  If you have any questions, ask your nurse or doctor.               These medicines have changed or have updated prescriptions.        Dose/Directions    carbidopa-levodopa  MG per tablet   Commonly known as:  SINEMET   This may have changed:    - how much to take  - how to take this  - when to take this  - reasons to take this  - additional instructions   Used for:  Restless legs syndrome, Neurologic gait disorder, Dysarthria,  Cerebellar disease        TAKE 2 TABLETS FOUR TIMES A DAY   Quantity:  720 tablet   Refills:  3       clopidogrel 75 MG tablet   Commonly known as:  PLAVIX   This may have changed:  when to take this   Used for:  CAD (coronary artery disease)        Dose:  75 mg   Take 1 tablet (75 mg) by mouth daily   Quantity:  90 tablet   Refills:  2       omeprazole 20 MG CR capsule   Commonly known as:  priLOSEC   This may have changed:  See the new instructions.   Used for:  Encounter for long-term (current) use of medications        TAKE 2 CAPSULES DAILY 30 TO 60 MINUTES BEFORE A MEAL   Quantity:  180 capsule   Refills:  1                Primary Care Provider Office Phone # Fax #    Aris Del Real [phone] [address]  [facility] [address]        Equal Access to Services     JOSELUIS MADRIGAL : Yuly perezo Somichelle, waaxda luqadaha, qaybta kaalmada adeegyada, carlo odonnell . So [facility] [phone].    ATENCIÓN: Si habla español, tiene a taylor disposición servicios gratuitos de asistencia lingüística. [facility] [phone].    We comply with applicable federal civil rights laws and [place] laws. We do not discriminate on the basis of race, color, national origin, age, disability, sex, sexual orientation, or gender identity.            Thank you!     Thank you for choosing [facility]  for your care. Our goal is always to provide you with excellent care. Hearing back from our patients is one way we can continue to improve our services. Please take a few minutes to complete the written survey that you may receive in the mail after your visit with us. Thank you!             Your Updated Medication List - Protect others around you: Learn how to safely use, store and throw away your medicines at www.disposemymeds.org.          This list is accurate as of 5/16/18 11:48 AM.  Always use your most recent med list.                   Brand Name Dispense  Instructions for use Diagnosis    Alfalfa 250 MG Tabs      Take 250 mg by mouth every morning        apixaban ANTICOAGULANT 5 MG tablet    ELIQUIS    60 tablet    Take 1 tablet (5 mg) by mouth 2 times daily    Cerebrovascular accident (CVA), unspecified mechanism (H)       botulinum toxin type A 100 units injection    BOTOX    200 Units    Inject 200 Units into the muscle every 3 months    Muscle spasms of both lower extremities       calcium + D 600-200 MG-UNIT Tabs   Generic drug:  calcium carbonate-vitamin D      Take 1 tablet by mouth At Bedtime    Preventative health care       carbidopa-levodopa  MG per tablet    SINEMET    720 tablet    TAKE 2 TABLETS FOUR TIMES A DAY    Restless legs syndrome, Neurologic gait disorder, Dysarthria, Cerebellar disease       carvedilol 6.25 MG tablet    COREG    180 tablet    Take 1 tablet (6.25 mg) by mouth 2 times daily (with meals)    Benign essential hypertension       clopidogrel 75 MG tablet    PLAVIX    90 tablet    Take 1 tablet (75 mg) by mouth daily    CAD (coronary artery disease)       DAILY MULTI Tabs      Take 1 tablet by mouth every morning        isosorbide mononitrate 30 MG 24 hr tablet    IMDUR    90 tablet    Take 1 tablet (30 mg) by mouth daily    Coronary artery disease involving native coronary artery of native heart without angina pectoris       levothyroxine 112 MCG tablet    SYNTHROID/LEVOTHROID    90 tablet    TAKE 1 TABLET DAILY    Encounter for medication refill       nitroGLYcerin 0.4 MG sublingual tablet    NITROSTAT    25 tablet    DISSOLVE ONE TABLET UNDER THE TONGUE EVERY 5 MINUTES AS NEEDED FOR CHEST PAIN.  DO NOT EXCEED A TOTAL OF 3 DOSES IN 15 MINUTES    CAD (coronary artery disease)       omeprazole 20 MG CR capsule    priLOSEC    180 capsule    TAKE 2 CAPSULES DAILY 30 TO 60 MINUTES BEFORE A MEAL    Encounter for long-term (current) use of medications       rosuvastatin 20 MG tablet    CRESTOR    90 tablet    Take 1 tablet (20 mg) by  mouth daily    Hyperlipidemia       VITAMIN B-12 PO      Take 1 tablet by mouth every morning

## 2018-05-16 NOTE — PATIENT INSTRUCTIONS
Please make an appointment with an eye doctor for your vision changes. [facility] is close by if that is convenient for you - next to the library in [place] [address].     Please call [employer] to determine of the application.

## 2018-05-17 NOTE — PROGRESS NOTES
Order clarification per VO Dr Villagomez.  Patient is to have MRI cervical spine.  Order re-entered and faxed to [facility].  Candy Gaona

## 2018-05-18 ENCOUNTER — MEDICAL CORRESPONDENCE (OUTPATIENT)
Dept: FAMILY MEDICINE | Facility: CLINIC | Age: 78
End: 2018-05-18

## 2018-05-23 ENCOUNTER — MEDICAL CORRESPONDENCE (OUTPATIENT)
Dept: FAMILY MEDICINE | Facility: CLINIC | Age: 78
End: 2018-05-23

## 2018-05-23 ENCOUNTER — OFFICE VISIT (OUTPATIENT)
Dept: ORTHOPEDICS | Facility: CLINIC | Age: 78
End: 2018-05-23
Payer: OTHER GOVERNMENT

## 2018-05-23 DIAGNOSIS — L60.2 LONG TOENAIL: Primary | ICD-10-CM

## 2018-05-23 DIAGNOSIS — Z79.01 LONG TERM CURRENT USE OF ANTICOAGULANT THERAPY: ICD-10-CM

## 2018-05-23 DIAGNOSIS — I73.9 PERIPHERAL ARTERIAL DISEASE (H): ICD-10-CM

## 2018-05-23 NOTE — PROGRESS NOTES
Chief Complaint:   Chief Complaint   Patient presents with     RECHECK     Toe nail trimming.      Subjective: Katherin is a 77 year old female who presents to the clinic today for toenail trim. No new lower extremity concerns. She would like toenails trimmed as she is unable to do this herself.     ROS: Denies lower extremity pain, increased swelling, open lesions, rashes.     Past Medical History:   Diagnosis Date     CAD (coronary artery disease)     s/p CABG 1992 and redo CABG 2009 (LIMA to LAD, SVG to OM and SVG to posterolateral branch of RCA)     Essential hypertension, benign      GERD (gastroesophageal reflux disease)      Hyperlipidaemia      Hyperlipidaemia LDL goal < 130      Ischemic cardiomyopathy      MI (myocardial infarction) 11/1996     Mixed hyperlipidemia      PAD (peripheral artery disease) (H)      Restless legs syndrome      Unspecified hypothyroidism      Past Surgical History:   Procedure Laterality Date     ARTHROSCOPY SHOULDER RT/LT       BUNIONECTOMY RT/LT       C CABG, ARTERY-VEIN, THREE  1/2009     C CARDIAC SURG PROCEDURE UNLIST       C HAND/FINGER SURGERY UNLISTED       C MRA UPPER EXTREMITY WO&W CONT       C SHOULDER SURG PROC UNLISTED       C STOMACH SURGERY PROCEDURE UNLISTED       CARDIAC SURGERY  1992    CABG,stents x2     CARPAL TUNNEL RELEASE RT/LT       HC VASCULAR SURGERY PROCEDURE UNLIST       HYSTERECTOMY, DAMIÁN  32 yo     OPEN REDUCTION INTERNAL FIXATION ANKLE      Left Ankle     RELEASE TRIGGER FINGER       REPAIR HAMMER TOE Right 12/19/2017    Procedure: REPAIR HAMMER TOE;  Right 2nd and 3rd toe pinning;  Surgeon: Fernando Wiggins MD;  Location: [facility]     STENT  2006     STENT(aka CARDIAC)  2008     VASCULAR SURGERY       Family History   Problem Relation Age of Onset     Unknown/Adopted Mother      Unknown/Adopted Father      Social History   Substance Use Topics     Smoking status: Former Smoker     Packs/day: 0.50     Years: 22.00     Types: Cigarettes     Quit  date: 4/15/1992     Smokeless tobacco: Never Used     Alcohol use 0.6 oz/week     1 Standard drinks or equivalent per week      Comment: MAYBE ONCE A YEAR     Allergies   Allergen Reactions     Ace Inhibitors Cough     Baclofen Other (See Comments)     Constipation , tiredness     Penicillins      10/7/14 Hives per patient -- many many years ago       Nickel Swelling and Rash     Other reaction(s): Unknown     Current Outpatient Rx   Medication Sig Dispense Refill     [place] 250 MG TABS Take 250 mg by mouth every morning        apixaban ANTICOAGULANT (ELIQUIS) 5 MG tablet Take 1 tablet (5 mg) by mouth 2 times daily 60 tablet 1     botulinum toxin type A (BOTOX) 100 units injection Inject 200 Units into the muscle every 3 months 200 Units 4     calcium carbonate-vitamin D (CALCIUM + D) 600-200 MG-UNIT TABS Take 1 tablet by mouth At Bedtime        carbidopa-levodopa (SINEMET)  MG per tablet TAKE 2 TABLETS FOUR TIMES A DAY (Patient taking differently: Take 2 tablets by mouth 4 times daily as needed (legs) Takes as needed throughout the day, up to 4 times daily) 720 tablet 3     carvedilol (COREG) 6.25 MG tablet Take 1 tablet (6.25 mg) by mouth 2 times daily (with meals) 180 tablet 3     clopidogrel (PLAVIX) 75 MG tablet Take 1 tablet (75 mg) by mouth daily (Patient taking differently: Take 75 mg by mouth every evening ) 90 tablet 2     Cyanocobalamin (VITAMIN B-12 PO) Take 1 tablet by mouth every morning        isosorbide mononitrate (IMDUR) 30 MG 24 hr tablet Take 1 tablet (30 mg) by mouth daily 90 tablet 3     levothyroxine (SYNTHROID/LEVOTHROID) 112 MCG tablet TAKE 1 TABLET DAILY 90 tablet 1     Multiple Vitamins-Minerals (DAILY MULTI) TABS Take 1 tablet by mouth every morning        nitroglycerin (NITROSTAT) 0.4 MG SL tablet DISSOLVE ONE TABLET UNDER THE TONGUE EVERY 5 MINUTES AS NEEDED FOR CHEST PAIN.  DO NOT EXCEED A TOTAL OF 3 DOSES IN 15 MINUTES 25 tablet 3     omeprazole (PRILOSEC) 20 MG CR capsule TAKE  2 CAPSULES DAILY 30 TO 60 MINUTES BEFORE A MEAL (Patient taking differently: TAKE 2 CAPSULES HS) 180 capsule 1     rosuvastatin (CRESTOR) 20 MG tablet Take 1 tablet (20 mg) by mouth daily 90 tablet 2       Objective:   There were no vitals taken for this visit.    General: Patient is well groomed, appears stated age, is alert, cooperative and in no acute distress.  Vascular: DP and PT pulses are non-palpable bilaterally. LE capillary refill time is <3 seconds. Absent pedal hair. Mild non-pitting LE edema.  MSK: Dorsally contracted digits 2-5 that are non-reducible, very limited passive ROM. Equinus present.  Neurologic: Gross sensation intact to bilateral LE.   Skin: LE skin color, texture and turgor are normal. Toenails are slightly elongated bilaterally. Right lateral hallux nail is ingrown mildly, nail border is mildly edematous. No erythema, drainage, pain or calor.     Previous Laboratory Studies:   Lab Results   Component Value Date    A1C 6.1 05/07/2018       Assessment:   - Elongated toenails  - Chronic anticoagulant use for atrial fibrillation  - Peripheral arterial disease with history of lower leg ulceration    Plan:   - Toenails trimmed x 10  - Pt to return to clinic in 3 months or as needed

## 2018-05-23 NOTE — NURSING NOTE
Reason For Visit:   Chief Complaint   Patient presents with     RECHECK     Toe nail trimming.        Pain Assessment  Patient Currently in Pain: Denies          Current Outpatient Prescriptions   Medication Sig Dispense Refill     [place] 250 MG TABS Take 250 mg by mouth every morning        apixaban ANTICOAGULANT (ELIQUIS) 5 MG tablet Take 1 tablet (5 mg) by mouth 2 times daily 60 tablet 1     botulinum toxin type A (BOTOX) 100 units injection Inject 200 Units into the muscle every 3 months 200 Units 4     calcium carbonate-vitamin D (CALCIUM + D) 600-200 MG-UNIT TABS Take 1 tablet by mouth At Bedtime        carbidopa-levodopa (SINEMET)  MG per tablet TAKE 2 TABLETS FOUR TIMES A DAY (Patient taking differently: Take 2 tablets by mouth 4 times daily as needed (legs) Takes as needed throughout the day, up to 4 times daily) 720 tablet 3     carvedilol (COREG) 6.25 MG tablet Take 1 tablet (6.25 mg) by mouth 2 times daily (with meals) 180 tablet 3     clopidogrel (PLAVIX) 75 MG tablet Take 1 tablet (75 mg) by mouth daily (Patient taking differently: Take 75 mg by mouth every evening ) 90 tablet 2     Cyanocobalamin (VITAMIN B-12 PO) Take 1 tablet by mouth every morning        isosorbide mononitrate (IMDUR) 30 MG 24 hr tablet Take 1 tablet (30 mg) by mouth daily 90 tablet 3     levothyroxine (SYNTHROID/LEVOTHROID) 112 MCG tablet TAKE 1 TABLET DAILY 90 tablet 1     Multiple Vitamins-Minerals (DAILY MULTI) TABS Take 1 tablet by mouth every morning        nitroglycerin (NITROSTAT) 0.4 MG SL tablet DISSOLVE ONE TABLET UNDER THE TONGUE EVERY 5 MINUTES AS NEEDED FOR CHEST PAIN.  DO NOT EXCEED A TOTAL OF 3 DOSES IN 15 MINUTES 25 tablet 3     omeprazole (PRILOSEC) 20 MG CR capsule TAKE 2 CAPSULES DAILY 30 TO 60 MINUTES BEFORE A MEAL (Patient taking differently: TAKE 2 CAPSULES HS) 180 capsule 1     rosuvastatin (CRESTOR) 20 MG tablet Take 1 tablet (20 mg) by mouth daily 90 tablet 2          Allergies   Allergen Reactions      Ace Inhibitors Cough     Baclofen Other (See Comments)     Constipation , tiredness     Penicillins      10/7/14 Hives per patient -- many many years ago       Nickel Swelling and Rash     Other reaction(s): Unknown

## 2018-05-23 NOTE — LETTER
5/23/2018       RE: Katherin Fletcher  [address]  [facility] [address]     Dear Colleague,    Thank you for referring your patient, Katherin Fletcher, to the [facility] at [facility] Please see a copy of my visit note below.    Chief Complaint:   Chief Complaint   Patient presents with     RECHECK     Toe nail trimming.      Subjective: Katherin is a 77 year old female who presents to the clinic today for toenail trim. No new lower extremity concerns. She would like toenails trimmed as she is unable to do this herself.     ROS: Denies lower extremity pain, increased swelling, open lesions, rashes.     Past Medical History:   Diagnosis Date     CAD (coronary artery disease)     s/p CABG 1992 and redo CABG 2009 (LIMA to LAD, SVG to OM and SVG to posterolateral branch of RCA)     Essential hypertension, benign      GERD (gastroesophageal reflux disease)      Hyperlipidaemia      Hyperlipidaemia LDL goal < 130      Ischemic cardiomyopathy      MI (myocardial infarction) 11/1996     Mixed hyperlipidemia      PAD (peripheral artery disease) (H)      Restless legs syndrome      Unspecified hypothyroidism      Past Surgical History:   Procedure Laterality Date     ARTHROSCOPY SHOULDER RT/LT       BUNIONECTOMY RT/LT       C CABG, ARTERY-VEIN, THREE  1/2009     C CARDIAC SURG PROCEDURE UNLIST       C HAND/FINGER SURGERY UNLISTED       C MRA UPPER EXTREMITY WO&W CONT       C SHOULDER SURG PROC UNLISTED       C STOMACH SURGERY PROCEDURE UNLISTED       CARDIAC SURGERY  1992    CABG,stents x2     CARPAL TUNNEL RELEASE RT/LT       HC VASCULAR SURGERY PROCEDURE UNLIST       HYSTERECTOMY, DAMIÁN  34 yo     OPEN REDUCTION INTERNAL FIXATION ANKLE      Left Ankle     RELEASE TRIGGER FINGER       REPAIR HAMMER TOE Right 12/19/2017    Procedure: REPAIR HAMMER TOE;  Right 2nd and 3rd toe pinning;  Surgeon: Fernando Wiggins MD;  Location: [facility]     STENT  2006      STENT(aka CARDIAC)  2008     VASCULAR SURGERY       Family History   Problem Relation Age of Onset     Unknown/Adopted Mother      Unknown/Adopted Father      Social History   Substance Use Topics     Smoking status: Former Smoker     Packs/day: 0.50     Years: 22.00     Types: Cigarettes     Quit date: 4/15/1992     Smokeless tobacco: Never Used     Alcohol use 0.6 oz/week     1 Standard drinks or equivalent per week      Comment: MAYBE ONCE A YEAR     Allergies   Allergen Reactions     Ace Inhibitors Cough     Baclofen Other (See Comments)     Constipation , tiredness     Penicillins      10/7/14 Hives per patient -- many many years ago       Nickel Swelling and Rash     Other reaction(s): Unknown     Current Outpatient Rx   Medication Sig Dispense Refill     [place] 250 MG TABS Take 250 mg by mouth every morning        apixaban ANTICOAGULANT (ELIQUIS) 5 MG tablet Take 1 tablet (5 mg) by mouth 2 times daily 60 tablet 1     botulinum toxin type A (BOTOX) 100 units injection Inject 200 Units into the muscle every 3 months 200 Units 4     calcium carbonate-vitamin D (CALCIUM + D) 600-200 MG-UNIT TABS Take 1 tablet by mouth At Bedtime        carbidopa-levodopa (SINEMET)  MG per tablet TAKE 2 TABLETS FOUR TIMES A DAY (Patient taking differently: Take 2 tablets by mouth 4 times daily as needed (legs) Takes as needed throughout the day, up to 4 times daily) 720 tablet 3     carvedilol (COREG) 6.25 MG tablet Take 1 tablet (6.25 mg) by mouth 2 times daily (with meals) 180 tablet 3     clopidogrel (PLAVIX) 75 MG tablet Take 1 tablet (75 mg) by mouth daily (Patient taking differently: Take 75 mg by mouth every evening ) 90 tablet 2     Cyanocobalamin (VITAMIN B-12 PO) Take 1 tablet by mouth every morning        isosorbide mononitrate (IMDUR) 30 MG 24 hr tablet Take 1 tablet (30 mg) by mouth daily 90 tablet 3     levothyroxine (SYNTHROID/LEVOTHROID) 112 MCG tablet TAKE 1 TABLET DAILY 90 tablet 1     Multiple  Vitamins-Minerals (DAILY MULTI) TABS Take 1 tablet by mouth every morning        nitroglycerin (NITROSTAT) 0.4 MG SL tablet DISSOLVE ONE TABLET UNDER THE TONGUE EVERY 5 MINUTES AS NEEDED FOR CHEST PAIN.  DO NOT EXCEED A TOTAL OF 3 DOSES IN 15 MINUTES 25 tablet 3     omeprazole (PRILOSEC) 20 MG CR capsule TAKE 2 CAPSULES DAILY 30 TO 60 MINUTES BEFORE A MEAL (Patient taking differently: TAKE 2 CAPSULES HS) 180 capsule 1     rosuvastatin (CRESTOR) 20 MG tablet Take 1 tablet (20 mg) by mouth daily 90 tablet 2       Objective:   There were no vitals taken for this visit.    General: Patient is well groomed, appears stated age, is alert, cooperative and in no acute distress.  Vascular: DP and PT pulses are non-palpable bilaterally. LE capillary refill time is <3 seconds. Absent pedal hair. Mild non-pitting LE edema.  MSK: Dorsally contracted digits 2-5 that are non-reducible, very limited passive ROM. Equinus present.  Neurologic: Gross sensation intact to bilateral LE.   Skin: LE skin color, texture and turgor are normal. Toenails are slightly elongated bilaterally. Right lateral hallux nail is ingrown mildly, nail border is mildly edematous. No erythema, drainage, pain or calor.     Previous Laboratory Studies:   Lab Results   Component Value Date    A1C 6.1 05/07/2018       Assessment:   - Elongated toenails  - Chronic anticoagulant use for atrial fibrillation  - Peripheral arterial disease with history of lower leg ulceration    Plan:   - Toenails trimmed x 10  - Pt to return to clinic in 3 months or as needed        Again, thank you for allowing me to participate in the care of your patient.      Sincerely,    Taty Bullard PA-C

## 2018-05-23 NOTE — MR AVS SNAPSHOT
After Visit Summary   5/23/2018    Katherin Fletcher    MRN: [id number]           Patient Information     Date Of Birth          1940        Visit Information        Provider Department      5/23/2018 1:00 PM Taty Bullard PA-C [facility]        Today's Diagnoses     Long toenail    -  1    Peripheral arterial disease (H)        Long term current use of anticoagulant therapy           Follow-ups after your visit        Your next 10 appointments already scheduled     Jun 01, 2018 10:30 AM CDT   (Arrive by 10:15 AM)   Return Ataxia with Joe Randolph MD   [facility] ([facility])    [address] [place] [address]  3rd [facility] [address]   [phone]            Jul 03, 2018  4:10 PM CDT   (Arrive by 3:55 PM)   New Botox with Darline Celestin MD   [facility] ([facility])    [address] [place] [id number] [phone]            Aug 22, 2018  1:00 PM CDT   (Arrive by 12:45 PM)   RETURN FOOT/ANKLE with Taty Bullard PA-C   [facility] ([facility])    [address] [place] [id number] [phone]              Who to contact     Please call your clinic at [phone] to:    Ask questions about your health    Make or cancel appointments    Discuss your medicines    Learn about your test results    Speak to your doctor            Additional Information About Your Visit        [employer] Information     [facility] gives you secure access to your electronic health record. If you see a primary care provider, you can also send messages to your care team and make appointments. If you have questions, please call your primary care clinic.  If you do not have a primary care provider, please call [phone] and they will assist you.      [facility] [facility] is an electronic gateway that provides easy, online access  to your medical records. With [employer], you can request a clinic appointment, read your test results, renew a prescription or communicate with your care team.     To access your existing account, please contact your [facility] or call [phone] for assistance.        Care EveryWhere ID     This is your Care EveryWhere ID. This could be used by other organizations to access your [facility] medical records  [id number]         Blood Pressure from Last 3 Encounters:   05/16/18 112/60   05/10/18 127/62   04/20/18 125/79    Weight from Last 3 Encounters:   05/16/18 73.5 kg (162 lb)   05/10/18 71 kg (156 lb 9.6 oz)   04/20/18 74.8 kg (165 lb)              We Performed the Following     TRIM NONDYSTRPHIC NAIL(S)          Today's Medication Changes          These changes are accurate as of 5/23/18  1:22 PM.  If you have any questions, ask your nurse or doctor.               These medicines have changed or have updated prescriptions.        Dose/Directions    carbidopa-levodopa  MG per tablet   Commonly known as:  SINEMET   This may have changed:    - how much to take  - how to take this  - when to take this  - reasons to take this  - additional instructions   Used for:  Restless legs syndrome, Neurologic gait disorder, Dysarthria, Cerebellar disease        TAKE 2 TABLETS FOUR TIMES A DAY   Quantity:  720 tablet   Refills:  3       clopidogrel 75 MG tablet   Commonly known as:  PLAVIX   This may have changed:  when to take this   Used for:  CAD (coronary artery disease)        Dose:  75 mg   Take 1 tablet (75 mg) by mouth daily   Quantity:  90 tablet   Refills:  2       omeprazole 20 MG CR capsule   Commonly known as:  priLOSEC   This may have changed:  See the new instructions.   Used for:  Encounter for long-term (current) use of medications        TAKE 2 CAPSULES DAILY 30 TO 60 MINUTES BEFORE A MEAL   Quantity:  180 capsule   Refills:  1                Primary Care Provider Office  Phone # Fax #    Aris Del Real [phone] [address]  [facility] [address]        Equal Access to Services     GEORGIERHONDA RODRIGUEZADRIANA : Yuly rosio joel anao Somichelle, waaxda luqadaha, qaybta kaalmada supriya, carlo winstonstan matthew. So [facility] [phone].    ATENCIÓN: Si habla español, tiene a taylor disposición servicios gratuitos de asistencia lingüística. Karen al [phone].    We comply with applicable federal civil rights laws and [place] laws. We do not discriminate on the basis of race, color, national origin, age, disability, sex, sexual orientation, or gender identity.            Thank you!     Thank you for choosing [facility]  for your care. Our goal is always to provide you with excellent care. Hearing back from our patients is one way we can continue to improve our services. Please take a few minutes to complete the written survey that you may receive in the mail after your visit with us. Thank you!             Your Updated Medication List - Protect others around you: Learn how to safely use, store and throw away your medicines at www.disposemymeds.org.          This list is accurate as of 5/23/18  1:22 PM.  Always use your most recent med list.                   Brand Name Dispense Instructions for use Diagnosis    Alfalfa 250 MG Tabs      Take 250 mg by mouth every morning        apixaban ANTICOAGULANT 5 MG tablet    ELIQUIS    60 tablet    Take 1 tablet (5 mg) by mouth 2 times daily    Cerebrovascular accident (CVA), unspecified mechanism (H)       botulinum toxin type A 100 units injection    BOTOX    200 Units    Inject 200 Units into the muscle every 3 months    Muscle spasms of both lower extremities       calcium + D 600-200 MG-UNIT Tabs   Generic drug:  calcium carbonate-vitamin D      Take 1 tablet by mouth At Bedtime    Preventative health care       carbidopa-levodopa  MG per tablet    SINEMET    720 tablet    TAKE 2  TABLETS FOUR TIMES A DAY    Restless legs syndrome, Neurologic gait disorder, Dysarthria, Cerebellar disease       carvedilol 6.25 MG tablet    COREG    180 tablet    Take 1 tablet (6.25 mg) by mouth 2 times daily (with meals)    Benign essential hypertension       clopidogrel 75 MG tablet    PLAVIX    90 tablet    Take 1 tablet (75 mg) by mouth daily    CAD (coronary artery disease)       DAILY MULTI Tabs      Take 1 tablet by mouth every morning        isosorbide mononitrate 30 MG 24 hr tablet    IMDUR    90 tablet    Take 1 tablet (30 mg) by mouth daily    Coronary artery disease involving native coronary artery of native heart without angina pectoris       levothyroxine 112 MCG tablet    SYNTHROID/LEVOTHROID    90 tablet    TAKE 1 TABLET DAILY    Encounter for medication refill       nitroGLYcerin 0.4 MG sublingual tablet    NITROSTAT    25 tablet    DISSOLVE ONE TABLET UNDER THE TONGUE EVERY 5 MINUTES AS NEEDED FOR CHEST PAIN.  DO NOT EXCEED A TOTAL OF 3 DOSES IN 15 MINUTES    CAD (coronary artery disease)       omeprazole 20 MG CR capsule    priLOSEC    180 capsule    TAKE 2 CAPSULES DAILY 30 TO 60 MINUTES BEFORE A MEAL    Encounter for long-term (current) use of medications       rosuvastatin 20 MG tablet    CRESTOR    90 tablet    Take 1 tablet (20 mg) by mouth daily    Hyperlipidemia       VITAMIN B-12 PO      Take 1 tablet by mouth every morning

## 2018-05-25 ENCOUNTER — TELEPHONE (OUTPATIENT)
Dept: FAMILY MEDICINE | Facility: CLINIC | Age: 78
End: 2018-05-25

## 2018-05-25 DIAGNOSIS — I25.10 CORONARY ARTERY DISEASE INVOLVING NATIVE CORONARY ARTERY OF NATIVE HEART WITHOUT ANGINA PECTORIS: ICD-10-CM

## 2018-05-25 DIAGNOSIS — M53.82 NECK MUSCLE WEAKNESS: Primary | ICD-10-CM

## 2018-05-25 DIAGNOSIS — M47.812 OSTEOARTHRITIS OF CERVICAL SPINE, UNSPECIFIED SPINAL OSTEOARTHRITIS COMPLICATION STATUS: ICD-10-CM

## 2018-05-25 DIAGNOSIS — M48.02 CERVICAL STENOSIS OF SPINAL CANAL: ICD-10-CM

## 2018-05-25 DIAGNOSIS — Z79.01 LONG TERM CURRENT USE OF ANTICOAGULANT THERAPY: ICD-10-CM

## 2018-05-25 DIAGNOSIS — M48.02 FORAMINAL STENOSIS OF CERVICAL REGION: ICD-10-CM

## 2018-05-25 NOTE — TELEPHONE ENCOUNTER
Pt reported weak muscles in her neck. MRI shows severe spinal canal stenosis with flattening of the spinal cord at C5-C6. Please have her see a spine surgeon. Thanks!

## 2018-05-25 NOTE — TELEPHONE ENCOUNTER
Called and spoke with patient regarding MRI results per Dr Villagomez.  Patient verbalized understanding and was advised that if unable to see Dr Ledezma in next 1-2 weeks then Dr Villagomez would be OK with her seeing other spinal surgeon.  Referral entered and faxed.  Candy Gaona

## 2018-05-28 PROBLEM — Z86.73 HISTORY OF TIA (TRANSIENT ISCHEMIC ATTACK) AND STROKE: Status: ACTIVE | Noted: 2018-05-07

## 2018-05-28 PROBLEM — R29.6 FALLS FREQUENTLY: Status: ACTIVE | Noted: 2018-05-28

## 2018-05-29 ENCOUNTER — TRANSFERRED RECORDS (OUTPATIENT)
Dept: FAMILY MEDICINE | Facility: CLINIC | Age: 78
End: 2018-05-29

## 2018-05-30 ENCOUNTER — TRANSFERRED RECORDS (OUTPATIENT)
Dept: FAMILY MEDICINE | Facility: CLINIC | Age: 78
End: 2018-05-30

## 2018-05-31 ENCOUNTER — MEDICAL CORRESPONDENCE (OUTPATIENT)
Dept: FAMILY MEDICINE | Facility: CLINIC | Age: 78
End: 2018-05-31

## 2018-06-01 ENCOUNTER — OFFICE VISIT (OUTPATIENT)
Dept: NEUROLOGY | Facility: CLINIC | Age: 78
End: 2018-06-01
Payer: OTHER GOVERNMENT

## 2018-06-01 VITALS
WEIGHT: 160.1 LBS | DIASTOLIC BLOOD PRESSURE: 75 MMHG | HEART RATE: 64 BPM | SYSTOLIC BLOOD PRESSURE: 125 MMHG | HEIGHT: 64 IN | TEMPERATURE: 98 F | BODY MASS INDEX: 27.33 KG/M2

## 2018-06-01 DIAGNOSIS — M48.02 SPINAL STENOSIS IN CERVICAL REGION: Primary | ICD-10-CM

## 2018-06-01 ASSESSMENT — PAIN SCALES - GENERAL: PAINLEVEL: NO PAIN (0)

## 2018-06-01 NOTE — MR AVS SNAPSHOT
After Visit Summary   6/1/2018    Katherin Fletcher    MRN: [id number]           Patient Information     Date Of Birth          1940        Visit Information        Provider Department      6/1/2018 10:30 AM Joe Randolph MD [facility] Neurology        Today's Diagnoses     Spinal stenosis in cervical region    -  1       Follow-ups after your visit        Your next 10 appointments already scheduled     Aug 22, 2018  1:00 PM CDT   (Arrive by 12:45 PM)   RETURN FOOT/ANKLE with Taty Bullard PA-C   [facility] ([facility])    [address]  [facility] [id number] [phone]            Sep 13, 2018  3:15 PM CDT   Return Visit with Kenton Grayson MD   [facility] ([facility])    [address] [place] [id number] [phone] OPT 2              Who to contact     Please call your clinic at [phone] to:    Ask questions about your health    Make or cancel appointments    Discuss your medicines    Learn about your test results    Speak to your doctor            Additional Information About Your Visit        MyChart Information     Cellay gives you secure access to your electronic health record. If you see a primary care provider, you can also send messages to your care team and make appointments. If you have questions, please call your primary care clinic.  If you do not have a primary care provider, please call [phone] and they will assist you.      Cellay is an electronic gateway that provides easy, online access to your medical records. With Cellay, you can request a clinic appointment, read your test results, renew a prescription or communicate with your care team.     To access your existing account, please contact your [facility] or call [phone] for assistance.        Care EveryWhere ID     This is your Care  "EveryWhere ID. This could be used by other organizations to access your [facility] medical records  [id number]        Your Vitals Were     Pulse Temperature Height Breastfeeding? BMI (Body Mass Index)       64 98  F (36.7  C) (Oral) 1.626 m (5' 4\") No 27.48 kg/m2        Blood Pressure from Last 3 Encounters:   06/29/18 116/66   06/01/18 125/75   05/16/18 112/60    Weight from Last 3 Encounters:   06/29/18 72.1 kg (159 lb)   06/01/18 72.6 kg (160 lb 1.6 oz)   05/16/18 73.5 kg (162 lb)              Today, you had the following     No orders found for display         Today's Medication Changes          These changes are accurate as of 6/1/18 11:59 PM.  If you have any questions, ask your nurse or doctor.               These medicines have changed or have updated prescriptions.        Dose/Directions    carbidopa-levodopa  MG per tablet   Commonly known as:  SINEMET   This may have changed:    - how much to take  - how to take this  - when to take this  - reasons to take this  - additional instructions   Used for:  Restless legs syndrome, Neurologic gait disorder, Dysarthria, Cerebellar disease        TAKE 2 TABLETS FOUR TIMES A DAY   Quantity:  720 tablet   Refills:  3       clopidogrel 75 MG tablet   Commonly known as:  PLAVIX   This may have changed:  when to take this   Used for:  CAD (coronary artery disease)        Dose:  75 mg   Take 1 tablet (75 mg) by mouth daily   Quantity:  90 tablet   Refills:  2       omeprazole 20 MG CR capsule   Commonly known as:  priLOSEC   This may have changed:  See the new instructions.   Used for:  Encounter for long-term (current) use of medications        TAKE 2 CAPSULES DAILY 30 TO 60 MINUTES BEFORE A MEAL   Quantity:  180 capsule   Refills:  1                Primary Care Provider Office Phone # Fax #    Aris Del Real [phone] [address] [facility] [id number]        Equal Access to Services     JOSELUIS MARDIGAL AH: Yuly farnsworth " Somichelle, waaxda luqadaha, qaybta kaalgordon epps, carol winstonstan matthew. So [facility] [phone].    ATENCIÓN: Si harry becerril, tiene a taylor disposición servicios gratuitos de asistencia lingüística. Karen al [phone].    We comply with applicable federal civil rights laws and [place] laws. We do not discriminate on the basis of race, color, national origin, age, disability, sex, sexual orientation, or gender identity.            Thank you!     Thank you for choosing [facility] NEUROLOG[facility]  for your care. Our goal is always to provide you with excellent care. Hearing back from our patients is one way we can continue to improve our services. Please take a few minutes to complete the written survey that you may receive in the mail after your visit with us. Thank you!             Your Updated Medication List - Protect others around you: Learn how to safely use, store and throw away your medicines at www.disposemymeds.org.          This list is accurate as of 6/1/18 11:59 PM.  Always use your most recent med list.                   Brand Name Dispense Instructions for use Diagnosis    Alfalfa 250 MG Tabs      Take 250 mg by mouth every morning        botulinum toxin type A 100 units injection    BOTOX    200 Units    Inject 200 Units into the muscle every 3 months    Muscle spasms of both lower extremities       calcium + D 600-200 MG-UNIT Tabs   Generic drug:  calcium carbonate-vitamin D      Take 1 tablet by mouth At Bedtime    Preventative health care       carbidopa-levodopa  MG per tablet    SINEMET    720 tablet    TAKE 2 TABLETS FOUR TIMES A DAY    Restless legs syndrome, Neurologic gait disorder, Dysarthria, Cerebellar disease       carvedilol 6.25 MG tablet    COREG    180 tablet    Take 1 tablet (6.25 mg) by mouth 2 times daily (with meals)    Benign essential hypertension       clopidogrel 75 MG tablet    PLAVIX    90 tablet    Take 1 tablet (75 mg) by mouth daily    CAD (coronary  artery disease)       DAILY MULTI Tabs      Take 1 tablet by mouth every morning        nitroGLYcerin 0.4 MG sublingual tablet    NITROSTAT    25 tablet    DISSOLVE ONE TABLET UNDER THE TONGUE EVERY 5 MINUTES AS NEEDED FOR CHEST PAIN.  DO NOT EXCEED A TOTAL OF 3 DOSES IN 15 MINUTES    CAD (coronary artery disease)       omeprazole 20 MG CR capsule    priLOSEC    180 capsule    TAKE 2 CAPSULES DAILY 30 TO 60 MINUTES BEFORE A MEAL    Encounter for long-term (current) use of medications       rosuvastatin 20 MG tablet    CRESTOR    90 tablet    Take 1 tablet (20 mg) by mouth daily    Hyperlipidemia       VITAMIN B-12 PO      Take 1 tablet by mouth every morning

## 2018-06-01 NOTE — LETTER
"6/1/2018       RE: Katherin Fletcher  [address]  [place] [place] [facility] [place] [facility] [address]     Dear Colleague,    Thank you for referring your patient, Katherin Fletcher, to the [facility] at [facility] acute [facility]. Please see a copy of my visit note below.    Service Date: 06/01/2018      HISTORY OF PRESENT ILLNESS:   Katherin is a 78-year-old right-handed woman whom I have seen on several occasions in the past with the most recent encounter being on 04/2018.   She has a history of an ataxic-type gait with spasticity being the greatest problem.  She has had development of posterior neck muscle weakness in the more recent past since the first of the year.  I had referred her to Physical Medicine and Rehabilitation for evaluation of that hoping that an EMG could be done.      In the intervening time since I saw her, she was admitted to [facility] on 05/07/2018 because of a sudden onset in the afternoon of numbness of her lips and the right side of her face and \"thick\" speech.  She noted a little weakness on the right side as well.  When seen in the emergency room, she had normal vital signs.  CT imaging of the head did not show any evidence of intracranial bleeding.  However, the report indicates that there is atrophy of the brain and white matter changes consistent with sequelae of small vessel ischemic disease.  The next day in the morning she had an MRI of the brain without contrast and this again showed diffuse cerebral volume loss and cerebral white matter changes consistent with SVID.  MR angiogram of the neck with and without contrast showed that the carotid arteries were widely patent as were the vertebrals.  The aortic arch appeared to be normal.  The patient was seen by Neurology and it was felt that she would not be a candidate for TPA nor neuro intervention.  Katherin has a history of atrial fibrillation and has been on Plavix but was also started on apixaban " "and her aspirin was stopped.  She also had exacerbation of her restless leg syndrome which was brought under control with levodopa/carbidopa.  The patient was discharged on 05/10/2018 and seen for followup on 05/16/2018.  She felt that she was at baseline at that time and had no residual deficits pertaining to that TIA.  The Marielle Richard MD was her treating physician at that time.      Then to address the problem of her spasticity and her weak neck, Dr. Richard referred the patient to Colton Ledezma of [facility].  She eventually had an MRI of the cervical spine which identified severe stenosis at C5-6.  Dr. Ledezma indicated he could do a 1-level decompression.  She also saw Dr. Handy Pham of the [facility] on 05/30/2018 and he decided that he would see her back to perform an EMG.  He mentioned in his report that her presumed TIA \"may have in fact be related to her cervical spine condition.\"      The patient did not recount all of this Information herself but I gleaned it from records obtained from the [facility] and [facility] and Dr. Richard.      At this time, Katherin is experiencing personal frustration not only with her health but with a situation at home with regard to her  who is also disabled and is 89 years old.  Fortunately, she was able to obtain a chair lift for her stairs and that was one of the major accomplishments since I last saw her.  She feels much safer living at home with this new device.      CURRENT MEDICATIONS:   1.  Oral alfalfa.     2.  Calcium with D 500 mg/400 units 1 daily.   3.  Carbidopa/levodopa 25/100 mg 2 orally 4 times a day.   4.  Carvedilol 6.25 mg 1 tablet 2 times a day with food.   5.  Crestor 20 mg 1 daily.   6.  Eliquis oral b.i.d.   7.  Glucosamine chondroitin daily.   8.  Isosorbide mononitrate 30 mg 1 in the morning.   9.  Levothyroxine 125 mcg 1 daily in the morning.   10.  " Lisinopril 5 mg 1 tablet by oral route daily.   11.  Multivitamins 1 daily.   12.  Sublingual nitroglycerin 0.4 mg sublingual tablet to be used p.r.n. at sign of any chest pain.   13.  Plavix 75 mg 1 daily.      PHYSICAL EXAMINATION:  Ms. Fletcher is a very pleasant lady who interacts well and establishes good eye contact.  She has a good fund of information and has no evidence of any thinking disorder.  Memory is good for recent and remote events.  Attention and concentration are normal.  Vital signs are recorded by the nurse in the record.      The neurologic examination is largely unchanged from that of 04/20/2018.  The cranial nerves are unremarkable.  There being no nystagmus.  No weakness of extraocular muscles.  The tongue is correct and the face is symmetric and the patient has normal ability to wrinkle her forehead and to close her eyes tightly.  The tongue is midline and the palate elevates well.  Her reflexes are active at 2+ in the upper extremities and knees and 1+ at the right ankle, but decreased on the left.  Toes are upgoing to plantar stimulation and she has 2 beats of ankle clonus on the right.  This could not be adequately tested on the left because of a previous fracture with fusion of the joint.  Muscle testing reveals good strength throughout in the upper extremities.  In the lower extremities, she has some left ankle weakness due to a history of the fusion.  There is a spastic catch of the quadriceps muscles bilaterally.  Cerebellar functioning was not notable other than there being some slurring of speech.  However, I could understand her 100%.  There is some slowness of alternating movements of the forearms.  There is slight past pointing on finger-to-nose testing.  Sensory testing revealed diminished pinprick perception in her feet about 50% of normal, but gradually improving to normal at the midcalf.  Temperature sensation likewise was diminished in the same fashion.  I did not have the  patient walk today.      IMPRESSION:   1.  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   2.  Posterior neck muscle weakness.  Etiology uncertain.  Doubt relationship to cervical stenosis, although it might be possible.   3.  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   4.  Polyneuropathy sensory type.   5.  Restless leg syndrome.   6.  Gait disorder associated with the spastic ataxia.      PLAN:   1.  EMG is to be done by Dr. Handy Pham at the [facility].  This has already been scheduled.   2.  I advised the patient that she should go along with whatever recommendations Dr. Ledezma and Dr. Richard have regarding decompression.  As it stands right now apparently Dr. Colton Ledezma is waiting to hear whether or not there are too many medical issues for the patient to undergo risky surgical procedure.   3.  The patient is to return for a followup visit on 08/10/2018.      A total of 40 minutes was spent with the patient, the majority of which was a discussion of her intervening history and also her cervical spinal stenosis.  We also talked about her situation at home and the continuing difficulties with her  and also being disabled.      E&M code [id number]      Joe Randolph MD      D: 2018   T: 2018   MT: nh      Name:     ANA RODRIGUEZ   MRN:      [id number]        Account:      [id number]   :      1940           Service Date: 2018      Document: [id number]

## 2018-06-02 DIAGNOSIS — Z76.0 ENCOUNTER FOR MEDICATION REFILL: ICD-10-CM

## 2018-06-03 RX ORDER — LEVOTHYROXINE SODIUM 112 UG/1
TABLET ORAL
Qty: 90 TABLET | Refills: 1 | Status: SHIPPED | OUTPATIENT
Start: 2018-06-03 | End: 2019-04-03

## 2018-06-04 ENCOUNTER — MYC MEDICAL ADVICE (OUTPATIENT)
Dept: CARDIOLOGY | Facility: CLINIC | Age: 78
End: 2018-06-04

## 2018-06-04 DIAGNOSIS — I63.9 CEREBROVASCULAR ACCIDENT (CVA), UNSPECIFIED MECHANISM (H): ICD-10-CM

## 2018-06-05 ENCOUNTER — TELEPHONE (OUTPATIENT)
Dept: CARDIOLOGY | Facility: CLINIC | Age: 78
End: 2018-06-05

## 2018-06-05 NOTE — TELEPHONE ENCOUNTER
I am having an EMG on my neck on June 12th.  Dr Pham mentioned that I should refrain from taking Eliquis before that.  How many days should I not take this Rx?     Thanks,   Katherin Fletcher              Patient on Eliquis for coronary and vascular disease.

## 2018-06-12 ENCOUNTER — TRANSFERRED RECORDS (OUTPATIENT)
Dept: FAMILY MEDICINE | Facility: CLINIC | Age: 78
End: 2018-06-12

## 2018-06-13 NOTE — TELEPHONE ENCOUNTER
Dr. Grayson reviewed  And recommends being off Eliquis for  4 days and back the day after the procedure.               Sent message to patient via My Chart from Dr. Grayson.   From: Danielle Perez  To: Natali Baird MD  Sent: 7/27/2017 10:49 AM CDT  Subject: June's stomach issues    June's stomach/nausea/lack of appetite is still an issue. The Tums does not seem to alleviate it any longer.    She has not been out of the house since your appointment. I had my last chemo on Tuesday, she still shuts down when I mention moving out of the home.    Today she called me because she feels all \"muddled up\". Like she can't function. She claims she slept, but is still exhausted and weak almost all day/every day. When I go over, I take her BP and it is never alarmingly high or low. I know she wouldn't be able to make it out of the house for an appt. today, and I do not feel well enough to take her.    Could going off the Lisinipril contribute to any of this after being on it for such a long time?

## 2018-06-22 DIAGNOSIS — I25.10 CORONARY ARTERY DISEASE INVOLVING NATIVE CORONARY ARTERY OF NATIVE HEART WITHOUT ANGINA PECTORIS: ICD-10-CM

## 2018-06-22 RX ORDER — ISOSORBIDE MONONITRATE 30 MG/1
30 TABLET, EXTENDED RELEASE ORAL DAILY
Qty: 90 TABLET | Refills: 3 | Status: SHIPPED | OUTPATIENT
Start: 2018-06-22 | End: 2018-06-22

## 2018-06-22 RX ORDER — ISOSORBIDE MONONITRATE 30 MG/1
30 TABLET, EXTENDED RELEASE ORAL DAILY
Qty: 90 TABLET | Refills: 0 | Status: SHIPPED | OUTPATIENT
Start: 2018-06-22 | End: 2018-09-13

## 2018-06-28 NOTE — PROGRESS NOTES
"Service Date: 06/01/2018      HISTORY OF PRESENT ILLNESS:   Katherin is a 78-year-old right-handed woman whom I have seen on several occasions in the past with the most recent encounter being on 04/2018.   She has a history of an ataxic-type gait with spasticity being the greatest problem.  She has had development of posterior neck muscle weakness in the more recent past since the first of the year.  I had referred her to Physical Medicine and Rehabilitation for evaluation of that hoping that an EMG could be done.      In the intervening time since I saw her, she was admitted to [facility] on 05/07/2018 because of a sudden onset in the afternoon of numbness of her lips and the right side of her face and \"thick\" speech.  She noted a little weakness on the right side as well.  When seen in the emergency room, she had normal vital signs.  CT imaging of the head did not show any evidence of intracranial bleeding.  However, the report indicates that there is atrophy of the brain and white matter changes consistent with sequelae of small vessel ischemic disease.  The next day in the morning she had an MRI of the brain without contrast and this again showed diffuse cerebral volume loss and cerebral white matter changes consistent with SVID.  MR angiogram of the neck with and without contrast showed that the carotid arteries were widely patent as were the vertebrals.  The aortic arch appeared to be normal.  The patient was seen by Neurology and it was felt that she would not be a candidate for TPA nor neuro intervention.  Katherin has a history of atrial fibrillation and has been on Plavix but was also started on apixaban and her aspirin was stopped.  She also had exacerbation of her restless leg syndrome which was brought under control with levodopa/carbidopa.  The patient was discharged on 05/10/2018 and seen for followup on 05/16/2018.  She felt that she was at baseline at that time and had no residual " "deficits pertaining to that TIA.  The Marielle Richard MD was her treating physician at that time.      Then to address the problem of her spasticity and her weak neck, Dr. Richard referred the patient to Colton Ledezma of [facility].  She eventually had an MRI of the cervical spine which identified severe stenosis at C5-6.  Dr. Ledezma indicated he could do a 1-level decompression.  She also saw Dr. Handy Pham of the [facility] on 05/30/2018 and he decided that he would see her back to perform an EMG.  He mentioned in his report that her presumed TIA \"may have in fact be related to her cervical spine condition.\"      The patient did not recount all of this Information herself but I gleaned it from records obtained from the [facility] and [facility] and Dr. Richard.      At this time, Katherin is experiencing personal frustration not only with her health but with a situation at home with regard to her  who is also disabled and is 89 years old.  Fortunately, she was able to obtain a chair lift for her stairs and that was one of the major accomplishments since I last saw her.  She feels much safer living at home with this new device.      CURRENT MEDICATIONS:   1.  Oral alfalfa.     2.  Calcium with D 500 mg/400 units 1 daily.   3.  Carbidopa/levodopa 25/100 mg 2 orally 4 times a day.   4.  Carvedilol 6.25 mg 1 tablet 2 times a day with food.   5.  Crestor 20 mg 1 daily.   6.  Eliquis oral b.i.d.   7.  Glucosamine chondroitin daily.   8.  Isosorbide mononitrate 30 mg 1 in the morning.   9.  Levothyroxine 125 mcg 1 daily in the morning.   10.  Lisinopril 5 mg 1 tablet by oral route daily.   11.  Multivitamins 1 daily.   12.  Sublingual nitroglycerin 0.4 mg sublingual tablet to be used p.r.n. at sign of any chest pain.   13.  Plavix 75 mg 1 daily.      PHYSICAL EXAMINATION:  Ms. Fletcher is a very pleasant lady who interacts well and " establishes good eye contact.  She has a good fund of information and has no evidence of any thinking disorder.  Memory is good for recent and remote events.  Attention and concentration are normal.  Vital signs are recorded by the nurse in the record.      The neurologic examination is largely unchanged from that of 04/20/2018.  The cranial nerves are unremarkable.  There being no nystagmus.  No weakness of extraocular muscles.  The tongue is correct and the face is symmetric and the patient has normal ability to wrinkle her forehead and to close her eyes tightly.  The tongue is midline and the palate elevates well.  Her reflexes are active at 2+ in the upper extremities and knees and 1+ at the right ankle, but decreased on the left.  Toes are upgoing to plantar stimulation and she has 2 beats of ankle clonus on the right.  This could not be adequately tested on the left because of a previous fracture with fusion of the joint.  Muscle testing reveals good strength throughout in the upper extremities.  In the lower extremities, she has some left ankle weakness due to a history of the fusion.  There is a spastic catch of the quadriceps muscles bilaterally.  Cerebellar functioning was not notable other than there being some slurring of speech.  However, I could understand her 100%.  There is some slowness of alternating movements of the forearms.  There is slight past pointing on finger-to-nose testing.  Sensory testing revealed diminished pinprick perception in her feet about 50% of normal, but gradually improving to normal at the midcalf.  Temperature sensation likewise was diminished in the same fashion.  I did not have the patient walk today.      IMPRESSION:   1.  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   2.  Posterior neck muscle weakness.  Etiology uncertain.  Doubt relationship to cervical stenosis, although it might be possible.   3.  Dysarthria  secondary to cerebellar dysfunction.   4.  Polyneuropathy sensory type.   5.  Restless leg syndrome.   6.  Gait disorder associated with the spastic ataxia.      PLAN:   1.  EMG is to be done by Dr. Handy Pham at the [facility].  This has already been scheduled.   2.  I advised the patient that she should go along with whatever recommendations Dr. Ledezma and Dr. Richard have regarding decompression.  As it stands right now apparently Dr. Colton Ledezma is waiting to hear whether or not there are too many medical issues for the patient to undergo risky surgical procedure.   3.  The patient is to return for a followup visit on 08/10/2018.      A total of 40 minutes was spent with the patient, the majority of which was a discussion of her intervening history and also her cervical spinal stenosis.  We also talked about her situation at home and the continuing difficulties with her  and also being disabled.      E&M code [id number]      MD SMITHA Salazar MD             D: 2018   T: 2018   MT: nh      Name:     ANA RODRIGUEZ   MRN:      [id number]        Account:      [id number]   :      1940           Service Date: 2018      Document: [id number]

## 2018-06-29 ENCOUNTER — OFFICE VISIT (OUTPATIENT)
Dept: FAMILY MEDICINE | Facility: CLINIC | Age: 78
End: 2018-06-29

## 2018-06-29 VITALS
BODY MASS INDEX: 27.29 KG/M2 | WEIGHT: 159 LBS | SYSTOLIC BLOOD PRESSURE: 116 MMHG | DIASTOLIC BLOOD PRESSURE: 66 MMHG | OXYGEN SATURATION: 97 % | RESPIRATION RATE: 16 BRPM | HEART RATE: 80 BPM

## 2018-06-29 DIAGNOSIS — M48.02 CERVICAL STENOSIS OF SPINAL CANAL: ICD-10-CM

## 2018-06-29 DIAGNOSIS — M62.89: Primary | ICD-10-CM

## 2018-06-29 DIAGNOSIS — R94.131 ABNORMAL EMG: ICD-10-CM

## 2018-06-29 PROCEDURE — 73560 X-RAY EXAM OF KNEE 1 OR 2: CPT | Performed by: FAMILY MEDICINE

## 2018-06-29 PROCEDURE — 99213 OFFICE O/P EST LOW 20 MIN: CPT | Performed by: FAMILY MEDICINE

## 2018-06-29 NOTE — PROGRESS NOTES
"Problem(s) Oriented visit      SUBJECTIVE:                                                    Katherin Fletcher is a 78 year old female who presents to clinic today for:  Patient presents with:  RECHECK: Muscle weakness    Pt present to follow-up on degenerative muscle weakness and inability to hold her head up due to profound muscle weakness. At that time, her dx was a nonspecific degenerative muscle disease, but no more specific dx was available. Since I last saw her, she completed an MRI of her c-spine and had ortho spine consult with Dr. Colton Ledezma. She also saw Dr. George Pham at [facility].   She had an EMG at the [facility] 6/12/2018. This showed \"severe chronic denervation of all upper extremity and cervical paraspinal muscles. This is consistent with a cervical polyradiculopathy.\"    She continues to have difficult w/ muscle weakness in her neck. She relies on a soft c-collar for support.   She is here to talk through what she has learned from Neurology and Dr. Colton Ledezma at [facility] (c-spine surgeon).     She is torn about taking the risk to have surgery - risks vs. Benefits and even if surgery is successful, she questions if it will help with the original problem with her neck muscles. She is aware she has severe central canal stenosis and a fall could be devastating.     She also has a lump behind her knee today. First noticed it 3 weeks ago. No painful. No erythema. Does not interfere w/ the function of her knee. No new knee pain. Would like to have this evaluated.     Problem list, Medication list, Allergies, and Medical/Social/Surgical histories reviewed in [facility] and updated as appropriate.     ROS:  5 point ROS completed and negative except noted above, including Gen, CV, Resp, GI, MS    Histories:   Patient Active Problem List   Diagnosis     Long term current use of anticoagulant therapy     CAD (coronary artery disease)     Hyperlipidemia     Health Care Home     " MRSA (methicillin resistant Staphylococcus aureus)     Dysarthria     Disturbances of sensation of smell and taste     Neurologic gait disorder     Cerebellar disease     Essential hypertension     Ischemic cardiomyopathy     Peripheral polyneuropathy     Arterial leg ulcer (H)     PAD (peripheral artery disease) (H)     Encounter for long-term (current) use of medications     Stroke (H)     Slurred speech     Neurodegenerative disorder     History of TIA (transient ischemic attack) and stroke     Falls frequently     Past Medical History:   Diagnosis Date     CAD (coronary artery disease)     s/p CABG 1992 and redo CABG 2009 (LIMA to LAD, SVG to OM and SVG to posterolateral branch of RCA)     Chronic cough     Non-productive. No known definite etiology. Is beeing seen by [facility].      Essential hypertension, benign      Falls frequently 5/28/2018     GERD (gastroesophageal reflux disease)      History of TIA (transient ischemic attack) and stroke 5/7/2018    R sided facial N&T.      Hyperlipidaemia      Hyperlipidaemia LDL goal < 130      Ischemic cardiomyopathy      MI (myocardial infarction) 11/1996     Mixed hyperlipidemia      Neurodegenerative disorder     Further detailed dx unknown. Managed by Dr. George Pham - [facility].      PAD (peripheral artery disease) (H)      Restless legs syndrome      Unspecified hypothyroidism      Past Surgical History:   Procedure Laterality Date     ARTHROSCOPY SHOULDER RT/LT       BUNIONECTOMY RT/LT       C CABG, ARTERY-VEIN, THREE  1/2009     C CARDIAC SURG PROCEDURE UNLIST       C HAND/FINGER SURGERY UNLISTED       C MRA UPPER EXTREMITY WO&W CONT       C SHOULDER SURG PROC UNLISTED       C STOMACH SURGERY PROCEDURE UNLISTED       CARDIAC SURGERY  1992    CABG,stents x2     CARPAL TUNNEL RELEASE RT/LT       HC VASCULAR SURGERY PROCEDURE UNLIST       HYSTERECTOMY, DAMIÁN  32 yo     OPEN REDUCTION INTERNAL FIXATION ANKLE Left 2015    Left Ankle     RELEASE TRIGGER  FINGER       REPAIR HAMMER TOE Right 12/19/2017    Procedure: REPAIR HAMMER TOE;  Right 2nd and 3rd toe pinning;  Surgeon: Fernando Wiggins MD;  [facility]     STENT  2006     STENT(aka CARDIAC)  2008     VASCULAR SURGERY       Social History   Substance Use Topics     Smoking status: Former Smoker     Packs/day: 0.50     Years: 22.00     Types: Cigarettes     Quit date: 4/15/1992     Smokeless tobacco: Never Used     Alcohol use 0.6 oz/week     1 Standard drinks or equivalent per week      Comment: MAYBE ONCE A YEAR     Family History   Problem Relation Age of Onset     Unknown/Adopted Mother      Unknown/Adopted Father        OBJECTIVE:                                                    /66  Pulse 80  Resp 16  Wt 72.1 kg (159 lb)  SpO2 97%  BMI 27.29 kg/m2  Body mass index is 27.29 kg/(m^2).   Gen: Cooperative. Delightful 77 yo but seems to be aging since I saw her a few weeks ago.   Neck: She is wearing a soft c-collar to support her head. Her overall posture is more kyphotic today than at her last visit.  Her voice is a little shakey.   Heart: Regular rate and rhythm.  Lungs: Normal respiratory effort.   Musculoskeletal: No lower extremity edema.  Skin: Warm and well perfused.   Neurologic: Alert. Gait is cautious but appears stable.   Psych:  Mood and affect are appropriate. She has clearly thought about this medical situation and asks good questions.   Knee: Firm mass posterior R knee. Not painful. No joint involvement.     ASSESSMENT/PLAN:                                                      Katherin was seen today for recheck.    Diagnoses and all orders for this visit:    Degenerative disorder of muscle    Cervical stenosis of spinal canal    Lump  -     XR Knee Right 1/2 Views; Future  -     XR Knee Right 1/2 Views    Abnormal EMG    Ultimately, she plans to return to discuss options with Dr. Colton Ledezma and plans to bring her family as she expects there will be a lot of questions  others will have. She is understandably cautious and concerned.     >15 min spent with patient, greater than 50% spent on discussion/education/planning, etc. About The primary encounter diagnosis was Degenerative disorder of muscle. Diagnoses of Cervical stenosis of spinal canal, Lump, and Abnormal EMG were also pertinent to this visit.    The following health maintenance items are reviewed in Epic and correct as of today:  Health Maintenance   Topic Date Due     INFLUENZA VACCINE (1) 09/01/2018     PNEUMOCOCCAL (2 of 2 - PPSV23) 09/14/2018     MEDICARE ANNUAL WELLNESS VISIT  03/29/2019     FALL RISK ASSESSMENT  03/29/2019     PHQ-2 Q1 YR  03/29/2019     LIPID SCREEN Q5 YR FEMALE (SYSTEM ASSIGNED)  05/07/2023     ADVANCE DIRECTIVE PLANNING Q5 YRS  07/10/2023     TETANUS IMMUNIZATION (SYSTEM ASSIGNED)  06/04/2026     DEXA SCAN SCREENING (SYSTEM ASSIGNED)  Completed       Halina Corrales MD  Family Medicine    For any issues, please call my office  [facility] at [phone].

## 2018-06-29 NOTE — MR AVS SNAPSHOT
After Visit Summary   6/29/2018    Katherin Fletcher    MRN: [id number]           Patient Information     Date Of Birth          1940        Visit Information        Provider Department      6/29/2018 1:45 PM Halina Corrales MD [facility]        Today's Diagnoses     Degenerative disorder of muscle    -  1    Cervical stenosis of spinal canal        Lump        Abnormal EMG           Follow-ups after your visit        Your next 10 appointments already scheduled     Aug 22, 2018  1:00 PM CDT   (Arrive by 12:45 PM)   RETURN FOOT/ANKLE with Taty Bullard PA-C   [facility] ([facility])    [address]  4th Floor  [facility] [address] [phone]            Sep 13, 2018  3:15 PM CDT   Return Visit with Kenton Grayson MD   [facility] ([facility])    [address] [place], [place] [id number]  [facility] [id number] [phone] OPT 2              Who to contact     If you have questions or need follow up information about today's clinic visit or your schedule please contact [facility] directly at [phone].  Normal or non-critical lab and imaging results will be communicated to you by MyChart, letter or phone within 4 business days after the clinic has received the results. If you do not hear from us within 7 days, please contact the clinic through makemojihart or phone. If you have a critical or abnormal lab result, we will notify you by phone as soon as possible.  Submit refill requests through YouBeQB or call your pharmacy and they will forward the refill request to us. Please allow 3 business days for your refill to be completed.          Additional Information About Your Visit        MyChart Information     YouBeQB gives you secure access to your electronic health record. If you see a primary care provider, you can also send messages to your care team and make  appointments. If you have questions, please call your primary care clinic.  If you do not have a primary care provider, please call [phone] and they will assist you.        Care EveryWhere ID     This is your Care EveryWhere ID. This could be used by other organizations to access your [place] medical records  [id number]        Your Vitals Were     Pulse Respirations Pulse Oximetry BMI (Body Mass Index)          80 16 97% 27.29 kg/m2         Blood Pressure from Last 3 Encounters:   06/29/18 116/66   06/01/18 125/75   05/16/18 112/60    Weight from Last 3 Encounters:   06/29/18 72.1 kg (159 lb)   06/01/18 72.6 kg (160 lb 1.6 oz)   05/16/18 73.5 kg (162 lb)              We Performed the Following     XR Knee Right 1/2 Views          Today's Medication Changes          These changes are accurate as of 6/29/18  2:57 PM.  If you have any questions, ask your nurse or doctor.               These medicines have changed or have updated prescriptions.        Dose/Directions    carbidopa-levodopa  MG per tablet   Commonly known as:  SINEMET   This may have changed:    - how much to take  - how to take this  - when to take this  - reasons to take this  - additional instructions   Used for:  Restless legs syndrome, Neurologic gait disorder, Dysarthria, Cerebellar disease        TAKE 2 TABLETS FOUR TIMES A DAY   Quantity:  720 tablet   Refills:  3       clopidogrel 75 MG tablet   Commonly known as:  PLAVIX   This may have changed:  when to take this   Used for:  CAD (coronary artery disease)        Dose:  75 mg   Take 1 tablet (75 mg) by mouth daily   Quantity:  90 tablet   Refills:  2       omeprazole 20 MG CR capsule   Commonly known as:  priLOSEC   This may have changed:  See the new instructions.   Used for:  Encounter for long-term (current) use of medications        TAKE 2 CAPSULES DAILY 30 TO 60 MINUTES BEFORE A MEAL   Quantity:  180 capsule   Refills:  1                Primary Care Provider Office Phone # Fax  #    Aris Del Real [phone]       [address]  [facility] [address]        Equal Access to Services     GEORGIERHONDA ROHAN : Yuly rosio joel daja Rubyali, wabellada luqlenora, qaybta kakimda supriya, carlo cervantes mikykermit watters lairishstan matthew. So [facility] [phone].    ATENCIÓN: Si habla español, tiene a taylor disposición servicios gratuitos de asistencia lingüística. Llame al [phone].    We comply with applicable federal civil rights laws and [place] laws. We do not discriminate on the basis of race, color, national origin, age, disability, sex, sexual orientation, or gender identity.            Thank you!     Thank you for choosing [facility]  for your care. Our goal is always to provide you with excellent care. Hearing back from our patients is one way we can continue to improve our services. Please take a few minutes to complete the written survey that you may receive in the mail after your visit with us. Thank you!             Your Updated Medication List - Protect others around you: Learn how to safely use, store and throw away your medicines at www.disposemymeds.org.          This list is accurate as of 6/29/18  2:57 PM.  Always use your most recent med list.                   Brand Name Dispense Instructions for use Diagnosis    Alfalfa 250 MG Tabs      Take 250 mg by mouth every morning        apixaban ANTICOAGULANT 5 MG tablet    ELIQUIS    180 tablet    Take 1 tablet (5 mg) by mouth 2 times daily    Cerebrovascular accident (CVA), unspecified mechanism (H)       botulinum toxin type A 100 units injection    BOTOX    200 Units    Inject 200 Units into the muscle every 3 months    Muscle spasms of both lower extremities       calcium + D 600-200 MG-UNIT Tabs   Generic drug:  calcium carbonate-vitamin D      Take 1 tablet by mouth At Bedtime    Preventative health care       carbidopa-levodopa  MG per tablet    SINEMET    720 tablet    TAKE 2 TABLETS FOUR  TIMES A DAY    Restless legs syndrome, Neurologic gait disorder, Dysarthria, Cerebellar disease       carvedilol 6.25 MG tablet    COREG    180 tablet    Take 1 tablet (6.25 mg) by mouth 2 times daily (with meals)    Benign essential hypertension       clopidogrel 75 MG tablet    PLAVIX    90 tablet    Take 1 tablet (75 mg) by mouth daily    CAD (coronary artery disease)       DAILY MULTI Tabs      Take 1 tablet by mouth every morning        isosorbide mononitrate 30 MG 24 hr tablet    IMDUR    90 tablet    Take 1 tablet (30 mg) by mouth daily    Coronary artery disease involving native coronary artery of native heart without angina pectoris       levothyroxine 112 MCG tablet    SYNTHROID/LEVOTHROID    90 tablet    TAKE 1 TABLET DAILY    Encounter for medication refill       nitroGLYcerin 0.4 MG sublingual tablet    NITROSTAT    25 tablet    DISSOLVE ONE TABLET UNDER THE TONGUE EVERY 5 MINUTES AS NEEDED FOR CHEST PAIN.  DO NOT EXCEED A TOTAL OF 3 DOSES IN 15 MINUTES    CAD (coronary artery disease)       omeprazole 20 MG CR capsule    priLOSEC    180 capsule    TAKE 2 CAPSULES DAILY 30 TO 60 MINUTES BEFORE A MEAL    Encounter for long-term (current) use of medications       rosuvastatin 20 MG tablet    CRESTOR    90 tablet    Take 1 tablet (20 mg) by mouth daily    Hyperlipidemia       VITAMIN B-12 PO      Take 1 tablet by mouth every morning

## 2018-07-10 ENCOUNTER — DOCUMENTATION ONLY (OUTPATIENT)
Dept: OTHER | Facility: CLINIC | Age: 78
End: 2018-07-10

## 2018-07-17 ENCOUNTER — TRANSFERRED RECORDS (OUTPATIENT)
Dept: FAMILY MEDICINE | Facility: CLINIC | Age: 78
End: 2018-07-17

## 2018-07-23 ENCOUNTER — MYC MEDICAL ADVICE (OUTPATIENT)
Dept: FAMILY MEDICINE | Facility: CLINIC | Age: 78
End: 2018-07-23

## 2018-07-23 DIAGNOSIS — R26.9 NEUROLOGIC GAIT DISORDER: ICD-10-CM

## 2018-07-23 DIAGNOSIS — R29.6 FALLS FREQUENTLY: ICD-10-CM

## 2018-07-23 DIAGNOSIS — G31.9 NEURODEGENERATIVE DISORDER (H): Primary | ICD-10-CM

## 2018-07-25 ENCOUNTER — OFFICE VISIT (OUTPATIENT)
Dept: FAMILY MEDICINE | Facility: CLINIC | Age: 78
End: 2018-07-25

## 2018-07-25 VITALS
DIASTOLIC BLOOD PRESSURE: 72 MMHG | BODY MASS INDEX: 27.46 KG/M2 | WEIGHT: 160 LBS | OXYGEN SATURATION: 96 % | HEART RATE: 86 BPM | RESPIRATION RATE: 16 BRPM | SYSTOLIC BLOOD PRESSURE: 114 MMHG

## 2018-07-25 DIAGNOSIS — L60.0 INGROWING TOENAIL WITH INFECTION: Primary | ICD-10-CM

## 2018-07-25 PROCEDURE — 99214 OFFICE O/P EST MOD 30 MIN: CPT | Mod: 25 | Performed by: NURSE PRACTITIONER

## 2018-07-25 PROCEDURE — 11750 EXCISION NAIL&NAIL MATRIX: CPT | Mod: T5 | Performed by: NURSE PRACTITIONER

## 2018-07-25 RX ORDER — SULFAMETHOXAZOLE AND TRIMETHOPRIM 400; 80 MG/1; MG/1
1 TABLET ORAL 2 TIMES DAILY
Qty: 10 TABLET | Refills: 0 | Status: SHIPPED | OUTPATIENT
Start: 2018-07-25 | End: 2018-07-30

## 2018-07-25 NOTE — PROGRESS NOTES
Problem(s) Oriented visit        SUBJECTIVE:                                                    Katherin Fletcher is a 78 year old female who presents to clinic today for the following health issues : Ingrown great toenail on right foot. Painful to touch. Red and hot.  Has been there a couple weeks. Has tried bacitracin and peroxide. Not much help. Denies fever or  Chills. Has happened in past before.           Problem list, Medication list, Allergies, and Medical/Social/Surgical histories reviewed in EPIC and updated as appropriate.   Additional history: as documented    ROS:  5 point ROS completed and negative except noted above, including Gen, CV, Resp, GI, MS    Histories:   Patient Active Problem List   Diagnosis     Long term current use of anticoagulant therapy     CAD (coronary artery disease)     Hyperlipidemia     Health Care Home     MRSA (methicillin resistant Staphylococcus aureus)     Dysarthria     Disturbances of sensation of smell and taste     Neurologic gait disorder     Cerebellar disease     Essential hypertension     Ischemic cardiomyopathy     Peripheral polyneuropathy     Arterial leg ulcer (H)     PAD (peripheral artery disease) (H)     Encounter for long-term (current) use of medications     Stroke (H)     Slurred speech     Neurodegenerative disorder     History of TIA (transient ischemic attack) and stroke     Falls frequently     Past Surgical History:   Procedure Laterality Date     ARTHROSCOPY SHOULDER RT/LT       BUNIONECTOMY RT/LT       C CABG, ARTERY-VEIN, THREE  1/2009     C CARDIAC SURG PROCEDURE UNLIST       C HAND/FINGER SURGERY UNLISTED       C MRA UPPER EXTREMITY WO&W CONT       C SHOULDER SURG PROC UNLISTED       C STOMACH SURGERY PROCEDURE UNLISTED       CARDIAC SURGERY  1992    CABG,stents x2     CARPAL TUNNEL RELEASE RT/LT       HC VASCULAR SURGERY PROCEDURE UNLIST       HYSTERECTOMY, DAMIÁN  32 yo     OPEN REDUCTION INTERNAL FIXATION ANKLE Left 2015    Left Ankle      RELEASE TRIGGER FINGER       REPAIR HAMMER TOE Right 12/19/2017    Procedure: REPAIR HAMMER TOE;  Right 2nd and 3rd toe pinning;  Surgeon: Fernando Wiggins MD;  Location: [facility] OR     STENT  2006     STENT(aka CARDIAC)  2008     VASCULAR SURGERY         Social History   Substance Use Topics     Smoking status: Former Smoker     Packs/day: 0.50     Years: 22.00     Types: Cigarettes     Quit date: 4/15/1992     Smokeless tobacco: Never Used     Alcohol use 0.6 oz/week     1 Standard drinks or equivalent per week      Comment: MAYBE ONCE A YEAR     Family History   Problem Relation Age of Onset     Unknown/Adopted Mother      Unknown/Adopted Father            OBJECTIVE:                                                    /72  Pulse 86  Resp 16  Wt 72.6 kg (160 lb)  SpO2 96%  BMI 27.46 kg/m2  Body mass index is 27.46 kg/(m^2).   APPEARANCE: = Relaxed and in no distress  SKIN: no suspicious lesions or rashes and pain and redness at the outer corner/side of great toenail growing into flesh.   Recent/Remote Memory = Alert and Oriented x 3  Mood/Affect = Cooperative and interested     ASSESSMENT/PLAN:                                                    1. Ingrowing toenail with infection  Toe numbed with lidocaine, soaked and cleaned, removed the side of toenail in a straight line, bacitracin and bandage applied. Instructed to soak foot in warm water TID for 2-3 days and apply bandage and bacitracin TID.   If any worsening sx, fever, etc. should be seen.  Allow nail to grow out and cut straight across.  - sulfamethoxazole-trimethoprim (BACTRIM/SEPTRA) 400-80 MG per tablet; Take 1 tablet by mouth 2 times daily for 5 days  Dispense: 10 tablet; Refill: 0  - REMOVAL NAIL/NAIL BED, PARTIAL OR COMPLETE    FUTURE APPOINTMENTS:       - Follow-up for annual visit or as needed    The following health maintenance items are reviewed in Epic and correct as of today:  Health Maintenance   Topic Date Due     INFLUENZA  VACCINE (1) 09/01/2018     PNEUMOCOCCAL (2 of 2 - PPSV23) 09/14/2018     MEDICARE ANNUAL WELLNESS VISIT  03/29/2019     FALL RISK ASSESSMENT  03/29/2019     PHQ-2 Q1 YR  03/29/2019     LIPID SCREEN Q5 YR FEMALE (SYSTEM ASSIGNED)  05/07/2023     ADVANCE DIRECTIVE PLANNING Q5 YRS  07/10/2023     TETANUS IMMUNIZATION (SYSTEM ASSIGNED)  06/04/2026     DEXA SCAN SCREENING (SYSTEM ASSIGNED)  Completed       EVONNE Go CNP  [facility]  Family Practice  [facility]  [phone]    For any issues my office # is [phone]

## 2018-07-25 NOTE — MR AVS SNAPSHOT
After Visit Summary   7/25/2018    Katherin Fletcher    MRN: [id number]           Patient Information     Date Of Birth          1940        Visit Information        Provider Department      7/25/2018 8:15 AM Karine Griffith APRN CNP [facility]        Today's Diagnoses     Ingrowing toenail with infection    -  1       Follow-ups after your visit        Follow-up notes from your care team     Return if symptoms worsen or fail to improve.      Your next 10 appointments already scheduled     Aug 22, 2018  1:00 PM CDT   (Arrive by 12:45 PM)   RETURN FOOT/ANKLE with Taty Bullard PA-C   [facility] ([facility])    [address]  4th Floor  [facility] [address] [phone]            Sep 13, 2018  3:15 PM CDT   Return Visit with Kenton Grayson MD   [facility] ([facility])    [address] [place], [place] [address]  [facility] [address] [phone] OPT 2              Who to contact     If you have questions or need follow up information about today's clinic visit or your schedule please contact [facility] directly at [phone].  Normal or non-critical lab and imaging results will be communicated to you by MyChart, letter or phone within 4 business days after the clinic has received the results. If you do not hear from us within 7 days, please contact the clinic through [employer] or phone. If you have a critical or abnormal lab result, we will notify you by phone as soon as possible.  Submit refill requests through [employer] or call your pharmacy and they will forward the refill request to us. Please allow 3 business days for your refill to be completed.          Additional Information About Your Visit        MyChart Information     [employer] gives you secure access to your electronic health record. If you see a primary care provider, you can also send messages to your care team  and make appointments. If you have questions, please call your primary care clinic.  If you do not have a primary care provider, please call [phone] and they will assist you.        Care EveryWhere ID     This is your Care EveryWhere ID. This could be used by other organizations to access your [place] medical records  [id number]        Your Vitals Were     Pulse Respirations Pulse Oximetry BMI (Body Mass Index)          86 16 96% 27.46 kg/m2         Blood Pressure from Last 3 Encounters:   07/25/18 114/72   06/29/18 116/66   06/01/18 125/75    Weight from Last 3 Encounters:   07/25/18 72.6 kg (160 lb)   06/29/18 72.1 kg (159 lb)   06/01/18 72.6 kg (160 lb 1.6 oz)              We Performed the Following     REMOVAL NAIL/NAIL BED, PARTIAL OR COMPLETE          Today's Medication Changes          These changes are accurate as of 7/25/18 11:52 AM.  If you have any questions, ask your nurse or doctor.               Start taking these medicines.        Dose/Directions    sulfamethoxazole-trimethoprim 400-80 MG per tablet   Commonly known as:  BACTRIM/SEPTRA   Used for:  Ingrowing toenail with infection   Started by:  Karine Griffith APRN CNP        Dose:  1 tablet   Take 1 tablet by mouth 2 times daily for 5 days   Quantity:  10 tablet   Refills:  0         These medicines have changed or have updated prescriptions.        Dose/Directions    carbidopa-levodopa  MG per tablet   Commonly known as:  SINEMET   This may have changed:    - how much to take  - how to take this  - when to take this  - reasons to take this  - additional instructions   Used for:  Restless legs syndrome, Neurologic gait disorder, Dysarthria, Cerebellar disease        TAKE 2 TABLETS FOUR TIMES A DAY   Quantity:  720 tablet   Refills:  3       clopidogrel 75 MG tablet   Commonly known as:  PLAVIX   This may have changed:  when to take this   Used for:  CAD (coronary artery disease)        Dose:  75 mg   Take 1 tablet (75 mg) by mouth  daily   Quantity:  90 tablet   Refills:  2       omeprazole 20 MG CR capsule   Commonly known as:  priLOSEC   This may have changed:  See the new instructions.   Used for:  Encounter for long-term (current) use of medications        TAKE 2 CAPSULES DAILY 30 TO 60 MINUTES BEFORE A MEAL   Quantity:  180 capsule   Refills:  1            Where to get your medicines      These medications were sent to [facility] - [address]  [address] [facility] [address]     Phone:  [phone]     sulfamethoxazole-trimethoprim 400-80 MG per tablet                Primary Care Provider Office Phone # Fax #    Aris Del Real [phone] [address]  [facility] [address]        Equal Access to Services     JOSELUIS MADRIGAL : Hadii rosio joel hadasho Soomaali, waaxda luqadaha, qaybta kaalmada adeegyada, carlo odonnell . So [facility] [phone].    ATENCIÓN: Si habla español, tiene a taylor disposición servicios gratuitos de asistencia lingüística. [facility] [phone].    We comply with applicable federal civil rights laws and [place] laws. We do not discriminate on the basis of race, color, national origin, age, disability, sex, sexual orientation, or gender identity.            Thank you!     Thank you for choosing [facility]  for your care. Our goal is always to provide you with excellent care. Hearing back from our patients is one way we can continue to improve our services. Please take a few minutes to complete the written survey that you may receive in the mail after your visit with us. Thank you!             Your Updated Medication List - Protect others around you: Learn how to safely use, store and throw away your medicines at www.disposemymeds.org.          This list is accurate as of 7/25/18 11:52 AM.  Always use your most recent med list.                   Brand Name Dispense Instructions for use Diagnosis     Alfalfa 250 MG Tabs      Take 250 mg by mouth every morning        apixaban ANTICOAGULANT 5 MG tablet    ELIQUIS    180 tablet    Take 1 tablet (5 mg) by mouth 2 times daily    Cerebrovascular accident (CVA), unspecified mechanism (H)       botulinum toxin type A 100 units injection    BOTOX    200 Units    Inject 200 Units into the muscle every 3 months    Muscle spasms of both lower extremities       calcium + D 600-200 MG-UNIT Tabs   Generic drug:  calcium carbonate-vitamin D      Take 1 tablet by mouth At Bedtime    Preventative health care       carbidopa-levodopa  MG per tablet    SINEMET    720 tablet    TAKE 2 TABLETS FOUR TIMES A DAY    Restless legs syndrome, Neurologic gait disorder, Dysarthria, Cerebellar disease       carvedilol 6.25 MG tablet    COREG    180 tablet    Take 1 tablet (6.25 mg) by mouth 2 times daily (with meals)    Benign essential hypertension       clopidogrel 75 MG tablet    PLAVIX    90 tablet    Take 1 tablet (75 mg) by mouth daily    CAD (coronary artery disease)       DAILY MULTI Tabs      Take 1 tablet by mouth every morning        isosorbide mononitrate 30 MG 24 hr tablet    IMDUR    90 tablet    Take 1 tablet (30 mg) by mouth daily    Coronary artery disease involving native coronary artery of native heart without angina pectoris       levothyroxine 112 MCG tablet    SYNTHROID/LEVOTHROID    90 tablet    TAKE 1 TABLET DAILY    Encounter for medication refill       nitroGLYcerin 0.4 MG sublingual tablet    NITROSTAT    25 tablet    DISSOLVE ONE TABLET UNDER THE TONGUE EVERY 5 MINUTES AS NEEDED FOR CHEST PAIN.  DO NOT EXCEED A TOTAL OF 3 DOSES IN 15 MINUTES    CAD (coronary artery disease)       omeprazole 20 MG CR capsule    priLOSEC    180 capsule    TAKE 2 CAPSULES DAILY 30 TO 60 MINUTES BEFORE A MEAL    Encounter for long-term (current) use of medications       rosuvastatin 20 MG tablet    CRESTOR    90 tablet    Take 1 tablet (20 mg) by mouth daily    Hyperlipidemia        sulfamethoxazole-trimethoprim 400-80 MG per tablet    BACTRIM/SEPTRA    10 tablet    Take 1 tablet by mouth 2 times daily for 5 days    Ingrowing toenail with infection       VITAMIN B-12 PO      Take 1 tablet by mouth every morning

## 2018-07-25 NOTE — PROGRESS NOTES
Called [facility] to cancel Bactrim/Septra RX and called into [facility].

## 2018-07-30 DIAGNOSIS — Z79.899 ENCOUNTER FOR LONG-TERM (CURRENT) USE OF MEDICATIONS: ICD-10-CM

## 2018-07-30 NOTE — TELEPHONE ENCOUNTER
Referral to PMR at [facility]  entered and faxed along with office notes, imaging reports and consults.  Candy Gaona

## 2018-08-01 ENCOUNTER — TELEPHONE (OUTPATIENT)
Dept: PHYSICAL MEDICINE AND REHAB | Facility: CLINIC | Age: 78
End: 2018-08-01

## 2018-08-01 NOTE — TELEPHONE ENCOUNTER
[facility] Call Center    Phone Message    May a detailed message be left on voicemail: yes    Reason for Call: Other: Pt is requesting a call back to set up new pt appointment with Dr. Mendoza. Referral is in epic.      Action Taken: Message routed to:  [facility] PM&R

## 2018-08-05 DIAGNOSIS — G25.81 RESTLESS LEGS SYNDROME: ICD-10-CM

## 2018-08-05 DIAGNOSIS — R47.1 DYSARTHRIA: ICD-10-CM

## 2018-08-05 DIAGNOSIS — R26.9 NEUROLOGIC GAIT DISORDER: ICD-10-CM

## 2018-08-05 DIAGNOSIS — G93.9: ICD-10-CM

## 2018-08-05 RX ORDER — CARBIDOPA AND LEVODOPA 25; 100 MG/1; MG/1
TABLET ORAL
Qty: 720 TABLET | Refills: 3 | Status: SHIPPED | OUTPATIENT
Start: 2018-08-05 | End: 2019-08-12

## 2018-08-22 ENCOUNTER — OFFICE VISIT (OUTPATIENT)
Dept: ORTHOPEDICS | Facility: CLINIC | Age: 78
End: 2018-08-22
Payer: MEDICARE

## 2018-08-22 DIAGNOSIS — L60.0 INGROWN TOENAIL: ICD-10-CM

## 2018-08-22 DIAGNOSIS — L60.2 LONG TOENAIL: Primary | ICD-10-CM

## 2018-08-22 DIAGNOSIS — I73.9 PAD (PERIPHERAL ARTERY DISEASE) (H): ICD-10-CM

## 2018-08-22 NOTE — MR AVS SNAPSHOT
After Visit Summary   8/22/2018    Katherin Fletcher    MRN: [id number]           Patient Information     Date Of Birth          1940        Visit Information        Provider Department      8/22/2018 1:00 PM Taty Bullard PA-C [facility]        Today's Diagnoses     Long toenail    -  1    Ingrown toenail        PAD (peripheral artery disease) (H)           Follow-ups after your visit        Your next 10 appointments already scheduled     Sep 13, 2018  3:15 PM CDT   Return Visit with Kenton Grayson MD   [facility] ([facility])    [address] [place] [address] [address]  [facility] [address] [phone] OPT 2            Sep 20, 2018  2:20 PM CDT   (Arrive by 2:05 PM)   New Patient Visit with Darline Celestin MD   [facility] ([facility])    [address]  3rd [facility] [address] [phone]              Who to contact     Please call your clinic at [phone] to:    Ask questions about your health    Make or cancel appointments    Discuss your medicines    Learn about your test results    Speak to your doctor            Additional Information About Your Visit        [facility] Information     [facility] gives you secure access to your electronic health record. If you see a primary care provider, you can also send messages to your care team and make appointments. If you have questions, please call your primary care clinic.  If you do not have a primary care provider, please call [phone] and they will assist you.      [facility] is an electronic gateway that provides easy, online access to your medical records. With [facility], you can request a clinic appointment, read your test results, renew a prescription or communicate with your care team.     To access your existing account, please contact your [facility] or call [phone]  for assistance.        Care EveryWhere ID     This is your Care EveryWhere ID. This could be used by other organizations to access your [place] medical records  [id number]         Blood Pressure from Last 3 Encounters:   07/25/18 114/72   06/29/18 116/66   06/01/18 125/75    Weight from Last 3 Encounters:   07/25/18 72.6 kg (160 lb)   06/29/18 72.1 kg (159 lb)   06/01/18 72.6 kg (160 lb 1.6 oz)              We Performed the Following     TRIM NONDYSTRPHIC NAIL(S)          Today's Medication Changes          These changes are accurate as of 8/22/18 11:59 PM.  If you have any questions, ask your nurse or doctor.               These medicines have changed or have updated prescriptions.        Dose/Directions    * carbidopa-levodopa  MG per tablet   Commonly known as:  SINEMET   This may have changed:    - how much to take  - how to take this  - when to take this  - reasons to take this  - additional instructions   Used for:  Restless legs syndrome, Neurologic gait disorder, Dysarthria, Cerebellar disease        TAKE 2 TABLETS FOUR TIMES A DAY   Quantity:  720 tablet   Refills:  3       * carbidopa-levodopa  MG per tablet   Commonly known as:  SINEMET   This may have changed:  Another medication with the same name was changed. Make sure you understand how and when to take each.   Used for:  Restless legs syndrome, Neurologic gait disorder, Dysarthria, Cerebellar disease        TAKE 2 TABLETS FOUR TIMES A DAY   Quantity:  720 tablet   Refills:  3       clopidogrel 75 MG tablet   Commonly known as:  PLAVIX   This may have changed:  when to take this   Used for:  CAD (coronary artery disease)        Dose:  75 mg   Take 1 tablet (75 mg) by mouth daily   Quantity:  90 tablet   Refills:  2       * Notice:  This list has 2 medication(s) that are the same as other medications prescribed for you. Read the directions carefully, and ask your doctor or other care provider to review them with you.             Primary  Care Provider Office Phone # Fax #    Aris Del Real [phone] [address]  [facility] [address]        Equal Access to Services     GEORGIERHONDA ROHAN : Yuly rosio joel anao Soceceali, waaxda luqadaha, qaybta kaalmada adelianne, carlo winstonstan matthew. So [facility] [phone].    ATENCIÓN: Si habla español, tiene a taylor disposición servicios gratuitos de asistencia lingüística. Joeame al [phone].    We comply with applicable federal civil rights laws and [place] laws. We do not discriminate on the basis of race, color, national origin, age, disability, sex, sexual orientation, or gender identity.            Thank you!     Thank you for choosing [facility]  for your care. Our goal is always to provide you with excellent care. Hearing back from our patients is one way we can continue to improve our services. Please take a few minutes to complete the written survey that you may receive in the mail after your visit with us. Thank you!             Your Updated Medication List - Protect others around you: Learn how to safely use, store and throw away your medicines at www.disposemymeds.org.          This list is accurate as of 8/22/18 11:59 PM.  Always use your most recent med list.                   Brand Name Dispense Instructions for use Diagnosis    Alfalfa 250 MG Tabs      Take 250 mg by mouth every morning        apixaban ANTICOAGULANT 5 MG tablet    ELIQUIS    180 tablet    Take 1 tablet (5 mg) by mouth 2 times daily    Cerebrovascular accident (CVA), unspecified mechanism (H)       botulinum toxin type A 100 units injection    BOTOX    200 Units    Inject 200 Units into the muscle every 3 months    Muscle spasms of both lower extremities       calcium + D 600-200 MG-UNIT Tabs   Generic drug:  calcium carbonate-vitamin D      Take 1 tablet by mouth At Bedtime    Preventative health care       * carbidopa-levodopa  MG per tablet    SINEMET     720 tablet    TAKE 2 TABLETS FOUR TIMES A DAY    Restless legs syndrome, Neurologic gait disorder, Dysarthria, Cerebellar disease       * carbidopa-levodopa  MG per tablet    SINEMET    720 tablet    TAKE 2 TABLETS FOUR TIMES A DAY    Restless legs syndrome, Neurologic gait disorder, Dysarthria, Cerebellar disease       carvedilol 6.25 MG tablet    COREG    180 tablet    Take 1 tablet (6.25 mg) by mouth 2 times daily (with meals)    Benign essential hypertension       clopidogrel 75 MG tablet    PLAVIX    90 tablet    Take 1 tablet (75 mg) by mouth daily    CAD (coronary artery disease)       DAILY MULTI Tabs      Take 1 tablet by mouth every morning        isosorbide mononitrate 30 MG 24 hr tablet    IMDUR    90 tablet    Take 1 tablet (30 mg) by mouth daily    Coronary artery disease involving native coronary artery of native heart without angina pectoris       levothyroxine 112 MCG tablet    SYNTHROID/LEVOTHROID    90 tablet    TAKE 1 TABLET DAILY    Encounter for medication refill       nitroGLYcerin 0.4 MG sublingual tablet    NITROSTAT    25 tablet    DISSOLVE ONE TABLET UNDER THE TONGUE EVERY 5 MINUTES AS NEEDED FOR CHEST PAIN.  DO NOT EXCEED A TOTAL OF 3 DOSES IN 15 MINUTES    CAD (coronary artery disease)       omeprazole 20 MG CR capsule    priLOSEC    180 capsule    TAKE 2 CAPSULES DAILY 30 TO 60 MINUTES BEFORE A MEAL    Encounter for long-term (current) use of medications       rosuvastatin 20 MG tablet    CRESTOR    90 tablet    Take 1 tablet (20 mg) by mouth daily    Hyperlipidemia       VITAMIN B-12 PO      Take 1 tablet by mouth every morning        * Notice:  This list has 2 medication(s) that are the same as other medications prescribed for you. Read the directions carefully, and ask your doctor or other care provider to review them with you.

## 2018-08-22 NOTE — PROGRESS NOTES
Chief Complaint:   Chief Complaint   Patient presents with     RECHECK     Toe nail trimming. Pt stated that her right hallux is tender to touch and red in color.      Subjective: Katherin is a 77 year old female who presents to the clinic today for toenail trim. No new lower extremity concerns. She would like toenails trimmed as she is unable to do this herself. Right hallux nail is ingrown into lateral border with some swelling and pain. No drainage or open wound.     Allergies   Allergen Reactions     Ace Inhibitors Cough     Baclofen Other (See Comments)     Constipation , tiredness     Penicillins      10/7/14 Hives per patient -- many many years ago       Nickel Swelling and Rash     Other reaction(s): Unknown     Current Outpatient Rx   Medication Sig Dispense Refill     Fleming 250 MG TABS Take 250 mg by mouth every morning        apixaban ANTICOAGULANT (ELIQUIS) 5 MG tablet Take 1 tablet (5 mg) by mouth 2 times daily 180 tablet 3     botulinum toxin type A (BOTOX) 100 units injection Inject 200 Units into the muscle every 3 months 200 Units 4     calcium carbonate-vitamin D (CALCIUM + D) 600-200 MG-UNIT TABS Take 1 tablet by mouth At Bedtime        carbidopa-levodopa (SINEMET)  MG per tablet TAKE 2 TABLETS FOUR TIMES A  tablet 3     carbidopa-levodopa (SINEMET)  MG per tablet TAKE 2 TABLETS FOUR TIMES A DAY (Patient taking differently: Take 2 tablets by mouth 4 times daily as needed (legs) Takes as needed throughout the day, up to 4 times daily) 720 tablet 3     carvedilol (COREG) 6.25 MG tablet Take 1 tablet (6.25 mg) by mouth 2 times daily (with meals) 180 tablet 3     clopidogrel (PLAVIX) 75 MG tablet Take 1 tablet (75 mg) by mouth daily (Patient taking differently: Take 75 mg by mouth every evening ) 90 tablet 2     Cyanocobalamin (VITAMIN B-12 PO) Take 1 tablet by mouth every morning        isosorbide mononitrate (IMDUR) 30 MG 24 hr tablet Take 1 tablet (30 mg) by mouth daily 90  tablet 0     levothyroxine (SYNTHROID/LEVOTHROID) 112 MCG tablet TAKE 1 TABLET DAILY 90 tablet 1     Multiple Vitamins-Minerals (DAILY MULTI) TABS Take 1 tablet by mouth every morning        nitroglycerin (NITROSTAT) 0.4 MG SL tablet DISSOLVE ONE TABLET UNDER THE TONGUE EVERY 5 MINUTES AS NEEDED FOR CHEST PAIN.  DO NOT EXCEED A TOTAL OF 3 DOSES IN 15 MINUTES 25 tablet 3     omeprazole (PRILOSEC) 20 MG CR capsule TAKE 2 CAPSULES DAILY 30 TO 60 MINUTES BEFORE A MEAL 180 capsule 1     rosuvastatin (CRESTOR) 20 MG tablet Take 1 tablet (20 mg) by mouth daily 90 tablet 2       Objective:   There were no vitals taken for this visit.    General: Patient is well groomed, appears stated age, is alert, cooperative and in no acute distress.  Vascular: DP and PT pulses are non-palpable bilaterally. LE capillary refill time is <3 seconds. Absent pedal hair. Mild non-pitting LE edema.  MSK: Dorsally contracted digits 2-5 that are non-reducible, very limited passive ROM. Equinus present.  Neurologic: Gross sensation intact to bilateral LE.   Skin: LE skin color, texture and turgor are normal. Toenails are slightly elongated bilaterally. Right lateral hallux nail is ingrown mildly, nail border is mildly edematous. No erythema, drainage, pain or calor.     Assessment:   - Elongated toenails  - Ingrown toenail R hallux  - Chronic anticoagulant use for atrial fibrillation  - Peripheral arterial disease with history of lower leg ulceration     Plan:   - Toenails trimmed x 10. Slant back performed to R lateral hallux nail  - Taught Arai taping technique  - Pt to return to clinic in 2-3 months if needed, she will call for appt.

## 2018-08-22 NOTE — LETTER
8/22/2018       RE: Katherin Fletcher  [address]  [facility] [address]     Dear Colleague,    Thank you for referring your patient, Katherin Fletcher, to the [facility] at [facility]. Please see a copy of my visit note below.    Chief Complaint:   Chief Complaint   Patient presents with     RECHECK     Toe nail trimming. Pt stated that her right hallux is tender to touch and red in color.      Subjective: Katherin is a 77 year old female who presents to the clinic today for toenail trim. No new lower extremity concerns. She would like toenails trimmed as she is unable to do this herself. Right hallux nail is ingrown into lateral border with some swelling and pain. No drainage or open wound.     Allergies   Allergen Reactions     Ace Inhibitors Cough     Baclofen Other (See Comments)     Constipation , tiredness     Penicillins      10/7/14 Hives per patient -- many many years ago       Nickel Swelling and Rash     Other reaction(s): Unknown     Current Outpatient Rx   Medication Sig Dispense Refill     [place] 250 MG TABS Take 250 mg by mouth every morning        apixaban ANTICOAGULANT (ELIQUIS) 5 MG tablet Take 1 tablet (5 mg) by mouth 2 times daily 180 tablet 3     botulinum toxin type A (BOTOX) 100 units injection Inject 200 Units into the muscle every 3 months 200 Units 4     calcium carbonate-vitamin D (CALCIUM + D) 600-200 MG-UNIT TABS Take 1 tablet by mouth At Bedtime        carbidopa-levodopa (SINEMET)  MG per tablet TAKE 2 TABLETS FOUR TIMES A  tablet 3     carbidopa-levodopa (SINEMET)  MG per tablet TAKE 2 TABLETS FOUR TIMES A DAY (Patient taking differently: Take 2 tablets by mouth 4 times daily as needed (legs) Takes as needed throughout the day, up to 4 times daily) 720 tablet 3     carvedilol (COREG) 6.25 MG tablet Take 1 tablet (6.25 mg) by mouth 2 times daily (with meals) 180 tablet 3     clopidogrel (PLAVIX) 75  MG tablet Take 1 tablet (75 mg) by mouth daily (Patient taking differently: Take 75 mg by mouth every evening ) 90 tablet 2     Cyanocobalamin (VITAMIN B-12 PO) Take 1 tablet by mouth every morning        isosorbide mononitrate (IMDUR) 30 MG 24 hr tablet Take 1 tablet (30 mg) by mouth daily 90 tablet 0     levothyroxine (SYNTHROID/LEVOTHROID) 112 MCG tablet TAKE 1 TABLET DAILY 90 tablet 1     Multiple Vitamins-Minerals (DAILY MULTI) TABS Take 1 tablet by mouth every morning        nitroglycerin (NITROSTAT) 0.4 MG SL tablet DISSOLVE ONE TABLET UNDER THE TONGUE EVERY 5 MINUTES AS NEEDED FOR CHEST PAIN.  DO NOT EXCEED A TOTAL OF 3 DOSES IN 15 MINUTES 25 tablet 3     omeprazole (PRILOSEC) 20 MG CR capsule TAKE 2 CAPSULES DAILY 30 TO 60 MINUTES BEFORE A MEAL 180 capsule 1     rosuvastatin (CRESTOR) 20 MG tablet Take 1 tablet (20 mg) by mouth daily 90 tablet 2       Objective:   There were no vitals taken for this visit.    General: Patient is well groomed, appears stated age, is alert, cooperative and in no acute distress.  Vascular: DP and PT pulses are non-palpable bilaterally. LE capillary refill time is <3 seconds. Absent pedal hair. Mild non-pitting LE edema.  MSK: Dorsally contracted digits 2-5 that are non-reducible, very limited passive ROM. Equinus present.  Neurologic: Gross sensation intact to bilateral LE.   Skin: LE skin color, texture and turgor are normal. Toenails are slightly elongated bilaterally. Right lateral hallux nail is ingrown mildly, nail border is mildly edematous. No erythema, drainage, pain or calor.     Assessment:   - Elongated toenails  - Ingrown toenail R hallux  - Chronic anticoagulant use for atrial fibrillation  - Peripheral arterial disease with history of lower leg ulceration     Plan:   - Toenails trimmed x 10. Slant back performed to R lateral hallux nail  - Taught Arai taping technique  - Pt to return to clinic in 2-3 months if needed, she will call for appt.      Again, thank you  for allowing me to participate in the care of your patient.      Sincerely,    Taty Bullard PA-C

## 2018-08-22 NOTE — NURSING NOTE
Reason For Visit:   Chief Complaint   Patient presents with     RECHECK     Toe nail trimming. Pt stated that her right hallux is tender to touch and red in color.        Pain Assessment  Patient Currently in Pain: Yes  Primary Pain Location:  (Hallux)  Pain Orientation: Right  Pain Descriptors: Tender             Current Outpatient Prescriptions   Medication Sig Dispense Refill     [place] 250 MG TABS Take 250 mg by mouth every morning        apixaban ANTICOAGULANT (ELIQUIS) 5 MG tablet Take 1 tablet (5 mg) by mouth 2 times daily 180 tablet 3     botulinum toxin type A (BOTOX) 100 units injection Inject 200 Units into the muscle every 3 months 200 Units 4     calcium carbonate-vitamin D (CALCIUM + D) 600-200 MG-UNIT TABS Take 1 tablet by mouth At Bedtime        carbidopa-levodopa (SINEMET)  MG per tablet TAKE 2 TABLETS FOUR TIMES A  tablet 3     carbidopa-levodopa (SINEMET)  MG per tablet TAKE 2 TABLETS FOUR TIMES A DAY (Patient taking differently: Take 2 tablets by mouth 4 times daily as needed (legs) Takes as needed throughout the day, up to 4 times daily) 720 tablet 3     carvedilol (COREG) 6.25 MG tablet Take 1 tablet (6.25 mg) by mouth 2 times daily (with meals) 180 tablet 3     clopidogrel (PLAVIX) 75 MG tablet Take 1 tablet (75 mg) by mouth daily (Patient taking differently: Take 75 mg by mouth every evening ) 90 tablet 2     Cyanocobalamin (VITAMIN B-12 PO) Take 1 tablet by mouth every morning        isosorbide mononitrate (IMDUR) 30 MG 24 hr tablet Take 1 tablet (30 mg) by mouth daily 90 tablet 0     levothyroxine (SYNTHROID/LEVOTHROID) 112 MCG tablet TAKE 1 TABLET DAILY 90 tablet 1     Multiple Vitamins-Minerals (DAILY MULTI) TABS Take 1 tablet by mouth every morning        nitroglycerin (NITROSTAT) 0.4 MG SL tablet DISSOLVE ONE TABLET UNDER THE TONGUE EVERY 5 MINUTES AS NEEDED FOR CHEST PAIN.  DO NOT EXCEED A TOTAL OF 3 DOSES IN 15 MINUTES 25 tablet 3     omeprazole (PRILOSEC) 20 MG CR  capsule TAKE 2 CAPSULES DAILY 30 TO 60 MINUTES BEFORE A MEAL 180 capsule 1     rosuvastatin (CRESTOR) 20 MG tablet Take 1 tablet (20 mg) by mouth daily 90 tablet 2          Allergies   Allergen Reactions     Ace Inhibitors Cough     Baclofen Other (See Comments)     Constipation , tiredness     Penicillins      10/7/14 Hives per patient -- many many years ago       Nickel Swelling and Rash     Other reaction(s): Unknown

## 2018-08-31 ENCOUNTER — TELEPHONE (OUTPATIENT)
Dept: CARDIOLOGY | Facility: CLINIC | Age: 78
End: 2018-08-31

## 2018-08-31 NOTE — TELEPHONE ENCOUNTER
Voice message received from Pippa from [facility] asking for a call back concerning upcoming pending dental implant surgery and the Plavix and Eliquis.    On Plavix has history of due to PVAD, and Eliquis due to TIA's with history of atrial fib.  Pippa is use to doing the implants with patients on Plavix without any issues, but was concerned that patient was on Plavix and Eliquis.     Per 6/5/18 note, Dr. Méndez was okay with patient being off Eliquis for procedures at that time. Has f/u with Dr. Grayson on 9/13/18- can clarify with him at that time.   Agreed as procedure will be done towards the end of the month.

## 2018-09-05 ENCOUNTER — MYC MEDICAL ADVICE (OUTPATIENT)
Dept: CARDIOLOGY | Facility: CLINIC | Age: 78
End: 2018-09-05

## 2018-09-05 DIAGNOSIS — I73.9 PAD (PERIPHERAL ARTERY DISEASE) (H): Primary | ICD-10-CM

## 2018-09-06 NOTE — TELEPHONE ENCOUNTER
Reviewed patient concerns with Dr. Erickson, he would like her to have a DOMINIK completed. Orders placed in epic and patient updated. Scheduling instructed to contact the patient to get DOMINIK set up. No other tasks to be done at this time. Kathleen Harding

## 2018-09-07 NOTE — PROGRESS NOTES
8/28/18 FAXED THIS OFFICE NOTE TO [facility] @ [phone]    Miguel Allen,   [facility]  [phone]

## 2018-09-13 ENCOUNTER — HOSPITAL ENCOUNTER (OUTPATIENT)
Dept: ULTRASOUND IMAGING | Facility: CLINIC | Age: 78
Discharge: HOME | End: 2018-09-13
Attending: INTERNAL MEDICINE | Admitting: INTERNAL MEDICINE
Payer: MEDICARE

## 2018-09-13 ENCOUNTER — OFFICE VISIT (OUTPATIENT)
Dept: CARDIOLOGY | Facility: CLINIC | Age: 78
End: 2018-09-13
Payer: MEDICARE

## 2018-09-13 VITALS
SYSTOLIC BLOOD PRESSURE: 136 MMHG | WEIGHT: 156.2 LBS | HEART RATE: 72 BPM | HEIGHT: 64 IN | BODY MASS INDEX: 26.67 KG/M2 | DIASTOLIC BLOOD PRESSURE: 72 MMHG

## 2018-09-13 DIAGNOSIS — I63.9 CEREBROVASCULAR ACCIDENT (CVA), UNSPECIFIED MECHANISM (H): ICD-10-CM

## 2018-09-13 DIAGNOSIS — I73.9 PAD (PERIPHERAL ARTERY DISEASE) (H): ICD-10-CM

## 2018-09-13 DIAGNOSIS — I25.10 CORONARY ARTERY DISEASE INVOLVING NATIVE CORONARY ARTERY OF NATIVE HEART WITHOUT ANGINA PECTORIS: ICD-10-CM

## 2018-09-13 DIAGNOSIS — I10 BENIGN ESSENTIAL HYPERTENSION: ICD-10-CM

## 2018-09-13 DIAGNOSIS — E78.2 MIXED HYPERLIPIDEMIA: ICD-10-CM

## 2018-09-13 PROCEDURE — 93922 UPR/L XTREMITY ART 2 LEVELS: CPT

## 2018-09-13 PROCEDURE — 99213 OFFICE O/P EST LOW 20 MIN: CPT | Mod: 25 | Performed by: INTERNAL MEDICINE

## 2018-09-13 PROCEDURE — 93922 UPR/L XTREMITY ART 2 LEVELS: CPT | Mod: 26 | Performed by: INTERNAL MEDICINE

## 2018-09-13 RX ORDER — CLOPIDOGREL BISULFATE 75 MG/1
75 TABLET ORAL DAILY
Qty: 90 TABLET | Refills: 3 | Status: SHIPPED | OUTPATIENT
Start: 2018-09-13 | End: 2019-07-22

## 2018-09-13 RX ORDER — ISOSORBIDE MONONITRATE 30 MG/1
30 TABLET, EXTENDED RELEASE ORAL DAILY
Qty: 90 TABLET | Refills: 3 | Status: SHIPPED | OUTPATIENT
Start: 2018-09-13 | End: 2019-07-22

## 2018-09-13 RX ORDER — ROSUVASTATIN CALCIUM 20 MG/1
20 TABLET, COATED ORAL DAILY
Qty: 90 TABLET | Refills: 3 | Status: SHIPPED | OUTPATIENT
Start: 2018-09-13 | End: 2019-07-22

## 2018-09-13 RX ORDER — CARVEDILOL 6.25 MG/1
6.25 TABLET ORAL 2 TIMES DAILY WITH MEALS
Qty: 180 TABLET | Refills: 3 | Status: SHIPPED | OUTPATIENT
Start: 2018-09-13 | End: 2019-07-22

## 2018-09-13 NOTE — MR AVS SNAPSHOT
After Visit Summary   9/13/2018    Katherin Fletcher    MRN: [id number]           Patient Information     Date Of Birth          1940        Visit Information        Provider Department      9/13/2018 3:15 PM Kenton Grayson MD [facility]        Today's Diagnoses     Cerebrovascular accident (CVA), unspecified mechanism (H)        Coronary artery disease involving native coronary artery of native heart without angina pectoris        Benign essential hypertension        Mixed hyperlipidemia           Follow-ups after your visit        Your next 10 appointments already scheduled     Sep 20, 2018  2:20 PM CDT   (Arrive by 2:05 PM)   New Patient Visit with Darline Celestin MD   [facility] ([facility])    [address]  [address] [facility] [address] [phone]              Who to contact     If you have questions or need follow up information about today's clinic visit or your schedule please contact [facility] directly at [phone].  Normal or non-critical lab and imaging results will be communicated to you by [employer], letter or phone within 4 business days after the clinic has received the results. If you do not hear from us within 7 days, please contact the clinic through [employer] or phone. If you have a critical or abnormal lab result, we will notify you by phone as soon as possible.  Submit refill requests through [employer] or call your pharmacy and they will forward the refill request to us. Please allow 3 business days for your refill to be completed.          Additional Information About Your Visit        [employer] Information     [employer] gives you secure access to your electronic health record. If you see a primary care provider, you can also send messages to your care team and make appointments. If you have questions, please call your  "primary care clinic.  If you do not have a primary care provider, please call [phone] and they will assist you.        Care EveryWhere ID     This is your Care EveryWhere ID. This could be used by other organizations to access your [facility] medical records  [id number]        Your Vitals Were     Pulse Height BMI (Body Mass Index)             72 1.626 m (5' 4\") 26.81 kg/m2          Blood Pressure from Last 3 Encounters:   09/13/18 136/72   07/25/18 114/72   06/29/18 116/66    Weight from Last 3 Encounters:   09/13/18 70.9 kg (156 lb 3.2 oz)   07/25/18 72.6 kg (160 lb)   06/29/18 72.1 kg (159 lb)              Today, you had the following     No orders found for display         Today's Medication Changes          These changes are accurate as of 9/13/18  4:31 PM.  If you have any questions, ask your nurse or doctor.               These medicines have changed or have updated prescriptions.        Dose/Directions    * carbidopa-levodopa  MG per tablet   Commonly known as:  SINEMET   This may have changed:    - how much to take  - how to take this  - when to take this  - reasons to take this  - additional instructions   Used for:  Restless legs syndrome, Neurologic gait disorder, Dysarthria, Cerebellar disease        TAKE 2 TABLETS FOUR TIMES A DAY   Quantity:  720 tablet   Refills:  3       * carbidopa-levodopa  MG per tablet   Commonly known as:  SINEMET   This may have changed:  Another medication with the same name was changed. Make sure you understand how and when to take each.   Used for:  Restless legs syndrome, Neurologic gait disorder, Dysarthria, Cerebellar disease        TAKE 2 TABLETS FOUR TIMES A DAY   Quantity:  720 tablet   Refills:  3       clopidogrel 75 MG tablet   Commonly known as:  PLAVIX   This may have changed:  when to take this   Used for:  Coronary artery disease involving native coronary artery of native heart without angina pectoris        Dose:  75 mg   Take 1 tablet (75 mg) " by mouth daily   Quantity:  90 tablet   Refills:  3       * Notice:  This list has 2 medication(s) that are the same as other medications prescribed for you. Read the directions carefully, and ask your doctor or other care provider to review them with you.         Where to get your medicines      These medications were sent to "[facility] - [place], [place] - [address] [place] [address]  [address] [place] [id number]     Phone:  [phone]     apixaban ANTICOAGULANT 5 MG tablet    carvedilol 6.25 MG tablet    clopidogrel 75 MG tablet    isosorbide mononitrate 30 MG 24 hr tablet    rosuvastatin 20 MG tablet                Primary Care Provider Office Phone # Fax #    Aris Del Real [phone] [address]  [facility] [address]        Equal Access to Services     [facility] : Hadii rosio joel hadasho Soomaali, waaxda luqadaha, qaybta kaalmada adeegyada, carlo odonnell . So [facility] [phone].    ATENCIÓN: Si habla español, tiene a taylor disposición servicios gratuitos de asistencia lingüística. Karen al [phone].    We comply with applicable federal civil rights laws and [place] laws. We do not discriminate on the basis of race, color, national origin, age, disability, sex, sexual orientation, or gender identity.            Thank you!     Thank you for choosing [facility]  for your care. Our goal is always to provide you with excellent care. Hearing back from our patients is one way we can continue to improve our services. Please take a few minutes to complete the written survey that you may receive in the mail after your visit with us. Thank you!             Your Updated Medication List - Protect others around you: Learn how to safely use, store and throw away your medicines at www.disposemymeds.org.          This list is accurate as of 9/13/18  4:31 PM.  Always use your most recent med list.                    Brand Name Dispense Instructions for use Diagnosis    Alfalfa 250 MG Tabs      Take 250 mg by mouth every morning        apixaban ANTICOAGULANT 5 MG tablet    ELIQUIS    180 tablet    Take 1 tablet (5 mg) by mouth 2 times daily    Cerebrovascular accident (CVA), unspecified mechanism (H)       calcium + D 600-200 MG-UNIT Tabs   Generic drug:  calcium carbonate-vitamin D      Take 1 tablet by mouth At Bedtime    Preventative health care       * carbidopa-levodopa  MG per tablet    SINEMET    720 tablet    TAKE 2 TABLETS FOUR TIMES A DAY    Restless legs syndrome, Neurologic gait disorder, Dysarthria, Cerebellar disease       * carbidopa-levodopa  MG per tablet    SINEMET    720 tablet    TAKE 2 TABLETS FOUR TIMES A DAY    Restless legs syndrome, Neurologic gait disorder, Dysarthria, Cerebellar disease       carvedilol 6.25 MG tablet    COREG    180 tablet    Take 1 tablet (6.25 mg) by mouth 2 times daily (with meals)    Benign essential hypertension       clopidogrel 75 MG tablet    PLAVIX    90 tablet    Take 1 tablet (75 mg) by mouth daily    Coronary artery disease involving native coronary artery of native heart without angina pectoris       DAILY MULTI Tabs      Take 1 tablet by mouth every morning        isosorbide mononitrate 30 MG 24 hr tablet    IMDUR    90 tablet    Take 1 tablet (30 mg) by mouth daily    Coronary artery disease involving native coronary artery of native heart without angina pectoris       levothyroxine 112 MCG tablet    SYNTHROID/LEVOTHROID    90 tablet    TAKE 1 TABLET DAILY    Encounter for medication refill       nitroGLYcerin 0.4 MG sublingual tablet    NITROSTAT    25 tablet    DISSOLVE ONE TABLET UNDER THE TONGUE EVERY 5 MINUTES AS NEEDED FOR CHEST PAIN.  DO NOT EXCEED A TOTAL OF 3 DOSES IN 15 MINUTES    CAD (coronary artery disease)       omeprazole 20 MG CR capsule    priLOSEC    180 capsule    TAKE 2 CAPSULES DAILY 30 TO 60 MINUTES BEFORE A MEAL     Encounter for long-term (current) use of medications       rosuvastatin 20 MG tablet    CRESTOR    90 tablet    Take 1 tablet (20 mg) by mouth daily    Mixed hyperlipidemia       VITAMIN B-12 PO      Take 1 tablet by mouth every morning        * Notice:  This list has 2 medication(s) that are the same as other medications prescribed for you. Read the directions carefully, and ask your doctor or other care provider to review them with you.

## 2018-09-13 NOTE — LETTER
9/13/2018    Aris Del Real MD  [address]  [facility] [address]    RE: Katherin Fletcher       Dear Colleague,    I had the pleasure of seeing Katherin Fletcher in the [facility].    HPI and Plan:   See dictation  IMPRESSION:   1.  Chronic coronary artery disease post two previous coronary artery bypass surgeries in 1992 and 2009.   2.  Chronic peripheral vascular artery disease of the lower extremities, post left fem-popliteal artery stenting 05/2017.   3.  Treated hypertension.   4.  Treated hyperlipidemia.   5.  Chronic ataxia and neurologic disorder.   6.  Treated mixed hyperlipidemia.     Echo done 5-2018:    A cardiac source of embolus was not identified.  Left ventricular systolic function is mildly reduced.  The visual ejection fraction is estimated at 45-50%.  The left ventricle is normal in size.  There is apical akinesis.  The patient exhibited frequent PACs.  Doppler interrogation does not demonstrate significant stenosis or  insufficiency involving cardiac valves.     No significant change since 10/13/2104.        No orders of the defined types were placed in this encounter.    No orders of the defined types were placed in this encounter.    Medications Discontinued During This Encounter   Medication Reason     botulinum toxin type A (BOTOX) 100 units injection Not filled/taken by Patient         No diagnosis found.    CURRENT MEDICATIONS:  Current Outpatient Prescriptions   Medication Sig Dispense Refill     [place] 250 MG TABS Take 250 mg by mouth every morning        apixaban ANTICOAGULANT (ELIQUIS) 5 MG tablet Take 1 tablet (5 mg) by mouth 2 times daily 180 tablet 3     calcium carbonate-vitamin D (CALCIUM + D) 600-200 MG-UNIT TABS Take 1 tablet by mouth At Bedtime        carbidopa-levodopa (SINEMET)  MG per tablet TAKE 2 TABLETS FOUR TIMES A  tablet 3     carbidopa-levodopa (SINEMET)  MG per tablet TAKE 2 TABLETS FOUR TIMES A  DAY (Patient taking differently: Take 2 tablets by mouth 4 times daily as needed (legs) Takes as needed throughout the day, up to 4 times daily) 720 tablet 3     carvedilol (COREG) 6.25 MG tablet Take 1 tablet (6.25 mg) by mouth 2 times daily (with meals) 180 tablet 3     clopidogrel (PLAVIX) 75 MG tablet Take 1 tablet (75 mg) by mouth daily (Patient taking differently: Take 75 mg by mouth every evening ) 90 tablet 2     isosorbide mononitrate (IMDUR) 30 MG 24 hr tablet Take 1 tablet (30 mg) by mouth daily 90 tablet 0     levothyroxine (SYNTHROID/LEVOTHROID) 112 MCG tablet TAKE 1 TABLET DAILY 90 tablet 1     Multiple Vitamins-Minerals (DAILY MULTI) TABS Take 1 tablet by mouth every morning        nitroglycerin (NITROSTAT) 0.4 MG SL tablet DISSOLVE ONE TABLET UNDER THE TONGUE EVERY 5 MINUTES AS NEEDED FOR CHEST PAIN.  DO NOT EXCEED A TOTAL OF 3 DOSES IN 15 MINUTES 25 tablet 3     omeprazole (PRILOSEC) 20 MG CR capsule TAKE 2 CAPSULES DAILY 30 TO 60 MINUTES BEFORE A MEAL 180 capsule 1     rosuvastatin (CRESTOR) 20 MG tablet Take 1 tablet (20 mg) by mouth daily 90 tablet 2     Cyanocobalamin (VITAMIN B-12 PO) Take 1 tablet by mouth every morning          ALLERGIES     Allergies   Allergen Reactions     Ace Inhibitors Cough     Baclofen Other (See Comments)     Constipation , tiredness     Penicillins      10/7/14 Hives per patient -- many many years ago       Nickel Swelling and Rash     Other reaction(s): Unknown       PAST MEDICAL HISTORY:  Past Medical History:   Diagnosis Date     CAD (coronary artery disease)     s/p CABG 1992 and redo CABG 2009 (LIMA to LAD, SVG to OM and SVG to posterolateral branch of RCA)     Chronic cough     Non-productive. No known definite etiology. Is beeing seen by [facility].      Essential hypertension, benign      Falls frequently 5/28/2018     GERD (gastroesophageal reflux disease)      History of TIA (transient ischemic attack) and stroke 5/7/2018    R sided facial N&T.       Hyperlipidaemia      Hyperlipidaemia LDL goal < 130      Ischemic cardiomyopathy      MI (myocardial infarction) 11/1996     Mixed hyperlipidemia      Neurodegenerative disorder     Further detailed dx unknown. Managed by Dr. George Pham - \[facility]\"" [facility].      PAD (peripheral artery disease) (H)      Restless legs syndrome      Unspecified hypothyroidism        PAST SURGICAL HISTORY:  Past Surgical History:   Procedure Laterality Date     ARTHROSCOPY SHOULDER RT/LT       BUNIONECTOMY RT/LT       C CABG, ARTERY-VEIN, THREE  1/2009     C CARDIAC SURG PROCEDURE UNLIST       C HAND/FINGER SURGERY UNLISTED       C MRA UPPER EXTREMITY WO&W CONT       C SHOULDER SURG PROC UNLISTED       C STOMACH SURGERY PROCEDURE UNLISTED       CARDIAC SURGERY  1992    CABG,stents x2     CARPAL TUNNEL RELEASE RT/LT       HC VASCULAR SURGERY PROCEDURE UNLIST       HYSTERECTOMY, DAMIÁN  34 yo     OPEN REDUCTION INTERNAL FIXATION ANKLE Left 2015    Left Ankle     RELEASE TRIGGER FINGER       REPAIR HAMMER TOE Right 12/19/2017    Procedure: REPAIR HAMMER TOE;  Right 2nd and 3rd toe pinning;  Surgeon: Fernando Wiggins MD;  Location: [facility] OR     STENT  2006     STENT(aka CARDIAC)  2008     VASCULAR SURGERY         FAMILY HISTORY:  Family History   Problem Relation Age of Onset     Unknown/Adopted Mother      Unknown/Adopted Father        SOCIAL HISTORY:  Social History     Social History     Marital status:      Spouse name: N/A     Number of children: N/A     Years of education: N/A     Social History Main Topics     Smoking status: Former Smoker     Packs/day: 0.50     Years: 22.00     Types: Cigarettes     Quit date: 4/15/1992     Smokeless tobacco: Never Used     Alcohol use 0.6 oz/week     1 Standard drinks or equivalent per week      Comment: MAYBE ONCE A YEAR     Drug use: No     Sexual activity: Not Currently     Partners: Male     Other Topics Concern     Parent/Sibling W/ Cabg, Mi Or Angioplasty Before 65f 55m? No  "    Caffeine Concern Yes     1-2 cups      Sleep Concern No     Stress Concern No     Weight Concern Yes     Special Diet No     Exercise Yes     stairs, walking      Social History Narrative         Review of Systems:  Skin:  Positive for hair changes losing hair   Eyes:  Positive for glasses    ENT:  Positive for nasal congestion due to weather changes  Respiratory:  Positive for cough     Cardiovascular:    edema;Positive for    Gastroenterology: Negative      Genitourinary:  Negative      Musculoskeletal:  Positive for arthritis Ataxia  Neurologic:  Negative      Psychiatric:  Negative      Heme/Lymph/Imm:  Negative      Endocrine:  Positive for thyroid disorder      Physical Exam:  Vitals: /72  Pulse 72  Ht 1.626 m (5' 4\")  Wt 70.9 kg (156 lb 3.2 oz)  BMI 26.81 kg/m2    Constitutional:           Skin:             Head:           Eyes:           Lymph:      ENT:           Neck:           Respiratory:            Cardiac:                                                           GI:           Extremities and Muscular Skeletal:                 Neurological:           Psych:           Recent Lab Results:  LIPID RESULTS:  Lab Results   Component Value Date    CHOL 133 05/07/2018    HDL 55 05/07/2018    LDL 54 05/07/2018    TRIG 121 05/07/2018    CHOLHDLRATIO 5.7 (H) 07/13/2010       LIVER ENZYME RESULTS:  Lab Results   Component Value Date    AST 23 05/07/2018    ALT 8 05/07/2018       CBC RESULTS:  Lab Results   Component Value Date    WBC 3.6 (A) 05/16/2018    WBC 4.4 05/08/2018    RBC 4.43 05/16/2018    RBC 4.35 05/08/2018    HGB 12.5 05/16/2018    HCT 37.6 05/16/2018    MCV 84.9 05/16/2018    MCH 28.3 05/16/2018    MCHC 33.4 05/16/2018    RDW 14.4 05/08/2018     05/16/2018       BMP RESULTS:  Lab Results   Component Value Date     05/08/2018    POTASSIUM 3.5 05/08/2018    CHLORIDE 110 (H) 05/08/2018    CO2 28 05/08/2018    ANIONGAP 4 05/08/2018    GLC 97 05/08/2018    BUN 11 05/08/2018    " CR 0.65 05/08/2018    GFRESTIMATED 88 05/08/2018    GFRESTBLACK >90 05/08/2018    GALE 9.5 05/08/2018        A1C RESULTS:  Lab Results   Component Value Date    A1C 6.1 05/07/2018       INR RESULTS:  Lab Results   Component Value Date    INR 1.00 05/07/2018    INR 1.06 02/27/2018           CC  No referring provider defined for this encounter.                    Thank you for allowing me to participate in the care of your patient.      Sincerely,     VICKIE RIVERA MD     [facility]    cc:   No referring provider defined for this encounter.

## 2018-09-13 NOTE — PROGRESS NOTES
HPI and Plan:   See dictation  IMPRESSION:   1.  Chronic coronary artery disease post two previous coronary artery bypass surgeries in 1992 and 2009.   2.  Chronic peripheral vascular artery disease of the lower extremities, post left fem-popliteal artery stenting 05/2017.   3.  Treated hypertension.   4.  Treated hyperlipidemia.   5.  Chronic ataxia and neurologic disorder.   6.  Treated mixed hyperlipidemia.     Echo done 5-2018:    A cardiac source of embolus was not identified.  Left ventricular systolic function is mildly reduced.  The visual ejection fraction is estimated at 45-50%.  The left ventricle is normal in size.  There is apical akinesis.  The patient exhibited frequent PACs.  Doppler interrogation does not demonstrate significant stenosis or  insufficiency involving cardiac valves.     No significant change since 10/13/2104.        No orders of the defined types were placed in this encounter.    No orders of the defined types were placed in this encounter.    Medications Discontinued During This Encounter   Medication Reason     botulinum toxin type A (BOTOX) 100 units injection Not filled/taken by Patient         No diagnosis found.    CURRENT MEDICATIONS:  Current Outpatient Prescriptions   Medication Sig Dispense Refill     [place] 250 MG TABS Take 250 mg by mouth every morning        apixaban ANTICOAGULANT (ELIQUIS) 5 MG tablet Take 1 tablet (5 mg) by mouth 2 times daily 180 tablet 3     calcium carbonate-vitamin D (CALCIUM + D) 600-200 MG-UNIT TABS Take 1 tablet by mouth At Bedtime        carbidopa-levodopa (SINEMET)  MG per tablet TAKE 2 TABLETS FOUR TIMES A  tablet 3     carbidopa-levodopa (SINEMET)  MG per tablet TAKE 2 TABLETS FOUR TIMES A DAY (Patient taking differently: Take 2 tablets by mouth 4 times daily as needed (legs) Takes as needed throughout the day, up to 4 times daily) 720 tablet 3     carvedilol (COREG) 6.25 MG tablet Take 1 tablet (6.25 mg) by mouth 2 times  daily (with meals) 180 tablet 3     clopidogrel (PLAVIX) 75 MG tablet Take 1 tablet (75 mg) by mouth daily (Patient taking differently: Take 75 mg by mouth every evening ) 90 tablet 2     isosorbide mononitrate (IMDUR) 30 MG 24 hr tablet Take 1 tablet (30 mg) by mouth daily 90 tablet 0     levothyroxine (SYNTHROID/LEVOTHROID) 112 MCG tablet TAKE 1 TABLET DAILY 90 tablet 1     Multiple Vitamins-Minerals (DAILY MULTI) TABS Take 1 tablet by mouth every morning        nitroglycerin (NITROSTAT) 0.4 MG SL tablet DISSOLVE ONE TABLET UNDER THE TONGUE EVERY 5 MINUTES AS NEEDED FOR CHEST PAIN.  DO NOT EXCEED A TOTAL OF 3 DOSES IN 15 MINUTES 25 tablet 3     omeprazole (PRILOSEC) 20 MG CR capsule TAKE 2 CAPSULES DAILY 30 TO 60 MINUTES BEFORE A MEAL 180 capsule 1     rosuvastatin (CRESTOR) 20 MG tablet Take 1 tablet (20 mg) by mouth daily 90 tablet 2     Cyanocobalamin (VITAMIN B-12 PO) Take 1 tablet by mouth every morning          ALLERGIES     Allergies   Allergen Reactions     Ace Inhibitors Cough     Baclofen Other (See Comments)     Constipation , tiredness     Penicillins      10/7/14 Hives per patient -- many many years ago       Nickel Swelling and Rash     Other reaction(s): Unknown       PAST MEDICAL HISTORY:  Past Medical History:   Diagnosis Date     CAD (coronary artery disease)     s/p CABG 1992 and redo CABG 2009 (LIMA to LAD, SVG to OM and SVG to posterolateral branch of RCA)     Chronic cough     Non-productive. No known definite etiology. Is beeing seen by [facility].      Essential hypertension, benign      Falls frequently 5/28/2018     GERD (gastroesophageal reflux disease)      History of TIA (transient ischemic attack) and stroke 5/7/2018    R sided facial N&T.      Hyperlipidaemia      Hyperlipidaemia LDL goal < 130      Ischemic cardiomyopathy      MI (myocardial infarction) 11/1996     Mixed hyperlipidemia      Neurodegenerative disorder     Further detailed dx unknown. Managed by Dr. George Pham - \[facility]\""  [facility]rology.      PAD (peripheral artery disease) (H)      Restless legs syndrome      Unspecified hypothyroidism        PAST SURGICAL HISTORY:  Past Surgical History:   Procedure Laterality Date     ARTHROSCOPY SHOULDER RT/LT       BUNIONECTOMY RT/LT       C CABG, ARTERY-VEIN, THREE  1/2009     C CARDIAC SURG PROCEDURE UNLIST       C HAND/FINGER SURGERY UNLISTED       C MRA UPPER EXTREMITY WO&W CONT       C SHOULDER SURG PROC UNLISTED       C STOMACH SURGERY PROCEDURE UNLISTED       CARDIAC SURGERY  1992    CABG,stents x2     CARPAL TUNNEL RELEASE RT/LT       HC VASCULAR SURGERY PROCEDURE UNLIST       HYSTERECTOMY, DAMIÁN  34 yo     OPEN REDUCTION INTERNAL FIXATION ANKLE Left 2015    Left Ankle     RELEASE TRIGGER FINGER       REPAIR HAMMER TOE Right 12/19/2017    Procedure: REPAIR HAMMER TOE;  Right 2nd and 3rd toe pinning;  Surgeon: Fernando Wiggins MD;  Location: [facility] OR     STENT  2006     STENT(aka CARDIAC)  2008     VASCULAR SURGERY         FAMILY HISTORY:  Family History   Problem Relation Age of Onset     Unknown/Adopted Mother      Unknown/Adopted Father        SOCIAL HISTORY:  Social History     Social History     Marital status:      Spouse name: N/A     Number of children: N/A     Years of education: N/A     Social History Main Topics     Smoking status: Former Smoker     Packs/day: 0.50     Years: 22.00     Types: Cigarettes     Quit date: 4/15/1992     Smokeless tobacco: Never Used     Alcohol use 0.6 oz/week     1 Standard drinks or equivalent per week      Comment: MAYBE ONCE A YEAR     Drug use: No     Sexual activity: Not Currently     Partners: Male     Other Topics Concern     Parent/Sibling W/ Cabg, Mi Or Angioplasty Before 65f 55m? No     Caffeine Concern Yes     1-2 cups      Sleep Concern No     Stress Concern No     Weight Concern Yes     Special Diet No     Exercise Yes     stairs, walking      Social History Narrative         Review of Systems:  Skin:  Positive for hair  "changes losing hair   Eyes:  Positive for glasses    ENT:  Positive for nasal congestion due to weather changes  Respiratory:  Positive for cough     Cardiovascular:    edema;Positive for    Gastroenterology: Negative      Genitourinary:  Negative      Musculoskeletal:  Positive for arthritis Ataxia  Neurologic:  Negative      Psychiatric:  Negative      Heme/Lymph/Imm:  Negative      Endocrine:  Positive for thyroid disorder      Physical Exam:  Vitals: /72  Pulse 72  Ht 1.626 m (5' 4\")  Wt 70.9 kg (156 lb 3.2 oz)  BMI 26.81 kg/m2    Constitutional:           Skin:             Head:           Eyes:           Lymph:      ENT:           Neck:           Respiratory:            Cardiac:                                                           GI:           Extremities and Muscular Skeletal:                 Neurological:           Psych:           Recent Lab Results:  LIPID RESULTS:  Lab Results   Component Value Date    CHOL 133 05/07/2018    HDL 55 05/07/2018    LDL 54 05/07/2018    TRIG 121 05/07/2018    CHOLHDLRATIO 5.7 (H) 07/13/2010       LIVER ENZYME RESULTS:  Lab Results   Component Value Date    AST 23 05/07/2018    ALT 8 05/07/2018       CBC RESULTS:  Lab Results   Component Value Date    WBC 3.6 (A) 05/16/2018    WBC 4.4 05/08/2018    RBC 4.43 05/16/2018    RBC 4.35 05/08/2018    HGB 12.5 05/16/2018    HCT 37.6 05/16/2018    MCV 84.9 05/16/2018    MCH 28.3 05/16/2018    MCHC 33.4 05/16/2018    RDW 14.4 05/08/2018     05/16/2018       BMP RESULTS:  Lab Results   Component Value Date     05/08/2018    POTASSIUM 3.5 05/08/2018    CHLORIDE 110 (H) 05/08/2018    CO2 28 05/08/2018    ANIONGAP 4 05/08/2018    GLC 97 05/08/2018    BUN 11 05/08/2018    CR 0.65 05/08/2018    GFRESTIMATED 88 05/08/2018    GFRESTBLACK >90 05/08/2018    GALE 9.5 05/08/2018        A1C RESULTS:  Lab Results   Component Value Date    A1C 6.1 05/07/2018       INR RESULTS:  Lab Results   Component Value Date    INR 1.00 " 05/07/2018    INR 1.06 02/27/2018           CC  No referring provider defined for this encounter.

## 2018-09-13 NOTE — LETTER
9/13/2018      Aris Del Real MD  [address]  [facility] [address]      RE: Katherin Fletcher       Dear Colleague,    I had the pleasure of seeing Katherin Fletcher in the [facility].    Service Date: 09/13/2018      HISTORY OF PRESENT ILLNESS:  I saw Katherin today.  She comes with a cervical collar in place, based on the idea that she does not have the right muscle weakness to control her head anymore.  She apparently has a neurology evaluation in the next week or so.  She came in walking slowly with a walker.  She has lost about 21 pounds in the past year.  She says she is not eating as much as she used to, and that seemed true and I was a little worried about it.      With the weight loss, I have to say she looks quite excellent.  She laments the fact that her arms and other soft tissues are more saggy now that she has lost weight.  I tried to encourage her to not take that point of view.      Appetite is diminished.  She said she is not as interested in food, but no vomiting.  Bowel and urinary functions are normal.  She has achy joints.  She has wobbly walking and needs a walker associated with her so-called peripheral neuropathy.  She has treated hypothyroidism.      PHYSICAL EXAMINATION:   GENERAL:  Reveals weight was down to 156 pounds.   VITAL SIGNS:  Blood pressure 130/70, heart rate 70 and regular.    NECK:  She had no neck vein distention or bruit at 45 degrees.   HEART:  Regular without gallop or murmur.   LUNGS:  Clear.   ABDOMEN:  Soft without organomegaly.   EXTREMITIES:  No significant edema.      IMPRESSION AND PLAN:  Katherin Fletcher is 78.  We have treated her for paroxysmal atrial fibrillation, but clinically she has not had any and no palpitations, dizziness or syncope.      1.  She has coronary artery disease with previous coronary artery bypass surgeries, the last in 2009 with a mammary artery to the LAD, saphenous vein graft to OM and  saphenous vein graft to the distal RCA, posterolateral branch.   2.  History of being overweight in the past.   3.  Treated hyperlipidemia   4.  Physical weakness and loss of stability is becoming bigger issue for her.  She has really slowed down a lot.  Her heart, I do not think, is causing much in the way of symptoms at this time.  I made no changes.  I reviewed each of these issues with her.  She has nothing to suggest angina or heart failure.  Her rhythm is stable.  Her blood pressure is normal.      We agreed to get together in a year.  I referred her care to you otherwise.  Warm regards.  If you have any questions, give me a call.      cc:   Aris Del Real MD    [facility] [address] [place]  [address]         VICKIE RIVERA MD, [facility]             D: 2018   T: 2018   MT: LIZBETH      Name:     ANA RODRIGUEZ   MRN:      [id number]        Account:      [id number]   :      1940           Service Date: 2018      Document: [id number]         Outpatient Encounter Prescriptions as of 2018   Medication Sig Dispense Refill     [place] 250 MG TABS Take 250 mg by mouth every morning        apixaban ANTICOAGULANT (ELIQUIS) 5 MG tablet Take 1 tablet (5 mg) by mouth 2 times daily 180 tablet 3     calcium carbonate-vitamin D (CALCIUM + D) 600-200 MG-UNIT TABS Take 1 tablet by mouth At Bedtime        carbidopa-levodopa (SINEMET)  MG per tablet TAKE 2 TABLETS FOUR TIMES A  tablet 3     carbidopa-levodopa (SINEMET)  MG per tablet TAKE 2 TABLETS FOUR TIMES A DAY (Patient taking differently: Take 2 tablets by mouth 4 times daily as needed (legs) Takes as needed throughout the day, up to 4 times daily) 720 tablet 3     carvedilol (COREG) 6.25 MG tablet Take 1 tablet (6.25 mg) by mouth 2 times daily (with meals) 180 tablet 3     clopidogrel (PLAVIX) 75 MG tablet Take 1 tablet (75 mg) by mouth daily 90 tablet 3     isosorbide mononitrate (IMDUR)  30 MG 24 hr tablet Take 1 tablet (30 mg) by mouth daily 90 tablet 3     levothyroxine (SYNTHROID/LEVOTHROID) 112 MCG tablet TAKE 1 TABLET DAILY 90 tablet 1     Multiple Vitamins-Minerals (DAILY MULTI) TABS Take 1 tablet by mouth every morning        nitroglycerin (NITROSTAT) 0.4 MG SL tablet DISSOLVE ONE TABLET UNDER THE TONGUE EVERY 5 MINUTES AS NEEDED FOR CHEST PAIN.  DO NOT EXCEED A TOTAL OF 3 DOSES IN 15 MINUTES 25 tablet 3     omeprazole (PRILOSEC) 20 MG CR capsule TAKE 2 CAPSULES DAILY 30 TO 60 MINUTES BEFORE A MEAL 180 capsule 1     rosuvastatin (CRESTOR) 20 MG tablet Take 1 tablet (20 mg) by mouth daily 90 tablet 3     Cyanocobalamin (VITAMIN B-12 PO) Take 1 tablet by mouth every morning        [DISCONTINUED] apixaban ANTICOAGULANT (ELIQUIS) 5 MG tablet Take 1 tablet (5 mg) by mouth 2 times daily 180 tablet 3     [DISCONTINUED] botulinum toxin type A (BOTOX) 100 units injection Inject 200 Units into the muscle every 3 months (Patient not taking: Reported on 9/13/2018) 200 Units 4     [DISCONTINUED] carvedilol (COREG) 6.25 MG tablet Take 1 tablet (6.25 mg) by mouth 2 times daily (with meals) 180 tablet 3     [DISCONTINUED] clopidogrel (PLAVIX) 75 MG tablet Take 1 tablet (75 mg) by mouth daily (Patient taking differently: Take 75 mg by mouth every evening ) 90 tablet 2     [DISCONTINUED] isosorbide mononitrate (IMDUR) 30 MG 24 hr tablet Take 1 tablet (30 mg) by mouth daily 90 tablet 0     [DISCONTINUED] rosuvastatin (CRESTOR) 20 MG tablet Take 1 tablet (20 mg) by mouth daily 90 tablet 2     No facility-administered encounter medications on file as of 9/13/2018.        Again, thank you for allowing me to participate in the care of your patient.      Sincerely,    VICKIE RIVERA MD     [facility]

## 2018-09-14 NOTE — PROGRESS NOTES
Service Date: 09/13/2018      HISTORY OF PRESENT ILLNESS:  I saw Katherin today.  She comes with a cervical collar in place, based on the idea that she does not have the right muscle weakness to control her head anymore.  She apparently has a neurology evaluation in the next week or so.  She came in walking slowly with a walker.  She has lost about 21 pounds in the past year.  She says she is not eating as much as she used to, and that seemed true and I was a little worried about it.      With the weight loss, I have to say she looks quite excellent.  She laments the fact that her arms and other soft tissues are more saggy now that she has lost weight.  I tried to encourage her to not take that point of view.      Appetite is diminished.  She said she is not as interested in food, but no vomiting.  Bowel and urinary functions are normal.  She has achy joints.  She has wobbly walking and needs a walker associated with her so-called peripheral neuropathy.  She has treated hypothyroidism.      PHYSICAL EXAMINATION:   GENERAL:  Reveals weight was down to 156 pounds.   VITAL SIGNS:  Blood pressure 130/70, heart rate 70 and regular.    NECK:  She had no neck vein distention or bruit at 45 degrees.   HEART:  Regular without gallop or murmur.   LUNGS:  Clear.   ABDOMEN:  Soft without organomegaly.   EXTREMITIES:  No significant edema.      IMPRESSION AND PLAN:  Katherin [place] is 78.  We have treated her for paroxysmal atrial fibrillation, but clinically she has not had any and no palpitations, dizziness or syncope.      1.  She has coronary artery disease with previous coronary artery bypass surgeries, the last in 2009 with a mammary artery to the LAD, saphenous vein graft to OM and saphenous vein graft to the distal RCA, posterolateral branch.   2.  History of being overweight in the past.   3.  Treated hyperlipidemia   4.  Physical weakness and loss of stability is becoming bigger issue for her.  She has really slowed down a  lot.  Her heart, I do not think, is causing much in the way of symptoms at this time.  I made no changes.  I reviewed each of these issues with her.  She has nothing to suggest angina or heart failure.  Her rhythm is stable.  Her blood pressure is normal.      We agreed to get together in a year.  I referred her care to you otherwise.  Warm regards.  If you have any questions, give me a call.      cc:   Aris Del Real MD    [facility] [address] [place] [address]         VICKIE RIVERA MD, [facility]             D: 2018   T: 2018   MT: LIZBETH      Name:     ANA RODRIGUEZ   MRN:      [id number]        Account:      [id number]   :      1940           Service Date: 2018      Document: [id number]

## 2018-09-20 ENCOUNTER — TELEPHONE (OUTPATIENT)
Dept: PHYSICAL MEDICINE AND REHAB | Facility: CLINIC | Age: 78
End: 2018-09-20

## 2018-09-20 NOTE — TELEPHONE ENCOUNTER
[facility] Call Center    Phone Message    May a detailed message be left on voicemail: yes    Reason for Call: Other: Pt cancelled her appt due to weather and would like to reschedule.  Please call her back. TY.     Action Taken: Message routed to:  [facility] ([facility]): [email or web address]

## 2018-10-01 ENCOUNTER — MYC MEDICAL ADVICE (OUTPATIENT)
Dept: FAMILY MEDICINE | Facility: CLINIC | Age: 78
End: 2018-10-01

## 2018-10-01 ENCOUNTER — MYC MEDICAL ADVICE (OUTPATIENT)
Dept: ORTHOPEDICS | Facility: CLINIC | Age: 78
End: 2018-10-01

## 2018-10-01 NOTE — TELEPHONE ENCOUNTER
Pt was called back to schedule appointment with Taty Bullard in wound clinic per patient request. Appointment set for 11:30 a.m. On 10/11/18.

## 2018-10-09 RX ORDER — CHLORHEXIDINE GLUCONATE ORAL RINSE 1.2 MG/ML
SOLUTION DENTAL
Status: ON HOLD | COMMUNITY
Start: 2018-09-08 | End: 2018-11-29

## 2018-10-09 RX ORDER — AMOXICILLIN 500 MG/1
CAPSULE ORAL PRN
COMMUNITY
Start: 2018-09-08 | End: 2018-11-19

## 2018-10-09 NOTE — PROGRESS NOTES
Problem(s) Oriented visit      SUBJECTIVE:                                                    Katherin [place] is a 78 year old female who presents to clinic today for:  Patient presents with:  Musculoskeletal Problem: Leg Spasms - both legs  Pain: Right wrist. Bump on inside, painful. No injury  Flu Shot    Has R wrist mass x several weeks. Getting bigger. Sometimes painful when bumped or needing to use her hand in ADLs. Falls frequently but does not recall FOOSH. No known trauma. No prior intervention to this wrist. No wrist xrays on file. Has not taken anything for this yet.    Still having severe BLE muscle spasms 2-3 times a day, also has at night. Getting worse. Workup has been negative - both metabolic and vascular. Walking helps. Taking a MVI. She has known severe cervical spinal stenosis and polyneuropathy degenerative disease. Has seen Dr. George Pham at [facility] and Dr. Colton Ledezma at [facility] for assessment. Is considering having c-spine surgery for decompression, but is understandably hesitant. She is aware she is at risk for paralysis if she falls and injures her neck. Conversely, she is also aware that decompressing her neck may or may not help w/ her muscle spasms and degenerative disease. She is weighing options.     She notes she is losing some hair, more than expected. She is on levothyroxine. No weight gain. Bowels are OK.     Problem list, Medication list, Allergies, and Medical/Social/Surgical histories reviewed in [facility] and updated as appropriate.     ROS:  10 point ROS completed and negative except noted above, including Gen, HEENT, CV, Resp, GI, , MS, Neurologic, Psych. Frequently tired. Is understandably frustrated over the loss of muscle strength and her deteriorating health.     Histories:   Patient Active Problem List   Diagnosis     Long term current use of anticoagulant therapy     CAD (coronary artery disease)     Hyperlipidemia     Health Care Home     MRSA  (methicillin resistant Staphylococcus aureus)     Dysarthria     Disturbances of sensation of smell and taste     Neurologic gait disorder     Cerebellar disease     Essential hypertension     Ischemic cardiomyopathy     Peripheral polyneuropathy     Arterial leg ulcer (H)     PAD (peripheral artery disease) (H)     Encounter for long-term (current) use of medications     Stroke (H)     Slurred speech     Neurodegenerative disorder     History of TIA (transient ischemic attack) and stroke     Falls frequently     Hypothyroidism, unspecified type     Past Medical History:   Diagnosis Date     CAD (coronary artery disease)     s/p CABG 1992 and redo CABG 2009 (LIMA to LAD, SVG to OM and SVG to posterolateral branch of RCA)     Chronic cough     Non-productive. No known definite etiology. Is beeing seen by [facility].      Essential hypertension, benign      Falls frequently 5/28/2018     GERD (gastroesophageal reflux disease)      History of TIA (transient ischemic attack) and stroke 5/7/2018    R sided facial N&T.      Hyperlipidaemia      Hyperlipidaemia LDL goal < 130      Ischemic cardiomyopathy      MI (myocardial infarction) (H) 11/1996     Mixed hyperlipidemia      Neurodegenerative disorder     Further detailed dx unknown. Managed by Dr. [facility] - [facility].      PAD (peripheral artery disease) (H)      Restless legs syndrome      Unspecified hypothyroidism      Past Surgical History:   Procedure Laterality Date     ARTHROSCOPY SHOULDER RT/LT       BUNIONECTOMY RT/LT       C CABG, ARTERY-VEIN, THREE  1/2009     C CARDIAC SURG PROCEDURE UNLIST       C HAND/FINGER SURGERY UNLISTED       C MRA UPPER EXTREMITY WO&W CONT       C SHOULDER SURG PROC UNLISTED       C STOMACH SURGERY PROCEDURE UNLISTED       CARDIAC SURGERY  1992    CABG,stents x2     CARPAL TUNNEL RELEASE RT/LT       [facility] VASCULAR SURGERY PROCEDURE UNLIST       HYSTERECTOMY, DAMIÁN  34 yo     OPEN REDUCTION INTERNAL FIXATION ANKLE Left 2015     Left Ankle     RELEASE TRIGGER FINGER       REPAIR HAMMER TOE Right 12/19/2017    Procedure: REPAIR HAMMER TOE;  Right 2nd and 3rd toe pinning;  Surgeon: Fernando Wiggins MD;  Location: [facility] OR     STENT  2006     STENT(aka CARDIAC)  2008     VASCULAR SURGERY       Social History   Substance Use Topics     Smoking status: Former Smoker     Packs/day: 0.50     Years: 22.00     Types: Cigarettes     Quit date: 4/15/1992     Smokeless tobacco: Never Used     Alcohol use 0.6 oz/week     1 Standard drinks or equivalent per week      Comment: MAYBE ONCE A YEAR     Family History   Problem Relation Age of Onset     Unknown/Adopted Mother      Unknown/Adopted Father      Other - See Comments Son      on warfarin for one year after afganastan now off and doing well     Other - See Comments Daughter      celiac, brittle bone, arthritis, Raynauds       OBJECTIVE:                                                    /60  Pulse 74  Resp 16  Wt 71.2 kg (157 lb)  SpO2 99%  BMI 26.95 kg/m2  Body mass index is 26.95 kg/(m^2).   Gen: Cooperative. No acute distress.Very pleasant, neatly dressed and well groomed. Wearing a soft c-collar to support her head. She appears to have less control of her head since I first stating seeing her.   Eyes: PERRL, sclera white. Wearing glasses.   Ears/Nose/Mouth/Throat:  Oropharynx mucous membranes dry.   Neck: Supple, without masses, lymphadenopathy or tenderness. Muscles are weak and sometimes she manually repositions her head.   Heart: Regular rate and rhythm without murmurs, rubs, or gallops.   Lungs: Normal respiratory effort. Lungs are clear to auscultation. Good breath sounds bilaterally.   Musculoskeletal: No lower extremity edema. She has a 1 cm volar firm but mobile nodule on her R wrist, consistent w/ a ganglion cyst.   Skin: Warm and well perfused.   Neurologic: Alert, oriented x3. Muscle weakness in BUE and BLE and neck. Speech is slurred and is worsening since our last  visit. Cognitively is quite intact. Using a 4 WW to support balance.   Psych:  Mood and affect are appropriate to discussion and her health issues. She is a little depressed but tries to be optimistic.      ASSESSMENT/PLAN:                                                      Katherin was seen today for musculoskeletal problem, pain and flu shot.    Diagnoses and all orders for this visit:    Mass of wrist, right  -     Referral to [facility] - Likely ganglion cyst. Discussed options.    Chose to defer needle aspiration due to high risk of recurrence.    We also opted to defer images today as Ortho may want their own views.    OK to brace for comfort, but will likely need more definitive mgmt, zack in consideration as it has grown quickly.     Muscle spasms of both lower extremities  -     TSH ([facility])  -     Thyroxine (T4) ([facility])  -     Ferritin  Serum ([facility])  -     Iron and TIBC ([facility])  -     C-Reactive Protein  Quant ([facility])  -     tiZANidine (ZANAFLEX) 2 MG tablet; Take 1 tablet (2 mg) by mouth 3 times daily  -     Magnesium  Serum ([facility])  -     CBC with Diff/Plt ([facility])  -     Comp. Metabolic Panel (14) ([facility])   She will keep me in the loop if she opts to have c-spine surgery or if has other questions about the procedure.     Hair loss  -     CBC with Diff/Plt ([facility])  -     Comp. Metabolic Panel (14) ([facility])    Peripheral polyneuropathy  -     CBC with Diff/Plt ([facility])    Neurodegenerative disorder  -     TSH ([facility])  -     Thyroxine (T4) ([facility])  -     Ferritin  Serum ([facility])  -     Iron and TIBC ([facility])  -     C-Reactive Protein  Quant ([facility])  -     Prealbumin ([facility])  -     Vitamin B12 ([facility])  -     CBC with Diff/Plt ([facility])  -     Comp. Metabolic Panel (14) ([facility])    Hypothyroidism, unspecified type  -     TSH ([facility])  -     Thyroxine (T4) ([facility])    Need for 23-polyvalent pneumococcal polysaccharide vaccine  -     PNEUMOCOCCAL VACCINE,ADULT,SQ OR IM  -      ADMIN PNEUMOCOCCAL VACCINE   Counseled pt on both immunizations planned for today. No known complications w/ vaccination today.     Need for prophylactic vaccination and inoculation against influenza  -     FLU VACCINE, INCREASED ANTIGEN, PRESV FREE, AGE 65+ [66679]  -     ADMIN INFLUENZA (For MEDICARE Patients ONLY) []    RTC prn.     Patient Instructions   OK to try Viactiv chocolate calcium chews.       The following health maintenance items are reviewed in Epic and correct as of today:  Health Maintenance   Topic Date Due     MEDICARE ANNUAL WELLNESS VISIT  03/29/2019     FALL RISK ASSESSMENT  10/11/2019     PHQ-2 Q1 YR  10/11/2019     LIPID SCREEN Q5 YR FEMALE (SYSTEM ASSIGNED)  05/07/2023     ADVANCE DIRECTIVE PLANNING Q5 YRS  07/10/2023     TETANUS IMMUNIZATION (SYSTEM ASSIGNED)  06/04/2026     DEXA SCAN SCREENING (SYSTEM ASSIGNED)  Completed     PNEUMOCOCCAL  Completed     INFLUENZA VACCINE  Completed       Halina Corrales MD  Family Medicine    For any issues, please call my office  [facility] at [phone].

## 2018-10-10 ENCOUNTER — OFFICE VISIT (OUTPATIENT)
Dept: FAMILY MEDICINE | Facility: CLINIC | Age: 78
End: 2018-10-10

## 2018-10-10 VITALS
RESPIRATION RATE: 16 BRPM | HEART RATE: 74 BPM | DIASTOLIC BLOOD PRESSURE: 60 MMHG | WEIGHT: 157 LBS | OXYGEN SATURATION: 99 % | SYSTOLIC BLOOD PRESSURE: 118 MMHG | BODY MASS INDEX: 26.95 KG/M2

## 2018-10-10 DIAGNOSIS — G62.9 PERIPHERAL POLYNEUROPATHY: ICD-10-CM

## 2018-10-10 DIAGNOSIS — L65.9 HAIR LOSS: ICD-10-CM

## 2018-10-10 DIAGNOSIS — Z23 NEED FOR 23-POLYVALENT PNEUMOCOCCAL POLYSACCHARIDE VACCINE: ICD-10-CM

## 2018-10-10 DIAGNOSIS — Z23 NEED FOR PROPHYLACTIC VACCINATION AND INOCULATION AGAINST INFLUENZA: ICD-10-CM

## 2018-10-10 DIAGNOSIS — G31.9 NEURODEGENERATIVE DISORDER (H): ICD-10-CM

## 2018-10-10 DIAGNOSIS — E03.9 HYPOTHYROIDISM, UNSPECIFIED TYPE: ICD-10-CM

## 2018-10-10 DIAGNOSIS — M62.838 MUSCLE SPASMS OF BOTH LOWER EXTREMITIES: ICD-10-CM

## 2018-10-10 DIAGNOSIS — R22.31 MASS OF WRIST, RIGHT: Primary | ICD-10-CM

## 2018-10-10 LAB
% GRANULOCYTES: 43.8 % (ref 42.2–75.2)
HCT VFR BLD AUTO: 37.7 % (ref 35–46)
HEMOGLOBIN: 12.5 G/DL (ref 11.8–15.5)
LYMPHOCYTES NFR BLD AUTO: 48.4 % (ref 20.5–51.1)
MCH RBC QN AUTO: 28.4 PG (ref 27–31)
MCHC RBC AUTO-ENTMCNC: 33.3 G/DL (ref 33–37)
MCV RBC AUTO: 85.2 FL (ref 80–100)
MONOCYTES NFR BLD AUTO: 7.8 % (ref 1.7–9.3)
PLATELET # BLD AUTO: 217 K/UL (ref 140–450)
RBC # BLD AUTO: 4.43 X10/CMM (ref 3.7–5.2)
WBC # BLD AUTO: 3.8 X10/CMM (ref 3.8–11)

## 2018-10-10 PROCEDURE — 90662 IIV NO PRSV INCREASED AG IM: CPT | Performed by: FAMILY MEDICINE

## 2018-10-10 PROCEDURE — G0009 ADMIN PNEUMOCOCCAL VACCINE: HCPCS | Performed by: FAMILY MEDICINE

## 2018-10-10 PROCEDURE — 90732 PPSV23 VACC 2 YRS+ SUBQ/IM: CPT | Performed by: FAMILY MEDICINE

## 2018-10-10 PROCEDURE — 36415 COLL VENOUS BLD VENIPUNCTURE: CPT | Performed by: FAMILY MEDICINE

## 2018-10-10 PROCEDURE — G0008 ADMIN INFLUENZA VIRUS VAC: HCPCS | Performed by: FAMILY MEDICINE

## 2018-10-10 PROCEDURE — 99214 OFFICE O/P EST MOD 30 MIN: CPT | Mod: 25 | Performed by: FAMILY MEDICINE

## 2018-10-10 PROCEDURE — 85025 COMPLETE CBC W/AUTO DIFF WBC: CPT | Performed by: FAMILY MEDICINE

## 2018-10-10 RX ORDER — TIZANIDINE 2 MG/1
2 TABLET ORAL 3 TIMES DAILY
Qty: 30 TABLET | Refills: 0 | Status: SHIPPED | OUTPATIENT
Start: 2018-10-10 | End: 2018-11-19

## 2018-10-10 NOTE — LETTER
"[facility]  [address] [place]  [address]  Phone: [phone]    October 24, 2018      Katherin [place]  [address]  [facility] [address]              Dear Katherin,    The results from your recent visit showed Your lab results all look good and do not reveal any obvious cause for muscle spasms.   Your blood counts all look good and do not reveal either anemia or infection.   Both of your thyroid labs are in a good range.   Your iron levels are all normal.   The inflammatory marker CRP is normal, indicating no major inflammation detected.   Your current nutrition level is good with a normal prealbumin.  Your Vitamin B12 (energy vitamin) is in a very nice range.   Your magnesium level is normal - you can experience muscle spasms if magnesium is low.   Your liver and kidney labs are all normal. This includes a normal potassium, sodium and calcium, all of which may cause muscle spasms and pain if they are abnormal. Yours are all normal.   Your screening test for diabetes is negative (no diabetes).   Overall, it's terrific to have such great lab results, but they do not point us to the reason you are having muscle spasms.   If your muscles are still bothering you, we could consider evaluating your lumbar spine (low back) and/or blood flow in and out of your legs.   We could also consider medication for restless leg syndrome to see if that would be helpful.   Please let me know your thoughts and update me as to how you are feeling.         Sincerely,     Halina \"Sandi\" MD Antoni/Americo Renteria,CMA      Results for orders placed or performed in visit on 10/10/18   TSH ([facility])   Result Value Ref Range    TSH 2.750 0.450 - 4.500 uIU/mL    Narrative    Performed at:  01 - [facility]  [address] [place] [address]  [id number]  : Kodi Ortiz MD, Phone:  [phone]   Thyroxine (T4) ([facility])   Result Value Ref Range    T4 Total 7.7 4.5 - 12.0 ug/dL    Narrative "    Performed at:  01 - [facility] [address], [place], [place]  [id number]  : Kodi Ortiz MD, Phone:  [phone]   Ferritin  Serum ([facility])   Result Value Ref Range    Ferritin 71 15 - 150 ng/mL    Narrative    Performed at:  01 - [facility] [address], [place], [place]  [id number]  : Kodi Ortiz MD, Phone:  [phone]   Iron and TIBC ([facility])   Result Value Ref Range    Iron Binding Cap 319 250 - 450 ug/dL    UIBC 262 118 - 369 ug/dL    Iron 57 27 - 139 ug/dL    Iron Saturation 18 15 - 55 %    Narrative    Performed at:  01 - [facility] [address], [place], [place]  [id number]  : Kodi Ortiz MD, Phone:  [phone]   C-Reactive Protein  Quant ([facility])   Result Value Ref Range    C-Reactive Protein 1.2 0.0 - 4.9 mg/L    Narrative    Performed at:  01 - [facility] [address], [place], [place]  [id number]  : Kodi Ortiz MD, Phone:  [phone]   Prealbumin ([facility])   Result Value Ref Range    Prealbumin 23 9 - 32 mg/dL    Narrative    Performed at:  01 - [facility] [address], [place], [place]  [id number]  : Kodi Ortiz MD, Phone:  [phone]   Vitamin B12 ([facility])   Result Value Ref Range    Vitamin B12 879 232 - 1245 pg/mL    Narrative    Performed at:  01 - [facility] [address], [place], [place]  [id number]  : Kodi Ortiz MD, Phone:  [phone]   Magnesium  Serum ([facility])   Result Value Ref Range    Magnesium 1.9 1.6 - 2.3 mg/dL    Narrative    Performed at:  01 - [facility] [address], [place], [place]  [id number]  : Kodi Ortiz MD, Phone:  [phone]   CBC with Diff/Plt ([facility])   Result Value Ref Range    WBC x10/cmm 3.8 3.8 - 11.0 x10/cmm    % Lymphocytes 48.4 20.5 - 51.1 %    % Monocytes 7.8 1.7 - 9.3 %    % Granulocytes 43.8 42.2 - 75.2 %    RBC x10/cmm 4.43 3.7 - 5.2 x10/cmm    Hemoglobin 12.5 11.8 - 15.5 g/dl    Hematocrit 37.7 35 - 46 %    MCV 85.2 80 - 100 fL     MCH 28.4 27.0 - 31.0 pg    MCHC 33.3 33.0 - 37.0 g/dL    Platelet Count 217 140 - 450 K/uL   Comp. Metabolic Panel (14) ([facility])   Result Value Ref Range    Glucose 99 65 - 99 mg/dL    Urea Nitrogen 11 8 - 27 mg/dL    Creatinine 0.65 0.57 - 1.00 mg/dL    eGFR If NonAfricn Am 85 >59 mL/min/1.73    eGFR If Africn Am 98 >59 mL/min/1.73    BUN/Creatinine Ratio 17 12 - 28    Sodium 141 134 - 144 mmol/L    Potassium 4.5 3.5 - 5.2 mmol/L    Chloride 102 96 - 106 mmol/L    Total CO2 25 20 - 29 mmol/L    Calcium 10.1 8.7 - 10.3 mg/dL    Protein Total 6.8 6.0 - 8.5 g/dL    Albumin 4.1 3.5 - 4.8 g/dL    Globulin, Total 2.7 1.5 - 4.5 g/dL    A/G Ratio 1.5 1.2 - 2.2    Bilirubin Total 0.4 0.0 - 1.2 mg/dL    Alkaline Phosphatase 80 39 - 117 IU/L    AST 17 0 - 40 IU/L    ALT 9 0 - 32 IU/L    Narrative    Performed at:  01 - [facility]  [address] [place], [place]  [id number]  : Kodi Ortiz MD, Phone:  [phone]

## 2018-10-10 NOTE — MR AVS SNAPSHOT
After Visit Summary   10/10/2018    Katherin Fletcher    MRN: [id number]           Patient Information     Date Of Birth          1940        Visit Information        Provider Department      10/10/2018 9:30 AM Halina Corrales MD [facility]        Today's Diagnoses     Mass of wrist, right    -  1    Muscle spasms of both lower extremities        Hair loss        Peripheral polyneuropathy        Neurodegenerative disorder        Hypothyroidism, unspecified type        Need for 23-polyvalent pneumococcal polysaccharide vaccine        Need for prophylactic vaccination and inoculation against influenza          Care Instructions    OK to try Viactiv chocolate calcium chews.           Follow-ups after your visit        Additional Services     Referral to [facility]       Referral to [facility]  Phone:  [phone]  Reason for Referral:  R wrist mass - presumed ganglion for several weeks. Painful.     Please be aware that coverage of these services is subject to the terms and limitations of your health insurance plan.  Call member services at your health plan with any benefit or coverage questions.                  Your next 10 appointments already scheduled     Oct 11, 2018 11:30 AM CDT   (Arrive by 11:15 AM)   Return Visit with Taty Bullard PA-C   [facility] ([facility])    [address] [place] [address]  4th Floor  [facility] [address] [phone]            Oct 11, 2018 12:20 PM CDT   (Arrive by 12:05 PM)   New Patient Visit with Darline Celestin MD   [facility] ([facility])    [address] [place] [address]  3rd Floor  [facility] [address] [phone]              Who to contact     If you have questions or need follow up information about today's clinic visit or your schedule please contact [facility] directly at  [phone].  Normal or non-critical lab and imaging results will be communicated to you by MyChart, letter or phone within 4 business days after the clinic has received the results. If you do not hear from us within 7 days, please contact the clinic through Snaptriphart or phone. If you have a critical or abnormal lab result, we will notify you by phone as soon as possible.  Submit refill requests through Lamiecco or call your pharmacy and they will forward the refill request to us. Please allow 3 business days for your refill to be completed.          Additional Information About Your Visit        Snaptriphart Information     [employer] gives you secure access to your electronic health record. If you see a primary care provider, you can also send messages to your care team and make appointments. If you have questions, please call your primary care clinic.  If you do not have a primary care provider, please call [phone] and they will assist you.        Care EveryWhere ID     This is your Care EveryWhere ID. This could be used by other organizations to access your [place] medical records  [id number]        Your Vitals Were     Pulse Respirations Pulse Oximetry BMI (Body Mass Index)          74 16 99% 26.95 kg/m2         Blood Pressure from Last 3 Encounters:   10/10/18 118/60   09/13/18 136/72   07/25/18 114/72    Weight from Last 3 Encounters:   10/10/18 71.2 kg (157 lb)   09/13/18 70.9 kg (156 lb 3.2 oz)   07/25/18 72.6 kg (160 lb)              We Performed the Following     ADMIN INFLUENZA (For MEDICARE Patients ONLY) []     ADMIN PNEUMOCOCCAL VACCINE     C-Reactive Protein  Quant ([facility])     CBC with Diff/Plt ([facility])     Comp. Metabolic Panel (14) ([facility])     Ferritin  Serum ([facility])     FLU VACCINE, INCREASED ANTIGEN, PRESV FREE, AGE 65+ [14430]     Iron and TIBC ([facility])     Magnesium  Serum ([facility])     PNEUMOCOCCAL VACCINE,ADULT,SQ OR IM     Prealbumin ([facility])     Referral to [facility]     Thyroxine (T4) ([facility])     TSH ([facility])     Vitamin B12 ([facility])          Today's Medication Changes          These changes are accurate as of 10/10/18 10:24 AM.  If you have any questions, ask your nurse or doctor.               Start taking these medicines.        Dose/Directions    tiZANidine 2 MG tablet   Commonly known as:  ZANAFLEX   Used for:  Muscle spasms of both lower extremities   Started by:  Halina Corrales MD        Dose:  2 mg   Take 1 tablet (2 mg) by mouth 3 times daily   Quantity:  30 tablet   Refills:  0         These medicines have changed or have updated prescriptions.        Dose/Directions    * carbidopa-levodopa  MG per tablet   Commonly known as:  SINEMET   This may have changed:    - how much to take  - how to take this  - when to take this  - reasons to take this  - additional instructions   Used for:  Restless legs syndrome, Neurologic gait disorder, Dysarthria, Cerebellar disease        TAKE 2 TABLETS FOUR TIMES A DAY   Quantity:  720 tablet   Refills:  3       * carbidopa-levodopa  MG per tablet   Commonly known as:  SINEMET   This may have changed:  Another medication with the same name was changed. Make sure you understand how and when to take each.   Used for:  Restless legs syndrome, Neurologic gait disorder, Dysarthria, Cerebellar disease        TAKE 2 TABLETS FOUR TIMES A DAY   Quantity:  720 tablet   Refills:  3       * Notice:  This list has 2 medication(s) that are the same as other medications prescribed for you. Read the directions carefully, and ask your doctor or other care provider to review them with you.         Where to get your medicines      These medications were sent to [facility] [address] [place] [id number]     Phone:  [phone]     tiZANidine 2 MG tablet                Primary Care Provider Office Phone # Fax #    Aris Del Real [phone]  [phone]       [address]  [facility] [address]        Equal Access to Services     JOSELUIS MADRIGAL : Hadii rosio joel daja Somichelle, waaxda luqadaha, qaybta kaalmada supriya, carlo brendain hayaastan bolañoskermit watters lairishstan castro. So [facility] [phone].    ATENCIÓN: Si habla español, tiene a taylor disposición servicios gratuitos de asistencia lingüística. Karen al [phone].    We comply with applicable federal civil rights laws and [place] laws. We do not discriminate on the basis of race, color, national origin, age, disability, sex, sexual orientation, or gender identity.            Thank you!     Thank you for choosing [facility]  for your care. Our goal is always to provide you with excellent care. Hearing back from our patients is one way we can continue to improve our services. Please take a few minutes to complete the written survey that you may receive in the mail after your visit with us. Thank you!             Your Updated Medication List - Protect others around you: Learn how to safely use, store and throw away your medicines at www.disposemymeds.org.          This list is accurate as of 10/10/18 10:24 AM.  Always use your most recent med list.                   Brand Name Dispense Instructions for use Diagnosis    Alfalfa 250 MG Tabs      Take 250 mg by mouth every morning        amoxicillin 500 MG capsule    AMOXIL          apixaban ANTICOAGULANT 5 MG tablet    ELIQUIS    180 tablet    Take 1 tablet (5 mg) by mouth 2 times daily    Cerebrovascular accident (CVA), unspecified mechanism (H)       calcium + D 600-200 MG-UNIT Tabs   Generic drug:  calcium carbonate-vitamin D      Take 1 tablet by mouth At Bedtime    Preventative health care       * carbidopa-levodopa  MG per tablet    SINEMET    720 tablet    TAKE 2 TABLETS FOUR TIMES A DAY    Restless legs syndrome, Neurologic gait disorder, Dysarthria, Cerebellar disease       * carbidopa-levodopa  MG per tablet    SINEMET     720 tablet    TAKE 2 TABLETS FOUR TIMES A DAY    Restless legs syndrome, Neurologic gait disorder, Dysarthria, Cerebellar disease       carvedilol 6.25 MG tablet    COREG    180 tablet    Take 1 tablet (6.25 mg) by mouth 2 times daily (with meals)    Benign essential hypertension       chlorhexidine 0.12 % solution    PERIDEX          clopidogrel 75 MG tablet    PLAVIX    90 tablet    Take 1 tablet (75 mg) by mouth daily    Coronary artery disease involving native coronary artery of native heart without angina pectoris       DAILY MULTI Tabs      Take 1 tablet by mouth every morning        isosorbide mononitrate 30 MG 24 hr tablet    IMDUR    90 tablet    Take 1 tablet (30 mg) by mouth daily    Coronary artery disease involving native coronary artery of native heart without angina pectoris       levothyroxine 112 MCG tablet    SYNTHROID/LEVOTHROID    90 tablet    TAKE 1 TABLET DAILY    Encounter for medication refill       nitroGLYcerin 0.4 MG sublingual tablet    NITROSTAT    25 tablet    DISSOLVE ONE TABLET UNDER THE TONGUE EVERY 5 MINUTES AS NEEDED FOR CHEST PAIN.  DO NOT EXCEED A TOTAL OF 3 DOSES IN 15 MINUTES    CAD (coronary artery disease)       omeprazole 20 MG CR capsule    priLOSEC    180 capsule    TAKE 2 CAPSULES DAILY 30 TO 60 MINUTES BEFORE A MEAL    Encounter for long-term (current) use of medications       rosuvastatin 20 MG tablet    CRESTOR    90 tablet    Take 1 tablet (20 mg) by mouth daily    Mixed hyperlipidemia       tiZANidine 2 MG tablet    ZANAFLEX    30 tablet    Take 1 tablet (2 mg) by mouth 3 times daily    Muscle spasms of both lower extremities       VITAMIN B-12 PO      Take 1 tablet by mouth every morning        * Notice:  This list has 2 medication(s) that are the same as other medications prescribed for you. Read the directions carefully, and ask your doctor or other care provider to review them with you.

## 2018-10-10 NOTE — PROGRESS NOTES
Injectable Influenza Immunization Documentation    1.  Is the person to be vaccinated sick today?   No    2. Does the person to be vaccinated have an allergy to a component   of the vaccine?   No  Egg Allergy Algorithm Link    3. Has the person to be vaccinated ever had a serious reaction   to influenza vaccine in the past?   No    4. Has the person to be vaccinated ever had Guillain-Barré syndrome?   No    Form completed by [facility]

## 2018-10-11 ENCOUNTER — OFFICE VISIT (OUTPATIENT)
Dept: WOUND CARE | Facility: CLINIC | Age: 78
End: 2018-10-11
Payer: MEDICARE

## 2018-10-11 ENCOUNTER — OFFICE VISIT (OUTPATIENT)
Dept: PHYSICAL MEDICINE AND REHAB | Facility: CLINIC | Age: 78
End: 2018-10-11
Payer: MEDICARE

## 2018-10-11 VITALS
HEART RATE: 78 BPM | RESPIRATION RATE: 20 BRPM | TEMPERATURE: 98 F | BODY MASS INDEX: 26.63 KG/M2 | SYSTOLIC BLOOD PRESSURE: 114 MMHG | OXYGEN SATURATION: 95 % | WEIGHT: 156 LBS | HEIGHT: 64 IN | DIASTOLIC BLOOD PRESSURE: 77 MMHG

## 2018-10-11 DIAGNOSIS — M62.838 MUSCLE SPASMS OF BOTH LOWER EXTREMITIES: ICD-10-CM

## 2018-10-11 DIAGNOSIS — M54.2 NECK PAIN: ICD-10-CM

## 2018-10-11 DIAGNOSIS — L60.0 INGROWING RIGHT GREAT TOENAIL: ICD-10-CM

## 2018-10-11 DIAGNOSIS — Z79.01 LONG TERM CURRENT USE OF ANTICOAGULANT THERAPY: ICD-10-CM

## 2018-10-11 DIAGNOSIS — R13.12 OROPHARYNGEAL DYSPHAGIA: ICD-10-CM

## 2018-10-11 DIAGNOSIS — R25.2 SPASTICITY: ICD-10-CM

## 2018-10-11 DIAGNOSIS — L60.2 LONG TOENAIL: Primary | ICD-10-CM

## 2018-10-11 DIAGNOSIS — I73.9 PERIPHERAL ARTERIAL DISEASE (H): ICD-10-CM

## 2018-10-11 DIAGNOSIS — M53.82 NECK MUSCLE WEAKNESS: ICD-10-CM

## 2018-10-11 DIAGNOSIS — M47.12 CERVICAL SPONDYLOSIS WITH MYELOPATHY: Primary | ICD-10-CM

## 2018-10-11 LAB
ALBUMIN SERPL-MCNC: 4.1 G/DL (ref 3.5–4.8)
ALBUMIN/GLOB SERPL: 1.5 {RATIO} (ref 1.2–2.2)
ALP SERPL-CCNC: 80 IU/L (ref 39–117)
ALT SERPL-CCNC: 9 IU/L (ref 0–32)
AST SERPL-CCNC: 17 IU/L (ref 0–40)
BILIRUB SERPL-MCNC: 0.4 MG/DL (ref 0–1.2)
BUN SERPL-MCNC: 11 MG/DL (ref 8–27)
BUN/CREATININE RATIO: 17 (ref 12–28)
CALCIUM SERPL-MCNC: 10.1 MG/DL (ref 8.7–10.3)
CHLORIDE SERPLBLD-SCNC: 102 MMOL/L (ref 96–106)
CREAT SERPL-MCNC: 0.65 MG/DL (ref 0.57–1)
CRP SERPL-MCNC: 1.2 MG/L (ref 0–4.9)
EGFR IF AFRICN AM: 98 ML/MIN/1.73
EGFR IF NONAFRICN AM: 85 ML/MIN/1.73
FERRITIN SERPL-MCNC: 71 NG/ML (ref 15–150)
GLOBULIN, TOTAL: 2.7 G/DL (ref 1.5–4.5)
GLUCOSE SERPL-MCNC: 99 MG/DL (ref 65–99)
IRON BINDING CAP: 319 UG/DL (ref 250–450)
IRON SATURATION: 18 % (ref 15–55)
IRON: 57 UG/DL (ref 27–139)
MAGNESIUM SERPL-MCNC: 1.9 MG/DL (ref 1.6–2.3)
POTASSIUM SERPL-SCNC: 4.5 MMOL/L (ref 3.5–5.2)
PREALB SERPL IA-MCNC: 23 MG/DL (ref 9–32)
PROT SERPL-MCNC: 6.8 G/DL (ref 6–8.5)
SODIUM SERPL-SCNC: 141 MMOL/L (ref 134–144)
T4 TOTAL: 7.7 UG/DL (ref 4.5–12)
TOTAL CO2: 25 MMOL/L (ref 20–29)
TSH BLD-ACNC: 2.75 UIU/ML (ref 0.45–4.5)
UIBC: 262 UG/DL (ref 118–369)
VIT B12 SERPL-MCNC: 879 PG/ML (ref 232–1245)

## 2018-10-11 ASSESSMENT — PAIN SCALES - GENERAL: PAINLEVEL: NO PAIN (0)

## 2018-10-11 NOTE — LETTER
"10/11/2018       RE: Katherin Fletcher  [address]  [facility] [address]     Dear Colleague,    Thank you for referring your patient, Katherin Fletcher, to the [facility] at [facility]. Please see a copy of my visit note below.           PM&R Clinic Note   Patient Name: Katherin Fletcher : 1940 Medical Record: [id number]     Requesting Physician/clinician: Halina Corrales MD            History of Present Illness:   Katherin Fletcher is a 78 year old female who presented to clinic for evaluation of falls, gait difficulty and weakness/spasticity. PMH is significant for A fib, CAD, HLD and TIA in 2018.     Her symptoms started 2 years ago with spasms in her legs and falls. Spasms have been progressively worse since then; occurs intermittently during the day and night time up to 3-4 times. They can last 10 minutes to 8 hours and are very painful. Improve with walking and worse at night time. She also reports \"tightness in her bilateral lower extremities\". She was seen and evaluated by Dr. Randolph neurology team; was started on sinemet but doesn't think this has been helpful. She also takes tylenol with codeine which are sometimes beneficial but not all the times. She tied baclofen in the past but it was discontinued due to GI side effects. She also tried gabapentin at higher doses with no results so stopped taking. Tizanidine was prescribed by Dr. Corrales but she has not started that yet. Botox injections were also recommended by Dr. Randolph but she cancelled her appointment as she was concerned about potential side effects.    Starting Dec 2017 she noticed that she \"couldn't hold her head up\". This has also been progressively worse since then. She has been using a soft cervical collar since 2018. Denies any paresthesia in her extremities or groins; no issue with bowel and bladder control. She has some neck " "pain, located at the \"base of her skull\" with radiation to b/l trap area but not to bilateral upper extremities. No back pain or radicular symptoms in bilateral lower extremities. She denies any issue with her swallowing, however she has noticed difficulty with \"increased saliva\" and worsening cough over the past few months. She sleeps ok at night time; appetite is fair and mood is stable.     She was admitted to the hospital in May 2018; presented right hemiparesis and slurred speech. Symptoms resolved and they were attributed to TIA. She had short course of PT, OT and SLP after that but no therapies for her chronic symptoms in the past.     She was followed by Dr. Randolph as mentioned above for posterior neck weakness and ataxic type gait with spasticity. Also seen by Dr. Ledezma at [facility] regarding possible surgical interventions; per chart review \"surgery was unlikely to help with her symptoms\".     She was seen and evaluated by Dr. Pham at [facility] of neurology in May 2018; EMG of bilateral upper extremities was performed on 6/27/18 which showed \"cervical polyradiculopathy; motor neuron disease can't be excluded\". There was no active denervation in all muscles tested in bilateral upper extremities and cervical paraspinals. Very large MUPs were seen at cervical paraspinals.     C spine MRI on 5/18/18 showed \"multilevel DDD, C5-6 severe spinal canal stenosis with flattening of the spinal cord and severe bilateral neuro-foraminal stenosis from uncinate spurs\".             Past Medical and Surgical History:     Past Medical History:   Diagnosis Date     CAD (coronary artery disease)     s/p CABG 1992 and redo CABG 2009 (LIMA to LAD, SVG to OM and SVG to posterolateral branch of RCA)     Chronic cough     Non-productive. No known definite etiology. Is beeing seen by [facility].      Essential hypertension, benign      Falls frequently 5/28/2018     GERD (gastroesophageal reflux disease)      History of TIA " (transient ischemic attack) and stroke 5/7/2018    R sided facial N&T.      Hyperlipidaemia      Hyperlipidaemia LDL goal < 130      Ischemic cardiomyopathy      MI (myocardial infarction) (H) 11/1996     Mixed hyperlipidemia      Neurodegenerative disorder     Further detailed dx unknown. Managed by Dr. George Pham - [facility].      PAD (peripheral artery disease) (H)      Restless legs syndrome      Unspecified hypothyroidism      Past Surgical History:   Procedure Laterality Date     ARTHROSCOPY SHOULDER RT/LT       BUNIONECTOMY RT/LT       C CABG, ARTERY-VEIN, THREE  1/2009     C CARDIAC SURG PROCEDURE UNLIST       C HAND/FINGER SURGERY UNLISTED       C MRA UPPER EXTREMITY WO&W CONT       C SHOULDER SURG PROC UNLISTED       C STOMACH SURGERY PROCEDURE UNLISTED       CARDIAC SURGERY  1992    CABG,stents x2     CARPAL TUNNEL RELEASE RT/LT       HC VASCULAR SURGERY PROCEDURE UNLIST       HYSTERECTOMY, DAMIÁN  34 yo     OPEN REDUCTION INTERNAL FIXATION ANKLE Left 2015    Left Ankle     RELEASE TRIGGER FINGER       REPAIR HAMMER TOE Right 12/19/2017    Procedure: REPAIR HAMMER TOE;  Right 2nd and 3rd toe pinning;  Surgeon: Fernando Wiggins MD;  [facility]: [facility]     STENT  2006     STENT(aka CARDIAC)  2008     VASCULAR SURGERY              Social History:     Marital Status:    Living situation: lives with her  in a house. Her  is 89 and a former . A ramp and a chair lift were recently installed at their house.   Family support: her  still drives and helps with transportation.   Vocational History: retired; she was  at [employer]   Tobacco use: quit in 1992  Alcohol use: rarely a glass of wine           Functional history:     Katherin is right hand-dominant; stopped driving in 11/2017 due to her symptoms. She has a scooter for outside and uses her 4WW inside her house. She is mod I with ADLs, cooking and cleaning and can also manager her meds. She  uses [employer] mobility as another way of transportation.            Family History:     Family History   Problem Relation Age of Onset     Unknown/Adopted Mother      Unknown/Adopted Father             Medications:     Current Outpatient Prescriptions   Medication Sig Dispense Refill     Bradley 250 MG TABS Take 250 mg by mouth every morning        apixaban ANTICOAGULANT (ELIQUIS) 5 MG tablet Take 1 tablet (5 mg) by mouth 2 times daily 180 tablet 3     calcium carbonate-vitamin D (CALCIUM + D) 600-200 MG-UNIT TABS Take 1 tablet by mouth At Bedtime        carbidopa-levodopa (SINEMET)  MG per tablet TAKE 2 TABLETS FOUR TIMES A  tablet 3     carbidopa-levodopa (SINEMET)  MG per tablet TAKE 2 TABLETS FOUR TIMES A DAY (Patient taking differently: Take 2 tablets by mouth 4 times daily as needed (legs) Takes as needed throughout the day, up to 4 times daily) 720 tablet 3     carvedilol (COREG) 6.25 MG tablet Take 1 tablet (6.25 mg) by mouth 2 times daily (with meals) 180 tablet 3     chlorhexidine (PERIDEX) 0.12 % solution        clopidogrel (PLAVIX) 75 MG tablet Take 1 tablet (75 mg) by mouth daily 90 tablet 3     Cyanocobalamin (VITAMIN B-12 PO) Take 1 tablet by mouth every morning        isosorbide mononitrate (IMDUR) 30 MG 24 hr tablet Take 1 tablet (30 mg) by mouth daily 90 tablet 3     levothyroxine (SYNTHROID/LEVOTHROID) 112 MCG tablet TAKE 1 TABLET DAILY 90 tablet 1     Multiple Vitamins-Minerals (DAILY MULTI) TABS Take 1 tablet by mouth every morning        omeprazole (PRILOSEC) 20 MG CR capsule TAKE 2 CAPSULES DAILY 30 TO 60 MINUTES BEFORE A MEAL 180 capsule 1     rosuvastatin (CRESTOR) 20 MG tablet Take 1 tablet (20 mg) by mouth daily 90 tablet 3     tiZANidine (ZANAFLEX) 2 MG tablet Take 1 tablet (2 mg) by mouth 3 times daily 30 tablet 0     amoxicillin (AMOXIL) 500 MG capsule as needed        nitroglycerin (NITROSTAT) 0.4 MG SL tablet DISSOLVE ONE TABLET UNDER THE TONGUE EVERY 5 MINUTES AS  "NEEDED FOR CHEST PAIN.  DO NOT EXCEED A TOTAL OF 3 DOSES IN 15 MINUTES (Patient not taking: Reported on 10/11/2018) 25 tablet 3            Allergies:     Allergies   Allergen Reactions     Ace Inhibitors Cough     Baclofen Other (See Comments)     Constipation , tiredness     Penicillins      10/7/14 Hives per patient -- many many years ago       Nickel Swelling and Rash     Other reaction(s): Unknown              ROS:     A focused ROS is negative other than the symptoms noted above in the HPI.           Physical Examiniation:   VITAL SIGNS: /77  Pulse 78  Temp 98  F (36.7  C) (Oral)  Resp 20  Ht 1.626 m (5' 4\")  Wt 70.8 kg (156 lb)  SpO2 95%  Breastfeeding? No  BMI 26.78 kg/m2  BMI: Estimated body mass index is 26.78 kg/(m^2) as calculated from the following:    Height as of this encounter: 1.626 m (5' 4\").    Weight as of this encounter: 70.8 kg (156 lb).    Gen: NAD, pleasant and cooperative   HEENT: C collar in place  Cardio: regular pulse  Pulm: non-labored breathing in room air  Abd: benign  Ext: WWP, no edema in BLE, no tenderness in calves  Neuro/MSK:    Mental Status:  alert and oriented x3   Cranial Nerves: grossly normal; didn't check IX and X     Sensory: Normal to light touch in bilateral upper and lower extremities    Strength:   SF  EF  EE  WE  G  I  HF  KE  DF  EHL  PF   R  4/5 5/5 5/5 5/5 5/5 4+/5 4/5 5/5 5/5 5/5 5/5 tested in sitting position   L  4/5  5/5 5/5 5/5 5/5 4+/5 4/5 5/5 5/5 5/5 5/5 tested in sitting position    Reflexes: 3+ at b/l biceps, brachioradialis, triceps and patellar. 2+ at ankles. No clonus.    Rojas's test: + bilaterally   Babinski reflex: equivocal bilaterally   Tone per modified Sheron Scale: increased tone in bilateral upper extremities > bilateral lower extremities     Speech: soft and slow but clear and coherent     Neck exam  She had forward head posture with upper thoracic kyphosis and loss of cervical lordosis. Neck ROM was limited at all directions, " slightly better with rotation b/l. She had tenderness to palpation at b/l trap, cervical paraspinals and debora-scapular areas. Obvious weakness of neck muscle, worse with flex.            Assessment/Plan:       Cervical myelopathy     Multi-level DDD at C spine     Severe spinal canal and bilateral  NF stenosis at C5-6 level    Spasticity and spasms in bilateral lower extremities     Neck weakness     Difficulty swallowing with increased saliva and cough     H/o A fib, CAD, HLD and TIA in 5/2018      Her symptoms have been progressively worse over the past 10 minutes. She is clearly spastic with + upper motor signs, and now with questionable new dysphagia. Posterior neck weakness was assumed a separate issue from her cervical myelopathy and likely myopathic, but EMG findings are more consistent with a neurogenic process. Discussed the plan with Dr. Randolph over the phone and reviewed her new symptoms. Strongly encouraged her to f/u with him to discuss EMG results and any further work-up if needed. May also need to f/u with Dr. Ledezma to discuss surgical interventions again.    1. Patient education: In depth discussion and education was provided about the assessment and implications of each of the below recommendations for management. Patient indicated readiness to learn, all questions were answered and understanding of material presented was confirmed.    2. Work-up: none today    3. Therapy/equipment/braces:  --PT to work on neck ROM, gait, balance, appropriate assistive device and stretching exercises for management of spasticty  --SLP for more evaluation on swallowing     4. Medications: agree with the plan to tart tizanidine. disucssed risks and benefits; she will start 2mg tid and let us know about her response.     5. Interventions: consider chemo-denervation     6. Referral / follow up with other providers:  --Will f/u with Dr. Randolph as soon as possible  --May need to f/u with Dr. Ledezma as well      7. Follow up:  6-8 weeks      I spent a total of 60 minutes face-to-face with Katherin Fletcher during today's office visit. Over 50% of this time was spent counseling the patient and/or coordinating care. See note for details.     Again, thank you for allowing me to participate in the care of your patient.      Sincerely,    Darline Celestin MD

## 2018-10-11 NOTE — MR AVS SNAPSHOT
After Visit Summary   10/11/2018    Katherin Fletcher    MRN: [id number]           Patient Information     Date Of Birth          1940        Visit Information        Provider Department      10/11/2018 12:20 PM Darline Celestin MD [facility] Physical Medicine and Rehabilitation        Today's Diagnoses     Cervical spondylosis with myelopathy    -  1    Muscle spasms of both lower extremities        Spasticity        Neck muscle weakness        Neck pain        Oropharyngeal dysphagia           Follow-ups after your visit        Additional Services     PHYSICAL THERAPY REFERRAL       If you have not heard from the scheduling office within 2 business days, please call [phone] for all locations, with the exception of [place], please call [phone] and [place], please call [phone].    Please be aware that coverage of these services is subject to the terms and limitations of your health insurance plan.  Call member services at your health plan with any benefit or coverage questions.    Goal   --HEP for neck stretching and possibly strengthening   --evaluation of gait and proper assistive device   --evaluation and treatment of spasticity bilateral lower extremities > bilateral upper extremities            SPEECH THERAPY REFERRAL       If you have not heard from the scheduling office within 2 business days, please call [phone] for all locations, with the exception of [place], please call [phone] and [place], please call [phone].    Please be aware that coverage of these services is subject to the terms and limitations of your health insurance plan.  Call member services at your health plan with any benefit or coverage questions.    Please see PM&R clinic note on 10/11/18 for more details                  Your next 10 appointments already scheduled     Oct 23, 2018  3:45 PM CDT   Clinical Swallow Chriss with Sybil Bah, SLP   [facility] Speech Therapy  ([facility])    [address]  [address]  [place] [address]   [phone]            Oct 24, 2018 11:30 AM CDT   Neuro Eval with Filiberto Hammond PT   [facility] Physical Therapy ([facility])    [address]  [address]  [place] [address]   [phone]            Nov 02, 2018  1:30 PM CDT   (Arrive by 1:15 PM)   Return Ataxia with Joe Randolph MD   [facility] Neurology ([facility])    [address] [id number] [phone]            Dec 06, 2018  1:40 PM CST   (Arrive by 1:25 PM)   Return Visit with Darline Celestin MD   [facility] Physical Medicine and Rehabilitation ([facility])    [address] [id number] [phone]              Who to contact     Please call your clinic at [phone] to:    Ask questions about your health    Make or cancel appointments    Discuss your medicines    Learn about your test results    Speak to your doctor            Additional Information About Your Visit        [employer] Information     [employer] gives you secure access to your electronic health record. If you see a primary care provider, you can also send messages to your care team and make appointments. If you have questions, please call your primary care clinic.  If you do not have a primary care provider, please call [phone] and they will assist you.      [employer] is an electronic gateway that provides easy, online access to your medical records. With [employer], you can request a clinic appointment, read your test results, renew a prescription or communicate with your care team.     To access your existing account, please contact your [facility] or call [phone] for assistance.        Care EveryWhere ID     This is your Care EveryWhere ID. This could be used by other organizations to access your [facility] medical records  [id number]       "  Your Vitals Were     Pulse Temperature Respirations Height Pulse Oximetry Breastfeeding?    78 98  F (36.7  C) (Oral) 20 1.626 m (5' 4\") 95% No    BMI (Body Mass Index)                   26.78 kg/m2            Blood Pressure from Last 3 Encounters:   10/11/18 114/77   10/10/18 118/60   09/13/18 136/72    Weight from Last 3 Encounters:   10/11/18 70.8 kg (156 lb)   10/10/18 71.2 kg (157 lb)   09/13/18 70.9 kg (156 lb 3.2 oz)                 Today's Medication Changes          These changes are accurate as of 10/11/18 11:59 PM.  If you have any questions, ask your nurse or doctor.               These medicines have changed or have updated prescriptions.        Dose/Directions    * carbidopa-levodopa  MG per tablet   Commonly known as:  SINEMET   This may have changed:    - how much to take  - how to take this  - when to take this  - reasons to take this  - additional instructions   Used for:  Restless legs syndrome, Neurologic gait disorder, Dysarthria, Cerebellar disease        TAKE 2 TABLETS FOUR TIMES A DAY   Quantity:  720 tablet   Refills:  3       * carbidopa-levodopa  MG per tablet   Commonly known as:  SINEMET   This may have changed:  Another medication with the same name was changed. Make sure you understand how and when to take each.   Used for:  Restless legs syndrome, Neurologic gait disorder, Dysarthria, Cerebellar disease        TAKE 2 TABLETS FOUR TIMES A DAY   Quantity:  720 tablet   Refills:  3       * Notice:  This list has 2 medication(s) that are the same as other medications prescribed for you. Read the directions carefully, and ask your doctor or other care provider to review them with you.             Primary Care Provider Office Phone # Fax #    Aris Del Real [phone] [address] [place] [address]        Equal Access to Services     JOSELUIS MADRIGAL AH: Yuly Owens, wabellada luqadaha, qaybta kaalmada adelianne, carlo marques " billy mikykermit florlowell rossaastan ah. So [facility] [phone].    ATENCIÓN: Si spencerla jerrica, tiene a taylor disposición servicios gratuitos de asistencia lingüística. Karen al [phone].    We comply with applicable federal civil rights laws and [place] laws. We do not discriminate on the basis of race, color, national origin, age, disability, sex, sexual orientation, or gender identity.            Thank you!     Thank you for choosing [facility] PHYSICAL MEDICINE AND REHABILITATION  for your care. Our goal is always to provide you with excellent care. Hearing back from our patients is one way we can continue to improve our services. Please take a few minutes to complete the written survey that you may receive in the mail after your visit with us. Thank you!             Your Updated Medication List - Protect others around you: Learn how to safely use, store and throw away your medicines at www.disposemymeds.org.          This list is accurate as of 10/11/18 11:59 PM.  Always use your most recent med list.                   Brand Name Dispense Instructions for use Diagnosis    Alfalfa 250 MG Tabs      Take 250 mg by mouth every morning        amoxicillin 500 MG capsule    AMOXIL     as needed        apixaban ANTICOAGULANT 5 MG tablet    ELIQUIS    180 tablet    Take 1 tablet (5 mg) by mouth 2 times daily    Cerebrovascular accident (CVA), unspecified mechanism (H)       calcium + D 600-200 MG-UNIT Tabs   Generic drug:  calcium carbonate-vitamin D      Take 1 tablet by mouth At Bedtime    Preventative health care       * carbidopa-levodopa  MG per tablet    SINEMET    720 tablet    TAKE 2 TABLETS FOUR TIMES A DAY    Restless legs syndrome, Neurologic gait disorder, Dysarthria, Cerebellar disease       * carbidopa-levodopa  MG per tablet    SINEMET    720 tablet    TAKE 2 TABLETS FOUR TIMES A DAY    Restless legs syndrome, Neurologic gait disorder, Dysarthria, Cerebellar disease       carvedilol 6.25 MG tablet     COREG    180 tablet    Take 1 tablet (6.25 mg) by mouth 2 times daily (with meals)    Benign essential hypertension       chlorhexidine 0.12 % solution    PERIDEX          clopidogrel 75 MG tablet    PLAVIX    90 tablet    Take 1 tablet (75 mg) by mouth daily    Coronary artery disease involving native coronary artery of native heart without angina pectoris       DAILY MULTI Tabs      Take 1 tablet by mouth every morning        isosorbide mononitrate 30 MG 24 hr tablet    IMDUR    90 tablet    Take 1 tablet (30 mg) by mouth daily    Coronary artery disease involving native coronary artery of native heart without angina pectoris       levothyroxine 112 MCG tablet    SYNTHROID/LEVOTHROID    90 tablet    TAKE 1 TABLET DAILY    Encounter for medication refill       nitroGLYcerin 0.4 MG sublingual tablet    NITROSTAT    25 tablet    DISSOLVE ONE TABLET UNDER THE TONGUE EVERY 5 MINUTES AS NEEDED FOR CHEST PAIN.  DO NOT EXCEED A TOTAL OF 3 DOSES IN 15 MINUTES    CAD (coronary artery disease)       omeprazole 20 MG CR capsule    priLOSEC    180 capsule    TAKE 2 CAPSULES DAILY 30 TO 60 MINUTES BEFORE A MEAL    Encounter for long-term (current) use of medications       rosuvastatin 20 MG tablet    CRESTOR    90 tablet    Take 1 tablet (20 mg) by mouth daily    Mixed hyperlipidemia       tiZANidine 2 MG tablet    ZANAFLEX    30 tablet    Take 1 tablet (2 mg) by mouth 3 times daily    Muscle spasms of both lower extremities       VITAMIN B-12 PO      Take 1 tablet by mouth every morning        * Notice:  This list has 2 medication(s) that are the same as other medications prescribed for you. Read the directions carefully, and ask your doctor or other care provider to review them with you.

## 2018-10-11 NOTE — MR AVS SNAPSHOT
After Visit Summary   10/11/2018    Katherin Fletcher    MRN: [id number]           Patient Information     Date Of Birth          1940        Visit Information        Provider Department      10/11/2018 11:30 AM Taty Bullard PA-C [facility] Wound Care        Today's Diagnoses     Long toenail    -  1    Peripheral arterial disease (H)        Long term current use of anticoagulant therapy        Ingrowing right great toenail           Follow-ups after your visit        Your next 10 appointments already scheduled     Oct 11, 2018 12:20 PM CDT   (Arrive by 12:05 PM)   New Patient Visit with Darline Celestin MD   [facility] Physical Medicine and Rehabilitation ([facility])    [address] [facility] [address] [phone]              Who to contact     Please call your clinic at [phone] to:    Ask questions about your health    Make or cancel appointments    Discuss your medicines    Learn about your test results    Speak to your doctor            Additional Information About Your Visit        [employer] Information     [employer] gives you secure access to your electronic health record. If you see a primary care provider, you can also send messages to your care team and make appointments. If you have questions, please call your primary care clinic.  If you do not have a primary care provider, please call [phone] and they will assist you.      [employer] is an electronic gateway that provides easy, online access to your medical records. With [employer], you can request a clinic appointment, read your test results, renew a prescription or communicate with your care team.     To access your existing account, please contact your [facility] or call [phone] for assistance.        Care EveryWhere ID     This is your Care EveryWhere ID. This could be used by other organizations to access your [facility]  records  [id number]         Blood Pressure from Last 3 Encounters:   10/10/18 118/60   09/13/18 136/72   07/25/18 114/72    Weight from Last 3 Encounters:   10/10/18 157 lb   09/13/18 156 lb 3.2 oz   07/25/18 160 lb              We Performed the Following     TRIM NONDYSTRPHIC NAIL(S)          Today's Medication Changes          These changes are accurate as of 10/11/18 11:40 AM.  If you have any questions, ask your nurse or doctor.               These medicines have changed or have updated prescriptions.        Dose/Directions    * carbidopa-levodopa  MG per tablet   Commonly known as:  SINEMET   This may have changed:    - how much to take  - how to take this  - when to take this  - reasons to take this  - additional instructions   Used for:  Restless legs syndrome, Neurologic gait disorder, Dysarthria, Cerebellar disease        TAKE 2 TABLETS FOUR TIMES A DAY   Quantity:  720 tablet   Refills:  3       * carbidopa-levodopa  MG per tablet   Commonly known as:  SINEMET   This may have changed:  Another medication with the same name was changed. Make sure you understand how and when to take each.   Used for:  Restless legs syndrome, Neurologic gait disorder, Dysarthria, Cerebellar disease        TAKE 2 TABLETS FOUR TIMES A DAY   Quantity:  720 tablet   Refills:  3       * Notice:  This list has 2 medication(s) that are the same as other medications prescribed for you. Read the directions carefully, and ask your doctor or other care provider to review them with you.             Primary Care Provider Office Phone # Fax #    Aris Del Real [phone] [address]  [facility] [address]        Equal Access to Services     [facility]: Hadii rosio joel hadashblake Somichelle, waaxda luqadaha, qaybta kaalmada adelianne, carlo castro. So [facility] [phone].    ATENCIÓN: Si habla español, tiene a taylor disposición servicios gratuitos de asistencia  lingüística. Karen al [phone].    We comply with applicable federal civil rights laws and [place] laws. We do not discriminate on the basis of race, color, national origin, age, disability, sex, sexual orientation, or gender identity.            Thank you!     Thank you for choosing [facility]  for your care. Our goal is always to provide you with excellent care. Hearing back from our patients is one way we can continue to improve our services. Please take a few minutes to complete the written survey that you may receive in the mail after your visit with us. Thank you!             Your Updated Medication List - Protect others around you: Learn how to safely use, store and throw away your medicines at www.disposemymeds.org.          This list is accurate as of 10/11/18 11:40 AM.  Always use your most recent med list.                   Brand Name Dispense Instructions for use Diagnosis    Alfalfa 250 MG Tabs      Take 250 mg by mouth every morning        amoxicillin 500 MG capsule    AMOXIL          apixaban ANTICOAGULANT 5 MG tablet    ELIQUIS    180 tablet    Take 1 tablet (5 mg) by mouth 2 times daily    Cerebrovascular accident (CVA), unspecified mechanism (H)       calcium + D 600-200 MG-UNIT Tabs   Generic drug:  calcium carbonate-vitamin D      Take 1 tablet by mouth At Bedtime    Preventative health care       * carbidopa-levodopa  MG per tablet    SINEMET    720 tablet    TAKE 2 TABLETS FOUR TIMES A DAY    Restless legs syndrome, Neurologic gait disorder, Dysarthria, Cerebellar disease       * carbidopa-levodopa  MG per tablet    SINEMET    720 tablet    TAKE 2 TABLETS FOUR TIMES A DAY    Restless legs syndrome, Neurologic gait disorder, Dysarthria, Cerebellar disease       carvedilol 6.25 MG tablet    COREG    180 tablet    Take 1 tablet (6.25 mg) by mouth 2 times daily (with meals)    Benign essential hypertension       chlorhexidine 0.12 % solution    PERIDEX           clopidogrel 75 MG tablet    PLAVIX    90 tablet    Take 1 tablet (75 mg) by mouth daily    Coronary artery disease involving native coronary artery of native heart without angina pectoris       DAILY MULTI Tabs      Take 1 tablet by mouth every morning        isosorbide mononitrate 30 MG 24 hr tablet    IMDUR    90 tablet    Take 1 tablet (30 mg) by mouth daily    Coronary artery disease involving native coronary artery of native heart without angina pectoris       levothyroxine 112 MCG tablet    SYNTHROID/LEVOTHROID    90 tablet    TAKE 1 TABLET DAILY    Encounter for medication refill       nitroGLYcerin 0.4 MG sublingual tablet    NITROSTAT    25 tablet    DISSOLVE ONE TABLET UNDER THE TONGUE EVERY 5 MINUTES AS NEEDED FOR CHEST PAIN.  DO NOT EXCEED A TOTAL OF 3 DOSES IN 15 MINUTES    CAD (coronary artery disease)       omeprazole 20 MG CR capsule    priLOSEC    180 capsule    TAKE 2 CAPSULES DAILY 30 TO 60 MINUTES BEFORE A MEAL    Encounter for long-term (current) use of medications       rosuvastatin 20 MG tablet    CRESTOR    90 tablet    Take 1 tablet (20 mg) by mouth daily    Mixed hyperlipidemia       tiZANidine 2 MG tablet    ZANAFLEX    30 tablet    Take 1 tablet (2 mg) by mouth 3 times daily    Muscle spasms of both lower extremities       VITAMIN B-12 PO      Take 1 tablet by mouth every morning        * Notice:  This list has 2 medication(s) that are the same as other medications prescribed for you. Read the directions carefully, and ask your doctor or other care provider to review them with you.

## 2018-10-11 NOTE — NURSING NOTE
Chief Complaint   Patient presents with     New Patient     [facility] NEW PATIENT CONSULTATION VISIT FOR ATAXIA        Colleen Corbin MA

## 2018-10-11 NOTE — NURSING NOTE
Chief Complaint   Patient presents with     Clinic Care Coordination - Follow-up     Patient here today for nail trimming.        There were no vitals filed for this visit.    There is no height or weight on file to calculate BMI.      WOUND EVALUATION: Nail trimming.    No vitals obtained, okay per Taty Bullard, PAC.      Karrie PINTO, LPN

## 2018-10-11 NOTE — LETTER
10/11/2018       RE: Katherin Fletcher  [address]  [facility] [address]     Dear Colleague,    Thank you for referring your patient, Katherin Fletcher, to the [facility] WOUND CARE at [facility]. Please see a copy of my visit note below.    Chief Complaint:   Chief Complaint   Patient presents with     Clinic Care Coordination - Follow-up     Patient here today for nail trimming.      Subjective: Katherin is a 77 year old female who presents to the clinic today for toenail trim. No new lower extremity concerns. She would like toenails trimmed as she is unable to do this herself. Right lateral hallux toenail continues to ingrow into border. A couple nails were catching on stockings, but none ripped off.  No drainage or open wound. Denies toenail pain, rashes, increased swelling, lower leg, ankle or foot pain.     Allergies   Allergen Reactions     Ace Inhibitors Cough     Baclofen Other (See Comments)     Constipation , tiredness     Penicillins      10/7/14 Hives per patient -- many many years ago       Nickel Swelling and Rash     Other reaction(s): Unknown     Current Outpatient Rx   Medication Sig Dispense Refill     Tama 250 MG TABS Take 250 mg by mouth every morning        apixaban ANTICOAGULANT (ELIQUIS) 5 MG tablet Take 1 tablet (5 mg) by mouth 2 times daily 180 tablet 3     calcium carbonate-vitamin D (CALCIUM + D) 600-200 MG-UNIT TABS Take 1 tablet by mouth At Bedtime        carbidopa-levodopa (SINEMET)  MG per tablet TAKE 2 TABLETS FOUR TIMES A  tablet 3     carbidopa-levodopa (SINEMET)  MG per tablet TAKE 2 TABLETS FOUR TIMES A DAY (Patient taking differently: Take 2 tablets by mouth 4 times daily as needed (legs) Takes as needed throughout the day, up to 4 times daily) 720 tablet 3     carvedilol (COREG) 6.25 MG tablet Take 1 tablet (6.25 mg) by mouth 2 times daily (with meals) 180 tablet 3     chlorhexidine (PERIDEX) 0.12 %  solution        clopidogrel (PLAVIX) 75 MG tablet Take 1 tablet (75 mg) by mouth daily 90 tablet 3     Cyanocobalamin (VITAMIN B-12 PO) Take 1 tablet by mouth every morning        isosorbide mononitrate (IMDUR) 30 MG 24 hr tablet Take 1 tablet (30 mg) by mouth daily 90 tablet 3     levothyroxine (SYNTHROID/LEVOTHROID) 112 MCG tablet TAKE 1 TABLET DAILY 90 tablet 1     Multiple Vitamins-Minerals (DAILY MULTI) TABS Take 1 tablet by mouth every morning        nitroglycerin (NITROSTAT) 0.4 MG SL tablet DISSOLVE ONE TABLET UNDER THE TONGUE EVERY 5 MINUTES AS NEEDED FOR CHEST PAIN.  DO NOT EXCEED A TOTAL OF 3 DOSES IN 15 MINUTES 25 tablet 3     omeprazole (PRILOSEC) 20 MG CR capsule TAKE 2 CAPSULES DAILY 30 TO 60 MINUTES BEFORE A MEAL 180 capsule 1     rosuvastatin (CRESTOR) 20 MG tablet Take 1 tablet (20 mg) by mouth daily 90 tablet 3     tiZANidine (ZANAFLEX) 2 MG tablet Take 1 tablet (2 mg) by mouth 3 times daily 30 tablet 0     amoxicillin (AMOXIL) 500 MG capsule          Objective:   There were no vitals taken for this visit.  General: Patient is well groomed, appears stated age, is alert, cooperative and in no acute distress.  Vascular: DP pulses are 2/4 and PT pulses are non-palpable bilaterally. LE capillary refill time is <3 seconds. Absent pedal hair. Mild non-pitting LE edema.  MSK: Dorsally contracted digits 2-5 that are non-reducible, very limited passive ROM. Equinus present.  Neurologic: Gross sensation intact to bilateral LE.   Skin: LE skin color, texture and turgor are normal. Toenails are slightly elongated bilaterally. Right lateral hallux nail is ingrown mildly, nail border is mildly edematous. No erythema, drainage, pain or calor.      Assessment:   - Elongated toenails  - Ingrown R lateral hallux toenail  - Chronic anticoagulant use for atrial fibrillation  - Peripheral arterial disease with history of lower leg ulceration      Plan:   - Toenails trimmed x 10. Slantback performed to right lateral  hallux nail  - Discussed possibility of nail avulsion to R lateral hallux toenail, likely with matrixectomy. She will consider this.  - Pt to return to clinic in 2-3 months if needed, she will call for appt.      Again, thank you for allowing me to participate in the care of your patient.      Sincerely,    Taty Bullard PA-C

## 2018-10-11 NOTE — PROGRESS NOTES
"       PM&R Clinic Note   Patient Name: Katherin Fletcher : 1940 Medical Record: [id number]     Requesting Physician/clinician: Halina Corrales MD            History of Present Illness:   Katherin Fletcher is a 78 year old female who presented to clinic for evaluation of falls, gait difficulty and weakness/spasticity. PMH is significant for A fib, CAD, HLD and TIA in 2018.     Her symptoms started 2 years ago with spasms in her legs and falls. Spasms have been progressively worse since then; occurs intermittently during the day and night time up to 3-4 times. They can last 10 minutes to 8 hours and are very painful. Improve with walking and worse at night time. She also reports \"tightness in her bilateral lower extremities\". She was seen and evaluated by Dr. Randolph neurology team; was started on sinemet but doesn't think this has been helpful. She also takes tylenol with codeine which are sometimes beneficial but not all the times. She tied baclofen in the past but it was discontinued due to GI side effects. She also tried gabapentin at higher doses with no results so stopped taking. Tizanidine was prescribed by Dr. Corrales but she has not started that yet. Botox injections were also recommended by Dr. Randolph but she cancelled her appointment as she was concerned about potential side effects.    Starting Dec 2017 she noticed that she \"couldn't hold her head up\". This has also been progressively worse since then. She has been using a soft cervical collar since 2018. Denies any paresthesia in her extremities or groins; no issue with bowel and bladder control. She has some neck pain, located at the \"base of her skull\" with radiation to b/l trap area but not to bilateral upper extremities. No back pain or radicular symptoms in bilateral lower extremities. She denies any issue with her swallowing, however she has noticed difficulty with \"increased saliva\" and worsening cough over the past few " "months. She sleeps ok at night time; appetite is fair and mood is stable.     She was admitted to the hospital in May 2018; presented right hemiparesis and slurred speech. Symptoms resolved and they were attributed to TIA. She had short course of PT, OT and SLP after that but no therapies for her chronic symptoms in the past.     She was followed by Dr. Randolph as mentioned above for posterior neck weakness and ataxic type gait with spasticity. Also seen by Dr. Ledezma at [facility] regarding possible surgical interventions; per chart review \"surgery was unlikely to help with her symptoms\".     She was seen and evaluated by Dr. Pham at [facility] neurology in May 2018; EMG of bilateral upper extremities was performed on 6/27/18 which showed \"cervical polyradiculopathy; motor neuron disease can't be excluded\". There was no active denervation in all muscles tested in bilateral upper extremities and cervical paraspinals. Very large MUPs were seen at cervical paraspinals.     C spine MRI on 5/18/18 showed \"multilevel DDD, C5-6 severe spinal canal stenosis with flattening of the spinal cord and severe bilateral neuro-foraminal stenosis from uncinate spurs\".             Past Medical and Surgical History:     Past Medical History:   Diagnosis Date     CAD (coronary artery disease)     s/p CABG 1992 and redo CABG 2009 (LIMA to LAD, SVG to OM and SVG to posterolateral branch of RCA)     Chronic cough     Non-productive. No known definite etiology. Is beeing seen by [facility].      Essential hypertension, benign      Falls frequently 5/28/2018     GERD (gastroesophageal reflux disease)      History of TIA (transient ischemic attack) and stroke 5/7/2018    R sided facial N&T.      Hyperlipidaemia      Hyperlipidaemia LDL goal < 130      Ischemic cardiomyopathy      MI (myocardial infarction) (H) 11/1996     Mixed hyperlipidemia      Neurodegenerative disorder     Further detailed dx unknown. Managed by Dr. George Pham - [facility].      PAD (peripheral artery disease) (H)      Restless legs syndrome      Unspecified hypothyroidism      Past Surgical History:   Procedure Laterality Date     ARTHROSCOPY SHOULDER RT/LT       BUNIONECTOMY RT/LT       C CABG, ARTERY-VEIN, THREE  1/2009     C CARDIAC SURG PROCEDURE UNLIST       C HAND/FINGER SURGERY UNLISTED       C MRA UPPER EXTREMITY WO&W CONT       C SHOULDER SURG PROC UNLISTED       C STOMACH SURGERY PROCEDURE UNLISTED       CARDIAC SURGERY  1992    CABG,stents x2     CARPAL TUNNEL RELEASE RT/LT       HC VASCULAR SURGERY PROCEDURE UNLIST       HYSTERECTOMY, DAMIÁN  34 yo     OPEN REDUCTION INTERNAL FIXATION ANKLE Left 2015    Left Ankle     RELEASE TRIGGER FINGER       REPAIR HAMMER TOE Right 12/19/2017    Procedure: REPAIR HAMMER TOE;  Right 2nd and 3rd toe pinning;  Surgeon: Fernando Wiggins MD;  Location: [facility] OR     STENT  2006     STENT(aka CARDIAC)  2008     VASCULAR SURGERY              Social History:     Marital Status:    Living situation: lives with her  in a house. Her  is 89 and a former . A ramp and a chair lift were recently installed at their house.   Family support: her  still drives and helps with transportation.   Vocational History: retired; she was  at [employer]   Tobacco use: quit in 1992  Alcohol use: rarely a glass of wine           Functional history:     Katherin is right hand-dominant; stopped driving in 11/2017 due to her symptoms. She has a scooter for outside and uses her 4WW inside her house. She is mod I with ADLs, cooking and cleaning and can also manager her meds. She uses [employer] as another way of transportation.            Family History:     Family History   Problem Relation Age of Onset     Unknown/Adopted Mother      Unknown/Adopted Father             Medications:     Current Outpatient Prescriptions   Medication Sig Dispense Refill     Pleasants 250 MG TABS Take 250 mg by mouth  every morning        apixaban ANTICOAGULANT (ELIQUIS) 5 MG tablet Take 1 tablet (5 mg) by mouth 2 times daily 180 tablet 3     calcium carbonate-vitamin D (CALCIUM + D) 600-200 MG-UNIT TABS Take 1 tablet by mouth At Bedtime        carbidopa-levodopa (SINEMET)  MG per tablet TAKE 2 TABLETS FOUR TIMES A  tablet 3     carbidopa-levodopa (SINEMET)  MG per tablet TAKE 2 TABLETS FOUR TIMES A DAY (Patient taking differently: Take 2 tablets by mouth 4 times daily as needed (legs) Takes as needed throughout the day, up to 4 times daily) 720 tablet 3     carvedilol (COREG) 6.25 MG tablet Take 1 tablet (6.25 mg) by mouth 2 times daily (with meals) 180 tablet 3     chlorhexidine (PERIDEX) 0.12 % solution        clopidogrel (PLAVIX) 75 MG tablet Take 1 tablet (75 mg) by mouth daily 90 tablet 3     Cyanocobalamin (VITAMIN B-12 PO) Take 1 tablet by mouth every morning        isosorbide mononitrate (IMDUR) 30 MG 24 hr tablet Take 1 tablet (30 mg) by mouth daily 90 tablet 3     levothyroxine (SYNTHROID/LEVOTHROID) 112 MCG tablet TAKE 1 TABLET DAILY 90 tablet 1     Multiple Vitamins-Minerals (DAILY MULTI) TABS Take 1 tablet by mouth every morning        omeprazole (PRILOSEC) 20 MG CR capsule TAKE 2 CAPSULES DAILY 30 TO 60 MINUTES BEFORE A MEAL 180 capsule 1     rosuvastatin (CRESTOR) 20 MG tablet Take 1 tablet (20 mg) by mouth daily 90 tablet 3     tiZANidine (ZANAFLEX) 2 MG tablet Take 1 tablet (2 mg) by mouth 3 times daily 30 tablet 0     amoxicillin (AMOXIL) 500 MG capsule as needed        nitroglycerin (NITROSTAT) 0.4 MG SL tablet DISSOLVE ONE TABLET UNDER THE TONGUE EVERY 5 MINUTES AS NEEDED FOR CHEST PAIN.  DO NOT EXCEED A TOTAL OF 3 DOSES IN 15 MINUTES (Patient not taking: Reported on 10/11/2018) 25 tablet 3            Allergies:     Allergies   Allergen Reactions     Ace Inhibitors Cough     Baclofen Other (See Comments)     Constipation , tiredness     Penicillins      10/7/14 Hives per patient -- many  "many years ago       Nickel Swelling and Rash     Other reaction(s): Unknown              ROS:     A focused ROS is negative other than the symptoms noted above in the HPI.           Physical Examiniation:   VITAL SIGNS: /77  Pulse 78  Temp 98  F (36.7  C) (Oral)  Resp 20  Ht 1.626 m (5' 4\")  Wt 70.8 kg (156 lb)  SpO2 95%  Breastfeeding? No  BMI 26.78 kg/m2  BMI: Estimated body mass index is 26.78 kg/(m^2) as calculated from the following:    Height as of this encounter: 1.626 m (5' 4\").    Weight as of this encounter: 70.8 kg (156 lb).    Gen: NAD, pleasant and cooperative   HEENT: C collar in place  Cardio: regular pulse  Pulm: non-labored breathing in room air  Abd: benign  Ext: WWP, no edema in BLE, no tenderness in calves  Neuro/MSK:    Mental Status:  alert and oriented x3   Cranial Nerves: grossly normal; didn't check IX and X     Sensory: Normal to light touch in bilateral upper and lower extremities    Strength:   SF  EF  EE  WE  G  I  HF  KE  DF  EHL  PF   R  4/5 5/5 5/5 5/5 5/5 4+/5 4/5 5/5 5/5 5/5 5/5 tested in sitting position   L  4/5  5/5 5/5 5/5 5/5 4+/5 4/5 5/5 5/5 5/5 5/5 tested in sitting position    Reflexes: 3+ at b/l biceps, brachioradialis, triceps and patellar. 2+ at ankles. No clonus.    Rojas's test: + bilaterally   Babinski reflex: equivocal bilaterally   Tone per modified Sheron Scale: increased tone in bilateral upper extremities > bilateral lower extremities     Speech: soft and slow but clear and coherent     Neck exam  She had forward head posture with upper thoracic kyphosis and loss of cervical lordosis. Neck ROM was limited at all directions, slightly better with rotation b/l. She had tenderness to palpation at b/l trap, cervical paraspinals and debora-scapular areas. Obvious weakness of neck muscle, worse with flex.            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 She is clearly spastic with + upper motor signs, and now with questionable new dysphagia. Posterior neck weakness was assumed a separate issue from her cervical myelopathy and likely myopathic, but EMG findings are more consistent with a neurogenic process. Discussed the plan with Dr. Randolph over the phone and reviewed her new symptoms. Strongly encouraged her to f/u with him to discuss EMG results and any further work-up if needed. May also need to f/u with Dr. Ledezma to discuss surgical interventions again.    1. Patient education: In depth discussion and education was provided about the assessment and implications of each of the below recommendations for management. Patient indicated readiness to learn, all questions were answered and understanding of material presented was confirmed.    2. Work-up: none today    3. Therapy/equipment/braces:  --PT to work on neck ROM, gait, balance, appropriate assistive device and stretching exercises for management of spasticty  --SLP for more evaluation on swallowing     4. Medications: agree with the plan to tart tizanidine. disucssed risks and benefits; she will start 2mg tid and let us know about her response.     5. Interventions: consider chemo-denervation     6. Referral / follow up with other providers:  --Will f/u with Dr. Randolph as soon as possible  --May need to f/u with Dr. Ledezma as well      7. Follow up: 6-8 weeks    Darline Celestin MD  Physical Medicine & Rehabilitation    I spent a total of 60 minutes face-to-face with Katherin Fletcher during today's office visit. Over 50% of this time was spent counseling the patient and/or coordinating care. See note for details.

## 2018-10-12 ENCOUNTER — TRANSFERRED RECORDS (OUTPATIENT)
Dept: FAMILY MEDICINE | Facility: CLINIC | Age: 78
End: 2018-10-12

## 2018-10-12 ASSESSMENT — PATIENT HEALTH QUESTIONNAIRE - PHQ9: SUM OF ALL RESPONSES TO PHQ QUESTIONS 1-9: 6

## 2018-10-23 ENCOUNTER — HOSPITAL ENCOUNTER (OUTPATIENT)
Dept: SPEECH THERAPY | Facility: CLINIC | Age: 78
Setting detail: THERAPIES SERIES
End: 2018-10-23
Attending: PHYSICAL MEDICINE & REHABILITATION
Payer: MEDICARE

## 2018-10-23 DIAGNOSIS — M47.12 CERVICAL SPONDYLOSIS WITH MYELOPATHY: ICD-10-CM

## 2018-10-23 DIAGNOSIS — R13.12 OROPHARYNGEAL DYSPHAGIA: ICD-10-CM

## 2018-10-23 PROCEDURE — 92610 EVALUATE SWALLOWING FUNCTION: CPT | Mod: GN | Performed by: SPEECH-LANGUAGE PATHOLOGIST

## 2018-10-23 PROCEDURE — G8997 SWALLOW GOAL STATUS: HCPCS | Mod: GN,CI | Performed by: SPEECH-LANGUAGE PATHOLOGIST

## 2018-10-23 PROCEDURE — 40000211 ZZHC STATISTIC SLP  DEPARTMENT VISIT: Performed by: SPEECH-LANGUAGE PATHOLOGIST

## 2018-10-23 PROCEDURE — G8996 SWALLOW CURRENT STATUS: HCPCS | Mod: GN,CJ | Performed by: SPEECH-LANGUAGE PATHOLOGIST

## 2018-10-24 ENCOUNTER — HOSPITAL ENCOUNTER (OUTPATIENT)
Dept: PHYSICAL THERAPY | Facility: CLINIC | Age: 78
Setting detail: THERAPIES SERIES
End: 2018-10-24
Attending: PHYSICAL MEDICINE & REHABILITATION
Payer: MEDICARE

## 2018-10-24 DIAGNOSIS — M47.12 CERVICAL SPONDYLOSIS WITH MYELOPATHY: ICD-10-CM

## 2018-10-24 DIAGNOSIS — M62.838 MUSCLE SPASMS OF BOTH LOWER EXTREMITIES: ICD-10-CM

## 2018-10-24 DIAGNOSIS — M53.82 NECK MUSCLE WEAKNESS: ICD-10-CM

## 2018-10-24 DIAGNOSIS — R25.2 SPASTICITY: ICD-10-CM

## 2018-10-24 DIAGNOSIS — M54.2 NECK PAIN: ICD-10-CM

## 2018-10-24 PROCEDURE — 97110 THERAPEUTIC EXERCISES: CPT | Mod: GP | Performed by: PHYSICAL THERAPIST

## 2018-10-24 PROCEDURE — G8981 BODY POS CURRENT STATUS: HCPCS | Mod: GP,CL | Performed by: PHYSICAL THERAPIST

## 2018-10-24 PROCEDURE — 97162 PT EVAL MOD COMPLEX 30 MIN: CPT | Mod: GP | Performed by: PHYSICAL THERAPIST

## 2018-10-24 PROCEDURE — G8982 BODY POS GOAL STATUS: HCPCS | Mod: GP,CK | Performed by: PHYSICAL THERAPIST

## 2018-10-24 PROCEDURE — 40000718 ZZHC STATISTIC PT DEPARTMENT ORTHO VISIT: Performed by: PHYSICAL THERAPIST

## 2018-10-24 NOTE — PROGRESS NOTES
[facility]          OUTPATIENT SWALLOW  EVALUATION  PLAN OF TREATMENT FOR OUTPATIENT REHABILITATION  (COMPLETE FOR INITIAL CLAIMS ONLY)  Patient's Last Name, First Name, M.I.  YOB: 1940  Katherin Fletcher     Provider's Name   [facility]   Medical Record No.  [id number]     Start of Care Date:  10/24/18   Onset Date:  10/11/18 (MD orders)   Type:     ___PT   ____OT  ___X_SLP Medical Diagnosis:  Cervical spondylosis with myelopathy     Treatment Diagnosis:  Mild oropharyngeal dysphagia  Visits from SOC:  1     _________________________________________________________________________________  Plan of Treatment/Functional Goals:  Planned Therapy Interventions: Dysphagia Treatment  Dysphagia treatment: Oropharyngeal exercise training, Instruction of safe swallow strategies                     Goals   1. Goal Identifier: LTG 1 - Swallowing Compensations        Goal Description: Patient will demonstrate use of 3 safe swallow strategies independently to tolerate the least restrictive diet without overt s/x of penetration/aspiration across 1 week of meals, per patient and family report.       Target Date: 11/14/18           2. Goal Identifier: LTG 2 - Pharyngeal Strengthening       Goal Description: Patient will complete recommended oralpharyngeal and velopharyngeal exercises with 80% accuracy independently to improve swallow function for least restrictive diet without overt s/x of penetration/aspiration across 1 week of meals, per patient and family report.        Target Date: 11/14/18                          Therapy Frequency: other (see comments) (1x/week for 3 weeks)  Predicted Duration of Therapy Intervention (days/wks): 1x/week for 3 weeks    Sybil Bah MA CCC-SLP       I CERTIFY THE NEED FOR THESE SERVICES FURNISHED UNDER        THIS PLAN OF TREATMENT AND WHILE UNDER MY CARE      (Physician co-signature of this document indicates review and certification of the therapy plan).                  Certification date from: 10/24/18   Certification date to: 11/14/18          Referring Physician: Dr. ISIDRO Celestin MD     Initial Assessment        See Epic Evaluation Start Of Care Date: 10/24/18

## 2018-10-24 NOTE — PROGRESS NOTES
"   10/24/18 1100   Quick Adds   Type of Visit Initial OP PT Evaluation   General Information   Start of Care Date 10/24/18   Referring Physician Dr. Darline Celestin MD   Orders Evaluate and Treat as Indicated   Order Date 10/11/18   Medical Diagnosis Cervical spondylosis with myelopathy, Muscle spasms of both lower extremities, Spasticity, Neck muscle weakness, Neck pain   Onset of illness/injury or Date of Surgery 07/27/18   Precautions/Limitations fall precautions   Special Instructions --HEP for neck stretching and possibly strengthening, evaluation of gait and proper assistive device, evaluation and treatment of spasticity bilateral lower extremities > bilateral upper extremities   Surgical/Medical history reviewed Yes   Pertinent history of current problem (include personal factors and/or comorbidities that impact the POC) Her symptoms started 2 years ago with spasms in her legs and falls. Spasms have been progressively worse since then; occurs intermittently during the day and night time up to 3-4 times. They can last 10 minutes to 8 hours and are very painful. Improve with walking and worse at night time. ALso has been unable to hold head up, wearing soft collar for support, C spine MRI on 5/18/18 showed \"multilevel DDD, C5-6 severe spinal canal stenosis with flattening of the spinal cord and severe bilateral neuro-foraminal stenosis from uncinate spurs\".  Right now for leg pain, the only thing she can do is get up and walk around. Has some neuropathy in LEs that she feels is getting worse.  For neck, reports has some pain if moves head too far in one way or another, has been using collar for about 8 months- all of the time except going to bed.  Has not done anything for the neck in the past.     Prior level of function comment Has a nu-step machine in home, uses 2-3 times per week. Independent for mobility, not driving.    Current Community Support Family/friend caregiver   Patient role/Employment history " Retired   Living environment [facility]   Current Assistive Devices Four Wheeled Walker   Assistive Devices Comments Owns a power scooter that she uses for longer distances or for shopping. Otherwise uses 4WW at all times.    Patient/Family Goals Statement Be able to improve neck strength, decrease pain   Fall Risk Screen   Fall screen completed by PT   Have you fallen 2 or more times in the past year? No   Have you fallen and had an injury in the past year? No   Is patient a fall risk? Yes   Pain   Patient currently in pain Yes   Pain comments Neck pain, B lower extremities   Cognitive Status Examination   Orientation orientation to person, place and time   Level of Consciousness alert   Follows Commands and Answers Questions able to follow single-step instructions   Personal Safety and Judgment intact   Memory intact   Integumentary   Integumentary No deficits were identified   Posture   Posture Comments Neck resting on cervical collar with all upright sitting, forward shoulder posture   Range of Motion (ROM)   ROM Comment tightness in neck rotation, signficiant impairment in B heel cords, tightness in hamstrings and quads   Strength   Strength Comments Difficulty with B SLR, requires signficaint use of UEs for sit>stand   Bed Mobility   Bed Mobility Comments independent   Transfer Skills   Transfer Comments independent with use of UEs   Gait   Gait Comments Ambulates slowly with use of 4WW, gaze towarrds floor, decreased step length.    Sensory Examination   Sensory Perception Comments L ankle numbness, B neuropathy   Coordination   Coordination no deficits were identified   Muscle Tone   Muscle Tone no deficits were identified   Planned Therapy Interventions   Planned Therapy Interventions ROM;strengthening;stretching;manual therapy   Clinical Impression   Criteria for Skilled Therapeutic Interventions Met yes, treatment indicated   PT Diagnosis Postural impairment, Generalized weakness   Influenced by the  following impairments Impaired neck strength/stability and postural control, impaired lower extremity strength, decreased ankle ROM, decreased sensation, impaired balance   Functional limitations due to impairments Decreased ability for restful sleep, decreased tolerance for mobility   Clinical Presentation Evolving/Changing   Clinical Decision Making (Complexity) Moderate complexity   Therapy Frequency 1 time/week  (for 3 weeks, decreasing to every other week after that)   Predicted Duration of Therapy Intervention (days/wks) 90 days   Risk & Benefits of therapy have been explained Yes   Patient, Family & other staff in agreement with plan of care Yes   GOALS   PT Eval Goals 1;2;3   Goal 1   Goal Identifier Cervical Endurance   Goal Description The client will demonstrate improved cervical endurance by being able to tolerate 5 minutes in sitting position without use of cervical collar.    Target Date 01/21/19   Goal 2   Goal Identifier Leg Spams   Goal Description The client will report waking up from leg pain less than 5 days per week for improved restorative sleep.   Target Date 01/21/19   Goal 3   Goal Identifier HEP   Goal Description The client will perform HEP at least 5 sessions per week in order to progress outside of therapy sessions.   Target Date 12/22/18   Total Evaluation Time   Total Evaluation Time (Minutes) 30   Therapy Certification   Certification date from 10/24/18   Certification date to 01/21/19   Medical Diagnosis Cervical spondylosis with myelopathy, Muscle spasms of both lower extremities, Spasticity, Neck muscle weakness, Neck pain

## 2018-10-24 NOTE — PROGRESS NOTES
[facility]        OUTPATIENT PHYSICAL THERAPY FUNCTIONAL EVALUATION  PLAN OF TREATMENT FOR OUTPATIENT REHABILITATION  (COMPLETE FOR INITIAL CLAIMS ONLY)  Patient's Last Name, First Name, M.I.  YOB: 1940  FletcherKatherin  Mague     Provider's Name   [facility]   Medical Record No.  [id number]     Start of Care Date:  10/24/18   Onset Date:  07/27/18   Type:     _X__PT   ____OT  ____SLP Medical Diagnosis:  Cervical spondylosis with myelopathy, Muscle spasms of both lower extremities, Spasticity, Neck muscle weakness, Neck pain     PT Diagnosis:  Postural impairment, Generalized weakness Visits from SOC:  1                              __________________________________________________________________________________  Plan of Treatment/Functional Goals:  ROM, strengthening, stretching, manual therapy           GOALS  Cervical Endurance  The client will demonstrate improved cervical endurance by being able to tolerate 5 minutes in sitting position without use of cervical collar.   01/21/19    Leg Spams  The client will report waking up from leg pain less than 5 days per week for improved restorative sleep.  01/21/19    HEP  The client will perform HEP at least 5 sessions per week in order to progress outside of therapy sessions.  12/22/18         Therapy Frequency:  1 time/week (for 3 weeks, decreasing to every other week after that)   Predicted Duration of Therapy Intervention:  90 days    Filiberto Hammond, PT                                    I CERTIFY THE NEED FOR THESE SERVICES FURNISHED UNDER        THIS PLAN OF TREATMENT AND WHILE UNDER MY CARE     (Physician co-signature of this document indicates review and certification of the therapy plan).                Certification Date From:  10/24/18   Certification Date To:  01/21/19    Referring Provider:  Dr. Gregorio  MD Sabi    Initial Assessment  See Epic Evaluation- Start of Care Date: 10/24/18

## 2018-10-24 NOTE — PROGRESS NOTES
" [facility] SPEECH LANGUAGE CLINICAL SWALLOW EVALUATION   10/23/18 1600       Present No   General Information   Type Of Visit Initial   Start Of Care Date 10/24/18   Referring Physician Dr. ISIDRO Celestin, MD    Orders Evaluate And Treat   Medical Diagnosis Cervical Spondylosis with myelopathy   Onset Of Illness/injury Or Date Of Surgery 10/11/18  (MD orders)   Precautions/limitations No Known Precautions/limitations   Hearing WFL   Pertinent History of Current Problem/OT: Additional Occupational Profile Info Katherin is a 78 y.o  female with a medical diagnosis of cervical spondylosis with myelopathy. Pt. arrived to the session wearing a neck collar in order to assist with holding head up. Pt. reports difficulties with eating and swallowing for the past 1-2 years, where she reported excess saliva, frequent coughing during meals, nasal regurgitation that she reports occurs 4-5x/day, difficulty swallowing medications/supplements, difficulties with mastication due to missing teeth and dental implants, and food pocketing. Pt. also reported that within the past 2 years she has lost 75 lbs, and that she cannot eat \"well\" and that she finds it hard to chew harder/crunchier foods due to difficulties in mastication.    Respiratory Status Room air   Prior Level Of Function Swallowing   Prior Level Of Function Comment Pt reports ongoing diff. w/ swallowing fro ~2 years.    Patient Role/employment History Retired   Living Environment [place]   Patient/family Goals To improve swallow function and reduce aspiration risks.    Pain Assessment   Pain Reported No   Fall Risk Screen   Fall screen comments Defer to PT-eval on 10/24/18   Clinical Swallow Evaluation   Oral Musculature generally intact   Structural Abnormalities none present   Dentition upper dentures  (lower missing teeth, plan for implants)   Secretion Management other (see comments);right corner drooling  (Pt. reports excessive drooling, impacting " speech. )   Mucosal Quality good  (Pt reports excessive but thick saliva. )   Mandibular Strength and Mobility intact   Oral Labial Strength and Mobility WFL   Lingual Strength and Mobility impaired coordination   Velar Elevation impaired  (Pt. reports frequent nasal regurgitation while eating.)   Buccal Strength and Mobility intact   Laryngeal Function Cough;Throat clear;Swallow;Voicing initiated;Dry swallow palpated   Oral Musculature Comments Pt. displays intact and functioning oral muscuature as needed for swallow function.    Additional Documentation Yes   Additional evaluation(s) completed today Yes   Rationale for completing additional evaluation Based on Pt report of symptoms, SLP clinical judgment, and MD orders.    Clinical Swallow Eval: Puree Solid Texture Trial   Mode of Presentation, Puree spoon   Volume of Puree Presented 1 teaspoon   Oral Phase, Puree WFL   Pharyngeal Phase, Puree intact   Diagnostic Statement Pt does not demonstrate overt s/sx of aspiration nor penetration.    Clinical Swallow Eval: Semisolid Texture Trial   Mode of Presentation, Semisolid spoon   Volume of Semisolid Food Presented 1 teaspoon   Oral Phase, Semisolid WFL   Pharyngeal Phase, Semisolid feeling of something stuck in throat;repeated swallows;throat clearing   Successful Strategies Trialed During Procedure, Semisolid chin tuck;hard swallow   Diagnostic Statement Pt reports globus sensation, placing her at risk for aspiration. Pt trained in chin tuck and hard swallow which reduced pharyngeal stasis per Pt report.    Clinical Swallow Eval: Solid Food Texture Trial   Mode of Presentation, Solid self-fed   Volume of Solid Food Presented 3/4 of mike cracker   Oral Phase, Solid Residue in oral cavity   Oral Residue, Solid left anterior lateral sulci;right anterior lateral sulci   Pharyngeal Phase, Solid feeling of something stuck in throat;repeated swallows;throat clearing   Successful Strategies Trialed During Procedure,  Solid chin tuck;hard swallow;other (see comments)  (alternate viscosities )   Diagnostic Statement Pt demonstrates overt s/sx of aspiration/penetration (throat clear), improved with trained strategies.    Swallow Compensations   Swallow Compensations Chin tuck;Effortful swallow;Pacing;Reduce amounts;Multiple swallow   Results (Risk for aspiration. Reduced w/ compensations. )   Educational Assessment   Barriers to Learning No barriers   Preferred Learning Style Listening;Reading;Demonstration   General Therapy Interventions   Planned Therapy Interventions Dysphagia Treatment   Dysphagia treatment Oropharyngeal exercise training;Instruction of safe swallow strategies;Compensatory strategies for swallowing  (Velopharyngeal exercises )   Swallow Eval: Clinical Impressions   Skilled Criteria for Therapy Intervention Skilled criteria met.  Treatment indicated.   Functional Assessment Scale (FAS) 5   Dysphagia Outcome Severity Scale (KIRSTEN) Level 5 - KIRSTEN   Treatment Diagnosis Mild oropharyngeal dysphagia    Diet texture recommendations Regular diet;Thin liquids   Recommended Feeding/Eating Techniques hard swallow w/ each bite or sip;small sips/bites;tuck chin during every swallow;maintain upright posture during/after eating for 30 mins;alternate between small bites and sips of food/liquid   Rehab Potential good, to achieve stated therapy goals   Therapy Frequency other (see comments)  (1x/week for 3 weeks)   Predicted Duration of Therapy Intervention (days/wks) 1x/week for 3 weeks   Anticipated Discharge Disposition home   Risks and Benefits of Treatment have been explained. Yes   Patient, family and/or staff in agreement with Plan of Care Yes   Clinical Impression Comments Katherin displays mild oropharyngeal dysphagia characterized by globus sensation for semi-solid and solid consistencies, oral residue with solids, food pocketing in sulci, coughing on solids, and reported nasal regurgitation. Dysphagia is also impacted  by medical diagnosis of cervical spondylosis with myelopathy and missing teeth. During evaluation pt. was educated on aspiration risks and signs and symptoms of aspiration pneumonia. Based on the results of this evaluation direct speech and language  services are recommended in order to educate and train patient on pharyngeal strengthening exercises, velopharyngeal exercises as well as compensation strategies to implement for home-exercise programming (HEP).    Swallow Goals   SLP Swallow Goals 1;2   Swallow Goal 1   Goal Identifier LTG 1 - Swallowing Compensations    Goal Description Patient will demonstrate use of 3 safe swallow strategies independently to tolerate the least restrictive diet without overt s/x of penetration/aspiration across 1 week of meals, per patient and family report.   Target Date 11/14/18   Swallow Goal 2   Goal Identifier LTG 2 - Pharyngeal Strengthening   Goal Description Patient will complete recommended oralpharyngeal and velopharyngeal exercises with 80% accuracy independently to improve swallow function for least restrictive diet without overt s/x of penetration/aspiration across 1 week of meals, per patient and family report.    Target Date 11/14/18   Total Session Time   Total Session Time 32 minutes   Total Evaluation Time 32 minutes   Therapy Certification   Certification date from 10/24/18   Certification date to 11/14/18   Medical Diagnosis Cervical spondylosis with myelopathy     Thank you for referring Katherin Fletcher  to outpatient therapy at [facility] in [place].  Please call Sybil Bah MA, SLP-CCC at [phone] or email [email or web address] with any questions or concerns.      GAVIN Christiansen.   in Speech Language Pathology     Sybil Bah M.A., SLP-CCC  Speech-Language Pathologist

## 2018-10-26 ASSESSMENT — ENCOUNTER SYMPTOMS
STIFFNESS: 1
BACK PAIN: 0
MUSCLE CRAMPS: 1
BRUISES/BLEEDS EASILY: 0
MUSCLE WEAKNESS: 0
SWOLLEN GLANDS: 0
ARTHRALGIAS: 1
MYALGIAS: 1
NECK PAIN: 1
JOINT SWELLING: 1

## 2018-11-01 ENCOUNTER — HOSPITAL ENCOUNTER (OUTPATIENT)
Dept: SPEECH THERAPY | Facility: CLINIC | Age: 78
Setting detail: THERAPIES SERIES
End: 2018-11-01
Attending: PHYSICAL MEDICINE & REHABILITATION
Payer: MEDICARE

## 2018-11-01 PROCEDURE — 40000211 ZZHC STATISTIC SLP  DEPARTMENT VISIT: Performed by: SPEECH-LANGUAGE PATHOLOGIST

## 2018-11-01 PROCEDURE — 92526 ORAL FUNCTION THERAPY: CPT | Mod: GN | Performed by: SPEECH-LANGUAGE PATHOLOGIST

## 2018-11-02 ENCOUNTER — OFFICE VISIT (OUTPATIENT)
Dept: NEUROLOGY | Facility: CLINIC | Age: 78
End: 2018-11-02
Payer: MEDICARE

## 2018-11-02 VITALS — OXYGEN SATURATION: 96 % | SYSTOLIC BLOOD PRESSURE: 129 MMHG | HEART RATE: 81 BPM | DIASTOLIC BLOOD PRESSURE: 77 MMHG

## 2018-11-02 DIAGNOSIS — R27.0 ATAXIA: ICD-10-CM

## 2018-11-02 ASSESSMENT — PAIN SCALES - GENERAL: PAINLEVEL: MILD PAIN (3)

## 2018-11-02 NOTE — MR AVS SNAPSHOT
After Visit Summary   11/2/2018    Katherin Fletcher    MRN: [id number]           Patient Information     Date Of Birth          1940        Visit Information        Provider Department      11/2/2018 1:30 PM Joe Randolph MD [facility]         Follow-ups after your visit        Your next 10 appointments already scheduled     Nov 02, 2018  1:30 PM CDT   (Arrive by 1:15 PM)   Return Ataxia with Joe Randolph MD   [facility] ([facility])    [address]  3rd [address]  [place] [address] [phone]            Nov 08, 2018  1:00 PM CST   Swallow Treatment with Sybil Bah, SLP   [facility] [place] [facility] ([facility])    [address]  [address]  [place] [address]   [phone]            Nov 15, 2018  1:45 PM CST   Swallow Treatment with Sybilcherie Bah, SLP   [facility] [place] [facility] ([facility])    [address]  [address]  [place] [facility] [address]   [phone]            Nov 15, 2018  2:30 PM CST   Neuro Treatment with Filiberto Manish, PT   [facility] ([facility])    [address]  [address]  [place] [facility] [address]   [phone]            Nov 28, 2018  2:00 PM CST   Neuro Treatment with Filiberto Manish, PT   [facility] [place] [facility] ([facility])    [address]  [address]  [place] [facility] [address]   [phone]            Dec 06, 2018  1:40 PM CST   (Arrive by 1:25 PM)   Return Visit with Darline Celestin MD   [facility] ([facility])    [address]  3rd Floor  [place] [address] [phone]            Dec 12, 2018  2:45 PM CST   Neuro Treatment with Filiberto Manish, PT   [facility] ([facility])    [address]  [address]  [place] [address]   [phone]              Who to contact     Please call your clinic at [phone] to:    Ask  questions about your health    Make or cancel appointments    Discuss your medicines    Learn about your test results    Speak to your doctor            Additional Information About Your Visit        [employer] Information     [employer] gives you secure access to your electronic health record. If you see a primary care provider, you can also send messages to your care team and make appointments. If you have questions, please call your primary care clinic.  If you do not have a primary care provider, please call [phone] and they will assist you.      [employer] is an electronic gateway that provides easy, online access to your medical records. With [employer], you can request a clinic appointment, read your test results, renew a prescription or communicate with your care team.     To access your existing account, please contact your [facility] or call [phone] for assistance.        Care EveryWhere ID     This is your Care EveryWhere ID. This could be used by other organizations to access your [place] medical records  [id number]        Your Vitals Were     Pulse Pulse Oximetry                81 96%           Blood Pressure from Last 3 Encounters:   11/02/18 129/77   10/11/18 114/77   10/10/18 118/60    Weight from Last 3 Encounters:   10/11/18 70.8 kg (156 lb)   10/10/18 71.2 kg (157 lb)   09/13/18 70.9 kg (156 lb 3.2 oz)              Today, you had the following     No orders found for display         Today's Medication Changes          These changes are accurate as of 11/2/18  1:20 PM.  If you have any questions, ask your nurse or doctor.               These medicines have changed or have updated prescriptions.        Dose/Directions    * carbidopa-levodopa  MG per tablet   Commonly known as:  SINEMET   This may have changed:    - how much to take  - how to take this  - when to take this  - reasons to take this  - additional instructions   Used for:  Restless legs syndrome,  Neurologic gait disorder, Dysarthria, Cerebellar disease        TAKE 2 TABLETS FOUR TIMES A DAY   Quantity:  720 tablet   Refills:  3       * carbidopa-levodopa  MG per tablet   Commonly known as:  SINEMET   This may have changed:  Another medication with the same name was changed. Make sure you understand how and when to take each.   Used for:  Restless legs syndrome, Neurologic gait disorder, Dysarthria, Cerebellar disease        TAKE 2 TABLETS FOUR TIMES A DAY   Quantity:  720 tablet   Refills:  3       * Notice:  This list has 2 medication(s) that are the same as other medications prescribed for you. Read the directions carefully, and ask your doctor or other care provider to review them with you.             Primary Care Provider Office Phone # Fax #    Aris Del Real [phone] [address] [place] [address]        Equal Access to Services     [facility] Rosa[facility]RI[facility] : Hadii rosio joel hadasho Soomaali, waaxda luqadaha, qaybta kaalmada adekermityada, carlo odonnell . So [facility] [phone].    ATENCIÓN: Si habla español, tiene a taylor disposición servicios gratuitos de asistencia lingüística. Karen al [phone].    We comply with applicable federal civil rights laws and [place] laws. We do not discriminate on the basis of race, color, national origin, age, disability, sex, sexual orientation, or gender identity.            Thank you!     Thank you for choosing [facility]  for your care. Our goal is always to provide you with excellent care. Hearing back from our patients is one way we can continue to improve our services. Please take a few minutes to complete the written survey that you may receive in the mail after your visit with us. Thank you!             Your Updated Medication List - Protect others around you: Learn how to safely use, store and throw away your medicines at www.disposemymeds.org.          This list is accurate as of 11/2/18   1:20 PM.  Always use your most recent med list.                   Brand Name Dispense Instructions for use Diagnosis    Alfalfa 250 MG Tabs      Take 250 mg by mouth every morning        amoxicillin 500 MG capsule    AMOXIL     as needed        apixaban ANTICOAGULANT 5 MG tablet    ELIQUIS    180 tablet    Take 1 tablet (5 mg) by mouth 2 times daily    Cerebrovascular accident (CVA), unspecified mechanism (H)       calcium + D 600-200 MG-UNIT Tabs   Generic drug:  calcium carbonate-vitamin D      Take 1 tablet by mouth At Bedtime    Preventative health care       * carbidopa-levodopa  MG per tablet    SINEMET    720 tablet    TAKE 2 TABLETS FOUR TIMES A DAY    Restless legs syndrome, Neurologic gait disorder, Dysarthria, Cerebellar disease       * carbidopa-levodopa  MG per tablet    SINEMET    720 tablet    TAKE 2 TABLETS FOUR TIMES A DAY    Restless legs syndrome, Neurologic gait disorder, Dysarthria, Cerebellar disease       carvedilol 6.25 MG tablet    COREG    180 tablet    Take 1 tablet (6.25 mg) by mouth 2 times daily (with meals)    Benign essential hypertension       chlorhexidine 0.12 % solution    PERIDEX          clopidogrel 75 MG tablet    PLAVIX    90 tablet    Take 1 tablet (75 mg) by mouth daily    Coronary artery disease involving native coronary artery of native heart without angina pectoris       DAILY MULTI Tabs      Take 1 tablet by mouth every morning        isosorbide mononitrate 30 MG 24 hr tablet    IMDUR    90 tablet    Take 1 tablet (30 mg) by mouth daily    Coronary artery disease involving native coronary artery of native heart without angina pectoris       levothyroxine 112 MCG tablet    SYNTHROID/LEVOTHROID    90 tablet    TAKE 1 TABLET DAILY    Encounter for medication refill       nitroGLYcerin 0.4 MG sublingual tablet    NITROSTAT    25 tablet    DISSOLVE ONE TABLET UNDER THE TONGUE EVERY 5 MINUTES AS NEEDED FOR CHEST PAIN.  DO NOT EXCEED A TOTAL OF 3 DOSES IN 15 MINUTES     CAD (coronary artery disease)       omeprazole 20 MG CR capsule    priLOSEC    180 capsule    TAKE 2 CAPSULES DAILY 30 TO 60 MINUTES BEFORE A MEAL    Encounter for long-term (current) use of medications       rosuvastatin 20 MG tablet    CRESTOR    90 tablet    Take 1 tablet (20 mg) by mouth daily    Mixed hyperlipidemia       tiZANidine 2 MG tablet    ZANAFLEX    30 tablet    Take 1 tablet (2 mg) by mouth 3 times daily    Muscle spasms of both lower extremities       VITAMIN B-12 PO      Take 1 tablet by mouth every morning        * Notice:  This list has 2 medication(s) that are the same as other medications prescribed for you. Read the directions carefully, and ask your doctor or other care provider to review them with you.

## 2018-11-02 NOTE — LETTER
"11/2/2018       RE: Katherin Fletcher  [address]  [facility] [id number]     Dear Colleague,    Thank you for referring your patient, Katherin Fletcher, to the [facility] NEUROLOGY at [facility]. Please see a copy of my visit note below.    Service Date: 11/02/2018      INTERVAL HISTORY:   Katherin lFetcher is a 78-year-old right-handed woman whom I have seen on 5 previous occasions.  Her first visit was on 04/14/2017.  Subsequently, there have been 4 encounters, all followup visits.  She has been followed primarily for imbalance, which has progressed to the point that she is now using a scooter when out and about, but gets along a little better with a walker at home.  She also has some dysarthria and during the last few encounters has shown significant weakness of her posterior neck muscles so that she has to wear a soft cervical collar in order for her head to be held upright.      At the time of this visit, the patient appears alone, this time not accompanied by her , Caio.  She relates a history which is consistent with other records I have.  She was last seen on 06/01/2018 and since then has had an EMG performed by Dr. Handy Pham, neurologist at the [facility] on 06/12/2018.  According to the records I have from Katherin's primary physician, Dr. Sandi Corrales, the report that she obtained and showed \"severe chronic denervation all upper extremity and cervical paraspinal muscles.  This is consistent with a cervical polyradiculopathy.\"      At the time of her visit with Dr. Corrales on 06/29/2018, they discussed a future direction for her cervical problem.  Previously, Dr. Colton Ledezma with [facility] had said that he could do a single level decompression of her cervical spine at C5-C6 and that this might possibly help her spasticity.  However, the patient was \"torn about taking the risk to have surgery\", according to Dr. " "Antoni.  Further, Katherin questioned whether it would help her original problem with her neck muscles and her spasticity.  With her cardiac difficulties and other medical problems, she had questioned if the risks outweighed the benefits or vice versa.  No conclusions were reached at that time.      Going back to 04/28/2018 when I saw the patient for the fifth time, she was referred to Physical Medicine and Rehabilitation (PM&R).  This visit finally came to pass on 10/11/2018 when she was seen by Darline Celestin MD.  In reviewing her report pertaining to that visit, she had a little more information regarding the EMG performed by Dr. Pham at the [facility].  This was performed on 06/27/2018 and showed \"cervical polyradiculopathy; motor neuron disease cannot be excluded\".  Further, Dr. Gregorio paraphrased the report indicating that there was no active denervation in all muscles tested in the bilateral upper extremities and cervical paraspinals.  Very large motor unit potentials were seen at the cervical paraspinals.  She also related the cervical spine MRI of 05/18/2018 which showed \"multilevel degenerative disk disease, severe C5-C6, cervical spine stenosis with flattening of the spinal cord and severe bilateral neuro-foraminal stenosis from uncinate spurs\".  Further, Dr. Celestin dictated a very complete report including a neck examination which she reported as follows:  \"Katherin had forward head posture with upper thoracic kyphosis and loss of cervical lordosis.  Neck AROM was limited in all directions, side being better with bilateral rotation.  She had tenderness to palpation bilaterally involving the trapezii, cervical paraspinals and periscapular areas.  There was obvious weakness of the neck muscles, worse with flexion.\"      Her assessment was:   1.  Cervical myelopathy.   2.  Multilevel degenerative disk disease involving the cervical spines.   3.  Severe spinal canal and bilateral neural " foraminal stenosis at the C5-C6 level.   4.  Spasticity and spasms in bilateral lower extremities.   5.  Neck weakness.   6.  Difficulty swallowing with increased saliva and cough.      Dr. Celestin had called me to discuss the findings and possible plans going forward.  We felt that the posterior neck muscle weakness was a separate issue from her cervical myelopathy and that it was likely myopathic.  However, the EMG findings are more consistent with a neurogenic process.  She was strongly encouraged to follow up with me.  Dr. Celestin had an in-depth discussion and education about the assessment and the implications regarding the diagnosis and also recommended that she have physical therapy to work on neck range of motion, gait, balance, and appropriate assistive device and stretching exercises for management of spasticity.  Also, in conjunction with our conversation, Dr. Celestin and I recommended more evaluation of her swallowing.  Dr. Celestin did agree with the patient being on tizanidine for her muscle spasms.  She also briefly touched on the fact that the patient may have to revisit Dr. Colton Ledezma regarding surgery on her neck.      One day before her visit to Dr. Celestin, Katherin had seen Dr. Corrales for another followup visit.  At that time she was complaining of increasing pain involving the right wrist where a volar carpi ganglion was noted.  It was getting progressively bigger.  For completing further metabolic evaluation of this patient, Dr. Corrales ordered C-reactive protein, CBC with differential, comprehensive metabolic, ferritin serum, iron and total iron binding capacity, serum magnesium, prealbumin, thyroxine T4, TSH and vitamin B12 level.  The results of these laboratory tests are not available at this time.      The results of the laboratory tests ordered by Dr. Corrales are not available to me at this time, but will be sought later.  However, the patient tells me that all the blood tests were normal.       The patient had called stating that she was having trouble swallowing her pills.  Since Dr. Celestin had recommended SLP evaluation, I ordered same and this was accomplished in the same building as that were Dr. Pham did the EMG.  She had a swallowing study under fluoroscopy while ingesting applesauce, yogurt and water.  She states that everything went fine.  She had no difficulty swallowing, no choking.  Yesterday she was advised to do tongue and swallowing exercises and was shown how to do them.      As far as the medication tizanidine is concerned, Ms. Fletcher has not found it to be helpful, although she tolerates it reasonably well.      I have had the opportunity to review the [facility] form regarding review of systems.  The patient says yes to the following:  Skeletal symptoms, blood symptoms, muscle aches, neck pain, swollen and painful joints, muscle cramps, stiffness, especially of her joints and swelling of her lower extremities, more so at the feet.      CURRENT MEDICATIONS:     1.  Alfalfa 250 mg every morning.   2.  Amoxicillin (Amoxil) 500 mg capsule p.r.n.   3.  Apixaban (Eliquis) 5 mg 1 b.i.d. as followup for her TIA.   4.  Calcium plus vitamin D 600-200, 1 at bedtime.   5.  Carbidopa/levodopa (Sinemet) 25/100, 1 tablet 4 times a day for gait disorder and restless leg syndrome.   6.  Carvedilol (Coreg) 6.25 mg 1 p.o. in the morning and at 6:00 p.m.   7.  Clopidogrel (Plavix) 75 mg 1 daily.   8.  Daily multivitamins every morning.   9.  Isosorbide mononitrate (Imdur) long-acting 30 mg 1 every morning daily.   10.  Levothyroxine (Synthroid) 125 mcg 1 daily in the morning.   11.  Nitroglycerin 0.4 mg sublingual tablet 1 as needed for chest pain to be dissolved under the tongue (the patient has been on this for 3 years and uses only very occasionally).   12.  Omeprazole (Prilosec) 20 mg CR capsule, 1 in the evening.   13.  Rosuvastatin (Crestor) 20 mg 1 daily at bedtime.   14.   "Tizanidine (Zanaflex) 2 mg 1 tablet 3 times a day plus at bedtime.   15.  Vitamin B12 daily.      Commenting on the effect of her medications, she stated that the Sinemet \"kind of helps my restless legs\".      PHYSICAL EXAMINATION:  Katherin comes through with her usual delightful personality.  She has a good memory and in fact good details of her recent medical happenings and visits to doctors.  Overall, her memory is good for just about everything.  She has normal attention and concentration.  Her thought processes are logical and coherent.  Her speech is still somewhat dysarthric with some slurring typical of ataxia.   VITAL SIGNS:  Blood pressure 129/77, right arm.  Pulse 81.  SpO2 96%.      No general physical was done as the patient had no complaints relative to other areas of her body, just those involving the nervous system.      The patient has a soft cervical collar which when removed reveals that her neck is very weak with her chin and her head flexes forward with her chin resting on her upper chest.  She is not able to bring her head upright.  Further evaluation of the cranial nerves reveal normal optic fundi with no optic atrophy.  The extraocular muscles were normal with no nystagmus detected.  There was no facial weakness nor numbness.  The tongue was midline and the palate elevated well, but she did demonstrate mild dysarthria, mostly manifested as slowing of her speech and some imprecision of consonant formation.  She certainly was 100% intelligible.  Reflexes were 2+ in the upper extremities and knees and 1+ at the right ankle, but markedly diminished at the left (this probably is related to her having had a fracture of the left ankle and subsequent fusion a number of years ago).  She had bilateral upgoing toes to plantar stimulation (positive (Babinski).  There were 2 beats of ankle clonus on the right and this could not be tested well on the left.  She had good strength and normal tone in the " upper extremities with no atrophy.  In the lower extremities, she had good strength although I could not test well above the left ankle.  No atrophy was noted; however, she had considerable increase in tone of her lower extremities manifested by a spastic catch of the quadriceps muscle when I suddenly flexed her leg at the knee.  Cerebellar functioning was mildly impaired with some slurring of speech as noted above.  Rapid alternating movements were diminished and were tested as follows:     TEST (15 seconds each)     NUMBER   Hand supination and pronation, right   10-15 seconds   Hand supination and pronation, left    12   Supination and pronation bilateral and laterally  11   Toe tapping, right      10 and 15 seconds   Toe tapping, left      Unable to do, ankle frozen   Finger tapping, right     23   Finger tapping, left      25   Both eyes blink      20   Tongue wiggle       11      This is definitely a slowing compared to normal, but she was fairly rhythmic and did not have a lot of irregularity as is often seen.  Sensory testing revealed position and vibratory sense to be normal for her age, but there was diminished pinprick perception in her feet about 50% of normal, but gradually normalizing at the midcalf.  Likewise, her temperature sensation was diminished distally and normalized as tuning fork was passed more proximally.      I did not test gait.      I was unable to test gait and station as she is really unable to mobilize on her own when standing upright.      It should be noted that the neck muscle strength was extremely weak as mentioned previously, but her cough was strong and effective.  I also stated that the upper extremity strength was normal, but she did have a very mild weakness of the deltoids bilaterally.      An attempt to formulate a unifying diagnosis I find it not possible to do so, so they are basically 3 issues as follows:   1.  Neck muscle weakness which may be a myopathy, although EMG  studies do not verify this.   2.  Cervical stenosis which may be causing a cervical myelopathy contributing to lower extremity spasticity (incidentally there was no spasticity found in the upper extremities).   3.  Definite cerebellar findings involving speech and all extremities.        Therefore, I feel that the following diagnoses are reasonable:   1.  Spastic ataxia due to:   A.  Cerebellar component causing ataxia.   B.  Severe cervical stenosis contributing to spasticity.   2.  Posterior neck muscle weakness, etiology undetermined, having the clinical appearance of myopathy but not confirmed electrodiagnostically.   3.  Cerebellar dysfunction causing gait imbalance, dysarthria and diminished dexterity in the upper and lower extremities as well as eye blink and tongue wiggle.   4.  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   5.  Restless leg syndrome.      PLAN:   1.  Probably proceed with physical therapy once a decision has been made whether or not to do 1 level surgery for the cervical stenosis.  This will be a decision which will probably involve several parties including Dr. Corrales, Dr. Ledezma and myself.   2.  This examiner will be contacting Dr. Ledezma and Dr. Corrales.   3.  Return on 2018 or next available appointment.      A total of 50 minutes was spent with the patient, the majority of which was dedicated to reviewing records from other clinics, discussing options with respect to Ana's neck problem and a plan to go forward.         EVALUATION AND MANAGEMENT (E/M):  [id number]         D: 2018   T: 2018   MT: LINA      Name:     ANA RODRIGUEZ   MRN:      [id number]        Account:      [id number]   :      1940           Service Date: 2018      Document: [id number]        Again, thank you for allowing me to participate in the care of your patient.      Sincerely,    Joe Randolph MD

## 2018-11-02 NOTE — NURSING NOTE
Chief Complaint   Patient presents with     RECHECK     FOLLOW UP ATAXIA       Yasmeen Francis MA

## 2018-11-07 ENCOUNTER — TELEPHONE (OUTPATIENT)
Dept: FAMILY MEDICINE | Facility: CLINIC | Age: 78
End: 2018-11-07

## 2018-11-07 DIAGNOSIS — R29.6 FALLS FREQUENTLY: ICD-10-CM

## 2018-11-07 DIAGNOSIS — M62.830 BACK MUSCLE SPASM: Primary | ICD-10-CM

## 2018-11-07 DIAGNOSIS — G62.9 PERIPHERAL POLYNEUROPATHY: ICD-10-CM

## 2018-11-07 DIAGNOSIS — G31.9 NEURODEGENERATIVE DISORDER (H): ICD-10-CM

## 2018-11-07 DIAGNOSIS — M62.838 MUSCLE SPASMS OF BOTH LOWER EXTREMITIES: ICD-10-CM

## 2018-11-07 DIAGNOSIS — Z87.898 HISTORY OF PROGRESSIVE WEAKNESS: ICD-10-CM

## 2018-11-07 NOTE — TELEPHONE ENCOUNTER
"Called Katherin.   She is not getting any better.   She thinks several of her doctors think she should have the surgery.   She states she is \"not found of surgery\".   She is concerned due to her frequent falls.   She is leaning toward having Dr. Colton Ledezma do the c-spine decompression surgery for her, but is interested in making sure that is all that is going on.   I offered MRI lumbar and thoracic spine w/o contrast for her. Her hands have some symptoms, but nothing like her legs which spasm on her 3-4 times a day.   She confirms no prior surgery on lumbar or thoracic back.   She had some DJD and bulging on last imaging 2015, but nothing then that required surgery. She confirms she has been getting worse w/ time.   She falls frequently and is concerned about what each fall does to her neck.     Ordered MRI of thoracic and lumbar spine - OK for [facility] to facilitate getting this scheduled w/ patient. Thanks all.    "

## 2018-11-12 ENCOUNTER — TRANSFERRED RECORDS (OUTPATIENT)
Dept: FAMILY MEDICINE | Facility: CLINIC | Age: 78
End: 2018-11-12

## 2018-11-14 ENCOUNTER — TELEPHONE (OUTPATIENT)
Dept: CARDIOLOGY | Facility: CLINIC | Age: 78
End: 2018-11-14

## 2018-11-14 NOTE — TELEPHONE ENCOUNTER
She should check with the surgeon if they want her to be off plavix and apixaban. If so she can be off plavix for a week and apixaban for 2 days prior to surgery. Post surgery can be resumed asap when safe per surgery. Apixaban full anticoagulation effect start within a few hours of first dose.    Thanks  Juvencio

## 2018-11-14 NOTE — TELEPHONE ENCOUNTER
Dr Grayson,     I am having a ganglion cyst and trigger finger surgery on the 29th of November.  How many days  should I NOT take Eliquis? Plavix?     Thank you, Katherin Fletcher              9-13-18 OV with Dr. Grayson  IMPRESSION AND PLAN:  Katherin Fletcher is 78.  We have treated her for paroxysmal atrial fibrillation, but clinically she has not had any and no palpitations, dizziness or syncope.       1.  She has coronary artery disease with previous coronary artery bypass surgeries, the last in 2009 with a mammary artery to the LAD, saphenous vein graft to OM and saphenous vein graft to the distal RCA, posterolateral branch.   2.  History of being overweight in the past.   3.  Treated hyperlipidemia   4.  Physical weakness and loss of stability is becoming bigger issue for her.  She has really slowed down a lot.  Her heart, I do not think, is causing much in the way of symptoms at this time.  I made no changes.  I reviewed each of these issues with her.  She has nothing to suggest angina or heart failure.  Her rhythm is stable.  Her blood pressure is normal.

## 2018-11-15 ENCOUNTER — HOSPITAL ENCOUNTER (OUTPATIENT)
Dept: SPEECH THERAPY | Facility: CLINIC | Age: 78
Setting detail: THERAPIES SERIES
End: 2018-11-15
Attending: PHYSICAL MEDICINE & REHABILITATION
Payer: MEDICARE

## 2018-11-15 PROCEDURE — 92526 ORAL FUNCTION THERAPY: CPT | Mod: GN | Performed by: SPEECH-LANGUAGE PATHOLOGIST

## 2018-11-15 PROCEDURE — 40000211 ZZHC STATISTIC SLP  DEPARTMENT VISIT: Performed by: SPEECH-LANGUAGE PATHOLOGIST

## 2018-11-15 PROCEDURE — G8997 SWALLOW GOAL STATUS: HCPCS | Mod: GN,CI | Performed by: SPEECH-LANGUAGE PATHOLOGIST

## 2018-11-15 PROCEDURE — G8998 SWALLOW D/C STATUS: HCPCS | Mod: GN,CI | Performed by: SPEECH-LANGUAGE PATHOLOGIST

## 2018-11-16 ENCOUNTER — TRANSFERRED RECORDS (OUTPATIENT)
Dept: FAMILY MEDICINE | Facility: CLINIC | Age: 78
End: 2018-11-16

## 2018-11-16 NOTE — PROGRESS NOTES
[facility]  [address] [place] [address] [phone]  Dept: [phone]    PRE-OP EVALUATION:  Today's date: 2018    Katherin Fletcher (: 1940) presents for pre-operative evaluation assessment as requested by Dr. Goldstein.  She requires evaluation and anesthesia risk assessment prior to undergoing surgery/procedure for treatment of Right voler carpel ganglion cyst .Right index trigger finger release.    Proposed Surgery/ Procedure:   EXCISE RIGHT VOLER CARPEL GANGLION CYST     RIGHT INDEX TRIGGER FINGER RELEASE         Date of Surgery/ Procedure: 2018  Time of Surgery/ Procedure: 1315  Hospital/Surgical Facility: [facility]  Fax number for surgical facility:   Primary Physician: Aris Del Real  Type of Anesthesia Anticipated: Benoit Block    Patient has a Health Care Directive or Living Will:  YES     1. YES - DO YOU HAVE A HISTORY OF HEART ATTACK, STROKE, STENT, BYPASS OR SURGERY ON AN ARTERY IN THE HEAD, NECK, HEART OR LEG? Heart attack, stent.stroke bypass. NO CHEST PAIN TODAY  1. NO - Do you have a history of heart attack, stroke, stent, bypass or surgery on an artery in the head, neck, heart or legs?  2. NO - Do you ever have any pain or discomfort in your chest?  3. NO - Do you have a history of  Heart Failure?  4. NO - Are you troubled by shortness of breath when: walking on the level, up a slight hill or at night?  5. NO - Do you currently have a cold, bronchitis or other respiratory infection?  6. YES - DO YOU HAVE A COUGH, SHORTNESS OF BREATH OR WHEEZING? Cough when eating food tickles throat. NOT AT COLD. Going to Speech for swallowing. No special diet.   6. NO - Do you have a cough, shortness of breath or wheezing?  7. NO - Do you sometimes get pains in the calves of your legs when you walk?  8. NO - Do you or anyone in your family have previous history of blood clots?  9. NO - Do you or does anyone in your family have a serious bleeding  "problem such as prolonged bleeding following surgeries or cuts?  10. NO - Have you ever had problems with anemia or been told to take iron pills?  11. NO - Have you had any abnormal blood loss such as black, tarry or bloody stools, or abnormal vaginal bleeding?  12. NO - Have you ever had a blood transfusion?  13. NO - Have you or any of your relatives ever had problems with anesthesia?  14. NO - Do you have sleep apnea, excessive snoring or daytime drowsiness?  15. NO - Do you have any prosthetic heart valves?  16. YES - DO YOU HAVE PROSTHETIC JOINTS? Left ankle  16. NO - Do you have prosthetic joints?  17. NO - Is there any chance that you may be pregnant?      5/7/18  Interpretation Summary     A cardiac source of embolus was not identified.  Left ventricular systolic function is mildly reduced.  The visual ejection fraction is estimated at 45-50%.  The left ventricle is normal in size.  There is apical akinesis.  The patient exhibited frequent PACs.  Doppler interrogation does not demonstrate significant stenosis or  insufficiency involving cardiac valves.     No significant change since 10/13/2104.    EKG today Afib Septal infarct age undetermined HR 74     9/13/18    IMPRESSION:   Stable reduction in right DOMINIK (0.81) with biphasic waveform.     Left lower extremity with borderline DOMINIK 0.94, remains unchanged from  previous study 6/27/2017, triphasic waveform.    RHD  Right wrist splint  Latex allergy no  OTC no  Travel no  Exposure no  ROS negative  Has chair lifts at home  Soft neck collar  Wheeled walker       Neck soft collar \"helps to hold head up\"  Glasses  Wheeled walker with seat and brakes    Fall no  Seizure no  LOC no    Upper denture  No sleep devices  Flu shot UTD    Sleep ok when not having dreams last night 6 hours. Asleep watching TV this morning 1/2 hour.  Appetite ok  Exercise nystep glider 5 min once daily    Smoking former quit 1992  ETOH no  Street drugs/MJ no  Caffeine 1 cup per " day    Depression no  Anxiety no  Panic no  SI/SP no  Hallucinations no  Paranoid no  Manic no  OCD no  PTSD no  Gambling [place] once in a while        HPI:     HPI related to upcoming procedure: Right ganglion cyst per surgery Right index trigger finger release      See problem list for active medical problems.  Problems all longstanding and stable, except as noted/documented.  See ROS for pertinent symptoms related to these conditions.                                                                                                                                                          .    MEDICAL HISTORY:     Patient Active Problem List    Diagnosis Date Noted     Hypothyroidism, unspecified type 10/10/2018     Priority: Medium     Falls frequently 05/28/2018     Priority: Medium     Neurodegenerative disorder      Priority: Medium     Further detailed dx unknown. Managed by Dr. George Pham - [facility].        Slurred speech 05/08/2018     Priority: Medium     Stroke (H) 05/07/2018     Priority: Medium     History of TIA (transient ischemic attack) and stroke 05/07/2018     Priority: Medium     R sided facial N&T.        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       Disturbances of sensation of smell and taste 02/06/2014     Priority: Medium     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       MRSA (methicillin resistant  Staphylococcus aureus) 09/12/2013     Priority: Medium     wound 6-10 and 9-23-10       Health Care Home 07/23/2013     Priority: Medium     .State Tier Level:  Tier 2  August 14, 2014               CAD (coronary artery disease) 04/23/2012     Priority: Medium     1992 CABG with LIMA to LAD and saphenous vein grafts to OM 1, 1992, 1/2009- redo CABG-LIMA to LAD,SVG-OM,SVG to ARLYN of RCA,       Hyperlipidemia 04/23/2012     Priority: Medium     Long term current use of anticoagulant therapy 08/16/2011     Priority: Medium     Problem list name updated by automated process. Provider to review        Past Medical History:   Diagnosis Date     CAD (coronary artery disease)     s/p CABG 1992 and redo CABG 2009 (LIMA to LAD, SVG to OM and SVG to posterolateral branch of RCA)     Chronic cough     Non-productive. No known definite etiology. Is beeing seen by [facility].      Essential hypertension, benign      Falls frequently 5/28/2018     GERD (gastroesophageal reflux disease)      History of TIA (transient ischemic attack) and stroke 5/7/2018    R sided facial N&T.      Hyperlipidaemia      Hyperlipidaemia LDL goal < 130      Ischemic cardiomyopathy      MI (myocardial infarction) (H) 11/1996     Mixed hyperlipidemia      Neurodegenerative disorder     Further detailed dx unknown. Managed by Dr. George Pham - \[facility].      PAD (peripheral artery disease) (H)      Restless legs syndrome      Unspecified hypothyroidism      Past Surgical History:   Procedure Laterality Date     ARTHROSCOPY SHOULDER RT/LT       BUNIONECTOMY RT/LT       C CABG, ARTERY-VEIN, THREE  1/2009     C CARDIAC SURG PROCEDURE UNLIST       C HAND/FINGER SURGERY UNLISTED       C MRA UPPER EXTREMITY WO&W CONT       C SHOULDER SURG PROC UNLISTED       C STOMACH SURGERY PROCEDURE UNLISTED       CARDIAC SURGERY  1992    CABG,stents x2     CARPAL TUNNEL RELEASE RT/LT       HC VASCULAR SURGERY PROCEDURE UNLIST       HYSTERECTOMY, DAMIÁN  34 yo      OPEN REDUCTION INTERNAL FIXATION ANKLE Left 2015    Left Ankle     RELEASE TRIGGER FINGER       REPAIR HAMMER TOE Right 12/19/2017    Procedure: REPAIR HAMMER TOE;  Right 2nd and 3rd toe pinning;  Surgeon: Fernando Wiggins MD;  Location: [facility] OR     STENT  2006     STENT(aka CARDIAC)  2008     VASCULAR SURGERY       Current Outpatient Prescriptions   Medication Sig Dispense Refill     [place] 250 MG TABS Take 250 mg by mouth every morning        amoxicillin (AMOXIL) 500 MG capsule as needed        apixaban ANTICOAGULANT (ELIQUIS) 5 MG tablet Take 1 tablet (5 mg) by mouth 2 times daily 180 tablet 3     calcium carbonate-vitamin D (CALCIUM + D) 600-200 MG-UNIT TABS Take 1 tablet by mouth At Bedtime        carbidopa-levodopa (SINEMET)  MG per tablet TAKE 2 TABLETS FOUR TIMES A  tablet 3     carbidopa-levodopa (SINEMET)  MG per tablet TAKE 2 TABLETS FOUR TIMES A DAY (Patient taking differently: Take 2 tablets by mouth 4 times daily as needed (legs) Takes as needed throughout the day, up to 4 times daily) 720 tablet 3     carvedilol (COREG) 6.25 MG tablet Take 1 tablet (6.25 mg) by mouth 2 times daily (with meals) 180 tablet 3     chlorhexidine (PERIDEX) 0.12 % solution        clopidogrel (PLAVIX) 75 MG tablet Take 1 tablet (75 mg) by mouth daily 90 tablet 3     Cyanocobalamin (VITAMIN B-12 PO) Take 1 tablet by mouth every morning        isosorbide mononitrate (IMDUR) 30 MG 24 hr tablet Take 1 tablet (30 mg) by mouth daily 90 tablet 3     levothyroxine (SYNTHROID/LEVOTHROID) 112 MCG tablet TAKE 1 TABLET DAILY 90 tablet 1     Multiple Vitamins-Minerals (DAILY MULTI) TABS Take 1 tablet by mouth every morning        nitroglycerin (NITROSTAT) 0.4 MG SL tablet DISSOLVE ONE TABLET UNDER THE TONGUE EVERY 5 MINUTES AS NEEDED FOR CHEST PAIN.  DO NOT EXCEED A TOTAL OF 3 DOSES IN 15 MINUTES 25 tablet 3     omeprazole (PRILOSEC) 20 MG CR capsule TAKE 2 CAPSULES DAILY 30 TO 60 MINUTES BEFORE A MEAL 180 capsule  1     rosuvastatin (CRESTOR) 20 MG tablet Take 1 tablet (20 mg) by mouth daily 90 tablet 3     tiZANidine (ZANAFLEX) 2 MG tablet Take 1 tablet (2 mg) by mouth 3 times daily 30 tablet 0     OTC products: None, except as noted above    Allergies   Allergen Reactions     Ace Inhibitors Cough     Baclofen Other (See Comments)     Constipation , tiredness     Penicillins      10/7/14 Hives per patient -- many many years ago       Nickel Swelling and Rash     Other reaction(s): Unknown      Latex Allergy: NO    Social History   Substance Use Topics     Smoking status: Former Smoker     Packs/day: 0.50     Years: 22.00     Types: Cigarettes     Quit date: 4/15/1992     Smokeless tobacco: Never Used     Alcohol use 0.6 oz/week     1 Standard drinks or equivalent per week      Comment: MAYBE ONCE A YEAR     History   Drug Use No       REVIEW OF SYSTEMS:   Constitutional, neuro, ENT, endocrine, pulmonary, cardiac, gastrointestinal, genitourinary, musculoskeletal, integument and psychiatric systems are negative, except as otherwise noted.    EXAM:   /80  Pulse 78  Resp 16  Wt 70.8 kg (156 lb)  SpO2 96%  BMI 26.78 kg/m2    GENERAL APPEARANCE: healthy, alert and no distress Denture     EYES: EOMI, PERRL     HENT: ear canals and TM's normal and nose and mouth without ulcers or lesions     NECK: no adenopathy, no asymmetry, masses, or scars and thyroid normal to palpation     RESP: lungs clear to auscultation - no rales, rhonchi or wheezes     CV: regular rates and rhythm, normal S1 S2, no S3 or S4 and no murmur, click or rub     ABDOMEN:  soft, nontender, no HSM or masses and bowel sounds normal     MS: extremities normal- no gross deformities noted, no evidence of inflammation in joints, FROM in all extremities. Right wrist splint     SKIN: no suspicious lesions or rashes     NEURO: Normal strength and tone, sensory exam grossly normal, mentation intact and speech normal     PSYCH: mentation appears normal. and affect  normal/bright     LYMPHATICS: No cervical adenopathy    DIAGNOSTICS:     EKG: atrial fibrillation, rate h/o septal infarct  Labs Drawn and in Process:   Unresulted Labs Ordered in the Past 30 Days of this Admission     No orders found from 9/20/2018 to 11/20/2018.          Recent Labs   Lab Test  10/10/18   1133  10/10/18   1036  05/16/18   1210  05/08/18   1230  05/07/18   2138  05/07/18   1502  02/27/18   2223   HGB  12.5   --   12.5  12.4  12.9  12.7  12.8   PLT  217   --   204  180  212  192  190   INR   --    --    --    --    --   1.00  1.06   NA   --   141   --   142  143  141  140   POTASSIUM   --   4.5   --   3.5  3.8  3.9  3.7   CR   --   0.65   --   0.65  0.78  0.69  0.76   A1C   --    --    --    --   6.1   --    --       Results for orders placed or performed in visit on 11/19/18   Basic Metabolic Panel (8) ([facility])   Result Value Ref Range    Glucose 100 (H) 65 - 99 mg/dL    Urea Nitrogen 14 8 - 27 mg/dL    Creatinine 0.82 0.57 - 1.00 mg/dL    eGFR If NonAfricn Am 69 >59 mL/min/1.73    eGFR If Africn Am 79 >59 mL/min/1.73    BUN/Creatinine Ratio 17 12 - 28    Sodium 141 134 - 144 mmol/L    Potassium 4.1 3.5 - 5.2 mmol/L    Chloride 102 96 - 106 mmol/L    Total CO2 28 20 - 29 mmol/L    Calcium 10.1 8.7 - 10.3 mg/dL    Narrative    Performed at:  01 - [facility]  [address] [place] [address]  [id number]  : Kodi Ortiz MD, Phone:  [phone]   CBC with Diff/Plt ([facility])   Result Value Ref Range    WBC x10/cmm 4.3 3.8 - 11.0 x10/cmm    % Lymphocytes 44.7 20.5 - 51.1 %    % Monocytes 7.0 1.7 - 9.3 %    % Granulocytes 48.3 42.2 - 75.2 %    RBC x10/cmm 4.55 3.7 - 5.2 x10/cmm    Hemoglobin 12.9 11.8 - 15.5 g/dl    Hematocrit 39.1 35 - 46 %    MCV 85.8 80 - 100 fL    MCH 28.4 27.0 - 31.0 pg    MCHC 33.1 33.0 - 37.0 g/dL    Platelet Count 211 140 - 450 K/uL         IMPRESSION:   Reason for surgery/procedure: right volar ganglion cyst and right index trigger finger release  Diagnosis/reason for  consult: preoperative clearance    The proposed surgical procedure is considered INTERMEDIATE risk.    REVISED CARDIAC RISK INDEX  The patient has the following serious cardiovascular risks for perioperative complications such as (MI, PE, VFib and 3  AV Block):  Coronary Artery Disease (MI, positive stress test, angina, Qs on EKG)  INTERPRETATION: 1 risks: Class II (low risk - 0.9% complication rate)    The patient has the following additional risks for perioperative complications:  PVD  Denture        ICD-10-CM    1. Preop general physical exam Z01.818 Basic Metabolic Panel (8) ([facility])     CBC with Diff/Plt ([facility])     EKG 12-lead complete w/read - Clinics     CANCELED: MRSA Screening Culture ([facility])   2. Ganglion cyst of wrist, unspecified laterality M67.439    3. Coronary artery disease involving native coronary artery of native heart without angina pectoris I25.10    4. Mixed hyperlipidemia E78.2    5. MRSA (methicillin resistant Staphylococcus aureus) A49.02    6. Cerebellar disease G93.9    7. Essential hypertension I10    8. Ischemic cardiomyopathy I25.5    9. Peripheral polyneuropathy G62.9    10. PAD (peripheral artery disease) (H) I73.9    11. Cerebrovascular accident (CVA), unspecified mechanism (H) I63.9    12. Hypothyroidism, unspecified type E03.9    13. Former smoker Z87.891    14. Neurodegenerative disorder G31.9        RECOMMENDATIONS:     --Consult hospital rounder / IM to assist post-op medical management    Cardiovascular Risk  Monitor and continue medications      Pulmonary Risk Former smoker  Incentive spirometry post op  Respiratory Therapy (Respiratory Care IP Consult)  post op    Before Your Surgery      Call your surgeon if there is any change in your health. This includes signs of a cold or flu (such as a sore throat, runny nose, cough, rash or fever).    Do not smoke, drink alcohol or take over the counter medicine (unless your surgeon or primary care doctor tells you to) for the 24  hours before and after surgery.    If you take prescribed drugs: Follow your doctor s orders about which medicines to take and which to stop until after surgery.    Eating and drinking prior to surgery: follow the instructions from your surgeon    Take a shower or bath the night before surgery. Use the soap your surgeon gave you to gently clean your skin. If you do not have soap from your surgeon, use your regular soap. Do not shave or scrub the surgery site.  Wear clean pajamas and have clean sheets on your bed.     Hold now until surgery Alfalfa, Calcium/D, multivitamin  1 week before surgery stop Plavix  2 days before stop Eliquis    Day of surgery take with small sip of water:  Sinemet, Cored, Imdur, Levothyroxine  HOLD OMEPRAZOLE WHEN NPO not eating  Resume medications post surgery per your regular routine unless directed by surgery              --Patient is to take all scheduled medications on the day of surgery EXCEPT for modifications listed below.    APPROVAL GIVEN to proceed with proposed procedure, without further diagnostic evaluation       Signed Electronically by: Clary Dumont MD    Copy of this evaluation report is provided to requesting physician.    [facility] Preop Guidelines    Revised Cardiac Risk Index

## 2018-11-16 NOTE — PATIENT INSTRUCTIONS
Before Your Surgery      Call your surgeon if there is any change in your health. This includes signs of a cold or flu (such as a sore throat, runny nose, cough, rash or fever).    Do not smoke, drink alcohol or take over the counter medicine (unless your surgeon or primary care doctor tells you to) for the 24 hours before and after surgery.    If you take prescribed drugs: Follow your doctor s orders about which medicines to take and which to stop until after surgery.    Eating and drinking prior to surgery: follow the instructions from your surgeon    Take a shower or bath the night before surgery. Use the soap your surgeon gave you to gently clean your skin. If you do not have soap from your surgeon, use your regular soap. Do not shave or scrub the surgery site.  Wear clean pajamas and have clean sheets on your bed.     Hold now until surgery Alfalfa, Calcium/D, multivitamin  1 week before surgery stop Plavix  2 days before stop Eliquis    Day of surgery take with small sip of water:  Sinemet, Cored, Imdur, Levothyroxine  HOLD OMEPRAZOLE WHEN NPO not eating  Resume medications post surgery per your regular routine unless directed by surgery

## 2018-11-19 ENCOUNTER — TELEPHONE (OUTPATIENT)
Dept: FAMILY MEDICINE | Facility: CLINIC | Age: 78
End: 2018-11-19

## 2018-11-19 ENCOUNTER — OFFICE VISIT (OUTPATIENT)
Dept: FAMILY MEDICINE | Facility: CLINIC | Age: 78
End: 2018-11-19

## 2018-11-19 VITALS
SYSTOLIC BLOOD PRESSURE: 126 MMHG | HEART RATE: 78 BPM | BODY MASS INDEX: 26.78 KG/M2 | RESPIRATION RATE: 16 BRPM | DIASTOLIC BLOOD PRESSURE: 80 MMHG | WEIGHT: 156 LBS | OXYGEN SATURATION: 96 %

## 2018-11-19 DIAGNOSIS — M79.10 MUSCLE PAIN: ICD-10-CM

## 2018-11-19 DIAGNOSIS — I25.5 ISCHEMIC CARDIOMYOPATHY: ICD-10-CM

## 2018-11-19 DIAGNOSIS — A49.02 MRSA (METHICILLIN RESISTANT STAPHYLOCOCCUS AUREUS): ICD-10-CM

## 2018-11-19 DIAGNOSIS — M65.321 TRIGGER FINGER, RIGHT INDEX FINGER: ICD-10-CM

## 2018-11-19 DIAGNOSIS — E03.9 HYPOTHYROIDISM, UNSPECIFIED TYPE: ICD-10-CM

## 2018-11-19 DIAGNOSIS — I25.10 CORONARY ARTERY DISEASE INVOLVING NATIVE CORONARY ARTERY OF NATIVE HEART WITHOUT ANGINA PECTORIS: ICD-10-CM

## 2018-11-19 DIAGNOSIS — G93.9: ICD-10-CM

## 2018-11-19 DIAGNOSIS — Z01.818 PREOP GENERAL PHYSICAL EXAM: Primary | ICD-10-CM

## 2018-11-19 DIAGNOSIS — I63.9 CEREBROVASCULAR ACCIDENT (CVA), UNSPECIFIED MECHANISM (H): ICD-10-CM

## 2018-11-19 DIAGNOSIS — R47.81 SLURRED SPEECH: ICD-10-CM

## 2018-11-19 DIAGNOSIS — M53.82 NECK MUSCLE WEAKNESS: ICD-10-CM

## 2018-11-19 DIAGNOSIS — R27.0 ATAXIA: Primary | ICD-10-CM

## 2018-11-19 DIAGNOSIS — M67.439 GANGLION CYST OF WRIST, UNSPECIFIED LATERALITY: ICD-10-CM

## 2018-11-19 DIAGNOSIS — R29.6 FALLS FREQUENTLY: ICD-10-CM

## 2018-11-19 DIAGNOSIS — M62.838 MUSCLE SPASM: ICD-10-CM

## 2018-11-19 DIAGNOSIS — I10 ESSENTIAL HYPERTENSION: ICD-10-CM

## 2018-11-19 DIAGNOSIS — G31.9 NEURODEGENERATIVE DISORDER (H): ICD-10-CM

## 2018-11-19 DIAGNOSIS — I73.9 PAD (PERIPHERAL ARTERY DISEASE) (H): ICD-10-CM

## 2018-11-19 DIAGNOSIS — G62.9 PERIPHERAL POLYNEUROPATHY: ICD-10-CM

## 2018-11-19 DIAGNOSIS — E78.2 MIXED HYPERLIPIDEMIA: ICD-10-CM

## 2018-11-19 DIAGNOSIS — Z87.891 FORMER SMOKER: ICD-10-CM

## 2018-11-19 LAB
% GRANULOCYTES: 48.3 % (ref 42.2–75.2)
HCT VFR BLD AUTO: 39.1 % (ref 35–46)
HEMOGLOBIN: 12.9 G/DL (ref 11.8–15.5)
LYMPHOCYTES NFR BLD AUTO: 44.7 % (ref 20.5–51.1)
MCH RBC QN AUTO: 28.4 PG (ref 27–31)
MCHC RBC AUTO-ENTMCNC: 33.1 G/DL (ref 33–37)
MCV RBC AUTO: 85.8 FL (ref 80–100)
MONOCYTES NFR BLD AUTO: 7 % (ref 1.7–9.3)
PLATELET # BLD AUTO: 211 K/UL (ref 140–450)
RBC # BLD AUTO: 4.55 X10/CMM (ref 3.7–5.2)
WBC # BLD AUTO: 4.3 X10/CMM (ref 3.8–11)

## 2018-11-19 PROCEDURE — 93000 ELECTROCARDIOGRAM COMPLETE: CPT | Performed by: FAMILY MEDICINE

## 2018-11-19 PROCEDURE — 99215 OFFICE O/P EST HI 40 MIN: CPT | Performed by: FAMILY MEDICINE

## 2018-11-19 PROCEDURE — 85025 COMPLETE CBC W/AUTO DIFF WBC: CPT | Performed by: FAMILY MEDICINE

## 2018-11-19 PROCEDURE — 36415 COLL VENOUS BLD VENIPUNCTURE: CPT | Performed by: FAMILY MEDICINE

## 2018-11-19 NOTE — MR AVS SNAPSHOT
After Visit Summary   11/19/2018    Katherin Fletcher    MRN: [id number]           Patient Information     Date Of Birth          1940        Visit Information        Provider Department      11/19/2018 2:15 PM Clary Dumont MD [facility]        Today's Diagnoses     Preop general physical exam    -  1    Ganglion cyst of wrist, unspecified laterality        Coronary artery disease involving native coronary artery of native heart without angina pectoris        Mixed hyperlipidemia        MRSA (methicillin resistant Staphylococcus aureus)        Cerebellar disease        Essential hypertension        Ischemic cardiomyopathy        Peripheral polyneuropathy        PAD (peripheral artery disease) (H)        Cerebrovascular accident (CVA), unspecified mechanism (H)        Hypothyroidism, unspecified type          Care Instructions      Before Your Surgery      Call your surgeon if there is any change in your health. This includes signs of a cold or flu (such as a sore throat, runny nose, cough, rash or fever).    Do not smoke, drink alcohol or take over the counter medicine (unless your surgeon or primary care doctor tells you to) for the 24 hours before and after surgery.    If you take prescribed drugs: Follow your doctor s orders about which medicines to take and which to stop until after surgery.    Eating and drinking prior to surgery: follow the instructions from your surgeon    Take a shower or bath the night before surgery. Use the soap your surgeon gave you to gently clean your skin. If you do not have soap from your surgeon, use your regular soap. Do not shave or scrub the surgery site.  Wear clean pajamas and have clean sheets on your bed.     Hold now until surgery Alfalfa, Calcium/D, multivitamin  1 week before surgery stop Plavix  2 days before stop Eliquis    Day of surgery take with small sip of water:  Sinemet, Cored, Imdur, Levothyroxine  HOLD OMEPRAZOLE WHEN  NPO not eating  Resume medications post surgery per your regular routine unless directed by surgery                Follow-ups after your visit        Your next 10 appointments already scheduled     Nov 28, 2018  2:00 PM CST   Neuro Treatment with Filiberto Hammond, PT   [facility] ([facility])    [address] [place] [address]  [address]  [facility] [address]   [phone]            Nov 29, 2018   Procedure with Minh Goldstein MD   [facility] (--)    [address]  [facility] [address]   [phone]            Dec 06, 2018  1:40 PM CST   (Arrive by 1:25 PM)   Return Visit with Darline Celestin MD   [facility] ([facility])    [address]  3rd Floor  [facility] [address]   [phone]            Dec 12, 2018  2:45 PM CST   Neuro Treatment with Filiberto Hammond, PT   [facility] ([facility])    [address]  [address]  [facility] [address]   [phone]              Who to contact     If you have questions or need follow up information about today's clinic visit or your schedule please contact [facility] directly at [phone].  Normal or non-critical lab and imaging results will be communicated to you by [employer], letter or phone within 4 business days after the clinic has received the results. If you do not hear from us within 7 days, please contact the clinic through [employer] or phone. If you have a critical or abnormal lab result, we will notify you by phone as soon as possible.  Submit refill requests through [employer] or call your pharmacy and they will forward the refill request to us. Please allow 3 business days for your refill to be completed.          Additional Information About Your Visit        [employer] Information     [employer] gives you secure access to your electronic health record. If you see a primary care provider, you can also send  messages to your care team and make appointments. If you have questions, please call your primary care clinic.  If you do not have a primary care provider, please call [phone] and they will assist you.        Care EveryWhere ID     This is your Care EveryWhere ID. This could be used by other organizations to access your [facility] medical records  [id number]        Your Vitals Were     Pulse Respirations Pulse Oximetry BMI (Body Mass Index)          78 16 96% 26.78 kg/m2         Blood Pressure from Last 3 Encounters:   11/19/18 126/80   11/02/18 129/77   10/11/18 114/77    Weight from Last 3 Encounters:   11/19/18 70.8 kg (156 lb)   10/11/18 70.8 kg (156 lb)   10/10/18 71.2 kg (157 lb)              We Performed the Following     Basic Metabolic Panel (8) ([facility])     CBC with Diff/Plt ([facility])     EKG 12-lead complete w/read - Clinics        Primary Care Provider Office Phone # Fax #    Aris Del Real [phone] [address]  [facility] [address]        Equal Access to Services     [facility] CenterADRIANA : Hadii aad ku hadasho Soomaali, waaxda luqadaha, qaybta kaalmada adeegyada, waxay jerald hayjimbon larissa odonnell . So [facility] [phone].    ATENCIÓN: Si habla español, tiene a taylor disposición servicios gratuitos de asistencia lingüística. Llame al [phone].    We comply with applicable federal civil rights laws and [place] laws. We do not discriminate on the basis of race, color, national origin, age, disability, sex, sexual orientation, or gender identity.            Thank you!     Thank you for choosing [facility]  for your care. Our goal is always to provide you with excellent care. Hearing back from our patients is one way we can continue to improve our services. Please take a few minutes to complete the written survey that you may receive in the mail after your visit with us. Thank you!             Your Updated Medication List - Protect others around you:  Learn how to safely use, store and throw away your medicines at www.disposemymeds.org.          This list is accurate as of 11/19/18  3:02 PM.  Always use your most recent med list.                   Brand Name Dispense Instructions for use Diagnosis    Alfalfa 250 MG Tabs      Take 250 mg by mouth every morning        apixaban ANTICOAGULANT 5 MG tablet    ELIQUIS    180 tablet    Take 1 tablet (5 mg) by mouth 2 times daily    Cerebrovascular accident (CVA), unspecified mechanism (H)       calcium + D 600-200 MG-UNIT Tabs   Generic drug:  calcium carbonate-vitamin D      Take 1 tablet by mouth At Bedtime    Preventative health care       carbidopa-levodopa  MG per tablet    SINEMET    720 tablet    TAKE 2 TABLETS FOUR TIMES A DAY    Restless legs syndrome, Neurologic gait disorder, Dysarthria, Cerebellar disease       carvedilol 6.25 MG tablet    COREG    180 tablet    Take 1 tablet (6.25 mg) by mouth 2 times daily (with meals)    Benign essential hypertension       chlorhexidine 0.12 % solution    PERIDEX          clopidogrel 75 MG tablet    PLAVIX    90 tablet    Take 1 tablet (75 mg) by mouth daily    Coronary artery disease involving native coronary artery of native heart without angina pectoris       DAILY MULTI Tabs      Take 1 tablet by mouth every morning        isosorbide mononitrate 30 MG 24 hr tablet    IMDUR    90 tablet    Take 1 tablet (30 mg) by mouth daily    Coronary artery disease involving native coronary artery of native heart without angina pectoris       levothyroxine 112 MCG tablet    SYNTHROID/LEVOTHROID    90 tablet    TAKE 1 TABLET DAILY    Encounter for medication refill       nitroGLYcerin 0.4 MG sublingual tablet    NITROSTAT    25 tablet    DISSOLVE ONE TABLET UNDER THE TONGUE EVERY 5 MINUTES AS NEEDED FOR CHEST PAIN.  DO NOT EXCEED A TOTAL OF 3 DOSES IN 15 MINUTES    CAD (coronary artery disease)       omeprazole 20 MG CR capsule    priLOSEC    180 capsule    TAKE 2 CAPSULES  DAILY 30 TO 60 MINUTES BEFORE A MEAL    Encounter for long-term (current) use of medications       rosuvastatin 20 MG tablet    CRESTOR    90 tablet    Take 1 tablet (20 mg) by mouth daily    Mixed hyperlipidemia

## 2018-11-19 NOTE — TELEPHONE ENCOUNTER
Called Katherin w/ results of thoracic MRI.   No answer. Left VM I had called.   I do not see any results yet of lumbar MRI. Unclear if not read yet or if it was deferred due to length of time in the scanner.   See result notes for thoracic MRI.

## 2018-11-19 NOTE — PROGRESS NOTES
Outpatient Speech Language Pathology Discharge Note     Patient: Katherin Fletcher  : 1940    Beginning/End Dates of Reporting Period:  10/23/2018 to 2018    Referring Provider: Dr. ISIDRO Celestin MD     Therapy Diagnosis: Mild oropharyngeal dysphagia     Client Self Report: Pt is a 78 y.o. Female with a medical dx of cervical spondylosis with myelopathy. Pt was referred for an OP SLP clinical swallow evaluation completed on 10/23/2018 with 2 subsequent treatment sessions completed. Pt reports improved swallow function following completion of recommended/trained exercises. Pt continues to report difficulty with mastication and salivia management d/t impaired dentition. Pt to have dental implant surgery in December, SLP recommends Pt to return following this should mastication and saliva management not improve.     Objective Measurements: See below:        Goals:  Goal Identifier LTG 1 - Swallowing Compensations     Goal Description Patient will demonstrate use of 3 safe swallow strategies independently to tolerate the least restrictive diet without overt s/x of penetration/aspiration across 1 week of meals, per patient and family report.    Target Date 18   Date Met  11/15/2018   Progress: Goal met.      Goal Identifier LTG 2 - Pharyngeal Strengthening    Goal Description Patient will complete recommended oralpharyngeal and velopharyngeal exercises with 80% accuracy independently to improve swallow function for least restrictive diet without overt s/x of penetration/aspiration across 1 week of meals, per patient and family report.     Target Date 18    Date Met  11/15/2018   Progress: Goal met.      Progress Toward Goals:    Progress this reporting period: Pt has met goals for this plan of care.     Plan:  Discharge from therapy.    Discharge: Yes     Reason for Discharge: Patient has met all goals.      Discharge Plan: Patient to continue home program. Pt to return for further tx following  dental implants should improvements in mastication and saliva management not improve.     Thank you for referring Katherin Fletcher  to outpatient therapy at [facility] in [place].  Please call Sybil Bah MA, SLP-CCC at [phone] or email [email or web address] with any questions or concerns.      Sybil Bah M.A., SLP-CCC  Speech-Language Pathologist

## 2018-11-19 NOTE — TELEPHONE ENCOUNTER
Received a call from Dr. Ayo Randolph - Neurologist, specializing in ataxia.     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 States has not been able to discuss w/ c-spine surgeon Dr. Colton Ledezma yet. Suggests a referral to Dr. Nasim Zepeda or Dr. Luis A Gonzalez at the [facility] Dept of Neurology may be helpful to investigate/treat the neuromuscular aspect of her disorder. Both of these physicians specialize in neuromuscular disorders.     I appreciate his thoughtful consideration.     I called Katherin. She is agreeable. Is very willing to do whatever necessary in order to give her some answers, and hopefully some relief.   I send URL to both doctors' bios to Katherin per My Chart.     Referral entered.

## 2018-11-20 LAB
BUN SERPL-MCNC: 14 MG/DL (ref 8–27)
BUN/CREATININE RATIO: 17 (ref 12–28)
CALCIUM SERPL-MCNC: 10.1 MG/DL (ref 8.7–10.3)
CHLORIDE SERPLBLD-SCNC: 102 MMOL/L (ref 96–106)
CREAT SERPL-MCNC: 0.82 MG/DL (ref 0.57–1)
EGFR IF AFRICN AM: 79 ML/MIN/1.73
EGFR IF NONAFRICN AM: 69 ML/MIN/1.73
GLUCOSE SERPL-MCNC: 100 MG/DL (ref 65–99)
POTASSIUM SERPL-SCNC: 4.1 MMOL/L (ref 3.5–5.2)
SODIUM SERPL-SCNC: 141 MMOL/L (ref 134–144)
TOTAL CO2: 28 MMOL/L (ref 20–29)

## 2018-11-21 NOTE — PROGRESS NOTES
Halina Corrales MD   [facility]    [address] [place] [address]      RE: Katherin Fletcher   MRN: [id number]   : 1940      Dear Sandi:      Pursuant to our telephone conversation yesterday, I have reviewed some more of her records and have enjoyed seeing the part where Katherin put on her MyChart that you have spent an unusual amount of time looking after her and that she appreciated that very much.  I guess I can tell that just by looking at your records and talking with you.        As far as where to go from here, I am in favor of doing a surgical decompression of the cervical spine at C5-C6 by Dr. Ledezma if she is physically capable of enduring a procedure of this type.  I think this would have to be negotiated with a cardiologist and you in conjunction with Dr. Ledezma.      It will be up to Dr. Ledezma and you, in conjunction with the patient whether or not it is reasonable to pursue surgical intervention as indicated.      It also must be made clear to Katherin Fletcher that the surgical intervention has basically only 1 purpose and that is to relieve her of the spasticity and that this might not help at all, so we cannot tell her for sure.  In addition, it more than likely will not help her neck weakness, which is a separate issue.  Nor will it do anything to improve her cerebellar dysfunction.      Fortunately, Katherin is a very reasonable woman who understands the difficulties in medical care since she was a  at the [facility] [place], operated by [facility] for a number of years.  In fact, I worked with her there when I worked at the [facility] back in the .      I think going forward that the best thing to do would be to talk with Dr. Ledezma and the patient and then make a decision if the cardiologist feels that she is able to withstand the procedure without having cardiac difficulties or that she can safely go off the anticoagulant.      Then I would welcome a call  from you or Dr. Ledezma regarding the plan that you have put forth or you may have some questions to ask of me.      You may contact me at the numbers I gave you yesterday and I would be happy to talk to you.      Thank you again for the opportunity of participating in the care of this very delightful lady who is still very bright and has much to live for.      Sincerely,       Joe Xiong MD            Sincerely,      JOE XIONG MD             D: 2018   T: 2018   MT: LINA      Name:     ANA RODRIGUEZ   MRN:      [id number]        Account:      [id number]   :      1940      Document: [id number]

## 2018-11-21 NOTE — PROGRESS NOTES
"Service Date: 11/02/2018      INTERVAL HISTORY:   Katherin Fletcher is a 78-year-old right-handed woman whom I have seen on 5 previous occasions.  Her first visit was on 04/14/2017.  Subsequently, there have been 4 encounters, all followup visits.  She has been followed primarily for imbalance, which has progressed to the point that she is now using a scooter when out and about, but gets along a little better with a walker at home.  She also has some dysarthria and during the last few encounters has shown significant weakness of her posterior neck muscles so that she has to wear a soft cervical collar in order for her head to be held upright.      At the time of this visit, the patient appears alone, this time not accompanied by her , Caio.  She relates a history which is consistent with other records I have.  She was last seen on 06/01/2018 and since then has had an EMG performed by Dr. Handy Pham, neurologist at the [facility] on 06/12/2018.  According to the records I have from Katherin's primary physician, Dr. Sandi Corrales, the report that she obtained and showed \"severe chronic denervation all upper extremity and cervical paraspinal muscles.  This is consistent with a cervical polyradiculopathy.\"      At the time of her visit with Dr. Corrales on 06/29/2018, they discussed a future direction for her cervical problem.  Previously, Dr. Colton Ledezma with [facility] had said that he could do a single level decompression of her cervical spine at C5-C6 and that this might possibly help her spasticity.  However, the patient was \"torn about taking the risk to have surgery\", according to Dr. Corrales.  Further, Katherin questioned whether it would help her original problem with her neck muscles and her spasticity.  With her cardiac difficulties and other medical problems, she had questioned if the risks outweighed the benefits or vice versa.  No conclusions were reached at that time.    " "  Going back to 04/28/2018 when I saw the patient for the fifth time, she was referred to Physical Medicine and Rehabilitation (PM&R).  This visit finally came to pass on 10/11/2018 when she was seen by Darline Celestin MD.  In reviewing her report pertaining to that visit, she had a little more information regarding the EMG performed by Dr. Pham at the [facility].  This was performed on 06/27/2018 and showed \"cervical polyradiculopathy; motor neuron disease cannot be excluded\".  Further, Dr. Gregorio paraphrased the report indicating that there was no active denervation in all muscles tested in the bilateral upper extremities and cervical paraspinals.  Very large motor unit potentials were seen at the cervical paraspinals.  She also related the cervical spine MRI of 05/18/2018 which showed \"multilevel degenerative disk disease, severe C5-C6, cervical spine stenosis with flattening of the spinal cord and severe bilateral neuro-foraminal stenosis from uncinate spurs\".  Further, Dr. Celestin dictated a very complete report including a neck examination which she reported as follows:  \"Katherin had forward head posture with upper thoracic kyphosis and loss of cervical lordosis.  Neck AROM was limited in all directions, side being better with bilateral rotation.  She had tenderness to palpation bilaterally involving the trapezii, cervical paraspinals and periscapular areas.  There was obvious weakness of the neck muscles, worse with flexion.\"      Her assessment was:   1.  Cervical myelopathy.   2.  Multilevel degenerative disk disease involving the cervical spines.   3.  Severe spinal canal and bilateral neural foraminal stenosis at the C5-C6 level.   4.  Spasticity and spasms in bilateral lower extremities.   5.  Neck weakness.   6.  Difficulty swallowing with increased saliva and cough.      Dr. Celestin had called me to discuss the findings and possible plans going forward.  We felt that the posterior " neck muscle weakness was a separate issue from her cervical myelopathy and that it was likely myopathic.  However, the EMG findings are more consistent with a neurogenic process.  She was strongly encouraged to follow up with me.  Dr. Celestin had an in-depth discussion and education about the assessment and the implications regarding the diagnosis and also recommended that she have physical therapy to work on neck range of motion, gait, balance, and appropriate assistive device and stretching exercises for management of spasticity.  Also, in conjunction with our conversation, Dr. Celestin and I recommended more evaluation of her swallowing.  Dr. Celestin did agree with the patient being on tizanidine for her muscle spasms.  She also briefly touched on the fact that the patient may have to revisit Dr. Colton Ledezma regarding surgery on her neck.      One day before her visit to Dr. Celestin, Katherin had seen Dr. Corrales for another followup visit.  At that time she was complaining of increasing pain involving the right wrist where a volar carpi ganglion was noted.  It was getting progressively bigger.  For completing further metabolic evaluation of this patient, Dr. Corrales ordered C-reactive protein, CBC with differential, comprehensive metabolic, ferritin serum, iron and total iron binding capacity, serum magnesium, prealbumin, thyroxine T4, TSH and vitamin B12 level.  The results of these laboratory tests are not available at this time.      The results of the laboratory tests ordered by Dr. Corrales are not available to me at this time, but will be sought later.  However, the patient tells me that all the blood tests were normal.      The patient had called stating that she was having trouble swallowing her pills.  Since Dr. Celestin had recommended SLP evaluation, I ordered same and this was accomplished in the same building as that were Dr. Pham did the EMG.  She had a swallowing study under fluoroscopy while ingesting  "applesauce, yogurt and water.  She states that everything went fine.  She had no difficulty swallowing, no choking.  Yesterday she was advised to do tongue and swallowing exercises and was shown how to do them.      As far as the medication tizanidine is concerned, Ms. Fletcher has not found it to be helpful, although she tolerates it reasonably well.      I have had the opportunity to review the [facility] form regarding review of systems.  The patient says yes to the following:  Skeletal symptoms, blood symptoms, muscle aches, neck pain, swollen and painful joints, muscle cramps, stiffness, especially of her joints and swelling of her lower extremities, more so at the feet.      CURRENT MEDICATIONS:     1.  Alfalfa 250 mg every morning.   2.  Amoxicillin (Amoxil) 500 mg capsule p.r.n.   3.  Apixaban (Eliquis) 5 mg 1 b.i.d. as followup for her TIA.   4.  Calcium plus vitamin D 600-200, 1 at bedtime.   5.  Carbidopa/levodopa (Sinemet) 25/100, 1 tablet 4 times a day for gait disorder and restless leg syndrome.   6.  Carvedilol (Coreg) 6.25 mg 1 p.o. in the morning and at 6:00 p.m.   7.  Clopidogrel (Plavix) 75 mg 1 daily.   8.  Daily multivitamins every morning.   9.  Isosorbide mononitrate (Imdur) long-acting 30 mg 1 every morning daily.   10.  Levothyroxine (Synthroid) 125 mcg 1 daily in the morning.   11.  Nitroglycerin 0.4 mg sublingual tablet 1 as needed for chest pain to be dissolved under the tongue (the patient has been on this for 3 years and uses only very occasionally).   12.  Omeprazole (Prilosec) 20 mg CR capsule, 1 in the evening.   13.  Rosuvastatin (Crestor) 20 mg 1 daily at bedtime.   14.  Tizanidine (Zanaflex) 2 mg 1 tablet 3 times a day plus at bedtime.   15.  Vitamin B12 daily.      Commenting on the effect of her medications, she stated that the Sinemet \"kind of helps my restless legs\".      PHYSICAL EXAMINATION:  Katherin comes through with her usual delightful personality.  " She has a good memory and in fact good details of her recent medical happenings and visits to doctors.  Overall, her memory is good for just about everything.  She has normal attention and concentration.  Her thought processes are logical and coherent.  Her speech is still somewhat dysarthric with some slurring typical of ataxia.   VITAL SIGNS:  Blood pressure 129/77, right arm.  Pulse 81.  SpO2 96%.      No general physical was done as the patient had no complaints relative to other areas of her body, just those involving the nervous system.      The patient has a soft cervical collar which when removed reveals that her neck is very weak with her chin and her head flexes forward with her chin resting on her upper chest.  She is not able to bring her head upright.  Further evaluation of the cranial nerves reveal normal optic fundi with no optic atrophy.  The extraocular muscles were normal with no nystagmus detected.  There was no facial weakness nor numbness.  The tongue was midline and the palate elevated well, but she did demonstrate mild dysarthria, mostly manifested as slowing of her speech and some imprecision of consonant formation.  She certainly was 100% intelligible.  Reflexes were 2+ in the upper extremities and knees and 1+ at the right ankle, but markedly diminished at the left (this probably is related to her having had a fracture of the left ankle and subsequent fusion a number of years ago).  She had bilateral upgoing toes to plantar stimulation (positive (Babinski).  There were 2 beats of ankle clonus on the right and this could not be tested well on the left.  She had good strength and normal tone in the upper extremities with no atrophy.  In the lower extremities, she had good strength although I could not test well above the left ankle.  No atrophy was noted; however, she had considerable increase in tone of her lower extremities manifested by a spastic catch of the quadriceps muscle when I  suddenly flexed her leg at the knee.  Cerebellar functioning was mildly impaired with some slurring of speech as noted above.  Rapid alternating movements were diminished and were tested as follows:     TEST (15 seconds each)     NUMBER   Hand supination and pronation, right   10-15 seconds   Hand supination and pronation, left    12   Supination and pronation bilateral and laterally  11   Toe tapping, right      10 and 15 seconds   Toe tapping, left      Unable to do, ankle frozen   Finger tapping, right     23   Finger tapping, left      25   Both eyes blink      20   Tongue wiggle       11      This is definitely a slowing compared to normal, but she was fairly rhythmic and did not have a lot of irregularity as is often seen.  Sensory testing revealed position and vibratory sense to be normal for her age, but there was diminished pinprick perception in her feet about 50% of normal, but gradually normalizing at the midcalf.  Likewise, her temperature sensation was diminished distally and normalized as tuning fork was passed more proximally.      I did not test gait.      I was unable to test gait and station as she is really unable to mobilize on her own when standing upright.      It should be noted that the neck muscle strength was extremely weak as mentioned previously, but her cough was strong and effective.  I also stated that the upper extremity strength was normal, but she did have a very mild weakness of the deltoids bilaterally.      An attempt to formulate a unifying diagnosis I find it not possible to do so, so they are basically 3 issues as follows:   1.  Neck muscle weakness which may be a myopathy, although EMG studies do not verify this.   2.  Cervical stenosis which may be causing a cervical myelopathy contributing to lower extremity spasticity (incidentally there was no spasticity found in the upper extremities).   3.  Definite cerebellar findings involving speech and all extremities.         Therefore, I feel that the following diagnoses are reasonable:   1.  Spastic ataxia due to:   A.  Cerebellar component causing ataxia.   B.  Severe cervical stenosis contributing to spasticity.   2.  Posterior neck muscle weakness, etiology undetermined, having the clinical appearance of myopathy but not confirmed electrodiagnostically.   3.  Cerebellar dysfunction causing gait imbalance, dysarthria and diminished dexterity in the upper and lower extremities as well as eye blink and tongue wiggle.   4.  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   5.  Restless leg syndrome.      PLAN:   1.  Probably proceed with physical therapy once a decision has been made whether or not to do 1 level surgery for the cervical stenosis.  This will be a decision which will probably involve several parties including Dr. Corrales, Dr. Ledezma and myself.   2.  This examiner will be contacting Dr. Ledezma and Dr. Corrales.   3.  Return on 2018 or next available appointment.      A total of 50 minutes was spent with the patient, the majority of which was dedicated to reviewing records from other clinics, discussing options with respect to Ana's neck problem and a plan to go forward.      Smitha Xiong MD        EVALUATION AND MANAGEMENT (E/M):  [id number]            SMITHA XIONG MD             D: 2018   T: 2018   MT: LINA      Name:     ANA RODIRGUEZ   MRN:      [id number]        Account:      [id number]   :      1940           Service Date: 2018      Document: [id number]

## 2018-11-27 PROBLEM — R27.0 ATAXIA: Status: ACTIVE | Noted: 2018-11-27

## 2018-11-27 NOTE — H&P (VIEW-ONLY)
[facility]  [address] [place] [address] [phone]  Dept: [phone]    PRE-OP EVALUATION:  Today's date: 2018    Katherin Fletcher (: 1940) presents for pre-operative evaluation assessment as requested by Dr. Goldstein.  She requires evaluation and anesthesia risk assessment prior to undergoing surgery/procedure for treatment of Right voler carpel ganglion cyst .Right index trigger finger release.    Proposed Surgery/ Procedure:   EXCISE RIGHT VOLER CARPEL GANGLION CYST     RIGHT INDEX TRIGGER FINGER RELEASE         Date of Surgery/ Procedure: 2018  Time of Surgery/ Procedure: 1315  Hospital/Surgical Facility: [facility]  Fax number for surgical facility:   Primary Physician: Aris Del Real  Type of Anesthesia Anticipated: Benoit Block    Patient has a Health Care Directive or Living Will:  YES     1. YES - DO YOU HAVE A HISTORY OF HEART ATTACK, STROKE, STENT, BYPASS OR SURGERY ON AN ARTERY IN THE HEAD, NECK, HEART OR LEG? Heart attack, stent.stroke bypass. NO CHEST PAIN TODAY  1. NO - Do you have a history of heart attack, stroke, stent, bypass or surgery on an artery in the head, neck, heart or legs?  2. NO - Do you ever have any pain or discomfort in your chest?  3. NO - Do you have a history of  Heart Failure?  4. NO - Are you troubled by shortness of breath when: walking on the level, up a slight hill or at night?  5. NO - Do you currently have a cold, bronchitis or other respiratory infection?  6. YES - DO YOU HAVE A COUGH, SHORTNESS OF BREATH OR WHEEZING? Cough when eating food tickles throat. NOT AT COLD. Going to Speech for swallowing. No special diet.   6. NO - Do you have a cough, shortness of breath or wheezing?  7. NO - Do you sometimes get pains in the calves of your legs when you walk?  8. NO - Do you or anyone in your family have previous history of blood clots?  9. NO - Do you or does anyone in your family have a serious bleeding  "problem such as prolonged bleeding following surgeries or cuts?  10. NO - Have you ever had problems with anemia or been told to take iron pills?  11. NO - Have you had any abnormal blood loss such as black, tarry or bloody stools, or abnormal vaginal bleeding?  12. NO - Have you ever had a blood transfusion?  13. NO - Have you or any of your relatives ever had problems with anesthesia?  14. NO - Do you have sleep apnea, excessive snoring or daytime drowsiness?  15. NO - Do you have any prosthetic heart valves?  16. YES - DO YOU HAVE PROSTHETIC JOINTS? Left ankle  16. NO - Do you have prosthetic joints?  17. NO - Is there any chance that you may be pregnant?      5/7/18  Interpretation Summary     A cardiac source of embolus was not identified.  Left ventricular systolic function is mildly reduced.  The visual ejection fraction is estimated at 45-50%.  The left ventricle is normal in size.  There is apical akinesis.  The patient exhibited frequent PACs.  Doppler interrogation does not demonstrate significant stenosis or  insufficiency involving cardiac valves.     No significant change since 10/13/2104.    EKG today Afib Septal infarct age undetermined HR 74     9/13/18    IMPRESSION:   Stable reduction in right DOMINIK (0.81) with biphasic waveform.     Left lower extremity with borderline DOMINIK 0.94, remains unchanged from  previous study 6/27/2017, triphasic waveform.    RHD  Right wrist splint  Latex allergy no  OTC no  Travel no  Exposure no  ROS negative  Has chair lifts at home  Soft neck collar  Wheeled walker       Neck soft collar \"helps to hold head up\"  Glasses  Wheeled walker with seat and brakes    Fall no  Seizure no  LOC no    Upper denture  No sleep devices  Flu shot UTD    Sleep ok when not having dreams last night 6 hours. Asleep watching TV this morning 1/2 hour.  Appetite ok  Exercise nystep glider 5 min once daily    Smoking former quit 1992  ETOH no  Street drugs/MJ no  Caffeine 1 cup per " day    Depression no  Anxiety no  Panic no  SI/SP no  Hallucinations no  Paranoid no  Manic no  OCD no  PTSD no  Gambling [place] once in a while        HPI:     HPI related to upcoming procedure: Right ganglion cyst per surgery Right index trigger finger release      See problem list for active medical problems.  Problems all longstanding and stable, except as noted/documented.  See ROS for pertinent symptoms related to these conditions.                                                                                                                                                          .    MEDICAL HISTORY:     Patient Active Problem List    Diagnosis Date Noted     Hypothyroidism, unspecified type 10/10/2018     Priority: Medium     Falls frequently 05/28/2018     Priority: Medium     Neurodegenerative disorder      Priority: Medium     Further detailed dx unknown. Managed by Dr. George Pham - [facility].        Slurred speech 05/08/2018     Priority: Medium     Stroke (H) 05/07/2018     Priority: Medium     History of TIA (transient ischemic attack) and stroke 05/07/2018     Priority: Medium     R sided facial N&T.        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       Disturbances of sensation of smell and taste 02/06/2014     Priority: Medium     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       MRSA (methicillin resistant  Staphylococcus aureus) 09/12/2013     Priority: Medium     wound 6-10 and 9-23-10       Health Care Home 07/23/2013     Priority: Medium     .State Tier Level:  Tier 2  August 14, 2014               CAD (coronary artery disease) 04/23/2012     Priority: Medium     1992 CABG with LIMA to LAD and saphenous vein grafts to OM 1, 1992, 1/2009- redo CABG-LIMA to LAD,SVG-OM,SVG to ARLYN of RCA,       Hyperlipidemia 04/23/2012     Priority: Medium     Long term current use of anticoagulant therapy 08/16/2011     Priority: Medium     Problem list name updated by automated process. Provider to review        Past Medical History:   Diagnosis Date     CAD (coronary artery disease)     s/p CABG 1992 and redo CABG 2009 (LIMA to LAD, SVG to OM and SVG to posterolateral branch of RCA)     Chronic cough     Non-productive. No known definite etiology. Is beeing seen by [facility].      Essential hypertension, benign      Falls frequently 5/28/2018     GERD (gastroesophageal reflux disease)      History of TIA (transient ischemic attack) and stroke 5/7/2018    R sided facial N&T.      Hyperlipidaemia      Hyperlipidaemia LDL goal < 130      Ischemic cardiomyopathy      MI (myocardial infarction) (H) 11/1996     Mixed hyperlipidemia      Neurodegenerative disorder     Further detailed dx unknown. Managed by Dr. George Pham - [facility].      PAD (peripheral artery disease) (H)      Restless legs syndrome      Unspecified hypothyroidism      Past Surgical History:   Procedure Laterality Date     ARTHROSCOPY SHOULDER RT/LT       BUNIONECTOMY RT/LT       C CABG, ARTERY-VEIN, THREE  1/2009     C CARDIAC SURG PROCEDURE UNLIST       C HAND/FINGER SURGERY UNLISTED       C MRA UPPER EXTREMITY WO&W CONT       C SHOULDER SURG PROC UNLISTED       C STOMACH SURGERY PROCEDURE UNLISTED       CARDIAC SURGERY  1992    CABG,stents x2     CARPAL TUNNEL RELEASE RT/LT       HC VASCULAR SURGERY PROCEDURE UNLIST       HYSTERECTOMY, DAMIÁN  32 yo      OPEN REDUCTION INTERNAL FIXATION ANKLE Left 2015    Left Ankle     RELEASE TRIGGER FINGER       REPAIR HAMMER TOE Right 12/19/2017    Procedure: REPAIR HAMMER TOE;  Right 2nd and 3rd toe pinning;  Surgeon: Fernando Wiggins MD;  Location: [facility] OR     STENT  2006     STENT(aka CARDIAC)  2008     VASCULAR SURGERY       Current Outpatient Prescriptions   Medication Sig Dispense Refill     [place] 250 MG TABS Take 250 mg by mouth every morning        amoxicillin (AMOXIL) 500 MG capsule as needed        apixaban ANTICOAGULANT (ELIQUIS) 5 MG tablet Take 1 tablet (5 mg) by mouth 2 times daily 180 tablet 3     calcium carbonate-vitamin D (CALCIUM + D) 600-200 MG-UNIT TABS Take 1 tablet by mouth At Bedtime        carbidopa-levodopa (SINEMET)  MG per tablet TAKE 2 TABLETS FOUR TIMES A  tablet 3     carbidopa-levodopa (SINEMET)  MG per tablet TAKE 2 TABLETS FOUR TIMES A DAY (Patient taking differently: Take 2 tablets by mouth 4 times daily as needed (legs) Takes as needed throughout the day, up to 4 times daily) 720 tablet 3     carvedilol (COREG) 6.25 MG tablet Take 1 tablet (6.25 mg) by mouth 2 times daily (with meals) 180 tablet 3     chlorhexidine (PERIDEX) 0.12 % solution        clopidogrel (PLAVIX) 75 MG tablet Take 1 tablet (75 mg) by mouth daily 90 tablet 3     Cyanocobalamin (VITAMIN B-12 PO) Take 1 tablet by mouth every morning        isosorbide mononitrate (IMDUR) 30 MG 24 hr tablet Take 1 tablet (30 mg) by mouth daily 90 tablet 3     levothyroxine (SYNTHROID/LEVOTHROID) 112 MCG tablet TAKE 1 TABLET DAILY 90 tablet 1     Multiple Vitamins-Minerals (DAILY MULTI) TABS Take 1 tablet by mouth every morning        nitroglycerin (NITROSTAT) 0.4 MG SL tablet DISSOLVE ONE TABLET UNDER THE TONGUE EVERY 5 MINUTES AS NEEDED FOR CHEST PAIN.  DO NOT EXCEED A TOTAL OF 3 DOSES IN 15 MINUTES 25 tablet 3     omeprazole (PRILOSEC) 20 MG CR capsule TAKE 2 CAPSULES DAILY 30 TO 60 MINUTES BEFORE A MEAL 180 capsule  1     rosuvastatin (CRESTOR) 20 MG tablet Take 1 tablet (20 mg) by mouth daily 90 tablet 3     tiZANidine (ZANAFLEX) 2 MG tablet Take 1 tablet (2 mg) by mouth 3 times daily 30 tablet 0     OTC products: None, except as noted above    Allergies   Allergen Reactions     Ace Inhibitors Cough     Baclofen Other (See Comments)     Constipation , tiredness     Penicillins      10/7/14 Hives per patient -- many many years ago       Nickel Swelling and Rash     Other reaction(s): Unknown      Latex Allergy: NO    Social History   Substance Use Topics     Smoking status: Former Smoker     Packs/day: 0.50     Years: 22.00     Types: Cigarettes     Quit date: 4/15/1992     Smokeless tobacco: Never Used     Alcohol use 0.6 oz/week     1 Standard drinks or equivalent per week      Comment: MAYBE ONCE A YEAR     History   Drug Use No       REVIEW OF SYSTEMS:   Constitutional, neuro, ENT, endocrine, pulmonary, cardiac, gastrointestinal, genitourinary, musculoskeletal, integument and psychiatric systems are negative, except as otherwise noted.    EXAM:   /80  Pulse 78  Resp 16  Wt 70.8 kg (156 lb)  SpO2 96%  BMI 26.78 kg/m2    GENERAL APPEARANCE: healthy, alert and no distress Denture     EYES: EOMI, PERRL     HENT: ear canals and TM's normal and nose and mouth without ulcers or lesions     NECK: no adenopathy, no asymmetry, masses, or scars and thyroid normal to palpation     RESP: lungs clear to auscultation - no rales, rhonchi or wheezes     CV: regular rates and rhythm, normal S1 S2, no S3 or S4 and no murmur, click or rub     ABDOMEN:  soft, nontender, no HSM or masses and bowel sounds normal     MS: extremities normal- no gross deformities noted, no evidence of inflammation in joints, FROM in all extremities. Right wrist splint     SKIN: no suspicious lesions or rashes     NEURO: Normal strength and tone, sensory exam grossly normal, mentation intact and speech normal     PSYCH: mentation appears normal. and affect  normal/bright     LYMPHATICS: No cervical adenopathy    DIAGNOSTICS:     EKG: atrial fibrillation, rate h/o septal infarct  Labs Drawn and in Process:   Unresulted Labs Ordered in the Past 30 Days of this Admission     No orders found from 9/20/2018 to 11/20/2018.          Recent Labs   Lab Test  10/10/18   1133  10/10/18   1036  05/16/18   1210  05/08/18   1230  05/07/18   2138  05/07/18   1502  02/27/18   2223   HGB  12.5   --   12.5  12.4  12.9  12.7  12.8   PLT  217   --   204  180  212  192  190   INR   --    --    --    --    --   1.00  1.06   NA   --   141   --   142  143  141  140   POTASSIUM   --   4.5   --   3.5  3.8  3.9  3.7   CR   --   0.65   --   0.65  0.78  0.69  0.76   A1C   --    --    --    --   6.1   --    --       Results for orders placed or performed in visit on 11/19/18   Basic Metabolic Panel (8) ([facility])   Result Value Ref Range    Glucose 100 (H) 65 - 99 mg/dL    Urea Nitrogen 14 8 - 27 mg/dL    Creatinine 0.82 0.57 - 1.00 mg/dL    eGFR If NonAfricn Am 69 >59 mL/min/1.73    eGFR If Africn Am 79 >59 mL/min/1.73    BUN/Creatinine Ratio 17 12 - 28    Sodium 141 134 - 144 mmol/L    Potassium 4.1 3.5 - 5.2 mmol/L    Chloride 102 96 - 106 mmol/L    Total CO2 28 20 - 29 mmol/L    Calcium 10.1 8.7 - 10.3 mg/dL    Narrative    Performed at:  01 - [facility]  [address] [place] [address]  [id number]  : Kodi Ortiz MD, Phone:  [phone]   CBC with Diff/Plt ([facility])   Result Value Ref Range    WBC x10/cmm 4.3 3.8 - 11.0 x10/cmm    % Lymphocytes 44.7 20.5 - 51.1 %    % Monocytes 7.0 1.7 - 9.3 %    % Granulocytes 48.3 42.2 - 75.2 %    RBC x10/cmm 4.55 3.7 - 5.2 x10/cmm    Hemoglobin 12.9 11.8 - 15.5 g/dl    Hematocrit 39.1 35 - 46 %    MCV 85.8 80 - 100 fL    MCH 28.4 27.0 - 31.0 pg    MCHC 33.1 33.0 - 37.0 g/dL    Platelet Count 211 140 - 450 K/uL         IMPRESSION:   Reason for surgery/procedure: right volar ganglion cyst and right index trigger finger release  Diagnosis/reason for  consult: preoperative clearance    The proposed surgical procedure is considered INTERMEDIATE risk.    REVISED CARDIAC RISK INDEX  The patient has the following serious cardiovascular risks for perioperative complications such as (MI, PE, VFib and 3  AV Block):  Coronary Artery Disease (MI, positive stress test, angina, Qs on EKG)  INTERPRETATION: 1 risks: Class II (low risk - 0.9% complication rate)    The patient has the following additional risks for perioperative complications:  PVD  Denture        ICD-10-CM    1. Preop general physical exam Z01.818 Basic Metabolic Panel (8) ([facility])     CBC with Diff/Plt ([facility])     EKG 12-lead complete w/read - Clinics     CANCELED: MRSA Screening Culture ([facility])   2. Ganglion cyst of wrist, unspecified laterality M67.439    3. Coronary artery disease involving native coronary artery of native heart without angina pectoris I25.10    4. Mixed hyperlipidemia E78.2    5. MRSA (methicillin resistant Staphylococcus aureus) A49.02    6. Cerebellar disease G93.9    7. Essential hypertension I10    8. Ischemic cardiomyopathy I25.5    9. Peripheral polyneuropathy G62.9    10. PAD (peripheral artery disease) (H) I73.9    11. Cerebrovascular accident (CVA), unspecified mechanism (H) I63.9    12. Hypothyroidism, unspecified type E03.9    13. Former smoker Z87.891    14. Neurodegenerative disorder G31.9        RECOMMENDATIONS:     --Consult hospital rounder / IM to assist post-op medical management    Cardiovascular Risk  Monitor and continue medications      Pulmonary Risk Former smoker  Incentive spirometry post op  Respiratory Therapy (Respiratory Care IP Consult)  post op    Before Your Surgery      Call your surgeon if there is any change in your health. This includes signs of a cold or flu (such as a sore throat, runny nose, cough, rash or fever).    Do not smoke, drink alcohol or take over the counter medicine (unless your surgeon or primary care doctor tells you to) for the 24  hours before and after surgery.    If you take prescribed drugs: Follow your doctor s orders about which medicines to take and which to stop until after surgery.    Eating and drinking prior to surgery: follow the instructions from your surgeon    Take a shower or bath the night before surgery. Use the soap your surgeon gave you to gently clean your skin. If you do not have soap from your surgeon, use your regular soap. Do not shave or scrub the surgery site.  Wear clean pajamas and have clean sheets on your bed.     Hold now until surgery Alfalfa, Calcium/D, multivitamin  1 week before surgery stop Plavix  2 days before stop Eliquis    Day of surgery take with small sip of water:  Sinemet, Cored, Imdur, Levothyroxine  HOLD OMEPRAZOLE WHEN NPO not eating  Resume medications post surgery per your regular routine unless directed by surgery              --Patient is to take all scheduled medications on the day of surgery EXCEPT for modifications listed below.    APPROVAL GIVEN to proceed with proposed procedure, without further diagnostic evaluation       Signed Electronically by: Clary Dumont MD    Copy of this evaluation report is provided to requesting physician.    [facility] Preop Guidelines    Revised Cardiac Risk Index

## 2018-11-28 RX ORDER — CEFAZOLIN SODIUM 1 G/3ML
1 INJECTION, POWDER, FOR SOLUTION INTRAMUSCULAR; INTRAVENOUS SEE ADMIN INSTRUCTIONS
Status: CANCELLED | OUTPATIENT
Start: 2018-11-28

## 2018-11-29 ENCOUNTER — SURGERY (OUTPATIENT)
Age: 78
End: 2018-11-29

## 2018-11-29 ENCOUNTER — ANESTHESIA (OUTPATIENT)
Dept: SURGERY | Facility: CLINIC | Age: 78
End: 2018-11-29
Payer: MEDICARE

## 2018-11-29 ENCOUNTER — ANESTHESIA EVENT (OUTPATIENT)
Dept: SURGERY | Facility: CLINIC | Age: 78
End: 2018-11-29
Payer: MEDICARE

## 2018-11-29 ENCOUNTER — HOSPITAL ENCOUNTER (OUTPATIENT)
Facility: CLINIC | Age: 78
Discharge: HOME | End: 2018-11-29
Attending: ORTHOPAEDIC SURGERY | Admitting: ORTHOPAEDIC SURGERY
Payer: MEDICARE

## 2018-11-29 VITALS
WEIGHT: 156.4 LBS | HEIGHT: 64 IN | BODY MASS INDEX: 26.7 KG/M2 | RESPIRATION RATE: 20 BRPM | TEMPERATURE: 97.3 F | OXYGEN SATURATION: 97 % | DIASTOLIC BLOOD PRESSURE: 60 MMHG | SYSTOLIC BLOOD PRESSURE: 107 MMHG

## 2018-11-29 DIAGNOSIS — M65.321 TRIGGER INDEX FINGER OF RIGHT HAND: Primary | ICD-10-CM

## 2018-11-29 PROCEDURE — 25000125 ZZHC RX 250: Performed by: ANESTHESIOLOGY

## 2018-11-29 PROCEDURE — 40000170 ZZH STATISTIC PRE-PROCEDURE ASSESSMENT II: Performed by: ORTHOPAEDIC SURGERY

## 2018-11-29 PROCEDURE — 25000125 ZZHC RX 250: Performed by: NURSE ANESTHETIST, CERTIFIED REGISTERED

## 2018-11-29 PROCEDURE — 71000027 ZZH RECOVERY PHASE 2 EACH 15 MINS: Performed by: ORTHOPAEDIC SURGERY

## 2018-11-29 PROCEDURE — 36000052 ZZH SURGERY LEVEL 2 EA 15 ADDTL MIN: Performed by: ORTHOPAEDIC SURGERY

## 2018-11-29 PROCEDURE — 25000125 ZZHC RX 250: Performed by: ORTHOPAEDIC SURGERY

## 2018-11-29 PROCEDURE — 25000128 H RX IP 250 OP 636: Performed by: NURSE ANESTHETIST, CERTIFIED REGISTERED

## 2018-11-29 PROCEDURE — 25000128 H RX IP 250 OP 636: Performed by: ORTHOPAEDIC SURGERY

## 2018-11-29 PROCEDURE — 88304 TISSUE EXAM BY PATHOLOGIST: CPT | Mod: 26 | Performed by: ORTHOPAEDIC SURGERY

## 2018-11-29 PROCEDURE — 37000009 ZZH ANESTHESIA TECHNICAL FEE, EACH ADDTL 15 MIN: Performed by: ORTHOPAEDIC SURGERY

## 2018-11-29 PROCEDURE — 88304 TISSUE EXAM BY PATHOLOGIST: CPT | Performed by: ORTHOPAEDIC SURGERY

## 2018-11-29 PROCEDURE — 36000050 ZZH SURGERY LEVEL 2 1ST 30 MIN: Performed by: ORTHOPAEDIC SURGERY

## 2018-11-29 PROCEDURE — 37000008 ZZH ANESTHESIA TECHNICAL FEE, 1ST 30 MIN: Performed by: ORTHOPAEDIC SURGERY

## 2018-11-29 PROCEDURE — 71000012 ZZH RECOVERY PHASE 1 LEVEL 1 FIRST HR: Performed by: ORTHOPAEDIC SURGERY

## 2018-11-29 PROCEDURE — 27210794 ZZH OR GENERAL SUPPLY STERILE: Performed by: ORTHOPAEDIC SURGERY

## 2018-11-29 RX ORDER — SODIUM CHLORIDE, SODIUM LACTATE, POTASSIUM CHLORIDE, CALCIUM CHLORIDE 600; 310; 30; 20 MG/100ML; MG/100ML; MG/100ML; MG/100ML
INJECTION, SOLUTION INTRAVENOUS CONTINUOUS PRN
Status: DISCONTINUED | OUTPATIENT
Start: 2018-11-29 | End: 2018-11-29

## 2018-11-29 RX ORDER — SODIUM CHLORIDE, SODIUM LACTATE, POTASSIUM CHLORIDE, CALCIUM CHLORIDE 600; 310; 30; 20 MG/100ML; MG/100ML; MG/100ML; MG/100ML
INJECTION, SOLUTION INTRAVENOUS CONTINUOUS
Status: DISCONTINUED | OUTPATIENT
Start: 2018-11-29 | End: 2018-11-29 | Stop reason: HOSPADM

## 2018-11-29 RX ORDER — MEPERIDINE HYDROCHLORIDE 25 MG/ML
12.5 INJECTION INTRAMUSCULAR; INTRAVENOUS; SUBCUTANEOUS
Status: DISCONTINUED | OUTPATIENT
Start: 2018-11-29 | End: 2018-11-29 | Stop reason: HOSPADM

## 2018-11-29 RX ORDER — LIDOCAINE HYDROCHLORIDE 5 MG/ML
INJECTION, SOLUTION INFILTRATION; PERINEURAL PRN
Status: DISCONTINUED | OUTPATIENT
Start: 2018-11-29 | End: 2018-11-29

## 2018-11-29 RX ORDER — BUPIVACAINE HYDROCHLORIDE 5 MG/ML
INJECTION, SOLUTION EPIDURAL; INTRACAUDAL PRN
Status: DISCONTINUED | OUTPATIENT
Start: 2018-11-29 | End: 2018-11-29 | Stop reason: HOSPADM

## 2018-11-29 RX ORDER — ONDANSETRON 2 MG/ML
INJECTION INTRAMUSCULAR; INTRAVENOUS PRN
Status: DISCONTINUED | OUTPATIENT
Start: 2018-11-29 | End: 2018-11-29

## 2018-11-29 RX ORDER — CLINDAMYCIN PHOSPHATE 900 MG/50ML
INJECTION, SOLUTION INTRAVENOUS PRN
Status: DISCONTINUED | OUTPATIENT
Start: 2018-11-29 | End: 2018-11-29

## 2018-11-29 RX ORDER — HYDROMORPHONE HYDROCHLORIDE 1 MG/ML
.3-.5 INJECTION, SOLUTION INTRAMUSCULAR; INTRAVENOUS; SUBCUTANEOUS EVERY 10 MIN PRN
Status: DISCONTINUED | OUTPATIENT
Start: 2018-11-29 | End: 2018-11-29 | Stop reason: HOSPADM

## 2018-11-29 RX ORDER — MAGNESIUM HYDROXIDE 1200 MG/15ML
LIQUID ORAL PRN
Status: DISCONTINUED | OUTPATIENT
Start: 2018-11-29 | End: 2018-11-29 | Stop reason: HOSPADM

## 2018-11-29 RX ORDER — FENTANYL CITRATE 50 UG/ML
INJECTION, SOLUTION INTRAMUSCULAR; INTRAVENOUS PRN
Status: DISCONTINUED | OUTPATIENT
Start: 2018-11-29 | End: 2018-11-29

## 2018-11-29 RX ORDER — ONDANSETRON 2 MG/ML
4 INJECTION INTRAMUSCULAR; INTRAVENOUS EVERY 30 MIN PRN
Status: DISCONTINUED | OUTPATIENT
Start: 2018-11-29 | End: 2018-11-29 | Stop reason: HOSPADM

## 2018-11-29 RX ORDER — PROPOFOL 10 MG/ML
INJECTION, EMULSION INTRAVENOUS CONTINUOUS PRN
Status: DISCONTINUED | OUTPATIENT
Start: 2018-11-29 | End: 2018-11-29

## 2018-11-29 RX ORDER — CLINDAMYCIN PHOSPHATE 900 MG/50ML
900 INJECTION, SOLUTION INTRAVENOUS
Status: DISCONTINUED | OUTPATIENT
Start: 2018-11-29 | End: 2018-11-29 | Stop reason: HOSPADM

## 2018-11-29 RX ORDER — ONDANSETRON 4 MG/1
4 TABLET, ORALLY DISINTEGRATING ORAL EVERY 30 MIN PRN
Status: DISCONTINUED | OUTPATIENT
Start: 2018-11-29 | End: 2018-11-29 | Stop reason: HOSPADM

## 2018-11-29 RX ORDER — NALOXONE HYDROCHLORIDE 0.4 MG/ML
.1-.4 INJECTION, SOLUTION INTRAMUSCULAR; INTRAVENOUS; SUBCUTANEOUS
Status: DISCONTINUED | OUTPATIENT
Start: 2018-11-29 | End: 2018-11-29 | Stop reason: HOSPADM

## 2018-11-29 RX ORDER — FENTANYL CITRATE 50 UG/ML
25-50 INJECTION, SOLUTION INTRAMUSCULAR; INTRAVENOUS
Status: DISCONTINUED | OUTPATIENT
Start: 2018-11-29 | End: 2018-11-29 | Stop reason: HOSPADM

## 2018-11-29 RX ORDER — HYDROCODONE BITARTRATE AND ACETAMINOPHEN 5; 325 MG/1; MG/1
1-2 TABLET ORAL EVERY 4 HOURS PRN
Qty: 10 TABLET | Refills: 0 | Status: ON HOLD | OUTPATIENT
Start: 2018-11-29 | End: 2019-08-17

## 2018-11-29 RX ADMIN — LIDOCAINE HYDROCHLORIDE 50 ML: 5 INJECTION, SOLUTION INFILTRATION; PERINEURAL at 14:21

## 2018-11-29 RX ADMIN — SODIUM CHLORIDE, POTASSIUM CHLORIDE, SODIUM LACTATE AND CALCIUM CHLORIDE: 600; 310; 30; 20 INJECTION, SOLUTION INTRAVENOUS at 14:16

## 2018-11-29 RX ADMIN — CLINDAMYCIN PHOSPHATE 900 MG: 18 INJECTION, SOLUTION INTRAVENOUS at 14:19

## 2018-11-29 RX ADMIN — PROPOFOL 100 MCG/KG/MIN: 10 INJECTION, EMULSION INTRAVENOUS at 14:18

## 2018-11-29 RX ADMIN — ONDANSETRON 4 MG: 2 INJECTION INTRAMUSCULAR; INTRAVENOUS at 14:33

## 2018-11-29 RX ADMIN — BUPIVACAINE HYDROCHLORIDE 17 ML: 5 INJECTION, SOLUTION EPIDURAL; INTRACAUDAL at 15:00

## 2018-11-29 RX ADMIN — FENTANYL CITRATE 50 MCG: 50 INJECTION, SOLUTION INTRAMUSCULAR; INTRAVENOUS at 14:18

## 2018-11-29 RX ADMIN — SODIUM CHLORIDE 1000 ML: 900 IRRIGANT IRRIGATION at 15:06

## 2018-11-29 ASSESSMENT — LIFESTYLE VARIABLES: TOBACCO_USE: 1

## 2018-11-29 NOTE — ANESTHESIA PREPROCEDURE EVALUATION
Procedure: Procedure(s):  EXCISE RIGHT VOLER CARPEL GANGLION CYST  RIGHT INDEX TRIGGER FINGER RELEASE  Preop diagnosis: GANGLION CYST AND TRIGGER FINGER    Allergies   Allergen Reactions     Ace Inhibitors Cough     Baclofen Other (See Comments)     Constipation , tiredness     Penicillins      10/7/14 Hives per patient -- many many years ago       Nickel Swelling and Rash     Other reaction(s): Unknown     Past Medical History:   Diagnosis Date     CAD (coronary artery disease)     s/p CABG 1992 and redo CABG 2009 (LIMA to LAD, SVG to OM and SVG to posterolateral branch of RCA)     Chronic cough     Non-productive. No known definite etiology. Is beeing seen by [facility].      Essential hypertension, benign      Falls frequently 5/28/2018     GERD (gastroesophageal reflux disease)      History of TIA (transient ischemic attack) and stroke 5/7/2018    R sided facial N&T.      Hyperlipidaemia      Hyperlipidaemia LDL goal < 130      Ischemic cardiomyopathy      MI (myocardial infarction) (H) 11/1996     Mixed hyperlipidemia      Neurodegenerative disorder     Further detailed dx unknown. Managed by Dr. George Pham - [facility].      PAD (peripheral artery disease) (H)      Restless legs syndrome      Unspecified hypothyroidism      Past Surgical History:   Procedure Laterality Date     ARTHROSCOPY SHOULDER RT/LT       BUNIONECTOMY RT/LT       C CABG, ARTERY-VEIN, THREE  1/2009     C CARDIAC SURG PROCEDURE UNLIST       C HAND/FINGER SURGERY UNLISTED       C MRA UPPER EXTREMITY WO&W CONT       C SHOULDER SURG PROC UNLISTED       C STOMACH SURGERY PROCEDURE UNLISTED       CARDIAC SURGERY  1992    CABG,stents x2     CARPAL TUNNEL RELEASE RT/LT       HC VASCULAR SURGERY PROCEDURE UNLIST       HYSTERECTOMY, DAMIÁN  34 yo     OPEN REDUCTION INTERNAL FIXATION ANKLE Left 2015    Left Ankle     RELEASE TRIGGER FINGER       REPAIR HAMMER TOE Right 12/19/2017    Procedure: REPAIR HAMMER TOE;  Right 2nd and 3rd toe  murtaza;  Surgeon: Fernando Wiggins MD;  Location: [facility]     STENT  2006     STENT(aka CARDIAC)  2008     VASCULAR SURGERY       Social History   Substance Use Topics     Smoking status: Former Smoker     Packs/day: 0.50     Years: 22.00     Types: Cigarettes     Quit date: 4/15/1992     Smokeless tobacco: Never Used     Alcohol use 0.6 oz/week     1 Standard drinks or equivalent per week      Comment: MAYBE ONCE A YEAR     Prior to Admission medications    Medication Sig Start Date End Date Taking? Authorizing Provider   [place] 250 MG TABS Take 250 mg by mouth every morning     Reported, Patient   apixaban ANTICOAGULANT (ELIQUIS) 5 MG tablet Take 1 tablet (5 mg) by mouth 2 times daily 9/13/18   Kenton Grayson MD   calcium carbonate-vitamin D (CALCIUM + D) 600-200 MG-UNIT TABS Take 1 tablet by mouth At Bedtime  5/22/12   Aris Del Real MD   carbidopa-levodopa (SINEMET)  MG per tablet TAKE 2 TABLETS FOUR TIMES A DAY 8/5/18   Arlin Harmon MD   carvedilol (COREG) 6.25 MG tablet Take 1 tablet (6.25 mg) by mouth 2 times daily (with meals) 9/13/18   Kenton Grayson MD   chlorhexidine (PERIDEX) 0.12 % solution  9/8/18   Reported, Patient   clopidogrel (PLAVIX) 75 MG tablet Take 1 tablet (75 mg) by mouth daily 9/13/18   Kenton Grayson MD   isosorbide mononitrate (IMDUR) 30 MG 24 hr tablet Take 1 tablet (30 mg) by mouth daily 9/13/18   Kenton Grayosn MD   levothyroxine (SYNTHROID/LEVOTHROID) 112 MCG tablet TAKE 1 TABLET DAILY 6/3/18   Clary Dumont MD   Multiple Vitamins-Minerals (DAILY MULTI) TABS Take 1 tablet by mouth every morning     Reported, Patient   nitroglycerin (NITROSTAT) 0.4 MG SL tablet DISSOLVE ONE TABLET UNDER THE TONGUE EVERY 5 MINUTES AS NEEDED FOR CHEST PAIN.  DO NOT EXCEED A TOTAL OF 3 DOSES IN 15 MINUTES 10/13/14   Kenton Grayson MD   omeprazole (PRILOSEC) 20 MG CR capsule TAKE 2 CAPSULES DAILY 30 TO 60 MINUTES BEFORE A MEAL 7/30/18   Clary Dumont MD    rosuvastatin (CRESTOR) 20 MG tablet Take 1 tablet (20 mg) by mouth daily 9/13/18   Kenton Grayson MD     Current Facility-Administered Medications Ordered in Epic   Medication Dose Route Frequency Last Rate Last Dose     clindamycin (CLEOCIN) infusion 900 mg  900 mg Intravenous Pre-Op/Pre-procedure x 1 dose         No current [facility]-ordered outpatient prescriptions on file.       Wt Readings from Last 1 Encounters:   11/19/18 70.8 kg (156 lb)     Temp Readings from Last 1 Encounters:   10/11/18 36.7  C (98  F) (Oral)     BP Readings from Last 6 Encounters:   11/19/18 126/80   11/02/18 129/77   10/11/18 114/77   10/10/18 118/60   09/13/18 136/72   07/25/18 114/72     Pulse Readings from Last 4 Encounters:   11/19/18 78   11/02/18 81   10/11/18 78   10/10/18 74     Resp Readings from Last 1 Encounters:   11/19/18 16     SpO2 Readings from Last 1 Encounters:   11/19/18 96%     Recent Labs   Lab Test  11/19/18   1445  10/10/18   1036  05/08/18   1230  05/07/18   2138   NA  141  141  142  143   POTASSIUM  4.1  4.5  3.5  3.8   CHLORIDE  102  102  110*  111*   CO2   --    --   28  23   ANIONGAP   --    --   4  9   GLC  100*  99  97  113*   BUN  14  17  11  17  11  12   CR  0.82  0.65  0.65  0.78   GALE  10.1  10.1  9.5  9.6     Recent Labs   Lab Test  10/10/18   1036  05/07/18   2138   AST  17  23   ALT  9  8   ALKPHOS  80  74   BILITOTAL  0.4  0.7     Recent Labs   Lab Test  11/19/18   1436  10/10/18   1133   WBC  4.3  3.8   HGB  12.9  12.5   PLT  211  217     No results for input(s): ABO, RH in the last 32473 hours.  Recent Labs   Lab Test  05/07/18   1502  02/27/18   2223   INR  1.00  1.06   PTT  30  30      Anesthesia Evaluation     . Pt has had prior anesthetic. Type: General and MAC    No history of anesthetic complications          ROS/MED HX    ENT/Pulmonary:     (+)tobacco use, Past use , . .    Neurologic:     (+)other neuro neurodegenerative condition-ataxia, spasticity, rigidity    Cardiovascular: Comment:  S/p left fem-pop arterial stent 5/2017    (+) Dyslipidemia, hypertension-Peripheral Vascular Disease-- Other, -past MI,CABG-date: 1992,2009, . Taking blood thinners Pt has received instructions: . CHF Last EF: 45% . . :. . Previous cardiac testing Echodate:2018results:Interpretation Summary     A cardiac source of embolus was not identified.  Left ventricular systolic function is mildly reduced.  The visual ejection fraction is estimated at 45-50%.  The left ventricle is normal in size.  There is apical akinesis.  The patient exhibited frequent PACs.  Doppler interrogation does not demonstrate significant stenosis or  insufficiency involving cardiac valves.     No significant change since 10/13/2104.date: results: date: results: date: results:          METS/Exercise Tolerance:  1 - Eating, dressing   Hematologic:  - neg hematologic  ROS       Musculoskeletal:  - neg musculoskeletal ROS       GI/Hepatic:     (+) GERD Asymptomatic on medication,       Renal/Genitourinary:  - ROS Renal section negative       Endo:     (+) thyroid problem hypothyroidism, .      Psychiatric:  - neg psychiatric ROS       Infectious Disease:   (+) MRSA,       Malignancy:      - no malignancy   Other:    (+) No chance of pregnancy C-spine cleared: N/A, H/O Chronic Pain,other significant disability Other (comment)                   Physical Exam      Airway   Mallampati: II  TM distance: >3 FB  Neck ROM: limited    Dental   (+) missing    Cardiovascular   Rhythm and rate: regular and normal      Pulmonary    breath sounds clear to auscultation    Other findings:                   Anesthesia Plan      History & Physical Review  History and physical reviewed and following examination; no interval change.    ASA Status:  3 .    NPO Status:  > 8 hours    Plan for Peripheral Nerve Block ([place] block) with Intravenous induction. Maintenance will be TIVA.    PONV prophylaxis:  Ondansetron (or other 5HT-3)       Postoperative Care  Postoperative pain  management:  Oral pain medications.      Consents  Anesthetic plan, risks, benefits and alternatives discussed with:  Patient..                          .

## 2018-11-29 NOTE — IP AVS SNAPSHOT
MRN:[id number]                      After Visit Summary   11/29/2018    Katherin Fletcher    MRN: [id number]           Thank you!     Thank you for choosing [facility] for your care. Our goal is always to provide you with excellent care. Hearing back from our patients is one way we can continue to improve our services. Please take a few minutes to complete the written survey that you may receive in the mail after you visit with us. Thank you!        Patient Information     Date Of Birth          1940        About your hospital stay     You were admitted on:  November 29, 2018 You last received care in the:  [facility] PreOP/Phase II    You were discharged on:  November 29, 2018        Reason for your hospital stay       Uneventful trigger finger release and excision of wrist mass.                  Who to Call     For medical emergencies, please call 911.  For non-urgent questions about your medical care, please call your primary care provider or clinic, [phone]  For questions related to your surgery, please call your surgery clinic        Attending Provider     Provider Specialty    Minh Goldstein MD Orthopedics       Primary Care Provider Office Phone # Fax #    Aris Del Real [phone]      After Care Instructions     Activity       Your activity upon discharge: activity as tolerated and no driving for today            Diet       Follow this diet upon discharge: Regular            Wound care and dressings       Instructions to care for your wound at home: keep wound clean and dry.                  Follow-up Appointments     Follow-up and recommended labs and tests        Follow up with Dr. Goldstein in 10-14 days.                  Your next 10 appointments already scheduled     Dec 06, 2018  1:40 PM CST   (Arrive by 1:25 PM)   Return Visit with Darline Celestin MD   [facility] ([facility])    [address]  3[address]  [facility] [address]   [phone]            Dec 12, 2018  2:45 PM CST   Neuro Treatment with Filiberto Hammond PT   [facility] ([facility])    [address] [place] [address]  [address]  [facility] [address]   [phone]            Chau 15, 2019  2:00 PM CST   New Myasthenia Gravis with Luis A Gonzalez MD   [facility] ([facility]    [address]  [address]  [facility] [address] [phone]              Further instructions from your care team       Same Day Surgery Discharge Instructions for  Sedation and General Anesthesia       It's not unusual to feel dizzy, light-headed or faint for up to 24 hours after surgery or while taking pain medication.  If you have these symptoms: sit for a few minutes before standing and have someone assist you when you get up to walk or use the bathroom.      You should rest and relax for the next 24 hours. We recommend you make arrangements to have an adult stay with you for at least 24 hours after your discharge.  Avoid hazardous and strenuous activity.      DO NOT DRIVE any vehicle or operate mechanical equipment for 24 hours following the end of your surgery.  Even though you may feel normal, your reactions may be affected by the medication you have received.      Do not drink alcoholic beverages for 24 hours following surgery.       Slowly progress to your regular diet as you feel able. It's not unusual to feel nauseated and/or vomit after receiving anesthesia.  If you develop these symptoms, drink clear liquids (apple juice, ginger ale, broth, 7-up, etc. ) until you feel better.  If your nausea and vomiting persists for 24 hours, please notify your surgeon.        All narcotic pain medications, along with inactivity and anesthesia, can cause constipation. Drinking plenty of liquids and increasing fiber intake will help.      For any questions of a medical nature, call your surgeon.      Do  "not make important decisions for 24 hours.      If you had general anesthesia, you may have a sore throat for a couple of days related to the breathing tube used during surgery.  You may use Cepacol lozenges to help with this discomfort.  If it worsens or if you develop a fever, contact your surgeon.       If you feel your pain is not well managed with the pain medications prescribed by your surgeon, please contact your surgeon's office to let them know so they can address your concerns.     Reasons to contact your surgeon:    1. Signs of possible infection: Check your incision daily for redness, swelling, warmth, red streaks or foul drainage.   2. Elevated temperature.  3. Pain not controlled with pain medication and/or rest.   4. Uncontrolled nausea or vomiting.  5. Any questions or concerns.        **If you have questions or concerns about your procedure,  call Dr. Goldstein at [phone]**    Pending Results     Date and Time Order Name Status Description    11/29/2018 1445 Surgical pathology exam In process             Admission Information     Date & Time Provider Department Dept. Phone    11/29/2018 Minh Goldstein MD [facility] PreOP/Phase -[phone]      Your Vitals Were     Blood Pressure Temperature Respirations Height Weight Pulse Oximetry    107/60 97.3  F (36.3  C) 20 1.626 m (5' 4\") 70.9 kg (156 lb 6.4 oz) 97%    BMI (Body Mass Index)                   26.85 kg/m2           MyChart Information     [employer] gives you secure access to your electronic health record. If you see a primary care provider, you can also send messages to your care team and make appointments. If you have questions, please call your primary care clinic.  If you do not have a primary care provider, please call [phone] and they will assist you.        Care EveryWhere ID     This is your Care EveryWhere ID. This could be used by other organizations to access your [place] medical records  [id number]        Equal " Access to Services     [facility]: Hadii aad ku haddelmyblake Dafnemichelle, waaxda luqadaha, qaybta kaalmacarlo cormier. So [facility] [phone].    ATENCIÓN: Si habla español, tiene a taylor disposición servicios gratuitos de asistencia lingüística. Llame al [phone].    We comply with applicable federal civil rights laws and [place] laws. We do not discriminate on the basis of race, color, national origin, age, disability, sex, sexual orientation, or gender identity.               Review of your medicines      START taking        Dose / Directions    HYDROcodone-acetaminophen 5-325 MG tablet   Commonly known as:  NORCO   Used for:  Trigger index finger of right hand        Dose:  1-2 tablet   Take 1-2 tablets by mouth every 4 hours as needed for pain   Quantity:  10 tablet   Refills:  0         CONTINUE these medicines which have NOT CHANGED        Dose / Directions    Alfalfa 250 MG Tabs        Dose:  250 mg   Take 250 mg by mouth every morning   Refills:  0       apixaban ANTICOAGULANT 5 MG tablet   Commonly known as:  ELIQUIS   Used for:  Cerebrovascular accident (CVA), unspecified mechanism (H)        Dose:  5 mg   Take 1 tablet (5 mg) by mouth 2 times daily   Quantity:  180 tablet   Refills:  3       calcium + D 600-200 MG-UNIT Tabs   Used for:  Preventative health care   Generic drug:  calcium carbonate-vitamin D        Dose:  1 tablet   Take 1 tablet by mouth At Bedtime   Refills:  0       carbidopa-levodopa  MG tablet   Commonly known as:  SINEMET   Used for:  Restless legs syndrome, Neurologic gait disorder, Dysarthria, Cerebellar disease        TAKE 2 TABLETS FOUR TIMES A DAY   Quantity:  720 tablet   Refills:  3       carvedilol 6.25 MG tablet   Commonly known as:  COREG   Used for:  Benign essential hypertension        Dose:  6.25 mg   Take 1 tablet (6.25 mg) by mouth 2 times daily (with meals)   Quantity:  180 tablet   Refills:  3       clopidogrel 75 MG tablet    Commonly known as:  PLAVIX   Used for:  Coronary artery disease involving native coronary artery of native heart without angina pectoris        Dose:  75 mg   Take 1 tablet (75 mg) by mouth daily   Quantity:  90 tablet   Refills:  3       DAILY MULTI Tabs        Dose:  1 tablet   Take 1 tablet by mouth every morning   Refills:  0       isosorbide mononitrate 30 MG 24 hr tablet   Commonly known as:  IMDUR   Used for:  Coronary artery disease involving native coronary artery of native heart without angina pectoris        Dose:  30 mg   Take 1 tablet (30 mg) by mouth daily   Quantity:  90 tablet   Refills:  3       levothyroxine 112 MCG tablet   Commonly known as:  SYNTHROID/LEVOTHROID   Used for:  Encounter for medication refill        TAKE 1 TABLET DAILY   Quantity:  90 tablet   Refills:  1       nitroGLYcerin 0.4 MG sublingual tablet   Commonly known as:  NITROSTAT   Used for:  CAD (coronary artery disease)        DISSOLVE ONE TABLET UNDER THE TONGUE EVERY 5 MINUTES AS NEEDED FOR CHEST PAIN.  DO NOT EXCEED A TOTAL OF 3 DOSES IN 15 MINUTES   Quantity:  25 tablet   Refills:  3       omeprazole 20 MG DR capsule   Commonly known as:  priLOSEC   Used for:  Encounter for long-term (current) use of medications        TAKE 2 CAPSULES DAILY 30 TO 60 MINUTES BEFORE A MEAL   Quantity:  180 capsule   Refills:  1       rosuvastatin 20 MG tablet   Commonly known as:  CRESTOR   Used for:  Mixed hyperlipidemia        Dose:  20 mg   Take 1 tablet (20 mg) by mouth daily   Quantity:  90 tablet   Refills:  3            Where to get your medicines      Some of these will need a paper prescription and others can be bought over the counter. Ask your nurse if you have questions.     Bring a paper prescription for each of these medications     HYDROcodone-acetaminophen 5-325 MG tablet                Protect others around you: Learn how to safely use, store and throw away your medicines at www.disposemymeds.org.        Information about  OPIOIDS     PRESCRIPTION OPIOIDS: WHAT YOU NEED TO KNOW   We gave you an opioid (narcotic) pain medicine. It is important to manage your pain, but opioids are not always the best choice. You should first try all the other options your care team gave you. Take this medicine for as short a time (and as few doses) as possible.    Some activities can increase your pain, such as bandage changes or therapy sessions. It may help to take your pain medicine 30 to 60 minutes before these activities. Reduce your stress by getting enough sleep, working on hobbies you enjoy and practicing relaxation or meditation. Talk to your care team about ways to manage your pain beyond prescription opioids.    These medicines have risks:    DO NOT drive when on new or higher doses of pain medicine. These medicines can affect your alertness and reaction times, and you could be arrested for driving under the influence (DUI). If you need to use opioids long-term, talk to your care team about driving.    DO NOT operate heavy machinery    DO NOT do any other dangerous activities while taking these medicines.    DO NOT drink any alcohol while taking these medicines.     If the opioid prescribed includes acetaminophen, DO NOT take with any other medicines that contain acetaminophen. Read all labels carefully. Look for the word  acetaminophen  or  Tylenol.  Ask your pharmacist if you have questions or are unsure.    You can get addicted to pain medicines, especially if you have a history of addiction (chemical, alcohol or substance dependence). Talk to your care team about ways to reduce this risk.    All opioids tend to cause constipation. Drink plenty of water and eat foods that have a lot of fiber, such as fruits, vegetables, prune juice, apple juice and high-fiber cereal. Take a laxative (Miralax, milk of magnesia, Colace, Senna) if you don t move your bowels at least every other day. Other side effects include upset stomach, sleepiness,  dizziness, throwing up, tolerance (needing more of the medicine to have the same effect), physical dependence and slowed breathing.    Store your pills in a secure place, locked if possible. We will not replace any lost or stolen medicine. If you don t finish your medicine, please throw away (dispose) as directed by your pharmacist. The [place] Pollution Control Agency has more information about safe disposal: https://www.pca.[email or web address].us/living-green/managing-unwanted-medications             Medication List: This is a list of all your medications and when to take them. Check marks below indicate your daily home schedule. Keep this list as a reference.      Medications           Morning Afternoon Evening Bedtime As Needed    Alfalfa 250 MG Tabs   Take 250 mg by mouth every morning                                apixaban ANTICOAGULANT 5 MG tablet   Commonly known as:  ELIQUIS   Take 1 tablet (5 mg) by mouth 2 times daily                                calcium + D 600-200 MG-UNIT Tabs   Take 1 tablet by mouth At Bedtime   Generic drug:  calcium carbonate-vitamin D                                carbidopa-levodopa  MG tablet   Commonly known as:  SINEMET   TAKE 2 TABLETS FOUR TIMES A DAY                                carvedilol 6.25 MG tablet   Commonly known as:  COREG   Take 1 tablet (6.25 mg) by mouth 2 times daily (with meals)                                clopidogrel 75 MG tablet   Commonly known as:  PLAVIX   Take 1 tablet (75 mg) by mouth daily                                DAILY MULTI Tabs   Take 1 tablet by mouth every morning                                HYDROcodone-acetaminophen 5-325 MG tablet   Commonly known as:  NORCO   Take 1-2 tablets by mouth every 4 hours as needed for pain                                isosorbide mononitrate 30 MG 24 hr tablet   Commonly known as:  IMDUR   Take 1 tablet (30 mg) by mouth daily                                levothyroxine 112 MCG tablet   Commonly known  as:  SYNTHROID/LEVOTHROID   TAKE 1 TABLET DAILY                                nitroGLYcerin 0.4 MG sublingual tablet   Commonly known as:  NITROSTAT   DISSOLVE ONE TABLET UNDER THE TONGUE EVERY 5 MINUTES AS NEEDED FOR CHEST PAIN.  DO NOT EXCEED A TOTAL OF 3 DOSES IN 15 MINUTES                                omeprazole 20 MG DR capsule   Commonly known as:  priLOSEC   TAKE 2 CAPSULES DAILY 30 TO 60 MINUTES BEFORE A MEAL                                rosuvastatin 20 MG tablet   Commonly known as:  CRESTOR   Take 1 tablet (20 mg) by mouth daily

## 2018-11-29 NOTE — BRIEF OP NOTE
[facility] Brief Operative Note    Pre-operative diagnosis: GANGLION CYST AND TRIGGER FINGER   Post-operative diagnosis (1) Stenosing tenosynovitis right II (trigger finger); (2) Right volar wrist mass.      Procedure: Procedure(s):  EXCISE RIGHT VOLAR CARPAL MASS AND RIGHT INDEX TRIGGER FINGER RELEASE  RIGHT INDEX TRIGGER FINGER RELEASE   Surgeon(s): Surgeon(s) and Role:     * Minh Goldstein MD - Primary   Estimated blood loss: 2 mL    Specimens:   ID Type Source Tests Collected by Time Destination   A : RIGHT VOLAR MASS Tissue Other SURGICAL PATHOLOGY EXAM Minh Goldstein MD 11/29/2018  2:44 PM       Findings: Mass had the appearance of inflammatory tissue coming from a degenerated FCR tendon.

## 2018-11-29 NOTE — OR NURSING
PT. USES A NECK BRACE TO HELP HER HOLD UP HER HEAD, NO NECK INJURY.  PT. HAS SINEMET IN LOCKER, WITH HER BELONGINGS TO TAKE POST-OP.

## 2018-11-29 NOTE — IP AVS SNAPSHOT
[facility] PreOP/Phase II    [address]    [place] [address]    Phone:  [phone]                                       After Visit Summary   11/29/2018    Katherin Fletcher    MRN: [id number]           After Visit Summary Signature Page     I have received my discharge instructions, and my questions have been answered. I have discussed any challenges I see with this plan with the nurse or doctor.    ..........................................................................................................................................  Patient/Patient Representative Signature      ..........................................................................................................................................  Patient Representative Print Name and Relationship to Patient    ..................................................               ................................................  Date                                   Time    ..........................................................................................................................................  Reviewed by Signature/Title    ...................................................              ..............................................  Date                                               Time          22EPIC Rev 08/18

## 2018-11-29 NOTE — ANESTHESIA CARE TRANSFER NOTE
Patient: Katherin Fletcher    Procedure(s):  EXCISE RIGHT VOLAR CARPAL MASS AND RIGHT INDEX TRIGGER FINGER RELEASE  RIGHT INDEX TRIGGER FINGER RELEASE    Diagnosis: GANGLION CYST AND TRIGGER FINGER  Diagnosis Additional Information: No value filed.    Anesthesia Type:   Peripheral Nerve Block     Note:  Airway :Face Mask  Patient transferred to:PACU  Comments: To recovery, Anne-Marie Report: Identifed the Patient, Identified the Reponsible Provider, Reviewed the pertinent medical history, Discussed the surgical course, Reviewed Intra-OP anesthesia mangement and issues during anesthesia, Set expectations for post-procedure period and Allowed opportunity for questions and acknowledgement of understanding      Vitals: (Last set prior to Anesthesia Care Transfer)    CRNA VITALS  11/29/2018 1434 - 11/29/2018 1512      11/29/2018             Resp Rate (set): 10                Electronically Signed By: EVONNE Segal CRNA  November 29, 2018  3:12 PM

## 2018-11-29 NOTE — ADDENDUM NOTE
Addendum  created 11/29/18 1651 by Luciana Galvan APRN CRNA    Anesthesia Intra Meds edited, Orders acknowledged in Narrator

## 2018-11-29 NOTE — DISCHARGE INSTRUCTIONS
Same Day Surgery Discharge Instructions for  Sedation and General Anesthesia       It's not unusual to feel dizzy, light-headed or faint for up to 24 hours after surgery or while taking pain medication.  If you have these symptoms: sit for a few minutes before standing and have someone assist you when you get up to walk or use the bathroom.      You should rest and relax for the next 24 hours. We recommend you make arrangements to have an adult stay with you for at least 24 hours after your discharge.  Avoid hazardous and strenuous activity.      DO NOT DRIVE any vehicle or operate mechanical equipment for 24 hours following the end of your surgery.  Even though you may feel normal, your reactions may be affected by the medication you have received.      Do not drink alcoholic beverages for 24 hours following surgery.       Slowly progress to your regular diet as you feel able. It's not unusual to feel nauseated and/or vomit after receiving anesthesia.  If you develop these symptoms, drink clear liquids (apple juice, ginger ale, broth, 7-up, etc. ) until you feel better.  If your nausea and vomiting persists for 24 hours, please notify your surgeon.        All narcotic pain medications, along with inactivity and anesthesia, can cause constipation. Drinking plenty of liquids and increasing fiber intake will help.      For any questions of a medical nature, call your surgeon.      Do not make important decisions for 24 hours.      If you had general anesthesia, you may have a sore throat for a couple of days related to the breathing tube used during surgery.  You may use Cepacol lozenges to help with this discomfort.  If it worsens or if you develop a fever, contact your surgeon.       If you feel your pain is not well managed with the pain medications prescribed by your surgeon, please contact your surgeon's office to let them know so they can address your concerns.     Reasons to contact your surgeon:    1. Signs of  possible infection: Check your incision daily for redness, swelling, warmth, red streaks or foul drainage.   2. Elevated temperature.  3. Pain not controlled with pain medication and/or rest.   4. Uncontrolled nausea or vomiting.  5. Any questions or concerns.        **If you have questions or concerns about your procedure,  call Dr. Goldstein at [phone]**

## 2018-11-29 NOTE — TELEPHONE ENCOUNTER
Patient is scheduled with [facility] Neurology Dr Luis A Gonzalez on 1/15/19.  Dr Villagomez reports that she will call his office to see if patient can be seen earlier. Patient is aware of appointment.  Candy Gaona

## 2018-11-29 NOTE — ANESTHESIA POSTPROCEDURE EVALUATION
Patient: Katherin Fletcher    Procedure(s):  EXCISE RIGHT VOLAR CARPAL MASS AND RIGHT INDEX TRIGGER FINGER RELEASE  RIGHT INDEX TRIGGER FINGER RELEASE    Diagnosis:GANGLION CYST AND TRIGGER FINGER  Diagnosis Additional Information: No value filed.    Anesthesia Type:  Peripheral Nerve Block    Note:  Anesthesia Post Evaluation    Patient location during evaluation: PACU  Patient participation: Able to fully participate in evaluation  Level of consciousness: awake  Pain management: adequate  Airway patency: patent  Cardiovascular status: acceptable  Respiratory status: acceptable  Hydration status: acceptable  PONV: controlled     Anesthetic complications: None          Last vitals:  Vitals:    11/29/18 1509 11/29/18 1515 11/29/18 1530   BP: 108/56 108/64 115/74   Resp: 14 14 16   Temp: 36.3  C (97.3  F) 36.3  C (97.3  F) 36.4  C (97.5  F)   SpO2: 98% 98% 98%         Electronically Signed By: Dino Camargo MD  November 29, 2018  3:35 PM

## 2018-11-30 LAB — COPATH REPORT: NORMAL

## 2018-11-30 NOTE — OP NOTE
Procedure Date: 11/29/2018      PREOPERATIVE DIAGNOSES:   1.  Stenosing tenosynovitis, right II (trigger finger).   2.  Right volar wrist mass.      POSTOPERATIVE DIAGNOSES:   1.  Stenosing tenosynovitis, right II (trigger finger).   2.  Right volar wrist mass.      OPERATIVE PROCEDURE:   1.  Right II trigger finger release.   2.  Excision of right volar wrist mass (probable phlegmon), distal flexor carpi radialis (FCR) tendon.      SURGEON:  Minh Goldstein M.D.      ASSISTANT:  FRANCESCA Russell      ANESTHESIA:  [place] block.      OPERATIVE PROCEDURE:  I identified the patient.  She was brought into the operating room and placed on the OR table where a well-padded tourniquet was applied to the right upper extremity.  She received preoperative IV antibiotics.  The arm was exsanguinated by Esmarch and tourniquet elevated to 250 mmHg.  Benoit block was then instilled by the anesthesiologist, and the right hand and forearm were prepped and draped in the standard fashion.      Index trigger finger release was performed first.  A transverse incision was made in the distal palmar crease in line with the index ray, and thick skin flaps were raised.  The underlying flexor tendon sheath was identified as well as the proximal border of the A1 pulley.  Neurovascular bundles were avoided.      The A1 pulley was fully released with a 15 blade.  A Hoyos scissors was used to release the A0 pulley in the palm.  Both flexor tendons were then drawn out of the wound to lyse any potential intertendinous adhesions.  A limited tenosynovectomy was performed.      The wound was then irrigated with saline.  Skin was closed with interrupted 4-0 nylon.  The wound was infiltrated with 0.5% Marcaine for postoperative analgesia.      The volar wrist mass was approached next.  Preoperatively, I felt that this was likely a volar carpal ganglion.  A longitudinal incision was therefore made directly over this at an interval between the radial  artery and the flexor carpi radialis, and thick skin flaps were raised.      The underlying tissue; however, did not have the appearance of a cyst, but rather that of an inflammatory mass.  The radial artery was dissected free and retracted.  The palmar branch of the radial artery was ligated with 3-0 Vicryl.  This mass appeared to be coming off adjacent to the FCR tendon, and there was some longitudinal splitting and degeneration of the tendon.  This mass was sharply excised measuring 10 x 20 mm and sent to the pathology lab.      A rent was retained in the volar wrist capsule to prevent potential cyst recurrence if indeed this was a cyst.  Wound was irrigated with saline and infiltrated with 0.5% Marcaine.      The tourniquet was then temporarily deflated.  There was no abnormal bleeding.  The tourniquet was then reinflated.      The wound was irrigated with saline and closed with 3-0 Vicryl in the subq layer and a 3-0 Monocryl subcuticular stitch.  A sterile bulky dressing was then applied along with a volar plaster splint.  The tourniquet deflated yet again, and the patient was taken to the recovery room having tolerated surgery well.         HOLLIS CHANG MD             D: 2018   T: 2018   MT: LISHA      Name:     ANA RODRIGUEZ   MRN:      [id number]        Account:        [id number]   :      1940           Procedure Date: 2018      Document: [id number]

## 2018-12-06 ENCOUNTER — OFFICE VISIT (OUTPATIENT)
Dept: PHYSICAL MEDICINE AND REHAB | Facility: CLINIC | Age: 78
End: 2018-12-06
Payer: MEDICARE

## 2018-12-06 VITALS
OXYGEN SATURATION: 93 % | RESPIRATION RATE: 20 BRPM | TEMPERATURE: 98.4 F | BODY MASS INDEX: 26.63 KG/M2 | WEIGHT: 156 LBS | SYSTOLIC BLOOD PRESSURE: 131 MMHG | HEART RATE: 77 BPM | HEIGHT: 64 IN | DIASTOLIC BLOOD PRESSURE: 71 MMHG

## 2018-12-06 DIAGNOSIS — M53.82 NECK MUSCLE WEAKNESS: ICD-10-CM

## 2018-12-06 DIAGNOSIS — M62.838 MUSCLE SPASMS OF BOTH LOWER EXTREMITIES: ICD-10-CM

## 2018-12-06 DIAGNOSIS — R25.2 SPASTICITY: ICD-10-CM

## 2018-12-06 DIAGNOSIS — M54.2 NECK PAIN: ICD-10-CM

## 2018-12-06 DIAGNOSIS — M47.12 CERVICAL SPONDYLOSIS WITH MYELOPATHY: Primary | ICD-10-CM

## 2018-12-06 ASSESSMENT — PAIN SCALES - GENERAL: PAINLEVEL: MILD PAIN (2)

## 2018-12-06 NOTE — PROGRESS NOTES
"       PM&R Clinic Note   Patient Name: Katherin Fletcher : 1940 Medical Record: [id number]     Requesting Physician/clinician: Halina Corrales MD            History of Present Illness:   Katherin Fletcher is a 78 year old female who presented to clinic for follow ups regarding her falls, gait difficulty and weakness/spasticity. PMH is significant for A fib, CAD, HLD and TIA in 2018. She was seen in clinic as initial consult by Dr. Corrales on 10/11/18.    Background;  Her symptoms started 2 years ago with spasms in her legs and falls. Spasms have been progressively worse since then; occurs intermittently during the day and night time up to 3-4 times. They can last 10 minutes to 8 hours and are very painful. Improve with walking and worse at night time. She also reports \"tightness in her bilateral lower extremities\". She was seen and evaluated by Dr. Randolph neurology team; was started on sinemet but doesn't think this has been helpful. She also takes tylenol with codeine which are sometimes beneficial but not all the times. She tied baclofen in the past but it was discontinued due to GI side effects. She also tried gabapentin at higher doses with no results so stopped taking. Tizanidine was prescribed by Dr. Corrales but she has not started that yet. Botox injections were also recommended by Dr. Randolph but she cancelled her appointment as she was concerned about potential side effects.    Starting Dec 2017 she noticed that she \"couldn't hold her head up\". This has also been progressively worse since then. She has been using a soft cervical collar since 2018. Denies any paresthesia in her extremities or groins; no issue with bowel and bladder control. She has some neck pain, located at the \"base of her skull\" with radiation to b/l trap area but not to bilateral upper extremities. No back pain or radicular symptoms in bilateral lower extremities. She denies any issue with her swallowing, however " "she has noticed difficulty with \"increased saliva\" and worsening cough over the past few months. She sleeps ok at night time; appetite is fair and mood is stable.     She was admitted to the hospital in May 2018; presented right hemiparesis and slurred speech. Symptoms resolved and they were attributed to TIA. She had short course of PT, OT and SLP after that but no therapies for her chronic symptoms in the past.     She was followed by Dr. Randolph as mentioned above for posterior neck weakness and ataxic type gait with spasticity. She was started on sinemet which has been beneficial. Also seen by Dr. L[facility]zma at [facility] regarding possible surgical interventions; per chart review \"surgery was unlikely to help with her symptoms\".     She was seen and evaluated by Dr. Pham at [facility] in May 2018; EMG of bilateral upper extremities was performed on 6/27/18 which showed \"cervical polyradiculopathy; motor neuron disease can't be excluded\". There was no active denervation in all muscles tested in bilateral upper extremities and cervical paraspinals. Very large MUPs were seen at cervical paraspinals.     C spine MRI on 5/18/18 showed \"multilevel DDD, C5-6 severe spinal canal stenosis with flattening of the spinal cord and severe bilateral neuro-foraminal stenosis from uncinate spurs\".     Interval history;  --clinical swallow evaluation on 10/23/18 showed mild oropharyngeal dysphagia. Completed to sessions for more education on techniques to prevent aspiration and has continued them since then.     --she took tizanidine for one month and it was helpful. Didn't renew the prescription as she wants to avoid medications as much as possible.     --her drooling is worse; wakes up in the middle of night and her pillow is wet with saliva. Other symptoms are basically unchanged. Had a fall few weeks ago but no injuries.     --was seen by Dr. Randolph in neurology clinic for follow up on 11/2; per his note   An attempt " to formulate a unifying diagnosis I find it not possible to do so, so they are basically 3 issues as follows:   1.  Neck muscle weakness which may be a myopathy, although EMG studies do not verify this.   2.  Cervical stenosis which may be causing a cervical myelopathy contributing to lower extremity spasticity (incidentally there was no spasticity found in the upper extremities).   3.  Definite cerebellar findings involving speech and all extremities.  Katherin notes that the plan was discussed more and she is going to see Dr. Gonzalez in Jan for second opinion.     --underwent surgery by ortho team for right volar carpal ganglion cyst and trigger finger; still has splint in place and will f/u next week for removing the splint and wound check.             Past Medical and Surgical History:     Past Medical History:   Diagnosis Date     CAD (coronary artery disease)     s/p CABG 1992 and redo CABG 2009 (LIMA to LAD, SVG to OM and SVG to posterolateral branch of RCA)     Chronic cough     Non-productive. No known definite etiology. Is beeing seen by [facility].      Essential hypertension, benign      Falls frequently 5/28/2018     GERD (gastroesophageal reflux disease)      History of TIA (transient ischemic attack) and stroke 5/7/2018    R sided facial N&T.      Hyperlipidaemia      Hyperlipidaemia LDL goal < 130      Ischemic cardiomyopathy      MI (myocardial infarction) (H) 11/1996     Mixed hyperlipidemia      Neurodegenerative disorder     Further detailed dx unknown. Managed by Dr. George Pham - [facility].      PAD (peripheral artery disease) (H)      Restless legs syndrome      Unspecified hypothyroidism      Past Surgical History:   Procedure Laterality Date     ARTHROSCOPY SHOULDER RT/LT       BUNIONECTOMY RT/LT       C CABG, ARTERY-VEIN, THREE  1/2009     C CARDIAC SURG PROCEDURE UNLIST       C HAND/FINGER SURGERY UNLISTED       C MRA UPPER EXTREMITY WO&W CONT       C SHOULDER SURG PROC UNLISTED        C STOMACH SURGERY PROCEDURE UNLISTED       CARDIAC SURGERY  1992    CABG,stents x2     CARPAL TUNNEL RELEASE RT/LT       EXCISE GANGLION HAND Right 11/29/2018    Procedure: EXCISE RIGHT VOLAR CARPAL MASS AND RIGHT INDEX TRIGGER FINGER RELEASE;  Surgeon: Minh Goldstein MD;  Location:  OR      VASCULAR SURGERY PROCEDURE UNLIST       HYSTERECTOMY, DAMIÁN  32 yo     OPEN REDUCTION INTERNAL FIXATION ANKLE Left 2015    Left Ankle     RELEASE TRIGGER FINGER       RELEASE TRIGGER FINGER Right 11/29/2018    Procedure: RIGHT INDEX TRIGGER FINGER RELEASE;  Surgeon: Minh Goldstein MD;  Location:  OR     REPAIR HAMMER TOE Right 12/19/2017    Procedure: REPAIR HAMMER TOE;  Right 2nd and 3rd toe pinning;  Surgeon: Fernando Wiggins MD;  Location: [facility] OR     STENT  2006     STENT(aka CARDIAC)  2008     VASCULAR SURGERY              Social History:     Unchanged   Marital Status:    Living situation: lives with her  in a house. Her  is 89 and a former . A ramp and a chair lift were recently installed at their house.   Family support: her  still drives and helps with transportation.   Vocational History: retired; she was  at [employer]   Tobacco use: quit in 1992  Alcohol use: rarely a glass of wine           Functional history:     Katherin is right hand-dominant; stopped driving in 11/2017 due to her symptoms. She has a scooter for outside and uses her 4WW inside her house. She also has a WC to use if needed. She is mod I with ADLs, cooking and cleaning and can also manager her meds. Her  still drives and helps with transportation. She uses [employer] as another way of transportation.            Family History:     Family History   Problem Relation Age of Onset     Unknown/Adopted Mother      Unknown/Adopted Father      Other - See Comments Son      on warfarin for one year after afganastan now off and doing well     Other - See Comments Daughter      celiac,  brittle bone, arthritis, Raynauds            Medications:     Current Outpatient Prescriptions   Medication Sig Dispense Refill     Cavalier 250 MG TABS Take 250 mg by mouth every morning        apixaban ANTICOAGULANT (ELIQUIS) 5 MG tablet Take 1 tablet (5 mg) by mouth 2 times daily 180 tablet 3     calcium carbonate-vitamin D (CALCIUM + D) 600-200 MG-UNIT TABS Take 1 tablet by mouth At Bedtime        carbidopa-levodopa (SINEMET)  MG per tablet TAKE 2 TABLETS FOUR TIMES A  tablet 3     carvedilol (COREG) 6.25 MG tablet Take 1 tablet (6.25 mg) by mouth 2 times daily (with meals) 180 tablet 3     clopidogrel (PLAVIX) 75 MG tablet Take 1 tablet (75 mg) by mouth daily 90 tablet 3     HYDROcodone-acetaminophen (NORCO) 5-325 MG tablet Take 1-2 tablets by mouth every 4 hours as needed for pain 10 tablet 0     isosorbide mononitrate (IMDUR) 30 MG 24 hr tablet Take 1 tablet (30 mg) by mouth daily 90 tablet 3     levothyroxine (SYNTHROID/LEVOTHROID) 112 MCG tablet TAKE 1 TABLET DAILY 90 tablet 1     Multiple Vitamins-Minerals (DAILY MULTI) TABS Take 1 tablet by mouth every morning        omeprazole (PRILOSEC) 20 MG CR capsule TAKE 2 CAPSULES DAILY 30 TO 60 MINUTES BEFORE A MEAL 180 capsule 1     rosuvastatin (CRESTOR) 20 MG tablet Take 1 tablet (20 mg) by mouth daily 90 tablet 3     nitroglycerin (NITROSTAT) 0.4 MG SL tablet DISSOLVE ONE TABLET UNDER THE TONGUE EVERY 5 MINUTES AS NEEDED FOR CHEST PAIN.  DO NOT EXCEED A TOTAL OF 3 DOSES IN 15 MINUTES (Patient not taking: Reported on 12/6/2018) 25 tablet 3            Allergies:     Allergies   Allergen Reactions     Ace Inhibitors Cough     Baclofen Other (See Comments)     Constipation , tiredness     Penicillins      10/7/14 Hives per patient -- many many years ago       Nickel Swelling and Rash     Other reaction(s): Unknown              ROS:     A focused ROS is negative other than the symptoms noted above in the HPI.           Physical Examiniation:   VITAL  "SIGNS: /71  Pulse 77  Temp 98.4  F (36.9  C) (Oral)  Resp 20  Ht 1.626 m (5' 4\")  Wt 70.8 kg (156 lb)  SpO2 93%  Breastfeeding? No  BMI 26.78 kg/m2  BMI: Estimated body mass index is 26.78 kg/(m^2) as calculated from the following:    Height as of this encounter: 1.626 m (5' 4\").    Weight as of this encounter: 70.8 kg (156 lb).    Gen: NAD, pleasant and cooperative   HEENT: C collar in place  Cardio: regular pulse  Pulm: non-labored breathing in room air  Abd: benign  Ext: WWP, no edema in BLE, no tenderness in calves  - splint in place on right hand and forearm  Neuro/MSK:    Mental Status:  alert and oriented x3   Cranial Nerves: grossly normal; didn't check IX and X     Sensory: Normal to light touch in bilateral upper and lower extremities    Strength:   SF  EF  EE  WE  G  I  HF  KE  DF  EHL  PF   R  4/5 5/5 5/5 5/5     not tested today  4/5 5/5 5/5 5/5 5/5 tested in sitting position   L  4/5  5/5 5/5 5/5 5/5 4+/5 4/5 5/5 5/5 5/5 5/5 tested in sitting position    Reflexes: 3+ at b/l biceps, brachioradialis, triceps and patellar. 2+ at ankles. No clonus.    Rojas's test: + bilaterally   Babinski reflex: equivocal bilaterally   Tone per modified Sheron Scale: increased tone in bilateral upper > lower extremities  1-1+/4 per MAS   Speech: soft and slow but clear and coherent     Neck exam  She had forward head posture with upper thoracic kyphosis and loss of cervical lordosis. Neck ROM was limited at all directions, slightly better with rotation b/l. She had tenderness to palpation at b/l trap, cervical paraspinals and debora-scapular areas L>R. Obvious weakness of neck muscle, worse with flex.            Assessment/Plan:       Cervical myelopathy     Multi-level DDD at C spine     Severe spinal canal and bilateral  NF stenosis at C5-6 level    Spasticity and spasms in bilateral lower extremities     Neck weakness     Mild oropharyngeal dysphagia - per clinical swallow evaluation in " 10/2018    Sialorrhea     H/o A fib, CAD, HLD and TIA in 5/2018      Her symptoms have been progressively worse over the past year. She is clearly spastic with + upper motor signs. Posterior neck weakness was assumed a separate issue from her cervical myelopathy and likely myopathic, but EMG findings are more consistent with a neurogenic process. Discussed the plan with Dr. Randolph over the phone; he saw Katherin in clinic shortly after my visit in October. The plan is to see Dr. Gonzalez in neuromuscular clinic for more evaluation.     Reviewed the treatment options for spasticity management including therapies, medications, and injections. She will continue her exercises for now and follow up in 3 months when the diagnosis is more clear. Not interested in trying any medications at this point for spasticity management.       1. Work-up: will defer to neurology team.     2. Therapy/equipment/braces: completed PT and SLP in 11/2018. Should continue her HEP and swallowing techniques. She has been using a soft cervical collar to help with neck weakness and pain. Discussed the risks of muscle atrophy secondary to disuse.     3. Medications: on sinemet per Dr. Randolph's recs. Tried tizanidine 2mg qhs with some benefits but didn't refill the prescription as noted above.  Not interested in trying any medications at this point for spasticity management.     4. Interventions: consider chemo-denervation for spasticity management and sialorrhea.     5. Referral / follow up with other providers: Dr. Gonzalez in Jan. F/u with ortho team regarding recent surgery. Consider referral to ENT team pending her final diagnosis.     6. Follow up: 3-4 months    Darline Celestin MD  Physical Medicine & Rehabilitation

## 2018-12-06 NOTE — MR AVS SNAPSHOT
After Visit Summary   12/6/2018    Katherin Fletcher    MRN: [id number]           Patient Information     Date Of Birth          1940        Visit Information        Provider Department      12/6/2018 1:40 PM Darline Celestin MD [facility] Physical Medicine and Rehabilitation         Follow-ups after your visit        Your next 10 appointments already scheduled     Dec 12, 2018  2:45 PM CST   Neuro Treatment with Filiberto Hammond, PT   [facility] Physical Therapy ([facility])    [address]  [address]  [facility] [address]   [phone]            Chau 15, 2019  2:00 PM CST   New Myasthenia Gravis with Luis A Gonzalez MD   [facility] NEUROSPECIALTIES ([facility])    [address]  [address]  [facility] [id number] [phone]            Mar 28, 2019 12:00 PM CDT   (Arrive by 11:45 AM)   Return Visit with Darline Celestin MD   [facility] Physical Medicine and Rehabilitation ([facility])    [address]  3rd Floor  [facility] [address] [phone]              Who to contact     Please call your clinic at [phone] to:    Ask questions about your health    Make or cancel appointments    Discuss your medicines    Learn about your test results    Speak to your doctor            Additional Information About Your Visit        [email or web address] Information     To[employer]pick gives you secure access to your electronic health record. If you see a primary care provider, you can also send messages to your care team and make appointments. If you have questions, please call your primary care clinic.  If you do not have a primary care provider, please call [phone] and they will assist you.      Toothpick is an electronic gateway that provides easy, online access to your medical records. With [employer], you can request a clinic appointment, read your test results, renew a prescription or communicate with your care team.     To access your existing  "account, please contact your [facility] or call [phone] for assistance.        Care EveryWhere ID     This is your Care EveryWhere ID. This could be used by other organizations to access your [place] medical records  [id number]        Your Vitals Were     Pulse Temperature Respirations Height Pulse Oximetry Breastfeeding?    77 98.4  F (36.9  C) (Oral) 20 1.626 m (5' 4\") 93% No    BMI (Body Mass Index)                   26.78 kg/m2            Blood Pressure from Last 3 Encounters:   12/06/18 131/71   11/29/18 107/60   11/19/18 126/80    Weight from Last 3 Encounters:   12/06/18 70.8 kg (156 lb)   11/29/18 70.9 kg (156 lb 6.4 oz)   11/19/18 70.8 kg (156 lb)              Today, you had the following     No orders found for display       Primary Care Provider Office Phone # Fax #    Aris Del Real [phone] [address]  [facility] [address]        Equal Access to Services     [facility]: Hadii aad ku hadasho Soomaali, waaxda luqadaha, qaybta kaalmada supriya, carlo odonnell . So [facility] [phone].    ATENCIÓN: Si habla español, tiene a taylor disposición servicios gratuitos de asistencia lingüística. Karen al [phone].    We comply with applicable federal civil rights laws and [place] laws. We do not discriminate on the basis of race, color, national origin, age, disability, sex, sexual orientation, or gender identity.            Thank you!     Thank you for choosing [facility] PHYSICAL MEDICINE AND REHABILITATION  for your care. Our goal is always to provide you with excellent care. Hearing back from our patients is one way we can continue to improve our services. Please take a few minutes to complete the written survey that you may receive in the mail after your visit with us. Thank you!             Your Updated Medication List - Protect others around you: Learn how to safely use, store and throw away your " medicines at www.disposemymeds.org.          This list is accurate as of 12/6/18  2:57 PM.  Always use your most recent med list.                   Brand Name Dispense Instructions for use Diagnosis    Alfalfa 250 MG Tabs      Take 250 mg by mouth every morning        apixaban ANTICOAGULANT 5 MG tablet    ELIQUIS    180 tablet    Take 1 tablet (5 mg) by mouth 2 times daily    Cerebrovascular accident (CVA), unspecified mechanism (H)       calcium + D 600-200 MG-UNIT Tabs   Generic drug:  calcium carbonate-vitamin D      Take 1 tablet by mouth At Bedtime    Preventative health care       carbidopa-levodopa  MG tablet    SINEMET    720 tablet    TAKE 2 TABLETS FOUR TIMES A DAY    Restless legs syndrome, Neurologic gait disorder, Dysarthria, Cerebellar disease       carvedilol 6.25 MG tablet    COREG    180 tablet    Take 1 tablet (6.25 mg) by mouth 2 times daily (with meals)    Benign essential hypertension       clopidogrel 75 MG tablet    PLAVIX    90 tablet    Take 1 tablet (75 mg) by mouth daily    Coronary artery disease involving native coronary artery of native heart without angina pectoris       DAILY MULTI Tabs      Take 1 tablet by mouth every morning        HYDROcodone-acetaminophen 5-325 MG tablet    NORCO    10 tablet    Take 1-2 tablets by mouth every 4 hours as needed for pain    Trigger index finger of right hand       isosorbide mononitrate 30 MG 24 hr tablet    IMDUR    90 tablet    Take 1 tablet (30 mg) by mouth daily    Coronary artery disease involving native coronary artery of native heart without angina pectoris       levothyroxine 112 MCG tablet    SYNTHROID/LEVOTHROID    90 tablet    TAKE 1 TABLET DAILY    Encounter for medication refill       nitroGLYcerin 0.4 MG sublingual tablet    NITROSTAT    25 tablet    DISSOLVE ONE TABLET UNDER THE TONGUE EVERY 5 MINUTES AS NEEDED FOR CHEST PAIN.  DO NOT EXCEED A TOTAL OF 3 DOSES IN 15 MINUTES    CAD (coronary artery disease)       omeprazole 20  MG DR capsule    priLOSEC    180 capsule    TAKE 2 CAPSULES DAILY 30 TO 60 MINUTES BEFORE A MEAL    Encounter for long-term (current) use of medications       rosuvastatin 20 MG tablet    CRESTOR    90 tablet    Take 1 tablet (20 mg) by mouth daily    Mixed hyperlipidemia

## 2018-12-06 NOTE — NURSING NOTE
Chief Complaint   Patient presents with     RECHECK     [facility] RETURN PATIENT VISIT FOR 8 WEEK FOLLOW UP AFTER THERAPY/CVA        Colleen Corbin MA

## 2018-12-06 NOTE — LETTER
"2018       RE: Katherin Fletcher  [address]  [facility] [address]     Dear Colleague,    Thank you for referring your patient, Katherin Fletcher, to the [facility] PHYSICAL MEDICINE AND REHABILITATION at [facility]. Please see a copy of my visit note below.           PM&R Clinic Note   Patient Name: Katherin Fletcher : 1940 Medical Record: [id number]     Requesting Physician/clinician: Halina Corrales MD            History of Present Illness:   Katherin Fletcher is a 78 year old female who presented to clinic for follow ups regarding her falls, gait difficulty and weakness/spasticity. PMH is significant for A fib, CAD, HLD and TIA in 2018. She was seen in clinic as initial consult by Dr. Corrales on 10/11/18.    Background;  Her symptoms started 2 years ago with spasms in her legs and falls. Spasms have been progressively worse since then; occurs intermittently during the day and night time up to 3-4 times. They can last 10 minutes to 8 hours and are very painful. Improve with walking and worse at night time. She also reports \"tightness in her bilateral lower extremities\". She was seen and evaluated by Dr. Randolph neurology team; was started on sinemet but doesn't think this has been helpful. She also takes tylenol with codeine which are sometimes beneficial but not all the times. She tied baclofen in the past but it was discontinued due to GI side effects. She also tried gabapentin at higher doses with no results so stopped taking. Tizanidine was prescribed by Dr. Corrales but she has not started that yet. Botox injections were also recommended by Dr. Randolph but she cancelled her appointment as she was concerned about potential side effects.    Starting Dec 2017 she noticed that she \"couldn't hold her head up\". This has also been progressively worse since then. She has been using a soft cervical collar since 2018. Denies any paresthesia " "in her extremities or groins; no issue with bowel and bladder control. She has some neck pain, located at the \"base of her skull\" with radiation to b/l trap area but not to bilateral upper extremities. No back pain or radicular symptoms in bilateral lower extremities. She denies any issue with her swallowing, however she has noticed difficulty with \"increased saliva\" and worsening cough over the past few months. She sleeps ok at night time; appetite is fair and mood is stable.     She was admitted to the hospital in May 2018; presented right hemiparesis and slurred speech. Symptoms resolved and they were attributed to TIA. She had short course of PT, OT and SLP after that but no therapies for her chronic symptoms in the past.     She was followed by Dr. Randolph as mentioned above for posterior neck weakness and ataxic type gait with spasticity. She was started on sinemet which has been beneficial. Also seen by Dr. Ledezma at [facility] regarding possible surgical interventions; per chart review \"surgery was unlikely to help with her symptoms\".     She was seen and evaluated by Dr. Pham at [place] [facility] in May 2018; EMG of bilateral upper extremities was performed on 6/27/18 which showed \"cervical polyradiculopathy; motor neuron disease can't be excluded\". There was no active denervation in all muscles tested in bilateral upper extremities and cervical paraspinals. Very large MUPs were seen at cervical paraspinals.     C spine MRI on 5/18/18 showed \"multilevel DDD, C5-6 severe spinal canal stenosis with flattening of the spinal cord and severe bilateral neuro-foraminal stenosis from uncinate spurs\".     Interval history;  --clinical swallow evaluation on 10/23/18 showed mild oropharyngeal dysphagia. Completed to sessions for more education on techniques to prevent aspiration and has continued them since then.     --she took tizanidine for one month and it was helpful. Didn't renew the prescription as she " wants to avoid medications as much as possible.     --her drooling is worse; wakes up in the middle of night and her pillow is wet with saliva. Other symptoms are basically unchanged. Had a fall few weeks ago but no injuries.     --was seen by Dr. Randolph in neurology clinic for follow up on 11/2; per his note   An attempt to formulate a unifying diagnosis I find it not possible to do so, so they are basically 3 issues as follows:   1.  Neck muscle weakness which may be a myopathy, although EMG studies do not verify this.   2.  Cervical stenosis which may be causing a cervical myelopathy contributing to lower extremity spasticity (incidentally there was no spasticity found in the upper extremities).   3.  Definite cerebellar findings involving speech and all extremities.  Katherin notes that the plan was discussed more and she is going to see Dr. Gonzalez in Jan for second opinion.     --underwent surgery by ortho team for right volar carpal ganglion cyst and trigger finger; still has splint in place and will f/u next week for removing the splint and wound check.             Past Medical and Surgical History:     Past Medical History:   Diagnosis Date     CAD (coronary artery disease)     s/p CABG 1992 and redo CABG 2009 (LIMA to LAD, SVG to OM and SVG to posterolateral branch of RCA)     Chronic cough     Non-productive. No known definite etiology. Is beeing seen by [facility].      Essential hypertension, benign      Falls frequently 5/28/2018     GERD (gastroesophageal reflux disease)      History of TIA (transient ischemic attack) and stroke 5/7/2018    R sided facial N&T.      Hyperlipidaemia      Hyperlipidaemia LDL goal < 130      Ischemic cardiomyopathy      MI (myocardial infarction) (H) 11/1996     Mixed hyperlipidemia      Neurodegenerative disorder     Further detailed dx unknown. Managed by Dr. George Pham - [facility].      PAD (peripheral artery disease) (H)      Restless legs syndrome       Unspecified hypothyroidism      Past Surgical History:   Procedure Laterality Date     ARTHROSCOPY SHOULDER RT/LT       BUNIONECTOMY RT/LT       C CABG, ARTERY-VEIN, THREE  1/2009     C CARDIAC SURG PROCEDURE UNLIST       C HAND/FINGER SURGERY UNLISTED       C MRA UPPER EXTREMITY WO&W CONT       C SHOULDER SURG PROC UNLISTED       C STOMACH SURGERY PROCEDURE UNLISTED       CARDIAC SURGERY  1992    CABG,stents x2     CARPAL TUNNEL RELEASE RT/LT       EXCISE GANGLION HAND Right 11/29/2018    Procedure: EXCISE RIGHT VOLAR CARPAL MASS AND RIGHT INDEX TRIGGER FINGER RELEASE;  Surgeon: Minh Goldstein MD;  Location:  OR     [facility] VASCULAR SURGERY PROCEDURE UNLIST       HYSTERECTOMY, DAMIÁN  32 yo     OPEN REDUCTION INTERNAL FIXATION ANKLE Left 2015    Left Ankle     RELEASE TRIGGER FINGER       RELEASE TRIGGER FINGER Right 11/29/2018    Procedure: RIGHT INDEX TRIGGER FINGER RELEASE;  Surgeon: Minh Goldstein MD;  Location: [facility] OR     REPAIR HAMMER TOE Right 12/19/2017    Procedure: REPAIR HAMMER TOE;  Right 2nd and 3rd toe pinning;  Surgeon: Fernando Wiggins MD;  Location: [facility] OR     STENT  2006     STENT(aka CARDIAC)  2008     VASCULAR SURGERY              Social History:     Unchanged   Marital Status:    Living situation: lives with her  in a house. Her  is 89 and a former . A ramp and a chair lift were recently installed at their house.   Family support: her  still drives and helps with transportation.   Vocational History: retired; she was  at [employer]   Tobacco use: quit in 1992  Alcohol use: rarely a glass of wine           Functional history:     Katherin is right hand-dominant; stopped driving in 11/2017 due to her symptoms. She has a scooter for outside and uses her 4WW inside her house. She also has a WC to use if needed. She is mod I with ADLs, cooking and cleaning and can also manager her meds. Her  still drives and helps with transportation. She uses  [employer] as another way of transportation.            Family History:     Family History   Problem Relation Age of Onset     Unknown/Adopted Mother      Unknown/Adopted Father      Other - See Comments Son      on warfarin for one year after afganastan now off and doing well     Other - See Comments Daughter      celiac, brittle bone, arthritis, Raynauds            Medications:     Current Outpatient Prescriptions   Medication Sig Dispense Refill     [place] 250 MG TABS Take 250 mg by mouth every morning        apixaban ANTICOAGULANT (ELIQUIS) 5 MG tablet Take 1 tablet (5 mg) by mouth 2 times daily 180 tablet 3     calcium carbonate-vitamin D (CALCIUM + D) 600-200 MG-UNIT TABS Take 1 tablet by mouth At Bedtime        carbidopa-levodopa (SINEMET)  MG per tablet TAKE 2 TABLETS FOUR TIMES A  tablet 3     carvedilol (COREG) 6.25 MG tablet Take 1 tablet (6.25 mg) by mouth 2 times daily (with meals) 180 tablet 3     clopidogrel (PLAVIX) 75 MG tablet Take 1 tablet (75 mg) by mouth daily 90 tablet 3     HYDROcodone-acetaminophen (NORCO) 5-325 MG tablet Take 1-2 tablets by mouth every 4 hours as needed for pain 10 tablet 0     isosorbide mononitrate (IMDUR) 30 MG 24 hr tablet Take 1 tablet (30 mg) by mouth daily 90 tablet 3     levothyroxine (SYNTHROID/LEVOTHROID) 112 MCG tablet TAKE 1 TABLET DAILY 90 tablet 1     Multiple Vitamins-Minerals (DAILY MULTI) TABS Take 1 tablet by mouth every morning        omeprazole (PRILOSEC) 20 MG CR capsule TAKE 2 CAPSULES DAILY 30 TO 60 MINUTES BEFORE A MEAL 180 capsule 1     rosuvastatin (CRESTOR) 20 MG tablet Take 1 tablet (20 mg) by mouth daily 90 tablet 3     nitroglycerin (NITROSTAT) 0.4 MG SL tablet DISSOLVE ONE TABLET UNDER THE TONGUE EVERY 5 MINUTES AS NEEDED FOR CHEST PAIN.  DO NOT EXCEED A TOTAL OF 3 DOSES IN 15 MINUTES (Patient not taking: Reported on 12/6/2018) 25 tablet 3            Allergies:     Allergies   Allergen Reactions     Ace Inhibitors Cough      "Baclofen Other (See Comments)     Constipation , tiredness     Penicillins      10/7/14 Hives per patient -- many many years ago       Nickel Swelling and Rash     Other reaction(s): Unknown              ROS:     A focused ROS is negative other than the symptoms noted above in the HPI.           Physical Examiniation:   VITAL SIGNS: /71  Pulse 77  Temp 98.4  F (36.9  C) (Oral)  Resp 20  Ht 1.626 m (5' 4\")  Wt 70.8 kg (156 lb)  SpO2 93%  Breastfeeding? No  BMI 26.78 kg/m2  BMI: Estimated body mass index is 26.78 kg/(m^2) as calculated from the following:    Height as of this encounter: 1.626 m (5' 4\").    Weight as of this encounter: 70.8 kg (156 lb).    Gen: NAD, pleasant and cooperative   HEENT: C collar in place  Cardio: regular pulse  Pulm: non-labored breathing in room air  Abd: benign  Ext: WWP, no edema in BLE, no tenderness in calves  - splint in place on right hand and forearm  Neuro/MSK:    Mental Status:  alert and oriented x3   Cranial Nerves: grossly normal; didn't check IX and X     Sensory: Normal to light touch in bilateral upper and lower extremities    Strength:   SF  EF  EE  WE  G  I  HF  KE  DF  EHL  PF   R  4/5 5/5 5/5 5/5     not tested today  4/5 5/5 5/5 5/5 5/5 tested in sitting position   L  4/5  5/5 5/5 5/5 5/5 4+/5 4/5 5/5 5/5 5/5 5/5 tested in sitting position    Reflexes: 3+ at b/l biceps, brachioradialis, triceps and patellar. 2+ at ankles. No clonus.    Rojas's test: + bilaterally   Babinski reflex: equivocal bilaterally   Tone per modified Sheron Scale: increased tone in bilateral upper > lower extremities  1-1+/4 per MAS   Speech: soft and slow but clear and coherent     Neck exam  She had forward head posture with upper thoracic kyphosis and loss of cervical lordosis. Neck ROM was limited at all directions, slightly better with rotation b/l. She had tenderness to palpation at b/l trap, cervical paraspinals and debora-scapular areas L>R. Obvious weakness of neck " muscle, worse with flex.            Assessment/Plan:       Cervical myelopathy     Multi-level DDD at C spine     Severe spinal canal and bilateral  NF stenosis at C5-6 level    Spasticity and spasms in bilateral lower extremities     Neck weakness     Mild oropharyngeal dysphagia - per clinical swallow evaluation in 10/2018    Sialorrhea     H/o A fib, CAD, HLD and TIA in 5/2018      Her symptoms have been progressively worse over the past year. She is clearly spastic with + upper motor signs. Posterior neck weakness was assumed a separate issue from her cervical myelopathy and likely myopathic, but EMG findings are more consistent with a neurogenic process. Discussed the plan with Dr. Randolph over the phone; he saw Katherin in clinic shortly after my visit in October. The plan is to see Dr. Gonzalez in neuromuscular clinic for more evaluation.     Reviewed the treatment options for spasticity management including therapies, medications, and injections. She will continue her exercises for now and follow up in 3 months when the diagnosis is more clear. Not interested in trying any medications at this point for spasticity management.       1. Work-up: will defer to neurology team.     2. Therapy/equipment/braces: completed PT and SLP in 11/2018. Should continue her HEP and swallowing techniques. She has been using a soft cervical collar to help with neck weakness and pain. Discussed the risks of muscle atrophy secondary to disuse.     3. Medications: on sinemet per Dr. Randolph's recs. Tried tizanidine 2mg qhs with some benefits but didn't refill the prescription as noted above.  Not interested in trying any medications at this point for spasticity management.     4. Interventions: consider chemo-denervation for spasticity management and sialorrhea.     5. Referral / follow up with other providers: Dr. Gonzalez in Jan. F/u with ortho team regarding recent surgery. Consider referral to ENT team pending her final diagnosis.      6. Follow up: 3-4 months    Darline Celestin MD  Physical Medicine & Rehabilitation

## 2018-12-10 ENCOUNTER — TRANSFERRED RECORDS (OUTPATIENT)
Dept: FAMILY MEDICINE | Facility: CLINIC | Age: 78
End: 2018-12-10

## 2018-12-12 ENCOUNTER — HOSPITAL ENCOUNTER (OUTPATIENT)
Dept: PHYSICAL THERAPY | Facility: CLINIC | Age: 78
Setting detail: THERAPIES SERIES
End: 2018-12-12
Attending: PHYSICAL MEDICINE & REHABILITATION
Payer: MEDICARE

## 2018-12-12 PROCEDURE — 97110 THERAPEUTIC EXERCISES: CPT | Mod: GP | Performed by: PHYSICAL THERAPIST

## 2018-12-26 ENCOUNTER — MYC MEDICAL ADVICE (OUTPATIENT)
Dept: CARDIOLOGY | Facility: CLINIC | Age: 78
End: 2018-12-26

## 2018-12-26 DIAGNOSIS — I25.10 CAD (CORONARY ARTERY DISEASE): Primary | ICD-10-CM

## 2018-12-26 DIAGNOSIS — I10 ESSENTIAL HYPERTENSION: ICD-10-CM

## 2018-12-26 DIAGNOSIS — E78.5 HYPERLIPIDEMIA: ICD-10-CM

## 2018-12-26 DIAGNOSIS — I25.5 ISCHEMIC CARDIOMYOPATHY: ICD-10-CM

## 2019-01-04 ENCOUNTER — HOSPITAL ENCOUNTER (EMERGENCY)
Facility: CLINIC | Age: 79
Discharge: HOME | End: 2019-01-04
Attending: EMERGENCY MEDICINE | Admitting: EMERGENCY MEDICINE
Payer: MEDICARE

## 2019-01-04 ENCOUNTER — APPOINTMENT (OUTPATIENT)
Dept: CT IMAGING | Facility: CLINIC | Age: 79
End: 2019-01-04
Payer: MEDICARE

## 2019-01-04 ENCOUNTER — APPOINTMENT (OUTPATIENT)
Dept: GENERAL RADIOLOGY | Facility: CLINIC | Age: 79
End: 2019-01-04
Payer: MEDICARE

## 2019-01-04 VITALS
BODY MASS INDEX: 27.64 KG/M2 | SYSTOLIC BLOOD PRESSURE: 145 MMHG | RESPIRATION RATE: 18 BRPM | WEIGHT: 156 LBS | HEART RATE: 66 BPM | DIASTOLIC BLOOD PRESSURE: 89 MMHG | TEMPERATURE: 98.4 F | HEIGHT: 63 IN | OXYGEN SATURATION: 98 %

## 2019-01-04 DIAGNOSIS — S09.90XA MINOR HEAD INJURY, INITIAL ENCOUNTER: ICD-10-CM

## 2019-01-04 DIAGNOSIS — S20.222A BACK CONTUSION, LEFT, INITIAL ENCOUNTER: ICD-10-CM

## 2019-01-04 PROCEDURE — 99284 EMERGENCY DEPT VISIT MOD MDM: CPT | Mod: 25

## 2019-01-04 PROCEDURE — 71101 X-RAY EXAM UNILAT RIBS/CHEST: CPT | Mod: LT

## 2019-01-04 PROCEDURE — 70450 CT HEAD/BRAIN W/O DYE: CPT

## 2019-01-04 ASSESSMENT — ENCOUNTER SYMPTOMS
FEVER: 0
SHORTNESS OF BREATH: 0
DIARRHEA: 0
NAUSEA: 0
ABDOMINAL PAIN: 0
VOMITING: 0
NECK PAIN: 0
HEADACHES: 0
BACK PAIN: 1
COLOR CHANGE: 1
WOUND: 1

## 2019-01-04 ASSESSMENT — MIFFLIN-ST. JEOR: SCORE: 1156.74

## 2019-01-04 NOTE — ED AVS SNAPSHOT
Emergency Department  [address] [place] [address]  Phone:  [phone]  Fax:  [phone]                                    Katherin Fletcher   MRN: [id number]    Department:   Emergency Department   Date of Visit:  1/4/2019           After Visit Summary Signature Page    I have received my discharge instructions, and my questions have been answered. I have discussed any challenges I see with this plan with the nurse or doctor.    ..........................................................................................................................................  Patient/Patient Representative Signature      ..........................................................................................................................................  Patient Representative Print Name and Relationship to Patient    ..................................................               ................................................  Date                                   Time    ..........................................................................................................................................  Reviewed by Signature/Title    ...................................................              ..............................................  Date                                               Time          22EPIC Rev 08/18

## 2019-01-04 NOTE — ED PROVIDER NOTES
History     Chief Complaint:  Fall    HPI   Katherin Fletcher is a 78 year old female with a history of hypertension, hyperlipidemia, CAD, stroke, and currently double anticoagulated who presents with her  to the ED via EMS for evaluation after a mechanical fall. The patient reports that she was walking at home when her walker got stuck on the carpet and she lost her balance. She fell backwards, hitting her head against the wall, her back, and landing on her buttock. The patient denies a loss of consciousness. She has superficial abrasions and bruising to her left lower back and is having low back pain but otherwise denies any other injuries or pain from the fall. 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 The patient denies any new neck pain from the fall, headache, or head wound. She denies any midline tenderness, leg bruising or swelling, chest pain, shortness of breath, abdominal pain, nausea, vomiting, diarrhea, fever, or any urinary symptoms. Of note, the patient is on two anticoagulant medications. She takes Eliquis for a-fib and Plavix for a history of stenting and previous stroke.    Allergies:  Ace inhibitors  Baclofen  Penicillins  Nickel     Medications:    Alfalfa 250 MG TABS  apixaban ANTICOAGULANT (ELIQUIS) 5 MG tablet  calcium carbonate-vitamin D (CALCIUM + D) 600-200 MG-UNIT TABS  carbidopa-levodopa (SINEMET)  MG per tablet  carvedilol (COREG) 6.25 MG tablet  clopidogrel (PLAVIX) 75 MG tablet  HYDROcodone-acetaminophen (NORCO) 5-325 MG tablet  isosorbide mononitrate (IMDUR) 30 MG 24 hr tablet  levothyroxine (SYNTHROID/LEVOTHROID) 112 MCG tablet  Multiple Vitamins-Minerals (DAILY MULTI) TABS  nitroglycerin (NITROSTAT) 0.4 MG SL tablet  omeprazole (PRILOSEC) 20 MG CR capsule  rosuvastatin (CRESTOR) 20 MG tablet     Past Medical History:    CAD  Chronic cough  Essential hypertension, benign  Falls frequently   GERD  TIA  "and stroke  Hyperlipidemia  Ischemic cardiomyopathy  MI  Neurodegenerative disorder  PAD  Restless leg syndrome  Hypothyroidism   MRSA    Past Surgical History:    Arthroscopy shoulder, bilateral  Bunionectomy  CABG, artery-vein, three  Cardiac surg procedure unlist  Hand/finger surgery  Shoulder surg proc  Stomach surgery procedure  Cardiac surgery -  CABG, stents x2  Carpal tunnel release, bilateral  Excise ganglion hand, right  Vascular surgery procedure  Hysterectomy, DAMIÁN  Open reduction internal fixation ankle, left  Release trigger finger, right  Repair hammer toe     Family History:    Celiac disease  Brittle bone  Arthritis  Raynauds    Social History:  Smoking status: Former  Alcohol use: Yes  Marital Status:       Review of Systems   Constitutional: Negative for fever.   Respiratory: Negative for shortness of breath.    Cardiovascular: Negative for chest pain and leg swelling.   Gastrointestinal: Negative for abdominal pain, diarrhea, nausea and vomiting.   Musculoskeletal: Positive for back pain. Negative for neck pain.   Skin: Positive for color change (bruising) and wound (abrasions to back).   Neurological: Negative for syncope and headaches.   All other systems reviewed and are negative.    Physical Exam   Patient Vitals for the past 24 hrs:   BP Temp Temp src Pulse Resp SpO2 Height Weight   01/04/19 1447 145/89 98.4  F (36.9  C) Oral 66 18 98 % 1.6 m (5' 3\") 70.8 kg (156 lb)     Physical Exam  Nursing note and vitals reviewed.  Constitutional:  Appears well-developed and well-nourished.   HENT:   Head:    Atraumatic.   Mouth/Throat:   Oropharynx is clear and moist. No oropharyngeal exudate.   Eyes:    Pupils are equal, round, and reactive to light.   Neck:    Normal range of motion. Neck supple.      No tracheal deviation present. No thyromegaly present.   Cardiovascular:  Normal rate, regular rhythm, no murmur   Pulmonary/Chest: Breath sounds are clear and equal without wheezes or " crackles.  Abdominal:   Soft. Bowel sounds are normal. Exhibits no distension and      no mass. There is no tenderness.      There is no rebound and no guarding.   Musculoskeletal:  Exhibits no edema.   Lymphadenopathy:  No cervical adenopathy.   Neurological:   Alert and oriented to person, place, and time.   GCS 15.  CN 2-12 intact.  and proximal upper extremity strength strong and equal.  Bilateral lower extremity strength strong and equal, including strong dorsiflexion and plantarflexion strength.  Sensation intact and equal to the face, arms and legs.  No facial droop or weakness. Normal speech.  Follows commands and answers questions normally.    Skin:    Skin is warm and dry. No pallor. Superficial abrasions and ecchymosis to left side of back at level T12-13.    Emergency Department Course     Imaging:  Radiographic findings were communicated with the patient and family who voiced understanding of the findings.    CT-scan Head w/o contrast:  IMPRESSION: Diffuse cerebral volume loss and cerebral white matter  changes consistent with chronic small vessel ischemic disease. No  evidence for acute intracranial pathology.  Result per radiology.     X-ray Left Unilateral Ribs & Chest, 3 views:  IMPRESSION: No rib fracture demonstrated.  Result per radiology.     Emergency Department Course:  The patient arrived in the emergency department via EMS.  Past medical records, nursing notes, and vitals reviewed.  1508: I performed an exam of the patient and obtained history, as documented above. GCS 15.     The patient was sent for a CT and xray while in the emergency department, findings above.    1656: I rechecked the patient. Findings and plan explained to the Patient and spouse. Patient discharged home with instructions regarding supportive care, medications, and reasons to return. The importance of close follow-up was reviewed.     Impression & Plan      Medical Decision Making:  I found this patient to have a  minor head injury without any intracranial bleed or skull fracture on CT. She is not having any current symptoms of concussion but she is double anticoagulated so I felt the CT was indicated. There was no sign of rib fracture or pneumothorax on her xray's and she does not have any midline spine tenderness so I felt she could be safely discharged to home. She has a back contusion and superficial abrasion but no other obvious injuries. She is told to follow up in clinic next week and to return for worsening symptoms. I did not feel there was any concern for neurologic injury.    Diagnosis:    ICD-10-CM   1. Back contusion, left, initial encounter S20.222A   2. Minor head injury, initial encounter S09.90XA       Disposition:  discharged to home      Rajni Begum  1/4/2019    EMERGENCY DEPARTMENT  I, Rajni Kel, am serving as a scribe at 3:08 PM on 1/4/2019 to document services personally performed by Rosalind Medrano MD based on my observations and the provider's statements to me.        Rosalind Medrano MD  01/05/19 0024

## 2019-01-04 NOTE — ED NOTES
Bed: ED25  Expected date:   Expected time:   Means of arrival:   Comments:  [facility] [place] - [phone] - 78 fall back pain eta 4256

## 2019-01-08 NOTE — PROGRESS NOTES
Problem(s) Oriented visit      SUBJECTIVE:                                                    Katherin Fletcher is a 78 year old female who presents to clinic today for:  Patient presents with:  Hospital F/U: Fall - walker stuck on carpet causing pt to lose balance and fall. Hit head and back    3 times a day, has horrific spasms in her legs. Uses extra Sinemet to help w/ her leg spasms. It sometimes helps, sometimes does not.   9:30-10:00 is the worst time. Legs are always a little edematous.   Is taking Sinemet, sometimes up to 12 pills a day.     ED/UC Followup:    Facility:  [facility] ED  Date of visit: 1/4/2019  Reason for visit: Mechanical Fall - walker got stuck on carpeting and she fell backwards, landed on her buttocks and hit head against the wall. Had abrasions and bruising. CT head neg for acute pathology (is on Eliquis for a fib and Plavix for hx of stenting and previous stroke). Xray ribs and chest did not show a rib fx. Was d/c'd to home w/ plan to f/u w/ PCP for routine healing.   Current Status: Back still a little painful, but feels better. Hematoma present; healing. She cannot see it.      Has appt coming up to assess her progressive neurodegenerative disease.     Problem list, Medication list, Allergies, and Medical/Social/Surgical histories reviewed in [facility] and updated as appropriate.     ROS:  10 point ROS completed and negative except noted above, including Gen, HEENT, CV, Resp, GI, , MS, Neurologic, Psych    Histories:   Patient Active Problem List   Diagnosis     Long term current use of anticoagulant therapy     CAD (coronary artery disease)     Hyperlipidemia     Health Care Home     MRSA (methicillin resistant Staphylococcus aureus)     Dysarthria     Disturbances of sensation of smell and taste     Neurologic gait disorder     Cerebellar disease     Essential hypertension     Ischemic cardiomyopathy     Peripheral polyneuropathy     Arterial leg ulcer (H)     PAD  (peripheral artery disease) (H)     Encounter for long-term (current) use of medications     Stroke (H)     Slurred speech     Neurodegenerative disorder     History of TIA (transient ischemic attack) and stroke     Falls frequently     Hypothyroidism, unspecified type     Ataxia     Past Medical History:   Diagnosis Date     CAD (coronary artery disease)     s/p CABG 1992 and redo CABG 2009 (LIMA to LAD, SVG to OM and SVG to posterolateral branch of RCA)     Chronic cough     Non-productive. No known definite etiology. Is beeing seen by [facility].      Essential hypertension, benign      Falls frequently 5/28/2018     GERD (gastroesophageal reflux disease)      History of TIA (transient ischemic attack) and stroke 5/7/2018    R sided facial N&T.      Hyperlipidaemia      Hyperlipidaemia LDL goal < 130      Ischemic cardiomyopathy      MI (myocardial infarction) (H) 11/1996     Mixed hyperlipidemia      Neurodegenerative disorder     Further detailed dx unknown. Managed by Dr. George Pham - [facility].      PAD (peripheral artery disease) (H)      Restless legs syndrome      Unspecified hypothyroidism      Past Surgical History:   Procedure Laterality Date     ARTHROSCOPY SHOULDER RT/LT       BUNIONECTOMY RT/LT       C CABG, ARTERY-VEIN, THREE  1/2009     C CARDIAC SURG PROCEDURE UNLIST       C HAND/FINGER SURGERY UNLISTED       C MRA UPPER EXTREMITY WO&W CONT       C SHOULDER SURG PROC UNLISTED       C STOMACH SURGERY PROCEDURE UNLISTED       CARDIAC SURGERY  1992    CABG,stents x2     CARPAL TUNNEL RELEASE RT/LT       EXCISE GANGLION HAND Right 11/29/2018    Procedure: EXCISE RIGHT VOLAR CARPAL MASS AND RIGHT INDEX TRIGGER FINGER RELEASE;  Surgeon: Minh Goldstein MD;  Location:  OR      VASCULAR SURGERY PROCEDURE UNLIST       HYSTERECTOMY, DAMIÁN  32 yo     OPEN REDUCTION INTERNAL FIXATION ANKLE Left 2015    Left Ankle     RELEASE TRIGGER FINGER       RELEASE TRIGGER FINGER Right 11/29/2018     Procedure: RIGHT INDEX TRIGGER FINGER RELEASE;  Surgeon: Minh Goldstein MD;  Location:  OR     REPAIR HAMMER TOE Right 2017    Procedure: REPAIR HAMMER TOE;  Right 2nd and 3rd toe pinning;  Surgeon: Fernando Wiggins MD;  Location:  OR     STENT  2006     STENT(aka CARDIAC)       VASCULAR SURGERY       Social History     Tobacco Use     Smoking status: Former Smoker     Packs/day: 0.50     Years: 22.00     Pack years: 11.00     Types: Cigarettes     Last attempt to quit: 4/15/1992     Years since quittin.7     Smokeless tobacco: Never Used   Substance Use Topics     Alcohol use: Yes     Alcohol/week: 0.6 oz     Types: 1 Standard drinks or equivalent per week     Comment: MAYBE ONCE A YEAR     Family History   Problem Relation Age of Onset     Unknown/Adopted Mother      Unknown/Adopted Father      Other - See Comments Son         on warfarin for one year after afganastan now off and doing well     Other - See Comments Daughter         celiac, brittle bone, arthritis, Raynauds       OBJECTIVE:                                                    /72   Pulse 69   Resp 16   Wt 70.3 kg (155 lb)   SpO2 99%   BMI 27.46 kg/m    Body mass index is 27.46 kg/m .   Gen: Cooperative. No acute distress. Neatly dressed; well groomed.   Eyes: PERRL, sclera white.   Ears/Nose/Mouth/Throat: Normal pinnae and ear canals, TMs without erythema or effusion. Oropharynx mucous membranes moist, without lesions, erythema, or exudate.   Neck: Using a soft c-collar to support her neck. It appears to be getting progressively weaker w/ time.   Heart: Regular rate and rhythm without murmurs, rubs, or gallops.   Lungs: Normal respiratory effort.   Musculoskeletal: No lower extremity edema.   Skin: Warm and well perfused.   Neurologic: Alert. Speech is dysarthric. Has excess saliva pooling. Gait appears unstable even w/ walker jermaine.   Psych:  Mood and affect are appropriate. Cognitively appears intact. Healthy  sense of humor intact.      ASSESSMENT/PLAN:                                                      Katherin was seen today for hospital f/u.    Diagnoses and all orders for this visit:    Neurodegenerative disorder  -     Discontinue: atropine 1 % ophthalmic solution; Place 1-2 drops under tongue TID prn for excess secretions.   For excess saliva. This was not covered by her insurance. Ipratropium was substituted.    I am concerned about the amount of Sinemet she is taking. During her appt, I contacted Dr. Anshu Gonzalez who will be seeing her next week. He recommended cutting back on the amt of Sinemet. I relayed the info to Katherin.     Falls frequently   She is careful around the house, but continues to fall. She is looking forward to visit at the [facility].     Hematoma of skin   Healing. Took photo w/ cell phone and shared w/ Katherin so she could see the hematoma on her back. Normal healing. I do not have capabilities to upload into Epic and deleted the photo afterward w/ Katherin's knowledge.     Abrasion   Healing as expected. No scarring expected.     Disturbance of salivary secretion  -     ipratropium (ATROVENT) 0.03 % nasal spray; 1-2  sprays  1-2 times daily prn into mouth    RTC prn.     >25 min spent with patient, greater than 50% spent on discussion/education/planning, etc. About The primary encounter diagnosis was Neurodegenerative disorder. Diagnoses of Falls frequently, Hematoma of skin, Abrasion, and Disturbance of salivary secretion were also pertinent to this visit.    The following health maintenance items are reviewed in Epic and correct as of today:  Health Maintenance   Topic Date Due     ZOSTER IMMUNIZATION (1 of 2) 05/27/1990     MEDICARE ANNUAL WELLNESS VISIT  03/29/2019     FALL RISK ASSESSMENT  10/11/2019     PHQ-2 Q1 YR  10/11/2019     LIPID SCREEN Q5 YR FEMALE (SYSTEM ASSIGNED)  05/07/2023     ADVANCE DIRECTIVE PLANNING Q5 YRS  07/10/2023     DTAP/TDAP/TD IMMUNIZATION (4 - Td) 06/04/2026      DEXA SCAN SCREENING (SYSTEM ASSIGNED)  Completed     INFLUENZA VACCINE  Completed     PNEUMOVAX IMMUNIZATION 65+ LOW/MEDIUM RISK  Completed     IPV IMMUNIZATION  Aged Out     MENINGITIS IMMUNIZATION  Aged Out       Halina Corrales MD  Family Medicine    For any issues, please call my office  [facility] at [phone].

## 2019-01-09 ENCOUNTER — OFFICE VISIT (OUTPATIENT)
Dept: FAMILY MEDICINE | Facility: CLINIC | Age: 79
End: 2019-01-09

## 2019-01-09 VITALS
DIASTOLIC BLOOD PRESSURE: 72 MMHG | RESPIRATION RATE: 16 BRPM | BODY MASS INDEX: 27.46 KG/M2 | HEART RATE: 69 BPM | WEIGHT: 155 LBS | SYSTOLIC BLOOD PRESSURE: 134 MMHG | OXYGEN SATURATION: 99 %

## 2019-01-09 DIAGNOSIS — K11.7 DISTURBANCE OF SALIVARY SECRETION: ICD-10-CM

## 2019-01-09 DIAGNOSIS — T14.8XXA HEMATOMA OF SKIN: ICD-10-CM

## 2019-01-09 DIAGNOSIS — R29.6 FALLS FREQUENTLY: ICD-10-CM

## 2019-01-09 DIAGNOSIS — G31.9 NEURODEGENERATIVE DISORDER (H): Primary | ICD-10-CM

## 2019-01-09 DIAGNOSIS — T14.8XXA ABRASION: ICD-10-CM

## 2019-01-09 PROCEDURE — 99214 OFFICE O/P EST MOD 30 MIN: CPT | Performed by: FAMILY MEDICINE

## 2019-01-09 RX ORDER — ATROPINE SULFATE 10 MG/ML
SOLUTION/ DROPS OPHTHALMIC
Qty: 15 ML | Refills: 11 | Status: SHIPPED | OUTPATIENT
Start: 2019-01-09 | End: 2019-01-10

## 2019-01-10 RX ORDER — IPRATROPIUM BROMIDE 21 UG/1
SPRAY, METERED NASAL
Qty: 30 ML | Refills: 1 | Status: ON HOLD | OUTPATIENT
Start: 2019-01-10 | End: 2019-08-17

## 2019-01-15 ENCOUNTER — OFFICE VISIT (OUTPATIENT)
Dept: NEUROLOGY | Facility: CLINIC | Age: 79
End: 2019-01-15
Payer: MEDICARE

## 2019-01-15 VITALS — SYSTOLIC BLOOD PRESSURE: 155 MMHG | TEMPERATURE: 97.8 F | DIASTOLIC BLOOD PRESSURE: 83 MMHG | HEART RATE: 72 BPM

## 2019-01-15 DIAGNOSIS — M62.81 GENERALIZED MUSCLE WEAKNESS: Primary | ICD-10-CM

## 2019-01-15 ASSESSMENT — PAIN SCALES - GENERAL: PAINLEVEL: NO PAIN (0)

## 2019-01-15 NOTE — LETTER
"1/15/2019       RE: Katherin Fletcher  [address]  [facility] [id number]     Dear Colleague,    Thank you for referring your patient, Katherin Fletcher, to the [facility] at [facility]. Please see a copy of my visit note below.    Chief Complaint: leg stiffness, head drop, saliva pooling.     History of Present Illness:    Katherin Fletcher is a 78 year old woman who I am seeing at the request of Dr. Villagomez for evaluation leg stiffness, head drop, saliva pooling. Her symptoms began about 4-5 years ago. About that time she developed stiffness of her legs as well as cramps and spasms. Progression of lower limb symptoms over the last 5 years has been slowly worsening. Stiffness has affected her gait and has lead to frequent falls. About 2-3 years ago she started using a walker and now uses a wheelchair for longer distances. She feels like her legs are weak. She denies atrophy or lower limb fasciculations.  She denies stiffness, cramps, or fasciculations in her upper limbs. About a year ago she developed a head drop. This also has been a slowly worsening process. Presently she can pick her head up from her chest but can only it keep it up for a few seconds. She wears a neck brace to help with support. Over the last several years she has noticed increased pooling of her saliva. She denies dysphagia. Her speech is dysarthric. She thinks this might have started 4 or 5 years ago (perhaps even longer). She denies shortness of breath. No bowel or bladder changes. No ptosis or diplopia.     She also reports that over the last few years she has become more \"wood\". She attributes this to frustration related to her inability to do the things she wants to do. She reports that she easily becomes tearful, even at things that are very sad.     She reports that about 3 or 4 years ago she was seen at [place] for this problem. 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 I do not have " the records from the [place] evaluation. She also has evaluated by Dr. Randolph. I see that extensive genetic testing for ataxic diseases was performed - all of which was negative.  Her last EMG was performed by Dr. Pham at [facility] in 2018. Just the right upper limb was studied. Median and ulnar motor and sensory responses were normal. In right upper limb muscles no abnormal spontaneous activity was reported, but chronic reinnervations changes were reported in all the sampled muscles.     Prior pertinent laboratory work-up:  10/18: Normal TSH  : Normal ESR. Hba1c 6.1.   : Genetic testing showed no mutations in the RKSD7WU (SCA8), TBP (SCA17). No expansions in ATXN1, ATXN2, ATXN3, VT6BL7N, TUZQ1A8.   : Next gen sequencing was negative for mutations in ABCB7, ABHD12, ACO2, AFG3L2, ANO10, AP4B1, AP4E1, AP4M1, AP4S1, AP5Z1, APTX, ATCAY, ATL1, KASSY, ATP7B, AT, BSCL2, R64esc4, U63ctt47, CA8, MCQQT9P, CACNB4, CAMTA1, CCT5, FCY28T8, CYP7B1, DNAJC19, ENTPD1, ERLIN2, FA2H, FGF14, FLVCR1, FXN, GJC2, GRM1, HEXA, HSPD1, IFRD1, ITPR1, KCNA1, KCNC3,  KCND3, KCNJ10, AOLO9829, BGAO5132, KIF5A, L1CAM, LYST, MARS2, MRE11A, MTPAP, NIPA1, NPC1, NPC2, OPA1, PDYN, PEX10, PEX2, PHYH, PIK3R5, PLP1, PNPLA6, POLG, POLR3A, PRICKLE1, PRKCG, NEN8TIA4, REEP1, PZL132, RTN2, SACS, SCN8A, SETX, SIL1, UJR38Y2, SLC1A3, ZRA35W7, DRT94U8, SPAST, SPG11, SPG20, SPG21, SPG7, SPTBN2, SYNE1, SYT14, TDP1, TGM6, TPP1, TTBK2, TTPA, VLDLR, WDR81, WWOX, ZFYVE26, ZFYVE27, MBO964     Prior imagin/18: MRI C spine showed severe spinal stenosis at C5-C6 resulting in flattening of the spinal cord and bilateral severe foraminal stenosis.  : MRI brain showed diffuse cerebral volume loss and cerebral white matter changes consistent with chronic small vessel ischemic disease    Prior electrophysiologic work-up:  9/15: EMG performed by Dr. Driver of right upper limb, cranial nerve muscles showed no abnormal spontaneous  activity.   6/18: Nerve conduction studies/needle EMG performed by Dr. Pham at [facility]. I reviewed the data. Just the right upper limb was studied. Median and ulnar motor and sensory responses were normal. In right upper limb muscles no abnormal spontaneous activity was reported, but chronic reinnervations changes were reported in all the sampled muscles.     Past Medical History:   Past Medical History:   Diagnosis Date     CAD (coronary artery disease)     s/p CABG 1992 and redo CABG 2009 (LIMA to LAD, SVG to OM and SVG to posterolateral branch of RCA)     Chronic cough     Non-productive. No known definite etiology. Is beeing seen by [facility].      Essential hypertension, benign      Falls frequently 5/28/2018     GERD (gastroesophageal reflux disease)      History of TIA (transient ischemic attack) and stroke 5/7/2018    R sided facial N&T.      Hyperlipidaemia      Hyperlipidaemia LDL goal < 130      Ischemic cardiomyopathy      MI (myocardial infarction) (H) 11/1996     Mixed hyperlipidemia      Neurodegenerative disorder     Further detailed dx unknown. Managed by Dr. George Pham - [facility].      PAD (peripheral artery disease) (H)      Restless legs syndrome      Unspecified hypothyroidism      Past Surgical History:  Past Surgical History:   Procedure Laterality Date     ARTHROSCOPY SHOULDER RT/LT       BUNIONECTOMY RT/LT       C CABG, ARTERY-VEIN, THREE  1/2009     C CARDIAC SURG PROCEDURE UNLIST       C HAND/FINGER SURGERY UNLISTED       C MRA UPPER EXTREMITY WO&W CONT       C SHOULDER SURG PROC UNLISTED       C STOMACH SURGERY PROCEDURE UNLISTED       CARDIAC SURGERY  1992    CABG,stents x2     CARPAL TUNNEL RELEASE RT/LT       EXCISE GANGLION HAND Right 11/29/2018    Procedure: EXCISE RIGHT VOLAR CARPAL MASS AND RIGHT INDEX TRIGGER FINGER RELEASE;  Surgeon: Minh Goldstein MD;  Location:  OR      VASCULAR SURGERY PROCEDURE UNLIST       HYSTERECTOMY, DAMIÁN  32 yo      OPEN REDUCTION INTERNAL FIXATION ANKLE Left 2015    Left Ankle     RELEASE TRIGGER FINGER       RELEASE TRIGGER FINGER Right 11/29/2018    Procedure: RIGHT INDEX TRIGGER FINGER RELEASE;  Surgeon: Minh Goldstein MD;  Location: [facility] OR     REPAIR HAMMER TOE Right 12/19/2017    Procedure: REPAIR HAMMER TOE;  Right 2nd and 3rd toe pinning;  Surgeon: Fernando Wiggins MD;  Location: [facility] OR     STENT  2006     STENT(aka CARDIAC)  2008     VASCULAR SURGERY       Family history:    There is no known family history of myopathy or other neuromuscular disorders, but she is adopted.    Social History:    She denies tobacco, alcohol, or illicit drug use.    Medical Allergies:     Allergies   Allergen Reactions     Ace Inhibitors Cough     Baclofen Other (See Comments)     Constipation , tiredness     Penicillins      10/7/14 Hives per patient -- many many years ago       Nickel Swelling and Rash     Other reaction(s): Unknown       Current Medications:   Current Outpatient Medications   Medication     Alfalfa 250 MG TABS     apixaban ANTICOAGULANT (ELIQUIS) 5 MG tablet     calcium carbonate-vitamin D (CALCIUM + D) 600-200 MG-UNIT TABS     carbidopa-levodopa (SINEMET)  MG per tablet     carvedilol (COREG) 6.25 MG tablet     clopidogrel (PLAVIX) 75 MG tablet     HYDROcodone-acetaminophen (NORCO) 5-325 MG tablet     ipratropium (ATROVENT) 0.03 % nasal spray     isosorbide mononitrate (IMDUR) 30 MG 24 hr tablet     levothyroxine (SYNTHROID/LEVOTHROID) 112 MCG tablet     Multiple Vitamins-Minerals (DAILY MULTI) TABS     nitroglycerin (NITROSTAT) 0.4 MG SL tablet     omeprazole (PRILOSEC) 20 MG CR capsule     rosuvastatin (CRESTOR) 20 MG tablet     No current facility-administered medications for this visit.      Physical examination:    /83 (BP Location: Right arm, Patient Position: Chair, Cuff Size: Adult Regular)   Pulse 72   Temp 97.8  F (36.6  C)       General Appearance: NAD    Skin: There are no rashes or  other skin lesions.    Musculoskeletal:  There is no scoliosis, lordosis, kyphosis, pes cavus, or hammertoes.    Neurologic examination:    Mental status:  Patient is alert, attentive, and oriented x 3.  Language is coherent and fluent without dysarthria or aphasia.  Memory, comprehension and ability to follow commands were intact.       Cranial nerves:  Pupils were round and reacted to light.  Extraocular movements were full. She speaks with a spastic dysarthria. Tongue movements are slow but bulk seems ok. There was no face, jaw, and palate weakness or atrophy. Hearing was grossly intact.       Motor exam: There is a mild degree of right > left FDI atrophy. No other areas of atrophy were detected, and I did not see any fasciculations. Tone is increased in the lower limbs, but probably normal in the upper limbs. Manual muscle testing revealed the following MRC grade muscle power:   Right Left   Neck flexion 5    Neck extension: 4-    Shoulder abduction:  4 4   Elbow extension: 5 5   Elbow flexion:  5 5   Wrist flexion:  5 5   Wrist extension:  5 5   FDI 4 4+   Hip flexion 5 5   Knee flexion 5 5   Knee extension 5 5   Dorsiflexion 5 5   Plantar flexion 5 5     Complex motor skills: No tremor. I did not find her to be ataxic with FNF or HTS testing.     Sensory exam: Vibration is slightly reduced in the toes, which is probably normal for her age. Vibration normal in fingers. Pin is intact in the hands and feet.     Gait: Very unsteady when upright. Has a wide base with spastic appearing gait.     Deep tendon reflexes:   Right Left   Triceps 3 2   Biceps 3 2   Brachioradialis 3 2   Knee jerk 3-4 (elicts a few beats of clonus) 3-4 (elicts a few beats of clonus)   Ankle jerk 2-3 Artifical ankle   Plantar responses extensor on the left and probably also the right. Nonsustained clonus at right ankle. Jaw jerk is not brisk.      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 She has severe spinal stenosis at C5-C6 with cord flattening at that level. While this might explain her lower limb symptoms, clearly the spastic dysarthria can not be explained by the cervical cord changes. Camptocormia is a symptom that can be part motor neuron diseases, NMJ disorders, extrapyramidal disorders such as Parkinson's Disease, Inclusion body myositis, and a few genetically determined myopathies. It may also be seen in the context of severe cervical spondylitic disease and may present as an isolated myopathy sometimes referred to as idiopathic axial myopathy.  She has no stigmata of a generalized myopathy (genetically determined or [employer]) and no Parkinson Symptoms, and so these can be excluded. Clearly the problem is not isolated to the paraspinal muscles, and so this is not idiopathic axial myopathy. The spondylotic changes in the cervical spine may well contribute, still this does not explain the dysarthria. I will check her for MG antibodies, but would be very surprised if this turned out to be a NMJ disorder. When these other considerations for camptocormia are excluded it leaves motor neuron disorders as the most likely explanation. I am going to repeat the EMG to see if there is evidence for this. It is challenging to sort out how much of the upper motor neuron findings are from cervical stenosis vs those that are from another process (like MND) - but if there is widespread denervation/reinnervation changes on the EMG then this should be telling (there was no widespread denervation on the EMG in 2015 performed by Dr. Driver). I do wonder about PLS here, but the cervical spinal stenosis makes that diagnostically challenging and also makes the camptocormia harder to explain. I will see her back after the EMG for additional comment.     Plan:      1. Labs: [facility], [facility], CK  2. Nerve conduction studies/EMG: Eval for generalized motor neuron  disorder  3. Follow up after above    Again, thank you for allowing me to participate in the care of your patient.      Sincerely,    Luis A Gonzalez MD

## 2019-01-15 NOTE — PROGRESS NOTES
"Chief Complaint: leg stiffness, head drop, saliva pooling.     History of Present Illness:    Katherin Fletcher is a 78 year old woman who I am seeing at the request of Dr. Villagomez for evaluation leg stiffness, head drop, saliva pooling. Her symptoms began about 4-5 years ago. About that time she developed stiffness of her legs as well as cramps and spasms. Progression of lower limb symptoms over the last 5 years has been slowly worsening. Stiffness has affected her gait and has lead to frequent falls. About 2-3 years ago she started using a walker and now uses a wheelchair for longer distances. She feels like her legs are weak. She denies atrophy or lower limb fasciculations.  She denies stiffness, cramps, or fasciculations in her upper limbs. About a year ago she developed a head drop. This also has been a slowly worsening process. Presently she can pick her head up from her chest but can only it keep it up for a few seconds. She wears a neck brace to help with support. Over the last several years she has noticed increased pooling of her saliva. She denies dysphagia. Her speech is dysarthric. She thinks this might have started 4 or 5 years ago (perhaps even longer). She denies shortness of breath. No bowel or bladder changes. No ptosis or diplopia.     She also reports that over the last few years she has become more \"wood\". She attributes this to frustration related to her inability to do the things she wants to do. She reports that she easily becomes tearful, even at things that are very sad.     She reports that about 3 or 4 years ago she was seen at [facility] for this problem. 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 I do not have the records from the [facility] evaluation. She also has evaluated by Dr. Randolph. I see that extensive genetic testing for ataxic diseases was performed - all of which was negative.  Her last EMG was performed by Dr. Pham at [facility] in June 2018. Just the right upper limb was " studied. Median and ulnar motor and sensory responses were normal. In right upper limb muscles no abnormal spontaneous activity was reported, but chronic reinnervations changes were reported in all the sampled muscles.     Prior pertinent laboratory work-up:  10/18: Normal TSH  : Normal ESR. Hba1c 6.1.   : Genetic testing showed no mutations in the PNHJ2AF (SCA8), TBP (SCA17). No expansions in ATXN1, ATXN2, ATXN3, PW5RF9B, FXAC3G6.   : Next gen sequencing was negative for mutations in ABCB7, ABHD12, ACO2, AFG3L2, ANO10, AP4B1, AP4E1, AP4M1, AP4S1, AP5Z1, APTX, ATCAY, ATL1, KASSY, ATP7B, AT, BSCL2, P65eac6, N52yki67, CA8, CXOCW7U, CACNB4, CAMTA1, CCT5, UVS36I6, CYP7B1, DNAJC19, ENTPD1, ERLIN2, FA2H, FGF14, FLVCR1, FXN, GJC2, GRM1, HEXA, HSPD1, IFRD1, ITPR1, KCNA1, KCNC3,  KCND3, KCNJ10, XQXP9473, ZLKD6334, KIF5A, L1CAM, LYST, MARS2, MRE11A, MTPAP, NIPA1, NPC1, NPC2, OPA1, PDYN, PEX10, PEX2, PHYH, PIK3R5, PLP1, PNPLA6, POLG, POLR3A, PRICKLE1, PRKCG, KBX6JTX1, REEP1, VBE197, RTN2, SACS, SCN8A, SETX, SIL1, ZXI95P2, SLC1A3, IKH44X1, UQL60Y9, SPAST, SPG11, SPG20, SPG21, SPG7, SPTBN2, SYNE1, SYT14, TDP1, TGM6, TPP1, TTBK2, TTPA, VLDLR, WDR81, WWOX, ZFYVE26, ZFYVE27, HSW168     Prior imagin/18: MRI C spine showed severe spinal stenosis at C5-C6 resulting in flattening of the spinal cord and bilateral severe foraminal stenosis.  : MRI brain showed diffuse cerebral volume loss and cerebral white matter changes consistent with chronic small vessel ischemic disease    Prior electrophysiologic work-up:  9/15: EMG performed by Dr. Driver of right upper limb, cranial nerve muscles showed no abnormal spontaneous activity.   : Nerve conduction studies/needle EMG performed by Dr. Pham at [facility]. I reviewed the data. Just the right upper limb was studied. Median and ulnar motor and sensory responses were normal. In right upper limb muscles no abnormal spontaneous activity was  reported, but chronic reinnervations changes were reported in all the sampled muscles.     Past Medical History:   Past Medical History:   Diagnosis Date     CAD (coronary artery disease)     s/p CABG 1992 and redo CABG 2009 (LIMA to LAD, SVG to OM and SVG to posterolateral branch of RCA)     Chronic cough     Non-productive. No known definite etiology. Is beeing seen by [facility].      Essential hypertension, benign      Falls frequently 5/28/2018     GERD (gastroesophageal reflux disease)      History of TIA (transient ischemic attack) and stroke 5/7/2018    R sided facial N&T.      Hyperlipidaemia      Hyperlipidaemia LDL goal < 130      Ischemic cardiomyopathy      MI (myocardial infarction) (H) 11/1996     Mixed hyperlipidemia      Neurodegenerative disorder     Further detailed dx unknown. Managed by Dr. George Pham - [facility].      PAD (peripheral artery disease) (H)      Restless legs syndrome      Unspecified hypothyroidism      Past Surgical History:  Past Surgical History:   Procedure Laterality Date     ARTHROSCOPY SHOULDER RT/LT       BUNIONECTOMY RT/LT       C CABG, ARTERY-VEIN, THREE  1/2009     C CARDIAC SURG PROCEDURE UNLIST       C HAND/FINGER SURGERY UNLISTED       C MRA UPPER EXTREMITY WO&W CONT       C SHOULDER SURG PROC UNLISTED       C STOMACH SURGERY PROCEDURE UNLISTED       CARDIAC SURGERY  1992    CABG,stents x2     CARPAL TUNNEL RELEASE RT/LT       EXCISE GANGLION HAND Right 11/29/2018    Procedure: EXCISE RIGHT VOLAR CARPAL MASS AND RIGHT INDEX TRIGGER FINGER RELEASE;  Surgeon: Minh Goldstein MD;  Location:  OR      VASCULAR SURGERY PROCEDURE UNLIST       HYSTERECTOMY, DAMIÁN  32 yo     OPEN REDUCTION INTERNAL FIXATION ANKLE Left 2015    Left Ankle     RELEASE TRIGGER FINGER       RELEASE TRIGGER FINGER Right 11/29/2018    Procedure: RIGHT INDEX TRIGGER FINGER RELEASE;  Surgeon: Minh Goldstein MD;  Location:  OR     REPAIR HAMMER TOE Right 12/19/2017    Procedure: REPAIR  HAMMER TOE;  Right 2nd and 3rd toe pinning;  Surgeon: Fernando Wiggins MD;  Location: [facility] OR     STENT  2006     STENT(aka CARDIAC)  2008     VASCULAR SURGERY       Family history:    There is no known family history of myopathy or other neuromuscular disorders, but she is adopted.    Social History:    She denies tobacco, alcohol, or illicit drug use.    Medical Allergies:     Allergies   Allergen Reactions     Ace Inhibitors Cough     Baclofen Other (See Comments)     Constipation , tiredness     Penicillins      10/7/14 Hives per patient -- many many years ago       Nickel Swelling and Rash     Other reaction(s): Unknown       Current Medications:   Current Outpatient Medications   Medication     Alfalfa 250 MG TABS     apixaban ANTICOAGULANT (ELIQUIS) 5 MG tablet     calcium carbonate-vitamin D (CALCIUM + D) 600-200 MG-UNIT TABS     carbidopa-levodopa (SINEMET)  MG per tablet     carvedilol (COREG) 6.25 MG tablet     clopidogrel (PLAVIX) 75 MG tablet     HYDROcodone-acetaminophen (NORCO) 5-325 MG tablet     ipratropium (ATROVENT) 0.03 % nasal spray     isosorbide mononitrate (IMDUR) 30 MG 24 hr tablet     levothyroxine (SYNTHROID/LEVOTHROID) 112 MCG tablet     Multiple Vitamins-Minerals (DAILY MULTI) TABS     nitroglycerin (NITROSTAT) 0.4 MG SL tablet     omeprazole (PRILOSEC) 20 MG CR capsule     rosuvastatin (CRESTOR) 20 MG tablet     No current facility-administered medications for this visit.      Review of Systems: A complete review of systems was obtained and was negative except for what was noted above.    Physical examination:    /83 (BP Location: Right arm, Patient Position: Chair, Cuff Size: Adult Regular)   Pulse 72   Temp 97.8  F (36.6  C)      General Appearance: NAD    Skin: There are no rashes or other skin lesions.    Musculoskeletal:  There is no scoliosis, lordosis, kyphosis, pes cavus, or hammertoes.    Neurologic examination:    Mental status:  Patient is alert, attentive,  and oriented x 3.  Language is coherent and fluent without dysarthria or aphasia.  Memory, comprehension and ability to follow commands were intact.       Cranial nerves:  Pupils were round and reacted to light.  Extraocular movements were full. She speaks with a spastic dysarthria. Tongue movements are slow but bulk seems ok. There was no face, jaw, and palate weakness or atrophy. Hearing was grossly intact.       Motor exam: There is a mild degree of right > left FDI atrophy. No other areas of atrophy were detected, and I did not see any fasciculations. Tone is increased in the lower limbs, but probably normal in the upper limbs. Manual muscle testing revealed the following MRC grade muscle power:   Right Left   Neck flexion 5    Neck extension: 4-    Shoulder abduction:  4 4   Elbow extension: 5 5   Elbow flexion:  5 5   Wrist flexion:  5 5   Wrist extension:  5 5   FDI 4 4+   Hip flexion 5 5   Knee flexion 5 5   Knee extension 5 5   Dorsiflexion 5 5   Plantar flexion 5 5     Complex motor skills: No tremor. I did not find her to be ataxic with FNF or HTS testing.     Sensory exam: Vibration is slightly reduced in the toes, which is probably normal for her age. Vibration normal in fingers. Pin is intact in the hands and feet.     Gait: Very unsteady when upright. Has a wide base with spastic appearing gait.     Deep tendon reflexes:   Right Left   Triceps 3 2   Biceps 3 2   Brachioradialis 3 2   Knee jerk 3-4 (elicts a few beats of clonus) 3-4 (elicts a few beats of clonus)   Ankle jerk 2-3 Artifical ankle   Plantar responses extensor on the left and probably also the right. Nonsustained clonus at right ankle. Jaw jerk is not brisk.      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 She has severe spinal stenosis at C5-C6 with cord flattening at that level. While this might explain her lower limb symptoms,  clearly the spastic dysarthria can not be explained by the cervical cord changes. Camptocormia is a symptom that can be part motor neuron diseases, NMJ disorders, extrapyramidal disorders such as Parkinson's Disease, Inclusion body myositis, and a few genetically determined myopathies. It may also be seen in the context of severe cervical spondylitic disease and may present as an isolated myopathy sometimes referred to as idiopathic axial myopathy.  She has no stigmata of a generalized myopathy (genetically determined or IBM) and no Parkinson Symptoms, and so these can be excluded. Clearly the problem is not isolated to the paraspinal muscles, and so this is not idiopathic axial myopathy. The spondylotic changes in the cervical spine may well contribute, still this does not explain the dysarthria. I will check her for MG antibodies, but would be very surprised if this turned out to be a NMJ disorder. When these other considerations for camptocormia are excluded it leaves motor neuron disorders as the most likely explanation. I am going to repeat the EMG to see if there is evidence for this. It is challenging to sort out how much of the upper motor neuron findings are from cervical stenosis vs those that are from another process (like MND) - but if there is widespread denervation/reinnervation changes on the EMG then this should be telling (there was no widespread denervation on the EMG in 2015 performed by Dr. Driver). I do wonder about PLS here, but the cervical spinal stenosis makes that diagnostically challenging and also makes the camptocormia harder to explain. I will see her back after the EMG for additional comment.     Plan:      1. Labs: ACHr, Musk, CK  2. Nerve conduction studies/EMG: Eval for generalized motor neuron disorder  3. Follow up after above  ---  1/29/19: The NCS/EMG showed extensive chronic reinnervation changes in the upper and lower limbs. Active denervation changes were present, but  mild and patchy. The findings are those that can be seen in slowly progressive MND, but also might reflect chronic LS and cervical radics. Although there certainly is some radiographic evidence for radiculopathy (especially at C5-C6), overall I do not think the imaging deficits are enough to explain the widespread chronic reinnervation changes. 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 I am going to have her complete the blood testing as above, but very much doubt that will change my impression. I discussed these diagnostic opinions with her today. I am going to see if one of my colleagues in the [facility] MND clinic will see her.

## 2019-01-17 ENCOUNTER — HOSPITAL ENCOUNTER (OUTPATIENT)
Dept: PHYSICAL THERAPY | Facility: CLINIC | Age: 79
Setting detail: THERAPIES SERIES
End: 2019-01-17
Attending: PHYSICAL MEDICINE & REHABILITATION
Payer: MEDICARE

## 2019-01-17 PROCEDURE — 97110 THERAPEUTIC EXERCISES: CPT | Mod: GP | Performed by: PHYSICAL THERAPIST

## 2019-01-23 ENCOUNTER — OFFICE VISIT (OUTPATIENT)
Dept: ORTHOPEDICS | Facility: CLINIC | Age: 79
End: 2019-01-23
Payer: MEDICARE

## 2019-01-23 DIAGNOSIS — L60.2 LONG TOENAIL: ICD-10-CM

## 2019-01-23 DIAGNOSIS — L60.0 INGROWN NAIL: Primary | ICD-10-CM

## 2019-01-23 DIAGNOSIS — I73.9 PAD (PERIPHERAL ARTERY DISEASE) (H): ICD-10-CM

## 2019-01-23 DIAGNOSIS — L03.031 PARONYCHIA OF TOE OF RIGHT FOOT: ICD-10-CM

## 2019-01-23 NOTE — LETTER
1/23/2019       RE: Katherin Fletcher  [address]  [place] [address]     Dear Colleague,    Thank you for referring your patient, Katherin Fletcher, to the [facility]. Please see a copy of my visit note below.    Chief Complaint:   Chief Complaint   Patient presents with     RECHECK     Pain, right hallux. patient state dthat her toe is very red, swollen and tender. Patient also stated that she needs a toe nail trimming.      Subjective: Katherin is a 78 year old female who presents to the clinic today for evaluation of ingrown toenail of right hallux. She would like all toenails trimmed as well. The right hallux lateral border continually becomes ingrown, mostly from the pressure of the second digit abutting the lateral hallux. Today it is painful, edematous, slightly red. She has not noticed drainage. Her skin is otherwise in good condition, denies rashes or open wounds. Admits chronic LE edema. Denies other toenails causing her discomfort or LE pain.    Allergies   Allergen Reactions     Ace Inhibitors Cough     Baclofen Other (See Comments)     Constipation , tiredness     Penicillins      10/7/14 Hives per patient -- many many years ago       Nickel Swelling and Rash     Other reaction(s): Unknown     Current Outpatient Rx   Medication Sig Dispense Refill     Duplin 250 MG TABS Take 250 mg by mouth every morning        apixaban ANTICOAGULANT (ELIQUIS) 5 MG tablet Take 1 tablet (5 mg) by mouth 2 times daily 180 tablet 3     calcium carbonate-vitamin D (CALCIUM + D) 600-200 MG-UNIT TABS Take 1 tablet by mouth At Bedtime        carbidopa-levodopa (SINEMET)  MG per tablet TAKE 2 TABLETS FOUR TIMES A  tablet 3     carvedilol (COREG) 6.25 MG tablet Take 1 tablet (6.25 mg) by mouth 2 times daily (with meals) 180 tablet 3     clopidogrel (PLAVIX) 75 MG tablet Take 1 tablet (75 mg) by mouth daily 90 tablet 3      HYDROcodone-acetaminophen (NORCO) 5-325 MG tablet Take 1-2 tablets by mouth every 4 hours as needed for pain 10 tablet 0     ipratropium (ATROVENT) 0.03 % nasal spray 1-2  sprays  1-2 times daily prn into mouth 30 mL 1     isosorbide mononitrate (IMDUR) 30 MG 24 hr tablet Take 1 tablet (30 mg) by mouth daily 90 tablet 3     levothyroxine (SYNTHROID/LEVOTHROID) 112 MCG tablet TAKE 1 TABLET DAILY 90 tablet 1     Multiple Vitamins-Minerals (DAILY MULTI) TABS Take 1 tablet by mouth every morning        nitroglycerin (NITROSTAT) 0.4 MG SL tablet DISSOLVE ONE TABLET UNDER THE TONGUE EVERY 5 MINUTES AS NEEDED FOR CHEST PAIN.  DO NOT EXCEED A TOTAL OF 3 DOSES IN 15 MINUTES 25 tablet 3     omeprazole (PRILOSEC) 20 MG CR capsule TAKE 2 CAPSULES DAILY 30 TO 60 MINUTES BEFORE A MEAL 180 capsule 1     rosuvastatin (CRESTOR) 20 MG tablet Take 1 tablet (20 mg) by mouth daily 90 tablet 3       Objective:   There were no vitals taken for this visit.    General: Patient is well groomed, appears stated age, is alert, cooperative and in no acute distress.  Vascular: DP pulses are 1/4 and PT pulses are non-palpable bilaterally. LE capillary refill time is <3 seconds. Absent pedal hair. Mild non-pitting LE edema.  MSK: Dorsally contracted digits 2-5 that are non-reducible, very limited passive ROM. Equinus present.  Neurologic: Gross sensation intact to bilateral LE.   Skin: LE skin color, texture and turgor are normal. Toenails are slightly elongated bilaterally. Right lateral hallux nail is ingrown mildly, nail border is edematous and mildly erythematous. No drainage or calor. Mild odor present from macerated tissue.      Assessment:   - Elongated toenails  - Ingrown R lateral hallux toenail  - Chronic anticoagulant use for atrial fibrillation  - Peripheral arterial disease with history of lower leg ulceration      Plan:   - Toenails trimmed x 10. Slantback performed to right lateral hallux nail  - Again discussed permanent nail  removal of R lateral hallux nail. She would like to try soaks and antibiotic ointment/bandaid for a week or so then call if symptoms persist to proceed with procedure.  - Pt to return to clinic in 2 weeks if symptoms not resolved.       Again, thank you for allowing me to participate in the care of your patient.      Sincerely,    Taty Bullard PA-C

## 2019-01-23 NOTE — PROGRESS NOTES
Chief Complaint:   Chief Complaint   Patient presents with     RECHECK     Pain, right hallux. patient state dthat her toe is very red, swollen and tender. Patient also stated that she needs a toe nail trimming.      Subjective: Katherin is a 78 year old female who presents to the clinic today for evaluation of ingrown toenail of right hallux. She would like all toenails trimmed as well. The right hallux lateral border continually becomes ingrown, mostly from the pressure of the second digit abutting the lateral hallux. Today it is painful, edematous, slightly red. She has not noticed drainage. Her skin is otherwise in good condition, denies rashes or open wounds. Admits chronic LE edema. Denies other toenails causing her discomfort or LE pain.    Allergies   Allergen Reactions     Ace Inhibitors Cough     Baclofen Other (See Comments)     Constipation , tiredness     Penicillins      10/7/14 Hives per patient -- many many years ago       Nickel Swelling and Rash     Other reaction(s): Unknown     Current Outpatient Rx   Medication Sig Dispense Refill     [place] 250 MG TABS Take 250 mg by mouth every morning        apixaban ANTICOAGULANT (ELIQUIS) 5 MG tablet Take 1 tablet (5 mg) by mouth 2 times daily 180 tablet 3     calcium carbonate-vitamin D (CALCIUM + D) 600-200 MG-UNIT TABS Take 1 tablet by mouth At Bedtime        carbidopa-levodopa (SINEMET)  MG per tablet TAKE 2 TABLETS FOUR TIMES A  tablet 3     carvedilol (COREG) 6.25 MG tablet Take 1 tablet (6.25 mg) by mouth 2 times daily (with meals) 180 tablet 3     clopidogrel (PLAVIX) 75 MG tablet Take 1 tablet (75 mg) by mouth daily 90 tablet 3     HYDROcodone-acetaminophen (NORCO) 5-325 MG tablet Take 1-2 tablets by mouth every 4 hours as needed for pain 10 tablet 0     ipratropium (ATROVENT) 0.03 % nasal spray 1-2  sprays  1-2 times daily prn into mouth 30 mL 1     isosorbide mononitrate (IMDUR) 30 MG 24 hr tablet Take 1 tablet (30 mg) by mouth daily  90 tablet 3     levothyroxine (SYNTHROID/LEVOTHROID) 112 MCG tablet TAKE 1 TABLET DAILY 90 tablet 1     Multiple Vitamins-Minerals (DAILY MULTI) TABS Take 1 tablet by mouth every morning        nitroglycerin (NITROSTAT) 0.4 MG SL tablet DISSOLVE ONE TABLET UNDER THE TONGUE EVERY 5 MINUTES AS NEEDED FOR CHEST PAIN.  DO NOT EXCEED A TOTAL OF 3 DOSES IN 15 MINUTES 25 tablet 3     omeprazole (PRILOSEC) 20 MG CR capsule TAKE 2 CAPSULES DAILY 30 TO 60 MINUTES BEFORE A MEAL 180 capsule 1     rosuvastatin (CRESTOR) 20 MG tablet Take 1 tablet (20 mg) by mouth daily 90 tablet 3       Objective:   There were no vitals taken for this visit.    General: Patient is well groomed, appears stated age, is alert, cooperative and in no acute distress.  Vascular: DP pulses are 1/4 and PT pulses are non-palpable bilaterally. LE capillary refill time is <3 seconds. Absent pedal hair. Mild non-pitting LE edema.  MSK: Dorsally contracted digits 2-5 that are non-reducible, very limited passive ROM. Equinus present.  Neurologic: Gross sensation intact to bilateral LE.   Skin: LE skin color, texture and turgor are normal. Toenails are slightly elongated bilaterally. Right lateral hallux nail is ingrown mildly, nail border is edematous and mildly erythematous. No drainage or calor. Mild odor present from macerated tissue.      Assessment:   - Elongated toenails  - Ingrown R lateral hallux toenail  - Chronic anticoagulant use for atrial fibrillation  - Peripheral arterial disease with history of lower leg ulceration      Plan:   - Toenails trimmed x 10. Slantback performed to right lateral hallux nail  - Again discussed permanent nail removal of R lateral hallux nail. She would like to try soaks and antibiotic ointment/bandaid for a week or so then call if symptoms persist to proceed with procedure.  - Pt to return to clinic in 2 weeks if symptoms not resolved.

## 2019-01-23 NOTE — NURSING NOTE
Reason For Visit:   Chief Complaint   Patient presents with     RECHECK     Pain, right hallux. patient state dthat her toe is very red, swollen and tender. Patient also stated that she needs a toe nail trimming.        Pain Assessment  Patient Currently in Pain: Yes  0-10 Pain Scale: 3  Primary Pain Location: (Right Hallux )                 Current Outpatient Medications   Medication Sig Dispense Refill     [place] 250 MG TABS Take 250 mg by mouth every morning        apixaban ANTICOAGULANT (ELIQUIS) 5 MG tablet Take 1 tablet (5 mg) by mouth 2 times daily 180 tablet 3     calcium carbonate-vitamin D (CALCIUM + D) 600-200 MG-UNIT TABS Take 1 tablet by mouth At Bedtime        carbidopa-levodopa (SINEMET)  MG per tablet TAKE 2 TABLETS FOUR TIMES A  tablet 3     carvedilol (COREG) 6.25 MG tablet Take 1 tablet (6.25 mg) by mouth 2 times daily (with meals) 180 tablet 3     clopidogrel (PLAVIX) 75 MG tablet Take 1 tablet (75 mg) by mouth daily 90 tablet 3     HYDROcodone-acetaminophen (NORCO) 5-325 MG tablet Take 1-2 tablets by mouth every 4 hours as needed for pain 10 tablet 0     ipratropium (ATROVENT) 0.03 % nasal spray 1-2  sprays  1-2 times daily prn into mouth 30 mL 1     isosorbide mononitrate (IMDUR) 30 MG 24 hr tablet Take 1 tablet (30 mg) by mouth daily 90 tablet 3     levothyroxine (SYNTHROID/LEVOTHROID) 112 MCG tablet TAKE 1 TABLET DAILY 90 tablet 1     Multiple Vitamins-Minerals (DAILY MULTI) TABS Take 1 tablet by mouth every morning        nitroglycerin (NITROSTAT) 0.4 MG SL tablet DISSOLVE ONE TABLET UNDER THE TONGUE EVERY 5 MINUTES AS NEEDED FOR CHEST PAIN.  DO NOT EXCEED A TOTAL OF 3 DOSES IN 15 MINUTES 25 tablet 3     omeprazole (PRILOSEC) 20 MG CR capsule TAKE 2 CAPSULES DAILY 30 TO 60 MINUTES BEFORE A MEAL 180 capsule 1     rosuvastatin (CRESTOR) 20 MG tablet Take 1 tablet (20 mg) by mouth daily 90 tablet 3          Allergies   Allergen Reactions     Ace Inhibitors Cough     Baclofen Other  (See Comments)     Constipation , tiredness     Penicillins      10/7/14 Hives per patient -- many many years ago       Nickel Swelling and Rash     Other reaction(s): Unknown

## 2019-01-29 ENCOUNTER — OFFICE VISIT (OUTPATIENT)
Dept: NEUROLOGY | Facility: CLINIC | Age: 79
End: 2019-01-29
Payer: MEDICARE

## 2019-01-29 DIAGNOSIS — G56.22 ULNAR NEUROPATHY OF LEFT UPPER EXTREMITY: ICD-10-CM

## 2019-01-29 DIAGNOSIS — M47.22 OSTEOARTHRITIS OF SPINE WITH RADICULOPATHY, CERVICAL REGION: Primary | ICD-10-CM

## 2019-01-29 DIAGNOSIS — M54.17 L-S RADICULOPATHY: ICD-10-CM

## 2019-01-29 NOTE — PROGRESS NOTES
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 Onset 4-5 years ago. She has severe spinal stenosis at C5-C6 with cord flattening at that level. Eval for motor neuron disorder.     Techniques: Motor and sensory conduction studies were done with surface recording electrodes. EMG was done with a concentric needle electrode.      Results:  Nerve conduction studies:   1. Bilateral sural sensory responses are absent.   2. Left SP sensory response shows normal CV and reduced amplitude.   3. Left ulnar-D5 sensory response shows mildly reduced amplitude and normal CV.  4. Left median-D2 and radial sensory responses are normal.   5. Left peroneal-EDB and tibial-AH motor responses show normal DL, moderately reduced amplitudes and normal CV.  6. Right peroneal-EDB and tibial-AH motor responses show normal DL and reduced amplitudes.   7. Left ulnar-ADM motor response shows normal DL, normal amplitude and moderate CV slowing across the elbow.   8. Left median-APB motor response shows borderline prolonged DL, normal amplitude and normal CV.   9. Note made of mild edema in the distal lower limbs.      Needle EM. Fibrillation potentials and positive sharp waves were seen in the left biceps, FDI, TA, and gastrocnemius muscles.   2. Fasciculation potentials were seen in the left biceps, PT, FDI, and EIP muscles.   3. Although no definite abnormal spontaneous activity was seen in the left cervical PS, thoracic PS, or tongue muscles, relaxation was not satisfactorily achieved in these muscles. As such, mild degrees of abnormal spontaneous activity can not be excluded.   4. Large amplitude and long  duration motor unit potentials (MUP) most of which had increased polyphasia were seen in all of the sampled muscles as tabulated below.   3. Rapidly firing MUPs with reduced recruitment were seen in the left deltoid, triceps, PT, FDI, EIP, TA, and gastrocnemius muscles.     Interpretation:  This is an abnormal study. 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 These abnormalities can be seen in the context of chronic multilevel radiculopathies co-existing at the cervical C5-C8 and lumbosacral L4-S1 levels. A very slowly progressive generalized motor neuron disorder with minimal evidence of active denervating change can not be excluded. 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 Finally, note is made of absent sural sensory responses on both sides. Although this finding can be seen in the context of length-dependant sensory axonal polyneuropathies, sural sensory responses can be difficult to elicit in normal individuals over the age of 60 years. As such (and especially also in the presence of mild edema in the distal lower limbs) this study is unable to provide an electrophysiologic diagnosis of polyneuropathy with certainty. Clinical correlation is recommended.     Luis A Gonzalez MD  Department of Neurology      Sensory NCS      Nerve / Sites Rec. Site Onset Peak NP Amp Ref. PP Amp Dist Allan Ref. Temp     ms ms  V  V  V cm m/s m/s  C   L MEDIAN - Dig II Anti      Wrist Dig II 2.71 3.80 20.3 10.0 27.7 14 51.7 48.0 32.4   L ULNAR - Dig V Anti      Wrist Dig V 2.60 3.23 7.2 8.0 8.8 12.5 48.0 48.0 32   L RADIAL - Snuff      Forearm Snuff 1.82 2.60 16.0 15.0 17.3 10 54.9 48.0 30.8   L SURAL - Lat Mall 60      Calf Ankle NR NR NR 5.0 NR 14 NR 38.0 29.2   R SURAL - Lat Mall 60      Calf Ankle NR NR NR 5.0 NR 14 NR 38.0 29   L SUP PERONEAL      Lat Leg Davidson 3.13 4.06 0.84   0.81 12.5 40.0 38.0 28.8       Motor NCS      Nerve / Sites Rec. Site Lat Ref. Amp Ref. Rel Amp Dist Allan Ref. Dur. Area Temp.     ms ms mV mV % cm m/s m/s ms %  C   L MEDIAN - APB      Wrist APB 4.48 4.40 9.4 5.0 100 8   7.03 100 31.3      Elbow APB 8.75  8.1  86.1 21 49.2 48.0 7.19 92.1 31.2   L ULNAR - ADM      Wrist ADM 2.92 3.50 11.4 5.0 100 8   7.14 100 30.8      B.Elbow ADM 6.25  10.3  91.1 19 57.0 48.0 7.45 91.3 30.8      A.Elbow ADM 8.23  10.0  88.4 7 35.4 48.0 7.76 88.2 30.8   L DEEP PERONEAL - EDB 60      Ankle EDB 5.05 6.00 0.9 2.0 100 8   8.18 100 29.4      FibHead EDB 12.86  1.0  118 30 38.4 38.0 9.06 152 29.3      Pop Fos EDB 15.42  0.9  107 10 39.2 38.0 8.59 98.5 29.3   R DEEP PERONEAL - EDB 60      Ankle EDB 5.36 6.00 1.5 2.0 100 8   5.89 100 28.9   L TIBIAL - AH      (edema) Ankle AH 5.78 6.00 1.0 4.0 100 8   6.46 100 28.8      Pop Fos AH 15.31  1.9  186 39 40.9 38.0 9.32 237 28.8   R TIBIAL - AH      Ankle AH 5.57 6.00 0.6 4.0 100 8   10.00 100 29.2   L PERONEAL - Tib Ant      Fib Head Tib Ant 4.27  2.6  100 13   9.32 100 29      Knee Tib Ant 5.89  2.4  93 9 55.7  9.84 85.5 28.9       EMG Summary Table     Spontaneous MUAP Recruitment    IA Fib/PSW Fasc H.F. Amp Dur. PPP Pattern   L. DELTOID N None None None 1+ 2+ 2+ Mildly Reduced   L. BICEPS Increased 1+ 1+ None 1+ 2+ 2+ Normal   L. TRICEPS N None None None 2+ 2+ 1+ Mildly Reduced   L. PRON TERES N None 1+ None 1+ 2+ 2+ Mildly Reduced   L. FIRST D INTEROSS Increased 2+ 2+ None 2+ 2+ 2+ Moderately Reduced   L. EXT INDICIS N None 2+ None 2+ 2+ 1+ Mildly Reduced   L. CERV PSP (M) N* None None None       L. THOR PSP (M) N* None None None       L. VAST LATERALIS N None None None 1+ 1+ N Normal   L. TIB ANTERIOR Increased 2+ None None 2+ 2+ 1+ Moderately Reduced   L. GASTROCN (MED) Increased 2+ None None 2+ 1+ 1+ Mildly Reduced   L. TONGUE N* None None None       *Relaxation incomplete. Abnormal spontaneous activity can not be excluded.

## 2019-01-29 NOTE — LETTER
2019     RE: Katherin Fletcher  [address]  [facility] [id number]     Dear Colleague,    Thank you for referring your patient, Katherin Fletcher, to the [facility] at [facility]. Please see a copy of my visit note below.        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 Onset 4-5 years ago. She has severe spinal stenosis at C5-C6 with cord flattening at that level. Eval for motor neuron disorder.     Techniques: Motor and sensory conduction studies were done with surface recording electrodes. EMG was done with a concentric needle electrode.      Results:  Nerve conduction studies:   1. Bilateral sural sensory responses are absent.   2. Left SP sensory response shows normal CV and reduced amplitude.   3. Left ulnar-D5 sensory response shows mildly reduced amplitude and normal CV.  4. Left median-D2 and radial sensory responses are normal.   5. Left peroneal-EDB and tibial-AH motor responses show normal DL, moderately reduced amplitudes and normal CV.  6. Right peroneal-EDB and tibial-AH motor responses show normal DL and reduced amplitudes.   7. Left ulnar-ADM motor response shows normal DL, normal amplitude and moderate CV slowing across the elbow.   8. Left median-APB motor response shows borderline prolonged DL, normal amplitude and normal CV.   9. Note made of mild edema in the distal lower limbs.      Needle EM. Fibrillation potentials and positive sharp waves were seen in the left biceps, FDI, TA, and gastrocnemius muscles.   2. Fasciculation potentials were seen in the left biceps, PT, FDI, and EIP muscles.    3. Although no definite abnormal spontaneous activity was seen in the left cervical PS, thoracic PS, or tongue muscles, relaxation was not satisfactorily achieved in these muscles. As such, mild degrees of abnormal spontaneous activity can not be excluded.   4. Large amplitude and long duration motor unit potentials (MUP) most of which had increased polyphasia were seen in all of the sampled muscles as tabulated below.   3. Rapidly firing MUPs with reduced recruitment were seen in the left deltoid, triceps, PT, FDI, EIP, TA, and gastrocnemius muscles.     Interpretation:  This is an abnormal study. 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 These abnormalities can be seen in the context of chronic multilevel radiculopathies co-existing at the cervical C5-C8 and lumbosacral L4-S1 levels. A very slowly progressive generalized motor neuron disorder with minimal evidence of active denervating change can not be excluded. 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 Finally, note is made of absent sural sensory responses on both sides. Although this finding can be seen in the context of length-dependant sensory axonal polyneuropathies, sural sensory responses can be difficult to elicit in normal individuals over the age of 60 years. As such (and especially also in the presence of mild edema in the distal lower limbs) this study is unable to provide an electrophysiologic diagnosis of polyneuropathy with certainty. Clinical correlation is recommended.     Luis A Gonzalez MD  Department of Neurology      Sensory NCS      Nerve / Sites Rec. Site Onset Peak NP Amp Ref. PP Amp Dist Allan Ref. Temp     ms ms  V  V  V cm m/s m/s  C   L MEDIAN - Dig II Anti      Wrist Dig II 2.71 3.80 20.3 10.0 27.7 14 51.7 48.0 32.4   L ULNAR - Dig V Anti      Wrist Dig V 2.60 3.23 7.2 8.0 8.8  12.5 48.0 48.0 32   L RADIAL - Snuff      Forearm Snuff 1.82 2.60 16.0 15.0 17.3 10 54.9 48.0 30.8   L SURAL - Lat Mall 60      Calf Ankle NR NR NR 5.0 NR 14 NR 38.0 29.2   R SURAL - Lat Mall 60      Calf Ankle NR NR NR 5.0 NR 14 NR 38.0 29   L SUP PERONEAL      Lat Leg Davidson 3.13 4.06 0.84  0.81 12.5 40.0 38.0 28.8       Motor NCS      Nerve / Sites Rec. Site Lat Ref. Amp Ref. Rel Amp Dist Allan Ref. Dur. Area Temp.     ms ms mV mV % cm m/s m/s ms %  C   L MEDIAN - APB      Wrist APB 4.48 4.40 9.4 5.0 100 8   7.03 100 31.3      Elbow APB 8.75  8.1  86.1 21 49.2 48.0 7.19 92.1 31.2   L ULNAR - ADM      Wrist ADM 2.92 3.50 11.4 5.0 100 8   7.14 100 30.8      B.Elbow ADM 6.25  10.3  91.1 19 57.0 48.0 7.45 91.3 30.8      A.Elbow ADM 8.23  10.0  88.4 7 35.4 48.0 7.76 88.2 30.8   L DEEP PERONEAL - EDB 60      Ankle EDB 5.05 6.00 0.9 2.0 100 8   8.18 100 29.4      FibHead EDB 12.86  1.0  118 30 38.4 38.0 9.06 152 29.3      Pop Fos EDB 15.42  0.9  107 10 39.2 38.0 8.59 98.5 29.3   R DEEP PERONEAL - EDB 60      Ankle EDB 5.36 6.00 1.5 2.0 100 8   5.89 100 28.9   L TIBIAL - AH      (edema) Ankle AH 5.78 6.00 1.0 4.0 100 8   6.46 100 28.8      Pop Fos AH 15.31  1.9  186 39 40.9 38.0 9.32 237 28.8   R TIBIAL - AH      Ankle AH 5.57 6.00 0.6 4.0 100 8   10.00 100 29.2   L PERONEAL - Tib Ant      Fib Head Tib Ant 4.27  2.6  100 13   9.32 100 29      Knee Tib Ant 5.89  2.4  93 9 55.7  9.84 85.5 28.9       EMG Summary Table     Spontaneous MUAP Recruitment    IA Fib/PSW Fasc H.F. Amp Dur. PPP Pattern   L. DELTOID N None None None 1+ 2+ 2+ Mildly Reduced   L. BICEPS Increased 1+ 1+ None 1+ 2+ 2+ Normal   L. TRICEPS N None None None 2+ 2+ 1+ Mildly Reduced   L. PRON TERES N None 1+ None 1+ 2+ 2+ Mildly Reduced   L. FIRST D INTEROSS Increased 2+ 2+ None 2+ 2+ 2+ Moderately Reduced   L. EXT INDICIS N None 2+ None 2+ 2+ 1+ Mildly Reduced   L. CERV PSP (M) N* None None None       L. THOR PSP (M) N* None None None       L. VAST LATERALIS N  None None None 1+ 1+ N Normal   L. TIB ANTERIOR Increased 2+ None None 2+ 2+ 1+ Moderately Reduced   L. GASTROCN (MED) Increased 2+ None None 2+ 1+ 1+ Mildly Reduced   L. TONGUE N* None None None       *Relaxation incomplete. Abnormal spontaneous activity can not be excluded.                                    Again, thank you for allowing me to participate in the care of your patient.      Sincerely,    Luis A Gonzalez MD

## 2019-02-22 ASSESSMENT — ENCOUNTER SYMPTOMS
ORTHOPNEA: 0
ARTHRALGIAS: 1
PALPITATIONS: 0
EXERCISE INTOLERANCE: 0
HEADACHES: 0
SLEEP DISTURBANCES DUE TO BREATHING: 0
DIZZINESS: 1
MEMORY LOSS: 0
MUSCLE CRAMPS: 1
HEMATURIA: 0
SPEECH CHANGE: 1
BACK PAIN: 0
FLANK PAIN: 0
TREMORS: 0
LIGHT-HEADEDNESS: 0
SYNCOPE: 0
NECK PAIN: 1
TINGLING: 0
MYALGIAS: 1
DISTURBANCES IN COORDINATION: 1
SEIZURES: 0
JOINT SWELLING: 1
LEG PAIN: 0
HYPERTENSION: 0
DYSURIA: 0
HYPOTENSION: 0
NUMBNESS: 0
WEAKNESS: 1
PARALYSIS: 0
MUSCLE WEAKNESS: 0
LOSS OF CONSCIOUSNESS: 0

## 2019-02-27 DIAGNOSIS — G12.21 ALS (AMYOTROPHIC LATERAL SCLEROSIS) (H): Primary | ICD-10-CM

## 2019-02-28 ENCOUNTER — THERAPY VISIT (OUTPATIENT)
Dept: PHYSICAL THERAPY | Facility: CLINIC | Age: 79
End: 2019-02-28
Payer: MEDICARE

## 2019-02-28 ENCOUNTER — THERAPY VISIT (OUTPATIENT)
Dept: OCCUPATIONAL THERAPY | Facility: CLINIC | Age: 79
End: 2019-02-28
Payer: MEDICARE

## 2019-02-28 ENCOUNTER — TRANSFERRED RECORDS (OUTPATIENT)
Dept: HEALTH INFORMATION MANAGEMENT | Facility: CLINIC | Age: 79
End: 2019-02-28

## 2019-02-28 ENCOUNTER — OFFICE VISIT (OUTPATIENT)
Dept: NEUROLOGY | Facility: CLINIC | Age: 79
End: 2019-02-28
Payer: MEDICARE

## 2019-02-28 ENCOUNTER — PATIENT OUTREACH (OUTPATIENT)
Dept: CARE COORDINATION | Facility: CLINIC | Age: 79
End: 2019-02-28

## 2019-02-28 VITALS
HEART RATE: 72 BPM | BODY MASS INDEX: 27.46 KG/M2 | HEIGHT: 63 IN | DIASTOLIC BLOOD PRESSURE: 71 MMHG | OXYGEN SATURATION: 96 % | SYSTOLIC BLOOD PRESSURE: 128 MMHG | WEIGHT: 155 LBS

## 2019-02-28 DIAGNOSIS — G12.21 ALS (AMYOTROPHIC LATERAL SCLEROSIS) (H): ICD-10-CM

## 2019-02-28 DIAGNOSIS — Z78.9 IMPAIRED INSTRUMENTAL ACTIVITIES OF DAILY LIVING (IADL): ICD-10-CM

## 2019-02-28 DIAGNOSIS — Z74.09 IMPAIRED MOBILITY AND ADLS: Primary | ICD-10-CM

## 2019-02-28 DIAGNOSIS — Z78.9 IMPAIRED MOBILITY AND ADLS: Primary | ICD-10-CM

## 2019-02-28 DIAGNOSIS — K11.7 SIALORRHEA: ICD-10-CM

## 2019-02-28 DIAGNOSIS — R25.2 SPASTICITY: Primary | ICD-10-CM

## 2019-02-28 RX ORDER — GLYCOPYRROLATE 1 MG/1
1 TABLET ORAL 3 TIMES DAILY
Qty: 90 TABLET | Refills: 3 | Status: SHIPPED | OUTPATIENT
Start: 2019-02-28 | End: 2019-05-23

## 2019-02-28 RX ORDER — TIZANIDINE 2 MG/1
2 TABLET ORAL 3 TIMES DAILY
Qty: 90 TABLET | Refills: 1 | Status: SHIPPED | OUTPATIENT
Start: 2019-02-28 | End: 2019-03-27

## 2019-02-28 ASSESSMENT — REVISED AMYOTROPHIC LATERAL SCLEROSIS FUNCTIONAL RATING SCALE (ALSFRS-R)
SPEECH: INTELLIGIBLE WITH REPEATING
RESPIRATORY_SUBTOTAL_SCORE: 12
DRESSING_AND_HYGEINE: 2
SPEECH: 2
CLIMBING_STAIRS: 0
GROSS_MOTOR_SUBTOTAL_SCORE: 5
SIX_ITEM_SUBTOTAL: 16
HANDWRITING: 3
TURNING_IN_BED_AND_ADJUSTING_BED_CLOTHES: SOMEWHAT SLOW AND CLUMSY, BUT NO HELP NEEDED
SWALLOWING: 3
CUTTING_FOOD_AND_HANDLING_UTENSILES: 3
FINE_MOTOR_SUBTOTAL_SCORE: 8
TURNING_IN_BED_AND_ADJUSTING_BED_CLOTHES: 3
SALIVATION: 0
HANDWRITING: SLOW OR SLOPPY: ALL WORDS ARE LEGIBLE
SWALLOWING: EARLY EATING PROBLEMS - OCCASIONAL CHOKING
WALKING: 2
DRESSING_AND_HYGEINE: INTERMITTENT ASSISTANCE OR SUBSTITUTE METHODS
DYSPNEA: 4
WALKING: WALKS WITH ASSISTANCE
CLIMBING_STAIRS: CANNOT DO
SALIVATION: MARKED DROOLING; REQUIRES CONSTANT TISSUE OR HANDKERCHIEF
ALSFRS_TOTAL_SCORE: 30
RESPIRATORY_INSUFFICIENCY: 4
ORTHOPENA: 4
BULBAR_SUBTOTAL: 5

## 2019-02-28 ASSESSMENT — MIFFLIN-ST. JEOR: SCORE: 1152.21

## 2019-02-28 ASSESSMENT — PAIN SCALES - GENERAL: PAINLEVEL: NO PAIN (0)

## 2019-02-28 NOTE — LETTER
2/28/2019       RE: Katherin Fletcher  [address]  [facility] [id number]     Dear Colleague,    Thank you for referring your patient, Katherin Fletcher, to the [facility] NEUROLOGY at [facility]. Please see a copy of my visit note below.    [facility] Certified [facility] Excellence  02/28/19    Referred by: Dr. Luis A Gonzalez    Chief Complaint: leg stiffness, head drop, saliva pooling.      History of Present Illness:    Katherin Fletcher is a 78 year old woman who presents to ALS clinic for evaluation of leg stiffness, head drop and excessive salivation. Her symptoms began about 4-5 years ago when she developed stiffness of her legs, leg jerking, cramps and muscle spasms. Her lower limb symptoms over the last 5 years has been slowly progressing. She reports feeling off balance when walking for the past 6 years. She felt this problem came on gradually and is the main reason why she falls. SHe continues to fall, often backwards or to the side. About 2-3 years ago she started using a walker and now uses a wheelchair for longer distances. She feels like her legs are weak. She denies atrophy or fasciculations in the legs but feels a crawling sensation in her calves at times. She started noticing  weakness bilaterally for the past several months. No problems with reaching above her head, stiffness, cramps, or fasciculations in the arms. About a year ago she developed a head drop which has slowly gotten worse. She wears a neck brace to help with head support. She noticed a progressively stooped posture. Over the last several years she has noticed increased pooling of her saliva. Her speech has become slow and slurred for the past 4 or 5 years. She denies shortness of breath or orthopnea. She naps during the day twice as she only sleeps 5 hours at night due to shoulder discomfort and cramping. Of note, she lost 75 pounds over 1 year because of  "a low appetite. Currently her appetite is okay and her weight hasn't changed. She takes Sinemet 2 tab Q 6 hours for leg tightness and jerking that can occur at time of day, with or without pressure on the feet, started by Dr. Del Real for the past 5-6 years. It does not seem to help the cramping. If off of Sinemet, the leg jerking returns. She denies restless leg symptoms. No tremor. She also reports that over the last few years she has become more \"wood\". She seems to laugh and cry easier and gets frustrated. No bowel or bladder changes, ptosis, diplopia, cognitive changes. She denies dysphagia. Denies any numbness, tingling or burning in the limbs. No night sweats, rashes, joint swelling, nightmares, constipation, hallucinations. Independent in ADLs and IADLs.     Prior pertinent laboratory work-up:  10/18: Normal TSH  : Normal ESR. Hba1c 6.1.   : Genetic testing showed no mutations in the EBXD9JY (SCA8), TBP (SCA17). No expansions in ATXN1, ATXN2, ATXN3, BG7EB4B, VUYL0Z9.   : Next gen sequencing was negative for mutations in ABCB7, ABHD12, ACO2, AFG3L2, ANO10, AP4B1, AP4E1, AP4M1, AP4S1, AP5Z1, APTX, ATCAY, ATL1, KASSY, ATP7B, AT, BSCL2, Q08uvy9, Y22buq93, CA8, FDAVK5Q, CACNB4, CAMTA1, CCT5, YBA50T6, CYP7B1, DNAJC19, ENTPD1, ERLIN2, FA2H, FGF14, FLVCR1, FXN, GJC2, GRM1, HEXA, HSPD1, IFRD1, ITPR1, KCNA1, KCNC3,  KCND3, KCNJ10, VFRD7821, DZVL0613, KIF5A, L1CAM, LYST, MARS2, MRE11A, MTPAP, NIPA1, NPC1, NPC2, OPA1, PDYN, PEX10, PEX2, PHYH, PIK3R5, PLP1, PNPLA6, POLG, POLR3A, PRICKLE1, PRKCG, NTX2LHY1, REEP1, ZGS007, RTN2, SACS, SCN8A, SETX, SIL1, EMW69T4, SLC1A3, WXI41W6, ZIA22S6, SPAST, SPG11, SPG20, SPG21, SPG7, SPTBN2, SYNE1, SYT14, TDP1, TGM6, TPP1, TTBK2, TTPA, VLDLR, WDR81, WWOX, ZFYVE26, ZFYVE27, KGM988     Prior imagin/18: MRI C spine showed severe spinal stenosis at C5-C6 resulting in flattening of the spinal cord and bilateral severe foraminal stenosis.  : MRI brain showed diffuse " cerebral volume loss and cerebral white matter changes consistent with chronic small vessel ischemic disease  11/18: MRI lumbar- minimal L3/4, L4/5 stenosis  11/18 MRI thoracic- unremarkable    Prior electrophysiologic work-up:  9/15: EMG performed by Dr. Driver of right upper limb, cranial nerve muscles showed no abnormal spontaneous activity.   6/18: Nerve conduction studies/needle EMG performed by Dr. Pham at [facility] of neurology. Just the right upper limb was studied. Median and ulnar motor and sensory responses were normal. In right upper limb muscles no abnormal spontaneous activity was reported, but chronic reinnervations changes were reported in all the sampled muscles.     1/29/19: The NCS/EMG showed extensive chronic reinnervation changes in the upper and lower limbs. Active denervation changes were present, but mild and patchy. The findings are those that can be seen in slowly progressive MND, but also might reflect chronic LS and cervical radics. Although there certainly is some radiographic evidence for radiculopathy (especially at C5-C6), overall I do not think the imaging deficits are enough to explain the widespread chronic reinnervation changes. 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 I am going to have her complete the blood testing as above, but very much doubt that will change my impression. I discussed these diagnostic opinions with her today. I am going to see if one of my colleagues in the [facility] MND clinic will see her.      Past Medical History:   Past Medical History        Past Medical History:   Diagnosis Date     CAD (coronary artery disease)       s/p CABG 1992 and redo CABG 2009 (LIMA to LAD, SVG to OM and SVG to posterolateral branch of RCA)     Chronic cough       Non-productive. No known definite etiology. Is beeing seen by [facility]  Lung.      Essential hypertension, benign       Falls frequently 5/28/2018     GERD (gastroesophageal reflux disease)       History of TIA (transient ischemic attack) and stroke 5/7/2018     R sided facial N&T.      Hyperlipidaemia       Hyperlipidaemia LDL goal < 130       Ischemic cardiomyopathy       MI (myocardial infarction) (H) 11/1996     Mixed hyperlipidemia       Neurodegenerative disorder       Further detailed dx unknown. Managed by Dr. George Pham - [facility].      PAD (peripheral artery disease) (H)       Restless legs syndrome       Unspecified hypothyroidism           Past Surgical History:  Past Surgical History         Past Surgical History:   Procedure Laterality Date     ARTHROSCOPY SHOULDER RT/LT         BUNIONECTOMY RT/LT         C CABG, ARTERY-VEIN, THREE   1/2009     C CARDIAC SURG PROCEDURE UNLIST         C HAND/FINGER SURGERY UNLISTED         C MRA UPPER EXTREMITY WO&W CONT         C SHOULDER SURG PROC UNLISTED         C STOMACH SURGERY PROCEDURE UNLISTED         CARDIAC SURGERY   1992     CABG,stents x2     CARPAL TUNNEL RELEASE RT/LT         EXCISE GANGLION HAND Right 11/29/2018     Procedure: EXCISE RIGHT VOLAR CARPAL MASS AND RIGHT INDEX TRIGGER FINGER RELEASE;  Surgeon: Minh Goldstein MD;  Location:  OR     HC VASCULAR SURGERY PROCEDURE UNLIST         HYSTERECTOMY, DAMIÁN   32 yo     OPEN REDUCTION INTERNAL FIXATION ANKLE Left 2015     Left Ankle     RELEASE TRIGGER FINGER         RELEASE TRIGGER FINGER Right 11/29/2018     Procedure: RIGHT INDEX TRIGGER FINGER RELEASE;  Surgeon: Minh Goldstein MD;  Location:  OR     REPAIR HAMMER TOE Right 12/19/2017     Procedure: REPAIR HAMMER TOE;  Right 2nd and 3rd toe pinning;  Surgeon: Fernando Wiggins MD;  Location:  OR     STENT   2006     STENT(aka CARDIAC)   2008     VASCULAR SURGERY             Family history:    She was adopted; unknown family history  3 children-1 estranged.   Daughter has RA, systemic  "sclerosis    Social History:    She denies tobacco, alcohol, or illicit drug use.  Lives at home with . Has 2 floor home   is a - disabled. 89 yo     Medical Allergies:           Allergies   Allergen Reactions     Ace Inhibitors Cough     Baclofen Other (See Comments)       Constipation , tiredness     Penicillins         10/7/14 Hives per patient -- many many years ago        Nickel Swelling and Rash       Other reaction(s): Unknown         Current Medications:       Current Outpatient Medications   Medication     Alfalfa 250 MG TABS     apixaban ANTICOAGULANT (ELIQUIS) 5 MG tablet     calcium carbonate-vitamin D (CALCIUM + D) 600-200 MG-UNIT TABS     carbidopa-levodopa (SINEMET)  MG per tablet     carvedilol (COREG) 6.25 MG tablet     clopidogrel (PLAVIX) 75 MG tablet     HYDROcodone-acetaminophen (NORCO) 5-325 MG tablet     ipratropium (ATROVENT) 0.03 % nasal spray     isosorbide mononitrate (IMDUR) 30 MG 24 hr tablet     levothyroxine (SYNTHROID/LEVOTHROID) 112 MCG tablet     Multiple Vitamins-Minerals (DAILY MULTI) TABS     nitroglycerin (NITROSTAT) 0.4 MG SL tablet     omeprazole (PRILOSEC) 20 MG CR capsule     rosuvastatin (CRESTOR) 20 MG tablet      No current facility-administered medications for this visit.       Review of Systems: A complete review of systems was obtained and was negative except for what was noted above.     Physical examination:    /71 (BP Location: Right arm)   Pulse 72   Ht 1.6 m (5' 3\")   Wt 70.3 kg (155 lb)   SpO2 96%   BMI 27.46 kg/m       PFT Latest Ref Rng & Units 2/28/2019   FVC L 1.80   FEV1 L 1.30   FVC% % 69   FEV1% % 65   MIP -49  MEP 46    General Appearance: NAD     Skin: There are no rashes or other skin lesions.     Musculoskeletal:  There is no scoliosis, lordosis, kyphosis, pes cavus, or hammertoes.     Neurologic examination:     Mental status:  Patient is alert, attentive, and oriented x 3.  Language is coherent and fluent without " dysarthria or aphasia.  Memory, comprehension and ability to follow commands were intact. No apraxia.    Cranial nerves:  Pupils were round and reacted to light. Unsustained gaze evoked nystagmus with upward and right gaze, mildly slow saccades. No square wave jerks  Extraocular movements were full. She speaks with a spastic dysarthria. Tongue movements are slow but bulk is normal. Mild eye closure and lip closure weakness. Mildly weak cheek puff. Hearing was grossly intact.       Motor exam:  Mild right > left FDI atrophy. No fasciculations. Tone is increased in the lower limbs, mildly increasedin the upper limbs. Manual muscle testing revealed the following MRC grade muscle power:    Right Left   Neck flexion 4     Neck extension: 4-     Shoulder abduction:              4 4   Elbow extension: 5 5   Elbow flexion:            4+ 4+   Wrist flexion:            5 5   Wrist extension 5 5   Finger extension:        4 4   FDI 4+ 4+   APB 4 4   Hip extension 5 5   Hip adduction 5 5   Hip abduction 4+ 4+   Hip flexion 5 5   Knee flexion 5 4+   Knee extension 5 5   Dorsiflexion 5 5 (limited ROM)   Plantar flexion 5 5 limited ROM)      Complex motor skills: No tremor. I did not find her to be ataxic with FNF or HTS testing.    Mild chin tremor. Slow rapid alternation movements left > right upper and lower extremities    Sensory exam: Vibration is slightly reduced in the toes. Vibration normal in fingers. Pin is intact in the hands and feet.  Unable to test Romberg; unsteady with eyes open     Gait: Very unsteady when upright. Has a wide base and spastic gait.      Deep tendon reflexes:    Right Left   Triceps 3 2   Biceps 3 2   Brachioradialis 3 2   Knee jerk 4 (few beats of clonus) 3    Ankle jerk 4 (few beats clonus Artifical ankle   Plantar responses extensor on the left and probably also the right. Nonsustained clonus at right ankle. Jaw jerk is not brisk.     b/l + pectoralis reflex, b/l Rojas   Upgoing left toe, mute  right.     Assessment:    Katherin Fletcher is a 78 year old woman with slowly progressive camptocormia, spastic dysarthria, lower >upper limb spasticity and imbalance over the past 5-6 years. Physical exam was notable for bilateral upper extremity and neck extension weakness and widespread hyperreflexia. EMG/NCS in 1/2019 showed extensive chronic reinnervation changes in the upper and lower limbs with mild, patchy active denervation. MRI showed C5-C6 cord flattening, which could explain her lower limb symptoms however, cannot account for spastic dysarthria and neck extension weakness. Given segmental cervical UMN and LMN signs and lumbosacral UMN signs with LMN findings on EMG, she fulfills [place] criteria for clinically probable, lab supported ALS. We discussed the diagnosis, prognosis and available research during our visit today. At this time, there would be minimal to no benefit of initiating Riluzole or Edaravone, given the duration of her disease.     Plan:      - Start Tizanidine 2mg Q8 prn for spasticity and cramps  - Start Glycopyrrelate 1-2mg TID for drooling in 1 month  - Reduce Sinemet to 1 tab Q6 hours; will consider discontinuing in the future if Tizanidine is effective in treating symptoms  - Although PFTs showed FVC 69 and MIP -49, she has no respiratory complaints. We will continue to monitor her respiratory function for now.   - PT/OT/ST/ evaluation  - Follow up in 6 months    Josy Estrada DO   [facility] Neuromuscular Fellow [id number]     Patient seen and evaluated with Dr. Driver    ATTENDING ADDENDUM: Patient seen and examined with Fellow Dr Estrada at the [facility] on 2/28/2019. Agree with her assessment and plan as above. TT spent for patient care 45 minutes; more than half was counseling.     Travon Driver MD

## 2019-02-28 NOTE — PROGRESS NOTES
"    OUTPATIENT OCCUPATIONAL THERAPY CLINIC NOTE  Katherin Fletcher  YOB: 1940  [id number]    Type of visit:  Evaluation            Date of service: 2/28/2019    Referring provider: Dr. Travon Driver    Others present at visit:  Child(imani), daughter-Ashley    Medical diagnosis:   Amyotrophic lateral sclerosis (ALS)     Date of diagnosis: 2/28/19      Pertinent history of current problem (include personal factors and/or comorbidities that impact the plan of care): Pt with onset of symptoms 4-5 years ago, including spasms in the legs and falls, neck weakness. Pt dx with C5-6 severe spinal stenosis, neuropathy. Hx includes CAD s/p CABG in 1992 and 2009, HTN, HLD, ischemic cardiomyopathy, MI 11/1996, TIA 5/2018 with R facial N/T, PAD, GERD, RLS, hypothyroidism, and recent R volar carpal ganglion cyst and trigger finger release 11/29/18.     Cardio-respiratory status:  Forced vital capacity: 69 % of predicted        Additional Occupational Profile Information (patterns of daily living, interests, values and needs): Pt is a 78 y.o  woman living with her spouse with whom she is a caregiver for.        Height/Weight: 5' 3\" / 155#    Living environment:  House, 2 story    Living environment:  [facility], [facility]   2 story, bathroom upstairs and on main, bedroom upstairs  Laundry in basement-spouse does this    Living environment barriers:  Ramp(s) present                Current assistance/living environment:  Lives with spouse-he is older, amb with cane                            Current mobility equipment:  4 wheeled walker with seat  Scooter                Current ADL equipment:  Orthotics: soft collar   extended tub bench requested (by PT on behalf of OT 2/28/19 from ALS)    Technology used: smartphone-Ind     Patient concerns/goals: Pt does all household duties, and provides care to her , a 92 y.o. [place]. Pt daughter lives nearby, assists about 1x/week. Pt is falling, falls rearward " and to side, LE's stiff. Hands weak. Walker in home and power scooter for distance     Evaluation   Interview completed.   Pain assessment:  Pain denied    Range of motion:  UE: WFL-reduced end range at shoulders    Manual muscle testing: UE's shoulders 4/5, elbows 5/5, wrist ext 5/5,  is fairly strong as well as lateral and palmar pinch mukesh and fairly symmetrical    Cognition:  Appears WFL    ADL:   Feeding self:  Ind  Grooming: Ind  UE Dressing:  Ind, hooks bra in front  LE Dressing:  Ind  Showering/bathing: Ind with shower chair and grab bars, steps into tub, high fall risk  Toileting/transfer:  Ind with higher toilet and vanity and window sill near  Observed transfers today: min assist: tends to push rearward when standing which makes feet slide forward and lends to her falling rearward-needs cues to lean forward with sit to stand and needs cues to use her UE's to push up from chair and to reach back for chair arms vs plop heavily rearward putting her at fall risk. Ambulating with SBA with 4 ww once up.    IADL:   Home management:  Ind, but notes putting items in oven is harder, has falls, spouse does laundry  Driving:  No longer, uses [employer]  Occupation: retired      Fall Risk Screen:   Has the patient fallen 2 or more times in the last year? Yes      Has the patient fallen and had an injury in the past year? Yes, fell down flight of stairs, hit head       Timed Up and Go Score: defer to PT    Is the patient a fall risk? Yes, department fall risk interventions implemented     Impairments:  Fatigue  Muscle atrophy  Coordination  Balance  Range of motion  Respiratory compromise     Treatment diagnosis:  Impaired mobility  Impaired activities of daily living  Impaired IADL    Assessment of Occupational Performance: 3-5 Performance Deficits  Identified Performance Deficits (ie: feeding, social skills): showering, toileting, cooking, cleaning, functional mobility  Clinical Decision Making (Complexity):  Moderate complexity     Recommendations/Plan of care:  Patient would benefit from interventions to enhance safety and independence.  Rehab potential good for stated goals.  Occupational therapy intervention for  self care/home management.  1 session evaluation & treatment.  Recommend referral to Home Health OT and PT.  Recommend emergency call system.    Goals:   Target date: today  Patient, family and/or caregiver will verbalize understanding of evaluation results and implications for functional performance.  Patient, family and/or caregiver will verbalize/demonstrate safe transfer techniques.  Patient, family and/or caregiver will verbalize energy management techniques appropriate for status and setting.      Educational assessment/barriers to learning:  No barriers noted      Treatment provided this date:   Self care/home management, 10 minutes of training in:  -purpose of extended tub bench for fall prevention and to modify her tub/shower by having her son remove shower doors and replace with curtain so she can use extended tub bench instead of shower chair to step in and out of tub to prevent falls  -purpose of [employer] call alert system for getting help with falls as pt carries cell phone but cannot wear this while showering  -purpose of home care for home safety evaluation to assess for powered mobility, use of the extended tub bench requested from [employer] loan program today and additional support services needed     Response to treatment/recommendations: receptive with reinforcement for need    Goal attainment:  All goals met    Risks and benefits of evaluation/treatment have been explained.  Patient, family and/or caregiver are in agreement with Plan of Care.     Timed Code Treatment Minutes: 10  Total Treatment Time (sum of timed and untimed services): 20    Signature: GRAEME Guzman MSCS   Date: 2/28/2019       Certification:  Onset date: 2/28/19  Start of care date: 2/28/2019  Certification date from  2/28/2019 to 2/28/19    I CERTIFY THE NEED FOR THESE SERVICES FURNISHED UNDER        THIS PLAN OF TREATMENT AND WHILE UNDER MY CARE     (Physician attestation of this document indicates review and certification of the therapy plan).

## 2019-02-28 NOTE — Clinical Note
Medicare Certification - Add your Attestation 1. Review the Certification Documentation below 2. Click 'QuickNote' on your toolbar 3. Add P MEDICARE CERTIFICATION-[facility] as a recipient 4. Add your attestation using [email or web address] and choose Rehab Services Attestation - Physician (at the bottom) 5. Click 'Save as QuickAction' and name the Button '[facility] Rehab Cert.' Click 'Accept.' This is a onetime set up. You will now have a [facility] Rehab Cert button on the right side of the inbasket toolbar so the next time you need to add your attestation just, click the button. You will not have to go through any other steps. 6. Click 'Sign and Route'

## 2019-02-28 NOTE — PATIENT INSTRUCTIONS
- Start Tizanidine 2 mg 3 times a day for stiffness and cramps  - Start Glycopyrrelate 1 mg 3 times a day 1 month later for drooling   - Reduce Sinemet to 1 tab every 6 hours; you can continue to cut down your dose and eventually stop the Sinemet if the Tizanidine is effective in treating your symptoms  - Follow up in 6 months

## 2019-02-28 NOTE — PROGRESS NOTES
"    OUTPATIENT PHYSICAL THERAPY CLINIC NOTE  Katherin Fletcher  YOB: 1940  [id number]    Type of visit:         Evaluation     Date of service: 2/28/2019    Referring provider: Dr. Driver    Others present at visit:  Child(imani)- daughter, Ruchi    Medical diagnosis:   Amyotrophic lateral sclerosis (ALS)    Date of diagnosis: Today, 2/28/19    Pertinent history of current problem (include personal factors and/or comorbidities that impact the plan of care): Pt with onset of symptoms 4-5 years ago, including spasms in the legs and falls, neck weakness. Pt dx with C5-6 severe spinal stenosis, neuropathy. Hx includes CAD s/p CABG in 1992 and 2009, HTN, HLD, ischemic cardiomyopathy, MI 11/1996, TIA 5/2018 with R facial N/T, PAD, GERD, RLS, hypothyroidism, and recent R volar carpal ganglion cyst and trigger finger release 11/29/18.    Cardio-respiratory status:  Forced vital capacity: 69 % of predicted     Height/Weight: 5'3\" / 155 lb    Living environment:  House, [facility]  2 story, bathroom on main level and upstairs    Living environment barriers:  Ramp(s) present     Current assistance/living environment:  Lives with spouse, Bill      Current mobility equipment:  4 wheeled walker with seat  Manual wheelchair  Scooter  Lift chair recliner  [employer]  Stair glide to upper level and to basement ( does laundry in basement)     Current ADL equipment:  Orthotics: soft collar  Raised toilet seat upstairs (no handles), grab bar nearby + countertop  Main level tall toilet, uses vanity and windowsill for support  Main level tub/shower combo- pt uses this one, has grab bars  Upstairs- Walk-in shower with lip, grab bars  Shower chair     Technology used: Phone    Patient concerns/goals: Pt does all household duties, and provides care to her , a 93 y.o. . Pt daughter lives nearby, assists with household tasks like cooking, cleaning. Son assists with home maintenance, yardwork. "     Evaluation   Interview completed.   Pain assessment:  Pain denied   Range of motion: Hypertonicity B LEs, with stiffness of HS and gastrocs, ankles to neutral with knee extended    Manual muscle testing: Hip flexion 4+/5 R, 4/5 L, hip abd 4/5 B, hip add 4/5 B, knee ext 5/5 R, 4+/5 L, knee flex 5/5 R, 4+/5 L, ankle DF 5/5 R, unable to actively DF L d/t h/o ankle arthroplasty   Transfers: Pt with strong posterior lean/LOB with attempt to stand from WC, Simi to stand after sitting for a while. Later sit>stand for TUG with light stabilization of chair and close SBA.    Gait: Pt amb with 4ww with shuffling gait, poor toe clearance B, very short step lengths, CGA for safety d/t instability.   Cognition: See OT note    *ALS FRS-R (ALS Functional Rating Scale-Revised) completed today. Please see separate note. Total: 30    Fall Risk Screen:   Has the patient fallen 2 or more times in the last year? Yes Fell about a year ago down a flight of stairs. 6 falls in past year. Not as frequent as before.       Has the patient fallen and had an injury in the past year? No. Bumps and bruises, no significant injuries       Timed Up and Go Score: 45 sec to return to chair, but did not sit back down d/t difficulty with sit>stand     25ft walk: 28.73 sec, 28 steps, 4ww, CGA    Is the patient a fall risk? Yes, department fall risk interventions implemented     Impairments:  Fatigue  Coordination  Balance  Range of motion  Spasticity     Treatment diagnosis:  Impaired mobility  Impaired activities of daily living    Clinical Presentation: Evolving/Changing  Clinical Presentation Rationale: Gradually progressive nature of symptoms, evolving over past several years, now with significant falls risk, gait and ADL impairments.  Clinical Decision Making (Complexity): Moderate complexity     Recommendations/Plan of care:  1 session evaluation & treatment.  Equipment recommended: Transfer belt, wheelchair cushion.  Recommended referrals: Home  OT/PT/SLP- for safety assessment, equipment recommendations, AFO trial, gait and transfer training to help reduce falls risk, and assessment of shower safety including new extended tub bench (rec by OT today).      Goals:   Target date: 2/28/19  Patient, family and/or caregiver will verbalize understanding of evaluation results and implications for functional performance.  Patient, family and/or caregiver will verbalize/demonstrate understanding of compensatory methods /equipment to enhance functional independence and safety.  Patient, family and/or caregiver will verbalize/demonstrate safe transfer techniques.    Educational assessment/barriers to learning:   No barriers noted     Treatment provided this date:   Therapeutic activities, 15 minutes  -Verbal cues and tactile cues for forward weight shift to avoid posterior loss of balance with sit>stand. Pt cautious to sit in regular chair without having it braced against the wall behind it. Pt notes she sits mainly in lift chair recliner at home, stable. Note poorly controlled stand>sit, cues for pt to reach back for arm rest vs just flopping from standing position into chair, pt did follow this cue with mild improvement.   -Encouraged elevating seat heights at home for safety and ease, she agrees and mainly uses lift chair, has tall toilet or RTS in restrooms. Will request WC cushion to raise seat height and promote pressure relief. Pt [facility], it is too wide for patient, but pt declines need to request another in her size at this time.   -Education for role of transfer belt for safe caregiver assistance for sit<>stand at home or family's homes if visiting. Pt agreeable to have belt sent out, home care PT/OT will help with training. Pt's daughter Ruchi notes d/t health conditions she likely would not do any physical lifting.     Gait training, 2 minutes  -Encouraged consistent use of 4ww, pt agrees. Note limited foot clearance  B, encouraged trial of AFO to promote foot clearance and break up extensor tone pattern. Pt declined trial at this time, open to potential trial in home care PT.       Response to treatment/recommendations: Pt/daughter verbalize understanding    Goal attainment:  All goals met. With home care recommended to ensure safety in the home.      Risks and benefits of evaluation/treatment have been explained.  Patient, family and/or caregiver are in agreement with Plan of Care.     Timed Code Treatment Minutes: 17  Total Treatment Time (sum of timed and untimed services): 25    Signature: Eliza Saha, PT   Date: 2/28/2019      Certification: Medicare  Onset date: 2/28/2019  Start of care date: 2/28/2019  Certification date from 2/28/2019 to 2/28/2019    I CERTIFY THE NEED FOR THESE SERVICES FURNISHED UNDER        THIS PLAN OF TREATMENT AND WHILE UNDER MY CARE     (Physician attestation of this document indicates review and certification of the therapy plan).

## 2019-02-28 NOTE — PROGRESS NOTES
[facility]  02/28/19      Referred by: Dr. Luis A Gonzalez    Chief Complaint: leg stiffness, head drop, saliva pooling.      History of Present Illness:    Katherin Fletcher is a 78 year old woman who presents to ALS clinic for evaluation of leg stiffness, head drop and excessive salivation. Her symptoms began about 4-5 years ago when she developed stiffness of her legs, leg jerking, cramps and muscle spasms. Her lower limb symptoms over the last 5 years has been slowly progressing. She reports feeling off balance when walking for the past 6 years. She felt this problem came on gradually and is the main reason why she falls. SHe continues to fall, often backwards or to the side. About 2-3 years ago she started using a walker and now uses a wheelchair for longer distances. She feels like her legs are weak. She denies atrophy or fasciculations in the legs but feels a crawling sensation in her calves at times. She started noticing  weakness bilaterally for the past several months. No problems with reaching above her head, stiffness, cramps, or fasciculations in the arms. About a year ago she developed a head drop which has slowly gotten worse. She wears a neck brace to help with head support. She noticed a progressively stooped posture. Over the last several years she has noticed increased pooling of her saliva. Her speech has become slow and slurred for the past 4 or 5 years. She denies shortness of breath or orthopnea. She naps during the day twice as she only sleeps 5 hours at night due to shoulder discomfort and cramping. Of note, she lost 75 pounds over 1 year because of a low appetite. Currently her appetite is okay and her weight hasn't changed. She takes Sinemet 2 tab Q 6 hours for leg tightness and jerking that can occur at time of day, with or without pressure on the feet, started by Dr. Del Real for the past 5-6 years. It does not seem to help the  "cramping. If off of Sinemet, the leg jerking returns. She denies restless leg symptoms. No tremor. She also reports that over the last few years she has become more \"wood\". She seems to laugh and cry easier and gets frustrated. No bowel or bladder changes, ptosis, diplopia, cognitive changes. She denies dysphagia. Denies any numbness, tingling or burning in the limbs. No night sweats, rashes, joint swelling, nightmares, constipation, hallucinations. Independent in ADLs and IADLs.     Prior pertinent laboratory work-up:  10/18: Normal TSH  : Normal ESR. Hba1c 6.1.   : Genetic testing showed no mutations in the SNVT0ZE (SCA8), TBP (SCA17). No expansions in ATXN1, ATXN2, ATXN3, IX4QL9W, DXRZ3A5.   : Next gen sequencing was negative for mutations in ABCB7, ABHD12, ACO2, AFG3L2, ANO10, AP4B1, AP4E1, AP4M1, AP4S1, AP5Z1, APTX, ATCAY, ATL1, KASSY, ATP7B, AT, BSCL2, F27xcd9, K61ryh87, CA8, RBUST9F, CACNB4, CAMTA1, CCT5, XGL17L1, CYP7B1, DNAJC19, ENTPD1, ERLIN2, FA2H, FGF14, FLVCR1, FXN, GJC2, GRM1, HEXA, HSPD1, IFRD1, ITPR1, KCNA1, KCNC3,  KCND3, KCNJ10, QXUP3273, YHYR6451, KIF5A, L1CAM, LYST, MARS2, MRE11A, MTPAP, NIPA1, NPC1, NPC2, OPA1, PDYN, PEX10, PEX2, PHYH, PIK3R5, PLP1, PNPLA6, POLG, POLR3A, PRICKLE1, PRKCG, PJZ2PJY7, REEP1, JMA801, RTN2, SACS, SCN8A, SETX, SIL1, QHO74G0, SLC1A3, ADF37Q1, SSB28Q6, SPAST, SPG11, SPG20, SPG21, SPG7, SPTBN2, SYNE1, SYT14, TDP1, TGM6, TPP1, TTBK2, TTPA, VLDLR, WDR81, WWOX, ZFYVE26, ZFYVE27, KBV155     Prior imagin/18: MRI C spine showed severe spinal stenosis at C5-C6 resulting in flattening of the spinal cord and bilateral severe foraminal stenosis.  : MRI brain showed diffuse cerebral volume loss and cerebral white matter changes consistent with chronic small vessel ischemic disease  : MRI lumbar- minimal L3/4, L4/5 stenosis   MRI thoracic- unremarkable    Prior electrophysiologic work-up:  9/15: EMG performed by Dr. Driver of right upper limb, " cranial nerve muscles showed no abnormal spontaneous activity.   6/18: Nerve conduction studies/needle EMG performed by Dr. Pham at [facility] of neurology. Just the right upper limb was studied. Median and ulnar motor and sensory responses were normal. In right upper limb muscles no abnormal spontaneous activity was reported, but chronic reinnervations changes were reported in all the sampled muscles.     1/29/19: The NCS/EMG showed extensive chronic reinnervation changes in the upper and lower limbs. Active denervation changes were present, but mild and patchy. The findings are those that can be seen in slowly progressive MND, but also might reflect chronic LS and cervical radics. Although there certainly is some radiographic evidence for radiculopathy (especially at C5-C6), overall I do not think the imaging deficits are enough to explain the widespread chronic reinnervation changes. 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 I am going to have her complete the blood testing as above, but very much doubt that will change my impression. I discussed these diagnostic opinions with her today. I am going to see if one of my colleagues in the [facility] MND clinic will see her.      Past Medical History:   Past Medical History        Past Medical History:   Diagnosis Date     CAD (coronary artery disease)       s/p CABG 1992 and redo CABG 2009 (LIMA to LAD, SVG to OM and SVG to posterolateral branch of RCA)     Chronic cough       Non-productive. No known definite etiology. Is beeing seen by [facility].      Essential hypertension, benign       Falls frequently 5/28/2018     GERD (gastroesophageal reflux disease)       History of TIA (transient ischemic attack) and stroke 5/7/2018     R sided facial N&T.      Hyperlipidaemia       Hyperlipidaemia LDL goal < 130       Ischemic  cardiomyopathy       MI (myocardial infarction) (H) 11/1996     Mixed hyperlipidemia       Neurodegenerative disorder       Further detailed dx unknown. Managed by Dr. George Pham - [facility].      PAD (peripheral artery disease) (H)       Restless legs syndrome       Unspecified hypothyroidism           Past Surgical History:  Past Surgical History         Past Surgical History:   Procedure Laterality Date     ARTHROSCOPY SHOULDER RT/LT         BUNIONECTOMY RT/LT         C CABG, ARTERY-VEIN, THREE   1/2009     C CARDIAC SURG PROCEDURE UNLIST         C HAND/FINGER SURGERY UNLISTED         C MRA UPPER EXTREMITY WO&W CONT         C SHOULDER SURG PROC UNLISTED         C STOMACH SURGERY PROCEDURE UNLISTED         CARDIAC SURGERY   1992     CABG,stents x2     CARPAL TUNNEL RELEASE RT/LT         EXCISE GANGLION HAND Right 11/29/2018     Procedure: EXCISE RIGHT VOLAR CARPAL MASS AND RIGHT INDEX TRIGGER FINGER RELEASE;  Surgeon: Minh Goldstein MD;  Location:  OR     HC VASCULAR SURGERY PROCEDURE UNLIST         HYSTERECTOMY, DAMIÁN   34 yo     OPEN REDUCTION INTERNAL FIXATION ANKLE Left 2015     Left Ankle     RELEASE TRIGGER FINGER         RELEASE TRIGGER FINGER Right 11/29/2018     Procedure: RIGHT INDEX TRIGGER FINGER RELEASE;  Surgeon: Minh Goldstein MD;  Location:  OR     REPAIR HAMMER TOE Right 12/19/2017     Procedure: REPAIR HAMMER TOE;  Right 2nd and 3rd toe pinning;  Surgeon: Fernando Wiggins MD;  Location:  OR     STENT   2006     STENT(aka CARDIAC)   2008     VASCULAR SURGERY             Family history:    She was adopted; unknown family history  3 children-1 estranged.   Daughter has RA, systemic sclerosis    Social History:    She denies tobacco, alcohol, or illicit drug use.  Lives at home with . Has 2 floor home   is a [place]- disabled. 89 yo     Medical Allergies:           Allergies   Allergen Reactions     Ace Inhibitors Cough     Baclofen Other (See Comments)        "Constipation , tiredness     Penicillins         10/7/14 Hives per patient -- many many years ago        Nickel Swelling and Rash       Other reaction(s): Unknown         Current Medications:       Current Outpatient Medications   Medication     Alfalfa 250 MG TABS     apixaban ANTICOAGULANT (ELIQUIS) 5 MG tablet     calcium carbonate-vitamin D (CALCIUM + D) 600-200 MG-UNIT TABS     carbidopa-levodopa (SINEMET)  MG per tablet     carvedilol (COREG) 6.25 MG tablet     clopidogrel (PLAVIX) 75 MG tablet     HYDROcodone-acetaminophen (NORCO) 5-325 MG tablet     ipratropium (ATROVENT) 0.03 % nasal spray     isosorbide mononitrate (IMDUR) 30 MG 24 hr tablet     levothyroxine (SYNTHROID/LEVOTHROID) 112 MCG tablet     Multiple Vitamins-Minerals (DAILY MULTI) TABS     nitroglycerin (NITROSTAT) 0.4 MG SL tablet     omeprazole (PRILOSEC) 20 MG CR capsule     rosuvastatin (CRESTOR) 20 MG tablet      No current facility-administered medications for this visit.       Review of Systems: A complete review of systems was obtained and was negative except for what was noted above.     Physical examination:    /71 (BP Location: Right arm)   Pulse 72   Ht 1.6 m (5' 3\")   Wt 70.3 kg (155 lb)   SpO2 96%   BMI 27.46 kg/m      PFT Latest Ref Rng & Units 2/28/2019   FVC L 1.80   FEV1 L 1.30   FVC% % 69   FEV1% % 65   MIP -49  MEP 46    General Appearance: NAD     Skin: There are no rashes or other skin lesions.     Musculoskeletal:  There is no scoliosis, lordosis, kyphosis, pes cavus, or hammertoes.     Neurologic examination:     Mental status:  Patient is alert, attentive, and oriented x 3.  Language is coherent and fluent without dysarthria or aphasia.  Memory, comprehension and ability to follow commands were intact. No apraxia.    Cranial nerves:  Pupils were round and reacted to light. Unsustained gaze evoked nystagmus with upward and right gaze, mildly slow saccades. No square wave jerks  Extraocular movements were " full. She speaks with a spastic dysarthria. Tongue movements are slow but bulk is normal. Mild eye closure and lip closure weakness. Mildly weak cheek puff. Hearing was grossly intact.       Motor exam: Mild right > left FDI atrophy. No fasciculations. Tone is increased in the lower limbs, mildly increasedin the upper limbs. Manual muscle testing revealed the following MRC grade muscle power:    Right Left   Neck flexion 4     Neck extension: 4-     Shoulder abduction:              4 4   Elbow extension: 5 5   Elbow flexion:            4+ 4+   Wrist flexion:            5 5   Wrist extension 5 5   Finger extension:        4 4   FDI 4+ 4+   APB 4 4   Hip extension 5 5   Hip adduction 5 5   Hip abduction 4+ 4+   Hip flexion 5 5   Knee flexion 5 4+   Knee extension 5 5   Dorsiflexion 5 5 (limited ROM)   Plantar flexion 5 5 limited ROM)      Complex motor skills: No tremor. I did not find her to be ataxic with FNF or HTS testing.    Mild chin tremor. Slow rapid alternation movements left > right upper and lower extremities    Sensory exam: Vibration is slightly reduced in the toes. Vibration normal in fingers. Pin is intact in the hands and feet. Unable to test Romberg; unsteady with eyes open     Gait: Very unsteady when upright. Has a wide base and spastic gait.      Deep tendon reflexes:    Right Left   Triceps 3 2   Biceps 3 2   Brachioradialis 3 2   Knee jerk 4 (few beats of clonus) 3    Ankle jerk 4 (few beats clonus Artifical ankle   Plantar responses extensor on the left and probably also the right. Nonsustained clonus at right ankle. Jaw jerk is not brisk.    b/l + pectoralis reflex, b/l Rojas   Upgoing left toe, mute right.     Assessment:    Katherin Fletcher is a 78 year old woman with slowly progressive camptocormia, spastic dysarthria, lower >upper limb spasticity and imbalance over the past 5-6 years. Physical exam was notable for bilateral upper extremity and neck extension weakness and widespread  hyperreflexia. EMG/NCS in 1/2019 showed extensive chronic reinnervation changes in the upper and lower limbs with mild, patchy active denervation. MRI showed C5-C6 cord flattening, which could explain her lower limb symptoms however, cannot account for spastic dysarthria and neck extension weakness. Given segmental cervical UMN and LMN signs and lumbosacral UMN signs with LMN findings on EMG, she fulfills [place] criteria for clinically probable, lab supported ALS. We discussed the diagnosis, prognosis and available research during our visit today. At this time, there would be minimal to no benefit of initiating Riluzole or Edaravone, given the duration of her disease.     Plan:      - Start Tizanidine 2mg Q8 prn for spasticity and cramps  - Start Glycopyrrelate 1-2mg TID for drooling in 1 month  - Reduce Sinemet to 1 tab Q6 hours; will consider discontinuing in the future if Tizanidine is effective in treating symptoms  - Although PFTs showed FVC 69 and MIP -49, she has no respiratory complaints. We will continue to monitor her respiratory function for now.   - PT/OT/ST/ evaluation  - Follow up in 6 months    Josy Estrada DO   [facility] Neuromuscular Fellow [id number]     Patient seen and evaluated with Dr. Driver    ATTENDING ADDENDUM: Patient seen and examined with Fellow Dr Estrada at the [facility] on 2/28/2019. Agree with her assessment and plan as above. TT spent for patient care 45 minutes; more than half was counseling. Travon Driver MD      Answers for HPI/ROS submitted by the patient on 2/22/2019   General Symptoms: No  Skin Symptoms: No  HENT Symptoms: No  EYE SYMPTOMS: No  HEART SYMPTOMS: Yes  LUNG SYMPTOMS: No  INTESTINAL SYMPTOMS: No  URINARY SYMPTOMS: Yes  GYNECOLOGIC SYMPTOMS: No  BREAST SYMPTOMS: No  SKELETAL SYMPTOMS: Yes  BLOOD SYMPTOMS: No  NERVOUS SYSTEM SYMPTOMS: Yes  MENTAL HEALTH SYMPTOMS: No  Chest pain or pressure: No  Fast or irregular heartbeat:  No  Pain in legs with walking: No  Trouble breathing while lying down: No  Fingers or toes appear blue: No  High blood pressure: No  Low blood pressure: No  Fainting: No  Murmurs: No  Pacemaker: No  Varicose veins: Yes  Edema or swelling: Yes  Wake up at night with shortness of breath: No  Light-headedness: No  Exercise intolerance: No  Trouble holding urine or incontinence: Yes  Pain or burning: No  Trouble starting or stopping: No  Increased frequency of urination: No  Blood in urine: No  Decreased frequency of urination: No  Frequent nighttime urination: No  Flank pain: No  Back pain: No  Muscle aches: Yes  Neck pain: Yes  Swollen joints: Yes  Joint pain: Yes  Bone pain: No  Muscle cramps: Yes  Muscle weakness: No  Bone fracture: No  Trouble with coordination: Yes  Dizziness or trouble with balance: Yes  Fainting or black-out spells: No  Memory loss: No  Headache: No  Seizures: No  Speech problems: Yes  Tingling: No  Tremor: No  Weakness: Yes  Difficulty walking: Yes  Paralysis: No  Numbness: No

## 2019-03-01 DIAGNOSIS — Z79.899 ENCOUNTER FOR LONG-TERM (CURRENT) USE OF MEDICATIONS: ICD-10-CM

## 2019-03-01 NOTE — PROGRESS NOTES
ALS Social Work Assessment  Collaborated with: Katherin and her dtr Ashlye  Support System: spouse Caio (age 93), Ashley dtr, David son, 5 grandchildren ages 19-33, one great grandson, many friends. Caio is Pt's 3rd husb. First husb was killed in a car accident, 2nd marriage ended in divorce.   Living Situation: She lives with spouse Caio in a 2 story home. They have stair glide to the upper level and to the basement. Pt's dtr described behaviors that indicate that Caio may be in the early stages of dementia.   Functional Capacity: See Pt/OT notes. There are some difficulties with mobility. Ashley visits weekly to help with some cleaning and cooking. Pt uses [employer] for grocery delivery, son David helps with household repairs. Pt does some of the cleaning and cooking also. Pt uses [employer].  Primary Caregiver: ? Spouse can help some but he has limitations  Employment status: retired from [employer].   Financial stability/insurance: Stable with some savings. She and husb have separate finances  Agencies involved: [employer], [employer]  Cultural/ethnic affiliation: she is [employer], [employer]. Has social support from [employer] also  Mental Health/CD/Cognitive concerns: not addressed today  Coping style/adjustment to ALS: Dx today. Feels she hasn't been able to process it yet. Teary   Legal concerns: Has a health care directive and ?may have a Will  Advanced Care Planning: Has health care directive. Didn't review today  Goals/Strengths: Has positive attitude, family support  Resource Needs: may need services at home.   Education Provided: Briefly discussed hiring assistance at home but not clear she is ready to do that yet. Discussed assisted living briefly as she feels she may need that in the future. Her husb won't move ever from their house. He has adult children that are not involved. Issues of finances may be of concern as they are  but keep finances separate. Provided info re [facility] who could help her find an assisted living facility.  Intervention/Assessment: Discussed possible dementia of spouse but dtr stated that there is some denial there with Pt. Spouse doesn't have involved adult children and they also live out of the area. She wasn't ready to talk about hiring in home assistance yet. Needs time to process the new dx.  Pt/dtr expressed sadness re dx today. Provided support/validation  Plan: Encouraged them to contact me between clinic visits as needed.  VLADISLAV Francis, [facility]    [facility]  [phone]

## 2019-03-06 LAB
EXPTIME-PRE: 4.9 SEC
FEF2575-%PRED-PRE: 59 %
FEF2575-PRE: 0.96 L/SEC
FEF2575-PRED: 1.61 L/SEC
FEFMAX-%PRED-PRE: 56 %
FEFMAX-PRE: 2.79 L/SEC
FEFMAX-PRED: 4.98 L/SEC
FEV1-%PRED-PRE: 65 %
FEV1-PRE: 1.3 L
FEV1FEV6-PRE: 72 %
FEV1FEV6-PRED: 78 %
FEV1FVC-PRE: 72 %
FEV1FVC-PRED: 77 %
FIFMAX-PRE: 1.94 L/SEC
FVC-%PRED-PRE: 69 %
FVC-PRE: 1.8 L
FVC-PRED: 2.6 L
MEP-PRE: 46 CMH2O
MIP-PRE: -49 CMH2O

## 2019-03-22 ENCOUNTER — MEDICAL CORRESPONDENCE (OUTPATIENT)
Dept: FAMILY MEDICINE | Facility: CLINIC | Age: 79
End: 2019-03-22

## 2019-03-28 ENCOUNTER — MEDICAL CORRESPONDENCE (OUTPATIENT)
Dept: FAMILY MEDICINE | Facility: CLINIC | Age: 79
End: 2019-03-28

## 2019-03-28 ENCOUNTER — OFFICE VISIT (OUTPATIENT)
Dept: PHYSICAL MEDICINE AND REHAB | Facility: CLINIC | Age: 79
End: 2019-03-28
Payer: MEDICARE

## 2019-03-28 VITALS
SYSTOLIC BLOOD PRESSURE: 161 MMHG | DIASTOLIC BLOOD PRESSURE: 89 MMHG | BODY MASS INDEX: 27.46 KG/M2 | OXYGEN SATURATION: 98 % | HEART RATE: 84 BPM | WEIGHT: 155 LBS

## 2019-03-28 DIAGNOSIS — R13.12 OROPHARYNGEAL DYSPHAGIA: ICD-10-CM

## 2019-03-28 DIAGNOSIS — G12.21 ALS (AMYOTROPHIC LATERAL SCLEROSIS) (H): ICD-10-CM

## 2019-03-28 DIAGNOSIS — R25.2 SPASTICITY: ICD-10-CM

## 2019-03-28 DIAGNOSIS — K11.7 DROOLING: ICD-10-CM

## 2019-03-28 DIAGNOSIS — M62.81 GENERALIZED MUSCLE WEAKNESS: ICD-10-CM

## 2019-03-28 DIAGNOSIS — R25.2 SPASTICITY: Primary | ICD-10-CM

## 2019-03-28 LAB
ALBUMIN SERPL-MCNC: 3.6 G/DL (ref 3.4–5)
ALP SERPL-CCNC: 81 U/L (ref 40–150)
ALT SERPL W P-5'-P-CCNC: 9 U/L (ref 0–50)
ANION GAP SERPL CALCULATED.3IONS-SCNC: 7 MMOL/L (ref 3–14)
AST SERPL W P-5'-P-CCNC: 18 U/L (ref 0–45)
BILIRUB SERPL-MCNC: 0.4 MG/DL (ref 0.2–1.3)
BUN SERPL-MCNC: 15 MG/DL (ref 7–30)
CALCIUM SERPL-MCNC: 9.8 MG/DL (ref 8.5–10.1)
CHLORIDE SERPL-SCNC: 106 MMOL/L (ref 94–109)
CK SERPL-CCNC: 166 U/L (ref 30–225)
CO2 SERPL-SCNC: 26 MMOL/L (ref 20–32)
CREAT SERPL-MCNC: 0.66 MG/DL (ref 0.52–1.04)
GFR SERPL CREATININE-BSD FRML MDRD: 84 ML/MIN/{1.73_M2}
GLUCOSE SERPL-MCNC: 86 MG/DL (ref 70–99)
POTASSIUM SERPL-SCNC: 3.6 MMOL/L (ref 3.4–5.3)
PROT SERPL-MCNC: 7.6 G/DL (ref 6.8–8.8)
SODIUM SERPL-SCNC: 140 MMOL/L (ref 133–144)

## 2019-03-28 RX ORDER — TIZANIDINE 2 MG/1
4 TABLET ORAL 3 TIMES DAILY
Qty: 540 TABLET | Refills: 1 | Status: SHIPPED | OUTPATIENT
Start: 2019-03-28 | End: 2019-08-08

## 2019-03-28 ASSESSMENT — PAIN SCALES - GENERAL: PAINLEVEL: NO PAIN (0)

## 2019-03-28 NOTE — NURSING NOTE
Chief Complaint   Patient presents with     RECHECK     [facility] RETURN 3 MO F/U AFTER THERAPY       Jaylin Galeas, EMT

## 2019-03-28 NOTE — LETTER
"3/28/2019       RE: Katherin Fletcher  [address]  [place] [address]     Dear Colleague,    Thank you for referring your patient, Katherin Fletcher, to the [facility] PHYSICAL MEDICINE AND REHABILITATION at [facility]. Please see a copy of my visit note below.         PM&R Clinic Note   Patient Name: Katherin Fletcher : 1940 Medical Record: [id number]          History of Present Illness:   Katherin Fletcher is a 78 year old female who presented to clinic for follow ups regarding her falls, gait difficulty and weakness/spasticity. PMH is significant for A fib, CAD, HLD and TIA in 2018. She was seen in clinic as initial consult by Dr. Corrales on 10/11/18. Kiee seen on 18.    Background;  Her symptoms started 2 years ago with spasms in her legs and falls. Spasms have been progressively worse since then; occurs intermittently during the day and night time up to 3-4 times. They can last 10 minutes to 8 hours and are very painful. Improve with walking and worse at night time. She also reports \"tightness in her bilateral lower extremities\". She was seen and evaluated by Dr. Randolph neurology team; was started on sinemet but doesn't think this has been helpful. She also takes tylenol with codeine which are sometimes beneficial but not all the times. She tied baclofen in the past but it was discontinued due to GI side effects. She also tried gabapentin at higher doses with no results so stopped taking. Tizanidine was prescribed by Dr. Corrales but she has not started that yet. Botox injections were also recommended by Dr. Randolph but she cancelled her appointment as she was concerned about potential side effects.    Starting Dec 2017 she noticed that she \"couldn't hold her head up\". This has also been progressively worse since then. She has been using a soft cervical collar since 2018. Denies any paresthesia in her extremities or groins; no issue with " "bowel and bladder control. She has some neck pain, located at the \"base of her skull\" with radiation to b/l trap area but not to bilateral upper extremities. No back pain or radicular symptoms in bilateral lower extremities. She denies any issue with her swallowing, however she has noticed difficulty with \"increased saliva\" and worsening cough over the past few months. She sleeps ok at night time; appetite is fair and mood is stable.     She was admitted to the hospital in May 2018; presented right hemiparesis and slurred speech. Symptoms resolved and they were attributed to TIA. She had short course of PT, OT and SLP after that but no therapies for her chronic symptoms in the past.     She was followed by Dr. Randolph as mentioned above for posterior neck weakness and ataxic type gait with spasticity. She was started on sinemet which has been beneficial. Also seen by Dr. Ledezma at [facility] regarding possible surgical interventions; per chart review \"surgery was unlikely to help with her symptoms\".     She was seen and evaluated by Dr. Pham at [facility] in May 2018; EMG of bilateral upper extremities was performed on 6/27/18 which showed \"cervical polyradiculopathy; motor neuron disease can't be excluded\". There was no active denervation in all muscles tested in bilateral upper extremities and cervical paraspinals. Very large MUPs were seen at cervical paraspinals.     C spine MRI on 5/18/18 showed \"multilevel DDD, C5-6 severe spinal canal stenosis with flattening of the spinal cord and severe bilateral neuro-foraminal stenosis from uncinate spurs\".     Clinical swallow evaluation on 10/23/18 showed mild oropharyngeal dysphagia. Completed two sessions for more education on techniques to prevent aspiration and has continued them since then.     She was seen by Dr. Randolph in neurology clinic for follow up on 11/2; per his note   An attempt to formulate a unifying diagnosis I find it not possible to do so, " "so they are basically 3 issues as follows:   1.  Neck muscle weakness which may be a myopathy, although EMG studies do not verify this.   2.  Cervical stenosis which may be causing a cervical myelopathy contributing to lower extremity spasticity (incidentally there was no spasticity found in the upper extremities).   3.  Definite cerebellar findings involving speech and all extremities.  Katherin notes that the plan was discussed more and she is going to see Dr. Gonzalez in Jan for second opinion.     Underwent surgery by ortho team for right volar carpal ganglion cyst and trigger finger; still has splint in place and will f/u next week for removing the splint and wound check.     Interval history;    Was seen in ED 1/2019 for fall; head CT and xray of chest and ribs were negative for any acute pathology. She had back contusion and superficial abrasion but no other obvious injuries. No concussion symptoms. Was discharged home.     Saw Dr. Gonzalez 1/15/19 and Dr. Driver 2/28/19 - blood work and repeat EMG were obtained.   Per chart review, \" Given segmental cervical UMN and LMN signs and lumbosacral UMN signs with LMN findings on EMG, she fulfills [place] criteria for clinically probable, lab supported ALS\".     Tizanidine was restarted to help with spasms, Glycopyrrelate was started for drooling, and sinemet dose was adjusted with plan to wean her off. Tizanidine dose was increased from 2 to 4 mg tid for better control of spasms.     Today she noted that she is not sure if tizanidine is working or not at 2mg tid; have not started 4mg tid. She is currently taking sinemet 1tablet tid and 2 tablets at bedtime with some help. Has not started glycopyrrolate either as it was recommended to start that one month after taking tizanidine.    Reviewed the characteristics of her symptoms/spasms and are basically unchanged. She feels generally weaker due to \"not moving\" and sitting for long period of time. No issues with bowel " and bladder function. Drooling continues to be a big issue and her saliva is very thick; wakes up in the morning with her pillow being completely wet. Reports occasional cough; doesn't follow the recommendations by SLP.     Has started home PT x2/week and OT x2/week. SLP was ordered and will start later. They work on neck and extremity exercises.          Past Medical and Surgical History:     Past Medical History:   Diagnosis Date     CAD (coronary artery disease)     s/p CABG 1992 and redo CABG 2009 (LIMA to LAD, SVG to OM and SVG to posterolateral branch of RCA)     Chronic cough     Non-productive. No known definite etiology. Is beeing seen by [facility].      Essential hypertension, benign      Falls frequently 5/28/2018     GERD (gastroesophageal reflux disease)      History of TIA (transient ischemic attack) and stroke 5/7/2018    R sided facial N&T.      Hyperlipidaemia      Hyperlipidaemia LDL goal < 130      Ischemic cardiomyopathy      MI (myocardial infarction) (H) 11/1996     Mixed hyperlipidemia      Neurodegenerative disorder     Further detailed dx unknown. Managed by Dr. George Pham - [facility].      PAD (peripheral artery disease) (H)      Restless legs syndrome      Unspecified hypothyroidism      Past Surgical History:   Procedure Laterality Date     ARTHROSCOPY SHOULDER RT/LT       BUNIONECTOMY RT/LT       C CABG, ARTERY-VEIN, THREE  1/2009     C CARDIAC SURG PROCEDURE UNLIST       C HAND/FINGER SURGERY UNLISTED       C MRA UPPER EXTREMITY WO&W CONT       C SHOULDER SURG PROC UNLISTED       C STOMACH SURGERY PROCEDURE UNLISTED       CARDIAC SURGERY  1992    CABG,stents x2     CARPAL TUNNEL RELEASE RT/LT       EXCISE GANGLION HAND Right 11/29/2018    Procedure: EXCISE RIGHT VOLAR CARPAL MASS AND RIGHT INDEX TRIGGER FINGER RELEASE;  Surgeon: Minh Goldstein MD;  Location:  OR      VASCULAR SURGERY PROCEDURE UNLIST       HYSTERECTOMY, DAMIÁN  32 yo     OPEN REDUCTION INTERNAL FIXATION  ANKLE Left 2015    Left Ankle     RELEASE TRIGGER FINGER       RELEASE TRIGGER FINGER Right 11/29/2018    Procedure: RIGHT INDEX TRIGGER FINGER RELEASE;  Surgeon: Minh Goldstein MD;  Location:  OR     REPAIR HAMMER TOE Right 12/19/2017    Procedure: REPAIR HAMMER TOE;  Right 2nd and 3rd toe pinning;  Surgeon: Fernando Wiggins MD;  Location: [facility] OR     STENT  2006     STENT(aka CARDIAC)  2008     VASCULAR SURGERY              Social History:     Marital Status:    Living situation: lives with her  in a house. Her  is 89 and a former . A ramp and a chair lift were recently installed at their house.   Family support: her  was driving but stopped since last visit.  They both use [employer] now.   Vocational History: retired; she was  at [employer]   Tobacco use: quit in 1992  Alcohol use: rarely a glass of wine           Functional history:     Katherin is right hand-dominant; stopped driving in 11/2017 due to her symptoms. She has a scooter for outside and uses her 4WW inside her house. She also has a WC to use if needed. She is mod I with ADLs, cooking and cleaning and can also manages her meds. She uses [employer] for transportation.            Family History:     Family History   Problem Relation Age of Onset     Unknown/Adopted Mother      Unknown/Adopted Father      Other - See Comments Son         on warfarin for one year after afganastan now off and doing well     Other - See Comments Daughter         celiac, brittle bone, arthritis, Raynauds            Medications:     Current Outpatient Medications   Medication Sig Dispense Refill     Arenac 250 MG TABS Take 250 mg by mouth every morning        apixaban ANTICOAGULANT (ELIQUIS) 5 MG tablet Take 1 tablet (5 mg) by mouth 2 times daily 180 tablet 3     calcium carbonate-vitamin D (CALCIUM + D) 600-200 MG-UNIT TABS Take 1 tablet by mouth At Bedtime        carbidopa-levodopa (SINEMET)  MG per  tablet TAKE 2 TABLETS FOUR TIMES A  tablet 3     carvedilol (COREG) 6.25 MG tablet Take 1 tablet (6.25 mg) by mouth 2 times daily (with meals) 180 tablet 3     clopidogrel (PLAVIX) 75 MG tablet Take 1 tablet (75 mg) by mouth daily 90 tablet 3     glycopyrrolate (ROBINUL) 1 MG tablet Take 1 tablet (1 mg) by mouth 3 times daily 90 tablet 3     HYDROcodone-acetaminophen (NORCO) 5-325 MG tablet Take 1-2 tablets by mouth every 4 hours as needed for pain 10 tablet 0     ipratropium (ATROVENT) 0.03 % nasal spray 1-2  sprays  1-2 times daily prn into mouth 30 mL 1     isosorbide mononitrate (IMDUR) 30 MG 24 hr tablet Take 1 tablet (30 mg) by mouth daily 90 tablet 3     levothyroxine (SYNTHROID/LEVOTHROID) 112 MCG tablet TAKE 1 TABLET DAILY 90 tablet 1     Multiple Vitamins-Minerals (DAILY MULTI) TABS Take 1 tablet by mouth every morning        omeprazole (PRILOSEC) 20 MG DR capsule TAKE 2 CAPSULES DAILY 30 TO 60 MINUTES BEFORE A MEAL 180 capsule 1     rosuvastatin (CRESTOR) 20 MG tablet Take 1 tablet (20 mg) by mouth daily 90 tablet 3     nitroglycerin (NITROSTAT) 0.4 MG SL tablet DISSOLVE ONE TABLET UNDER THE TONGUE EVERY 5 MINUTES AS NEEDED FOR CHEST PAIN.  DO NOT EXCEED A TOTAL OF 3 DOSES IN 15 MINUTES (Patient not taking: Reported on 3/28/2019) 25 tablet 3     tiZANidine (ZANAFLEX) 2 MG tablet Take 2 tablets (4 mg) by mouth 3 times daily 540 tablet 1            Allergies:     Allergies   Allergen Reactions     Ace Inhibitors Cough     Baclofen Other (See Comments)     Constipation , tiredness     Penicillins      10/7/14 Hives per patient -- many many years ago       Nickel Swelling and Rash     Other reaction(s): Unknown            ROS:     A focused ROS is negative other than the symptoms noted above in the HPI.         Physical Examiniation:   VITAL SIGNS: /89 (BP Location: Right arm, Patient Position: Chair, Cuff Size: Adult Regular)   Pulse 84   Wt 70.3 kg (155 lb)   SpO2 98%   BMI 27.46 kg/m    "  BMI: Estimated body mass index is 27.46 kg/m  as calculated from the following:    Height as of 2/28/19: 1.6 m (5' 3\").    Weight as of this encounter: 70.3 kg (155 lb).    Gen: NAD, pleasant and cooperative   HEENT: C collar in place  Cardio: regular pulse  Pulm: non-labored breathing in room air  Abd: benign  Ext: WWP, no edema in BLE, no tenderness in calves  - splint in place on right hand and forearm  Neuro/MSK:    Mental Status:  alert and oriented x3   Cranial Nerves: dysarthric     Sensory: Normal to light touch in bilateral upper and lower extremities    Strength:   SF  EF  EE  WE  G  I  HF  KE  DF  EHL  PF   R  4/5 5/5 5/5 5/5 4/5 4/5 4/5 5/5 5/5 5/5 5/5 tested in sitting position   L  4/5  5/5 5/5 5/5 5/5 4+/5 4/5 5/5 5/5 5/5 5/5 tested in sitting position    Reflexes: 3+ at b/l biceps, brachioradialis, triceps and patellar. 2+ at ankles. No clonus.    Rojas's test: + bilaterally   Babinski reflex: equivocal bilaterally   Tone per modified Sheron Scale: increased tone in bilateral upper > lower extremities  1-1+/4 per MAS   Speech: soft and slow but clear and coherent     Neck exam  She had forward head posture with upper thoracic kyphosis and loss of cervical lordosis. Neck ROM was limited at all directions, slightly better with rotation b/l. She had tenderness to palpation at b/l trap, cervical paraspinals and debora-scapular areas L>R. Obvious weakness of neck muscle, worse with ext today.          Assessment/Plan:       ALS     Cervical myelopathy     Multi-level DDD at C spine     Severe spinal canal and bilateral  NF stenosis at C5-6 level    Spasticity and spasms in bilateral lower extremities     Neck weakness     Mild oropharyngeal dysphagia - per clinical swallow evaluation in 10/2018    Sialorrhea     H/o A fib, CAD, HLD and TIA in 5/2018    Final diagnosis is now ALS per neurology team evaluation. She denies any significant functional decline since last visit; maybe slightly weakness at " bilateral upper extremities and having difficulty lifting objects.     In terms of spasticity management, again reviewed the treatment options for spasticity management including therapies, medications, and injections. Also reviewed goals for PT to maintain function, strengthen as much as possible, keep normal ROM at joints, and to prevent falls. Strongly recommended to continue her PT and OT, as well as regular exercises at home.     She had side effects with baclofen in the past; tizanidine was restarted by Dr. Driver and she will increase the dose today to 4mg tid. Checked LFT today which was normal. Will continue to monitor LFTs with any dose change or at least every 6 months given the potential side effects. Educated her on goals of medication and asked her to make a diary of her symptoms to better monitor her response to the treatment.     Discussed botulinum toxin injection as a potential treatment option; also discussed pros and cons of this option. To be considered in the future pending her response to higher dose of tizanidine and therapies.     For her drooling, she will also start glycopyrrolate as prescribed by Dr. Driver. Reviewed other medications used for treatment of drooling and also botulinum toxin injection. She will start SLP to help with dysphagia as well.     1. Work-up: managed by neurology team.     2. Therapy/equipment/braces: continue PT, OT and SLP.  She has been using a soft cervical collar to help with neck weakness and pain. Discussed the risks of muscle atrophy secondary to disuse.     3. Medications: tizanidine as above. Will titrate the dose gradually.     4. Interventions: consider chemo-denervation for spasticity management and sialorrhea.     5. Referral / follow up with other providers: neurology team as scheduled.     6. Follow up: 3-4 months    Darline Celestin MD  Physical Medicine & Rehabilitation

## 2019-03-28 NOTE — PROGRESS NOTES
"       PM&R Clinic Note   Patient Name: Katherin Fletcher : 1940 Medical Record: [id number]            History of Present Illness:   Katherin Fletcher is a 78 year old female who presented to clinic for follow ups regarding her falls, gait difficulty and weakness/spasticity. PMH is significant for A fib, CAD, HLD and TIA in 2018. She was seen in clinic as initial consult by Dr. Corrales on 10/11/18. Yanet seen on 18.    Background;  Her symptoms started 2 years ago with spasms in her legs and falls. Spasms have been progressively worse since then; occurs intermittently during the day and night time up to 3-4 times. They can last 10 minutes to 8 hours and are very painful. Improve with walking and worse at night time. She also reports \"tightness in her bilateral lower extremities\". She was seen and evaluated by Dr. Randolph neurology team; was started on sinemet but doesn't think this has been helpful. She also takes tylenol with codeine which are sometimes beneficial but not all the times. She tied baclofen in the past but it was discontinued due to GI side effects. She also tried gabapentin at higher doses with no results so stopped taking. Tizanidine was prescribed by Dr. Corrales but she has not started that yet. Botox injections were also recommended by Dr. Randolph but she cancelled her appointment as she was concerned about potential side effects.    Starting Dec 2017 she noticed that she \"couldn't hold her head up\". This has also been progressively worse since then. She has been using a soft cervical collar since 2018. Denies any paresthesia in her extremities or groins; no issue with bowel and bladder control. She has some neck pain, located at the \"base of her skull\" with radiation to b/l trap area but not to bilateral upper extremities. No back pain or radicular symptoms in bilateral lower extremities. She denies any issue with her swallowing, however she has noticed difficulty with \"increased " "saliva\" and worsening cough over the past few months. She sleeps ok at night time; appetite is fair and mood is stable.     She was admitted to the hospital in May 2018; presented right hemiparesis and slurred speech. Symptoms resolved and they were attributed to TIA. She had short course of PT, OT and SLP after that but no therapies for her chronic symptoms in the past.     She was followed by Dr. Randolph as mentioned above for posterior neck weakness and ataxic type gait with spasticity. She was started on sinemet which has been beneficial. Also seen by Dr. Ledezma at [facility] regarding possible surgical interventions; per chart review \"surgery was unlikely to help with her symptoms\".     She was seen and evaluated by Dr. Pham at [facility] of neurology in May 2018; EMG of bilateral upper extremities was performed on 6/27/18 which showed \"cervical polyradiculopathy; motor neuron disease can't be excluded\". There was no active denervation in all muscles tested in bilateral upper extremities and cervical paraspinals. Very large MUPs were seen at cervical paraspinals.     C spine MRI on 5/18/18 showed \"multilevel DDD, C5-6 severe spinal canal stenosis with flattening of the spinal cord and severe bilateral neuro-foraminal stenosis from uncinate spurs\".     Clinical swallow evaluation on 10/23/18 showed mild oropharyngeal dysphagia. Completed two sessions for more education on techniques to prevent aspiration and has continued them since then.     She was seen by Dr. Randolph in neurology clinic for follow up on 11/2; per his note   An attempt to formulate a unifying diagnosis I find it not possible to do so, so they are basically 3 issues as follows:   1.  Neck muscle weakness which may be a myopathy, although EMG studies do not verify this.   2.  Cervical stenosis which may be causing a cervical myelopathy contributing to lower extremity spasticity (incidentally there was no spasticity found in the upper extremities). " "  3.  Definite cerebellar findings involving speech and all extremities.  Katherin notes that the plan was discussed more and she is going to see Dr. Gonzalez in Jan for second opinion.     Underwent surgery by ortho team for right volar carpal ganglion cyst and trigger finger; still has splint in place and will f/u next week for removing the splint and wound check.     Interval history;    Was seen in ED 1/2019 for fall; head CT and xray of chest and ribs were negative for any acute pathology. She had back contusion and superficial abrasion but no other obvious injuries. No concussion symptoms. Was discharged home.     Saw Dr. Gonzalez 1/15/19 and Dr. Driver 2/28/19 - blood work and repeat EMG were obtained.   Per chart review, \" Given segmental cervical UMN and LMN signs and lumbosacral UMN signs with LMN findings on EMG, she fulfills [place] criteria for clinically probable, lab supported ALS\".     Tizanidine was restarted to help with spasms, Glycopyrrelate was started for drooling, and sinemet dose was adjusted with plan to wean her off. Tizanidine dose was increased from 2 to 4 mg tid for better control of spasms.     Today she noted that she is not sure if tizanidine is working or not at 2mg tid; have not started 4mg tid. She is currently taking sinemet 1tablet tid and 2 tablets at bedtime with some help. Has not started glycopyrrolate either as it was recommended to start that one month after taking tizanidine.      Reviewed the characteristics of her symptoms/spasms and are basically unchanged. She feels generally weaker due to \"not moving\" and sitting for long period of time. No issues with bowel and bladder function. Drooling continues to be a big issue and her saliva is very thick; wakes up in the morning with her pillow being completely wet. Reports occasional cough; doesn't follow the recommendations by SLP.     Has started home PT x2/week and OT x2/week. SLP was ordered and will start later. They " work on neck and extremity exercises.            Past Medical and Surgical History:     Past Medical History:   Diagnosis Date     CAD (coronary artery disease)     s/p CABG 1992 and redo CABG 2009 (LIMA to LAD, SVG to OM and SVG to posterolateral branch of RCA)     Chronic cough     Non-productive. No known definite etiology. Is beeing seen by [facility].      Essential hypertension, benign      Falls frequently 5/28/2018     GERD (gastroesophageal reflux disease)      History of TIA (transient ischemic attack) and stroke 5/7/2018    R sided facial N&T.      Hyperlipidaemia      Hyperlipidaemia LDL goal < 130      Ischemic cardiomyopathy      MI (myocardial infarction) (H) 11/1996     Mixed hyperlipidemia      Neurodegenerative disorder     Further detailed dx unknown. Managed by Dr. George Pham - [facility].      PAD (peripheral artery disease) (H)      Restless legs syndrome      Unspecified hypothyroidism      Past Surgical History:   Procedure Laterality Date     ARTHROSCOPY SHOULDER RT/LT       BUNIONECTOMY RT/LT       C CABG, ARTERY-VEIN, THREE  1/2009     C CARDIAC SURG PROCEDURE UNLIST       C HAND/FINGER SURGERY UNLISTED       C MRA UPPER EXTREMITY WO&W CONT       C SHOULDER SURG PROC UNLISTED       C STOMACH SURGERY PROCEDURE UNLISTED       CARDIAC SURGERY  1992    CABG,stents x2     CARPAL TUNNEL RELEASE RT/LT       EXCISE GANGLION HAND Right 11/29/2018    Procedure: EXCISE RIGHT VOLAR CARPAL MASS AND RIGHT INDEX TRIGGER FINGER RELEASE;  Surgeon: Minh Goldstein MD;  Location:  OR      VASCULAR SURGERY PROCEDURE UNLIST       HYSTERECTOMY, DAMIÁN  32 yo     OPEN REDUCTION INTERNAL FIXATION ANKLE Left 2015    Left Ankle     RELEASE TRIGGER FINGER       RELEASE TRIGGER FINGER Right 11/29/2018    Procedure: RIGHT INDEX TRIGGER FINGER RELEASE;  Surgeon: Minh Goldstein MD;  Location:  OR     REPAIR HAMMER TOE Right 12/19/2017    Procedure: REPAIR HAMMER TOE;  Right 2nd and 3rd toe pinning;   Surgeon: Fernando Wiggins MD;  Location: [facility] OR     STENT  2006     STENT(aka CARDIAC)  2008     VASCULAR SURGERY              Social History:     Marital Status:    Living situation: lives with her  in a house. Her  is 89 and a former . A ramp and a chair lift were recently installed at their house.   Family support: her  was driving but stopped since last visit.  They both use [employer] now.   Vocational History: retired; she was  at [employer]   Tobacco use: quit in 1992  Alcohol use: rarely a glass of wine           Functional history:     Katherin is right hand-dominant; stopped driving in 11/2017 due to her symptoms. She has a scooter for outside and uses her 4WW inside her house. She also has a WC to use if needed. She is mod I with ADLs, cooking and cleaning and can also manages her meds. She uses [employer] for transportation.            Family History:     Family History   Problem Relation Age of Onset     Unknown/Adopted Mother      Unknown/Adopted Father      Other - See Comments Son         on warfarin for one year after afganastan now off and doing well     Other - See Comments Daughter         celiac, brittle bone, arthritis, Raynauds            Medications:     Current Outpatient Medications   Medication Sig Dispense Refill     Major 250 MG TABS Take 250 mg by mouth every morning        apixaban ANTICOAGULANT (ELIQUIS) 5 MG tablet Take 1 tablet (5 mg) by mouth 2 times daily 180 tablet 3     calcium carbonate-vitamin D (CALCIUM + D) 600-200 MG-UNIT TABS Take 1 tablet by mouth At Bedtime        carbidopa-levodopa (SINEMET)  MG per tablet TAKE 2 TABLETS FOUR TIMES A  tablet 3     carvedilol (COREG) 6.25 MG tablet Take 1 tablet (6.25 mg) by mouth 2 times daily (with meals) 180 tablet 3     clopidogrel (PLAVIX) 75 MG tablet Take 1 tablet (75 mg) by mouth daily 90 tablet 3     glycopyrrolate (ROBINUL) 1 MG tablet Take 1 tablet  "(1 mg) by mouth 3 times daily 90 tablet 3     HYDROcodone-acetaminophen (NORCO) 5-325 MG tablet Take 1-2 tablets by mouth every 4 hours as needed for pain 10 tablet 0     ipratropium (ATROVENT) 0.03 % nasal spray 1-2  sprays  1-2 times daily prn into mouth 30 mL 1     isosorbide mononitrate (IMDUR) 30 MG 24 hr tablet Take 1 tablet (30 mg) by mouth daily 90 tablet 3     levothyroxine (SYNTHROID/LEVOTHROID) 112 MCG tablet TAKE 1 TABLET DAILY 90 tablet 1     Multiple Vitamins-Minerals (DAILY MULTI) TABS Take 1 tablet by mouth every morning        omeprazole (PRILOSEC) 20 MG DR capsule TAKE 2 CAPSULES DAILY 30 TO 60 MINUTES BEFORE A MEAL 180 capsule 1     rosuvastatin (CRESTOR) 20 MG tablet Take 1 tablet (20 mg) by mouth daily 90 tablet 3     nitroglycerin (NITROSTAT) 0.4 MG SL tablet DISSOLVE ONE TABLET UNDER THE TONGUE EVERY 5 MINUTES AS NEEDED FOR CHEST PAIN.  DO NOT EXCEED A TOTAL OF 3 DOSES IN 15 MINUTES (Patient not taking: Reported on 3/28/2019) 25 tablet 3     tiZANidine (ZANAFLEX) 2 MG tablet Take 2 tablets (4 mg) by mouth 3 times daily 540 tablet 1            Allergies:     Allergies   Allergen Reactions     Ace Inhibitors Cough     Baclofen Other (See Comments)     Constipation , tiredness     Penicillins      10/7/14 Hives per patient -- many many years ago       Nickel Swelling and Rash     Other reaction(s): Unknown              ROS:     A focused ROS is negative other than the symptoms noted above in the HPI.           Physical Examiniation:   VITAL SIGNS: /89 (BP Location: Right arm, Patient Position: Chair, Cuff Size: Adult Regular)   Pulse 84   Wt 70.3 kg (155 lb)   SpO2 98%   BMI 27.46 kg/m    BMI: Estimated body mass index is 27.46 kg/m  as calculated from the following:    Height as of 2/28/19: 1.6 m (5' 3\").    Weight as of this encounter: 70.3 kg (155 lb).    Gen: NAD, pleasant and cooperative   HEENT: C collar in place  Cardio: regular pulse  Pulm: non-labored breathing in room " air  Abd: benign  Ext: WWP, no edema in BLE, no tenderness in calves  - splint in place on right hand and forearm  Neuro/MSK:    Mental Status:  alert and oriented x3   Cranial Nerves: dysarthric     Sensory: Normal to light touch in bilateral upper and lower extremities    Strength:   SF  EF  EE  WE  G  I  HF  KE  DF  EHL  PF   R  4/5 5/5 5/5 5/5 4/5 4/5 4/5 5/5 5/5 5/5 5/5 tested in sitting position   L  4/5  5/5 5/5 5/5 5/5 4+/5 4/5 5/5 5/5 5/5 5/5 tested in sitting position    Reflexes: 3+ at b/l biceps, brachioradialis, triceps and patellar. 2+ at ankles. No clonus.    Rojas's test: + bilaterally   Babinski reflex: equivocal bilaterally   Tone per modified Sheron Scale: increased tone in bilateral upper > lower extremities  1-1+/4 per MAS   Speech: soft and slow but clear and coherent     Neck exam  She had forward head posture with upper thoracic kyphosis and loss of cervical lordosis. Neck ROM was limited at all directions, slightly better with rotation b/l. She had tenderness to palpation at b/l trap, cervical paraspinals and debora-scapular areas L>R. Obvious weakness of neck muscle, worse with ext today.            Assessment/Plan:       ALS       Cervical myelopathy     Multi-level DDD at C spine     Severe spinal canal and bilateral  NF stenosis at C5-6 level    Spasticity and spasms in bilateral lower extremities     Neck weakness     Mild oropharyngeal dysphagia - per clinical swallow evaluation in 10/2018    Sialorrhea     H/o A fib, CAD, HLD and TIA in 5/2018      Final diagnosis is now ALS per neurology team evaluation. She denies any significant functional decline since last visit; maybe slightly weakness at bilateral upper extremities and having difficulty lifting objects.     In terms of spasticity management, again reviewed the treatment options for spasticity management including therapies, medications, and injections. Also reviewed goals for PT to maintain function, strengthen as much as  possible, keep normal ROM at joints, and to prevent falls. Strongly recommended to continue her PT and OT, as well as regular exercises at home.     She had side effects with baclofen in the past; tizanidine was restarted by Dr. Driver and she will increase the dose today to 4mg tid. Checked LFT today which was normal. Will continue to monitor LFTs with any dose change or at least every 6 months given the potential side effects. Educated her on goals of medication and asked her to make a diary of her symptoms to better monitor her response to the treatment.     Discussed botulinum toxin injection as a potential treatment option; also discussed pros and cons of this option. To be considered in the future pending her response to higher dose of tizanidine and therapies.     For her drooling, she will also start glycopyrrolate as prescribed by Dr. Driver. Reviewed other medications used for treatment of drooling and also botulinum toxin injection. She will start SLP to help with dysphagia as well.       1. Work-up: managed by neurology team.     2. Therapy/equipment/braces: continue PT, OT and SLP.  She has been using a soft cervical collar to help with neck weakness and pain. Discussed the risks of muscle atrophy secondary to disuse.     3. Medications: tizanidine as above. Will titrate the dose gradually.     4. Interventions: consider chemo-denervation for spasticity management and sialorrhea.     5. Referral / follow up with other providers: neurology team as scheduled.     6. Follow up: 3-4 months    Darline Celestin MD  Physical Medicine & Rehabilitation

## 2019-03-30 LAB — ACHR BIND AB SER-SCNC: 0 NMOL/L (ref 0–0.4)

## 2019-04-01 ENCOUNTER — DOCUMENTATION ONLY (OUTPATIENT)
Dept: FAMILY MEDICINE | Facility: CLINIC | Age: 79
End: 2019-04-01

## 2019-04-01 LAB — MUSK AB SER-SCNC: 0 NMOL/L (ref 0–0.02)

## 2019-04-01 NOTE — PROGRESS NOTES
[facility] now requests orders and shares plan of care/discharge summaries for some patients through [employer].  Please REPLY TO THIS MESSAGE OR ROUTE BACK TO THE AUTHOR in order to give authorization for orders when needed.  This is considered a verbal order, you will still receive a faxed copy of orders for signature.  Thank you for your assistance in improving collaboration for our patients.    ORDER    Home care SLP evaluation for speech and swallowing.

## 2019-04-03 DIAGNOSIS — Z76.0 ENCOUNTER FOR MEDICATION REFILL: ICD-10-CM

## 2019-04-03 RX ORDER — LEVOTHYROXINE SODIUM 112 UG/1
TABLET ORAL
Qty: 90 TABLET | Refills: 1 | Status: SHIPPED | OUTPATIENT
Start: 2019-04-03 | End: 2019-11-01

## 2019-04-04 ENCOUNTER — MEDICAL CORRESPONDENCE (OUTPATIENT)
Dept: FAMILY MEDICINE | Facility: CLINIC | Age: 79
End: 2019-04-04

## 2019-04-18 ENCOUNTER — DOCUMENTATION ONLY (OUTPATIENT)
Dept: FAMILY MEDICINE | Facility: CLINIC | Age: 79
End: 2019-04-18

## 2019-04-19 NOTE — PROGRESS NOTES
[facility] now requests orders and shares plan of care/discharge summaries for some patients through [employer].  Please REPLY TO THIS MESSAGE OR ROUTE BACK TO THE AUTHOR in order to give authorization for orders when needed.  This is considered a verbal order, you will still receive a faxed copy of orders for signature.  Thank you for your assistance in improving collaboration for our patients.    ORDER    Continue home care PT 1x/week for 2 weeks for functional strengthening of neck and LE, activity tolerance and balance training to maximize safe functional independence in the home.

## 2019-04-21 ENCOUNTER — APPOINTMENT (OUTPATIENT)
Dept: CT IMAGING | Facility: CLINIC | Age: 79
End: 2019-04-21
Attending: EMERGENCY MEDICINE
Payer: MEDICARE

## 2019-04-21 ENCOUNTER — HOSPITAL ENCOUNTER (EMERGENCY)
Facility: CLINIC | Age: 79
Discharge: HOME | End: 2019-04-21
Attending: EMERGENCY MEDICINE | Admitting: EMERGENCY MEDICINE
Payer: MEDICARE

## 2019-04-21 VITALS
TEMPERATURE: 97.3 F | HEIGHT: 65 IN | BODY MASS INDEX: 25.66 KG/M2 | SYSTOLIC BLOOD PRESSURE: 127 MMHG | DIASTOLIC BLOOD PRESSURE: 79 MMHG | WEIGHT: 154 LBS | RESPIRATION RATE: 16 BRPM | OXYGEN SATURATION: 98 % | HEART RATE: 64 BPM

## 2019-04-21 DIAGNOSIS — M54.2 NECK PAIN: ICD-10-CM
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
## 2019-04-21 PROCEDURE — 72125 CT NECK SPINE W/O DYE: CPT

## 2019-04-21 PROCEDURE — A9270 NON-COVERED ITEM OR SERVICE: HCPCS | Performed by: EMERGENCY MEDICINE

## 2019-04-21 PROCEDURE — 25000132 ZZH RX MED GY IP 250 OP 250 PS 637: Performed by: EMERGENCY MEDICINE

## 2019-04-21 PROCEDURE — 99284 EMERGENCY DEPT VISIT MOD MDM: CPT | Mod: 25

## 2019-04-21 PROCEDURE — 70450 CT HEAD/BRAIN W/O DYE: CPT

## 2019-04-21 RX ORDER — ACETAMINOPHEN 325 MG/1
650 TABLET ORAL ONCE
Status: COMPLETED | OUTPATIENT
Start: 2019-04-21 | End: 2019-04-21

## 2019-04-21 RX ADMIN — ACETAMINOPHEN 650 MG: 325 TABLET, FILM COATED ORAL at 12:58

## 2019-04-21 ASSESSMENT — ENCOUNTER SYMPTOMS
ABDOMINAL PAIN: 0
SHORTNESS OF BREATH: 0
HEADACHES: 1
ARTHRALGIAS: 1
APPETITE CHANGE: 0

## 2019-04-21 ASSESSMENT — MIFFLIN-ST. JEOR: SCORE: 1179.42

## 2019-04-21 NOTE — ED AVS SNAPSHOT
Emergency Department  [address] [place] [address]  Phone:  [phone]  Fax:  [phone]                                    Katherin Fletcher   MRN: [id number]    Department:   Emergency Department   Date of Visit:  4/21/2019           After Visit Summary Signature Page    I have received my discharge instructions, and my questions have been answered. I have discussed any challenges I see with this plan with the nurse or doctor.    ..........................................................................................................................................  Patient/Patient Representative Signature      ..........................................................................................................................................  Patient Representative Print Name and Relationship to Patient    ..................................................               ................................................  Date                                   Time    ..........................................................................................................................................  Reviewed by Signature/Title    ...................................................              ..............................................  Date                                               Time          22EPIC Rev 08/18

## 2019-04-21 NOTE — ED PROVIDER NOTES
"  History   Chief Complaint:  Fall    HPI   Katherin Fletcher is an anticoagulated 78 year old female with a complex medical history notable for ALS who presents via EMS for evaluation after a fall. The patient reports that prior to coming to the ED, she was reaching for a pill on top of a counter when she stepped back and lost her balance. She states that she fell backwards, hitting her head and left shoulder on the ground while \"pulling her neck.\" She did not lose consciousness at any point. In the ED, she endorses pain in the posterior of her head and the base of her skull the radiates into her neck, neck pain, and left shoulder pain.The patient denies chest pain, abdominal pain, shortness of breath, numbness, weakness, and appetite changes.    Allergies:  ACE inhibitors  Baclofen  Penicillins  Nickel    Medications:    Alfalfa  Eliquis  Carbidopa-levodopa  Coreg  Plavix  Glycopyrrolate  Imdur  Levoxyl  Nitroglycerin   Prilosec  Crestor  Granada  Zanaflex    Past Medical History:    ALS  CAD  Chronic cough  Essential hypertension, benign  Falls frequently  GERD  TIA  Mixed hyperlipidemia  Ischemic cardiomyopathy  MI  Neurodegenerative disorder  Peripheral artery disease  Restless leg syndrome  Hypothyroidism  MRSA  Disturbances of sensation of smell and taste  Dysarthria  Cerebellar disease  Neurologic gait disorder  Peripheral polyneuropathy   Arterial leg ulcer  Stroke  Neurodegenerative disorder  Ataxia    Past Surgical History:    Bunionectomy   CABG, artery-vein, three  Cardiac surgery procedure   Hand/finger surgery  MRA upper extremity w/o and w contrast  Shoulder surgery  Stomach surgery  CABG, stents x2  Carpal tunnel release  Excise ganglion hand  Vascular surgery procedure  DAMIÁN  Release trigger finger  Repair hammer toe  Cardiac stent placed  Vascular surgery    Family History:    Celiac disease  Brittle bone  Arthritis  Raynauds    Social History:  Smoking status: Former smoker  Alcohol use: " "Yes  Marital Status:   [2]     Review of Systems   Constitutional: Negative for appetite change.   Respiratory: Negative for shortness of breath.    Cardiovascular: Negative for chest pain.   Gastrointestinal: Negative for abdominal pain.   Musculoskeletal: Positive for arthralgias (left shoulder).   Neurological: Positive for headaches.   All other systems reviewed and are negative.      Physical Exam     Patient Vitals for the past 24 hrs:   BP Temp Temp src Heart Rate Resp SpO2 Height Weight   04/21/19 1226 (!) 153/98 97.3  F (36.3  C) Oral 90 16 98 % 1.651 m (5' 5\") 69.9 kg (154 lb)     Physical Exam  General: Alert and cooperative with exam. Patient in mild distress. Normal mentation.  Head:  Mild tenderness to palpation at the base of her skull without overlying skin changes. Scalp is NC/AT.  Eyes:  No scleral icterus, PERRL, EOMI   ENT:  The external nose and ears are normal. The oropharynx is normal and without erythema; mucus membranes are moist. Uvula midline, no evidence of deep space infection.  Neck:  Soft cervical collar in place.  CV:  Regular rate and rhythm  Resp:  Breath sounds are clear bilaterally    Non-labored, no retractions or accessory muscle use  GI:  Abdomen is soft, no distension, no tenderness. No peritoneal signs  MS:  No lower extremity edema. LUE: Normal exam with mild TTP to anterior shoulder; CMS intact    No midline cervical, thoracic, or lumbar tenderness  Skin:  Warm and dry, No rash or lesions noted.  Neuro: Oriented x 3. No gross motor deficits.    Strength and sensation grossly intact in all 4 extremities.      Cranial nerves 2-12 intact.    GCS: 15    Emergency Department Course   Imaging:  Radiographic findings were communicated with the patient who voiced understanding of the findings.    Cervical Spine CT w/o Contrast:  1.  No CT evidence for acute fracture or post traumatic subluxation.  2.  No high-grade spinal canal or neural foraminal stenosis.  As read by " Radiology.    Head CT w/o Contrast:  1.  No mass, hemorrhage or stroke.  2.  Mild presumed chronic small vessel ischemic change with mild  generalized volume loss.  3.  No interval change.  As read by Radiology.    Interventions:  1258: Tylenol 650 mg PO    Emergency Department Course:  Past medical records, nursing notes, and vitals reviewed.   1239: I performed an exam of the patient and obtained history, as documented above.  The patient was sent for a cervical spine CT and head CT while in the emergency department, findings above.    1345: I rechecked the patient. Explained findings to the patient.    Findings and plan explained to the patient. Patient discharged home with instructions regarding supportive care, medications, and reasons to return. The importance of close follow-up was reviewed.     Impression & Plan    Medical Decision Making:  Patient is a 78-year-old female with history of frequent falls who presents after mechanical fall with associated head injury and neck pain.  Patient anticoagulated on Plavix and Eliquis.  Patient's medical history and records were reviewed.  Initial consideration for, but not limited to, fracture, intracranial bleed/pathology, soft tissue injury, concussion, among others.  Patient is neurologically baseline with an unremarkable neurologic exam; no headache.  She did demonstrate some mild left-sided neck pain.  Head and neck CT were obtained and unremarkable as noted above.  She was provided Tylenol with noted improvement.  Presentation consistent with mechanical fall and associated minor soft tissue injury.  Recommended supportive care and discussed return precautions.  Patient to follow-up with PCP as needed.  Discharged home.    Diagnosis:    ICD-10-CM    1. Fall, initial encounter W19.XXXA    2. Neck pain M54.2        Disposition:  Discharged to home.      Remi Beyer  4/21/2019    EMERGENCY DEPARTMENT  I, Remi Beyer am serving as a scribe at 12:39 PM on 4/21/2019  to document services personally performed by Niarj Torres DO based on my observations and the provider's statements to me.        Niraj Torres DO  04/21/19 1536

## 2019-04-21 NOTE — ED NOTES
Bed: ED28  Expected date:   Expected time:   Means of arrival:   Comments:  Ivis 432 fall head injury 70 f

## 2019-04-21 NOTE — ED TRIAGE NOTES
Fell backward in the kitching while reaching for her medications in a cabinet, not sure if hit head anywhere, denies of LOC. Reports new left sided neck pain, left shoulder pain. On Eliquis and Plavix.

## 2019-04-21 NOTE — ED NOTES
Patient is up for discharge after ED evaluation, called  multiple times with home phone and cellular phone but not answering the phone calls.

## 2019-04-29 ENCOUNTER — OFFICE VISIT (OUTPATIENT)
Dept: ORTHOPEDICS | Facility: CLINIC | Age: 79
End: 2019-04-29
Payer: MEDICARE

## 2019-04-29 DIAGNOSIS — L60.2 LONG TOENAIL: ICD-10-CM

## 2019-04-29 DIAGNOSIS — G62.9 PERIPHERAL POLYNEUROPATHY: Primary | ICD-10-CM

## 2019-04-29 DIAGNOSIS — I73.9 PVD (PERIPHERAL VASCULAR DISEASE) WITH CLAUDICATION (H): ICD-10-CM

## 2019-04-29 NOTE — PROGRESS NOTES
Chief Complaint:   Chief Complaint   Patient presents with     RECHECK     Follow up right 4th hammer toe and nail trimmed          Allergies   Allergen Reactions     Ace Inhibitors Cough     Baclofen Other (See Comments)     Constipation , tiredness     Penicillins      10/7/14 Hives per patient -- many many years ago       Nickel Swelling and Rash     Other reaction(s): Unknown         Subjective: Katherin is a 78 year old female who presents to the clinic today for a follow up of elongated nails. Relates that she was recently diagnosed with ALS. She has had more difficulty walking over the last few months. Relates that she is using her wheelchair primarily.     Objective     DP pulses are 1/4 and PT pulses are non-palpable bilaterally. LE capillary refill time is <3 seconds. Absent pedal hair. Mild non-pitting LE edema.  Dorsally contracted digits 2-5 that are non-reducible, very limited passive ROM. Equinus present.  Gross sensation intact to bilateral LE.   LE skin color, texture and turgor are normal. Toenails are slightly elongated bilaterally.      Assessment:   - Elongated toenails  - Ingrown R lateral hallux toenail - resolved  - Chronic anticoagulant use for atrial fibrillation  - Peripheral arterial disease with history of lower leg ulceration      Plan:   - Toenails trimmed x 10.   - Cont with comfortable shoes.   - Pt to return to clinic in 12 weeks.

## 2019-04-29 NOTE — LETTER
4/29/2019       RE: Katherin Fletcher  [address]  [facility] [address]     Dear Colleague,    Thank you for referring your patient, Katherin Fletcher, to the [facility]. Please see a copy of my visit note below.    Chief Complaint:   Chief Complaint   Patient presents with     RECHECK     Follow up right 4th hammer toe and nail trimmed          Allergies   Allergen Reactions     Ace Inhibitors Cough     Baclofen Other (See Comments)     Constipation , tiredness     Penicillins      10/7/14 Hives per patient -- many many years ago       Nickel Swelling and Rash     Other reaction(s): Unknown         Subjective: Katherin is a 78 year old female who presents to the clinic today for a follow up of elongated nails. Relates that she was recently diagnosed with ALS. She has had more difficulty walking over the last few months. Relates that she is using her wheelchair primarily.     Objective     DP pulses are 1/4 and PT pulses are non-palpable bilaterally. LE capillary refill time is <3 seconds. Absent pedal hair. Mild non-pitting LE edema.  Dorsally contracted digits 2-5 that are non-reducible, very limited passive ROM. Equinus present.  Gross sensation intact to bilateral LE.   LE skin color, texture and turgor are normal. Toenails are slightly elongated bilaterally.      Assessment:   - Elongated toenails  - Ingrown R lateral hallux toenail - resolved  - Chronic anticoagulant use for atrial fibrillation  - Peripheral arterial disease with history of lower leg ulceration      Plan:   - Toenails trimmed x 10.   -  Cont with comfortable shoes.   - Pt to return to clinic in  12 weeks.      Again, thank you for allowing me to participate in the care of your patient.      Sincerely,    Adrian Arroyo DPM

## 2019-04-29 NOTE — NURSING NOTE
Reason For Visit:   Chief Complaint   Patient presents with     RECHECK     Follow up right 4th hammer toe and nail trimmed       There were no vitals taken for this visit.    Pain Assessment  Patient Currently in Pain: Yes  0-10 Pain Scale: 3  Primary Pain Location: Foot(Right)    Rajni Freedman ATC

## 2019-05-06 ENCOUNTER — DOCUMENTATION ONLY (OUTPATIENT)
Dept: CARE COORDINATION | Facility: CLINIC | Age: 79
End: 2019-05-06

## 2019-05-06 NOTE — PROGRESS NOTES
I would recommend orders for Katherin for Wheeled Mobility Evaluation as an outpatient.     Please send orders through [facility] or FAX to the [facility] at [phone].     Thank you.

## 2019-05-08 DIAGNOSIS — G12.21 ALS (AMYOTROPHIC LATERAL SCLEROSIS) (H): Primary | ICD-10-CM

## 2019-05-14 ENCOUNTER — OFFICE VISIT (OUTPATIENT)
Dept: NEUROLOGY | Facility: CLINIC | Age: 79
End: 2019-05-14
Payer: MEDICARE

## 2019-05-14 DIAGNOSIS — R29.898 DROPPED HEAD SYNDROME: Primary | ICD-10-CM

## 2019-05-14 DIAGNOSIS — G62.9 SENSORY NEUROPATHY: ICD-10-CM

## 2019-05-14 DIAGNOSIS — G56.21 ULNAR NEUROPATHY AT ELBOW OF RIGHT UPPER EXTREMITY: ICD-10-CM

## 2019-05-14 DIAGNOSIS — G12.21 ALS (AMYOTROPHIC LATERAL SCLEROSIS) (H): ICD-10-CM

## 2019-05-14 DIAGNOSIS — G56.21 NEUROPATHY OF RIGHT ULNAR NERVE AT WRIST: ICD-10-CM

## 2019-05-14 DIAGNOSIS — G56.03 BILATERAL CARPAL TUNNEL SYNDROME: ICD-10-CM

## 2019-05-14 NOTE — PROGRESS NOTES
"    [facility]  Electrodiagnostic Laboratory    Nerve Conduction & EMG Report          Patient: Katherin Fletcher YOB: 1940  Patient ID: [id number] Age: 78 Years 11 Months  Gender: Female      History & Examination:    78 year old woman with history of slowly progressive, upper motor neuron predominant ALS who reports a \"film-like\" sensation at both hands, affecting the thumb and index fingers, for several months. She has a history of remote bilateral carpal tunnel release surgery in the late 1980s. Query carpal tunnel syndrome.    Techniques:    Sensory and motor conduction studies were done with surface recording electrodes.     Results:    Right median and ulnar antidromic sensory NCSs showed attenuated conduction velocities and normal SNAP amplitudes. Right median orthodromic mixed NCS done with stimulation at the palm showed mildly prolonged peak latency. Right ulnar and left median orthodromic mixed NCSs done with stimulation at the palm were normal. Left ulnar orthodromic mixed NCS showed minimally prolonged peak latency. Right median motor NCS recorded from APB showed prolonged distal latency, normal CMAP amplitudes without segmental changes, and mildly attenuated forearm conduction velocity.  Right ulnar motor NCSs recorded from the ADM and FDI showed prolonged distal latencies, normal CMAP amplitudes without segmental changes, normal conduction velocities at the forearm, and attenuated conduction velocities across the elbow. Bilateral median and ulnar second lumbrical/palmar interossei comparison motor NCSs showed a difference of distal latencies between the median and the ulnar nerves of 0.89 ms on the right, and 0.68 ms on the left (normal is <0.5 ms). Needle EMG was deferred (reason: not necessary to answer the referral question).     Interpretation:    Abnormal study. 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 A left ulnar sensory neuropathy at the wrist cannot be excluded. Clinical correlation is recommended.    EMG Physician:    Travon Driver MD         Sensory NCS      Nerve / Sites Rec. Site Onset Peak Ref. NP Amp Ref. PP Amp Dist Allan Ref. Temp     ms ms ms  V  V  V cm m/s m/s  C   R MEDIAN - Dig II Anti      Wrist Dig II 3.44 4.43  13.4 10.0 26.8 14 40.7 48.0 32.9   R ULNAR - Dig V Anti      Wrist Dig V 3.07 3.96  13.9 8.0 22.0 12.5 40.7 48.0 32.9   R MEDIAN - Ulnar - Palmar      Median Wrist 1.98 2.60 2.40 14.7  24.4 8 40.4  33      Ulnar Wrist 1.61 2.34 2.40 13.1  22.0 8 49.5  33   L MEDIAN - Ulnar - Palmar      Median Wrist 1.56 2.34 2.40 15.7  29.1 8 51.2  33.4      Ulnar Wrist 1.61 2.45 2.40 10.0  25.2 8 49.5  33.3       Motor NCS      Nerve / Sites Rec. Site Lat Ref. Amp Ref. Rel Amp Dist Allan Ref. Dur. Area Temp.     ms ms mV mV % cm m/s m/s ms %  C   R MEDIAN - APB      Wrist APB 5.00 4.40 8.5 5.0 100 8   6.98 100 33      Elbow APB 9.64  8.5  99.3 20.5 44.2 48.0 7.55 102 33   R ULNAR - ADM      Wrist ADM 4.43 3.50 7.9 5.0 100 8   7.81 100 33      B.Elbow ADM 7.81  7.9  99.9 21 62.0 48.0 8.18 94.9 33.1      A.Elbow ADM 9.90  7.7  98.1 9 43.2 48.0 8.13 90.3 33   R MEDIAN - II Lumb      Median II Lumb 4.74  1.7  100 10   7.14 100 33      Ulnar Palm Int 3.85  4.9  281 10   6.61 210 33   L MEDIAN - II Lumb      Median II Lumb 4.53  0.7  100 10   6.98 100 33.4      Ulnar Palm Int 3.85  6.9  1061 10   6.67 762 33.4   R ULNAR - FDI      Wrist FDI 5.36  11.2  100    5.52 100 33      B.Elbow FDI 8.75  10.0  89.3 20 59.1  5.99 99.3 33      A.Elbow FDI 10.99  9.4  83.8 9 40.2  5.83 100 33.1

## 2019-05-14 NOTE — LETTER
"5/14/2019       RE: Katherin Fletcher  [address]  [facility] [address]     Dear Colleague,    Thank you for referring your patient, Katherin Fletcher, to the [facility] EMG at [facility]. Please see a copy of my visit note below.        [facility]  Electrodiagnostic Laboratory    Nerve Conduction & EMG Report          Patient: Katherin Fletcher YOB: 1940  Patient ID: [id number] Age: 78 Years 11 Months  Gender: Female      History & Examination:    78 year old woman with history of slowly progressive, upper motor neuron predominant ALS who reports a \"film-like\" sensation at both hands, affecting the thumb and index fingers, for several months. She has a history of remote bilateral carpal tunnel release surgery in the late 1980s. Query carpal tunnel syndrome.    Techniques:    Sensory and motor conduction studies were done with surface recording electrodes.     Results:    Right median and ulnar antidromic sensory NCSs showed attenuated conduction velocities and normal SNAP amplitudes. Right median orthodromic mixed NCS done with stimulation at the palm showed mildly prolonged peak latency. Right ulnar and left median orthodromic mixed NCSs done with stimulation at the palm were normal. Left ulnar orthodromic mixed NCS showed minimally prolonged peak latency. Right median motor NCS recorded from APB showed prolonged distal latency, normal CMAP amplitudes without segmental changes, and mildly attenuated forearm conduction velocity.  Right ulnar motor NCSs recorded from the ADM and FDI showed prolonged distal latencies, normal CMAP amplitudes without segmental changes, normal conduction velocities at the forearm, and attenuated conduction velocities across the elbow. Bilateral median and ulnar second lumbrical/palmar interossei comparison motor NCSs showed a difference of distal latencies between the median and the ulnar nerves of 0.89 ms on " the right, and 0.68 ms on the left (normal is <0.5 ms). Needle EMG was deferred (reason: not necessary to answer the referral question).     Interpretation:    Abnormal study. 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 A left ulnar sensory neuropathy at the wrist cannot be excluded. Clinical correlation is recommended.    EMG Physician:    Travon Driver MD       Sensory NCS      Nerve / Sites Rec. Site Onset Peak Ref. NP Amp Ref. PP Amp Dist Allan Ref. Temp     ms ms ms  V  V  V cm m/s m/s  C   R MEDIAN - Dig II Anti      Wrist Dig II 3.44 4.43  13.4 10.0 26.8 14 40.7 48.0 32.9   R ULNAR - Dig V Anti      Wrist Dig V 3.07 3.96  13.9 8.0 22.0 12.5 40.7 48.0 32.9   R MEDIAN - Ulnar - Palmar      Median Wrist 1.98 2.60 2.40 14.7  24.4 8 40.4  33      Ulnar Wrist 1.61 2.34 2.40 13.1  22.0 8 49.5  33   L MEDIAN - Ulnar - Palmar      Median Wrist 1.56 2.34 2.40 15.7  29.1 8 51.2  33.4      Ulnar Wrist 1.61 2.45 2.40 10.0  25.2 8 49.5  33.3       Motor NCS      Nerve / Sites Rec. Site Lat Ref. Amp Ref. Rel Amp Dist Allan Ref. Dur. Area Temp.     ms ms mV mV % cm m/s m/s ms %  C   R MEDIAN - APB      Wrist APB 5.00 4.40 8.5 5.0 100 8   6.98 100 33      Elbow APB 9.64  8.5  99.3 20.5 44.2 48.0 7.55 102 33   R ULNAR - ADM      Wrist ADM 4.43 3.50 7.9 5.0 100 8   7.81 100 33      B.Elbow ADM 7.81  7.9  99.9 21 62.0 48.0 8.18 94.9 33.1      A.Elbow ADM 9.90  7.7  98.1 9 43.2 48.0 8.13 90.3 33   R MEDIAN - II Lumb      Median II Lumb 4.74  1.7  100 10   7.14 100 33      Ulnar Palm Int 3.85  4.9  281 10   6.61 210 33   L MEDIAN - II Lumb      Median II Lumb 4.53  0.7  100 10   6.98 100 33.4      Ulnar Palm Int 3.85  6.9  1061 10   6.67 762 33.4   R ULNAR - FDI      Wrist FDI 5.36  11.2  100    5.52 100 33      B.Elbow FDI 8.75  10.0  89.3 20 59.1  5.99 99.3 33      A.Elbow  FDI 10.99  9.4  83.8 9 40.2  5.83 100 33.1                          Again, thank you for allowing me to participate in the care of your patient.      Sincerely,    Travon Driver MD

## 2019-05-14 NOTE — PATIENT INSTRUCTIONS
1. The film-like sensation at your hands (thumb and index finger) is likely due to carpal tunnel syndrome. This can sometimes recur after successful surgery. If too uncomfortable, you can try wrist splints at night.     2. The film-like sensation at your toes is likely due to a mild neuropathy. For mild neuropathies, a cause is often not found. I would like you to get tested again for diabetes and abnormal proteins in the blood. The diabetes test needs to be done fasting. You may want to coordinate this with your primary care doctor. I will order the blood tests today.    3. For your head drop, I will refer you to the Orthotics Department of [employer]. (they will be calling you). I think you need a different neck collar because the one you currently use is too soft and does not provide adequate head support.    4. I would agree that cannabis is a reasonable treatment for your muscle spasms at this point. I would like to get you registered with the [facility]. Please contact me by [employer] and tell me how you got the cannabis supply.    Please also increase your tizanidine dose to 3 pills (6 mg) three times a day.     Thank you!

## 2019-05-22 ENCOUNTER — MYC MEDICAL ADVICE (OUTPATIENT)
Dept: NEUROLOGY | Facility: CLINIC | Age: 79
End: 2019-05-22

## 2019-05-22 DIAGNOSIS — K11.7 SIALORRHEA: ICD-10-CM

## 2019-05-22 DIAGNOSIS — G12.21 ALS (AMYOTROPHIC LATERAL SCLEROSIS) (H): ICD-10-CM

## 2019-05-23 DIAGNOSIS — G62.9 SENSORY NEUROPATHY: Primary | ICD-10-CM

## 2019-05-23 DIAGNOSIS — R29.898 DROPPED HEAD SYNDROME: ICD-10-CM

## 2019-05-23 RX ORDER — GLYCOPYRROLATE 1 MG/1
1 TABLET ORAL 3 TIMES DAILY
Qty: 90 TABLET | Refills: 3 | Status: SHIPPED | OUTPATIENT
Start: 2019-05-23 | End: 2019-08-05

## 2019-05-23 NOTE — PROGRESS NOTES
#[id number] MAN Immunifixation serum- fasting, & glucose 2 hr tolerance test- draw initial & then drink 75g of orange glucose -then redraw- celine tubes correctly with their times of drawing- per Dr Oropeza    I called patient to inform her that I am awaiting arrival of glucose 75 g bottle & will call upon their arrival so she may schedule  Fasting appt.  Puja Josue MA May 23, 2019 2:52 PM    To mail her special instructions regarding glucose tolerance 2 hr test

## 2019-05-23 NOTE — LETTER
[facility] [address] [place] [address] [phone]      May 23, 2019      Katherin Fletcher  [address]  [facility] [address]              Dear Katherin,    Just to remind you , I will call upon receiving glucose drink for this 2 hr test.     will draw your initial blood, then you drink glucose 75 g-then waiting 2 hrs in office to which afterwards tech will draw last tube .    It is noted in instructions for 3 days PRIOR to fasting test to be active and eat a regular diet that INCLUDES AT LEAST 150 grams of carbohydrate daily for three(3) days prior to test.  Day of test- to be fasting at least 8 -10 hours, less than 14 hours - with nothing to eat but may have water- plenty of water- to be hydrated.  Discontinue nonessential medications-only hold your supplements - as discussed on phone.      Sincerely,    Puja Josue MA for Dr Del Rael

## 2019-05-28 ENCOUNTER — HOSPITAL ENCOUNTER (OUTPATIENT)
Dept: OCCUPATIONAL THERAPY | Facility: CLINIC | Age: 79
Setting detail: THERAPIES SERIES
End: 2019-05-28
Attending: PSYCHIATRY & NEUROLOGY
Payer: MEDICARE

## 2019-05-28 DIAGNOSIS — G12.21 ALS (AMYOTROPHIC LATERAL SCLEROSIS) (H): ICD-10-CM

## 2019-05-28 PROCEDURE — 97542 WHEELCHAIR MNGMENT TRAINING: CPT | Mod: GO | Performed by: OCCUPATIONAL THERAPIST

## 2019-05-28 NOTE — NURSING NOTE
Received glucose 75 mg drink for glucose tolerance test  Left message to call back [facility] & set up lab appt  Needs to be here 2 hrs approx    May 28, 2019  Puja Josue MA

## 2019-05-29 NOTE — PROGRESS NOTES
"   SEATING AND WHEELED MOBILITY ASSESSMENT  05/28/19 1400   Quick Adds   Quick Adds Certification   General Information    Rehab Discipline OT   Funding Medicare/   Service Outpatient;Occupational Therapy;Seating/Wheeled Mobility Evaluation   Height 5'4\"   Weight 145   Start Of Care Date 05/28/19   Referring Physician Neisha   Orders Evaluate And Treat As Indicated;Per Therapist Evaluation   Orders Date 05/08/19   Patient/Caregiver Goals power mobility   Rehabilitation Technology Supplier Karine TRES from [employer]   Current Community Support Personal Care Attendant;Transportation Service;Emergency Call System   Patient role/Employment history Retired;Disabled   Fall Risk Screen   Fall screen completed by OT   Have you fallen 2 or more times in the past year? Yes   Have you fallen and had an injury in the past year? Yes   Is patient a fall risk? Yes   Fall screen comments 4 falls in last year, hit head, fell down 13 stairs prior to having stair lift   Medical History   Onset Of Illness/injury Or Date Of Surgery 5/8/19   Medical Diagnosis ALS   Medical History TIA, MI, HTN, PAD, L ORIF ankle, CABG x2, R trigger finger, Bilateral bunionectomy, Bilateral carpal tunnel   Home Accessibility   Living Environment House  (with 93 year old spouse)   Primary Entrance Level  (2 stairs in back with ramp in front)   Home Accessibility Comments chair lift both up and downstairs.  Wait list for [employer] [facility]   Community ADL   Transportation Car;Transportation Services   Community Mobility Requirements Medical Appointments;Shopping;Gnosticism   Cognitive/Visual/Hearing Status   Observations No Problems Observed During Evaluation   Vision Corrective Lenses;Intact   Hearing Intact   ADL Status   Feeding Independent  (challenges with chewing due to dental work)   Grooming/Hygiene Independent   Dressing Independent   Toileting Independent;Requires Assist;Incontinent  (pulls on grab bar or vanity)   Bathing Requires Assist;Uses " Equipment  (shower chair )   Meal Preparation Requires Assist   Home Management Requires Assist   Balance   Unsupported Sitting Balance Uses Upper Extremities for Balance   Sitting Balance in Chair Uses Upper Extremities for Balance   Standing Balance Physical Assist Required   Ambulation   Ambulation Ambulatory   Ambulation Assist Requires Assist   Ambulation Equipment 4 Wheeled Walker with Seat   Ambulation Comments 25 ft walk 2/28 with Pt 28.73 sec with 28 steps with 4ww wtih CGA and head drop   Transfers   Transfer Assist Minimal Assist   Transfer Method Stand Pivot   Sleep/Rest   Sleep Surface/Equipment Standard bed with bed rail to assist with rolling   Neuromuscular   History of Pressure Sores No   Pain No   Coordination UE Intact;LE Impaired   Tone Spasticity   Sensory Deficits Reported Bilateral radial nerve in hands, possibly from carpal tunnel   Head and Neck   Head and Neck Position Flexed   Tone/Movement of Head and Neck Wears soft collar most of the time and is getting fit for headmaster   Upper Extremities   UE ROM full ROM with tightness at end ranges   UE Strength UE's shoulders 4/5, elbows 5/5, wrist ext 5/5,  is fairly strong as well as lateral and palmar pinch mukesh and fairly symmetrical   Dominance Right   Pelvis   Anterior/Posterior Pelvis Position Posterior Tilt;Partially Flexible   Trunk   Anterior/Posterior Trunk Position Increased Thoracic Kyphosis;Partially Flexible   Lower Extremities   LE ROM R ankle fused with minimal PF only otherwise WFL   LE Strength Hip flexion 4+/5 R, 4/5 L, hip abd 4/5 B, hip add 4/5 B, knee ext 5/5 R, 4+/5 L, knee flex 5/5 R, 4+/5 L, ankle DF 5/5 R, unable to actively DF L d/t h/o ankle arthroplasty   Patient Measurements   Other see atp notes   Education Assessment   Barriers to Learning Emotional;Physical   Preferred Learning Style Listening;Demonstration   Assessment/Plan   Criteria for Skilled Interventions Met Yes, Treatment Indicated   Treatment  Diagnosis impaired participation in MRADLS   Therapy Frequency once   Planned Therapy Interventions Wheelchair Management/Propulsion Training   Planned Therapy Interventions Comments Educated on process to obtain power wheelchair.  Patient demo'd safe use of chair in clinic with all seat functions.  LE edema noted and leg rest elevation needed.  Recline and tilt along with properly place headrest needed to allow for upright posture for safe driving and line of sight as well as head support.   Risks and benefits of treatment have been explained Yes   Patient/family & other staff in agreement with plan of care Yes   Comments Good trial in clinic today.   Session Time   OT Wheelchair Management Minutes (76834) 120   Certification   Certification date from 05/28/19   Certification date to 05/28/19   Adult OT Eval Goals   OT Eval Goals (Adult) 1    OT Goal 1   Goal Identifier power mobility   Goal Description Patient to demonstrate a successful clinical trial of the recommended wheelchair   Target Date 05/28/19   Date Met 05/29/19   Electronically signed by:  Natividad JENKINS/JACKIE, ATP      Occupational Therapist, [employer]   [phone]      fax: [phone]      [email or web address]  Seating Clinic- [facility], [address]  [address]  [place]   [address]

## 2019-06-03 NOTE — PROGRESS NOTES
REQUISITION AND JUSTIFICATION FOR DURABLE MEDICAL EQUIPMENT    Patient Name:  Katherin Fletcher  MR #:  [id number]  :  1940  Age/Gender:  79 year old female  Visit Date:  Katherin Fletcher seen for seating and wheeled mobility evaluation by Natividad Mckeon OTR/L,ATP and ATP from [facility] on 19.    CLINICAL CRITERIA FOR MOBILITY ASSISTIVE EQUIPMENT  Coverage Criteria Per Local Coverage Determination  A) Katherin has mobility limitations due to Amyotrophic Lateral Sclerosis (ALS), TIA, HTN, PAD, L ORIF ankle, CABG x2, right trigger finger, B bunionectomy, and bilateral carpal tunnel that significantly impairs her ability to participate in all of her mobility-related activities of daily living (MRADL). Specifically affected are toileting (being able to get there in time to prevent accidents), dressing, and bathing (getting into the bathroom of designated place). Current equipment used is standard manual chair and rolling walker. This patient needs the new equipment requested to be able to increase safe and independent participation in MRADLs and assist with trunk and neck positioning due to severe weakness. Please see additional documentation in the seating and wheeled mobility report for details.   Katherin had a successful clinical trial here, and also a successful trial at home with the recommended equipment. Katherin is very willing and physically / cognitively able to use the recommended equipment to assist her with mobility-related activities of daily living and general mobility. A group 3 power wheelchair is being requested because it has better suspension for a smooth ride and has the capabilities of expandable electronics to operate the  power seat functions Katherin needs for independence with her activities of daily living. A Group 2 power wheelchair does not have sufficient electronics to support this patient's progressive neurological deficits due to ALS.  B) Katherin's mobility limitation  cannot be sufficiently and safely resolved by the use of an appropriately fitted cane or walker because she requires assistance with all mobility and has had a significant falls history with injury. Distance and time to ambulate 25 ft walk on 2/28/19 with PT 28/73 with 28 steps with 4ww and CGA with head dropped to chest. Strength of legs is hip flexion 4+ R 4 L, hip abd/add 4 Bilaterally, knee 5 R 4+ L, ankle DF 5 R unable due to history of surgery,  for one maximal repetition. Fatigue also impacts this patient's ability to ambulate, regardless of the gait aid.    C) Katherin does not have sufficient upper extremity function to self-propel an optimally-configured manual wheelchair in her home to perform MRADLs during a typical day due to limitations in strength, endurance, range of motion, and coordination. Distance and time to propel a light weight manual wheelchair NT due to neck weakness and inability to maintain upright posture for safe propulsion.  Strength of arms is shoulders 4/5 elbows 5/5 wrists 5/5/.  D)  Katherin is not able to use a POV/scooter because it will not fit in her home environment. Katherin is unable to safely transfer to and from a POV, unable to operate the tiller steering system, and unable to maintain postural stability and position while operating the POV. Katherin needs more appropriate seating and positioning than any scooter seat provides.  E) The need for this equipment is LIFETIME.     RECOMMENDATIONS FOR MOBILITY BASE, SEATING SYSTEM AND COMPONENTS  Permobil M3- this mid wheel drive power wheelchair is needed for this patient to continue to have independent mobility and to be able to allow her to complete or assist in all of her mobility related activities of daily living (MRADLs). This wheelchair will also have the seating and positioning system and seat function she needs to be able to use and tolerate the wheelchair full time, and have functional and comfortable positioning for a  full day's activities. Katherin has Amyotrophic Lateral Sclerosis (ALS), TIA, HTN, PAD, L ORIF ankle, CABG x2, right trigger finger, B bunionectomy, and bilateral carpal tunnel which impairs her ability to move in her home without the use of the requested wheelchair. She lives in home with ramp access.    Expandable controller -  Needed for operation of the joystick for mobility and seat functions    Harness for expandable controller - needs to be inline for communication between the controller and the joystick    Swing away joystick mount - needed to be able to move the joystick out of the way during transfers, or when at the desk using the computer, or at the table eating, so as not to inadvertently hit the joystick, thus moving the wheelchair or turning the power on or off without her knowledge.    Corpus Power Tilt and Recline  - This seating function combination is needed for this patient to be able to perform independent weight shifts and also to be able to change her seated position without the request of a care giver. She requires a powered seating system with both tilt and recline to optimize pressure distribution and postural repositioning.   The power tilt seat allows her to change her position by rotating rearward without changing the angle of her hips or knees. Due to atrophy of her buttocks she is at high risk of developing pressure ulcers if not able to change her position. Due to compromised ability to exert herself for weight shifting, the power tilt seat allows her to do this by operating it through the joystick. She can also use a slight degree of tilt for assisting in her seating and positioning for normal functions during the day a to assist keeping her in a midline, erect and upright position by using the effect of gravity.   The power reclining back feature also reduces pressures by allowing her to change the angle of her hips and knees into a more open and supine / recumbent position. This  increases her tolerance and be able to remain in the requested wheelchair for a complete day and not be dependent on care givers to change her position or transfer her to another surface for comfort or resting. The recline feature can be used to access the perineal area necessary for toileting assistance. The power tilt seat and power recline back are necessary features that allow tasks to be done by the care giver without the need for transferring back to bed for these activities.  The medical justification for these seat functions is consistent with the RESNA Position Papers regarding these seat function interventions (Navarro et al., 2009). These seat functions will also reduce upper extremity strain per the Paralyzed 's of [place] Guidelines for upper limb preservation (Taiwo, et al., 2005).    Power elevating seat - Vertical movement is necessary to allow her to function and participate in a three-dimensional world. This seat feature will increase her ability to reach by bringing her closer for better access with weak arms while reaching into cupboards or closets.  During the home trial she was able to use this feature to increase light of sight and for increase safety with transfers due to weakness.  She is transferring with minimal assist for stand pivot transfer. This requested seat lift feature promotes safety with and improved independence with lateral transfers by allowing a level transfer or transfer from a higher to lower surface, which is gravity-assisted.  It also facilitates forward transfer by allowing legs, hips to be more extended, thereby lessening the strength required for the user to perform a stand-pivot transfer.  Power seat elevation also allows the user to have eye contact with others and reduces cervical strain and pain (including headaches from poor positioning). Vertical rise also provides psycho-social benefits of being on peer level and speaking eye-to-eye. Additionally, seat  elevation allows certain medications to be kept out of reach of children but remain accessible to the user.    Corpus ERGO Solid curved and padded back support - firm and contoured back support is needed to support Katherin's thoracolumbar area in an upright and midline position, with appropriate support pads as needed. This will provide support whether she is in the upright or tilted/reclined position. This back support is essential to provide sufficient lateral contour to maximize her postural alignment and minimize her tendency to develop scoliosis and other secondary complications.    Bodilink headrest and mounting hardware - needed to keep Warren[place] head in an erect, midline and upright position whether she is in the upright, tilted or reclined position. Her head and neck need to be supported as well as the rest of her body.    Bodilink removable mounting hardware - needed for mounting the requested headrest in the most appropriate position on the back of the wheelchair for optimal head and neck support and to be able to be removed for transfers.     Power Positioning electronics to be operated through the R-net controller - this is needed to allow her to use the joystick of the wheelchair for control of mobility and operation of the power seat function. This is important for this patient with limited strength and coordination to increase ease of operation of the seat functions.    Power elevating foot legrest- Allows for elevation and extension of lower extremities while elevating. This can improve circulation and prevent/reduce edema (with recline/tilt combination).  The lower legs of this wheelchair user act as a reservoir for fluid accumulation due to lack of movement. Elevation of this patient's legs above the level of the heart (left atrium) is recommended as part of the management of edema in conjunction with other measures such as support garments  This patient has lower extremity dependent edema while  sitting in her wheelchair, which resolves with elevation of her legs. This feature, when used with the power recline back or tilt seat, can increase Katherin's sitting tolerance while positioning her in a more natural position. This can also be helpful if she fatigues and requires rests throughout the day, without transferring her back to bed.  Due to inability to perform a functional weight shifting, she is at risk for developing pressure ulcers. With the use of this feature it will allow for optimal weight shifting in conjunction with tilt and recline.    Per RESNA white paper on elevating legrests, using elevating legrests and tilting more than 30 degrees in combination with full recline significantly improves lower leg hemodynamics status as measured by near-infrared spectroscopy (Afshan et al., 2010)  Additionally, these legrests can improve ground clearance to navigate thresholds and slopes and still allowing the legs to achieve a tight 90 degree position for typical driving conditions. This position shortens the overall functional wheelbase for improved maneuverability    Corpus ergo seat cushion - this pressure distribution and positioning seat cushion will optimally  distribute seating pressures to prevent pressure ulcers, but also provide a stable base of support for her to use during MRADLs.    2 Batteries and  - gel sealed, and two are necessary to power the wheelchair. They are maintenance free and are safe for travel on the road or in the air. They are necessary to provide reliable use of the power wheelchair on a single charge.    2pc Narrow corpus footplates- Required with individual adjustability  (angle and height) to accommodate bilateral leg length and asymmetries present.  The flip-up features allows for them to fold completely out of the way for safe transfers.      Mounting block- needed to place headrest on chair.    This equipment is reasonable and necessary with reference to accepted  standards of medical practice and treatment of this patient's condition and is not being recommended as a convenience item. Without this recommended equipment, she is highly likely to sustain injuries from falls, develop pressure sores or postural compensation, and/or be bed confined, which those costs far exceed the cost of the requested equipment.    Electronically signed by:  Natividad BEDOLLA, ATP      Occupational Therapist, Assistive   [phone]      fax: [phone]      [email or web address] [address]  Corinne 3, 2019    I have read and concur with the above recommendations.    Physician Printed Name __________________________________________    Physician SIgnature  _____________________________________________    Date of SIgnature ______________________________    Physician Phone  ______________________________

## 2019-06-04 DIAGNOSIS — G62.9 SENSORY NEUROPATHY: ICD-10-CM

## 2019-06-04 DIAGNOSIS — R29.898 DROPPED HEAD SYNDROME: ICD-10-CM

## 2019-06-04 NOTE — PROGRESS NOTES
Travon Driver -[phone]     glucose & tolerance test, immunofixation protein - specialist  Arrived and drawn at 1300 hr  Drank whole  Container of 75 gram easydex-glucose tolerance beverage  Redrawn by Anastacia sin CMA at 1315pm  Both tubes properly marked & sent to [facility]  Puja Josue MA June 4, 2019 4:42 PM    Faxed [phone]

## 2019-06-06 LAB
GLUCOSE SERPL-MCNC: 94 MG/DL (ref 65–99)
GLUCOSE, TWO-HOUR POSTPRANDIAL: 143 MG/DL (ref 65–139)

## 2019-06-09 LAB
IGA: 335 MG/DL (ref 64–422)
IGG: 1156 MG/DL (ref 700–1600)
IGM: 110 MG/DL (ref 26–217)
IMMUNOFIXATION RESULT, SERUM: NORMAL

## 2019-06-12 ENCOUNTER — APPOINTMENT (OUTPATIENT)
Dept: GENERAL RADIOLOGY | Facility: CLINIC | Age: 79
End: 2019-06-12
Attending: EMERGENCY MEDICINE
Payer: MEDICARE

## 2019-06-12 ENCOUNTER — APPOINTMENT (OUTPATIENT)
Dept: CT IMAGING | Facility: CLINIC | Age: 79
End: 2019-06-12
Attending: EMERGENCY MEDICINE
Payer: MEDICARE

## 2019-06-12 ENCOUNTER — HOSPITAL ENCOUNTER (EMERGENCY)
Facility: CLINIC | Age: 79
Discharge: HOME | End: 2019-06-12
Attending: EMERGENCY MEDICINE | Admitting: EMERGENCY MEDICINE
Payer: MEDICARE

## 2019-06-12 VITALS
TEMPERATURE: 97.9 F | WEIGHT: 150 LBS | RESPIRATION RATE: 16 BRPM | SYSTOLIC BLOOD PRESSURE: 142 MMHG | DIASTOLIC BLOOD PRESSURE: 103 MMHG | OXYGEN SATURATION: 97 % | HEART RATE: 86 BPM | HEIGHT: 64 IN | BODY MASS INDEX: 25.61 KG/M2
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
## 2019-06-12 DIAGNOSIS — S09.90XA INJURY OF HEAD, INITIAL ENCOUNTER: ICD-10-CM

## 2019-06-12 DIAGNOSIS — M25.50 ARTHRALGIA, UNSPECIFIED JOINT: ICD-10-CM

## 2019-06-12 PROCEDURE — 99285 EMERGENCY DEPT VISIT HI MDM: CPT | Mod: 25

## 2019-06-12 PROCEDURE — A9270 NON-COVERED ITEM OR SERVICE: HCPCS | Mod: GY | Performed by: EMERGENCY MEDICINE

## 2019-06-12 PROCEDURE — 25000132 ZZH RX MED GY IP 250 OP 250 PS 637: Mod: GY | Performed by: EMERGENCY MEDICINE

## 2019-06-12 PROCEDURE — 73502 X-RAY EXAM HIP UNI 2-3 VIEWS: CPT

## 2019-06-12 PROCEDURE — 73030 X-RAY EXAM OF SHOULDER: CPT | Mod: LT

## 2019-06-12 PROCEDURE — 70450 CT HEAD/BRAIN W/O DYE: CPT

## 2019-06-12 RX ORDER — CARBIDOPA AND LEVODOPA 25; 100 MG/1; MG/1
2 TABLET ORAL ONCE
Status: COMPLETED | OUTPATIENT
Start: 2019-06-12 | End: 2019-06-12

## 2019-06-12 RX ADMIN — CARBIDOPA AND LEVODOPA 2 TABLET: 25; 100 TABLET ORAL at 08:18

## 2019-06-12 ASSESSMENT — ENCOUNTER SYMPTOMS
ARTHRALGIAS: 1
NECK PAIN: 0

## 2019-06-12 ASSESSMENT — MIFFLIN-ST. JEOR: SCORE: 1140.4

## 2019-06-12 NOTE — ED NOTES
Bed: ED26  Expected date:   Expected time:   Means of arrival:   Comments:  [facility] 79[facility]

## 2019-06-12 NOTE — ED AVS SNAPSHOT
Emergency Department  [address] [place] [id number]  Phone:  [phone]  Fax:  [phone]                                    Katherin Fletcher   MRN: [id number]    Department:   Emergency Department   Date of Visit:  6/12/2019           After Visit Summary Signature Page    I have received my discharge instructions, and my questions have been answered. I have discussed any challenges I see with this plan with the nurse or doctor.    ..........................................................................................................................................  Patient/Patient Representative Signature      ..........................................................................................................................................  Patient Representative Print Name and Relationship to Patient    ..................................................               ................................................  Date                                   Time    ..........................................................................................................................................  Reviewed by Signature/Title    ...................................................              ..............................................  Date                                               Time          22EPIC Rev 08/18

## 2019-06-12 NOTE — ED PROVIDER NOTES
History     Chief Complaint:  Fall     The history is provided by the patient.      Katherin Fletcher is a 79 year old female status post CABG x2 with past medical history significant for stroke on Eliquis and Plavix, atrial fibrillation, ischemic cardiomyopathy, and myocardial infarction who presents via EMS after fall. She reports she fell when her right leg slipped on the carpet after stepping out of bed prior to arrival. Patient annotates she landed on the right-inferior aspect of her occiput on the nightstand and remarks her walker also landed on her legs. Patient affirms she was able to ambulate several steps after her fall with assistance. Currently, she complains of 3/10 pain to her right ankle as well as left hip and shoulder pain. She denies any loss of consciousness, amnesia, left knee/ankle pain, left wrist pain, or new neck pain. Patient notes she is right-hand dominant and states she has not taken her morning Sinemet dose. Medics states she has recently fallen 3 times. Her prior CT radiographs on 4/21 are detailed below.     CT OF THE HEAD AND CERVICAL SPINE WITHOUT CONTRAST (4/21)  1.  No CT evidence for acute fracture or post traumatic subluxation.  2.  No high-grade spinal canal or neural foraminal stenosis.    CT OF THE HEAD AND CERVICAL SPINE WITHOUT CONTRAST (4/21)  1.  No CT evidence for acute fracture or post traumatic subluxation.  2.  No high-grade spinal canal or neural foraminal stenosis.    Allergies:  Ace inhibitors  Baclofen  Penicillins  Nickel    Medications:    Eliquis  Coreg  Plavix  Robinul  Imdur  Crestor  Zanaflex    Past Medical History:    CAD  Chronic cough  Hypertension  Frequent falls  TIA  Hyperlipidemia  Ischemic cardiomyopathy  Stroke  MI  Hyperlipidemia  Atrial fibrillation  MRSA  Neurodegenerative disorder  PAD  RLS  Hypothyroidism    Past Surgical History:    Bilateral shoulder arthroscopy  Bilateral bunionectomy  CABG x2  Bilateral carpal tunnel release  Right  "index trigger finger release  Repair hammer toe  Vascular surgery  [facility]    Family History:    No past pertinent family history.    Social History:  Smoking Status: Former Smoker  Smokeless Tobacco: Never Used  Alcohol Use: Positive  Marital Status:      Review of Systems   Musculoskeletal: Positive for arthralgias. Negative for gait problem and neck pain.   Neurological: Negative for syncope.   All other systems reviewed and are negative.    Physical Exam   Patient Vitals for the past 24 hrs:   BP Temp Temp src Heart Rate Resp SpO2 Height Weight   06/12/19 0717 129/82 97.9  F (36.6  C) Oral 67 16 99 % 1.626 m (5' 4\") 68 kg (150 lb)     Physical Exam  General: Alert and cooperative with exam. Patient in minor distress. Normal mentation.  Head:  Scalp is NC/AT  Eyes:  No scleral icterus, PERRL, EOMI   ENT:  The external nose and ears are normal. The oropharynx is normal and without erythema; mucus membranes are moist. Uvula midline, no evidence of deep space infection.  Neck:  Normal range of motion without rigidity.  CV:  Irregular rate and rhythm  Resp:  Breath sounds are clear bilaterally    Non-labored, no retractions or accessory muscle use  GI:  Abdomen is soft, no distension, no tenderness. No peritoneal signs  MS:  No lower extremity edema     No midline cervical tenderness    Mild TTP medial-distal tibia without overlying skin changes or deformity; CMS intact. LLE: anterior hip with normal ROM; CMS intact. LUE: CMS intact; TTP posterior shoulder with overlying skin changes. Wrist and elbow exam normal.   Skin:  Warm and dry, No rash or lesions noted.  Neuro: Oriented x 3. No gross motor deficits.    Strength and sensation grossly intact in all 4 extremities.      Cranial nerves 2-12 intact.    GCS: 15    Emergency Department Course     Imaging:  XR Pelvis w Hip Left 1 View  No fracture.  WILL BROWN MD  Reading per radiology    XR Shoulder Left G/E 3 Views  No fracture.  WILL BROWN MD  Reading per " radiology    Head CT w/o contrast  Diffuse cerebral volume loss and cerebral white matter changes consistent with chronic small vessel ischemic disease. No evidence for acute intracranial pathology.   Reading per radiology    Interventions:  0818: Sinemet 2 tablet PO    Emergency Department Course:  0713 Nursing notes and vitals reviewed.  0717 I performed an exam of the patient as documented above.   0732 The patient was sent for a XR while in the emergency department, results above.   0812 The patient was sent for a CT while in the emergency department, results above.   0844 Patient rechecked and updated.   0856 Per ER-T, patient passed road test.   0858 I personally reviewed the imaging results with the patient and answered all related questions prior to discharge, anticipatory guidance given.    Impression & Plan      Medical Decision Making:  Patient is a 79-year-old female who presents with fall and associated head injury, left shoulder/hip pain, and right lower extremity pain.  Patient's medical history and records were reviewed.  Initial consideration for, not limited to, and intracranial bleed/pathology, fracture, dislocation, among others.  Imaging obtained and fortunately unremarkable as noted above.  Patient able to ambulate in the ED without significant difficulty.  Neurologic exam is unremarkable.  There is no significant evidence of trauma on exam.  Presentation consistent with arthralgia/soft tissue injury secondary to fall.  Recommended supportive care including Tylenol and ice.  Patient follow closely with PCP as needed.  Return precautions discussed.  Patient discharged to home.  At the time discharge patient was hemodynamically stable, neurologically intact, and pain was well controlled.    Diagnosis:    ICD-10-CM    1. Fall, initial encounter W19.XXXA    2. Injury of head, initial encounter S09.90XA    3. Arthralgia, unspecified joint M25.50      Disposition: Home    Scribe Disclosure:  JUVENAL  Jose Tejeda, mane serving as a scribe at 7:30 AM on 6/12/2019 to document services personally performed by Niraj Torres DO based on my observations and the provider's statements to me.     EMERGENCY DEPARTMENT       Niraj Torres DO  06/12/19 0601

## 2019-06-12 NOTE — ED TRIAGE NOTES
Pt tripped getting out of bed to use bathroom, hit head on night stand, no LOC, +blood thinner, denies neck pain. Pt to ED for right ankle, left hip, and left shoulder soreness.

## 2019-06-23 ASSESSMENT — ENCOUNTER SYMPTOMS
PALPITATIONS: 0
MUSCLE WEAKNESS: 1
FLANK PAIN: 0
EXERCISE INTOLERANCE: 0
EYE WATERING: 1
EYE REDNESS: 0
BACK PAIN: 0
STIFFNESS: 1
ORTHOPNEA: 0
JOINT SWELLING: 1
HEMATURIA: 0
ARTHRALGIAS: 1
HYPOTENSION: 0
DOUBLE VISION: 0
HOARSE VOICE: 1
TASTE DISTURBANCE: 0
NECK PAIN: 1
LEG PAIN: 0
LIGHT-HEADEDNESS: 0
SORE THROAT: 1
NECK MASS: 0
TROUBLE SWALLOWING: 0
MYALGIAS: 1
SLEEP DISTURBANCES DUE TO BREATHING: 0
SMELL DISTURBANCE: 0
HYPERTENSION: 0
EYE PAIN: 0
DIFFICULTY URINATING: 0
SYNCOPE: 0
MUSCLE CRAMPS: 1
SINUS PAIN: 0
EYE IRRITATION: 1
SINUS CONGESTION: 1
DYSURIA: 0

## 2019-06-26 DIAGNOSIS — G12.21 ALS (AMYOTROPHIC LATERAL SCLEROSIS) (H): Primary | ICD-10-CM

## 2019-06-27 ENCOUNTER — THERAPY VISIT (OUTPATIENT)
Dept: SPEECH THERAPY | Facility: CLINIC | Age: 79
End: 2019-06-27
Payer: MEDICARE

## 2019-06-27 ENCOUNTER — ALLIED HEALTH/NURSE VISIT (OUTPATIENT)
Dept: NUTRITION | Facility: CLINIC | Age: 79
End: 2019-06-27

## 2019-06-27 ENCOUNTER — THERAPY VISIT (OUTPATIENT)
Dept: PHYSICAL THERAPY | Facility: CLINIC | Age: 79
End: 2019-06-27
Payer: MEDICARE

## 2019-06-27 ENCOUNTER — THERAPY VISIT (OUTPATIENT)
Dept: OCCUPATIONAL THERAPY | Facility: CLINIC | Age: 79
End: 2019-06-27
Payer: MEDICARE

## 2019-06-27 ENCOUNTER — OFFICE VISIT (OUTPATIENT)
Dept: NEUROLOGY | Facility: CLINIC | Age: 79
End: 2019-06-27
Payer: MEDICARE

## 2019-06-27 VITALS
OXYGEN SATURATION: 97 % | HEART RATE: 71 BPM | WEIGHT: 149 LBS | SYSTOLIC BLOOD PRESSURE: 102 MMHG | BODY MASS INDEX: 25.58 KG/M2 | DIASTOLIC BLOOD PRESSURE: 57 MMHG

## 2019-06-27 DIAGNOSIS — Z74.09 IMPAIRED MOBILITY AND ADLS: Primary | ICD-10-CM

## 2019-06-27 DIAGNOSIS — G12.21 ALS (AMYOTROPHIC LATERAL SCLEROSIS) (H): ICD-10-CM

## 2019-06-27 DIAGNOSIS — Z78.9 IMPAIRED INSTRUMENTAL ACTIVITIES OF DAILY LIVING (IADL): ICD-10-CM

## 2019-06-27 DIAGNOSIS — G12.21 ALS (AMYOTROPHIC LATERAL SCLEROSIS) (H): Primary | ICD-10-CM

## 2019-06-27 DIAGNOSIS — G31.9 NEURODEGENERATIVE DISORDER (H): Primary | ICD-10-CM

## 2019-06-27 DIAGNOSIS — R47.1 DYSARTHRIA: Primary | ICD-10-CM

## 2019-06-27 DIAGNOSIS — K11.7 INCREASED OROPHARYNGEAL SECRETIONS: ICD-10-CM

## 2019-06-27 DIAGNOSIS — Z78.9 IMPAIRED MOBILITY AND ADLS: Primary | ICD-10-CM

## 2019-06-27 RX ORDER — GUAIFENESIN 600 MG/1
600 TABLET, EXTENDED RELEASE ORAL 2 TIMES DAILY
Qty: 60 TABLET | Refills: 1 | Status: SHIPPED | OUTPATIENT
Start: 2019-06-27 | End: 2019-11-14

## 2019-06-27 ASSESSMENT — PAIN SCALES - GENERAL: PAINLEVEL: NO PAIN (0)

## 2019-06-27 NOTE — PROGRESS NOTES
"    OUTPATIENT PHYSICAL THERAPY CLINIC NOTE  Katherin Fletcher  YOB: 1940  [id number]    Type of visit:         Evaluation     Date of service: 6/27/2019    Referring provider: Dr. Driver     present: No    Others present at visit:  Child(imani)- daughter Ruchi    Medical diagnosis:   Amyotrophic lateral sclerosis (ALS)    Date of diagnosis: 2/28/19    Pertinent history of current problem (include personal factors and/or comorbidities that impact the plan of care): Pt with onset of symptoms 4-5 years ago, including spasms in the legs and falls, neck weakness. Pt dx with C5-6 severe spinal stenosis, neuropathy. Hx includes CAD s/p CABG in 1992 and 2009, HTN, HLD, ischemic cardiomyopathy, MI 11/1996, TIA 5/2018 with R facial N/T, PAD, GERD, RLS, hypothyroidism, and recent R volar carpal ganglion cyst and trigger finger release 11/29/18.    Cardio-respiratory status:  Forced vital capacity: 77 % of predicted     Height/Weight: 5'3\" / 149 lb    Living environment:  [place], [place]- has main level full bathroom  [facility], [facility] is 3 years  *Has [facility] system    Living environment barriers:  Ramp(s) present                Current assistance/living environment:  Lives with spouse, Caio (89)                            Current mobility equipment:  4 wheeled walker with seat  Manual wheelchair  Scooter  Lift chair recliner  [employer]  Stair glide to upper level and to basement ( does laundry in basement)  Power wheelchair in process- agreeable to loaner request 6/27/19  Bedrail on one side, step to access bed on other side of bed                Current ADL equipment:  Orthotics: soft collar + [place] [place] collar  Raised toilet seat upstairs (no handles), grab bar nearby + countertop  Main level tall toilet, uses vanity and windowsill for support  Main level tub/shower combo- pt uses this one, has grab bars  Upstairs- Walk-in shower with lip, grab " [email or web address]  Shower chair      Technology used: Phone    Patient concerns/goals: Son built a step to get up into bed and it works slick, steps up backward holding onto locked walker. Has bedrail on the other side of bed to help turn.     Evaluation   Interview completed.   Pain assessment:  Pain denied   Range of motion: Limited extensibility of adductors, quads, HS, gastrocs   Manual muscle testing: Hip flexion 4+/5 B, hip abd/add 5/5 B, knee ext 4+/5 B, knee flex 5/5 B, ankle DF 4+/5 R, 5/5 L   Transfer: Sit>stand with CGA d/t posterior lean/LOB, requiring 3 attempts and extra time/effort from transport wheelchair with thin pad.   Gait: Pt amb with 4ww with CGA for safety, poor clearance of toes B with shuffling gait likely d/t spasticity, a little better after warming/loosening up, needs to go slow to ensure toe clearance to avoid tipping forward, base is narrowed.   Cognition: Intact    Fall Risk Screen:   Has the patient fallen 2 or more times in the last year? Yes, 4 falls in home      Has the patient fallen and had an injury in the past year? No, had xrays, all fine       25ft walk: did not complete distance for this today    Is the patient a fall risk? Yes, department fall risk interventions implemented     Impairments:  Fatigue  Muscle atrophy  Coordination  Balance  Range of motion  Spasticity     Treatment diagnosis:  Impaired mobility  Impaired activities of daily living    Clinical Presentation: Evolving/Changing  Clinical Presentation Rationale: Progressive nature of disease with high falls risk and progressive limitation in mobility and ADLs.  Clinical Decision Making (Complexity): Moderate complexity     Recommendations/Plan of care:  1 session evaluation & treatment.  Equipment recommended: Encouraged to pursue BLAZE villar PWC vs waiting for her PWC to be ready.     Goals:   Target date: 6/27/19  Patient, family and/or caregiver will verbalize understanding of evaluation results and implications for  functional performance.  Patient, family and/or caregiver will verbalize/demonstrate understanding of compensatory methods /equipment to enhance functional independence and safety.    Educational assessment/barriers to learning:   No barriers noted     Treatment provided this date:   Self-care/Home management, 20 minutes  -Pt notes she's awaiting power wheelchair, did not pursue loaner after assessment. Encouraged pt to go ahead with loaner PWC to allow for practice and starting to use for tasks like sitting with PWC elevated at counter for countertop tasks, reducing falls risk when fatigued etc. With encouragement pt agreeable, and I contacted ROB Henson that completed assessment to provide details to [employer].  -Discussed options for improving toe clearance with gait, likely d/t spasticity primarily. Pt could consider [place] foot up or AFO B, pictures shown and described how to don and use. Pt would like to talk with podiatrist d/t some R small toes painful, thinks arthritis. Provided info to consider and reach out if questions, see AVS.   -Discussed options to reduce/avoid stairs if needed d/t falls risk. Pt does have a room on the main level she could get ready to use and have a bed etc. Pt also notes needing to open some space in dining room for PWC, her  uses a PWC as well. Dtr Ruchi said she could help with home preparations. Pt has full bath on main level, this is where she currently showers.   -See SW note for details about discussion/options for alternate living situation with more assistance. D/t pt high falls risk, it would be best to have additional assistance for transfers, ADLs/IADLs.    Response to treatment/recommendations: Pt verbalizes understanding    Goal attainment:  All goals met     Risks and benefits of evaluation/treatment have been explained.  Patient, family and/or caregiver are in agreement with Plan of Care.     Timed Code Treatment Minutes: 20  Total Treatment Time (sum of timed and  untimed services): 28    Signature: Eliza Saha, PT   Date: 6/27/2019    Certification:  Onset date: 6/27/2019  Start of care date: 6/27/2019  Certification date from 6/27/2019 to 6/27/2019    I CERTIFY THE NEED FOR THESE SERVICES FURNISHED UNDER        THIS PLAN OF TREATMENT AND WHILE UNDER MY CARE     (Physician attestation of this document indicates review and certification of the therapy plan).

## 2019-06-27 NOTE — PROGRESS NOTES
Outpatient Speech Language Pathology Neurology Clinic Evaluation  Katherin Fletcher YOB: 1940 [id number]    Visit Date: 6/27/2019    Age: 79 year old    Medical Diagnosis: Amyotrophic lateral sclerosis (ALS)    Date of Diagnosis: 2/28/19    Referring MD: Dr Driver     present: No    PMH: Refer to Medical Chart    Fall Risk:Refer to physician report.    Others at Clinic Visit: Children    Living Situation: With others    Patient Concerns/Goals: Increased speech deficits    Observations: Patient is seen very briefly due to time constraints of her ride arriving.    Current Mode of Nutrition: Oral diet    Weight: 149 lbs    Cardio-Respiratory Status: Forced vital capacity: 77%    Functional Rating Scale (ALS-FRS): 30/48 last visit, not entered yet for today's visit      Oral Motor/Swallowing  Oral Motor Function: not formally assessed this visit due to time constraints    Swallow Function: she denies dyspaghia and reports she is consuming regular solids and thin liquids without difficulty    Dysphagia Diagnosis: Swallow within functional limits per pt report      Speech Intelligibility/Functional Communication   Methods of communication: Verbal    Dysarthria: Mild-moderate    Speech:   Deficits in phonation- Strained-strangled quality (spastic) and Loudness (soft)  Deficits in articulation- Decreased precision of articulation and Slow effortful rate  Intelligibility- 100% to this skilled listener in quiet environment but  is hard of hearing and often asks her to repeat    Speech Intelligibility/Communication:  Communicates functionally    Augmentative and Alternative Communication (AAC):   Educated re Voice Banking as described below      Clinical Impression/Plan of Care  Treatment Diagnosis: Dysarthia     Recommendations:  Continue compensatory speech strategies.  Initiate Voice Banking (SLP requested in-home equipment be sent from [facility])    Plan of Care:  1 session  evaluation and treatment.    Goals: Based on today's evaluation session patient and/or caregiver will have understanding of current communication and/or swallowing status and recommendations for management.    Educational Assessment:  Learners- Patient and Children  Barriers to Learning- No barriers.    Education provided/response:   Speech  Verbalized understanding   Voice Banking    Treatment provided this date:   Communication intervention, 6 minutes   SLP educated re importance and benefit of Voice Banking and encouraged her to pursue. She agreed to complete utilizing the in-home option so SLP requested equipment from [facility]. SLP also educated re speech strategies including slow rate, reducing background noise, face to face communication as much as possible, gaining attention prior to starting message, etc.    Response to recommendations/treatment: verbalized understanding, agreed to voice banking    Goal attainment: All goals met    Risks and benefits of evaluation/treatment have been explained.  Patient, family and/or caregiver are in agreement with Plan of Care.    Timed Code Treatment Minutes: 0 minutes timed codes    Total Treatment Time (sum of timed and untimed services): 9 minutes    Certification:  Onset date: 6/27/19  Start of care date: 6/27/2019  Certification date from 6/27/2019 to 6/27/19    I CERTIFY THE NEED FOR THESE SERVICES FURNISHED UNDER        THIS PLAN OF TREATMENT AND WHILE UNDER MY CARE     (Physician attestation of this document indicates review and certification of the therapy plan).

## 2019-06-27 NOTE — PROGRESS NOTES
"CLINICAL NUTRITION SERVICES - ASSESSMENT NOTE    Katherin Fletcher 79 year old referred for MNT related to ALS    Visit Type: initial  Referring Physician: Neisha Grey accompanied by: her daughter, Ruchi    NUTRITION HISTORY  Factors affecting nutrition intake include:decreased appetite with difficulty chewing (upper dentures do not fit well).  Current diet: general  Current appetite/intake: fair  PEG Tube: No    Diet Recall  Breakfast Blueberry muffin, 1 cup coffee   Lunch 1 cup cheerios with 1 bottle of boost   Dinner 3 oz meat with grains and vegetables   Snacks Arnaud crackers and prunes   Beverages 3-4 cups water, 1 cup coffee, 1 bottle of boost   Alcohol unknown   Dining out [facility] - prepares her own meals at home (for her and her )     ANTHROPOMETRICS  Height: 63\"  Weight: 149 lb/67kg  BMI: 27  Weight Status:  Overweight BMI 25-29.9  IBW: 115 lbs  % IBW: 129%  Weight History: down 5 lb x past 3 months  Wt Readings from Last 3 Encounters:   06/27/19 67.6 kg (149 lb)   06/12/19 68 kg (150 lb)   04/21/19 69.9 kg (154 lb)     Dosing Weight: 67kg (actual wt)    Medications/vitamins/minerals/herbals:   Reviewed  MVI, Emery    LABS  Labs reviewed    ASSESSED NUTRITION NEEDS:  Estimated Energy Needs: 1700 kcals (30 Kcal/Kg)  Justification: maintenance  Estimated Protein Needs: 67 grams protein (1.2 g pro/Kg)  Justification: maintenance  Estimated Fluid Needs: 2000  mL   Justification: maintenance    MALNUTRITION:  % Weight Loss:  Weight loss does not meet criteria for malnutrition   % Intake:  Decreased intake does not meet criteria for malnutrition   Subcutaneous Fat Loss:  None observed  Muscle Loss:  None observed  Fluid Retention:  None noted    Malnutrition Diagnosis: Patient does not meet two of the above criteria necessary for diagnosing malnutrition but is at risk.     NUTRITION DIAGNOSIS:  Inadequate oral intake related to decreased appetite with uncomfortable dentures as evidenced by 5 lb wt " loss x past 3 months.     INTERVENTIONS  Provided written & verbal education on:   - Ways to maximize kcal and protein intake. Discussed calorie and protein needs for maintenance of weight and nutrition status.  Advised pt to aim for at least 1700kcal and 65g protein via 5-6 small frequent meals.  Discussed that as disease progresses, eating may become more difficult and discussed ways to cope with this.   - ONS (Ensure Enlive, Ensure Plus/Boost Plus, CIB etc). Suggested ways to incorporate these supplements to avoid flavor fatigue. Encouraged pt to start consuming 2 ONS daily if weight continue to decline.     Provided pt with corresponding education materials/handouts on:  High Calorie, High Protein Recipe booklet, Tips to Increase the Calories in Your Diet    Goals  1.  Aim for 5-6 small frequent meals  2.  Aim for 1700kcal and 67g protein/day  3. Weight maintenance      Follow-Up Plans: Pt has RD contact information for questions.    Pt encouraged to follow up with RD at next clinic visit in 3-6 months.    MONITORING AND EVALUATION:  -Food intake  -Liquid meal replacement or supplement  -Weight trends    Jaja Mario RDN, LD

## 2019-06-27 NOTE — PATIENT INSTRUCTIONS
For the thick phlegm, please increase the amount of water you take. You should take at least 6-7 small bottles of water every day.  You can also take Mucinex three times a day (I will prescribe it).    If thick phlegm is a bigger problem than the drooling, please reduce the Robinul dose to 1 mg TWO times a day. If you are feeling better, you may cut it more- to 1 mg once a day.    Our physical, occupational and speech therapists will see you today. The occupational therapist will show you how to use the head collar.     For the spasms, let's see what happens with a larger cannabis dose first. If you are not happy, please contact me.    The film sensation at the legs is due to neuropathy. We will just watch this symptom for now. We do not have a definite cause and it is not related to the ALS.    We should think seriously about your living situation. I am concerned about your safety at home and the frequent falls.

## 2019-06-27 NOTE — PROGRESS NOTES
"    OUTPATIENT OCCUPATIONAL THERAPY CLINIC NOTE  Katherin Fletcher  YOB: 1940  [id number]    Type of visit:  Evaluation            Date of service: 6/27/19    Referring provider: Dr. Travon Driver    Others present at visit:  Child(imani), daughter-Ashley    Medical diagnosis:   Amyotrophic lateral sclerosis (ALS)     Date of diagnosis: 2/28/19      Pertinent history of current problem (include personal factors and/or comorbidities that impact the plan of care): Pt with onset of symptoms 4-5 years ago, including spasms in the legs and falls, neck weakness. Pt dx with C5-6 severe spinal stenosis, neuropathy. Hx includes CAD s/p CABG in 1992 and 2009, HTN, HLD, ischemic cardiomyopathy, MI 11/1996, TIA 5/2018 with R facial N/T, PAD, GERD, RLS, hypothyroidism, and recent R volar carpal ganglion cyst and trigger finger release 11/29/18.     Cardio-respiratory status:  Forced vital capacity: 77 % of predicted        Additional Occupational Profile Information (patterns of daily living, interests, values and needs): Pt is a 78 y.o  woman living with her spouse with whom she is a caregiver for at time of dx with ALS.    Height/Weight: 5' 3\" / 149#    Living environment:  [place], 2 story, 3 steps in with railing     Living environment:  [facility]   2 story, bathroom upstairs and on main, bedroom upstairs, 2 steps to  chairlift to upper level  Laundry in basement-spouse does this  Tub/shower  Toilet-high and and two bars by toilet    Living environment barriers:  Ramp(s) present                Current assistance/living environment:  Lives with spouse-he is older, amb with cane                            Current mobility equipment:  4 wheeled walker with seat  Scooter                Current ADL equipment:  Orthotics: soft collar   extended tub bench requested (by PT on behalf of OT 2/28/19 from [facility])    Technology used: smartphone-Ind     Patient concerns/goals: Pt does all household " duties, and provides care to her , a 92 y.o. [place]. Pt daughter lives nearby, assists about 1x/week. Pt is falling, falls rearward and to side, LE's stiff. Hands weak. Walker in home and power scooter for distance     Evaluation   Interview completed.   Pain assessment:  Pain denied    Range of motion:  UE: AROM reduced-shoulders to 90 degrees flexion today bilateral    Manual muscle testing:   is fairly strong but  lateral and palmar pinch mukesh are weaker now    Cognition:  Appears WFL, not formally assessed    ADL:   Feeding self:  Ind, regular utensils  Grooming: Ind  UE Dressing:  Ind, hooks bra in front  LE Dressing:  Ind  Showering/bathing: Ind with extended tub bench and grab bars and HHS  Toileting/transfer:  Ind with higher toilet and now has grab bars  Mobility: pt reports she is amb throughout her home with walker    IADL:   Home management:  Ind, [employer] delivery for meals a few times per week., has falls, spouse does laundry,   Driving:  No longer, uses [employer]  Occupation: retired   Lifeline: has fall detection-did not work when had last fall but reports she wasn't wearing it and that it was on her walker and flew off walker and across room when she fell.    Fall Risk Screen:   Has the patient fallen 2 or more times in the last year? Yes      Has the patient fallen and had an injury in the past year? Yes, fell down flight of stairs, hit head, fell 6/14/19 and hit  Head early morning getting out of bed to go to the bathroom -slipped in barefeet on carpet       Timed Up and Go Score: defer to PT    Is the patient a fall risk? Yes, department fall risk interventions implemented     Impairments:  Fatigue  Muscle atrophy  Coordination  Balance  Range of motion  Respiratory compromise     Treatment diagnosis:  Impaired mobility  Impaired activities of daily living  Impaired IADL    Assessment of Occupational Performance: 3-5 Performance Deficits  Identified Performance Deficits (ie:  feeding, social skills): showering, toileting, cooking, cleaning, functional mobility  Clinical Decision Making (Complexity): Moderate complexity     Recommendations/Plan of care:  Patient would benefit from interventions to enhance safety and independence.  Rehab potential good for stated goals.  Occupational therapy intervention for  self care/home management.  1 session evaluation & treatment.  Recommend referral to: hand therapy for custom fitting of R thumb palmar abd splint to aid pinch force    Goals:   Target date: today  Patient, family and/or caregiver will verbalize understanding of evaluation results and implications for functional performance.  Patient, family and/or caregiver will verbalize/demonstrate understanding of AE and splints/adapations to aid hand function for daily living skills.      Educational assessment/barriers to learning:  No barriers noted      Treatment provided this date:   Self care/home management, 25 minutes of training in:  -purpose of being sure to wear her Lifeline alert system at night and if not comfortable with neck pendant while sleeping, consider pajamas with pocket at chest she can put it into during night, vs not wearing as she does currently  -Purpose of having custom R thumb palmar abd splint made to aid hand function force  -method to don her neck brace with 3 different trials to practice this and training in option of how to modify neck collar strapping with daughter's assistance with loop on strap for improved prehension and to trial propping arms in table at home when donning. Pt does best to keep one side in place set at optimal fit by therapist today and her daughter will celine with marking pen when they return home where velcro aligns so patient knows how tightly it should fit for optimal fit and comfort.. Did need min assist even on 3rd trial for precise alignment.       Response to treatment/recommendations: receptive    Goal attainment:  All goals  met    Risks and benefits of evaluation/treatment have been explained.  Patient, family and/or caregiver are in agreement with Plan of Care.     Timed Code Treatment Minutes: 25    Total Treatment Time (sum of timed and untimed services): 35    Signature: GRAEME Guzman, [facility] [place]   Date: 6/27/19       Certification:  Onset date: 6/27/19  Start of care date: 6/27/2019  Certification date from 6/27/2019 to 6/27/19    I CERTIFY THE NEED FOR THESE SERVICES FURNISHED UNDER        THIS PLAN OF TREATMENT AND WHILE UNDER MY CARE     (Physician attestation of this document indicates review and certification of the therapy plan).

## 2019-06-27 NOTE — LETTER
2019       RE: Katherin Fletcher  [address]  [facility] [address]     Dear Colleague,    Thank you for referring your patient, Katherin Fletcher, to the [facility] NEUROLOGY at [facility]. Please see a copy of my visit note below.    Service Date: 2019       Aris Del Real MD    [facility]   [address] [place] [address]       RE:    Katherin Fletcher   MRN:  [id number]   : 1940      Dear Dr. Del Real:      I had the pleasure to see Katherin Fletcher in followup at the [facility].  I last met with her in clinic in 2019.  She was referred by Dr. Gonzalez.  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         Katherin comes for followup today.  She has a number of issues.  She has taken 4 falls since the last time I saw her.  All of the falls have occurred inside her house.  She has a manual wheelchair, but she has applied for a power wheelchair and she is in process of receiving this.  Her  cannot provide much help at home.  He is a 89-year-old with mild dementia to my understanding.      Her muscle spasms resulting from the upper motor neuron dysfunction have been truly problematic.  We have increased her tizanidine dose and she currently is on 6 mg 3 times a day.  A few months ago, we started at 2 mg 3 times a day.  The control was incomplete and she decided to apply for medical cannabis, which she started taking about a month ago.  She has noted about a 10%-20% improvement of her muscle spasms that do not bother her that much at night anymore, but they still sometimes occur during the day.  She is also interestingly on Sinemet 2 pills 4 times a day taken at 5:00 a.m., 11:00 a.m., 5:00 p.m. and 11:00 p.m.  She does not have any parkinsonism on her exam, yet  Sinemet was helpful and when we attempted to taper it, her spasms got worse. Lastly she takes a 50:50 mixture of CBD:THC at 9 a.m. and 9:00 p.m.  She says that her spasms usually occur around 6-7 p.m.  She was seen by Physical Medicine and Rehabilitation and the possibility of botulinum A toxin chemodenervation was discussed.  She has not been on baclofen.  She still ambulates with help; she is not exclusively wheelchair bound.        Another problem for Katherin is her head drop.  When I met her in 05/2019 for repeat EMG, I prescribed a hard collar, which she has a hard time putting on, because she has not figured out the way.  She is using a soft collar that does not provide as good neck support.        Katherin is also bothered by her bulbar symptoms.  Her speech is thick and effortful, although it is not much worse than a few months ago.  She still remains intelligible for the most part. She also has problems with secretions.  When we first met, I thought it was mostly thin sialorrhea and prescribe her Robinul 1 mg 3 times a day.  She feels that at this point the drooling is not bad, but there are thick secretions that she has difficulty clearing from her throat and those become uncomfortable at times.  Katherin does not stay well hydrated.  She takes very little water during the day.        In terms of her living situation, she has applied for [employer] as the spouse of a , but there is a terribly long wait time of almost 3 years.        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  A previous EMG done by Dr. Gonzalez had also shown some lower extremity polyneuropathy.  Note that the patient complains of a film-like sensation occurring from her knees to her feet, especially upon morning awakening; it is not a painful sensation.      Pulmonary function tests today showed a forced vital capacity of 77%  predicted for age, height and sex.  FEV1 of 73% and MIP of -39 cm of water.        Blood pressure was 102/67, pulse 71 and regular.  O2 sat 97% on room air.  Weight 67.6 kg and she endorsed no pain.  I did not repeat her neurological examination.      I spent about 35-40 minutes with Katherin, of which more than half was counseling.  She has a number of practical issues that are actually very challenging to address.  Regarding her muscle spasms, she has not reached a maximal dose of THC: CBD mix, and I would like to see what happens when she gets on the higher dose.  Hopefully she will get some relief.  If not, this is not going to be a straightforward problem to manage.  We could consider baclofen, but she already is quite sedated with the tizanidine 6 mg t.i.d and sedation will only get worse with the baclofen.  In addition, I am concerned that adding baclofen can aggravate her leg weakness and lead to more falls, because she still uses her legs to ambulate and transfer and she is not exclusively wheelchair bound.  The same problems may occur by using botulinum toxin.      For the management of secretions, it seems that the thick secretions are the predominant problem at this point.  I asked her to reduce her Robinul dose to 1 mg b.i.d. and see what happens.  If she feels better, she can taper it further to 1 mg daily as long as she does not have prominent drooling.  I asked her to improve her hydration and take at least 7-8 bottles of water a day and I will prescribe her Mucinex 600 mg b.i.d. to facilitate clearance of phlegm.        Regarding the use of the collar, our occupational therapist will demonstrate to her how to use the rigid one properly today.      Regarding the neuropathy in her feet, there is no clear explanation found.  Immunofixation was negative.  She does not have diabetes or B12 deficiency.  This will be monitored for now.  It is not a painful symptom.      What I am mostly concerned about is  her living situation.  I do not think that the current situation is tenable anymore.  Her  cannot provide any help and she is significantly disabled with major mobility problems and several falls at home.  I would recommend considering relocation to an assisted living facility. Ana seems to understand.        Follow up in our clinic in 3 months or sooner if needed.       Sincerely,      Travon Driver MD       D: 2019   T: 2019   MT:       Name:     ANA RODRIGUEZ   MRN:      [id number]        Account:      [id number]   :      1940           Service Date: 2019      Document: [id number]

## 2019-06-27 NOTE — DISCHARGE INSTRUCTIONS
Ankle support options:     AFO (ankle foot orthosis) - this would be fit by an orthotist, we can enter an order      [employer] Foot-up        About $30 on [employer] (each)

## 2019-06-27 NOTE — PROGRESS NOTES
Service Date: 2019       Aris Del Real MD    [facility]   [address] [place] [address]       RE:    Katherin Fletcher   MRN:  [id number]   : 1940      Dear Dr. Del Real:      I had the pleasure to see Katherin Fletcher in followup at the [facility].  I last met with her in clinic in 2019.  She was referred by Dr. Gonzalez.  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         Katherin comes for followup today.  She has a number of issues.  She has taken 4 falls since the last time I saw her.  All of the falls have occurred inside her house.  She has a manual wheelchair, but she has applied for a power wheelchair and she is in process of receiving this.  Her  cannot provide much help at home.  He is a 89-year-old with mild dementia to my understanding.      Her muscle spasms resulting from the upper motor neuron dysfunction have been truly problematic.  We have increased her tizanidine dose and she currently is on 6 mg 3 times a day.  A few months ago, we started at 2 mg 3 times a day.  The control was incomplete and she decided to apply for medical cannabis, which she started taking about a month ago.  She has noted about a 10%-20% improvement of her muscle spasms that do not bother her that much at night anymore, but they still sometimes occur during the day.  She is also interestingly on Sinemet 2 pills 4 times a day taken at 5:00 a.m., 11:00 a.m., 5:00 p.m. and 11:00 p.m.  She does not have any parkinsonism on her exam, yet Sinemet was helpful and when we attempted to taper it, her spasms got worse. Lastly she takes a 50:50 mixture of CBD:THC at 9 a.m. and 9:00 p.m.  She says that her spasms usually occur around 6-7 p.m.  She was seen by Physical Medicine and Rehabilitation and the possibility of botulinum A toxin  chemodenervation was discussed.  She has not been on baclofen.  She still ambulates with help; she is not exclusively wheelchair bound.        Another problem for Katherin is her head drop.  When I met her in 05/2019 for repeat EMG, I prescribed a hard collar, which she has a hard time putting on, because she has not figured out the way.  She is using a soft collar that does not provide as good neck support.        Katherin is also bothered by her bulbar symptoms.  Her speech is thick and effortful, although it is not much worse than a few months ago.  She still remains intelligible for the most part. She also has problems with secretions.  When we first met, I thought it was mostly thin sialorrhea and prescribe her Robinul 1 mg 3 times a day.  She feels that at this point the drooling is not bad, but there are thick secretions that she has difficulty clearing from her throat and those become uncomfortable at times.  Katherin does not stay well hydrated.  She takes very little water during the day.        In terms of her living situation, she has applied for [employer] as the spouse of a , but there is a terribly long wait time of almost 3 years.        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  A previous EMG done by Dr. Gonzalez had also shown some lower extremity polyneuropathy.  Note that the patient complains of a film-like sensation occurring from her knees to her feet, especially upon morning awakening; it is not a painful sensation.      Pulmonary function tests today showed a forced vital capacity of 77% predicted for age, height and sex.  FEV1 of 73% and MIP of -39 cm of water.        Blood pressure was 102/67, pulse 71 and regular.  O2 sat 97% on room air.  Weight 67.6 kg and she endorsed no pain.  I did not repeat her neurological examination.      I spent about 35-40 minutes with Katherin, of  which more than half was counseling.  She has a number of practical issues that are actually very challenging to address.  Regarding her muscle spasms, she has not reached a maximal dose of THC: CBD mix, and I would like to see what happens when she gets on the higher dose.  Hopefully she will get some relief.  If not, this is not going to be a straightforward problem to manage.  We could consider baclofen, but she already is quite sedated with the tizanidine 6 mg t.i.d and sedation will only get worse with the baclofen.  In addition, I am concerned that adding baclofen can aggravate her leg weakness and lead to more falls, because she still uses her legs to ambulate and transfer and she is not exclusively wheelchair bound.  The same problems may occur by using botulinum toxin.      For the management of secretions, it seems that the thick secretions are the predominant problem at this point.  I asked her to reduce her Robinul dose to 1 mg b.i.d. and see what happens.  If she feels better, she can taper it further to 1 mg daily as long as she does not have prominent drooling.  I asked her to improve her hydration and take at least 7-8 bottles of water a day and I will prescribe her Mucinex 600 mg b.i.d. to facilitate clearance of phlegm.        Regarding the use of the collar, our occupational therapist will demonstrate to her how to use the rigid one properly today.      Regarding the neuropathy in her feet, there is no clear explanation found.  Immunofixation was negative.  She does not have diabetes or B12 deficiency.  This will be monitored for now.  It is not a painful symptom.      What I am mostly concerned about is her living situation.  I do not think that the current situation is tenable anymore.  Her  cannot provide any help and she is significantly disabled with major mobility problems and several falls at home.  I would recommend considering relocation to an assisted living facility. Katherin  seems to understand.        Follow up in our clinic in 3 months or sooner if needed.       Sincerely,         MD KLAUDIA Allen MD       ADDENDUM: Ms Fletcher has severely compromised mobility due to ALS, an inexorably progressive and currently incurable neuromuscular disorder. She is in absolute need of a power wheelchair to allow activities of daily living to be performed safely at home. This is the criterion for reimbursement of a fully motorized wheelchair by medicare. She does have the mental and physical skills to operate a power wheelchair safely. Alternative assistive devices are not appropriate. She was already evaluated by a skilled occupational therapist for the same reason, who concurred with this necessity. Klaudia Driver MD          D: 2019   T: 2019   MT: SANNA      Name:     ANA FLETCHER   MRN:      [id number]        Account:      [id number]   :      1940           Service Date: 2019      Document: [id number]

## 2019-06-29 LAB
EXPTIME-PRE: 5.57 SEC
FEF2575-%PRED-PRE: 58 %
FEF2575-PRE: 0.93 L/SEC
FEF2575-PRED: 1.58 L/SEC
FEFMAX-%PRED-PRE: 69 %
FEFMAX-PRE: 3.38 L/SEC
FEFMAX-PRED: 4.89 L/SEC
FEV1-%PRED-PRE: 73 %
FEV1-PRE: 1.44 L
FEV1FEV6-PRE: 67 %
FEV1FEV6-PRED: 78 %
FEV1FVC-PRE: 72 %
FEV1FVC-PRED: 77 %
FIFMAX-PRE: 1.78 L/SEC
FVC-%PRED-PRE: 77 %
FVC-PRE: 2 L
FVC-PRED: 2.57 L
MEP-PRE: 42 CMH2O
MIP-PRE: -39 CMH2O

## 2019-07-05 ENCOUNTER — TELEPHONE (OUTPATIENT)
Dept: CARDIOLOGY | Facility: CLINIC | Age: 79
End: 2019-07-05

## 2019-07-05 ENCOUNTER — TELEPHONE (OUTPATIENT)
Dept: NEUROLOGY | Facility: CLINIC | Age: 79
End: 2019-07-05

## 2019-07-05 DIAGNOSIS — G12.21 ALS (AMYOTROPHIC LATERAL SCLEROSIS) (H): Primary | ICD-10-CM

## 2019-07-05 DIAGNOSIS — E78.2 MIXED HYPERLIPIDEMIA: Primary | ICD-10-CM

## 2019-07-14 NOTE — PROGRESS NOTES
Chief Complaint   Patient presents with     Follow Up     Nail trimming. Pain, right 4th toe. Patient stated that her right 4th toe is still sore since Dr. Arroyo trimmed the callous.               Allergies   Allergen Reactions     Ace Inhibitors Cough     Baclofen Other (See Comments)     Constipation , tiredness     Penicillins      10/7/14 Hives per patient -- many many years ago       Nickel Swelling and Rash     Other reaction(s): Unknown         Subjective: Katherni is a 78 year old female who presents to the clinic today for a follow up of elongated nails. Relates that her right 4th toe is sore still. Relates that it hurts the most when in a shoe.       Objective     DP pulses are 1/4 and PT pulses are non-palpable bilaterally. LE capillary refill time is <3 seconds. Absent pedal hair. Mild non-pitting LE edema.  Dorsally contracted digits 2-5 that are non-reducible, very limited passive ROM. Equinus present.  Gross sensation intact to bilateral LE.   LE skin color, texture and turgor are normal. Toenails are slightly elongated bilaterally. Right 4th toe is hammered and in a rigid position. Small tyloma noted to the area. No s/s of infection.      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   - Silicone toe cap on the right 4th digit. Only on when shoes are on.   - Cont with comfortable shoes.   - Pt to return to clinic in 9 weeks.

## 2019-07-15 ENCOUNTER — OFFICE VISIT (OUTPATIENT)
Dept: ORTHOPEDICS | Facility: CLINIC | Age: 79
End: 2019-07-15
Payer: MEDICARE

## 2019-07-15 DIAGNOSIS — L60.2 LONG TOENAIL: ICD-10-CM

## 2019-07-15 DIAGNOSIS — M20.41 HAMMER TOES OF BOTH FEET: ICD-10-CM

## 2019-07-15 DIAGNOSIS — M20.42 HAMMER TOES OF BOTH FEET: ICD-10-CM

## 2019-07-15 DIAGNOSIS — I73.9 PAD (PERIPHERAL ARTERY DISEASE) (H): Primary | ICD-10-CM

## 2019-07-15 DIAGNOSIS — L84 TYLOMA: ICD-10-CM

## 2019-07-15 NOTE — LETTER
7/15/2019       RE: Katherin Fletcher  [address] [place] [address]     Dear Colleague,    Thank you for referring your patient, Katherin Fletcher, to the [facility]. Please see a copy of my visit note below.    Chief Complaint   Patient presents with     Follow Up     Nail trimming. Pain, right 4th toe. Patient stated that her right 4th toe is still sore since Dr. Arroyo trimmed the callous.               Allergies   Allergen Reactions     Ace Inhibitors Cough     Baclofen Other (See Comments)     Constipation , tiredness     Penicillins      10/7/14 Hives per patient -- many many years ago       Nickel Swelling and Rash     Other reaction(s): Unknown         Subjective: Katherin is a 78 year old female who presents to the clinic today for a follow up of elongated nails. Relates that her right 4th toe is sore still. Relates that it hurts the most when in a shoe.       Objective     DP pulses are 1/4 and PT pulses are non-palpable bilaterally. LE capillary refill time is <3 seconds. Absent pedal hair. Mild non-pitting LE edema.  Dorsally contracted digits 2-5 that are non-reducible, very limited passive ROM. Equinus present.  Gross sensation intact to bilateral LE.   LE skin color, texture and turgor are normal. Toenails are slightly elongated bilaterally. Right 4th toe is hammered and in a rigid position. Small tyloma noted to the area. No s/s of infection.      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   - Silicone toe cap on the right 4th digit. Only on when shoes are on.   - Cont with comfortable shoes.   - Pt to return to clinic in 9 weeks.      Again, thank you for allowing me to participate in the care of your patient.      Sincerely,    Adrian HOFFMAN  JAYASHREE Arroyo

## 2019-07-16 NOTE — PROGRESS NOTES
Hand Therapy Initial Evaluation  Current Date:  7/19/2019    Diagnosis 1:Right Hand Weakness S/P ALS  Diagnosis 2: B IF, LF and RF Trigger Fingers  DOI: 7/5/2019 (MD order)  Post:  > 3 years for triggering    Precautions: ALS, fall risk    Subjective:  Katherin Fletcher is a 79 year old female.    Patient reports symptoms of the weakness of the right thumb and stiffness and triggering of the B IF, LF and RF which occurred due to ALS and B IF, LF and RF trigger finger. Since onset symptoms are Gradually getting worse.  Special tests:  None.  Previous treatment: R IF A1 pulley release 1 year ago, R LF trigger release > 3 years ago.    General health as reported by patient is fair.  Pertinent medical history includes:ALS, see EMR  Medical allergies PCN, nickel.  Surgical history: orthopedic: R IF, LF A1 pulley release, see EMR.  Medication history: see EMR.    Current occupation is None  Lives at home with   Patient is a fall risk    Objective:    Pain Level (Scale 0-10):   7/19/2019   At Rest 0/10   With Use 0/10     Pain Description:  Date 7/19/2019   Location B hands (IF, LF, RF A1 pulleys), thumbs   Pain Quality No pain, just stiffness and triggering   Frequency intermittent    Pain is worst Daytime/same all the time   Exacerbated by Finger motion, use of thumb, cane use, walker use, squeezing the brakes   Relieved by Heat and massage   Progression unchanged     ROM  Hand 7/19/2019 7/19/2019   AROM(PROM) Left Right   Index MP /90 -60/90   PIP /105 0/110   DIP /60 -15/60   PADILLA     Long MP /95 -50/100   PIP /100 0/95   DIP -30/69 62   PADILLA     Ring MP /96 -30/100   PIP /105 -35/110   DIP /69 0/60   PADILLA     Small MP /95 0/105   PIP /106 -25/105   DIP /59 0/65   PADILLA       ROM  Thumb 7/19/2019 7/19/2019   AROM  (PROM) Left Right   MP -10/64 -30/80   IP 0/45 0/60   RABD 60 50   PABD 45 50     Strength   (Measured in pounds)  Pain Report:  + mild    ++ moderate    +++ severe    7/19/2019 7/19/2019   Trials  Left Right   1  2  3 40 42   Average 40 42     Lat Pinch 7/19/2019 7/19/2019   Trials left Right   1  2  3 7 8   Average 7 8     3 Pt Pinch 7/19/2019 7/19/2019   Trials Left Right   1  2  3 7 6   Average 7 6     Edema:  Mild to moderate, joints arthritic bilaterally    Scar/Wound:  N/A    Sensation:  WNL throughout all nerve distributions; per patient report    Palpation  Pain Report: + mild    ++ moderate    +++ severe    7/19/19   A1 pulley R Index finger -   A1 pulley R Long finger -   A1 pulley R Ring finger -   A1 pulley L Index finger -   A1 pulley L Long finger -   A1 pulley L Ring finger -     Stage of Stenosing Tenosynovitis (SST)     7/19/19   R Index Finger Stage 3-4   R Long Finger Stage 3-4   R Ring Finger Stage 3-4   L Index Finger Stage 3-4   L Long Finger Stage 3-4   L Ring Finger Stage 3-4   Stage 1:  Normal  Stage 2:  Uneven motion of tendon  Stage 3:  Triggering, clicking, catching  Stage 4:  Locking in extension or flexion; unlocked by active motion  Stage 5:  Locking in extension or flexion; unlocked by passive motion  Stage 6:  Finger locked in extension or flexion    Assessment:  Patient presents with symptoms consistent with diagnosis as listed above with conservative intervention.     Patient's limitations or Problem List includes:  Decreased ROM/motion, Weakness, Decreased stability, Decreased , Decreased pinch, Decreased coordination and Decreased dexterity of the right thumb and bilateral index finger, long finger and ring finger which interferes with the patient's ability to perform Self Care Tasks (dressing, eating, bathing, hygiene/toileting), Recreational Activities and Household Chores as compared to previous level of function.    Rehab Potential:  Good - Return to full activity, some limitations    Patient will benefit from skilled Occupational Therapy to increase ROM, flexibility and stability of thumb and decrease triggering to return to previous activity level and resume  normal daily tasks and to reach their rehab potential.    Barriers to Learning:  No barrier    Communication Issues:  Patient appears to be able to clearly communicate and understand verbal and written communication and follow directions correctly.    Chart Review: Chart Review and Detailed history review with patient    Identified Performance Deficits: dressing, hygiene and grooming, home establishment and management, meal preparation and cleanup and leisure activities    Assessment of Occupational Performance:  5 or more Performance Deficits    Clinical Decision Making (Complexity): Low complexity    Treatment Explanation:  The following has been discussed with the patient:  RX ordered/plan of care  Anticipated outcomes  Possible risks and side effects    Plan:  Frequency:  1 X week, once daily  Duration:  for 8 weeks    Treatment Plan:  Modalities:  US and Laser Light  Therapeutic Exercise:  AROM, AAROM, PROM and Tendon Gliding  Manual Techniques:  Friction massage  Orthotic Fabrication:  Finger based orthosis and Hand based orthosis  Self Care:  Self Care Tasks    Home Program for R thumb weakness:  R HBTSS to aid with functional use    Home Program for B IF, LF, RF trigger:  To be addressed at next visit    Next visit:   Patient had to end eval short due to leg cramping, did not address trigger finger  Check R splint  Anatomy and diagnosis review of trigger finger  US vs Laser  FM  Gentle PROM  Future visits: consider anti-trigger splints, active tendon gliding, AE review for functional tasks    Discharge Plan:  Achieve all LTG.  Independent in home treatment program.  Reach maximal therapeutic benefit.    Please see daily flow sheet for treatment and 1:1 time provided today.

## 2019-07-19 ENCOUNTER — THERAPY VISIT (OUTPATIENT)
Dept: OCCUPATIONAL THERAPY | Facility: CLINIC | Age: 79
End: 2019-07-19
Attending: PSYCHIATRY & NEUROLOGY
Payer: MEDICARE

## 2019-07-19 DIAGNOSIS — G12.21 AMYOTROPHIC LATERAL SCLEROSIS (H): ICD-10-CM

## 2019-07-19 DIAGNOSIS — M65.30 TRIGGER FINGER, ACQUIRED: ICD-10-CM

## 2019-07-19 DIAGNOSIS — G12.21 ALS (AMYOTROPHIC LATERAL SCLEROSIS) (H): ICD-10-CM

## 2019-07-19 PROCEDURE — 97760 ORTHOTIC MGMT&TRAING 1ST ENC: CPT | Mod: GO | Performed by: OCCUPATIONAL THERAPIST

## 2019-07-19 PROCEDURE — 97165 OT EVAL LOW COMPLEX 30 MIN: CPT | Mod: GO | Performed by: OCCUPATIONAL THERAPIST

## 2019-07-19 NOTE — LETTER
DEPARTMENT OF HEALTH AND HUMAN SERVICES  CENTERS FOR MEDICARE & MEDICAID SERVICES    PLAN/UPDATED PLAN OF PROGRESS FOR OUTPATIENT REHABILITATION    PATIENTS NAME:  Katherin Fletcher     : 1940    PROVIDER NUMBER:  [id number]    [id number]     [facility]    MEDICAL RECORD NUMBER: [id number]     START OF CARE DATE:    SOC Date: 19     TYPE:  OT    PRIMARY/TREATMENT DIAGNOSIS: (Pertinent Medical Diagnosis)     ALS (amyotrophic lateral sclerosis) (H)  Amyotrophic lateral sclerosis (H)  Trigger finger, acquired    VISITS FROM START OF CARE:  Rxs Used: 1     Hand Therapy Initial Evaluation  Current Date:  2019    Diagnosis 1:Right Hand Weakness S/P ALS  Diagnosis 2: B IF, LF and RF Trigger Fingers  DOI: 2019 (MD order)  Post:  > 3 years for triggering    Precautions: ALS, fall risk    Subjective:  Katherin Fletcher is a 79 year old female.  Patient reports symptoms of the weakness of the right thumb and stiffness and triggering of the B IF, LF and RF which occurred due to ALS and B IF, LF and RF trigger finger. Since onset symptoms are Gradually getting worse.  Special tests:  None.  Previous treatment: R IF A1 pulley release 1 year ago, R LF trigger release > 3 years ago.    General health as reported by patient is fair.  Pertinent medical history includes:ALS, see EMR  Medical allergies PCN, nickel.  Surgical history: orthopedic: R IF, LF A1 pulley release, see EMR.  Medication history: see EMR.    Current occupation is None.  Lives at home with .  Patient is a fall risk.    Objective:  Pain Level (Scale 0-10):    Katherin Fletcher   : 1940-Page 2     2019   At Rest 0/10   With Use 0/10     Pain Description:  Date 2019   Location B hands (IF, LF, RF A1 pulleys), thumbs   Pain Quality No pain, just stiffness and triggering   Frequency intermittent    Pain is worst Daytime/same all the time   Exacerbated by Finger motion, use of thumb, cane use,  walker use, squeezing the brakes   Relieved by Heat and massage   Progression unchanged     ROM  Hand 2019   AROM(PROM) Left Right   Index MP /90 -60/90   PIP /105 0/110   DIP /60 -15/60   PADILLA     Long MP /95 -50/100   PIP /100 0/95   DIP -30/69 62   PADILLA     Ring MP /96 -30/100   PIP /105 -35/110   DIP /69 0/60   PADILLA     Small MP /95 0/105   PIP /106 -25/105   DIP /59 0/65   PADILLA       ROM  Thumb 2019   AROM  (PROM) Left Right   MP -10/64 -30/80   IP 0/45 0/60   RABD 60 50   PABD 45 50     Strength   (Measured in pounds)  Pain Report:  + mild    ++ moderate    +++ severe   Katherin Fletcher   : 1940-Page 3     2019   Trials Left Right   1  2  3 40 42   Average 40 42     Lat Pinch 2019   Trials left Right   1  2  3 7 8   Average 7 8     3 Pt Pinch 2019   Trials Left Right   1  2  3 7 6   Average 7 6     Edema:  Mild to moderate, joints arthritic bilaterally  Scar/Wound:  N/A  Sensation:  WNL throughout all nerve distributions; per patient report  Palpation  Pain Report: + mild    ++ moderate    +++ severe      19   A1 pulley R Index finger -   A1 pulley R Long finger -   A1 pulley R Ring finger -   A1 pulley L Index finger -   A1 pulley L Long finger -   A1 pulley L Ring finger -   Stage of Stenosing Tenosynovitis (SST)              Katherin Fletcher   : 1940-Page 4       19   R Index Finger Stage 3-4   R Long Finger Stage   3-4   R Ring Finger Stage 3-4   L Index Finger Stage 3-4   L Long Finger Stage 3-4   L Ring Finger Stage 3-4   Stage 1:  Normal  Stage 2:  Uneven motion of tendon  Stage 3:  Triggering, clicking, catching  Stage 4:  Locking in extension or flexion; unlocked by active motion  Stage 5:  Locking in extension or flexion; unlocked by passive motion  Stage 6:  Finger locked in extension or flexion    Assessment:  Patient presents with symptoms consistent with diagnosis as listed above with conservative  intervention.   Patient's limitations or Problem List includes:  Decreased ROM/motion, Weakness, Decreased stability, Decreased , Decreased pinch, Decreased coordination and Decreased dexterity of the right thumb and bilateral index finger, long finger and ring finger which interferes with the patient's ability to perform Self Care Tasks (dressing, eating, bathing, hygiene/toileting), Recreational Activities and Household Chores as compared to previous level of function.  Rehab Potential:  Good - Return to full activity, some limitations  Patient will benefit from skilled Occupational Therapy to increase ROM, flexibility and stability of thumb and decrease triggering to return to previous activity level and resume normal daily tasks and to reach their rehab potential.  Barriers to Learning:  No barrier  Communication Issues:  Patient appears to be able to clearly communicate and understand verbal and written communication and follow directions correctly.  Chart Review: Chart Review and Detailed history review with patient  Identified Performance Deficits: dressing, hygiene and grooming, home establishment and management, meal preparation and cleanup and leisure activities    Assessment of Occupational Performance:  5 or more Performance Deficits  Clinical Decision Making (Complexity): Low complexity  Treatment Explanation:  The following has been discussed with the patient:  Katherin Cormier   : 1940-Page 5    ordered/plan of care  Anticipated outcomes  Possible risks and side effects  Plan:  Frequency:  1 X week, once daily  Duration:  for 8 weeks  Treatment Plan:  Modalities:  US and Laser Light  Therapeutic Exercise:  AROM, AAROM, PROM and Tendon Gliding  Manual Techniques:  Friction massage  Orthotic Fabrication:  Finger based orthosis and Hand based orthosis  Self Care:  Self Care Tasks  Home Program for R thumb weakness:  R HBTSS to aid with functional use  Home Program for B IF, LF, RF  "trigger:  To be addressed at next visit  Next visit:   Patient had to end eval short due to leg cramping, did not address trigger finger  Check R splint. Anatomy and diagnosis review of trigger finger  US vs Laser FM and Gentle PROM.  Future visits: consider anti-trigger splints, active tendon gliding, AE review for functional tasks    Discharge Plan:  Achieve all LTG.  Independent in home treatment program.  Reach maximal therapeutic benefit.    Caregiver Signature/Credentials ______________________________ Date ________       Treating Provider: Tanya Arriola, ROBR/L CHT    I have reviewed and certified the need for these services and plan of treatment while under my care.        PHYSICIAN'S SIGNATURE:   _________________________________________  Date___________    Travon Driver MD    Certification period: Beginning of Cert date period: 07/19/19 End of Cert period date: 10/16/19     Functional Level Progress Report: Please see attached \"Goal Flow sheet for Functional level.\"    ___X_____ Continue Services or       ________ DC Services                Service dates: SOC Date: 07/19/19  to present                                                                     "

## 2019-07-22 ENCOUNTER — TELEPHONE (OUTPATIENT)
Dept: NEUROLOGY | Facility: CLINIC | Age: 79
End: 2019-07-22

## 2019-07-22 ENCOUNTER — OFFICE VISIT (OUTPATIENT)
Dept: CARDIOLOGY | Facility: CLINIC | Age: 79
End: 2019-07-22
Payer: MEDICARE

## 2019-07-22 VITALS
BODY MASS INDEX: 26.39 KG/M2 | HEIGHT: 63 IN | SYSTOLIC BLOOD PRESSURE: 124 MMHG | HEART RATE: 72 BPM | DIASTOLIC BLOOD PRESSURE: 76 MMHG

## 2019-07-22 DIAGNOSIS — I63.9 CEREBROVASCULAR ACCIDENT (CVA), UNSPECIFIED MECHANISM (H): ICD-10-CM

## 2019-07-22 DIAGNOSIS — E78.2 MIXED HYPERLIPIDEMIA: Primary | ICD-10-CM

## 2019-07-22 DIAGNOSIS — I25.5 ISCHEMIC CARDIOMYOPATHY: ICD-10-CM

## 2019-07-22 DIAGNOSIS — I10 BENIGN ESSENTIAL HYPERTENSION: ICD-10-CM

## 2019-07-22 DIAGNOSIS — I25.10 CORONARY ARTERY DISEASE INVOLVING NATIVE CORONARY ARTERY OF NATIVE HEART WITHOUT ANGINA PECTORIS: ICD-10-CM

## 2019-07-22 PROCEDURE — 99214 OFFICE O/P EST MOD 30 MIN: CPT | Performed by: INTERNAL MEDICINE

## 2019-07-22 RX ORDER — CLOPIDOGREL BISULFATE 75 MG/1
75 TABLET ORAL DAILY
Qty: 90 TABLET | Refills: 3 | Status: SHIPPED | OUTPATIENT
Start: 2019-07-22 | End: 2021-01-01

## 2019-07-22 RX ORDER — NITROGLYCERIN 0.4 MG/1
TABLET SUBLINGUAL
Qty: 25 TABLET | Refills: 3 | Status: SHIPPED | OUTPATIENT
Start: 2019-07-22

## 2019-07-22 RX ORDER — ISOSORBIDE MONONITRATE 30 MG/1
30 TABLET, EXTENDED RELEASE ORAL DAILY
Qty: 90 TABLET | Refills: 3 | Status: SHIPPED | OUTPATIENT
Start: 2019-07-22 | End: 2021-01-01

## 2019-07-22 RX ORDER — ROSUVASTATIN CALCIUM 20 MG/1
20 TABLET, COATED ORAL DAILY
Qty: 90 TABLET | Refills: 3 | Status: SHIPPED | OUTPATIENT
Start: 2019-07-22 | End: 2020-01-01

## 2019-07-22 RX ORDER — CARVEDILOL 6.25 MG/1
6.25 TABLET ORAL 2 TIMES DAILY WITH MEALS
Qty: 180 TABLET | Refills: 3 | Status: SHIPPED | OUTPATIENT
Start: 2019-07-22 | End: 2021-01-01

## 2019-07-22 NOTE — LETTER
7/22/2019    Aris Del Real MD  [address]  [facility] [address]    RE: Katherin Fletcher       Dear Colleague,    I had the pleasure of seeing Katherin Fletcher in the [facility].    HPI and Plan:     I had the pleasure seeing Ms. Fletcher in follow-up at the [facility] today.  She is a very pleasant 79-year-old female who was previously followed with Dr. Grayson in our cardiology clinic.    Her cardiac history includes coronary artery disease with history of 2 prior coronary artery bypass surgeries. She underwent  saphenous vein grafting to the LAD and OM in 1992 for an anterior wall myocardial infarction.  She then had a PCI of the circumflex and the vein graft to the LAD in 2005 again after having another myocardial infarction.  She then presented 01/2009 again with another  non-ST elevation MI and had a redo bypass x3 with a LIMA to the LAD and saphenous vein grafting to the OM1 and PDA.  She has a known mild ischemic cardiomyopathy.  In addition, she has a history of paroxysmal atrial fibrillation and is on Eliquis for thromboembolic prophylaxis.    She also has known peripheral arterial disease and due to nonhealing wound in 2010, she underwent peripheral angiography initially in January at which time she had an angioplasty to multiple vessels in the left lower extremity along with stenting of the distal left superficial femoral and above-the-knee popliteal artery.  She underwent repeat angiography with PTA to the left below-the-knee popliteal artery and anterior tibial artery in 07/2010.  Most recently she underwent successful PTA of the left popliteal artery with a drug-coated balloon for a nonhealing wound on 5/4/2017.    Unfortunately she has been diagnosed with ALS and has severe functional limitations secondary to this.  She has fallen multiple occasions and is essentially wheelchair-bound.  She does walk occasionally with a  walker.    From a cardiovascular standpoint she remains asymptomatic.  She specifically denies any chest discomfort, dyspnea on exertion, syncope or presyncope.  Although her functional status is obviously limited she has not noted any cardiac issues with her usual activities.      Physical exam is dictated below.    Her last echocardiogram on 5/7/2018 demonstrated mildly reduced left ventricular systolic function with an LVEF estimated at 45 to 50%.  Apical akinesis was noted.  There is no change compared to a prior study from October 2014.    IMPRESSION:    1) Coronary artery disease as described above  2) Mild ischemic cardiomyopathy  3) Paroxysmal atrial fibrillation  4) PAD  4) ALS    PLAN  -The patient is stable overall from a cardiovascular standpoint.  There is no evidence of angina or congestive heart failure.  Her medications are appropriate and I will make no changes today.  -Assuming her clinical status remains stable, I will have her follow-up in approximately 1 year at which point an echocardiogram will be performed.  It was a pleasure seeing her in follow-up.      CURRENT MEDICATIONS:  Current Outpatient Medications   Medication Sig Dispense Refill     Upton 250 MG TABS Take 250 mg by mouth every morning        apixaban ANTICOAGULANT (ELIQUIS) 5 MG tablet Take 1 tablet (5 mg) by mouth 2 times daily 180 tablet 3     calcium carbonate-vitamin D (CALCIUM + D) 600-200 MG-UNIT TABS Take 1 tablet by mouth At Bedtime        carbidopa-levodopa (SINEMET)  MG per tablet TAKE 2 TABLETS FOUR TIMES A  tablet 3     carvedilol (COREG) 6.25 MG tablet Take 1 tablet (6.25 mg) by mouth 2 times daily (with meals) 180 tablet 3     clopidogrel (PLAVIX) 75 MG tablet Take 1 tablet (75 mg) by mouth daily 90 tablet 3     glycopyrrolate (ROBINUL) 1 MG tablet Take 1 tablet (1 mg) by mouth 3 times daily 90 tablet 3     guaiFENesin (MUCINEX) 600 MG 12 hr tablet Take 1 tablet (600 mg) by mouth 2 times daily 60 tablet  1     HYDROcodone-acetaminophen (NORCO) 5-325 MG tablet Take 1-2 tablets by mouth every 4 hours as needed for pain 10 tablet 0     ipratropium (ATROVENT) 0.03 % nasal spray 1-2  sprays  1-2 times daily prn into mouth 30 mL 1     isosorbide mononitrate (IMDUR) 30 MG 24 hr tablet Take 1 tablet (30 mg) by mouth daily 90 tablet 3     LEVOXYL 112 MCG tablet TAKE 1 TABLET DAILY 90 tablet 1     Multiple Vitamins-Minerals (DAILY MULTI) TABS Take 1 tablet by mouth every morning        nitroglycerin (NITROSTAT) 0.4 MG SL tablet DISSOLVE ONE TABLET UNDER THE TONGUE EVERY 5 MINUTES AS NEEDED FOR CHEST PAIN.  DO NOT EXCEED A TOTAL OF 3 DOSES IN 15 MINUTES (Patient not taking: Reported on 3/28/2019) 25 tablet 3     omeprazole (PRILOSEC) 20 MG DR capsule TAKE 2 CAPSULES DAILY 30 TO 60 MINUTES BEFORE A MEAL 180 capsule 1     rosuvastatin (CRESTOR) 20 MG tablet Take 1 tablet (20 mg) by mouth daily 90 tablet 3     tiZANidine (ZANAFLEX) 2 MG tablet Take 2 tablets (4 mg) by mouth 3 times daily 540 tablet 1       ALLERGIES     Allergies   Allergen Reactions     Ace Inhibitors Cough     Baclofen Other (See Comments)     Constipation , tiredness     Penicillins      10/7/14 Hives per patient -- many many years ago       Nickel Swelling and Rash     Other reaction(s): Unknown       PAST MEDICAL HISTORY:  Past Medical History:   Diagnosis Date     CAD (coronary artery disease)     s/p CABG 1992 and redo CABG 2009 (LIMA to LAD, SVG to OM and SVG to posterolateral branch of RCA)     Chronic cough     Non-productive. No known definite etiology. Is beeing seen by [facility].      Essential hypertension, benign      Falls frequently 5/28/2018     GERD (gastroesophageal reflux disease)      History of TIA (transient ischemic attack) and stroke 5/7/2018    R sided facial N&T.      Hyperlipidaemia      Hyperlipidaemia LDL goal < 130      Ischemic cardiomyopathy      MI (myocardial infarction) (H) 11/1996     Mixed hyperlipidemia      Neurodegenerative  disorder     Further detailed dx unknown. Managed by Dr. George Pham - [facility].      PAD (peripheral artery disease) (H)      Restless legs syndrome      Unspecified hypothyroidism        PAST SURGICAL HISTORY:  Past Surgical History:   Procedure Laterality Date     ARTHROSCOPY SHOULDER RT/LT       BUNIONECTOMY RT/LT       C CABG, ARTERY-VEIN, THREE  1/2009     C CARDIAC SURG PROCEDURE UNLIST       C HAND/FINGER SURGERY UNLISTED       C MRA UPPER EXTREMITY WO&W CONT       C SHOULDER SURG PROC UNLISTED       C STOMACH SURGERY PROCEDURE UNLISTED       CARDIAC SURGERY  1992    CABG,stents x2     CARPAL TUNNEL RELEASE RT/LT       EXCISE GANGLION HAND Right 11/29/2018    Procedure: EXCISE RIGHT VOLAR CARPAL MASS AND RIGHT INDEX TRIGGER FINGER RELEASE;  Surgeon: Minh Goldstein MD;  Location:  OR     HC VASCULAR SURGERY PROCEDURE UNLIST       HYSTERECTOMY, DAMIÁN  32 yo     OPEN REDUCTION INTERNAL FIXATION ANKLE Left 2015    Left Ankle     RELEASE TRIGGER FINGER       RELEASE TRIGGER FINGER Right 11/29/2018    Procedure: RIGHT INDEX TRIGGER FINGER RELEASE;  Surgeon: Minh Goldstein MD;  Location:  OR     REPAIR HAMMER TOE Right 12/19/2017    Procedure: REPAIR HAMMER TOE;  Right 2nd and 3rd toe pinning;  Surgeon: Fernando Wiggins MD;  Location:  OR     STENT  2006     STENT(aka CARDIAC)  2008     VASCULAR SURGERY         FAMILY HISTORY:  Family History   Problem Relation Age of Onset     Unknown/Adopted Mother      Unknown/Adopted Father      Other - See Comments Son         on warfarin for one year after afganastan now off and doing well     Other - See Comments Daughter         celiac, brittle bone, arthritis, Raynauds       SOCIAL HISTORY:  Social History     Socioeconomic History     Marital status:      Spouse name: Not on file     Number of children: Not on file     Years of education: Not on file     Highest education level: Not on file   Occupational History     Not on file   Social  Needs     Financial resource strain: Not on file     Food insecurity:     Worry: Not on file     Inability: Not on file     Transportation needs:     Medical: Not on file     Non-medical: Not on file   Tobacco Use     Smoking status: Former Smoker     Packs/day: 0.50     Years: 22.00     Pack years: 11.00     Types: Cigarettes     Last attempt to quit: 4/15/1992     Years since quittin.2     Smokeless tobacco: Never Used   Substance and Sexual Activity     Alcohol use: Yes     Alcohol/week: 0.6 oz     Types: 1 Standard drinks or equivalent per week     Comment: MAYBE ONCE A YEAR     Drug use: No     Sexual activity: Not Currently     Partners: Male   Lifestyle     Physical activity:     Days per week: Not on file     Minutes per session: Not on file     Stress: Not on file   Relationships     Social connections:     Talks on phone: Not on file     Gets together: Not on file     Attends Restoration service: Not on file     Active member of club or organization: Not on file     Attends meetings of clubs or organizations: Not on file     Relationship status: Not on file     Intimate partner violence:     Fear of current or ex partner: Not on file     Emotionally abused: Not on file     Physically abused: Not on file     Forced sexual activity: Not on file   Other Topics Concern     Parent/sibling w/ CABG, MI or angioplasty before 65F 55M? No      Service Not Asked     Blood Transfusions Not Asked     Caffeine Concern Yes     Comment: 1-2 cups      Occupational Exposure Not Asked     Hobby Hazards Not Asked     Sleep Concern No     Stress Concern No     Weight Concern Yes     Special Diet No     Back Care Not Asked     Exercise Yes     Comment: stairs, walking      Bike Helmet Not Asked     Seat Belt Not Asked     Self-Exams Not Asked   Social History Narrative     Not on file       Review of Systems:  Skin:          Eyes:         ENT:         Respiratory:          Cardiovascular:         Gastroenterology:         Genitourinary:         Musculoskeletal:         Neurologic:         Psychiatric:         Heme/Lymph/Imm:         Endocrine:           Physical Exam:  Vitals: There were no vitals taken for this visit.    Constitutional:  cooperative;well developed;in no acute distress        Skin:  warm and dry to the touch          Head:  normocephalic        Eyes:  pupils equal and round;sclera white        Lymph:      ENT:  no pallor or cyanosis        Neck:  carotid pulses are full and equal bilaterally;JVP normal;no carotid bruit        Respiratory:  clear to auscultation         Cardiac: regular rhythm;no murmurs, gallops or rubs detected                not assessed this visit pulses below the femoral arteries are diminished                                      GI:  abdomen soft;no masses;non-tender        Extremities and Muscular Skeletal:  no deformities, clubbing, cyanosis, erythema observed;no edema              Neurological:  affect appropriate slurred speech weakness    Psych:  oriented to time, person and place        CC  No referring provider defined for this encounter.              Thank you for allowing me to participate in the care of your patient.    Sincerely,     Abhijit Boyd MD     [facility]

## 2019-07-22 NOTE — TELEPHONE ENCOUNTER
Health Call Center    Phone Message    May a detailed message be left on voicemail: yes    Reason for Call: Order(s): Wheel Chair  Reason for requested: Order faxed on 7.19.19 to clinic for 's signature  Date needed: ASAP  Provider name: Dr. Driver  Please fax signed order to [phone]  Action Taken: Message routed to:  [facility] neurology

## 2019-07-22 NOTE — PROGRESS NOTES
HPI and Plan:     I had the pleasure seeing Ms. Fletcher in follow-up at the [facility] heart today.  She is a very pleasant 79-year-old female who was previously followed with Dr. Grayson in our cardiology clinic.    Her cardiac history includes coronary artery disease with history of 2 prior coronary artery bypass surgeries. She underwent  saphenous vein grafting to the LAD and OM in 1992 for an anterior wall myocardial infarction.  She then had a PCI of the circumflex and the vein graft to the LAD in 2005 again after having another myocardial infarction.  She then presented 01/2009 again with another  non-ST elevation MI and had a redo bypass x3 with a LIMA to the LAD and saphenous vein grafting to the OM1 and PDA.  She has a known mild ischemic cardiomyopathy.  In addition, she has a history of paroxysmal atrial fibrillation and is on Eliquis for thromboembolic prophylaxis.    She also has known peripheral arterial disease and due to nonhealing wound in 2010, she underwent peripheral angiography initially in January at which time she had an angioplasty to multiple vessels in the left lower extremity along with stenting of the distal left superficial femoral and above-the-knee popliteal artery.  She underwent repeat angiography with PTA to the left below-the-knee popliteal artery and anterior tibial artery in 07/2010.  Most recently she underwent successful PTA of the left popliteal artery with a drug-coated balloon for a nonhealing wound on 5/4/2017.    Unfortunately she has been diagnosed with ALS and has severe functional limitations secondary to this.  She has fallen multiple occasions and is essentially wheelchair-bound.  She does walk occasionally with a walker.    From a cardiovascular standpoint she remains asymptomatic.  She specifically denies any chest discomfort, dyspnea on exertion, syncope or presyncope.  Although her functional status is obviously limited she has not noted any  cardiac issues with her usual activities.      Physical exam is dictated below.    Her last echocardiogram on 5/7/2018 demonstrated mildly reduced left ventricular systolic function with an LVEF estimated at 45 to 50%.  Apical akinesis was noted.  There is no change compared to a prior study from October 2014.    IMPRESSION:    1) Coronary artery disease as described above  2) Mild ischemic cardiomyopathy  3) Paroxysmal atrial fibrillation  4) PAD  4) ALS    PLAN  -The patient is stable overall from a cardiovascular standpoint.  There is no evidence of angina or congestive heart failure.  Her medications are appropriate and I will make no changes today.  -Assuming her clinical status remains stable, I will have her follow-up in approximately 1 year at which point an echocardiogram will be performed.  It was a pleasure seeing her in follow-up.      CURRENT MEDICATIONS:  Current Outpatient Medications   Medication Sig Dispense Refill     [place] 250 MG TABS Take 250 mg by mouth every morning        apixaban ANTICOAGULANT (ELIQUIS) 5 MG tablet Take 1 tablet (5 mg) by mouth 2 times daily 180 tablet 3     calcium carbonate-vitamin D (CALCIUM + D) 600-200 MG-UNIT TABS Take 1 tablet by mouth At Bedtime        carbidopa-levodopa (SINEMET)  MG per tablet TAKE 2 TABLETS FOUR TIMES A  tablet 3     carvedilol (COREG) 6.25 MG tablet Take 1 tablet (6.25 mg) by mouth 2 times daily (with meals) 180 tablet 3     clopidogrel (PLAVIX) 75 MG tablet Take 1 tablet (75 mg) by mouth daily 90 tablet 3     glycopyrrolate (ROBINUL) 1 MG tablet Take 1 tablet (1 mg) by mouth 3 times daily 90 tablet 3     guaiFENesin (MUCINEX) 600 MG 12 hr tablet Take 1 tablet (600 mg) by mouth 2 times daily 60 tablet 1     HYDROcodone-acetaminophen (NORCO) 5-325 MG tablet Take 1-2 tablets by mouth every 4 hours as needed for pain 10 tablet 0     ipratropium (ATROVENT) 0.03 % nasal spray 1-2  sprays  1-2 times daily prn into mouth 30 mL 1      isosorbide mononitrate (IMDUR) 30 MG 24 hr tablet Take 1 tablet (30 mg) by mouth daily 90 tablet 3     LEVOXYL 112 MCG tablet TAKE 1 TABLET DAILY 90 tablet 1     Multiple Vitamins-Minerals (DAILY MULTI) TABS Take 1 tablet by mouth every morning        nitroglycerin (NITROSTAT) 0.4 MG SL tablet DISSOLVE ONE TABLET UNDER THE TONGUE EVERY 5 MINUTES AS NEEDED FOR CHEST PAIN.  DO NOT EXCEED A TOTAL OF 3 DOSES IN 15 MINUTES (Patient not taking: Reported on 3/28/2019) 25 tablet 3     omeprazole (PRILOSEC) 20 MG DR capsule TAKE 2 CAPSULES DAILY 30 TO 60 MINUTES BEFORE A MEAL 180 capsule 1     rosuvastatin (CRESTOR) 20 MG tablet Take 1 tablet (20 mg) by mouth daily 90 tablet 3     tiZANidine (ZANAFLEX) 2 MG tablet Take 2 tablets (4 mg) by mouth 3 times daily 540 tablet 1       ALLERGIES     Allergies   Allergen Reactions     Ace Inhibitors Cough     Baclofen Other (See Comments)     Constipation , tiredness     Penicillins      10/7/14 Hives per patient -- many many years ago       Nickel Swelling and Rash     Other reaction(s): Unknown       PAST MEDICAL HISTORY:  Past Medical History:   Diagnosis Date     CAD (coronary artery disease)     s/p CABG 1992 and redo CABG 2009 (LIMA to LAD, SVG to OM and SVG to posterolateral branch of RCA)     Chronic cough     Non-productive. No known definite etiology. Is beeing seen by [facility].      Essential hypertension, benign      Falls frequently 5/28/2018     GERD (gastroesophageal reflux disease)      History of TIA (transient ischemic attack) and stroke 5/7/2018    R sided facial N&T.      Hyperlipidaemia      Hyperlipidaemia LDL goal < 130      Ischemic cardiomyopathy      MI (myocardial infarction) (H) 11/1996     Mixed hyperlipidemia      Neurodegenerative disorder     Further detailed dx unknown. Managed by Dr. George Pham - [facility].      PAD (peripheral artery disease) (H)      Restless legs syndrome      Unspecified hypothyroidism        PAST SURGICAL  HISTORY:  Past Surgical History:   Procedure Laterality Date     ARTHROSCOPY SHOULDER RT/LT       BUNIONECTOMY RT/LT       C CABG, ARTERY-VEIN, THREE  1/2009     C CARDIAC SURG PROCEDURE UNLIST       C HAND/FINGER SURGERY UNLISTED       C MRA UPPER EXTREMITY WO&W CONT       C SHOULDER SURG PROC UNLISTED       C STOMACH SURGERY PROCEDURE UNLISTED       CARDIAC SURGERY  1992    CABG,stents x2     CARPAL TUNNEL RELEASE RT/LT       EXCISE GANGLION HAND Right 11/29/2018    Procedure: EXCISE RIGHT VOLAR CARPAL MASS AND RIGHT INDEX TRIGGER FINGER RELEASE;  Surgeon: Minh Goldstein MD;  Location:  OR     [facility] VASCULAR SURGERY PROCEDURE UNLIST       HYSTERECTOMY, DAMIÁN  32 yo     OPEN REDUCTION INTERNAL FIXATION ANKLE Left 2015    Left Ankle     RELEASE TRIGGER FINGER       RELEASE TRIGGER FINGER Right 11/29/2018    Procedure: RIGHT INDEX TRIGGER FINGER RELEASE;  Surgeon: Minh Goldstein MD;  Location:  OR     REPAIR HAMMER TOE Right 12/19/2017    Procedure: REPAIR HAMMER TOE;  Right 2nd and 3rd toe pinning;  Surgeon: Fernando Wiggins MD;  Location: [facility] OR     STENT  2006     STENT(aka CARDIAC)  2008     VASCULAR SURGERY         FAMILY HISTORY:  Family History   Problem Relation Age of Onset     Unknown/Adopted Mother      Unknown/Adopted Father      Other - See Comments Son         on warfarin for one year after afganastan now off and doing well     Other - See Comments Daughter         celiac, brittle bone, arthritis, Raynauds       SOCIAL HISTORY:  Social History     Socioeconomic History     Marital status:      Spouse name: Not on file     Number of children: Not on file     Years of education: Not on file     Highest education level: Not on file   Occupational History     Not on file   Social Needs     Financial resource strain: Not on file     Food insecurity:     Worry: Not on file     Inability: Not on file     Transportation needs:     Medical: Not on file     Non-medical: Not on file   Tobacco Use      Smoking status: Former Smoker     Packs/day: 0.50     Years: 22.00     Pack years: 11.00     Types: Cigarettes     Last attempt to quit: 4/15/1992     Years since quittin.2     Smokeless tobacco: Never Used   Substance and Sexual Activity     Alcohol use: Yes     Alcohol/week: 0.6 oz     Types: 1 Standard drinks or equivalent per week     Comment: MAYBE ONCE A YEAR     Drug use: No     Sexual activity: Not Currently     Partners: Male   Lifestyle     Physical activity:     Days per week: Not on file     Minutes per session: Not on file     Stress: Not on file   Relationships     Social connections:     Talks on phone: Not on file     Gets together: Not on file     Attends [employer] service: Not on file     Active member of club or organization: Not on file     Attends meetings of clubs or organizations: Not on file     Relationship status: Not on file     Intimate partner violence:     Fear of current or ex partner: Not on file     Emotionally abused: Not on file     Physically abused: Not on file     Forced sexual activity: Not on file   Other Topics Concern     Parent/sibling w/ CABG, MI or angioplasty before 65F 55M? No      Service Not Asked     Blood Transfusions Not Asked     Caffeine Concern Yes     Comment: 1-2 cups      Occupational Exposure Not Asked     Hobby Hazards Not Asked     Sleep Concern No     Stress Concern No     Weight Concern Yes     Special Diet No     Back Care Not Asked     Exercise Yes     Comment: stairs, walking      Bike Helmet Not Asked     Seat Belt Not Asked     Self-Exams Not Asked   Social History Narrative     Not on file       Review of Systems:  Skin:          Eyes:         ENT:         Respiratory:          Cardiovascular:         Gastroenterology:        Genitourinary:         Musculoskeletal:         Neurologic:         Psychiatric:         Heme/Lymph/Imm:         Endocrine:           Physical Exam:  Vitals: There were no vitals taken for this  visit.    Constitutional:  cooperative;well developed;in no acute distress        Skin:  warm and dry to the touch          Head:  normocephalic        Eyes:  pupils equal and round;sclera white        Lymph:      ENT:  no pallor or cyanosis        Neck:  carotid pulses are full and equal bilaterally;JVP normal;no carotid bruit        Respiratory:  clear to auscultation         Cardiac: regular rhythm;no murmurs, gallops or rubs detected                not assessed this visit pulses below the femoral arteries are diminished                                      GI:  abdomen soft;no masses;non-tender        Extremities and Muscular Skeletal:  no deformities, clubbing, cyanosis, erythema observed;no edema              Neurological:  affect appropriate slurred speech weakness    Psych:  oriented to time, person and place        CC  No referring provider defined for this encounter.

## 2019-07-23 NOTE — PROGRESS NOTES
Please refer to the daily flowsheet for treatment today, total treatment time and time spent performing 1:1 timed codes.       Home Program for R thumb weakness:  R HBTSS to aid with functional use    Home Program for B IF, LF, RF trigger:  FM to B IF, LF and RF  FDS Tendon glide (no triggering)  FDP Tendon glide (no triggering)  Extensor tracking on leg    Next visit:   Patient wishes to work on exercises on independent home exercise program vs coming in for formal therapy. Will leave chart open one month in case of follow up/worsening of symptoms

## 2019-07-30 ENCOUNTER — THERAPY VISIT (OUTPATIENT)
Dept: OCCUPATIONAL THERAPY | Facility: CLINIC | Age: 79
End: 2019-07-30
Payer: MEDICARE

## 2019-07-30 DIAGNOSIS — G12.21 AMYOTROPHIC LATERAL SCLEROSIS (H): ICD-10-CM

## 2019-07-30 DIAGNOSIS — M65.30 TRIGGER FINGER, ACQUIRED: ICD-10-CM

## 2019-07-30 PROCEDURE — 97763 ORTHC/PROSTC MGMT SBSQ ENC: CPT | Mod: GO | Performed by: OCCUPATIONAL THERAPIST

## 2019-07-30 PROCEDURE — 97110 THERAPEUTIC EXERCISES: CPT | Mod: GO | Performed by: OCCUPATIONAL THERAPIST

## 2019-07-30 PROCEDURE — 97140 MANUAL THERAPY 1/> REGIONS: CPT | Mod: GO | Performed by: OCCUPATIONAL THERAPIST

## 2019-08-01 ENCOUNTER — OFFICE VISIT (OUTPATIENT)
Dept: FAMILY MEDICINE | Facility: CLINIC | Age: 79
End: 2019-08-01

## 2019-08-01 VITALS
HEART RATE: 68 BPM | WEIGHT: 146 LBS | SYSTOLIC BLOOD PRESSURE: 120 MMHG | DIASTOLIC BLOOD PRESSURE: 70 MMHG | BODY MASS INDEX: 25.86 KG/M2 | OXYGEN SATURATION: 97 % | RESPIRATION RATE: 16 BRPM

## 2019-08-01 DIAGNOSIS — G11.8 SPINOCEREBELLAR ATAXIA (H): ICD-10-CM

## 2019-08-01 DIAGNOSIS — I73.9 PAD (PERIPHERAL ARTERY DISEASE) (H): ICD-10-CM

## 2019-08-01 DIAGNOSIS — G62.9 PERIPHERAL POLYNEUROPATHY: ICD-10-CM

## 2019-08-01 DIAGNOSIS — R47.81 SLURRED SPEECH: ICD-10-CM

## 2019-08-01 DIAGNOSIS — G12.21 AMYOTROPHIC LATERAL SCLEROSIS (H): ICD-10-CM

## 2019-08-01 DIAGNOSIS — J44.9 CHRONIC OBSTRUCTIVE PULMONARY DISEASE, UNSPECIFIED COPD TYPE (H): ICD-10-CM

## 2019-08-01 DIAGNOSIS — I11.0 HYPERTENSIVE HEART DISEASE WITH HEART FAILURE (H): ICD-10-CM

## 2019-08-01 DIAGNOSIS — R26.9 NEUROLOGIC GAIT DISORDER: ICD-10-CM

## 2019-08-01 DIAGNOSIS — I48.91 ATRIAL FIBRILLATION, UNSPECIFIED TYPE (H): ICD-10-CM

## 2019-08-01 DIAGNOSIS — Z00.00 ENCOUNTER FOR MEDICARE ANNUAL WELLNESS EXAM: Primary | ICD-10-CM

## 2019-08-01 PROCEDURE — 99214 OFFICE O/P EST MOD 30 MIN: CPT | Mod: 25 | Performed by: FAMILY MEDICINE

## 2019-08-01 PROCEDURE — G0439 PPPS, SUBSEQ VISIT: HCPCS | Performed by: FAMILY MEDICINE

## 2019-08-01 NOTE — PROGRESS NOTES
SUBJECTIVE:   Katherin Fletcher is a 79 year old female who presents for Preventive Visit.  Are you in the first 12 months of your Medicare coverage?  No  HPI  Do you feel safe in your environment? Yes  Do you have a Health Care Directive? Yes: Advance Directive has been received and scanned.  Hearing Acuity: Pass  Hearing Assessment: normal  Fall riskFallen 2 or more times in the past year?: Yes  Any fall with injury in the past year?: No2 weeks ago turned too quick and fell on bedroom floor. No injury.  Due to ALS has significant risk for falling  Cognitive Screening   1) Repeat 3 items (Leader, Season, Table)    2) Clock draw: NORMAL  3) 3 item recall: Recalls 3 objects  Results: 3 items recalled: COGNITIVE IMPAIRMENT LESS LIKELY    Mini-CogTM Copyright S Judah. Licensed by the author for use in [facility] [employer]ainment; reprinted with permission (danika@[facility]). All rights reserved.      Do you have sleep apnea, excessive snoring or daytime drowsiness?: no    Reviewed and updated as needed this visit by clinical staff  Tobacco  Allergies  Meds  Problems       Reviewed and updated as needed this visit by Provider  Problems        Social History     Tobacco Use     Smoking status: Former Smoker     Packs/day: 0.50     Years: 22.00     Pack years: 11.00     Types: Cigarettes     Last attempt to quit: 4/15/1992     Years since quittin.3     Smokeless tobacco: Never Used   Substance Use Topics     Alcohol use: Yes     Alcohol/week: 0.6 oz     Types: 1 Standard drinks or equivalent per week     Comment: MAYBE ONCE A YEAR     If you drink alcohol do you typically have >3 drinks per day or >7 drinks per week? No    Alcohol Use 2019   Prescreen: >3 drinks/day or >7 drinks/week? No     PROBLEMS TO ADD ON...  -------------------------------------   Diagnosis Comments   1. Encounter for Medicare annual wellness exam     2. Spinocerebellar ataxia (H)  See neuro reports,    3. Hypertensive heart disease with  heart failure (H)  Blood presure remains well controlled when checked out of clinic.    Reviewed last 6 BP readings in chart:  BP Readings from Last 6 Encounters:   08/01/19 120/70   07/22/19 124/76   06/27/19 102/57   06/12/19 (!) 142/103   04/21/19 127/79   03/28/19 161/89       she has not experienced any significant side effects from medications for hypertension.    NO active cardiac complaints or symptoms with exercise.     4. Chronic obstructive pulmonary disease, unspecified COPD type (H)  COPD remains stable.  Has not had any recent breathing troubles beyond usual baseline.  Has not any acute respiratory events.  Remains with intermittent cough, mild shortness of breath with overexertion as per usual.  Using medication as directed with reported side effects.     5. Atrial fibrillation, unspecified type (H)  Has history of atrial fibrillation.    No palpitations, no dizziness, no dyspnea on exertion, no shortness of breath.  No racing heart, no fast heart rates.    Taking medications as ordered, no side effects reported.   Patient is taking anticoagulation Eliquis and Plavix according to direction, with no reported side effects.     6. Peripheral polyneuropathy  No clear explanation   7. Amyotrophic lateral sclerosis (H)  See neuro reports,   8. PAD (peripheral artery disease) (H)  No current wounds   9. Slurred speech  See neuro reports,   10. Neurologic gait disorder  See neuro reports,       Current providers sharing in care for this patient include:   Patient Care Team:  Aris Del Real MD as PCP - General (Family Practice)  Travon Driver MD as MD (Neurology)  Precious Black LPN as [facility] as  ( - Clinical)  Clary Dumont MD as Assigned PCP    The following health maintenance items are reviewed in Epic and correct as of today:  Health Maintenance   Topic Date Due     ZOSTER IMMUNIZATION (1 of 2) 05/27/1990     PHQ-2  01/01/2019      MEDICARE ANNUAL WELLNESS VISIT  03/29/2019     INFLUENZA VACCINE (1) 09/01/2019     FALL RISK ASSESSMENT  10/11/2019     ADVANCE CARE PLANNING  07/10/2023     DTAP/TDAP/TD IMMUNIZATION (4 - Td) 06/04/2026     DEXA  Completed     PNEUMOCOCCAL IMMUNIZATION 65+ LOW/MEDIUM RISK  Completed     IPV IMMUNIZATION  Aged Out     MENINGITIS IMMUNIZATION  Aged Out     Patient Active Problem List   Diagnosis     Long term current use of anticoagulant therapy     CAD (coronary artery disease)     Hyperlipidemia     Health Care Home     MRSA (methicillin resistant Staphylococcus aureus)     Dysarthria     Disturbances of sensation of smell and taste     Neurologic gait disorder     Cerebellar disease     Essential hypertension     Ischemic cardiomyopathy     Peripheral polyneuropathy     Arterial leg ulcer (H)     PAD (peripheral artery disease) (H)     Encounter for long-term (current) use of medications     Stroke (H)     Slurred speech     Neurodegenerative disorder     History of TIA (transient ischemic attack) and stroke     Falls frequently     Hypothyroidism, unspecified type     Ataxia     Amyotrophic lateral sclerosis (H)     Trigger finger, acquired     Spinocerebellar ataxia (H)     Hypertensive heart disease with heart failure (H)     Chronic obstructive pulmonary disease, unspecified COPD type (H)     Atrial fibrillation, unspecified type (H)     Past Surgical History:   Procedure Laterality Date     ARTHROSCOPY SHOULDER RT/LT       BUNIONECTOMY RT/LT       C CABG, ARTERY-VEIN, THREE  1/2009     C CARDIAC SURG PROCEDURE UNLIST       C HAND/FINGER SURGERY UNLISTED       C MRA UPPER EXTREMITY WO&W CONT       C SHOULDER SURG PROC UNLISTED       C STOMACH SURGERY PROCEDURE UNLISTED       CARDIAC SURGERY  1992    CABG,stents x2     CARPAL TUNNEL RELEASE RT/LT       EXCISE GANGLION HAND Right 11/29/2018    Procedure: EXCISE RIGHT VOLAR CARPAL MASS AND RIGHT INDEX TRIGGER FINGER RELEASE;  Surgeon: Minh Goldstein MD;   "Location: [facility] OR     HC VASCULAR SURGERY PROCEDURE UNLIST       HYSTERECTOMY, DAMIÁN  32 yo     OPEN REDUCTION INTERNAL FIXATION ANKLE Left     Left Ankle     RELEASE TRIGGER FINGER       RELEASE TRIGGER FINGER Right 2018    Procedure: RIGHT INDEX TRIGGER FINGER RELEASE;  Surgeon: Minh Goldstein MD;  Location: [facility] OR     REPAIR HAMMER TOE Right 2017    Procedure: REPAIR HAMMER TOE;  Right 2nd and 3rd toe pinning;  Surgeon: Fernando Wiggins MD;  Location: [facility] OR     STENT  2006     STENT(aka CARDIAC)       VASCULAR SURGERY         Social History     Tobacco Use     Smoking status: Former Smoker     Packs/day: 0.50     Years: 22.00     Pack years: 11.00     Types: Cigarettes     Last attempt to quit: 4/15/1992     Years since quittin.3     Smokeless tobacco: Never Used   Substance Use Topics     Alcohol use: Yes     Alcohol/week: 0.6 oz     Types: 1 Standard drinks or equivalent per week     Comment: MAYBE ONCE A YEAR     Family History   Problem Relation Age of Onset     Unknown/Adopted Mother      Unknown/Adopted Father      Other - See Comments Son         on warfarin for one year after afganastan now off and doing well     Other - See Comments Daughter         celiac, brittle bone, arthritis, Raynauds             Review of Systems  Constitutional, HEENT, cardiovascular, pulmonary, GI, , musculoskeletal, neuro, skin, endocrine and psych systems are negative, except as otherwise noted.    OBJECTIVE:   /70   Pulse 68   Resp 16   Wt 66.2 kg (146 lb)   SpO2 97%   BMI 25.86 kg/m   Estimated body mass index is 25.86 kg/m  as calculated from the following:    Height as of 19: 1.6 m (5' 3\").    Weight as of this encounter: 66.2 kg (146 lb).  Physical Exam  GENERAL APPEARANCE: alert, frail, appears older than stated age and obvious head drop  EYES: Eyes grossly normal to inspection, PERRL and conjunctivae and sclerae normal  HENT: ear canals and TM's normal, nose and mouth without " ulcers or lesions, oropharynx clear and oral mucous membranes moist  NECK: no adenopathy, no asymmetry, masses, or scars and thyroid normal to palpation  RESP: lungs clear to auscultation - no rales, rhonchi or wheezes  BREAST: normal without masses, tenderness or nipple discharge and no palpable axillary masses or adenopathy  CV: regular rate and rhythm, normal S1 S2, no S3 or S4, no murmur, click or rub, no peripheral edema and peripheral pulses strong  ABDOMEN: soft, nontender, no hepatosplenomegaly, no masses and bowel sounds normal  MS: due to generalized weakness, moreso in the lower extremeties  SKIN: no suspicious lesions or rashes  NEURO: mentation intact and oriented times times 3, speech is slurred.  PSYCH: mentation appears normal and affect normal/bright    Diagnostic Test Results:  Labs reviewed in Epic    ASSESSMENT / PLAN:   Katherin was seen today for physical.    Diagnoses and all orders for this visit:    Encounter for Medicare annual wellness exam    Spinocerebellar ataxia (H)    Hypertensive heart disease with heart failure (H)  Discussed current hypertension treatment guidelines, including indications for treatment and treatment options.  Discussed the importance for aggressive management of HTN to prevent vascular complications later.  Recommended lower fat, lower carbohydrate, and lower sodium (<2000 mg)diet.  Discussed required intervals for follow up on HTN, lab studies.  Recommened pt. follow their blood pressures outside the clinic to ensure that BPs are remaining within guidelines, and to contact me if the readings are not within guidelines on a regular basis so we can adjust treatment as needed.    Chronic obstructive pulmonary disease, unspecified COPD type (H)  Discussed general issues of COPD, including pathophysiology, ways it will affect the pt., when to seek help, reviewed the typical medications (how they are taken, how they help)    Atrial fibrillation, unspecified type (H)  The  "pathophysiology of atrial fibrillation, various causes, possibility of lone atrial fibrillation, risk of thrombus and embolic stroke, need for anticoagulation depending on etiology and Chads Score was discussed with patient and/or relatives. Rate Vs rhythm control approach was discussed including no difference in two strategies in patients enrolled in Affirm and Race trials. In some patients, afib ablation may be an option and was reviewed.  The indication for anticoagulation was discussed with the patient and/or their family in detail. The pros and cons of using warfarin versus newer anticoagulants was discussed including the need for INR monitoring with warfarin, dietary restrictions with warfarin and low cost of warfarin versus high co-pay for newer anticoagulants and lack of reversal agent with agents like Eliquis and Xarelto. Agents like Pradaxa has a reversal agent although the reversal agent can cause serious adverse events. The risk of bleeding including major bleeding and intracranial bleeding was discussed with all these agents and the data of efficacy and safety of these agents versus warfarin was communicated.      Peripheral polyneuropathy    Amyotrophic lateral sclerosis (H)  Long discussion, aware and facing it head on.  Really has been a very slow course and a difficult to make diagnoses.    PAD (peripheral artery disease) (H)  Currently skin is in good shape.    Slurred speech  Able to understand her well.  Neurologic gait disorder  Getting a wheel chair tomorrow.        End of Life Planning:  Patient currently has an advanced directive: Yes.  Practitioner is supportive of decision.    COUNSELING:  Reviewed preventive health counseling, as reflected in patient instructions       need for assisted living. she has a current plan though it is pretty long term.    Estimated body mass index is 25.86 kg/m  as calculated from the following:    Height as of 7/22/19: 1.6 m (5' 3\").    Weight as of this " encounter: 66.2 kg (146 lb).         reports that she quit smoking about 27 years ago. Her smoking use included cigarettes. She has a 11.00 pack-year smoking history. She has never used smokeless tobacco.      Appropriate preventive services were discussed with this patient, including applicable screening as appropriate for cardiovascular disease, diabetes, osteopenia/osteoporosis, and glaucoma.  As appropriate for age/gender, discussed screening for colorectal cancer, prostate cancer, breast cancer, and cervical cancer. Checklist reviewing preventive services available has been given to the patient.    Reviewed patients plan of care and provided an AVS. The Basic Care Plan (routine screening as documented in Health Maintenance) for Katherin meets the Care Plan requirement. This Care Plan has been established and reviewed with the Patient.    Counseling Resources:  ATP IV Guidelines  Pooled Cohorts Equation Calculator  Breast Cancer Risk Calculator  FRAX Risk Assessment  ICSI Preventive Guidelines  Dietary Guidelines for Americans, 2010  USDA's MyPlate  ASA Prophylaxis  Lung CA Screening    Aris Del Real MD  [facility]    Identified Health Risks:    She is at risk for falling and has been provided with information to reduce the risk of falling at home.

## 2019-08-01 NOTE — PATIENT INSTRUCTIONS
Patient Education   Personalized Prevention Plan  You are due for the preventive services outlined below.  Your care team is available to assist you in scheduling these services.  If you have already completed any of these items, please share that information with your care team to update in your medical record.  Health Maintenance Due   Topic Date Due     Zoster (Shingles) Vaccine (1 of 2) 05/27/1990     PHQ-2  01/01/2019     Annual Wellness Visit  03/29/2019        Patient Education   Personalized Prevention Plan  You are due for the preventive services outlined below.  Your care team is available to assist you in scheduling these services.  If you have already completed any of these items, please share that information with your care team to update in your medical record.  Health Maintenance Due   Topic Date Due     Zoster (Shingles) Vaccine (1 of 2) 05/27/1990     PHQ-2  01/01/2019     Annual Wellness Visit  03/29/2019       Preventing Falls in the Home  An adult or child can fall for many reasons. If you are an older adult, you may fall because your reaction time slows down. Your muscles and joints may get stiff, weak, or less flexible because of illness, medicines, or a physical condition. These things can also make a child more likely to fall or be injured in a fall.  Other health problems that make falls more likely include:    Arthritis    Dizziness or lightheadedness when you get out of bed (orthostatic hypotension)    History of a stroke    Dizziness    Anemia    Certain medicines taken for mental illness    Problems with balance or gait    History of falls with or without an injury    Changes in vision (vision impairment)    Changes in thinking skills and memory (cognitive impairment)  Injuries from a fall can include broken bones, dislocated joints, and cuts. When these injuries are serious enough, they can make it impossible for you or a child who is injured in a fall to live on his or her  own.  Prevention tips  To help prevent falls and fall-related injuries, follow the tips below.   Floors  Make floors safer by doing the following:     Put nonskid pads under area rugs.    Remove throw rugs.    Replace worn floor coverings.    Tack carpets firmly to each step on carpeted stairs. Put nonskid strips on the edges of uncarpeted stairs.    Keep floors and stairs free of clutter and cords.    Arrange furniture so there are clear pathways.    Clean up any spills right away.    Wear shoes that fit.  Bathrooms    Make bathrooms safer by doing the following:     Install grab bars in the tub or shower.    Apply nonskid strips or put a nonskid rubber mat in the tub or shower.    Sit on a bath chair to bathe.    Use bathmats with nonskid backing.  Lighting and the environment  Improve lighting in your home by doing the following:     Keep a flashlight in each room. Or put a lamp next to the bed within easy reach.    Put nightlights in the bedrooms, hallways, kitchen, and bathrooms.    Make sure all stairways have good lighting.    Take your time when going up and down stairs.    Put handrails on both sides of stairs and in walkways for more support. To prevent injury to your wrist or arm, don t use handrails to pull yourself up.    Install grab bars to pull yourself up.    Move or rearrange items that you use often. This will make them easier to find or reach.    Look at your home to find any safety hazards. Especially look at doorways, walkways, and the driveway. Remove or repair any safety problems that you find.  Date Last Reviewed: 8/1/2016 2000-2018 [employer] [address] [facility], [place] [address]. All rights reserved. This information is not intended as a substitute for professional medical care. Always follow your healthcare professional's instructions.

## 2019-08-05 DIAGNOSIS — G12.21 ALS (AMYOTROPHIC LATERAL SCLEROSIS) (H): ICD-10-CM

## 2019-08-05 DIAGNOSIS — K11.7 SIALORRHEA: ICD-10-CM

## 2019-08-05 RX ORDER — GLYCOPYRROLATE 1 MG/1
1 TABLET ORAL 3 TIMES DAILY
Qty: 270 TABLET | Refills: 1 | Status: SHIPPED | OUTPATIENT
Start: 2019-08-05

## 2019-08-08 DIAGNOSIS — G12.21 ALS (AMYOTROPHIC LATERAL SCLEROSIS) (H): ICD-10-CM

## 2019-08-08 DIAGNOSIS — R25.2 SPASTICITY: ICD-10-CM

## 2019-08-09 RX ORDER — TIZANIDINE 2 MG/1
6 TABLET ORAL 3 TIMES DAILY
Qty: 270 TABLET | Refills: 2 | Status: SHIPPED | OUTPATIENT
Start: 2019-08-09 | End: 2019-11-13

## 2019-08-12 DIAGNOSIS — R26.9 NEUROLOGIC GAIT DISORDER: ICD-10-CM

## 2019-08-12 DIAGNOSIS — G93.9: ICD-10-CM

## 2019-08-12 DIAGNOSIS — G25.81 RESTLESS LEGS SYNDROME: ICD-10-CM

## 2019-08-12 DIAGNOSIS — R47.1 DYSARTHRIA: ICD-10-CM

## 2019-08-12 RX ORDER — CARBIDOPA AND LEVODOPA 25; 100 MG/1; MG/1
TABLET ORAL
Qty: 720 TABLET | Refills: 3 | Status: ON HOLD | OUTPATIENT
Start: 2019-08-12 | End: 2019-08-17

## 2019-08-12 NOTE — TELEPHONE ENCOUNTER
Refill medication sinemet    LOV 08/01/2019  Labs 11/19/2018    Suzy Dhaliwal MA on 8/12/2019 at 9:58 AM

## 2019-08-17 ENCOUNTER — APPOINTMENT (OUTPATIENT)
Dept: GENERAL RADIOLOGY | Facility: CLINIC | Age: 79
End: 2019-08-17
Attending: EMERGENCY MEDICINE
Payer: MEDICARE

## 2019-08-17 ENCOUNTER — HOSPITAL ENCOUNTER (OUTPATIENT)
Facility: CLINIC | Age: 79
Setting detail: OBSERVATION
Discharge: HOME | End: 2019-08-18
Attending: EMERGENCY MEDICINE | Admitting: INTERNAL MEDICINE
Payer: MEDICARE

## 2019-08-17 DIAGNOSIS — S22.41XA CLOSED FRACTURE OF MULTIPLE RIBS OF RIGHT SIDE, INITIAL ENCOUNTER: Primary | ICD-10-CM

## 2019-08-17 DIAGNOSIS — S22.41XA CLOSED FRACTURE OF TWO RIBS OF RIGHT SIDE, INITIAL ENCOUNTER: ICD-10-CM

## 2019-08-17 PROBLEM — S22.49XA RIB FRACTURES: Status: ACTIVE | Noted: 2019-08-17

## 2019-08-17 LAB
ANION GAP SERPL CALCULATED.3IONS-SCNC: 3 MMOL/L (ref 3–14)
BASOPHILS # BLD AUTO: 0 10E9/L (ref 0–0.2)
BASOPHILS NFR BLD AUTO: 0.2 %
BUN SERPL-MCNC: 8 MG/DL (ref 7–30)
CALCIUM SERPL-MCNC: 9.4 MG/DL (ref 8.5–10.1)
CHLORIDE SERPL-SCNC: 107 MMOL/L (ref 94–109)
CO2 SERPL-SCNC: 30 MMOL/L (ref 20–32)
CREAT SERPL-MCNC: 0.74 MG/DL (ref 0.52–1.04)
DIFFERENTIAL METHOD BLD: NORMAL
EOSINOPHIL # BLD AUTO: 0.1 10E9/L (ref 0–0.7)
EOSINOPHIL NFR BLD AUTO: 1.1 %
ERYTHROCYTE [DISTWIDTH] IN BLOOD BY AUTOMATED COUNT: 13.6 % (ref 10–15)
GFR SERPL CREATININE-BSD FRML MDRD: 77 ML/MIN/{1.73_M2}
GLUCOSE SERPL-MCNC: 97 MG/DL (ref 70–99)
HCT VFR BLD AUTO: 38.9 % (ref 35–47)
HGB BLD-MCNC: 13 G/DL (ref 11.7–15.7)
IMM GRANULOCYTES # BLD: 0 10E9/L (ref 0–0.4)
IMM GRANULOCYTES NFR BLD: 0.4 %
LYMPHOCYTES # BLD AUTO: 1.8 10E9/L (ref 0.8–5.3)
LYMPHOCYTES NFR BLD AUTO: 38.1 %
MCH RBC QN AUTO: 28.9 PG (ref 26.5–33)
MCHC RBC AUTO-ENTMCNC: 33.4 G/DL (ref 31.5–36.5)
MCV RBC AUTO: 86 FL (ref 78–100)
MONOCYTES # BLD AUTO: 0.5 10E9/L (ref 0–1.3)
MONOCYTES NFR BLD AUTO: 10.5 %
NEUTROPHILS # BLD AUTO: 2.3 10E9/L (ref 1.6–8.3)
NEUTROPHILS NFR BLD AUTO: 49.7 %
NRBC # BLD AUTO: 0 10*3/UL
NRBC BLD AUTO-RTO: 0 /100
PLATELET # BLD AUTO: 207 10E9/L (ref 150–450)
POTASSIUM SERPL-SCNC: 3.9 MMOL/L (ref 3.4–5.3)
RBC # BLD AUTO: 4.5 10E12/L (ref 3.8–5.2)
SODIUM SERPL-SCNC: 140 MMOL/L (ref 133–144)
WBC # BLD AUTO: 4.6 10E9/L (ref 4–11)

## 2019-08-17 PROCEDURE — 99285 EMERGENCY DEPT VISIT HI MDM: CPT | Mod: 25

## 2019-08-17 PROCEDURE — G0378 HOSPITAL OBSERVATION PER HR: HCPCS

## 2019-08-17 PROCEDURE — 99219 ZZC INITIAL OBSERVATION CARE,LEVL II: CPT | Performed by: INTERNAL MEDICINE

## 2019-08-17 PROCEDURE — 85025 COMPLETE CBC W/AUTO DIFF WBC: CPT | Performed by: EMERGENCY MEDICINE

## 2019-08-17 PROCEDURE — 25000128 H RX IP 250 OP 636: Performed by: EMERGENCY MEDICINE

## 2019-08-17 PROCEDURE — 96374 THER/PROPH/DIAG INJ IV PUSH: CPT

## 2019-08-17 PROCEDURE — 25000132 ZZH RX MED GY IP 250 OP 250 PS 637: Mod: GY | Performed by: INTERNAL MEDICINE

## 2019-08-17 PROCEDURE — 96376 TX/PRO/DX INJ SAME DRUG ADON: CPT

## 2019-08-17 PROCEDURE — 25000132 ZZH RX MED GY IP 250 OP 250 PS 637: Mod: GY | Performed by: EMERGENCY MEDICINE

## 2019-08-17 PROCEDURE — 80048 BASIC METABOLIC PNL TOTAL CA: CPT | Performed by: EMERGENCY MEDICINE

## 2019-08-17 PROCEDURE — 93005 ELECTROCARDIOGRAM TRACING: CPT

## 2019-08-17 PROCEDURE — 71101 X-RAY EXAM UNILAT RIBS/CHEST: CPT | Mod: RT

## 2019-08-17 RX ORDER — GLYCOPYRROLATE 1 MG/1
1 TABLET ORAL 3 TIMES DAILY
Status: DISCONTINUED | OUTPATIENT
Start: 2019-08-17 | End: 2019-08-18 | Stop reason: HOSPADM

## 2019-08-17 RX ORDER — HYDROMORPHONE HYDROCHLORIDE 1 MG/ML
0.2 INJECTION, SOLUTION INTRAMUSCULAR; INTRAVENOUS; SUBCUTANEOUS
Status: DISCONTINUED | OUTPATIENT
Start: 2019-08-17 | End: 2019-08-18 | Stop reason: HOSPADM

## 2019-08-17 RX ORDER — OXYCODONE HYDROCHLORIDE 5 MG/1
5-10 TABLET ORAL
Status: DISCONTINUED | OUTPATIENT
Start: 2019-08-17 | End: 2019-08-18 | Stop reason: HOSPADM

## 2019-08-17 RX ORDER — ACETAMINOPHEN 500 MG
1000 TABLET ORAL 3 TIMES DAILY
Status: DISCONTINUED | OUTPATIENT
Start: 2019-08-17 | End: 2019-08-18 | Stop reason: HOSPADM

## 2019-08-17 RX ORDER — LEVOTHYROXINE SODIUM 112 UG/1
112 TABLET ORAL DAILY
Status: DISCONTINUED | OUTPATIENT
Start: 2019-08-17 | End: 2019-08-18 | Stop reason: HOSPADM

## 2019-08-17 RX ORDER — LIDOCAINE 4 G/G
1 PATCH TOPICAL
Status: DISCONTINUED | OUTPATIENT
Start: 2019-08-17 | End: 2019-08-18 | Stop reason: HOSPADM

## 2019-08-17 RX ORDER — ISOSORBIDE MONONITRATE 30 MG/1
30 TABLET, EXTENDED RELEASE ORAL DAILY
Status: DISCONTINUED | OUTPATIENT
Start: 2019-08-17 | End: 2019-08-18 | Stop reason: HOSPADM

## 2019-08-17 RX ORDER — CARBIDOPA AND LEVODOPA 25; 100 MG/1; MG/1
2 TABLET ORAL AT BEDTIME
COMMUNITY
End: 2020-01-01

## 2019-08-17 RX ORDER — ACETAMINOPHEN 325 MG/1
650 TABLET ORAL ONCE
Status: COMPLETED | OUTPATIENT
Start: 2019-08-17 | End: 2019-08-17

## 2019-08-17 RX ORDER — ONDANSETRON 4 MG/1
4 TABLET, ORALLY DISINTEGRATING ORAL EVERY 6 HOURS PRN
Status: DISCONTINUED | OUTPATIENT
Start: 2019-08-17 | End: 2019-08-18 | Stop reason: HOSPADM

## 2019-08-17 RX ORDER — ROSUVASTATIN CALCIUM 20 MG/1
20 TABLET, COATED ORAL DAILY
Status: DISCONTINUED | OUTPATIENT
Start: 2019-08-17 | End: 2019-08-18 | Stop reason: HOSPADM

## 2019-08-17 RX ORDER — LIDOCAINE 40 MG/G
CREAM TOPICAL
Status: DISCONTINUED | OUTPATIENT
Start: 2019-08-17 | End: 2019-08-18 | Stop reason: HOSPADM

## 2019-08-17 RX ORDER — CLOPIDOGREL BISULFATE 75 MG/1
75 TABLET ORAL DAILY
Status: DISCONTINUED | OUTPATIENT
Start: 2019-08-17 | End: 2019-08-18 | Stop reason: HOSPADM

## 2019-08-17 RX ORDER — CARBIDOPA AND LEVODOPA 25; 100 MG/1; MG/1
2 TABLET ORAL 4 TIMES DAILY
COMMUNITY

## 2019-08-17 RX ORDER — CARBIDOPA AND LEVODOPA 25; 100 MG/1; MG/1
2 TABLET ORAL AT BEDTIME
Status: DISCONTINUED | OUTPATIENT
Start: 2019-08-17 | End: 2019-08-18 | Stop reason: HOSPADM

## 2019-08-17 RX ORDER — TIZANIDINE 2 MG/1
6 TABLET ORAL 3 TIMES DAILY
Status: DISCONTINUED | OUTPATIENT
Start: 2019-08-17 | End: 2019-08-18 | Stop reason: HOSPADM

## 2019-08-17 RX ORDER — CARBIDOPA AND LEVODOPA 25; 100 MG/1; MG/1
1 TABLET ORAL 3 TIMES DAILY
Status: DISCONTINUED | OUTPATIENT
Start: 2019-08-17 | End: 2019-08-18 | Stop reason: HOSPADM

## 2019-08-17 RX ORDER — CARVEDILOL 6.25 MG/1
6.25 TABLET ORAL 2 TIMES DAILY WITH MEALS
Status: DISCONTINUED | OUTPATIENT
Start: 2019-08-17 | End: 2019-08-18 | Stop reason: HOSPADM

## 2019-08-17 RX ORDER — HYDROMORPHONE HYDROCHLORIDE 1 MG/ML
0.3 INJECTION, SOLUTION INTRAMUSCULAR; INTRAVENOUS; SUBCUTANEOUS
Status: DISCONTINUED | OUTPATIENT
Start: 2019-08-17 | End: 2019-08-17

## 2019-08-17 RX ORDER — NALOXONE HYDROCHLORIDE 0.4 MG/ML
.1-.4 INJECTION, SOLUTION INTRAMUSCULAR; INTRAVENOUS; SUBCUTANEOUS
Status: DISCONTINUED | OUTPATIENT
Start: 2019-08-17 | End: 2019-08-18 | Stop reason: HOSPADM

## 2019-08-17 RX ORDER — ONDANSETRON 2 MG/ML
4 INJECTION INTRAMUSCULAR; INTRAVENOUS EVERY 6 HOURS PRN
Status: DISCONTINUED | OUTPATIENT
Start: 2019-08-17 | End: 2019-08-18 | Stop reason: HOSPADM

## 2019-08-17 RX ADMIN — ROSUVASTATIN CALCIUM 20 MG: 20 TABLET, FILM COATED ORAL at 19:44

## 2019-08-17 RX ADMIN — CLOPIDOGREL BISULFATE 75 MG: 75 TABLET ORAL at 19:45

## 2019-08-17 RX ADMIN — GLYCOPYRROLATE 1 MG: 1 TABLET ORAL at 19:45

## 2019-08-17 RX ADMIN — TIZANIDINE 6 MG: 2 TABLET ORAL at 15:24

## 2019-08-17 RX ADMIN — ACETAMINOPHEN 1000 MG: 500 TABLET, FILM COATED ORAL at 19:45

## 2019-08-17 RX ADMIN — CARVEDILOL 6.25 MG: 6.25 TABLET, FILM COATED ORAL at 20:35

## 2019-08-17 RX ADMIN — OMEPRAZOLE 20 MG: 20 CAPSULE, DELAYED RELEASE ORAL at 19:02

## 2019-08-17 RX ADMIN — APIXABAN 5 MG: 5 TABLET, FILM COATED ORAL at 19:45

## 2019-08-17 RX ADMIN — TIZANIDINE 6 MG: 2 TABLET ORAL at 19:44

## 2019-08-17 RX ADMIN — ACETAMINOPHEN 1000 MG: 500 TABLET, FILM COATED ORAL at 14:08

## 2019-08-17 RX ADMIN — APIXABAN 5 MG: 5 TABLET, FILM COATED ORAL at 14:08

## 2019-08-17 RX ADMIN — HYDROMORPHONE HYDROCHLORIDE 0.3 MG: 1 INJECTION, SOLUTION INTRAMUSCULAR; INTRAVENOUS; SUBCUTANEOUS at 10:18

## 2019-08-17 RX ADMIN — LIDOCAINE 1 PATCH: 560 PATCH PERCUTANEOUS; TOPICAL; TRANSDERMAL at 19:43

## 2019-08-17 RX ADMIN — ACETAMINOPHEN 650 MG: 325 TABLET, FILM COATED ORAL at 09:00

## 2019-08-17 RX ADMIN — CARBIDOPA AND LEVODOPA 2 TABLET: 25; 100 TABLET ORAL at 20:35

## 2019-08-17 RX ADMIN — ISOSORBIDE MONONITRATE 30 MG: 30 TABLET, EXTENDED RELEASE ORAL at 14:08

## 2019-08-17 RX ADMIN — HYDROMORPHONE HYDROCHLORIDE 0.3 MG: 1 INJECTION, SOLUTION INTRAMUSCULAR; INTRAVENOUS; SUBCUTANEOUS at 09:00

## 2019-08-17 RX ADMIN — OXYCODONE HYDROCHLORIDE 5 MG: 5 TABLET ORAL at 20:34

## 2019-08-17 ASSESSMENT — ENCOUNTER SYMPTOMS
BACK PAIN: 0
ARTHRALGIAS: 1
HEADACHES: 0
NECK PAIN: 0
SHORTNESS OF BREATH: 1

## 2019-08-17 ASSESSMENT — MIFFLIN-ST. JEOR
SCORE: 1117.97
SCORE: 1117.72

## 2019-08-17 NOTE — ED NOTES
"[facility]  ED Nurse Handoff Report    ED Chief complaint: Fall      ED Diagnosis:   Final diagnoses:   Closed fracture of two ribs of right side, initial encounter       Code Status: Full Code    Allergies:   Allergies   Allergen Reactions     Ace Inhibitors Cough     Baclofen Other (See Comments)     Constipation , tiredness     Penicillins      10/7/14 Hives per patient -- many many years ago       Nickel Swelling and Rash     Other reaction(s): Unknown       Activity level - Baseline/Home:  Independent  Activity Level - Current:   Stand with Assist    Patient's Preferred language: english   Needed?: No    Isolation: Yes  Infection: MRSA  Bariatric?: No    Vital Signs:   Vitals:    08/17/19 0843 08/17/19 0900 08/17/19 1007   BP: 117/66     Pulse: 68     Resp: 16     Temp: 97.9  F (36.6  C)     TempSrc: Oral     SpO2: 99% 99% 95%   Weight: 65.8 kg (145 lb)     Height: 1.626 m (5' 4\")         Cardiac Rhythm: ,        Pain level: 0-10 Pain Scale: 5    Is this patient confused?: No   Does this patient have a guardian?  No         If yes, is there guardianship documents in the Epic \"Code/ACP\" activity?  N/A         Guardian Notified?  N/A  [place] - Suicide Severity Rating Scale Completed?  Yes  If yes, what color did the patient score?  White    Patient Report: Initial Complaint: pt presents to ED via EMS from home after falling this morning and hitting R ribs on bedside table. Patient has a hx of ALS, uses a walker, just lost balance and tipped over. No LOC or hitting head.   Focused Assessment: patient appears in pain. Uses soft collar while out of bed normally to help hold head up. A & O x 4, moves well.  Tests Performed: Xrays, labs  Abnormal Results: Abnormal Labs Reviewed - No abnormal labs to display  Xray shows 6th and 7th R rib fractures.   Treatments provided: pain mgt    Family Comments: I called  who is arranging ride to come here.     OBS brochure/video " discussed/provided to patient/family: Yes              Name of person given brochure if not patient:               Relationship to patient:     ED Medications:   Medications   HYDROmorphone (PF) (DILAUDID) injection 0.3 mg (0.3 mg Intravenous Given 8/17/19 1018)   acetaminophen (TYLENOL) tablet 650 mg (650 mg Oral Given 8/17/19 0900)       Drips infusing?:  No    For the majority of the shift this patient was Green.   Interventions performed were .    Severe Sepsis OR Septic Shock Diagnosis Present: No    To be done/followed up on inpatient unit:      ED NURSE PHONE NUMBER: [phone]

## 2019-08-17 NOTE — ED NOTES
Bed: ED13  Expected date:   Expected time:   Means of arrival:   Comments:  418  79 F fall/back pain  0887

## 2019-08-17 NOTE — H&P
[facility]    History and Physical  Hospitalist       Date of Admission:  8/17/2019    Assessment & Plan   Katherin Fletcher is a 79 year old female with PMH of ALS, ischemic CMO s/p CABG, CAD, HLD, PAD, CVA, COPD, afib, who presents with fall and rib fractures.    Right 6th and 7th rib fractures: Patient reports mechanical fall, no premonitory symptoms. Endorses pleuritic chest pain. Required IV pain control in ED. Labwork unremarkable.Primary risk here is for atelectasis and increasing risk of infection, so goal will be to mobilize patient ASAP (starting later today), incentive spirometer, and pain control.  - Pain control with tylenol scheduled, oxycodone PRN, dilaudid IV PRN  - Lidocaine patch at night  - Incentive spirometer  - Ambulate QID  - Physical therapy consult    Afib: Currently in afib, rates are controlled  - Check EKG  - PTA apixaban    ALS, gait disorder: Follows with ALS clinic here.  - PTA Sinemet QID, tizanidine, glyccopyrolate    Ischemic CMO s/p CABG x2:   CAD, HLD  Cardiac status appears stable here  - PTA Coreg, Imdur, rosuvastatin    PAD, hx CVA: PTA Plavix  Hypothyroidism: PTA Synthroid    DVT Prophylaxis: Pneumatic Compression Devices  Code Status: Special code: Okay to resuscitate but no electric shocks, okay to intubate    Disposition: Expected discharge 1-2 days    Handy Sofia MD    Primary Care Physician   Aris Del Real    Chief Complaint   Fall    History is obtained from the patient    History of Present Illness   Katherin Fletcher is a 79 year old female who presents with fall. She was getting out of bed this morning when she slipped and her chest fell against her nightstand. She describes this as a mechanical fall, no lightheadedness prior. She falls somewhat regularly, last fall was in June, also mechanical, related to her ALS. No head trauma or LOC here. [place] at her baseline yesterday. At baseline, she uses a walker in her home, and an  electric wheelchair outside the home. She currently endorses 4/10 anterior/posterior chest pain, worse with breathing, no cough, mild shortness of breath as she can't take deep breaths. She notes the pain here is worse than after her CABG.    Past Medical History    I have reviewed this patient's medical history and updated it with pertinent information if needed.   Past Medical History:   Diagnosis Date     CAD (coronary artery disease)     s/p CABG 1992 and redo CABG 2009 (LIMA to LAD, SVG to OM and SVG to posterolateral branch of RCA)     Chronic cough     Non-productive. No known definite etiology. Is beeing seen by [facility].      Essential hypertension, benign      Falls frequently 5/28/2018     GERD (gastroesophageal reflux disease)      History of TIA (transient ischemic attack) and stroke 5/7/2018    R sided facial N&T.      Hyperlipidaemia      Hyperlipidaemia LDL goal < 130      Ischemic cardiomyopathy      MI (myocardial infarction) (H) 11/1996     Mixed hyperlipidemia      Neurodegenerative disorder     Further detailed dx unknown. Managed by Dr. George Pham - [facility].      PAD (peripheral artery disease) (H)      Restless legs syndrome      Unspecified hypothyroidism        Past Surgical History   I have reviewed this patient's surgical history and updated it with pertinent information if needed.  Past Surgical History:   Procedure Laterality Date     ARTHROSCOPY SHOULDER RT/LT       BUNIONECTOMY RT/LT       C CABG, ARTERY-VEIN, THREE  1/2009     C CARDIAC SURG PROCEDURE UNLIST       C HAND/FINGER SURGERY UNLISTED       C MRA UPPER EXTREMITY WO&W CONT       C SHOULDER SURG PROC UNLISTED       C STOMACH SURGERY PROCEDURE UNLISTED       CARDIAC SURGERY  1992    CABG,stents x2     CARPAL TUNNEL RELEASE RT/LT       EXCISE GANGLION HAND Right 11/29/2018    Procedure: EXCISE RIGHT VOLAR CARPAL MASS AND RIGHT INDEX TRIGGER FINGER RELEASE;  Surgeon: Minh Goldstein MD;  Location:  OR       VASCULAR SURGERY PROCEDURE UNLIST       HYSTERECTOMY, DAMIÁN  34 yo     OPEN REDUCTION INTERNAL FIXATION ANKLE Left     Left Ankle     RELEASE TRIGGER FINGER       RELEASE TRIGGER FINGER Right 2018    Procedure: RIGHT INDEX TRIGGER FINGER RELEASE;  Surgeon: Minh Goldstein MD;  Location: [facility] OR     REPAIR HAMMER TOE Right 2017    Procedure: REPAIR HAMMER TOE;  Right 2nd and 3rd toe pinning;  Surgeon: Fernando Wiggins MD;  Location: [facility] OR     STENT  2006     STENT(aka CARDIAC)       VASCULAR SURGERY         Prior to Admission Medications   Prior to Admission Medications   Prescriptions Last Dose Informant Patient Reported? Taking?   Alfalfa 250 MG TABS  Self Yes No   Sig: Take 250 mg by mouth every morning    HYDROcodone-acetaminophen (NORCO) 5-325 MG tablet   No No   Sig: Take 1-2 tablets by mouth every 4 hours as needed for pain   Patient not taking: Reported on 2019   LEVOXYL 112 MCG tablet   No No   Sig: TAKE 1 TABLET DAILY   Multiple Vitamins-Minerals (DAILY MULTI) TABS  Self Yes No   Sig: Take 1 tablet by mouth every morning    apixaban ANTICOAGULANT (ELIQUIS) 5 MG tablet   No No   Sig: Take 1 tablet (5 mg) by mouth 2 times daily   calcium carbonate-vitamin D (CALCIUM + D) 600-200 MG-UNIT TABS  Self Yes No   Sig: Take 1 tablet by mouth At Bedtime    carbidopa-levodopa (SINEMET)  MG tablet   No No   Sig: TAKE 2 TABLETS FOUR TIMES A DAY   carvedilol (COREG) 6.25 MG tablet   No No   Sig: Take 1 tablet (6.25 mg) by mouth 2 times daily (with meals)   clopidogrel (PLAVIX) 75 MG tablet   No No   Sig: Take 1 tablet (75 mg) by mouth daily   glycopyrrolate (ROBINUL) 1 MG tablet   No No   Sig: Take 1 tablet (1 mg) by mouth 3 times daily   guaiFENesin (MUCINEX) 600 MG 12 hr tablet   No No   Sig: Take 1 tablet (600 mg) by mouth 2 times daily   ipratropium (ATROVENT) 0.03 % nasal spray   No No   Si-2  sprays  1-2 times daily prn into mouth   isosorbide mononitrate (IMDUR) 30 MG 24 hr  tablet   No No   Sig: Take 1 tablet (30 mg) by mouth daily   nitroGLYcerin (NITROSTAT) 0.4 MG sublingual tablet   No No   Sig: DISSOLVE ONE TABLET UNDER THE TONGUE EVERY 5 MINUTES AS NEEDED FOR CHEST PAIN.  DO NOT EXCEED A TOTAL OF 3 DOSES IN 15 MINUTES   omeprazole (PRILOSEC) 20 MG DR capsule   No No   Sig: TAKE 2 CAPSULES DAILY 30 TO 60 MINUTES BEFORE A MEAL   rosuvastatin (CRESTOR) 20 MG tablet   No No   Sig: Take 1 tablet (20 mg) by mouth daily   tiZANidine (ZANAFLEX) 2 MG tablet   No No   Sig: Take 3 tablets (6 mg) by mouth 3 times daily      Facility-Administered Medications: None     Allergies   Allergies   Allergen Reactions     Ace Inhibitors Cough     Baclofen Other (See Comments)     Constipation , tiredness     Penicillins      10/7/14 Hives per patient -- many many years ago       Nickel Swelling and Rash     Other reaction(s): Unknown       Social History   I have reviewed this patient's social history and updated it with pertinent information if needed. Katherin Fletcher  reports that she quit smoking about 27 years ago. Her smoking use included cigarettes. She has a 11.00 pack-year smoking history. She has never used smokeless tobacco. She reports that she drinks about 0.6 oz of alcohol per week. She reports that she does not use drugs.    Family History   I have reviewed this patient's family history and updated it with pertinent information if needed.   Family History   Problem Relation Age of Onset     Unknown/Adopted Mother      Unknown/Adopted Father      Other - See Comments Son         on warfarin for one year after afganastan now off and doing well     Other - See Comments Daughter         celiac, brittle bone, arthritis, Raynauds       Review of Systems   The 10 point Review of Systems is negative other than noted in the HPI or here.     Physical Exam   Temp: 97.9  F (36.6  C) Temp src: Oral BP: 117/66 Pulse: 68   Resp: 16 SpO2: 95 % O2 Device: None (Room air)    Vital Signs with  Ranges  Temp:  [97.9  F (36.6  C)] 97.9  F (36.6  C)  Pulse:  [68] 68  Resp:  [16] 16  BP: (117)/(66) 117/66  SpO2:  [95 %-99 %] 95 %  145 lbs 0 oz    Constitutional: Female in moderate distress due to pain  HEENT: Normocephalic, atraumatic, oral mucosa moist  Respiratory: Bibasilar crackles with no wheezing  Cardiovascular: Irregular, normal S1/2, no m/r/g  GI: No organomegaly, normoactive bowel sounds, nontender  Vascular: Palpable peripheral pulses in upper and lower extremities, no lower extremity pitting edema  Skin: No rashes, light abrasion posterolateral right ribs  Musculoskeletal: Normal strength in UE and LE, moves all extremities  Neurologic: A&Ox3  Psychiatric: Appropriate affect and mood    Data   Data reviewed today:  I personally reviewed CXR.  Recent Labs   Lab 08/17/19  0857   WBC 4.6   HGB 13.0   MCV 86         POTASSIUM 3.9   CHLORIDE 107   CO2 30   BUN 8   CR 0.74   ANIONGAP 3   GALE 9.4   GLC 97       Imaging:  Recent Results (from the past 24 hour(s))   Ribs XR, unilat 3 views + PA chest, right    Narrative    CHEST AND RIGHT RIBS THREE VIEWS   8/17/2019 9:40 AM    HISTORY: Right posterior rib pain after falling.    COMPARISON: 1/4/2019    FINDINGS: There is a recent-appearing fracture of the posterolateral  aspect of what appears to be the right seventh rib with near full  shaft width inferior displacement. There is also a minimally displaced  fracture of the lateral aspect of the right sixth rib. No other  definite acute fracture is seen. There are surgical changes of a  median sternotomy. No pneumothorax is demonstrated. There are  degenerative changes of the shoulders and spine. No other abnormality  is seen.      Impression    IMPRESSION: Recent-appearing right sixth and seventh rib fractures. No  pneumothorax is seen.     VERITO ZARAGOZA MD

## 2019-08-17 NOTE — ED TRIAGE NOTES
Getting up out of bed this morning to go to bathroom, lost balance, fell and hit R ribs on corner of bedside table. Small abrasion to R ribs. Hx of ALS

## 2019-08-17 NOTE — PLAN OF CARE
RECEIVING UNIT ED HANDOFF REVIEW    ED Nurse Handoff Report was reviewed by: Radha Jay on August 17, 2019 at 11:04 AM

## 2019-08-17 NOTE — ED PROVIDER NOTES
History     Chief Complaint:  Fall following loss of balance    HPI   Katherin Fletcher is a 79 year old female who is taking Eliquis and Plavix and has a history of ALS who presents via EMS immediately following a fall. She reports that she lost her balance while getting up to go to the bathroom and impacted the right side of her ribs on her bedside table before falling to the floor. She denies loss of consciousness or hitting her head. EMS did not give her any pain medication. She reports pain on her right side but denies pain in her head, neck, abdomen, hip, or lower back. She reports that her last fall was in June. She reports that she experiences shortness of breath with her ALS once in a while. She denies taking prescribed narcotics since a surgery she had a long time ago but took a Tylenol last night prior to her fall.    Allergies:  ACE inhibitors  Baclofen  Penicillins  Nickel    Medications:    Eliquis  Sinemet  Coreg  Plavix  Robinul  Mucinex  Imdur  Levoxyl  Crestor  Zanaflex    Past Medical History:    Spinocerebellar ataxia  Heart failure  COPD  Atrial fibrillation  ALS  Trigger finger  Hypothyroidism  Stroke  TIA  Aterial leg ulcer  PAD  Peripheral polyneuropathy  Ischemic cardiomyopathy  HTN  Dysarthria  Smell and taste disturbances  MRSA  CAD  Hyperlipidemia  Chronic cough  GERD  MI  Restless legs syndrome    Past Surgical History:    Shoulder arthroscopy  Bunionectomy  C CABG, Artery-Vein, three  Cardiac surgery  Stomach surgery  Carpal tunnel surgery  Excise right hand ganglion  Vascular surgery  Hysterectomy  Ankle open reduction  Trigger finger release  Hammer toe repair    Family History:    Adopted, unknown parents    Daughter - celiac disease, arthritis, Raynauds    Social History:  Smoking status: Former 0.5 pack / day smoker  Alcohol use: 1 drink / week  Marital Status:      Review of Systems   Respiratory: Positive for shortness of breath.    Musculoskeletal: Positive for  "arthralgias. Negative for back pain and neck pain.   Neurological: Negative for syncope and headaches.   All other systems reviewed and are negative.      Physical Exam     Patient Vitals for the past 24 hrs:   BP Temp Temp src Pulse Resp SpO2 Height Weight   08/17/19 1028 -- -- -- -- -- 95 % -- --   08/17/19 1007 -- -- -- -- -- 95 % -- --   08/17/19 0900 -- -- -- -- -- 99 % -- --   08/17/19 0843 117/66 97.9  F (36.6  C) Oral 68 16 99 % 1.626 m (5' 4\") 65.8 kg (145 lb)     Physical Exam  Nursing note and vitals reviewed.  Constitutional:  Appears well-developed and well-nourished.   HENT:   Head:    Atraumatic.   Mouth/Throat:   Oropharynx is clear and moist. No oropharyngeal exudate.   Eyes:    Pupils are equal, round, and reactive to light.   Neck:    Normal range of motion. Neck supple. Soft cervical collar. Non-tender neck.     No tracheal deviation present. No thyromegaly present.   Cardiovascular:  Normal rate, regular rhythm, no murmur   Pulmonary/Chest: Breath sounds are clear and equal without wheezes or crackles. Tenderness with abrasion and slight swelling to the right posterior/lateral ribs approximately number 7,8, and 9. Just inferior and lateral to the right scapula. No crepitus or subcutaneous emphysema.  Abdominal:   Soft. Bowel sounds are normal. Exhibits no distension and      no mass. There is no tenderness.      There is no rebound and no guarding.   Back:   No spine tenderness in the thoracic or lumbar regions. No CVA tenderness. Right posterior rib tenderness as stated above.  Musculoskeletal:  Exhibits no edema. Arms and legs non-tender.  Lymphadenopathy:  No cervical adenopathy.   Neurological:   Alert and oriented to person, place, and time. GCS 15.  CN 2-12 intact.  and proximal upper extremity strength strong and equal.  Bilateral lower extremity strength strong and equal, including strong dorsiflexion and plantarflexion strength.  Sensation intact and equal to the face, arms and " legs.  No facial droop or weakness. Normal speech.  Follows commands and answers questions normally.     Skin:    Skin is warm and dry. No rash noted. No pallor.     Emergency Department Course   Imaging:  Radiographic findings were communicated with the patient who voiced understanding of the findings.  XR Ribs, unliat, 3 views + PA chest:   Recent-appearing right sixth and seventh rib fractures. No  pneumothorax is seen.   Read as per radiology.     Laboratory:  BMP: Glucose 97, o/w WNL (Creatinine: 0.74)  CBC: WBC: 4.6, HGB: 13.0, PLT: 207    Interventions:  0900: Tylenol 650 mg PO, Dilaudid 0.3 mg IV  1018: Dilaudid 0.3 mg IV    Emergency Department Course:  Nursing notes and vitals reviewed. (7534) I performed an exam of the patient as documented above.     IV inserted. Medicine administered as documented above. Blood drawn. This was sent to the lab for further testing, results above.    The patient was sent for a rib X-ray while in the emergency department, findings above.     1026 I rechecked the patient and discussed the results of her workup thus far.     1035  I consulted with Dr. Sofia of the hospitalist services. They are in agreement to accept the patient for admission.    Findings and plan explained to the Patient who consents to admission. Discussed the patient with Dr. Sofia, who will admit the patient to an observation bed for further monitoring, evaluation, and treatment.    Impression & Plan    Medical Decision Making:  I found her to have two right sided rib fractures from her fall without any sign of bleeding, hemothorax, pneumothorax, or intraabdominal injury. Since she was in significant pain, she was admitted to the observation unit under the care of Dr. Sofia for pain control and further evaluation. She did not have any sign of head injury, neck injury, or other injuries either.    Diagnosis:    ICD-10-CM    1. Closed fracture of two ribs of right side, initial encounter S22.41XA         Disposition:  Admitted to an observation bed.    Remi Mccain  8/17/2019    EMERGENCY DEPARTMENT  Scribe Disclosure:  I, Remi Mccain, am serving as a scribe on 8/17/2019 at 8:41 AM to personally document services performed by Rosalind Medrano MD based on my observations and the provider's statements to me.          Rosalind Medrano MD  08/17/19 1280

## 2019-08-17 NOTE — PLAN OF CARE
PRIMARY DIAGNOSIS: ACUTE PAIN  OUTPATIENT/OBSERVATION GOALS TO BE MET BEFORE DISCHARGE:  1. Pain Status: Improved-controlled with oral pain medications.    2. Return to near baseline physical activity: No    3. Cleared for discharge by consultants (if involved): No    Discharge Planner Nurse   Safe discharge environment identified: No  Barriers to discharge: Yes       Entered by: Radha Jay 08/17/2019 3:44 PM     Please review provider order for any additional goals.   Nurse to notify provider when observation goals have been met and patient is ready for discharge.    A&Ox4. VSS on RA. States right rib pain decreased after sinemet and tizanidine. Declined further pain medication. Up in chair most of afternoon. Using IS frequently, up to 500. Assist x2 with walker to take a few steps. Soft collar in place to support head. Contact iso for hx MRSA. Tolerating regular diet. PT consult tomorrow.

## 2019-08-17 NOTE — PHARMACY-ADMISSION MEDICATION HISTORY
Admission medication history interview status for the 8/17/2019  admission is complete. See EPIC admission navigator for prior to admission medications     Medication history source reliability:Good    Actions taken by pharmacist (provider contacted, etc): Spoke with patient.     Additional medication history information not noted on PTA med list :None    Medication reconciliation/reorder completed by provider prior to medication history? No    Time spent in this activity: 15 minutes     Prior to Admission medications    Medication Sig Last Dose Taking? Auth Provider   Alfalfa 250 MG TABS Take 250 mg by mouth every morning  Past Week at Unknown time Yes Reported, Patient   apixaban ANTICOAGULANT (ELIQUIS) 5 MG tablet Take 1 tablet (5 mg) by mouth 2 times daily 8/16/2019 at 10 pm  Yes Abhijit Boyd MD   calcium carbonate-vitamin D (CALCIUM + D) 600-200 MG-UNIT TABS Take 1 tablet by mouth At Bedtime  8/16/2019 at Unknown time Yes Aris Del Real MD   carbidopa-levodopa (SINEMET)  MG tablet Take 1 tablet by mouth 3 times daily 8/17/2019 at 5 am  Yes Unknown, Entered By History   carbidopa-levodopa (SINEMET)  MG tablet Take 2 tablets by mouth At Bedtime 8/16/2019 at bed time  Yes Unknown, Entered By History   carvedilol (COREG) 6.25 MG tablet Take 1 tablet (6.25 mg) by mouth 2 times daily (with meals) 8/16/2019 at pm Yes Abhijit Boyd MD   clopidogrel (PLAVIX) 75 MG tablet Take 1 tablet (75 mg) by mouth daily 8/16/2019 at pm Yes Abhijit Boyd MD   glycopyrrolate (ROBINUL) 1 MG tablet Take 1 tablet (1 mg) by mouth 3 times daily 8/16/2019 at pm Yes Luis A Gonzalez MD   guaiFENesin (MUCINEX) 600 MG 12 hr tablet Take 1 tablet (600 mg) by mouth 2 times daily 8/16/2019 at pm Yes Travon Driver MD   isosorbide mononitrate (IMDUR) 30 MG 24 hr tablet Take 1 tablet (30 mg) by mouth daily 8/16/2019 at am Yes Abhijit Boyd MD   LEVOXYL 112 MCG tablet TAKE 1 TABLET DAILY  8/16/2019 at am Yes Clary Dumont MD   Multiple Vitamins-Minerals (DAILY MULTI) TABS Take 1 tablet by mouth every morning  8/16/2019 at am Yes Reported, Patient   nitroGLYcerin (NITROSTAT) 0.4 MG sublingual tablet DISSOLVE ONE TABLET UNDER THE TONGUE EVERY 5 MINUTES AS NEEDED FOR CHEST PAIN.  DO NOT EXCEED A TOTAL OF 3 DOSES IN 15 MINUTES  at prn Yes Abhijit Boyd MD   omeprazole (PRILOSEC) 20 MG DR capsule Take 20 mg by mouth 2 times daily 30-60 minutes before meal 8/16/2019 at pm Yes Unknown, Entered By History   rosuvastatin (CRESTOR) 20 MG tablet Take 1 tablet (20 mg) by mouth daily 8/16/2019 at pm Yes Abhijit Boyd MD   tiZANidine (ZANAFLEX) 2 MG tablet Take 3 tablets (6 mg) by mouth 3 times daily 8/17/2019 at am  Yes Luis A Gonzalez MD Elham Said   Student Pharmacist

## 2019-08-18 ENCOUNTER — APPOINTMENT (OUTPATIENT)
Dept: PHYSICAL THERAPY | Facility: CLINIC | Age: 79
End: 2019-08-18
Attending: INTERNAL MEDICINE
Payer: MEDICARE

## 2019-08-18 VITALS
OXYGEN SATURATION: 98 % | DIASTOLIC BLOOD PRESSURE: 63 MMHG | TEMPERATURE: 97.6 F | HEIGHT: 64 IN | BODY MASS INDEX: 24.75 KG/M2 | RESPIRATION RATE: 14 BRPM | HEART RATE: 79 BPM | WEIGHT: 145 LBS | SYSTOLIC BLOOD PRESSURE: 112 MMHG

## 2019-08-18 LAB
ANION GAP SERPL CALCULATED.3IONS-SCNC: 5 MMOL/L (ref 3–14)
BUN SERPL-MCNC: 12 MG/DL (ref 7–30)
CALCIUM SERPL-MCNC: 9.4 MG/DL (ref 8.5–10.1)
CHLORIDE SERPL-SCNC: 103 MMOL/L (ref 94–109)
CO2 SERPL-SCNC: 26 MMOL/L (ref 20–32)
CREAT SERPL-MCNC: 0.65 MG/DL (ref 0.52–1.04)
GFR SERPL CREATININE-BSD FRML MDRD: 84 ML/MIN/{1.73_M2}
GLUCOSE SERPL-MCNC: 98 MG/DL (ref 70–99)
POTASSIUM SERPL-SCNC: 4 MMOL/L (ref 3.4–5.3)
SODIUM SERPL-SCNC: 134 MMOL/L (ref 133–144)

## 2019-08-18 PROCEDURE — 25000132 ZZH RX MED GY IP 250 OP 250 PS 637: Mod: GY | Performed by: INTERNAL MEDICINE

## 2019-08-18 PROCEDURE — 97161 PT EVAL LOW COMPLEX 20 MIN: CPT | Mod: GP

## 2019-08-18 PROCEDURE — 80048 BASIC METABOLIC PNL TOTAL CA: CPT | Performed by: INTERNAL MEDICINE

## 2019-08-18 PROCEDURE — G0378 HOSPITAL OBSERVATION PER HR: HCPCS

## 2019-08-18 PROCEDURE — 99217 ZZC OBSERVATION CARE DISCHARGE: CPT | Performed by: INTERNAL MEDICINE

## 2019-08-18 PROCEDURE — 36415 COLL VENOUS BLD VENIPUNCTURE: CPT | Performed by: INTERNAL MEDICINE

## 2019-08-18 PROCEDURE — 99207 ZZC CDG-CODE CATEGORY CHANGED: CPT | Performed by: INTERNAL MEDICINE

## 2019-08-18 RX ORDER — LIDOCAINE 4 G/G
1 PATCH TOPICAL EVERY 24 HOURS
Qty: 5 PATCH | Refills: 0 | Status: SHIPPED | OUTPATIENT
Start: 2019-08-18 | End: 2019-11-14

## 2019-08-18 RX ORDER — OXYCODONE AND ACETAMINOPHEN 5; 325 MG/1; MG/1
1 TABLET ORAL EVERY 6 HOURS PRN
Qty: 15 TABLET | Refills: 0 | Status: SHIPPED | OUTPATIENT
Start: 2019-08-18 | End: 2019-11-14

## 2019-08-18 RX ADMIN — ISOSORBIDE MONONITRATE 30 MG: 30 TABLET, EXTENDED RELEASE ORAL at 08:02

## 2019-08-18 RX ADMIN — LEVOTHYROXINE SODIUM 112 MCG: 112 TABLET ORAL at 08:01

## 2019-08-18 RX ADMIN — GLYCOPYRROLATE 1 MG: 1 TABLET ORAL at 08:01

## 2019-08-18 RX ADMIN — TIZANIDINE 6 MG: 2 TABLET ORAL at 14:33

## 2019-08-18 RX ADMIN — ACETAMINOPHEN 1000 MG: 500 TABLET, FILM COATED ORAL at 08:02

## 2019-08-18 RX ADMIN — OXYCODONE HYDROCHLORIDE 5 MG: 5 TABLET ORAL at 06:01

## 2019-08-18 RX ADMIN — CARBIDOPA AND LEVODOPA 1 TABLET: 25; 100 TABLET ORAL at 11:20

## 2019-08-18 RX ADMIN — OMEPRAZOLE 20 MG: 20 CAPSULE, DELAYED RELEASE ORAL at 08:02

## 2019-08-18 RX ADMIN — CARBIDOPA AND LEVODOPA 1 TABLET: 25; 100 TABLET ORAL at 05:02

## 2019-08-18 RX ADMIN — CARVEDILOL 6.25 MG: 6.25 TABLET, FILM COATED ORAL at 08:03

## 2019-08-18 RX ADMIN — APIXABAN 5 MG: 5 TABLET, FILM COATED ORAL at 08:02

## 2019-08-18 NOTE — PLAN OF CARE
PRIMARY DIAGNOSIS: ACUTE PAIN  OUTPATIENT/OBSERVATION GOALS TO BE MET BEFORE DISCHARGE:  1. Pain Status: Improved-controlled with oral pain medications.    2. Return to near baseline physical activity: No    3. Cleared for discharge by consultants (if involved): Yes    Discharge Planner Nurse   Safe discharge environment identified: Yes  Barriers to discharge: No       Entered by: Radha Jay 08/18/2019 10:20 AM     Please review provider order for any additional goals.   Nurse to notify provider when observation goals have been met and patient is ready for discharge.    A&Ox4. VSS. Reports minimal pain, declines pain medication. Assist x1 and walker. Up in chair. IS encouraged. Tolerating diet. Contact iso for hx MRSA.

## 2019-08-18 NOTE — PLAN OF CARE
A&Ox4. VSS on RA. R rib pain controlled w/ Xanaflex/lido patch/Tylenol/ 1 tab oxycodone. Encourage IS while awake. Assist x2 with walker to BSC. Marched in place. Soft collar in place to support head. Tolerating regular diet. PT consult today.

## 2019-08-18 NOTE — PROGRESS NOTES
08/18/19 1100   Quick Adds   Type of Visit Initial PT Evaluation   Living Environment   Lives With spouse   Living Arrangements house   Home Accessibility   (ramp to enter, chair lift in home)   Living Environment Comment Plans to sleep in lift recliner in main floor. Able to naviagate electric w/c everywhere except bathroom   Self-Care   Usual Activity Tolerance fair   Current Activity Tolerance poor   Regular Exercise No   Equipment Currently Used at Home wheelchair, power;walker, rolling;shower chair;grab bar, toilet;grab bar, tub/shower   Functional Level Prior   Ambulation 1-->assistive equipment   Transferring 1-->assistive equipment   Toileting 1-->assistive equipment   Bathing 1-->assistive equipment   Fall history within last six months yes   Number of times patient has fallen within last six months 2   Which of the above functional risks had a recent onset or change? transferring;ambulation   Prior Functional Level Comment 4WW use in home, electric w/c in community. Low activity tolerance at baseline. Mod-I for ADLs   General Information   Onset of Illness/Injury or Date of Surgery - Date 08/17/19   Referring Physician Handy Sofia MD   Patient/Family Goals Statement Home   Pertinent History of Current Problem (include personal factors and/or comorbidities that impact the POC) 79 year old female with PMH of ALS, ischemic CMO s/p CABG, CAD, HLD, PAD, CVA, COPD, afib, who presents with fall and rib fractures   Precautions/Limitations fall precautions   Cognitive Status Examination   Orientation orientation to person, place and time   Level of Consciousness alert   Follows Commands and Answers Questions 100% of the time   Pain Assessment   Patient Currently in Pain   (reporting minmial pain)   Posture    Posture Kyphosis;Protracted shoulders;Forward head position   Range of Motion (ROM)   ROM Comment B LE WFL, UE decreased shouler ROM, but functional   Strength   Strength Comments NT, B LE >3/5  "functionally   Bed Mobility   Bed Mobility Comments Not assessed   Transfer Skills   Transfer Comments SBA sit<>stand, BUE use and reliant on momentum   Gait   Gait Comments SBA with 4WW, decreased pace and step length, but steady 20' within room   Balance   Balance Comments Requires UE support for dynamic balance   Sensory Examination   Sensory Perception no deficits were identified   Clinical Impression   Criteria for Skilled Therapeutic Intervention evaluation only   PT Diagnosis Function functional activity tolerance   Influenced by the following impairments Pain, generalized weakness   Functional limitations due to impairments Bed mobility, transfers, gait   Clinical Presentation Stable/Uncomplicated   Clinical Presentation Rationale Medically stable   Clinical Decision Making (Complexity) Low complexity   Anticipated Discharge Disposition Home with Home Therapy   Risk & Benefits of therapy have been explained Yes   Patient, Family & other staff in agreement with plan of care Yes   Clinical Impression Comments Pt presents with ribs fxs, generally weak, however decreased activity at baseline. Current mobiltiy functional for home given home setup and electric w/c use. Pt does need further PT as she is a fall risk at baseline, declines TCU at this some, so would benefit from  PT. States spouse able to supervise and provide light assist with mobiltiy and ADLs   [facility] [employer] TM \"6 Clicks\"   2016, Trustees of [facility], under license to [employer].  All rights reserved.   6 Clicks Short Forms Basic Mobility Inpatient Short Form   [facility] AM-PAC  \"6 Clicks\" V.2 Basic Mobility Inpatient Short Form   1. Turning from your back to your side while in a flat bed without using bedrails? 3 - A Little   2. Moving from lying on your back to sitting on the side of a flat bed without using bedrails? 3 - A Little   3. Moving to and from a bed to a chair (including a wheelchair)? 3 - A Little   4. " Standing up from a chair using your arms (e.g., wheelchair, or bedside chair)? 3 - A Little   5. To walk in hospital room? 3 - A Little   6. Climbing 3-5 steps with a railing? 1 - Total   Basic Mobility Raw Score (Score out of 24.Lower scores equate to lower levels of function) 16   Total Evaluation Time   Total Evaluation Time (Minutes) 20

## 2019-08-18 NOTE — PROGRESS NOTES
Writer did not get a chance to meet with the patient for discharge planning.  Patient discharged to home with Homecare PT/OT with a medicare face to face.  Writer called and talked to the patient and [facility]. Igorr sent email to  for referral. Patient also states she left a pair of red framed reading glasses in the room.  Writer passed this information along to the charge. Patient would like a call back regarding her glasses.  She also states that they would need to be mailed as neither her or her  drive.

## 2019-08-18 NOTE — PLAN OF CARE
PRIMARY DIAGNOSIS: ACUTE PAIN  OUTPATIENT/OBSERVATION GOALS TO BE MET BEFORE DISCHARGE:  1. Pain Status: Improved-controlled with oral pain medications.     2. Return to near baseline physical activity: No     3. Cleared for discharge by consultants (if involved): Yes     Discharge Planner Nurse   Safe discharge environment identified: Yes  Barriers to discharge: No       Entered by: Radha Jay 08/18/2019 10:20 AM  Please review provider order for any additional goals.   Nurse to notify provider when observation goals have been met and patient is ready for discharge.

## 2019-08-18 NOTE — PLAN OF CARE
Observation goals - Acute Traumatic pain PRIOR TO DISCHARGE     Comments: List all goals to be met before discharge home:   - Adequate pain control on oral analgesia.-met  - Vital Signs normal or at patient baseline. -met  - Tolerating oral intake to maintain hydration. -met  - Diagnostic tests and consults completed and resulted -not met. PT/CC consults tomorrow  - Cleared for discharge from consultants' standpoint if involved in care -NA  - Safe disposition plan has been identified -not met  - Return to baseline functional status -not met  - Nurse to notify provider when observation goals have been met and patient is ready for discharge.

## 2019-08-18 NOTE — PLAN OF CARE
Observation goals - Acute Traumatic pain PRIOR TO DISCHARGE     Comments: List all goals to be met before discharge home:   - Adequate pain control on oral analgesia.-met  - Vital Signs normal or at patient baseline. -met  - Tolerating oral intake to maintain hydration. -met  - Diagnostic tests and consults completed and resulted -not met. PT/CC consults today  - Cleared for discharge from consultants' standpoint if involved in care -not met  - Safe disposition plan has been identified -not met  - Return to baseline functional status -not met  - Nurse to notify provider when observation goals have been met and patient is ready for discharge.

## 2019-08-18 NOTE — DISCHARGE SUMMARY
[facility]    Hospitalist Discharge Summary       Date of Admission:  8/17/2019  Date of Discharge:  8/18/2019  Discharging Provider: Handy Sofia MD      Discharge Diagnoses   Traumatic right rib fractures    Follow-ups Needed After Discharge   Follow-up Appointments     Follow-up and recommended labs and tests       Follow up with primary care provider, Aris Del Real, within 14 days   for hospital follow- up.  No follow up labs or test are needed.           Hospital Course   Katherin Fletcher is a 79 year old female with PMH of ALS, ischemic CMO s/p CABG, CAD, HLD, PAD, CVA, COPD, afib, who was admitted on 8/17 with fall and rib fractures for pain control/. Patient reports mechanical fall, no premonitory symptoms. Required IV pain control in ED, but controlled with PO after admission. Appears to have benefit from lidocaine patch. Noted lung crackles on admission improved at discharge so taking deeper breaths. Seen by PT, will discharge home with home PT/OT. No laxatives ordered at discharge because patient stated that she had plenty.    Consultations This Hospital Stay   PHYSICAL THERAPY ADULT IP CONSULT  CARE TRANSITION RN/SW IP CONSULT    Code Status   Special Code    Time Spent on this Encounter   I, Handy Sofia, personally saw the patient today and spent approximately 20 minutes discharging this patient.       Handy Sofia MD  [facility]  ______________________________________________________________________    Physical Exam   Vital Signs: Temp: 97.6  F (36.4  C) Temp src: Oral BP: 112/63 Pulse: 79   Resp: 14 SpO2: 98 % O2 Device: None (Room air)    Weight: 145 lbs 0 oz    Constitutional: Elderyl female in NAD  Eyes: PERRL, nonicteric, normal ocular movements  HEENT: Normocephalic, atraumatic, oral mucosa moist, soft neck collar in place  Respiratory: CTAB, no wheezing or crackles  Cardiovascular: RRR, normal S1/2, no m/r/g  GI: No organomegaly,  normoactive bowel sounds, nontender, nondistended  Skin: No rashes, light abrasion posterolateral ribs  Musculoskeletal: Normal strength in UE and LE, moves all extremities  Neurologic: A&Ox3  Psychiatric: Appropriate affect and mood       Primary Care Physician   Aris Del Real    Discharge Disposition   Discharged to home  Condition at discharge: Stable    Significant Results and Procedures   Most Recent 3 CBC's:  Recent Labs   Lab Test 08/17/19  0857 11/19/18  1436 10/10/18  1133   WBC 4.6 4.3 3.8   HGB 13.0 12.9 12.5   MCV 86 85.8 85.2    211 217     Most Recent 3 BMP's:  Recent Labs   Lab Test 08/18/19  0624 08/17/19  0857 06/04/19  1250 03/28/19  1324    140  --  140   POTASSIUM 4.0 3.9  --  3.6   CHLORIDE 103 107  --  106   CO2 26 30  --  26   BUN 12 8  --  15   CR 0.65 0.74  --  0.66   ANIONGAP 5 3  --  7   GALE 9.4 9.4  --  9.8   GLC 98 97 94 86     Most Recent 2 LFT's:  Recent Labs   Lab Test 03/28/19  1324 10/10/18  1036   AST 18 17   ALT 9 9   ALKPHOS 81 80   BILITOTAL 0.4 0.4   ,   Results for orders placed or performed during the hospital encounter of 08/17/19   Ribs XR, unilat 3 views + PA chest, right    Narrative    CHEST AND RIGHT RIBS THREE VIEWS   8/17/2019 9:40 AM    HISTORY: Right posterior rib pain after falling.    COMPARISON: 1/4/2019    FINDINGS: There is a recent-appearing fracture of the posterolateral  aspect of what appears to be the right seventh rib with near full  shaft width inferior displacement. There is also a minimally displaced  fracture of the lateral aspect of the right sixth rib. No other  definite acute fracture is seen. There are surgical changes of a  median sternotomy. No pneumothorax is demonstrated. There are  degenerative changes of the shoulders and spine. No other abnormality  is seen.      Impression    IMPRESSION: Recent-appearing right sixth and seventh rib fractures. No  pneumothorax is seen.     VERITO ZARAGOZA MD       Discharge Orders       Home Care PT Referral for Hospital Discharge      Home Care OT Referral for Hospital Discharge      Reason for your hospital stay    You were hospitalized for rib fractures     Follow-up and recommended labs and tests     Follow up with primary care provider, Aris Del Real, within 14 days for hospital follow- up.  No follow up labs or test are needed.     Activity    Your activity upon discharge: activity as tolerated     When to contact your care team    Call your primary doctor if you have any of the following: worsening cough, fevers, weakness     MD face to face encounter    Documentation of Face to Face and Certification for Home Health Services    I certify that patient: Katherin Fletcher is under my care and that I, or a nurse practitioner or physician's assistant working with me, had a face-to-face encounter that meets the physician face-to-face encounter requirements with this patient on: 8/18/2019.    This encounter with the patient was in whole, or in part, for the following medical condition, which is the primary reason for home health care: rib fractures.    I certify that, based on my findings, the following services are medically necessary home health services: Occupational Therapy and Physical Therapy.    My clinical findings support the need for the above services because: Occupational Therapy Services are needed to assess and treat cognitive ability and address ADL safety due to impairment in rib fractures, ALS. and Physical Therapy Services are needed to assess and treat the following functional impairments: rib fractures ALS.    Further, I certify that my clinical findings support that this patient is homebound (i.e. absences from home require considerable and taxing effort and are for medical reasons or Druze services or infrequently or of short duration when for other reasons) because: Leaving home is medically contraindicated for the following reason(s): Dyspnea on exertion that  makes it so they cannot leave their home for needed services without clinical deterioration...    Based on the above findings. I certify that this patient is confined to the home and needs intermittent skilled nursing care, physical therapy and/or speech therapy.  The patient is under my care, and I have initiated the establishment of the plan of care.  This patient will be followed by a physician who will periodically review the plan of care.  Physician/Provider to provide follow up care: Aris Del Real    Attending hospital physician (the Medicare certified [place] provider): Handy Sofia  Physician Signature: See electronic signature associated with these discharge orders.  Date: 8/18/2019     Special Code (specify)    No shocks, other CPR and intubation okay     Diet    Follow this diet upon discharge: Orders Placed This Encounter      Regular Diet Adult     Discharge Medications   Current Discharge Medication List      START taking these medications    Details   Lidocaine (LIDOCARE) 4 % Patch Place 1 patch onto the skin every 24 hours To prevent lidocaine toxicity, patient should be patch free for 12 hrs daily.  Qty: 5 patch, Refills: 0    Associated Diagnoses: Closed fracture of multiple ribs of right side, initial encounter      oxyCODONE-acetaminophen (PERCOCET) 5-325 MG tablet Take 1 tablet by mouth every 6 hours as needed for moderate to severe pain  Qty: 15 tablet, Refills: 0    Associated Diagnoses: Closed fracture of multiple ribs of right side, initial encounter         CONTINUE these medications which have NOT CHANGED    Details   Alfalfa 250 MG TABS Take 250 mg by mouth every morning       apixaban ANTICOAGULANT (ELIQUIS) 5 MG tablet Take 1 tablet (5 mg) by mouth 2 times daily  Qty: 180 tablet, Refills: 3    Associated Diagnoses: Cerebrovascular accident (CVA), unspecified mechanism (H)      calcium carbonate-vitamin D (CALCIUM + D) 600-200 MG-UNIT TABS Take 1 tablet by mouth At Bedtime      Associated Diagnoses: Preventative health care      !! carbidopa-levodopa (SINEMET)  MG tablet Take 1 tablet by mouth 3 times daily      !! carbidopa-levodopa (SINEMET)  MG tablet Take 2 tablets by mouth At Bedtime      carvedilol (COREG) 6.25 MG tablet Take 1 tablet (6.25 mg) by mouth 2 times daily (with meals)  Qty: 180 tablet, Refills: 3    Associated Diagnoses: Benign essential hypertension      clopidogrel (PLAVIX) 75 MG tablet Take 1 tablet (75 mg) by mouth daily  Qty: 90 tablet, Refills: 3      glycopyrrolate (ROBINUL) 1 MG tablet Take 1 tablet (1 mg) by mouth 3 times daily  Qty: 270 tablet, Refills: 1    Associated Diagnoses: ALS (amyotrophic lateral sclerosis) (H); Sialorrhea      guaiFENesin (MUCINEX) 600 MG 12 hr tablet Take 1 tablet (600 mg) by mouth 2 times daily  Qty: 60 tablet, Refills: 1    Associated Diagnoses: ALS (amyotrophic lateral sclerosis) (H); Increased oropharyngeal secretions      isosorbide mononitrate (IMDUR) 30 MG 24 hr tablet Take 1 tablet (30 mg) by mouth daily  Qty: 90 tablet, Refills: 3      LEVOXYL 112 MCG tablet TAKE 1 TABLET DAILY  Qty: 90 tablet, Refills: 1    Associated Diagnoses: Encounter for medication refill      Multiple Vitamins-Minerals (DAILY MULTI) TABS Take 1 tablet by mouth every morning       nitroGLYcerin (NITROSTAT) 0.4 MG sublingual tablet DISSOLVE ONE TABLET UNDER THE TONGUE EVERY 5 MINUTES AS NEEDED FOR CHEST PAIN.  DO NOT EXCEED A TOTAL OF 3 DOSES IN 15 MINUTES  Qty: 25 tablet, Refills: 3      omeprazole (PRILOSEC) 20 MG DR capsule Take 20 mg by mouth 2 times daily 30-60 minutes before meal      rosuvastatin (CRESTOR) 20 MG tablet Take 1 tablet (20 mg) by mouth daily  Qty: 90 tablet, Refills: 3    Associated Diagnoses: Mixed hyperlipidemia      tiZANidine (ZANAFLEX) 2 MG tablet Take 3 tablets (6 mg) by mouth 3 times daily  Qty: 270 tablet, Refills: 2    Associated Diagnoses: Spasticity; ALS (amyotrophic lateral sclerosis) (H)       !! - Potential  duplicate medications found. Please discuss with provider.        Allergies   Allergies   Allergen Reactions     Ace Inhibitors Cough     Baclofen Other (See Comments)     Constipation , tiredness     Penicillins      10/7/14 Hives per patient -- many many years ago       Nickel Swelling and Rash     Other reaction(s): Unknown

## 2019-08-18 NOTE — PLAN OF CARE
Discharge Planner PT     PT: Orders received, eval completed, tx initiated.  Pt lives in a multi-floor home with her spouse. Ramp to enter, chair lift within home. At baseline ambulates household distances with her 4WW, electric w/c in community. Reports she could manage electric w/c in home except for in bathrooms.    Patient plan for discharge: Home, agreeable to home cares  Current status: Orders received, chart reviewed, eval attempted. Pt admitted on OBS following a fall from bed resulting with rib fxs. Up in chair at time of eval. Complete sit<>stand to 4WW SBA, heavy momentum use. Ambulates 20' within room with 4WW SBA, minimal pain complaint. Bed mobility deferred, states she plans to sleep in her lift chair at home. Discussed home vs. TCU, pt has strong preference for home. States able to use electric chair for mobility except for bathroom and performing functional bathroom gait SBA and sit<>stand SBA at this time. States spouse able to supervise and provide min physical assist as needed. Will use ramp to enter home and stay in lift chair.  Barriers to return to prior living situation: None if spouse able to supervision transfers/mobility, assist with obtaining meals, dressing  Recommendations for discharge: Home with HH PT, home cares for bathing  Rationale for recommendations: Pt SBA with transfers from chair and short mobility with 4WW. Plans to use electric w/c in home at this time. Will required HH PT to progress strength and mobility, home nursing cares for bathing. Spouse not present at this time, will need TCU is spouse not able to provide supervision/assist above. No further IP PT needs, will complete order.       Entered by: Shawn York 08/18/2019 11:34 AM

## 2019-08-18 NOTE — PLAN OF CARE
Patient has been discharged August 18, 2019 2:59 PM. Discharge instructions and education reviewed, medication schedule and follow up appts discussed. Pt had no questions. All belongings sent with pt. Discharge medications were filled and sent with pt. Pt educated on medications.

## 2019-08-20 LAB — INTERPRETATION ECG - MUSE: NORMAL

## 2019-08-20 PROCEDURE — G0180 MD CERTIFICATION HHA PATIENT: HCPCS | Performed by: FAMILY MEDICINE

## 2019-08-21 DIAGNOSIS — G12.21 ALS (AMYOTROPHIC LATERAL SCLEROSIS) (H): Primary | ICD-10-CM

## 2019-09-05 ENCOUNTER — OFFICE VISIT (OUTPATIENT)
Dept: FAMILY MEDICINE | Facility: CLINIC | Age: 79
End: 2019-09-05

## 2019-09-05 VITALS — DIASTOLIC BLOOD PRESSURE: 62 MMHG | OXYGEN SATURATION: 97 % | HEART RATE: 76 BPM | SYSTOLIC BLOOD PRESSURE: 108 MMHG

## 2019-09-05 DIAGNOSIS — S22.41XA CLOSED FRACTURE OF MULTIPLE RIBS OF RIGHT SIDE, INITIAL ENCOUNTER: ICD-10-CM

## 2019-09-05 DIAGNOSIS — G11.8 SPINOCEREBELLAR ATAXIA (H): Primary | ICD-10-CM

## 2019-09-05 PROBLEM — L97.909 ARTERIAL LEG ULCER (H): Status: RESOLVED | Noted: 2017-04-13 | Resolved: 2019-09-05

## 2019-09-05 PROCEDURE — 99214 OFFICE O/P EST MOD 30 MIN: CPT | Performed by: FAMILY MEDICINE

## 2019-09-05 ASSESSMENT — PAIN SCALES - GENERAL: PAINLEVEL: NO PAIN (1)

## 2019-09-05 NOTE — PROGRESS NOTES
Patient here for follow up from hospital. Fell at home and broke 2 ribs on table by bedside. Symptoms improving.  Aris Del Real MD on 9/5/2019 at 12:47 PM    Problem(s) Oriented visit        SUBJECTIVE:                                                    Katherin Fletcher is a 79 year old female who presents to clinic today for the following health issues :        Hospital Follow-up Visit:    Hospital/Nursing Home/IP Rehab Facility: [facility]       Date of Admission:  8/17/2019  Date of Discharge:  8/18/2019  Reason(s) for Admission: broken ribs            Problems taking medications regularly:  None       Medication changes since discharge: None       Problems adhering to non-medication therapy:  None    Summary of hospitalization:  [facility] discharge summary reviewed  Diagnostic Tests/Treatments reviewed.  Follow up needed: none  Other Healthcare Providers Involved in Patient s Care:         Physical Therapy  Update since discharge: improved. Pain is now only minimal.    Post Discharge Medication Reconciliation: discharge medications reconciled and changed, per note/orders (see AVS).  Plan of care communicated with patient     Coding guidelines for this visit:  Type of Medical   Decision Making Face-to-Face Visit       within 7 Days of discharge Face-to-Face Visit        within 14 days of discharge   Moderate Complexity [id number]   High Complexity [id number]                  There are no diagnoses linked to this encounter.       Problem list, Medication list, Allergies, and Medical/Social/Surgical histories reviewed in EPIC and updated as appropriate.   Additional history: as documented    ROS:  General and Resp. completed and negative except as noted above    Histories:   Patient Active Problem List   Diagnosis     Long term current use of anticoagulant therapy     CAD (coronary artery disease)     Hyperlipidemia     Health Care Home     MRSA (methicillin resistant  Staphylococcus aureus)     Dysarthria     Disturbances of sensation of smell and taste     Neurologic gait disorder     Cerebellar disease     Essential hypertension     Ischemic cardiomyopathy     Peripheral polyneuropathy     PAD (peripheral artery disease) (H)     Encounter for long-term (current) use of medications     Stroke (H)     Slurred speech     Neurodegenerative disorder     History of TIA (transient ischemic attack) and stroke     Falls frequently     Hypothyroidism, unspecified type     Ataxia     Amyotrophic lateral sclerosis (H)     Trigger finger, acquired     Spinocerebellar ataxia (H)     Hypertensive heart disease with heart failure (H)     Chronic obstructive pulmonary disease, unspecified COPD type (H)     Atrial fibrillation, unspecified type (H)     Rib fractures     Past Surgical History:   Procedure Laterality Date     ARTHROSCOPY SHOULDER RT/LT       BUNIONECTOMY RT/LT       C CABG, ARTERY-VEIN, THREE  1/2009     C CARDIAC SURG PROCEDURE UNLIST       C HAND/FINGER SURGERY UNLISTED       C MRA UPPER EXTREMITY WO&W CONT       C SHOULDER SURG PROC UNLISTED       C STOMACH SURGERY PROCEDURE UNLISTED       CARDIAC SURGERY  1992    CABG,stents x2     CARPAL TUNNEL RELEASE RT/LT       EXCISE GANGLION HAND Right 11/29/2018    Procedure: EXCISE RIGHT VOLAR CARPAL MASS AND RIGHT INDEX TRIGGER FINGER RELEASE;  Surgeon: Minh Goldstein MD;  Location:  OR     HC VASCULAR SURGERY PROCEDURE UNLIST       HYSTERECTOMY, DAMIÁN  32 yo     OPEN REDUCTION INTERNAL FIXATION ANKLE Left 2015    Left Ankle     RELEASE TRIGGER FINGER       RELEASE TRIGGER FINGER Right 11/29/2018    Procedure: RIGHT INDEX TRIGGER FINGER RELEASE;  Surgeon: Minh Goldstein MD;  Location:  OR     REPAIR HAMMER TOE Right 12/19/2017    Procedure: REPAIR HAMMER TOE;  Right 2nd and 3rd toe pinning;  Surgeon: Fernando Wiggins MD;  Location:  OR     STENT  2006     STENT(aka CARDIAC)  2008     VASCULAR SURGERY         Social History      Tobacco Use     Smoking status: Former Smoker     Packs/day: 0.50     Years: 22.00     Pack years: 11.00     Types: Cigarettes     Last attempt to quit: 4/15/1992     Years since quittin.4     Smokeless tobacco: Never Used   Substance Use Topics     Alcohol use: Yes     Alcohol/week: 0.6 oz     Types: 1 Standard drinks or equivalent per week     Comment: MAYBE ONCE A YEAR     Family History   Problem Relation Age of Onset     Unknown/Adopted Mother      Unknown/Adopted Father      Other - See Comments Son         on warfarin for one year after afganastan now off and doing well     Other - See Comments Daughter         celiac, brittle bone, arthritis, Raynauds           OBJECTIVE:                                                    /62 (BP Location: Right arm, Patient Position: Sitting, Cuff Size: Adult Regular)   Pulse 76   SpO2 97%   There is no height or weight on file to calculate BMI.   APPEARANCE: = alert, no distress and wearing a neck brace  Conj/Eyelids = noninjected and lids and lashes are without inflammation  PERRLA/Irises = Pupils Round Reactive to Light and Irisis without inflammation  Neck = No anterior or posterior adenopathy appreciated.  Thyroid = Not enlarged and no masses felt  Resp effort = Calm regular breathing  Breath Sounds = Good air movement with no rales or rhonchi in any lung fields  Heart Rate, Rhythm, & sounds (no Murm)  = Regular rate and rhythm with no S3, S4, or murmur appreciated.  Carotid Art's = Pulses full and equal and no bruits appreciated  Abdomen = Soft, nontender, no masses, & bowel sounds in all quadrants  Liver/Spleen = Normal span and no splenomegaly noted  Digits and Nails = FROM in all finger joints, no nail dystrophy  Ext (edema) = No pretibial edema noted or elsewhere  Musculsktl: sore on the right side to palpitation but otherwise is ok  SKIN = absent significant rashes or lesions   Recent/Remote Memory = Alert and Oriented x 3  Mood/Affect =  Cooperative and interested     ASSESSMENT/PLAN:                                                        Katherin was seen today for hospital f/u.    Diagnoses and all orders for this visit:    Spinocerebellar ataxia (H)    Closed fracture of multiple ribs of right side, initial encounter    >25 min spent with patient, greater than 50% spent on discussion/education/planning, etc. About The primary encounter diagnosis was Spinocerebellar ataxia (H). A diagnosis of Closed fracture of multiple ribs of right side, initial encounter was also pertinent to this visit.          The following health maintenance items are reviewed in Epic and correct as of today:  Health Maintenance   Topic Date Due     COPD ACTION PLAN  1940     ZOSTER IMMUNIZATION (1 of 2) 05/27/1990     INFLUENZA VACCINE (1) 09/01/2019     SPIROMETRY  06/27/2020     MEDICARE ANNUAL WELLNESS VISIT  08/01/2020     FALL RISK ASSESSMENT  08/01/2020     ADVANCE CARE PLANNING  07/10/2023     DTAP/TDAP/TD IMMUNIZATION (4 - Td) 06/04/2026     DEXA  Completed     PHQ-2  Completed     PNEUMOCOCCAL IMMUNIZATION 65+ LOW/MEDIUM RISK  Completed     IPV IMMUNIZATION  Aged Out     MENINGITIS IMMUNIZATION  Aged Out       Aris Del Real MD  [facility]  Family Practice  [place]  [phone]    For any issues my office # is [phone]

## 2019-09-12 ENCOUNTER — MEDICAL CORRESPONDENCE (OUTPATIENT)
Dept: FAMILY MEDICINE | Facility: CLINIC | Age: 79
End: 2019-09-12

## 2019-09-16 ENCOUNTER — OFFICE VISIT (OUTPATIENT)
Dept: ORTHOPEDICS | Facility: CLINIC | Age: 79
End: 2019-09-16
Payer: MEDICARE

## 2019-09-16 DIAGNOSIS — I73.9 PAD (PERIPHERAL ARTERY DISEASE) (H): Primary | ICD-10-CM

## 2019-09-16 DIAGNOSIS — G62.9 PERIPHERAL POLYNEUROPATHY: ICD-10-CM

## 2019-09-16 DIAGNOSIS — L60.2 LONG TOENAIL: ICD-10-CM

## 2019-09-16 NOTE — NURSING NOTE
Reason For Visit:   Chief Complaint   Patient presents with     Follow Up     9 week follow up. Toe nail trimming. Pain, right great toe.        Pain Assessment  Patient Currently in Pain: Yes  Primary Pain Location: (right great toe. )        Allergies   Allergen Reactions     Ace Inhibitors Cough     Baclofen Other (See Comments)     Constipation , tiredness     Penicillins      10/7/14 Hives per patient -- many many years ago       Nickel Swelling and Rash     Other reaction(s): Unknown           Precious Tovar LPN

## 2019-09-16 NOTE — LETTER
9/16/2019       RE: Katherin Fletcher  [address]  [facility] [address]     Dear Colleague,    Thank you for referring your patient, Katherin Fletcher, to the [facility] at [facility]. Please see a copy of my visit note below.    Chief Complaint   Patient presents with     Follow Up     9 week follow up. Toe nail trimming. Pain, right great toe.         Allergies   Allergen Reactions     Ace Inhibitors Cough     Baclofen Other (See Comments)     Constipation , tiredness     Penicillins      10/7/14 Hives per patient -- many many years ago       Nickel Swelling and Rash     Other reaction(s): Unknown       Subjective: Katherin is a 78 year old female who presents to the clinic today for a follow up of elongated nails. She is now using an electric scooter. Relates no pain to the feet.      Objective     DP pulses are 1/4 and PT pulses are non-palpable bilaterally. LE capillary refill time is <3 seconds. Absent pedal hair. Mild non-pitting LE edema.  Dorsally contracted digits 2-5 that are non-reducible, very limited passive ROM. Equinus present.  Gross sensation intact to bilateral LE.   LE skin color, texture and turgor are normal. Toenails are slightly elongated bilaterally. Right 4th toe is hammered and in a rigid position.      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   - Cont with comfortable shoes.   - Pt to return to clinic in 9 weeks.    Again, thank you for allowing me to participate in the care of your patient.      Sincerely,    Adrian Arroyo DPM

## 2019-09-16 NOTE — PROGRESS NOTES
Chief Complaint   Patient presents with     Follow Up     9 week follow up. Toe nail trimming. Pain, right great toe.             Allergies   Allergen Reactions     Ace Inhibitors Cough     Baclofen Other (See Comments)     Constipation , tiredness     Penicillins      10/7/14 Hives per patient -- many many years ago       Nickel Swelling and Rash     Other reaction(s): Unknown         Subjective: Katherin is a 78 year old female who presents to the clinic today for a follow up of elongated nails. She is now using an electric scooter. Relates no pain to the feet.      Objective     DP pulses are 1/4 and PT pulses are non-palpable bilaterally. LE capillary refill time is <3 seconds. Absent pedal hair. Mild non-pitting LE edema.  Dorsally contracted digits 2-5 that are non-reducible, very limited passive ROM. Equinus present.  Gross sensation intact to bilateral LE.   LE skin color, texture and turgor are normal. Toenails are slightly elongated bilaterally. Right 4th toe is hammered and in a rigid position.      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   - Cont with comfortable shoes.   - Pt to return to clinic in 9 weeks.

## 2019-09-17 DIAGNOSIS — G12.21 ALS (AMYOTROPHIC LATERAL SCLEROSIS) (H): Primary | ICD-10-CM

## 2019-09-19 ENCOUNTER — PATIENT OUTREACH (OUTPATIENT)
Dept: CARE COORDINATION | Facility: CLINIC | Age: 79
End: 2019-09-19

## 2019-09-19 ENCOUNTER — THERAPY VISIT (OUTPATIENT)
Dept: PHYSICAL THERAPY | Facility: CLINIC | Age: 79
End: 2019-09-19
Payer: MEDICARE

## 2019-09-19 ENCOUNTER — DOCUMENTATION ONLY (OUTPATIENT)
Dept: OTHER | Facility: CLINIC | Age: 79
End: 2019-09-19

## 2019-09-19 ENCOUNTER — OFFICE VISIT (OUTPATIENT)
Dept: NEUROLOGY | Facility: CLINIC | Age: 79
End: 2019-09-19
Payer: MEDICARE

## 2019-09-19 ENCOUNTER — ALLIED HEALTH/NURSE VISIT (OUTPATIENT)
Dept: NUTRITION | Facility: CLINIC | Age: 79
End: 2019-09-19

## 2019-09-19 ENCOUNTER — THERAPY VISIT (OUTPATIENT)
Dept: SPEECH THERAPY | Facility: CLINIC | Age: 79
End: 2019-09-19
Payer: MEDICARE

## 2019-09-19 VITALS — HEART RATE: 71 BPM | SYSTOLIC BLOOD PRESSURE: 119 MMHG | OXYGEN SATURATION: 100 % | DIASTOLIC BLOOD PRESSURE: 71 MMHG

## 2019-09-19 DIAGNOSIS — G12.21 ALS (AMYOTROPHIC LATERAL SCLEROSIS) (H): ICD-10-CM

## 2019-09-19 DIAGNOSIS — G12.21 AMYOTROPHIC LATERAL SCLEROSIS (H): Primary | ICD-10-CM

## 2019-09-19 DIAGNOSIS — R13.12 OROPHARYNGEAL DYSPHAGIA: Primary | ICD-10-CM

## 2019-09-19 DIAGNOSIS — G12.21 ALS (AMYOTROPHIC LATERAL SCLEROSIS) (H): Primary | ICD-10-CM

## 2019-09-19 DIAGNOSIS — Z74.09 IMPAIRED MOBILITY AND ADLS: Primary | ICD-10-CM

## 2019-09-19 DIAGNOSIS — R47.1 DYSARTHRIA: ICD-10-CM

## 2019-09-19 DIAGNOSIS — Z78.9 IMPAIRED MOBILITY AND ADLS: Primary | ICD-10-CM

## 2019-09-19 PROBLEM — G11.8 SPINOCEREBELLAR ATAXIA (H): Status: RESOLVED | Noted: 2019-08-01 | Resolved: 2019-09-19

## 2019-09-19 ASSESSMENT — REVISED AMYOTROPHIC LATERAL SCLEROSIS FUNCTIONAL RATING SCALE (ALSFRS-R)
CUTTING_FOOD_AND_HANDLING_UTENSILES: 2
BULBAR_SUBTOTAL: 6
CLIMBING_STAIRS: 1
RESPIRATORY_INSUFFICIENCY: 4
FINE_MOTOR_SUBTOTAL_SCORE: 7
DRESSING_AND_HYGEINE: INTERMITTENT ASSISTANCE OR SUBSTITUTE METHODS
RESPIRATORY_SUBTOTAL_SCORE: 9
WALKING: WALKS WITH ASSISTANCE
TURNING_IN_BED_AND_ADJUSTING_BED_CLOTHES: CAN TURN ALONE OR ADJUST SHEETS, BUT WITH GREAT DIFFICULTY
SIX_ITEM_SUBTOTAL: 14
HANDWRITING: 3
HANDWRITING: SLOW OR SLOPPY: ALL WORDS ARE LEGIBLE
SPEECH: 2
GROSS_MOTOR_SUBTOTAL_SCORE: 5
TURNING_IN_BED_AND_ADJUSTING_BED_CLOTHES: 2
SWALLOWING: 4
ORTHOPENA: 2
SALIVATION: 0
DYSPNEA: OCCURS WHEN WALKING
SWALLOWING: NORMAL EATING HABITS
DYSPNEA: 3
ORTHOPENA: NEEDS EXTRA PILLOWS IN ORDER TO SLEEP (MORE THAN TWO)
SALIVATION: MARKED DROOLING; REQUIRES CONSTANT TISSUE OR HANDKERCHIEF
ALSFRS_TOTAL_SCORE: 27
WALKING: 2
CLIMBING_STAIRS: NEEDS ASSISTANCE
SPEECH: INTELLIGIBLE WITH REPEATING
DRESSING_AND_HYGEINE: 2

## 2019-09-19 ASSESSMENT — PAIN SCALES - GENERAL: PAINLEVEL: NO PAIN (0)

## 2019-09-19 NOTE — PATIENT INSTRUCTIONS
Please increase your saliva medication (Robinul/glycopyrrolate) to 1 tablet three times a day. If not enough to control the saliva, increase by 1 pill every week until you reach 2 pills three times a day.    Consider getting Botox injections in your legs for the muscle spasms. This may make your foot weakness a bit worse; however, Botox injections do not cause feeling of fatigue or brain fog or confusion, and I personally prefer this option over increasing the other meds you take for the muscle spasms (cannabis, tizanidine, Sinemet, etc). I will not order the Botox injections until I hear from you.      We will refer you to Orthotics to see if they can adjust your head collar to make it work more efficiently.     I am very worried about your living situation. With all those falls and injuries, and lack of help at home, this is a recipe for problems. I would consider re-locating to an assisted living facility if possible.

## 2019-09-19 NOTE — PROGRESS NOTES
CLINICAL NUTRITION SERVICES - REASSESSMENT NOTE   EVALUATION OF PREVIOUS PLAN OF CARE:   Referring Physician: Neisha  Current diet: general, increased intake of softer foods  Current appetite/intake: fair/good  PEG Tube: No    Monitoring from previous assessment:   -Food intake - Katherin reports that she has been focused on eating softer foods as eating remains difficult with her upper dentures not fitting well.  She no longer eats meat as she cannot chew it well.    B - muffin with coffee  L - cheerios with Boost high protein OR cottage cheese with peaches   D - tuna salad, fish or eggs, pasta, cooked vegetables  Snacks - pudding, yogurt  -Liquid meal replacement or supplement - 1 Boost HP daily  -Weight trends - not weighed today; plans to weight using leander per patient report  Wt Readings from Last 8 Encounters:   08/17/19 65.8 kg (145 lb)   08/01/19 66.2 kg (146 lb)   06/27/19 67.6 kg (149 lb)   06/12/19 68 kg (150 lb)   04/21/19 69.9 kg (154 lb)   03/28/19 70.3 kg (155 lb)   02/28/19 70.3 kg (155 lb)   01/09/19 70.3 kg (155 lb)     Previous Goals:   1.  Aim for 5-6 small frequent meals - goal met/ongoing  2.  Aim for 1700kcal and 67g protein/day - goal not met  3. Weight maintenance - goal not met  Evaluation: Not met   Previous Nutrition Diagnosis:   Inadequate oral intake related to decreased appetite, pain with chewing  as evidenced by 5 lb wt loss x past 3 months.   Evaluation: Improving and No change   CURRENT NUTRITION DIAGNOSIS   Inadequate oral intake related to decreased appetite, pain with chewing  as evidenced by pt report and weight loss.   INTERVENTIONS   -General/healthful diet - reviewed calorie and protein needs for maintenance.  Discussed importance of weight maintenance with ALS.  Advised Katherin to aim for at least 1700kcal, 75g protein/day.  Reviewed ideas for soft foods and meal choices.   -Medical Food Supplement - discussed ONS alternatives with higher calories.  Suggested she try  Boost Plus, Ensure Plus or Ensure Enlive.  These products have an additional 110 rafal per same volume.  Suggested she increase from 1/day to taking 2/day.   Goals  1.  Aim for 5-6 small frequent meals, start taking 2 ONS/day  2.  Aim for 1700kcal and 67g protein/day  3. Weight maintenance        Follow-Up Plans: Pt has RD contact information for questions.    Pt encouraged to follow up with RD at next clinic visit in 3-6 months.     MONITORING AND EVALUATION:  -Food intake  -Liquid meal replacement or supplement  -Weight trends     Jaja Mario, ALEXUSN, LD

## 2019-09-19 NOTE — LETTER
RE: Katherin Fletcher  [address]  [facility] [address]       Service Date: 2019      Aris Del Real MD   [facility]    [address] [place] [address]      RE: Katherin Fletcher   MRN: [id number]   : 1940      Dear Dr. Del Real:      I had the pleasure to see Katherin in followup at the [facility].  I last met her on 2019.  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  She was previously given a diagnosis of spinocerebellar ataxia which is not correct and I erased it from the medical record.     In the interim since her last visit, she had at least 3 falls, one of which was particularly serious and led to a hospital admission due to several fractured ribs on 2019.  When I saw her in , I had voiced concerns about her living situation.  She has a 93-year-old  with dementia who cannot help her with transfers or activities of daily living.  She already needed a power wheelchair at that time and I thought that relocation to an assisted living facility would be a good idea.  Following her hospital discharge, unfortunately she was sent back home with PT, OT and the home physical therapy services have been completed now.  They did some modifications so that she can reach her walker easier from her bed. She does not have to go to the upper floor of the house at this point.     Other symptoms that remain problematic are her muscle spasms in the lower extremities, especially in the lower calves.  Control of this has been especially difficult.  She is on tizanidine 6 mg t.i.d. and a THC/CBD mix through the [place] [employer], the dose of which was increased about a month or two ago.  She is also on Sinemet 25/100 mg 1 tablet 3 times a day and then 2 in the evening; however, despite this  combination and some oxycodone at night her symptoms remains poorly controlled and wakes her up several times.  We had previously discussed botulinum toxin.  The issue is that she still ambulates with caution, and Botox could aggravate her weakness.  That is why it was not pursued so far.  Secretions remain a problem, both thick and thin, and she frequently finds herself holding a handkerchief.  She takes Robinul 1 mg twice a day. She also has head drop that is gradually worsening and she uses a collar.  She has not found a satisfactory solution with that either.  Soft collar does not provide good head support and rigid collar is difficult to tolerate because it creates pain and tension at the jaw.  Her neuropathy in the feet was not much of an issue during the current visit.  Her speech is slightly worse, although still intelligible.      MEDICATIONS:  Reviewed and are as per Epic record.      VITAL SIGNS:  She was not weighed.  Blood pressure 119/71.  Pulse 71 and regular.  O2 sat 100% on room air.  She endorses no pain now. I did not do a repeat neurological exam.      IMPRESSION:  In summary, Ms. Fletcher has motor neuron disease with predominantly upper motor neuron signs but a few lower, qualifying for a slowly progressive ALS diagnosis.  She did not want to take disease-modifying treatments such as riluzole or edaravone and her priority is symptomatic management.  However, the latter has been especially challenging.  We discussed the followin. Her living situation is, in my opinion, untenable.  Her  cannot help her and she does not have home care set up.  Even if we set up home care, I do not think this is going to be enough to minimize the risk of falls or injuries because home care cannot be available 24 hours a day and her risk of falls is continuous.  I am not even sure about her safety using a walker.  In this scenario, I would recommend relocation to an assisted living facility which of  course is easier said than done, but I will have her discuss this with our nurse and  today. I practically do not see any better solution here.      2. Regarding her muscle spasms, we have 2 options, either to add further oral medications like baclofen or dantrolene or to refer her for botulinum toxin injections.  Both have potential disadvantages, but I would probably favor the latter solution.  She already is experiencing fatigue and lethargy and I do not want to add further CNS acting medications to treat her spasticity.  Botulinum toxin can be injected in her lower legs focally since the spasticity problem is not diffuse.  However, I did warn her that injecting botulinum toxin in her legs can make her foot weak and cause falls if she continues to ambulate. She needs to think about this situation carefully and let me know what she wants to do next.  The problem of muscle spasms remains serious and impairs her quality of life and her sleep.      3. For her sialorrhea, I asked her to increase her Robinul to 1 mg t.i.d. and then she can go up by 1 pill every week until she reaches 2 mg t.i.d. as needed.      4. I will refer her to Orthotics to re-evaluate her head collar.  She will be seen by our physical therapist today.      Follow up in 3 months or sooner if needed.TT spent for patient care 25 minutes; more than half was counseling.    Sincerely,       Travon rDiver MD

## 2019-09-19 NOTE — NURSING NOTE
Chief Complaint   Patient presents with     RECHECK     [facility] RETURN - FOLLOW UP       Niraj Pond, EMT

## 2019-09-19 NOTE — PROGRESS NOTES
Outpatient Speech Language Pathology Neurology Clinic Evaluation  Katherin Fletcher YOB: 1940 [id number]    Visit Date: 9/19/19    Age: 79 year old    Medical Diagnosis: Amyotrophic lateral sclerosis (ALS)    Date of Diagnosis: 2/28/19    Referring MD: Dr Driver     present: No    PMH: Refer to Medical Chart    Fall Risk: Refer to physician report.    Others at Clinic Visit: Children    Living Situation: With others    Patient Concerns/Goals: Increased difficulty chewing hard solids    Observations: Patient is seen very briefly due to leg pain/spasms and her desire to end visit    Current Mode of Nutrition: Oral diet of regular solids and thin liquids although avoids meats    Weight: not weighed today, last documented 145 lbs    Cardio-Respiratory Status: Forced vital capacity: 77% at last visit, not assessed today    Functional Rating Scale (ALS-FRS): 27/48      Oral Motor/Swallowing  Oral Motor Function: not formally assessed this visit due to time constraints    Swallow Function:  Thin Liquids - multiple large consecutive sips tolerated without overt dysphagia. Unable to palpate laryngeal elevation due to cervical collar but timing of swallow initiation appear intact  Regular Solids - she declines trials today due to pain and desire to end visit but reports difficulty chewing large pieces of steak or chicken. She denies dysphagia other than mastication. Educated regarding diet modification to ease chewing (softer, moist foods, meats cut in small pieces and put in gravy etc.)    Dysphagia Diagnosis: Mild dysphagia with solids per pt report      Speech Intelligibility/Functional Communication   Methods of communication: Verbal    Dysarthria: Mild-moderate    Speech:   Deficits in phonation- Strained-strangled quality (spastic) and Loudness (soft)  Deficits in articulation- Decreased precision of articulation and Slow effortful rate  Intelligibility- 100% to this skilled  listener in quiet environment    Speech Intelligibility/Communication:  Communicates functionally    Augmentative and Alternative Communication (AAC):   Has been educated in the past regarding voice banking and AAC options but she has not used      Clinical Impression/Plan of Care  Treatment Diagnosis: Oropharyngeal Dysphagia     Recommendations:  Cut meats into small pieces and moisten with gravy/sauce  Encourage soft moist solids, okay to continue thin liquids  Continue use of safe swallow strategies (slow rate, small amounts, mindful swallowing, etc)  Continue to monitor swallow function for further change/decline    Plan of Care:  Evaluation only.    Goals: Based on today's evaluation session patient and/or caregiver will have understanding of current communication and/or swallowing status and recommendations for management.    Educational Assessment:  Learners- Patient and Children  Barriers to Learning- No barriers.    Education provided/response:   Swallowing  Diet  Verbalized understanding     Treatment provided this date:   None this date    Response to recommendations/treatment: verbalized understanding, asked appropriate questions    Goal attainment: All goals met    Risks and benefits of evaluation/treatment have been explained.  Patient, family and/or caregiver are in agreement with Plan of Care.    Timed Code Treatment Minutes: 0 minutes timed codes    Total Treatment Time (sum of timed and untimed services): 18 minutes    Certification:  Onset date: 9/19/19  Start of care date: 9/19/19  Certification date from 9/19/19 to 9/19/19    I CERTIFY THE NEED FOR THESE SERVICES FURNISHED UNDER        THIS PLAN OF TREATMENT AND WHILE UNDER MY CARE     (Physician attestation of this document indicates review and certification of the therapy plan).

## 2019-09-19 NOTE — PROGRESS NOTES
"    OUTPATIENT PHYSICAL THERAPY CLINIC NOTE  Katherin Fletcher  YOB: 1940  [id number]    Type of visit:         Evaluation     Date of service: 9/19/2019    Referring provider: Dr. Driver     present: No    Others present at visit:  Child(imani)- daughter, Ruchi    Medical diagnosis:   Amyotrophic lateral sclerosis (ALS)    Date of diagnosis: 2/28/19    Pertinent history of current problem (include personal factors and/or comorbidities that impact the plan of care): Pt with onset of symptoms 4-5 years ago, including spasms in the legs and falls, neck weakness. Pt dx with C5-6 severe spinal stenosis, neuropathy. Hx includes CAD s/p CABG in 1992 and 2009, HTN, HLD, ischemic cardiomyopathy, MI 11/1996, TIA 5/2018 with R facial N/T, PAD, GERD, RLS, hypothyroidism, and recent R volar carpal ganglion cyst and trigger finger release 11/29/18. *Fall with rib fractures 8/17/19.    Cardio-respiratory status:  Forced vital capacity: NT this date d/t pt late arrival (77% of predicted 6/27/19)    Height/Weight: 5'4\" / 145 lb    Living environment:  [place], [place]- has main level full bathroom  [facility], [facility] is 3 years (spouse is )  *Has [employer] system     Living environment barriers:  Ramp(s) present     Current assistance/living environment:  Lives with spouseCaio (93)     Current mobility equipment:  4 wheeled walker with seat- primary device inside home  Manual wheelchair  Scooter  Lift chair recliner- main level  [employer]  Stair glide to upper level and to basement ( does laundry in basement)  Power wheelchair- uses outside home primarily, able to use inside  Bedrail on one side, step to access bed on other side of bed     Current ADL equipment:  Orthotics: soft collar + Aspen Hernandez collar  Raised toilet seat upstairs (no handles), grab bar nearby + countertop  Main level tall toilet, uses vanity and windowsill for support  Main level " tub/shower combo- pt uses this one, has grab bars  Upstairs- Walk-in shower with lip, grab bars  Shower chair      Technology used: Phone    Patient concerns/goals: 3 recent falls. Rib fx d/t striking bedside table, fell getting up from bedside to 4 wheeled walker. Stair glide then 2 stairs with B rails to upper level bedroom.     Evaluation   Interview completed.   Pain assessment:  Pain denied at rest. Notes ribs still sore with reaching tasks at certain angles.    Range of motion: HS and gastroc stiffness, pt is stretching calves in stance at sink   Manual muscle testing: Hip flexion 4/5 B, hip abd 4+/5 B, hip add 4+/5 B, knee ext 5/5 B, knee flex 5/5 B, ankle DF (slow) 5/5 B   Gait: Pt amb with 4ww with slow pace, appears effortful, shuffling feet with limited clearance B, short step length with prolonged double stance time. Pt wearing soft collar but not supportive enough to provide forward gaze, head is dropped.   Cognition: Intact     *ALS FRS-R (ALS Functional Rating Scale-Revised) completed today. Please see separate note. Total: 27    Fall Risk Screen:   Has the patient fallen 2 or more times in the last year? Yes      Has the patient fallen and had an injury in the past year? Yes, rib fractures 8/17/19       25ft walk: 27.19 sec, 32 steps, 4ww, soft collar    Is the patient a fall risk? Yes, department fall risk interventions implemented     Impairments:  Fatigue  Coordination  Balance  Pain  Spasticity     Treatment diagnosis:  Impaired mobility  Impaired activities of daily living    Clinical Presentation: Evolving/Changing  Clinical Presentation Rationale: Progressive nature of disease with transfer and gait instability, high falls risk and significant fatigue with exertion of ADLs.  Clinical Decision Making (Complexity): Moderate complexity     Recommendations/Plan of care:  1 session evaluation & treatment.  Recommend referral to Orthotics- for better fitting cervical collar, to trial Headmaster or  adjust current Aspen collar if able.  Equipment recommended: Bedrailing- requested from [facility].     Goals:   Target date: 9/19/19  Patient, family and/or caregiver will verbalize understanding of evaluation results and implications for functional performance.  Patient, family and/or caregiver will verbalize/demonstrate understanding of compensatory methods /equipment to enhance functional independence and safety.    Educational assessment/barriers to learning:   Emotional     Treatment provided this date:   Self-care/Home management, 25 minutes   -Pt with recent fall getting out of bed. There is a bedrail on the other side but not on her side of bed, open to having bedrail requested from [facility]. Hopefully this will help stability with sit>stand from EOB. Bed height is good per report.  -Encouraged increasing use of Power wheelchair inside the home, to help with reducing fatigue and reducing falls risk d/t instability. Pt daughter voices she's encouraging use of PWC in the kitchen where she's standing for longer times and gets very tired.   -Pt has had poor comfort in Aspen collar dt pressure to her jaw and pain that radiates up side of her face (from TMJ?), encouraged considering trial of Headmaster collar as alternative with good support. Encouraged she also bring her Aspen collar in case they can adjust for comfort. Pt agreeable to orthotic consult.  -We discussed at some length the need to consider more supportive living environment, as she is struggling more with balance/falls and LE weakness and fatigue. Pt tearful and notes its difficult to lose her independence. Encouraged she discuss options with SW today and start to discuss future plans. Pt daughter voices interest in having pt [place]. Please see SW note as well.   -Pt needed to stand to relieve LE muscle spasms several times throughout her visit today. Pt spoke with MD about medication management options for muscle  spasms/soreness. She does do stretching. Spasms disrupt her sleep as she is most relieved by getting up and moving around with walker.     Response to treatment/recommendations: Pt/daughter verbalize understanding    Goal attainment:  All goals met     Risks and benefits of evaluation/treatment have been explained.  Patient, family and/or caregiver are in agreement with Plan of Care.     Timed Code Treatment Minutes: 25  Total Treatment Time (sum of timed and untimed services): 36    Signature: Eliza Saha PT   Date: 9/19/2019    Certification:  Onset date: 9/19/2019  Start of care date: 9/19/2019  Certification date from 9/19/2019 to 9/19/2019    I CERTIFY THE NEED FOR THESE SERVICES FURNISHED UNDER        THIS PLAN OF TREATMENT AND WHILE UNDER MY CARE     (Physician attestation of this document indicates review and certification of the therapy plan).

## 2019-09-20 NOTE — PROGRESS NOTES
Social Work -ALS Clinic Progress Note  [facility]    Data/Intervention:    Patient Name:  Katherin Fletcher  /Age:  1940 (79 year old)    Visit Type: in person    Collaborated With:    -Katherin and dtr Ashley Choi  -Dr Polk and members of the ALS interdisciplinary team    Updates/Concerns:   Pt has had falls, one resulting in rib fractures. Pt continues to live with her spouse of 93 and has help from her dtr. She reports she is on a 3 year waiting list at the [facility]. She is doing most of her own ADLs now with help from her dtr and spouse. Groceries are delivered. When they go out, they use [employer].  Pt is not using NIV. She had to stand a few times during our visit to relieve muscle spasms in her legs that were causing discomfort.     Intervention/Education/Resources Provided:  Reviewed her current situation and discussed need to plan ahead for care. She stated she hasn't really thought about needing more help and how she would get it. Her dtr Ashley however is thinking about it and trying to gently help her mom do some planning. Pt cannot live with any of her children for various reasons. Reviewed their financial situation. She and and her husb have kept their finances separate and he is very private. He is 100% disabled  and she knows he doesn't have any savings. She has some savings and a low income. They own their home/pay a mortgage.   Based upon his income, they could pay for assisted living facility. The issue is whether he would move and in the past he has indicated he planned to die in his home. [place] have [place] which I recommended to discuss what they may be eligible for in terms of care/services at home. Provided the phone # and asked Ashley if she could help her Mom make that appt for an in-home assessment.  Also discussed using [employer] for help with locating an appropriate living situation such as assisted living for  no cost. Ashley would [place] if [place].   Lastly, she indicated that she was DNR/DNI and I reviewed her record and indicated that is not the case. Reviewed the POLST and discussed how it's used and she wanted to complete that today. Reviewed the options and the document was completed and signed. She has the original and a copy was sent for scanning.   She also would like to do a new health care directive removing her spouse and making her son her proxy decision maker. Provided the document and her dtr will help her with that and will bring to her next clinic appt to notarize if that is easiest.     Assessment/Plan:  Provided gentle but clear guidance on importance of planning for the future so she can have some control over her situation and not need to make decisions in a crisis. Difficult situation with spouse who has some memory loss but also not engaged in any planning for Katherin's future. There is also a clear lack of access to his financial situation or monies. Pushing ahead with the [place] may help with some clarity if he will cooperate. Engaging Pt's 2 children in the process is vital.     Provided patient/family with contact information and encouraged them to contact me between clinic visits as needed.     Andria Thibodeaux, VLADISLAV, [facility]    [facility]  [phone]

## 2019-09-24 DIAGNOSIS — G12.21 ALS (AMYOTROPHIC LATERAL SCLEROSIS) (H): Primary | ICD-10-CM

## 2019-09-25 PROBLEM — M65.30 TRIGGER FINGER, ACQUIRED: Status: RESOLVED | Noted: 2019-07-19 | Resolved: 2019-09-25

## 2019-09-25 PROBLEM — G12.21 AMYOTROPHIC LATERAL SCLEROSIS (H): Status: RESOLVED | Noted: 2019-07-19 | Resolved: 2019-09-25

## 2019-09-25 NOTE — PROGRESS NOTES
Discharge Summary - Hand Therapy    Patient did not return to therapy.  We will assume that patient's goals were met.    D/C from [facility].

## 2019-10-01 ENCOUNTER — HEALTH MAINTENANCE LETTER (OUTPATIENT)
Age: 79
End: 2019-10-01

## 2019-10-10 ENCOUNTER — TELEPHONE (OUTPATIENT)
Dept: NEUROLOGY | Facility: CLINIC | Age: 79
End: 2019-10-10

## 2019-10-10 ENCOUNTER — MYC MEDICAL ADVICE (OUTPATIENT)
Dept: FAMILY MEDICINE | Facility: CLINIC | Age: 79
End: 2019-10-10

## 2019-10-15 DIAGNOSIS — K21.00 GASTROESOPHAGEAL REFLUX DISEASE WITH ESOPHAGITIS: Primary | ICD-10-CM

## 2019-11-01 DIAGNOSIS — Z76.0 ENCOUNTER FOR MEDICATION REFILL: ICD-10-CM

## 2019-11-01 RX ORDER — LEVOTHYROXINE SODIUM 112 UG/1
TABLET ORAL
Qty: 90 TABLET | Refills: 4 | Status: SHIPPED | OUTPATIENT
Start: 2019-11-01 | End: 2020-01-01

## 2019-11-13 DIAGNOSIS — G12.21 ALS (AMYOTROPHIC LATERAL SCLEROSIS) (H): ICD-10-CM

## 2019-11-13 DIAGNOSIS — R25.2 SPASTICITY: ICD-10-CM

## 2019-11-14 ENCOUNTER — OFFICE VISIT (OUTPATIENT)
Dept: FAMILY MEDICINE | Facility: CLINIC | Age: 79
End: 2019-11-14

## 2019-11-14 VITALS
SYSTOLIC BLOOD PRESSURE: 98 MMHG | RESPIRATION RATE: 16 BRPM | HEART RATE: 79 BPM | DIASTOLIC BLOOD PRESSURE: 70 MMHG | OXYGEN SATURATION: 93 %

## 2019-11-14 DIAGNOSIS — M47.812 SPONDYLOSIS OF CERVICAL REGION WITHOUT MYELOPATHY OR RADICULOPATHY: ICD-10-CM

## 2019-11-14 DIAGNOSIS — G31.9 NEURODEGENERATIVE DISORDER (H): Primary | ICD-10-CM

## 2019-11-14 DIAGNOSIS — M19.072 PRIMARY OSTEOARTHRITIS OF BOTH FEET: ICD-10-CM

## 2019-11-14 DIAGNOSIS — E78.00 HYPERCHOLESTEREMIA: ICD-10-CM

## 2019-11-14 DIAGNOSIS — M19.071 PRIMARY OSTEOARTHRITIS OF BOTH FEET: ICD-10-CM

## 2019-11-14 DIAGNOSIS — M19.249 LOCALIZED, SECONDARY OSTEOARTHRITIS OF THE HAND, UNSPECIFIED LATERALITY: ICD-10-CM

## 2019-11-14 LAB
% GRANULOCYTES: 55.5 % (ref 42.2–75.2)
HCT VFR BLD AUTO: 38.8 % (ref 35–46)
HEMOGLOBIN: 12.6 G/DL (ref 11.8–15.5)
LYMPHOCYTES NFR BLD AUTO: 37.1 % (ref 20.5–51.1)
MCH RBC QN AUTO: 28.3 PG (ref 27–31)
MCHC RBC AUTO-ENTMCNC: 32.5 G/DL (ref 33–37)
MCV RBC AUTO: 86.8 FL (ref 80–100)
MONOCYTES NFR BLD AUTO: 7.4 % (ref 1.7–9.3)
PLATELET # BLD AUTO: 235 K/UL (ref 140–450)
RBC # BLD AUTO: 4.47 X10/CMM (ref 3.7–5.2)
WBC # BLD AUTO: 4.4 X10/CMM (ref 3.8–11)

## 2019-11-14 PROCEDURE — 36415 COLL VENOUS BLD VENIPUNCTURE: CPT | Performed by: FAMILY MEDICINE

## 2019-11-14 PROCEDURE — 85025 COMPLETE CBC W/AUTO DIFF WBC: CPT | Performed by: FAMILY MEDICINE

## 2019-11-14 PROCEDURE — G0008 ADMIN INFLUENZA VIRUS VAC: HCPCS | Performed by: FAMILY MEDICINE

## 2019-11-14 PROCEDURE — 90662 IIV NO PRSV INCREASED AG IM: CPT | Performed by: FAMILY MEDICINE

## 2019-11-14 PROCEDURE — 99214 OFFICE O/P EST MOD 30 MIN: CPT | Mod: 25 | Performed by: FAMILY MEDICINE

## 2019-11-14 RX ORDER — TIZANIDINE 2 MG/1
TABLET ORAL
Qty: 270 TABLET | Refills: 3 | Status: SHIPPED | OUTPATIENT
Start: 2019-11-14 | End: 2020-01-28

## 2019-11-14 RX ORDER — ACETAMINOPHEN 500 MG
500-1000 TABLET ORAL EVERY 6 HOURS PRN
COMMUNITY
Start: 2019-11-14

## 2019-11-14 NOTE — PROGRESS NOTES
she has had Osteoarthritis in her hands feet and neck for many years.  Would like to discuss options including any possible solutions.    Not on any tylenol.    Got a flu shot and needs a blood work,    Problem(s) Oriented visit      ROS:  General and Resp. completed and negative except as noted above     HISTORY:   reports previous alcohol use of about 1.0 standard drinks of alcohol per week.   reports that she quit smoking about 27 years ago. Her smoking use included cigarettes. She has a 11.00 pack-year smoking history. She has never used smokeless tobacco.    Past Medical History:   Diagnosis Date     CAD (coronary artery disease)      Chronic cough      Essential hypertension, benign      Falls frequently 5/28/2018     GERD (gastroesophageal reflux disease)      History of TIA (transient ischemic attack) and stroke 5/7/2018     Hyperlipidaemia      Hyperlipidaemia LDL goal < 130      Ischemic cardiomyopathy      MI (myocardial infarction) (H) 11/1996     Mixed hyperlipidemia      Neurodegenerative disorder (H)      PAD (peripheral artery disease) (H)      Restless legs syndrome      Unspecified hypothyroidism      Past Surgical History:   Procedure Laterality Date     ARTHROSCOPY SHOULDER RT/LT       BUNIONECTOMY RT/LT       C CABG, ARTERY-VEIN, THREE  1/2009     C CARDIAC SURG PROCEDURE UNLIST       C HAND/FINGER SURGERY UNLISTED       C MRA UPPER EXTREMITY WO&W CONT       C SHOULDER SURG PROC UNLISTED       C STOMACH SURGERY PROCEDURE UNLISTED       CARDIAC SURGERY  1992    CABG,stents x2     CARPAL TUNNEL RELEASE RT/LT       EXCISE GANGLION HAND Right 11/29/2018    Procedure: EXCISE RIGHT VOLAR CARPAL MASS AND RIGHT INDEX TRIGGER FINGER RELEASE;  Surgeon: Minh Goldstein MD;  Location:  OR      VASCULAR SURGERY PROCEDURE UNLIST       HYSTERECTOMY, DAMIÁN  32 yo     OPEN REDUCTION INTERNAL FIXATION ANKLE Left 2015    Left Ankle     RELEASE TRIGGER FINGER       RELEASE TRIGGER FINGER Right 11/29/2018     Procedure: RIGHT INDEX TRIGGER FINGER RELEASE;  Surgeon: Minh Goldstein MD;  Location:  OR     REPAIR HAMMER TOE Right 12/19/2017    Procedure: REPAIR HAMMER TOE;  Right 2nd and 3rd toe pinning;  Surgeon: Fernando Wiggins MD;  Location:  OR     STENT  2006     STENT(aka CARDIAC)  2008     VASCULAR SURGERY         EXAM:  BP: 98/70   Pulse: 79    Temp: Data Unavailable    Wt Readings from Last 2 Encounters:   08/17/19 65.8 kg (145 lb)   08/01/19 66.2 kg (146 lb)       BMI= There is no height or weight on file to calculate BMI.    EXAM:  APPEARANCE: = healthy, alert and mild distress  Conj/Eyelids = noninjected and lids and lashes are without inflammation  PERRLA/Irises = Pupils Round Reactive to Light and Irisis without inflammation  Neck = No anterior or posterior adenopathy appreciated.  Thyroid = Not enlarged and no masses felt  Resp effort = Calm regular breathing  Breath Sounds = Good air movement with no rales or rhonchi in any lung fields  Heart Rate, Rythym, & sounds (no Murm)  = Regular rate and rythym with no S3, S4, or murmer appreciated.  Carotid Art's = Pulses full and equal and no bruits appreciated  Abdomen = Soft, nontender, no masses, & bowel sounds in all quadrants  Liver/Spleen = Normal span and no splenomegaly noted  Digits and Nails = FROM in all finger joints, no nail dystrophy  Ext (edema) = No pretibial edema noted or elsewhere  Musculsktl: extremities normal- no gross deformities noted and decreased range of motion and OA changes in the hands, feet and neck  SKIN = absent significant rashes or lesions   Recent/Remote Memory = Alert and Oriented x 3  Mood/Affect = Cooperative and interested      Assessment/Plan:  Katherin was seen today for recheck, flu shot and blood draw.    Diagnoses and all orders for this visit:    Neurodegenerative disorder (H)  -     Comp. Metabolic Panel (14) ([facility])    Localized, secondary osteoarthritis of the hand, unspecified laterality  -     CBC with  "Diff/Plt ([facility])  -     acetaminophen (TYLENOL) 500 MG tablet; Take 1-2 tablets (500-1,000 mg) by mouth every 6 hours as needed for mild pain    Spondylosis of cervical region without myelopathy or radiculopathy  -     acetaminophen (TYLENOL) 500 MG tablet; Take 1-2 tablets (500-1,000 mg) by mouth every 6 hours as needed for mild pain    Primary osteoarthritis of both feet  -     acetaminophen (TYLENOL) 500 MG tablet; Take 1-2 tablets (500-1,000 mg) by mouth every 6 hours as needed for mild pain    Hypercholesteremia  -     Lipid Panel ([facility])    Other orders  -     FLU VACCINE, INCREASED ANTIGEN, PRESV FREE  -     ADMIN INFLUENZA VIRUS VAC    >25 min spent with patient, greater than 50% spent on discussion/education/planning, etc. About The primary encounter diagnosis was Neurodegenerative disorder (H). Diagnoses of Localized, secondary osteoarthritis of the hand, unspecified laterality, Spondylosis of cervical region without myelopathy or radiculopathy, Primary osteoarthritis of both feet, and Hypercholesteremia were also pertinent to this visit.        COUNSELING:   reports that she quit smoking about 27 years ago. Her smoking use included cigarettes. She has a 11.00 pack-year smoking history. She has never used smokeless tobacco.    Estimated body mass index is 24.88 kg/m  as calculated from the following:    Height as of 8/17/19: 1.626 m (5' 4.02\").    Weight as of 8/17/19: 65.8 kg (145 lb).       Appropriate preventive services were discussed with this patient, including applicable screening as appropriate for cardiovascular disease, diabetes, osteopenia/osteoporosis, and glaucoma.  As appropriate for age/gender, discussed screening for colorectal cancer, prostate cancer, breast cancer, and cervical cancer. Checklist reviewing preventive services available has been given to the patient.    Reviewed patients plan of care and provided an AVS. The Basic Care Plan (routine screening as documented in Health " Maintenance) for Katherin meets the Care Plan requirement. This Care Plan has been established and reviewed with the  Patient.      The following health maintenance items are reviewed in Epic and correct as of today:  Health Maintenance   Topic Date Due     COPD ACTION PLAN  1940     ZOSTER IMMUNIZATION (1 of 2) 05/27/1990     INFLUENZA VACCINE (1) 09/01/2019     MEDICARE ANNUAL WELLNESS VISIT  08/01/2020     FALL RISK ASSESSMENT  08/01/2020     LIPID  05/07/2023     ADVANCE CARE PLANNING  09/19/2024     DTAP/TDAP/TD IMMUNIZATION (4 - Td) 06/04/2026     DEXA  Completed     SPIROMETRY  Completed     PHQ-2  Completed     PNEUMOCOCCAL IMMUNIZATION 65+ LOW/MEDIUM RISK  Completed     IPV IMMUNIZATION  Aged Out     MENINGITIS IMMUNIZATION  Aged Out       Aris Del Real  [facility]  For any issues my office # is [phone]

## 2019-11-14 NOTE — TELEPHONE ENCOUNTER
Rx Authorization:    Requested Medication/ Dose tiZANidine (ZANAFLEX) 2 MG tablet    Date last refill ordered: 08/09/19    Quantity ordered: 270    # refills: 2    Date of last clinic visit with ordering provider: 09/19/19 Lizzette     Date of next clinic visit with ordering provider: 01/09/20 Lizzette     All pertinent protocol data (lab date/result):     Include pertinent information from patients message:

## 2019-11-15 LAB
ALBUMIN SERPL-MCNC: 4 G/DL (ref 3.5–4.8)
ALBUMIN/GLOB SERPL: 1.6 {RATIO} (ref 1.2–2.2)
ALP SERPL-CCNC: 71 IU/L (ref 39–117)
ALT SERPL-CCNC: 4 IU/L (ref 0–32)
AST SERPL-CCNC: 14 IU/L (ref 0–40)
BILIRUB SERPL-MCNC: 0.5 MG/DL (ref 0–1.2)
BUN SERPL-MCNC: 10 MG/DL (ref 8–27)
BUN/CREATININE RATIO: 14 (ref 12–28)
CALCIUM SERPL-MCNC: 10 MG/DL (ref 8.7–10.3)
CHLORIDE SERPLBLD-SCNC: 100 MMOL/L (ref 96–106)
CHOLEST SERPL-MCNC: 132 MG/DL (ref 100–199)
CREAT SERPL-MCNC: 0.7 MG/DL (ref 0.57–1)
EGFR IF AFRICN AM: 95 ML/MIN/1.73
EGFR IF NONAFRICN AM: 83 ML/MIN/1.73
GLOBULIN, TOTAL: 2.5 G/DL (ref 1.5–4.5)
GLUCOSE SERPL-MCNC: 91 MG/DL (ref 65–99)
HDLC SERPL-MCNC: 43 MG/DL
LDL/HDL RATIO: 1.6 RATIO (ref 0–3.2)
LDLC SERPL CALC-MCNC: 68 MG/DL (ref 0–99)
POTASSIUM SERPL-SCNC: 4.5 MMOL/L (ref 3.5–5.2)
PROT SERPL-MCNC: 6.5 G/DL (ref 6–8.5)
SODIUM SERPL-SCNC: 136 MMOL/L (ref 134–144)
TOTAL CO2: 29 MMOL/L (ref 20–29)
TRIGL SERPL-MCNC: 107 MG/DL (ref 0–149)
VLDLC SERPL CALC-MCNC: 21 MG/DL (ref 5–40)

## 2019-11-18 ENCOUNTER — OFFICE VISIT (OUTPATIENT)
Dept: ORTHOPEDICS | Facility: CLINIC | Age: 79
End: 2019-11-18
Payer: MEDICARE

## 2019-11-18 DIAGNOSIS — L60.2 LONG TOENAIL: ICD-10-CM

## 2019-11-18 DIAGNOSIS — I73.9 PAD (PERIPHERAL ARTERY DISEASE) (H): Primary | ICD-10-CM

## 2019-11-18 DIAGNOSIS — G62.9 PERIPHERAL POLYNEUROPATHY: ICD-10-CM

## 2019-11-18 NOTE — LETTER
11/18/2019       RE: Katherin Fletcher  [address]  [facility] [place] [address]     Dear Colleague,    Thank you for referring your patient, Katherin Fletcher, to the [facility] at [facility]. Please see a copy of my visit note below.    Chief Complaint:   Chief Complaint   Patient presents with     Right Foot - RECHECK     Left Foot - RECHECK     RECHECK     9 week nail trimming       Allergies   Allergen Reactions     Ace Inhibitors Cough     Baclofen Other (See Comments)     Constipation , tiredness     Penicillins      10/7/14 Hives per patient -- many many years ago       Nickel Swelling and Rash     Other reaction(s): Unknown       Subjective: Katherin is a 79 year old female who presents to the clinic today for a follow up of elongated nails.  She presents today in her electric wheelchair and wearing a neck brace.  She relates that she does not have a musculature in her neck to hold her head up anymore.  She is in the neck brace most of the time.  She is in good spirits today.    Objective  Data Unavailable Data Unavailable Data Unavailable Data Unavailable Data Unavailable 0 lbs 0 oz  DP pulses are 1/4 and PT pulses are non-palpable bilaterally. LE capillary refill time is <3 seconds. Absent pedal hair. Mild non-pitting LE edema.  Dorsally contracted digits 2-5 that are non-reducible, very limited passive ROM. Equinus present.  Gross sensation intact to bilateral LE.   LE skin color, texture and turgor are normal. Toenails are slightly elongated bilaterally. Right 4th toe is hammered and in a rigid position.      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   - Cont with comfortable shoes.   - Pt to return to clinic in 9 weeks.    Again, thank you for allowing me to  participate in the care of your patient.      Sincerely,    Adrian Arroyo DPM

## 2019-11-18 NOTE — PROGRESS NOTES
Chief Complaint:   Chief Complaint   Patient presents with     Right Foot - RECHECK     Left Foot - RECHECK     RECHECK     9 week nail trimming          Allergies   Allergen Reactions     Ace Inhibitors Cough     Baclofen Other (See Comments)     Constipation , tiredness     Penicillins      10/7/14 Hives per patient -- many many years ago       Nickel Swelling and Rash     Other reaction(s): Unknown         Subjective: Katherin is a 79 year old female who presents to the clinic today for a follow up of elongated nails.  She presents today in her electric wheelchair and wearing a neck brace.  She relates that she does not have a musculature in her neck to hold her head up anymore.  She is in the neck brace most of the time.  She is in good spirits today.    Objective  Data Unavailable Data Unavailable Data Unavailable Data Unavailable Data Unavailable 0 lbs 0 oz  DP pulses are 1/4 and PT pulses are non-palpable bilaterally. LE capillary refill time is <3 seconds. Absent pedal hair. Mild non-pitting LE edema.  Dorsally contracted digits 2-5 that are non-reducible, very limited passive ROM. Equinus present.  Gross sensation intact to bilateral LE.   LE skin color, texture and turgor are normal. Toenails are slightly elongated bilaterally. Right 4th toe is hammered and in a rigid position.      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   - Cont with comfortable shoes.   - Pt to return to clinic in 9 weeks.

## 2019-11-18 NOTE — NURSING NOTE
Reason For Visit:   Chief Complaint   Patient presents with     Right Foot - RECHECK     Left Foot - RECHECK     RECHECK     9 week nail trimming       There were no vitals taken for this visit.    Pain Assessment  Patient Currently in Pain: Yes  0-10 Pain Scale: 3  Primary Pain Location: Foot(right big toe)    Emmanuelle Anderson, ATC

## 2019-11-27 DIAGNOSIS — G12.21 ALS (AMYOTROPHIC LATERAL SCLEROSIS) (H): Primary | ICD-10-CM

## 2019-12-01 ENCOUNTER — APPOINTMENT (OUTPATIENT)
Dept: GENERAL RADIOLOGY | Facility: CLINIC | Age: 79
End: 2019-12-01
Attending: EMERGENCY MEDICINE
Payer: MEDICARE

## 2019-12-01 ENCOUNTER — HOSPITAL ENCOUNTER (EMERGENCY)
Facility: CLINIC | Age: 79
Discharge: HOME | End: 2019-12-01
Attending: EMERGENCY MEDICINE | Admitting: EMERGENCY MEDICINE
Payer: MEDICARE

## 2019-12-01 VITALS
BODY MASS INDEX: 23.16 KG/M2 | TEMPERATURE: 98.2 F | RESPIRATION RATE: 14 BRPM | HEART RATE: 63 BPM | DIASTOLIC BLOOD PRESSURE: 66 MMHG | OXYGEN SATURATION: 96 % | SYSTOLIC BLOOD PRESSURE: 111 MMHG | WEIGHT: 135 LBS

## 2019-12-01 DIAGNOSIS — S40.012A CONTUSION OF LEFT SCAPULA, INITIAL ENCOUNTER: ICD-10-CM
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
## 2019-12-01 LAB
ANION GAP SERPL CALCULATED.3IONS-SCNC: 4 MMOL/L (ref 3–14)
BASOPHILS # BLD AUTO: 0 10E9/L (ref 0–0.2)
BASOPHILS NFR BLD AUTO: 0.2 %
BUN SERPL-MCNC: 13 MG/DL (ref 7–30)
CALCIUM SERPL-MCNC: 9.1 MG/DL (ref 8.5–10.1)
CHLORIDE SERPL-SCNC: 107 MMOL/L (ref 94–109)
CO2 SERPL-SCNC: 28 MMOL/L (ref 20–32)
CREAT SERPL-MCNC: 0.69 MG/DL (ref 0.52–1.04)
DIFFERENTIAL METHOD BLD: NORMAL
EOSINOPHIL # BLD AUTO: 0.1 10E9/L (ref 0–0.7)
EOSINOPHIL NFR BLD AUTO: 1.1 %
ERYTHROCYTE [DISTWIDTH] IN BLOOD BY AUTOMATED COUNT: 13.8 % (ref 10–15)
GFR SERPL CREATININE-BSD FRML MDRD: 83 ML/MIN/{1.73_M2}
GLUCOSE SERPL-MCNC: 111 MG/DL (ref 70–99)
HCT VFR BLD AUTO: 39.7 % (ref 35–47)
HGB BLD-MCNC: 13.1 G/DL (ref 11.7–15.7)
IMM GRANULOCYTES # BLD: 0 10E9/L (ref 0–0.4)
IMM GRANULOCYTES NFR BLD: 0.2 %
LYMPHOCYTES # BLD AUTO: 2 10E9/L (ref 0.8–5.3)
LYMPHOCYTES NFR BLD AUTO: 43 %
MCH RBC QN AUTO: 28.5 PG (ref 26.5–33)
MCHC RBC AUTO-ENTMCNC: 33 G/DL (ref 31.5–36.5)
MCV RBC AUTO: 87 FL (ref 78–100)
MONOCYTES # BLD AUTO: 0.5 10E9/L (ref 0–1.3)
MONOCYTES NFR BLD AUTO: 10.9 %
NEUTROPHILS # BLD AUTO: 2.1 10E9/L (ref 1.6–8.3)
NEUTROPHILS NFR BLD AUTO: 44.6 %
NRBC # BLD AUTO: 0 10*3/UL
NRBC BLD AUTO-RTO: 0 /100
PLATELET # BLD AUTO: 189 10E9/L (ref 150–450)
POTASSIUM SERPL-SCNC: 3.6 MMOL/L (ref 3.4–5.3)
RBC # BLD AUTO: 4.59 10E12/L (ref 3.8–5.2)
SODIUM SERPL-SCNC: 139 MMOL/L (ref 133–144)
WBC # BLD AUTO: 4.7 10E9/L (ref 4–11)

## 2019-12-01 PROCEDURE — 80048 BASIC METABOLIC PNL TOTAL CA: CPT | Performed by: EMERGENCY MEDICINE

## 2019-12-01 PROCEDURE — 99285 EMERGENCY DEPT VISIT HI MDM: CPT

## 2019-12-01 PROCEDURE — 85025 COMPLETE CBC W/AUTO DIFF WBC: CPT | Performed by: EMERGENCY MEDICINE

## 2019-12-01 PROCEDURE — 73010 X-RAY EXAM OF SHOULDER BLADE: CPT | Mod: LT

## 2019-12-01 PROCEDURE — 72072 X-RAY EXAM THORAC SPINE 3VWS: CPT

## 2019-12-01 NOTE — ED AVS SNAPSHOT
Emergency Department  [address] [place] [address]  Phone:  [phone]  Fax:  [phone]                                    Katherin Fletcher   MRN: [id number]    Department:   Emergency Department   Date of Visit:  12/1/2019           After Visit Summary Signature Page    I have received my discharge instructions, and my questions have been answered. I have discussed any challenges I see with this plan with the nurse or doctor.    ..........................................................................................................................................  Patient/Patient Representative Signature      ..........................................................................................................................................  Patient Representative Print Name and Relationship to Patient    ..................................................               ................................................  Date                                   Time    ..........................................................................................................................................  Reviewed by Signature/Title    ...................................................              ..............................................  Date                                               Time          22EPIC Rev 08/18

## 2019-12-01 NOTE — ED TRIAGE NOTES
Pt tripped today and has pain to left shoulder blade.  No LOC, did not hit head.  Pt reports pain to tailbone area for past week

## 2019-12-01 NOTE — ED NOTES
Bed: ED25  Expected date:   Expected time:   Means of arrival:   Comments:  411  79 F shoulder pain/fall  1641

## 2019-12-01 NOTE — ED PROVIDER NOTES
History   Chief Complaint:  Fall    HPI   Katherin Fletcher is 79 year old female (anticoagulated on Eliquis and Plavix) with a complex medical history who presents via EMS for evaluation after a fall. The patient reports that today while helping her  with the TV remote she backed up, lost her balance, and fell backwards. She states that she fell to the ground, hitting her left shoulder in the process on a vase. The patient did not hit her head or lose consciousness although she is anticoagulated on Eliquis and Plavix. After the fall, she attests to developing pain in her left shoulder blade region, prompting her to contact EMS.     Allergies:  ACE inhibitors  Baclofen  Penicillins  Nickel     Medications:    apixaban ANTICOAGULANT (ELIQUIS) 5 MG tablet  carbidopa-levodopa (SINEMET)  MG tablet  carvedilol (COREG) 6.25 MG tablet  clopidogrel (PLAVIX) 75 MG tablet  glycopyrrolate (ROBINUL) 1 MG tablet  isosorbide mononitrate (IMDUR) 30 MG 24 hr tablet  LEVOXYL 112 MCG tablet  nitroGLYcerin (NITROSTAT) 0.4 MG sublingual tablet  omeprazole (PRILOSEC) 20 MG DR capsule  rosuvastatin (CRESTOR) 20 MG tablet  tiZANidine (ZANAFLEX) 2 MG tablet    Past Medical History:    CAD (coronary artery disease)  Chronic cough  Essential hypertension, benign  Falls frequently  GERD (gastroesophageal reflux disease)  History of TIA (transient ischemic attack) and stroke  Hyperlipidaemia   Ischemic cardiomyopathy  MI (myocardial infarction)  Mixed hyperlipidemia  MRSA (methicillin resistant Staphylococcus aureus)  Neurodegenerative disorder  PAD (peripheral artery disease)   Restless legs syndrome  Hypothyroidism  Rib fractures  Hypertensive heart disease with heart failure  Chronic obstructive pulmonary disease  Atrial fibrillation  Ataxia  Neurodegenerative disorder  Slurred speech  Peripheral polyneuropathy  Neurologic gait disorder  Cerebellar disease  Dysarthria  Disturbances of sensation of smell and taste    Past  Surgical History:    Bunionectomy   CABG, artery-vein, three  Cardiac surgery  Hand/finger surgery  Shoulder surgery  Stomach surgery  Carpal tunnel release  Excise ganglion, right hand  Vascular surgery x2  DAMIÁN  ORIF left ankle  Release trigger finger  Release trigger finger, right  Repai hammer toe, right  Stents placed (cardiac) x2    Family History:    History reviewed. No pertinent family history.     Social History:  Smoking status: Former smoker  Alcohol use: Not currently  Marital Status:   [2]     Review of Systems   Musculoskeletal:        Positive for pain in left shoulder blade and tailbone.    All other systems reviewed and are negative.    Physical Exam     Patient Vitals for the past 24 hrs:   BP Temp Temp src Pulse Heart Rate Resp SpO2 Weight   12/01/19 1701 118/82 98.2  F (36.8  C) Oral 69 69 16 97 % 61.2 kg (135 lb)     Physical Exam  General/Appearance: appears stated age, well-groomed, appears comfortable  Eyes: EOMI, no scleral injection, no icterus  ENT: MMM  Neck: supple, nl ROM, no stiffness  Cardiovascular: RRR, nl S1S2, no m/r/g, 2+ pulses in all 4 extremities, cap refill <2sec  Respiratory: CTAB, good air movement throughout, no wheezes/rhonchi/rales, no increased WOB, no retractions  Back: no lesions, reproducible ttp to L scapula  GI: abd soft, non-distended, nttp,  no HSM, no rebound, no guarding, nl BS  MSK: LOVE, good tone, no bony abnormality  Skin: warm and well-perfused, no rash, no edema, no ecchymosis, nl turgor  Neuro: GCS 15, alert and oriented, no gross focal neuro deficits  Psych: interacts appropriately  Heme: no petechia, no purpura, no active bleeding    Emergency Department Course   Imaging:  Radiographic findings were communicated with the patient who voiced understanding of the findings.    XR Scapula Left:  No displaced fracture of the scapula is identified.  Moderate to severe left AC joint degenerative changes. Alignment of  the left shoulder appears grossly  normal. Partially visualized  sternotomy wires and multiple surgical clips projecting over the  mediastinum.  As read by Radiology.    Thoracic Spine XR, 3 Views:  Unchanged mild anterior wedge compression deformity of  T11. Diffuse osseous demineralization without obvious new gross  vertebral body height loss in the thoracic spine.  As read by Radiology.    Laboratory:  CBC: WNL (WBC 4.7, HGB 13.1, )  BMP: Glucose 1111 (H) o/w WNL (Creatinine 0.69)    Emergency Department Course:  The patient presents to the ED via EMS.     Past medical records, nursing notes, and vitals reviewed.   1717: I performed an exam of the patient and obtained history, as documented above.    IV inserted and blood drawn. The patient was sent for a left scapula x-ray and thoracic spine x-ray while in the emergency department, findings above.     1919: I rechecked the patient. Explained findings to the patient. Patient states she feels ready for discharge.    Findings and plan explained to the patient. Patient discharged home with instructions regarding supportive care, medications, and reasons to return. The importance of close follow-up was reviewed.    Impression & Plan    Medical Decision Making:  This patient is a 79-year-old female on blood thinners who presents after mechanical fall.  She fell backwards, striking her left shoulder blade but not hitting her head.  She has no stigmata of intracranial bleed.  I do not think imaging is needed.  X-ray of her shoulder and shoulder blade, where she does have reproducible tenderness, are negative.  She is able to fully range her shoulder.  I doubt that were missing a subtle fracture.  I think is reasonable for her to be discharged.  As she is not complaining of other back pain, abdominal pain, lightheadedness, chest pain, shortness of breath, urinary symptoms I think is okay to do a limited work-up in the ED.    Diagnosis:    ICD-10-CM   1. Fall, initial encounter W19.XXXA   2.  Contusion of left scapula, initial encounter S40.012A       Disposition:  Discharged to home        Remi eByer  12/1/2019    EMERGENCY DEPARTMENT  I, Remi Beyer, am serving as a scribe at 5:17 PM on 12/1/2019 to document services personally performed by Judith Lewis MD based on my observations and the provider's statements to me.        Judith Lewis MD  12/01/19 1956

## 2019-12-06 ENCOUNTER — HOSPITAL ENCOUNTER (OUTPATIENT)
Dept: PHYSICAL THERAPY | Facility: CLINIC | Age: 79
Setting detail: THERAPIES SERIES
End: 2019-12-06
Attending: PSYCHIATRY & NEUROLOGY
Payer: MEDICARE

## 2019-12-06 DIAGNOSIS — G12.21 ALS (AMYOTROPHIC LATERAL SCLEROSIS) (H): ICD-10-CM

## 2019-12-06 PROCEDURE — 97162 PT EVAL MOD COMPLEX 30 MIN: CPT | Mod: GP | Performed by: PHYSICAL THERAPIST

## 2019-12-06 PROCEDURE — 97110 THERAPEUTIC EXERCISES: CPT | Mod: GP | Performed by: PHYSICAL THERAPIST

## 2019-12-06 NOTE — DISCHARGE INSTRUCTIONS
Before the muscle spasms start (8:30/9p) try rolling on the Muscle Ease to see if that can settle down your legs before they get going.        We will call you on Monday to schedule 4 PT visits with Angelique Joyce  [phone]

## 2019-12-06 NOTE — IP AVS SNAPSHOT
MRN:[id number]                      After Visit Summary   12/6/2019    Katherin Fletcher    MRN: [id number]           Visit Information        Provider Department      12/6/2019  2:45 PM Kristen Monteiro, PT [facility], [place], Physical Therapy - Outpatient        Your next 10 appointments already scheduled    Jan 16, 2020 11:30 AM CST  PFT VISIT with SEGUN PFL FELICTIY  [facility] Pulmonary Function Testing ([facility]) [address] [place] [address]  3rd [facility] [id number] [phone]      Jan 16, 2020 12:00 PM CST  New ALS/Motor Neuron with Travon Driver MD  [facility] Neurology ([facility]) [address] [place] [address]  3rd [facility] [id number] [phone]      Jan 20, 2020  1:40 PM CST  (Arrive by 1:25 PM)  RETURN FOOT/ANKLE with Adrian Arroyo DPM  [facility] Orthopaedic Clinic ([facility]) [address] [place] [id number] [phone]           Further instructions from your care team       Before the muscle spasms start (8:30/9p) try rolling on the Muscle Ease to see if that can settle down your legs before they get going.        We will call you on Monday to schedule 4 PT visits with Angelique Joyce  [phone]      [employer] Information    [facility] gives you secure access to your electronic health record. If you see a primary care provider, you can also send messages to your care team and make appointments. If you have questions, please call your primary care clinic.  If you do not have a primary care provider, please call [phone] and they will assist you.       Care EveryWhere ID    This is your Care EveryWhere ID. This could be used by other organizations to access your [place] medical records  [id number]       Equal Access to Services    JOSELUIS MADRIGAL : Yuly Owens, wilmer phoenix, carlo parisi  la'cheryle ah. So [facility] [phone].    ATENCIÓN: Si habla español, tiene a taylor disposición servicios gratuitos de asistencia lingüística. Llame al [phone].    We comply with applicable federal and state civil rights laws, including the [place] Human Rights Act. We do not discriminate on the basis of race, color, creed, Druze, national origin, marital status, age, disability, sex, sexual orientation, or gender identity.

## 2019-12-07 NOTE — PROGRESS NOTES
[facility]        OUTPATIENT PHYSICAL THERAPY FUNCTIONAL EVALUATION  PLAN OF TREATMENT FOR OUTPATIENT REHABILITATION  (COMPLETE FOR INITIAL CLAIMS ONLY)  Patient's Last Name, First Name, M.I.  YOB: 1940  Katherin Fletcher     Provider's Name   [facility]   Medical Record No.  [id number]     Start of Care Date:  12/06/19   Onset Date:  11/27/19(order date)   Type:     _X__PT   ____OT  ____SLP Medical Diagnosis:  ALS     PT Diagnosis:  LE spasms in context of ALS Visits from SOC:  1                              __________________________________________________________________________________  Plan of Treatment/Functional Goals:  ROM, strengthening, stretching, gait training, neuromuscular re-education, manual therapy(endurance  training)     Cryotherapy     GOALS  HEP  Pt demo indep with HEP for LE strength, stretching, and endurance training as appropriate for her stage of ALS for management of her LE   02/03/20    Falls prevention  Pt demo improved functional awareness of prevention of falls including use of PWC more in the house, potential transition to 1st floor bedroom, and other strategies identified throughout treatment sessions.  02/03/20       Therapy Frequency:  1 time/week(likely 4 visits)   Predicted Duration of Therapy Intervention:  60 days    Kristen Monteiro, PT                                    I CERTIFY THE NEED FOR THESE SERVICES FURNISHED UNDER        THIS PLAN OF TREATMENT AND WHILE UNDER MY CARE     (Physician co-signature of this document indicates review and certification of the therapy plan).                Certification Date From:  12/06/19   Certification Date To:  02/03/20    Referring Provider:  Dr Laci Driver    Initial Assessment  See Epic Evaluation- Start of Care Date: 12/06/19

## 2019-12-07 NOTE — PROGRESS NOTES
12/06/19 1400   Quick Adds   Type of Visit Initial OP PT Evaluation   General Information   Start of Care Date 12/06/19   Referring Physician Dr Laci Driver   Orders Evaluate and Treat as Indicated   Order Date 11/27/19   Medical Diagnosis ALS   Onset of illness/injury or Date of Surgery 11/27/19  (order date)   Special Instructions Nustep trial and stretching program to manage muscle spasms in B LEs   Surgical/Medical history reviewed Yes   Pertinent history of current problem (include personal factors and/or comorbidities that impact the POC) Pt diagnosed with ALS 2/28/19 - 4-5 year progression of balance issues preceeded diagnosis. C5-6 severe spinal stensosis, neuropathy, CAD with CABG 1992 and 2009, HTN, HLD, MI 11/1996, TIA 5/2018, PAD, GERD, RLS, hypothyroidism, R volar carpal ganglion cyst and trigger finger releases 11/29/18. Difficulty with saliva management. About 9 or 10p legs will start to spasm - has to walk it off, takes meds (not cannibis) - sinemet and zanaflex and those help. Pt notes baclofen was not tolerated, does not want botox for either saliva management or LE spasms. Pt arrived in her PWC today - states it has been going well but it can't get into the bathroom on main floor.    Pertinent Visual History  wears glasses   Current Community Support   (daughter Ruchi, yu Jones,  (93yo).)   Patient role/Employment history Retired   Living environment [facility]   Home/Community Accessibility Comments chair glides to basement and 2nd floor. Note: chair glide only to top landing on 2nd floor, needs to climb 2 stairs to get up to 2nd floor (has 2 rails).    Current Assistive Devices Four Wheeled Walker;Power Wheel Chair;Manual Wheel Chair;Scooter  (lift chair, [employer], bedrail)   ADL Devices Shower/Tub Chair;Raised Toilet Seat   Assistive Devices Comments Uses 4WW in home, w/c can't access bathroom.    Patient/Family Goals Statement Find some exercise I can do to  work out my leg spasms. I have really liked the Nustep in the past.   Fall Risk Screen   Fall screen completed by PT   Have you fallen 2 or more times in the past year? Yes   Have you fallen and had an injury in the past year? Yes   Timed Up and Go score (seconds) 59.62  (4WW)   Is patient a fall risk? Yes   Fall screen comments fall with rib fractures 8/17/19, fall with L scapular contusion 12/1/19. Falls are always posterior. Pt notes she has probably fallen 150x in the last 3 years.   Pain   Patient currently in pain Yes   Pain location L scapula   Pain description comment sore when touched from fall last week   Pain comments At night will get LE spasms that are very painful -prevent sleep. See subjective for pt's strategies for management.   Vitals Signs   Heart Rate 87   Blood Pressure 133/70   Vital Signs Comments After Nustep 135/68,    Cognitive Status Examination   Orientation orientation to person, place and time   Level of Consciousness alert   Follows Commands and Answers Questions 100% of the time;able to follow multistep instructions   Personal Safety and Judgment intact   Memory intact   Range of Motion (ROM)   ROM Comment DF knee straight sitting to neutral only B.   Strength   Strength Comments B LEs grossly 4+5 throughout sitting. However, functionally with decreased hip ext with sit/stand and impaired clearance LEs with amb.   Transfer Skills   Transfer Comments Heavy use of UEs for stabilization, slides feet with transfers.   Locomotion   Wheel Chair Mobility Comments mod I power w/c.   Gait   Gait Comments Pt amb with very slow john, shuffling feet foward, prolonged double stance time. Pt wearing soft cervical collar - looking at floor with amb. Pt notes she always uses 4WW with amb, except for bathrooms which are too small for walker (has grab bars/hand holds).   Gait Special Tests   Gait Special Tests 25 FOOT TIMED WALK   Gait Special Tests 25 Foot Timed Walk   Seconds 33.19  secs  (4WW)   Comments 9/19/19: 27.19 secs with 4WW.   Balance   Balance Comments not formally tested, but clearly with increased post weight shift and increased falls risk.   Modality Interventions   Planned Modality Interventions Cryotherapy   Planned Therapy Interventions   Planned Therapy Interventions ROM;strengthening;stretching;gait training;neuromuscular re-education;manual therapy  (endurance  training)   Clinical Impression   Criteria for Skilled Therapeutic Interventions Met yes, treatment indicated   PT Diagnosis LE spasms in context of ALS   Influenced by the following impairments pain L scapula after fall, B LEs at night, decreased LE strength and endurance.   Functional limitations due to impairments Increased risk for falls (with history of injurous falls), impaired gait, impaired sleep due to LE cramping/spasming.   Clinical Presentation Evolving/Changing   Clinical Presentation Rationale pain has been increasing as she has been getting less mobile.   Clinical Decision Making (Complexity) Moderate complexity   Therapy Frequency 1 time/week  (likely 4 visits)   Predicted Duration of Therapy Intervention (days/wks) 60 days   Risk & Benefits of therapy have been explained Yes   Patient, Family & other staff in agreement with plan of care Yes   Clinical Impression Comments Pt initially wanting to come to PT to do Nustep 2x per week for several weeks. Educ re: need for skill, and determining strategy for managing it at home.    Goal 1   Goal Identifier HEP   Goal Description Pt rachel indep with HEP for LE strength, stretching, and endurance training as appropriate for her stage of ALS for management of her LE    Target Date 02/03/20   Goal 2   Goal Identifier Falls prevention   Goal Description Pt demo improved functional awareness of prevention of falls including use of PWC more in the house, potential transition to 1st floor bedroom, and other strategies identified throughout treatment sessions.    Target Date 02/03/20   Total Evaluation Time   PT Eval, Moderate Complexity Minutes (90204) 30   Therapy Certification   Certification date from 12/06/19   Certification date to 02/03/20   Medical Diagnosis ALS

## 2019-12-18 ENCOUNTER — HOSPITAL ENCOUNTER (OUTPATIENT)
Dept: PHYSICAL THERAPY | Facility: CLINIC | Age: 79
Setting detail: THERAPIES SERIES
End: 2019-12-18
Attending: PSYCHIATRY & NEUROLOGY
Payer: MEDICARE

## 2019-12-18 PROCEDURE — 97110 THERAPEUTIC EXERCISES: CPT | Mod: GP | Performed by: PHYSICAL THERAPIST

## 2019-12-18 NOTE — IP AVS SNAPSHOT
MRN:[id number]                      After Visit Summary   12/18/2019    Katherin Fletcher    MRN: [id number]           Visit Information        Provider Department      12/18/2019  2:45 PM Kristen Monteiro, PT [facility], [place], Physical Therapy - Outpatient        Your next 10 appointments already scheduled    Jan 06, 2020 11:00 AM CST  (Arrive by 10:45 AM)  NEW HAND with Gokul Mario MD  [facility] Orthopaedic Clinic ([facility]) [address]  4th [facility] [address]  [phone]      Jan 06, 2020  1:00 PM CST  Neuro Treatment with Kristen Monteiro, PT  [facility], [place], Physical Therapy - Outpatient ([facility]) [address]  [place] [address]  [phone]      Jan 13, 2020  1:00 PM CST  Neuro Treatment with Kristen Monteiro PT  [facility], [place], Physical Therapy - Outpatient ([facility]) [address]  [place] [address]  [phone]      Jan 16, 2020 11:30 AM CST  PFT VISIT with  PFL [facility] Pulmonary Function Testing ([facility]) [address]  3rd [facility] [id number] [phone]      Jan 16, 2020 12:00 PM CST  New ALS/Motor Neuron with Travon Driver MD  [facility] Neurology ([facility]) [address]  3rd [facility] [address] [phone]      Jan 20, 2020  1:40 PM CST  (Arrive by 1:25 PM)  RETURN FOOT/ANKLE with Adrian Arroyo DPM  [facility]opaedic Clinic ([facility]) [address]  4th Floor  [facility] [address] [phone]      Jan 21, 2020  1:00 PM CST  Neuro Treatment with Kristen Monteiro, PT  [facility] ([facility]) [address]  [place]  [id number] [phone]           Further instructions from your care team       Call [facility] and see if you can either use your insurance for a Silver Sneakers program or [facility] the [facility] in [facility]. This would give you access to the Nustep.    You may also want to get a set of portable pedals that you could exercise with if you are having more cramping at night. See attached for an example. I will ask the [facility] [employer]odeepti closeisaura to see if they have something like this.          [employer] Information    [employer] gives you secure access to your electronic health record. If you see a primary care provider, you can also send messages to your care team and make appointments. If you have questions, please call your primary care clinic.  If you do not have a primary care provider, please call [phone] and they will assist you.       Care EveryWhere ID    This is your Care EveryWhere ID. This could be used by other organizations to access your [place] medical records  [id number]       Equal Access to Services    JOSELUIS MADRIGAL AH: Hadii rosio Owens, waaxda lizett, qasanchezta kaalmaedy epps, carlo castro. So [facility] [place] [phone].    ATENCIÓN: Si habla español, tiene a taylor disposición servicios gratuitos de asistencia lingüística. Llame al [phone].    We comply with applicable federal and state civil rights laws, including the [place] Human Rights Act. We do not discriminate on the basis of race, color, creed, Cheondoism, national origin, marital status, age, disability, sex, sexual orientation, or gender identity.

## 2019-12-18 NOTE — DISCHARGE INSTRUCTIONS
Call [facility] and see if you can either use your insurance for a Silver Sneakers program or at the [facility] in the [facility]. This would give you access to the Nustep.    You may also want to get a set of portable pedals that you could exercise with if you are having more cramping at night. See attached for an example. I will ask the [facility] loan closet to see if they have something like this.

## 2020-01-01 ENCOUNTER — OFFICE VISIT (OUTPATIENT)
Dept: CARDIOLOGY | Facility: CLINIC | Age: 80
End: 2020-01-01
Attending: INTERNAL MEDICINE
Payer: MEDICARE

## 2020-01-01 ENCOUNTER — ANCILLARY PROCEDURE (OUTPATIENT)
Dept: GENERAL RADIOLOGY | Facility: CLINIC | Age: 80
End: 2020-01-01
Payer: MEDICARE

## 2020-01-01 ENCOUNTER — MYC MEDICAL ADVICE (OUTPATIENT)
Dept: CARDIOLOGY | Facility: CLINIC | Age: 80
End: 2020-01-01

## 2020-01-01 ENCOUNTER — DOCUMENTATION ONLY (OUTPATIENT)
Dept: CARDIOLOGY | Facility: CLINIC | Age: 80
End: 2020-01-01

## 2020-01-01 ENCOUNTER — MEDICAL CORRESPONDENCE (OUTPATIENT)
Dept: FAMILY MEDICINE | Facility: CLINIC | Age: 80
End: 2020-01-01

## 2020-01-01 ENCOUNTER — HOSPITAL ENCOUNTER (OUTPATIENT)
Dept: PHYSICAL THERAPY | Facility: CLINIC | Age: 80
Setting detail: THERAPIES SERIES
End: 2020-08-05
Attending: FAMILY MEDICINE
Payer: MEDICARE

## 2020-01-01 ENCOUNTER — VIRTUAL VISIT (OUTPATIENT)
Dept: ORTHOPEDICS | Facility: CLINIC | Age: 80
End: 2020-01-01
Payer: MEDICARE

## 2020-01-01 ENCOUNTER — OFFICE VISIT (OUTPATIENT)
Dept: PHYSICAL MEDICINE AND REHAB | Facility: CLINIC | Age: 80
End: 2020-01-01
Payer: MEDICARE

## 2020-01-01 ENCOUNTER — OFFICE VISIT (OUTPATIENT)
Dept: ORTHOPEDICS | Facility: CLINIC | Age: 80
End: 2020-01-01
Payer: MEDICARE

## 2020-01-01 ENCOUNTER — THERAPY VISIT (OUTPATIENT)
Dept: OCCUPATIONAL THERAPY | Facility: CLINIC | Age: 80
End: 2020-01-01
Attending: FAMILY MEDICINE
Payer: MEDICARE

## 2020-01-01 ENCOUNTER — ANCILLARY PROCEDURE (OUTPATIENT)
Dept: GENERAL RADIOLOGY | Facility: CLINIC | Age: 80
End: 2020-01-01
Attending: FAMILY MEDICINE
Payer: MEDICARE

## 2020-01-01 ENCOUNTER — NURSE TRIAGE (OUTPATIENT)
Dept: NURSING | Facility: CLINIC | Age: 80
End: 2020-01-01

## 2020-01-01 ENCOUNTER — HOSPITAL ENCOUNTER (OUTPATIENT)
Dept: CARDIOLOGY | Facility: CLINIC | Age: 80
Discharge: HOME | End: 2020-11-11
Attending: INTERNAL MEDICINE | Admitting: INTERNAL MEDICINE
Payer: MEDICARE

## 2020-01-01 ENCOUNTER — HOSPITAL ENCOUNTER (EMERGENCY)
Facility: CLINIC | Age: 80
Discharge: HOME | End: 2020-06-30
Attending: NURSE PRACTITIONER | Admitting: NURSE PRACTITIONER
Payer: MEDICARE

## 2020-01-01 ENCOUNTER — TELEPHONE (OUTPATIENT)
Dept: FAMILY MEDICINE | Facility: CLINIC | Age: 80
End: 2020-01-01

## 2020-01-01 ENCOUNTER — MYC MEDICAL ADVICE (OUTPATIENT)
Dept: FAMILY MEDICINE | Facility: CLINIC | Age: 80
End: 2020-01-01

## 2020-01-01 ENCOUNTER — OFFICE VISIT (OUTPATIENT)
Dept: FAMILY MEDICINE | Facility: CLINIC | Age: 80
End: 2020-01-01

## 2020-01-01 ENCOUNTER — PATIENT OUTREACH (OUTPATIENT)
Dept: CARE COORDINATION | Facility: CLINIC | Age: 80
End: 2020-01-01

## 2020-01-01 ENCOUNTER — MYC MEDICAL ADVICE (OUTPATIENT)
Dept: PHYSICAL MEDICINE AND REHAB | Facility: CLINIC | Age: 80
End: 2020-01-01

## 2020-01-01 ENCOUNTER — OFFICE VISIT (OUTPATIENT)
Dept: ORTHOPEDICS | Facility: CLINIC | Age: 80
End: 2020-01-01
Attending: PODIATRIST
Payer: MEDICARE

## 2020-01-01 ENCOUNTER — HOSPITAL ENCOUNTER (OUTPATIENT)
Facility: CLINIC | Age: 80
Setting detail: OBSERVATION
Discharge: HOME | End: 2020-09-03
Attending: EMERGENCY MEDICINE | Admitting: INTERNAL MEDICINE
Payer: MEDICARE

## 2020-01-01 ENCOUNTER — HOSPITAL ENCOUNTER (OUTPATIENT)
Dept: PHYSICAL THERAPY | Facility: CLINIC | Age: 80
Setting detail: THERAPIES SERIES
End: 2020-08-25
Attending: FAMILY MEDICINE
Payer: MEDICARE

## 2020-01-01 ENCOUNTER — OFFICE VISIT (OUTPATIENT)
Dept: PHYSICAL MEDICINE AND REHAB | Facility: CLINIC | Age: 80
End: 2020-01-01
Attending: PSYCHIATRY & NEUROLOGY
Payer: MEDICARE

## 2020-01-01 ENCOUNTER — APPOINTMENT (OUTPATIENT)
Dept: GENERAL RADIOLOGY | Facility: CLINIC | Age: 80
End: 2020-01-01
Attending: EMERGENCY MEDICINE
Payer: MEDICARE

## 2020-01-01 ENCOUNTER — VIRTUAL VISIT (OUTPATIENT)
Dept: NEUROLOGY | Facility: CLINIC | Age: 80
End: 2020-01-01
Payer: MEDICARE

## 2020-01-01 ENCOUNTER — APPOINTMENT (OUTPATIENT)
Dept: GENERAL RADIOLOGY | Facility: CLINIC | Age: 80
End: 2020-01-01
Attending: NURSE PRACTITIONER
Payer: MEDICARE

## 2020-01-01 ENCOUNTER — MYC MEDICAL ADVICE (OUTPATIENT)
Dept: ANESTHESIOLOGY | Facility: CLINIC | Age: 80
End: 2020-01-01

## 2020-01-01 ENCOUNTER — VIRTUAL VISIT (OUTPATIENT)
Dept: FAMILY MEDICINE | Facility: CLINIC | Age: 80
End: 2020-01-01

## 2020-01-01 ENCOUNTER — TELEPHONE (OUTPATIENT)
Dept: NEUROLOGY | Facility: CLINIC | Age: 80
End: 2020-01-01

## 2020-01-01 ENCOUNTER — HOSPITAL ENCOUNTER (OUTPATIENT)
Dept: PHYSICAL THERAPY | Facility: CLINIC | Age: 80
Setting detail: THERAPIES SERIES
End: 2020-09-15
Attending: FAMILY MEDICINE
Payer: MEDICARE

## 2020-01-01 ENCOUNTER — HOSPITAL ENCOUNTER (OUTPATIENT)
Dept: OCCUPATIONAL THERAPY | Facility: CLINIC | Age: 80
Setting detail: THERAPIES SERIES
End: 2020-07-22
Attending: PSYCHIATRY & NEUROLOGY
Payer: MEDICARE

## 2020-01-01 ENCOUNTER — TELEPHONE (OUTPATIENT)
Dept: PHYSICAL MEDICINE AND REHAB | Facility: CLINIC | Age: 80
End: 2020-01-01

## 2020-01-01 ENCOUNTER — PRE VISIT (OUTPATIENT)
Dept: ORTHOPEDICS | Facility: CLINIC | Age: 80
End: 2020-01-01

## 2020-01-01 ENCOUNTER — OFFICE VISIT (OUTPATIENT)
Dept: NEUROLOGY | Facility: CLINIC | Age: 80
End: 2020-01-01
Payer: MEDICARE

## 2020-01-01 ENCOUNTER — ANCILLARY PROCEDURE (OUTPATIENT)
Dept: GENERAL RADIOLOGY | Facility: CLINIC | Age: 80
End: 2020-01-01
Attending: PODIATRIST
Payer: MEDICARE

## 2020-01-01 ENCOUNTER — HOSPITAL ENCOUNTER (OUTPATIENT)
Dept: PHYSICAL THERAPY | Facility: CLINIC | Age: 80
Setting detail: THERAPIES SERIES
End: 2020-09-01
Attending: FAMILY MEDICINE
Payer: MEDICARE

## 2020-01-01 ENCOUNTER — APPOINTMENT (OUTPATIENT)
Dept: CT IMAGING | Facility: CLINIC | Age: 80
End: 2020-01-01
Attending: NURSE PRACTITIONER
Payer: MEDICARE

## 2020-01-01 ENCOUNTER — APPOINTMENT (OUTPATIENT)
Dept: PHYSICAL THERAPY | Facility: CLINIC | Age: 80
End: 2020-01-01
Attending: INTERNAL MEDICINE
Payer: MEDICARE

## 2020-01-01 ENCOUNTER — PATIENT OUTREACH (OUTPATIENT)
Dept: PHYSICAL MEDICINE AND REHAB | Facility: CLINIC | Age: 80
End: 2020-01-01

## 2020-01-01 ENCOUNTER — TELEPHONE (OUTPATIENT)
Dept: OCCUPATIONAL THERAPY | Facility: CLINIC | Age: 80
End: 2020-01-01

## 2020-01-01 ENCOUNTER — TRANSFERRED RECORDS (OUTPATIENT)
Dept: FAMILY MEDICINE | Facility: CLINIC | Age: 80
End: 2020-01-01

## 2020-01-01 ENCOUNTER — VIRTUAL VISIT (OUTPATIENT)
Dept: ANESTHESIOLOGY | Facility: CLINIC | Age: 80
End: 2020-01-01
Attending: PSYCHIATRY & NEUROLOGY
Payer: MEDICARE

## 2020-01-01 VITALS
TEMPERATURE: 98.7 F | DIASTOLIC BLOOD PRESSURE: 68 MMHG | BODY MASS INDEX: 22.31 KG/M2 | OXYGEN SATURATION: 96 % | SYSTOLIC BLOOD PRESSURE: 102 MMHG | HEIGHT: 64 IN | HEART RATE: 72 BPM | RESPIRATION RATE: 16 BRPM

## 2020-01-01 VITALS
DIASTOLIC BLOOD PRESSURE: 77 MMHG | OXYGEN SATURATION: 97 % | HEART RATE: 64 BPM | SYSTOLIC BLOOD PRESSURE: 131 MMHG | RESPIRATION RATE: 16 BRPM

## 2020-01-01 VITALS — HEART RATE: 79 BPM | DIASTOLIC BLOOD PRESSURE: 71 MMHG | SYSTOLIC BLOOD PRESSURE: 106 MMHG | TEMPERATURE: 98.1 F

## 2020-01-01 VITALS
WEIGHT: 130 LBS | TEMPERATURE: 98.8 F | BODY MASS INDEX: 22.3 KG/M2 | SYSTOLIC BLOOD PRESSURE: 116 MMHG | HEART RATE: 92 BPM | OXYGEN SATURATION: 97 % | DIASTOLIC BLOOD PRESSURE: 93 MMHG | RESPIRATION RATE: 19 BRPM

## 2020-01-01 VITALS
TEMPERATURE: 97.7 F | SYSTOLIC BLOOD PRESSURE: 145 MMHG | OXYGEN SATURATION: 98 % | HEART RATE: 71 BPM | DIASTOLIC BLOOD PRESSURE: 81 MMHG | RESPIRATION RATE: 16 BRPM

## 2020-01-01 VITALS
WEIGHT: 117 LBS | HEART RATE: 91 BPM | SYSTOLIC BLOOD PRESSURE: 111 MMHG | BODY MASS INDEX: 20.08 KG/M2 | OXYGEN SATURATION: 95 % | DIASTOLIC BLOOD PRESSURE: 65 MMHG | RESPIRATION RATE: 16 BRPM

## 2020-01-01 VITALS — SYSTOLIC BLOOD PRESSURE: 130 MMHG | DIASTOLIC BLOOD PRESSURE: 78 MMHG | RESPIRATION RATE: 16 BRPM | HEART RATE: 83 BPM

## 2020-01-01 VITALS — WEIGHT: 130 LBS | BODY MASS INDEX: 22.3 KG/M2

## 2020-01-01 VITALS
SYSTOLIC BLOOD PRESSURE: 138 MMHG | HEIGHT: 64 IN | WEIGHT: 117 LBS | BODY MASS INDEX: 19.97 KG/M2 | DIASTOLIC BLOOD PRESSURE: 94 MMHG | HEART RATE: 88 BPM

## 2020-01-01 VITALS
DIASTOLIC BLOOD PRESSURE: 80 MMHG | HEART RATE: 63 BPM | OXYGEN SATURATION: 96 % | RESPIRATION RATE: 16 BRPM | TEMPERATURE: 98.4 F | SYSTOLIC BLOOD PRESSURE: 122 MMHG

## 2020-01-01 VITALS — BODY MASS INDEX: 22.3 KG/M2 | DIASTOLIC BLOOD PRESSURE: 87 MMHG | SYSTOLIC BLOOD PRESSURE: 135 MMHG | WEIGHT: 130 LBS

## 2020-01-01 VITALS
HEART RATE: 60 BPM | SYSTOLIC BLOOD PRESSURE: 136 MMHG | RESPIRATION RATE: 16 BRPM | OXYGEN SATURATION: 91 % | TEMPERATURE: 97.9 F | DIASTOLIC BLOOD PRESSURE: 79 MMHG

## 2020-01-01 VITALS — DIASTOLIC BLOOD PRESSURE: 88 MMHG | RESPIRATION RATE: 16 BRPM | SYSTOLIC BLOOD PRESSURE: 118 MMHG | HEART RATE: 84 BPM

## 2020-01-01 VITALS
DIASTOLIC BLOOD PRESSURE: 86 MMHG | OXYGEN SATURATION: 97 % | HEART RATE: 122 BPM | TEMPERATURE: 97.9 F | SYSTOLIC BLOOD PRESSURE: 134 MMHG

## 2020-01-01 DIAGNOSIS — J02.9 SORE THROAT: Primary | ICD-10-CM

## 2020-01-01 DIAGNOSIS — I25.10 CAD (CORONARY ARTERY DISEASE): ICD-10-CM

## 2020-01-01 DIAGNOSIS — M79.672 LEFT FOOT PAIN: Primary | ICD-10-CM

## 2020-01-01 DIAGNOSIS — G12.21 ALS (AMYOTROPHIC LATERAL SCLEROSIS) (H): ICD-10-CM

## 2020-01-01 DIAGNOSIS — R29.898 DROPPED HEAD SYNDROME: Primary | ICD-10-CM

## 2020-01-01 DIAGNOSIS — M25.641 STIFFNESS OF JOINTS OF BOTH HANDS: Primary | ICD-10-CM

## 2020-01-01 DIAGNOSIS — S09.90XA INJURY OF HEAD, INITIAL ENCOUNTER: ICD-10-CM

## 2020-01-01 DIAGNOSIS — L60.2 LONG TOENAIL: Primary | ICD-10-CM

## 2020-01-01 DIAGNOSIS — M25.562 LEFT KNEE PAIN, UNSPECIFIED CHRONICITY: ICD-10-CM

## 2020-01-01 DIAGNOSIS — G25.81 RESTLESS LEG SYNDROME: Primary | ICD-10-CM

## 2020-01-01 DIAGNOSIS — L03.031 PARONYCHIA OF TOE OF RIGHT FOOT: Primary | ICD-10-CM

## 2020-01-01 DIAGNOSIS — M17.12 PATELLOFEMORAL ARTHRITIS OF LEFT KNEE: Primary | ICD-10-CM

## 2020-01-01 DIAGNOSIS — F32.89 OTHER DEPRESSION: ICD-10-CM

## 2020-01-01 DIAGNOSIS — Z99.3 WHEELCHAIR DEPENDENCE: ICD-10-CM

## 2020-01-01 DIAGNOSIS — I10 ESSENTIAL HYPERTENSION: Primary | ICD-10-CM

## 2020-01-01 DIAGNOSIS — L60.2 LONG TOENAIL: ICD-10-CM

## 2020-01-01 DIAGNOSIS — I25.10 CORONARY ARTERY DISEASE INVOLVING NATIVE CORONARY ARTERY OF NATIVE HEART WITHOUT ANGINA PECTORIS: ICD-10-CM

## 2020-01-01 DIAGNOSIS — M24.549 FLEXION CONTRACTURE OF JOINT OF HAND, UNSPECIFIED LATERALITY: ICD-10-CM

## 2020-01-01 DIAGNOSIS — I63.9 CEREBROVASCULAR ACCIDENT (CVA), UNSPECIFIED MECHANISM (H): ICD-10-CM

## 2020-01-01 DIAGNOSIS — G82.50 SPASTIC QUADRIPARESIS (H): Primary | ICD-10-CM

## 2020-01-01 DIAGNOSIS — L60.0 ONYCHOCRYPTOSIS: Primary | ICD-10-CM

## 2020-01-01 DIAGNOSIS — M19.049 PRIMARY LOCALIZED OSTEOARTHROSIS OF HAND, UNSPECIFIED LATERALITY: Primary | ICD-10-CM

## 2020-01-01 DIAGNOSIS — G12.21 ALS (AMYOTROPHIC LATERAL SCLEROSIS) (H): Primary | ICD-10-CM

## 2020-01-01 DIAGNOSIS — K11.7 SIALORRHEA: ICD-10-CM

## 2020-01-01 DIAGNOSIS — M19.049 PRIMARY LOCALIZED OSTEOARTHROSIS OF HAND, UNSPECIFIED LATERALITY: ICD-10-CM

## 2020-01-01 DIAGNOSIS — K11.7 SIALORRHEA: Primary | ICD-10-CM

## 2020-01-01 DIAGNOSIS — I25.10 CAD (CORONARY ARTERY DISEASE): Primary | ICD-10-CM

## 2020-01-01 DIAGNOSIS — J44.9 CHRONIC OBSTRUCTIVE PULMONARY DISEASE, UNSPECIFIED COPD TYPE (H): ICD-10-CM

## 2020-01-01 DIAGNOSIS — G47.429 NARCOLEPSY DUE TO UNDERLYING CONDITION WITHOUT CATAPLEXY: ICD-10-CM

## 2020-01-01 DIAGNOSIS — I73.89 OTHER SPECIFIED PERIPHERAL VASCULAR DISEASES (H): ICD-10-CM

## 2020-01-01 DIAGNOSIS — G62.9 PERIPHERAL POLYNEUROPATHY: ICD-10-CM

## 2020-01-01 DIAGNOSIS — L08.9 LOCAL INFECTION OF SKIN AND SUBCUTANEOUS TISSUE: Primary | ICD-10-CM

## 2020-01-01 DIAGNOSIS — M77.41 METATARSALGIA OF RIGHT FOOT: ICD-10-CM

## 2020-01-01 DIAGNOSIS — M53.3 PAIN IN THE COCCYX: ICD-10-CM

## 2020-01-01 DIAGNOSIS — I48.91 ATRIAL FIBRILLATION, UNSPECIFIED TYPE (H): ICD-10-CM

## 2020-01-01 DIAGNOSIS — K59.00 CONSTIPATION, UNSPECIFIED CONSTIPATION TYPE: Primary | ICD-10-CM

## 2020-01-01 DIAGNOSIS — G56.03 BILATERAL CARPAL TUNNEL SYNDROME: Primary | ICD-10-CM

## 2020-01-01 DIAGNOSIS — G82.50 SPASTIC QUADRIPARESIS (H): ICD-10-CM

## 2020-01-01 DIAGNOSIS — M25.511 ACUTE PAIN OF RIGHT SHOULDER: ICD-10-CM

## 2020-01-01 DIAGNOSIS — I73.9 PAD (PERIPHERAL ARTERY DISEASE) (H): ICD-10-CM

## 2020-01-01 DIAGNOSIS — I25.5 ISCHEMIC CARDIOMYOPATHY: ICD-10-CM

## 2020-01-01 DIAGNOSIS — I95.9 HYPOTENSION, UNSPECIFIED HYPOTENSION TYPE: ICD-10-CM

## 2020-01-01 DIAGNOSIS — Z20.822 COVID-19 RULED OUT: Primary | ICD-10-CM

## 2020-01-01 DIAGNOSIS — M79.671 RIGHT FOOT PAIN: Primary | ICD-10-CM

## 2020-01-01 DIAGNOSIS — M79.672 LEFT FOOT PAIN: ICD-10-CM

## 2020-01-01 DIAGNOSIS — R43.9 DISTURBANCES OF SENSATION OF SMELL AND TASTE: ICD-10-CM

## 2020-01-01 DIAGNOSIS — M25.642 STIFFNESS OF JOINTS OF BOTH HANDS: Primary | ICD-10-CM

## 2020-01-01 DIAGNOSIS — G31.9 NEURODEGENERATIVE DISORDER (H): ICD-10-CM

## 2020-01-01 DIAGNOSIS — M62.838 MUSCLE SPASMS OF BOTH LOWER EXTREMITIES: Primary | ICD-10-CM

## 2020-01-01 DIAGNOSIS — I11.0 HYPERTENSIVE HEART DISEASE WITH HEART FAILURE (H): Primary | ICD-10-CM

## 2020-01-01 DIAGNOSIS — M79.671 RIGHT FOOT PAIN: ICD-10-CM

## 2020-01-01 DIAGNOSIS — M17.12 PATELLOFEMORAL ARTHRITIS OF LEFT KNEE: ICD-10-CM

## 2020-01-01 DIAGNOSIS — R25.2 SPASTICITY: ICD-10-CM

## 2020-01-01 DIAGNOSIS — E78.2 MIXED HYPERLIPIDEMIA: ICD-10-CM

## 2020-01-01 DIAGNOSIS — M65.30 TRIGGER FINGER, UNSPECIFIED FINGER, UNSPECIFIED LATERALITY: ICD-10-CM

## 2020-01-01 DIAGNOSIS — F32.A DEPRESSION, UNSPECIFIED DEPRESSION TYPE: ICD-10-CM

## 2020-01-01 DIAGNOSIS — E44.0 MODERATE PROTEIN-CALORIE MALNUTRITION (H): ICD-10-CM

## 2020-01-01 DIAGNOSIS — E86.0 DEHYDRATION: ICD-10-CM
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
## 2020-01-01 DIAGNOSIS — G93.9: ICD-10-CM

## 2020-01-01 LAB
ALBUMIN SERPL-MCNC: 2.9 G/DL (ref 3.4–5)
ALBUMIN SERPL-MCNC: 3.5 G/DL (ref 3.4–5)
ALBUMIN UR-MCNC: NEGATIVE MG/DL
ALP SERPL-CCNC: 82 U/L (ref 40–150)
ALP SERPL-CCNC: 90 U/L (ref 40–150)
ALT SERPL W P-5'-P-CCNC: 15 U/L (ref 0–50)
ALT SERPL W P-5'-P-CCNC: 17 U/L (ref 0–50)
ANION GAP SERPL CALCULATED.3IONS-SCNC: 2 MMOL/L (ref 3–14)
ANION GAP SERPL CALCULATED.3IONS-SCNC: 3 MMOL/L (ref 3–14)
ANION GAP SERPL CALCULATED.3IONS-SCNC: 5 MMOL/L (ref 3–14)
APPEARANCE UR: CLEAR
AST SERPL W P-5'-P-CCNC: 41 U/L (ref 0–45)
AST SERPL W P-5'-P-CCNC: 47 U/L (ref 0–45)
BACTERIA SPEC CULT: NO GROWTH
BACTERIA SPEC CULT: NO GROWTH
BASOPHILS # BLD AUTO: 0 10E9/L (ref 0–0.2)
BASOPHILS # BLD AUTO: 0 10E9/L (ref 0–0.2)
BASOPHILS NFR BLD AUTO: 0.3 %
BASOPHILS NFR BLD AUTO: 0.5 %
BILIRUB SERPL-MCNC: 0.4 MG/DL (ref 0.2–1.3)
BILIRUB SERPL-MCNC: 0.5 MG/DL (ref 0.2–1.3)
BILIRUB UR QL STRIP: NEGATIVE
BUN SERPL-MCNC: 11 MG/DL (ref 7–30)
BUN SERPL-MCNC: 12 MG/DL (ref 7–30)
BUN SERPL-MCNC: 8 MG/DL (ref 7–30)
CALCIUM SERPL-MCNC: 8.1 MG/DL (ref 8.5–10.1)
CALCIUM SERPL-MCNC: 9.4 MG/DL (ref 8.5–10.1)
CALCIUM SERPL-MCNC: 9.8 MG/DL (ref 8.5–10.1)
CHLORIDE SERPL-SCNC: 103 MMOL/L (ref 94–109)
CHLORIDE SERPL-SCNC: 105 MMOL/L (ref 94–109)
CHLORIDE SERPL-SCNC: 106 MMOL/L (ref 94–109)
CHOLEST SERPL-MCNC: 138 MG/DL
CO2 SERPL-SCNC: 27 MMOL/L (ref 20–32)
CO2 SERPL-SCNC: 29 MMOL/L (ref 20–32)
CO2 SERPL-SCNC: 30 MMOL/L (ref 20–32)
COLOR UR AUTO: YELLOW
CREAT SERPL-MCNC: 0.64 MG/DL (ref 0.52–1.04)
CREAT SERPL-MCNC: 0.64 MG/DL (ref 0.52–1.04)
CREAT SERPL-MCNC: 0.85 MG/DL (ref 0.52–1.04)
DIFFERENTIAL METHOD BLD: NORMAL
DIFFERENTIAL METHOD BLD: NORMAL
EOSINOPHIL # BLD AUTO: 0 10E9/L (ref 0–0.7)
EOSINOPHIL # BLD AUTO: 0 10E9/L (ref 0–0.7)
EOSINOPHIL NFR BLD AUTO: 0.9 %
EOSINOPHIL NFR BLD AUTO: 1 %
ERYTHROCYTE [DISTWIDTH] IN BLOOD BY AUTOMATED COUNT: 14.4 % (ref 10–15)
ERYTHROCYTE [DISTWIDTH] IN BLOOD BY AUTOMATED COUNT: 14.6 % (ref 10–15)
ERYTHROCYTE [DISTWIDTH] IN BLOOD BY AUTOMATED COUNT: 14.8 % (ref 10–15)
EXPTIME-PRE: 5.59 SEC
FEF2575-%PRED-PRE: 33 %
FEF2575-PRE: 0.53 L/SEC
FEF2575-PRED: 1.55 L/SEC
FEFMAX-%PRED-PRE: 30 %
FEFMAX-PRE: 1.46 L/SEC
FEFMAX-PRED: 4.79 L/SEC
FEV1-%PRED-PRE: 37 %
FEV1-PRE: 0.72 L
FEV1FEV6-PRE: 76 %
FEV1FEV6-PRED: 78 %
FEV1FVC-PRE: 76 %
FEV1FVC-PRED: 77 %
FIFMAX-PRE: 1.03 L/SEC
FVC-%PRED-PRE: 37 %
FVC-PRE: 0.95 L
FVC-PRED: 2.54 L
GFR SERPL CREATININE-BSD FRML MDRD: 65 ML/MIN/{1.73_M2}
GFR SERPL CREATININE-BSD FRML MDRD: 84 ML/MIN/{1.73_M2}
GFR SERPL CREATININE-BSD FRML MDRD: 84 ML/MIN/{1.73_M2}
GLUCOSE SERPL-MCNC: 104 MG/DL (ref 70–99)
GLUCOSE SERPL-MCNC: 90 MG/DL (ref 70–99)
GLUCOSE SERPL-MCNC: 95 MG/DL (ref 70–99)
GLUCOSE UR STRIP-MCNC: NEGATIVE MG/DL
HCT VFR BLD AUTO: 37.5 % (ref 35–47)
HCT VFR BLD AUTO: 38.9 % (ref 35–47)
HCT VFR BLD AUTO: 39.6 % (ref 35–47)
HDLC SERPL-MCNC: 49 MG/DL
HGB BLD-MCNC: 12.4 G/DL (ref 11.7–15.7)
HGB BLD-MCNC: 12.7 G/DL (ref 11.7–15.7)
HGB BLD-MCNC: 12.7 G/DL (ref 11.7–15.7)
HGB UR QL STRIP: NEGATIVE
IMM GRANULOCYTES # BLD: 0 10E9/L (ref 0–0.4)
IMM GRANULOCYTES # BLD: 0 10E9/L (ref 0–0.4)
IMM GRANULOCYTES NFR BLD: 0 %
IMM GRANULOCYTES NFR BLD: 0.2 %
INTERPRETATION ECG - MUSE: NORMAL
INTERPRETATION ECG - MUSE: NORMAL
KETONES UR STRIP-MCNC: NEGATIVE MG/DL
LACTATE BLD-SCNC: 1.5 MMOL/L (ref 0.7–2)
LDLC SERPL CALC-MCNC: 76 MG/DL
LEUKOCYTE ESTERASE UR QL STRIP: NEGATIVE
LYMPHOCYTES # BLD AUTO: 1.1 10E9/L (ref 0.8–5.3)
LYMPHOCYTES # BLD AUTO: 1.4 10E9/L (ref 0.8–5.3)
LYMPHOCYTES NFR BLD AUTO: 25.2 %
LYMPHOCYTES NFR BLD AUTO: 34.8 %
MAGNESIUM SERPL-MCNC: 1.9 MG/DL (ref 1.6–2.3)
MCH RBC QN AUTO: 28.2 PG (ref 26.5–33)
MCH RBC QN AUTO: 28.9 PG (ref 26.5–33)
MCH RBC QN AUTO: 30.2 PG (ref 26.5–33)
MCHC RBC AUTO-ENTMCNC: 32.1 G/DL (ref 31.5–36.5)
MCHC RBC AUTO-ENTMCNC: 32.6 G/DL (ref 31.5–36.5)
MCHC RBC AUTO-ENTMCNC: 33.1 G/DL (ref 31.5–36.5)
MCV RBC AUTO: 88 FL (ref 78–100)
MCV RBC AUTO: 89 FL (ref 78–100)
MCV RBC AUTO: 91 FL (ref 78–100)
MEP-PRE: 18 CMH2O
MIP-PRE: -13 CMH2O
MONOCYTES # BLD AUTO: 0.3 10E9/L (ref 0–1.3)
MONOCYTES # BLD AUTO: 0.4 10E9/L (ref 0–1.3)
MONOCYTES NFR BLD AUTO: 11 %
MONOCYTES NFR BLD AUTO: 7.4 %
MUCOUS THREADS #/AREA URNS LPF: PRESENT /LPF
NEUTROPHILS # BLD AUTO: 2.1 10E9/L (ref 1.6–8.3)
NEUTROPHILS # BLD AUTO: 2.9 10E9/L (ref 1.6–8.3)
NEUTROPHILS NFR BLD AUTO: 52.9 %
NEUTROPHILS NFR BLD AUTO: 65.8 %
NITRATE UR QL: NEGATIVE
NONHDLC SERPL-MCNC: 89 MG/DL
NRBC # BLD AUTO: 0 10*3/UL
NRBC # BLD AUTO: 0 10*3/UL
NRBC BLD AUTO-RTO: 0 /100
NRBC BLD AUTO-RTO: 0 /100
PH UR STRIP: 6.5 PH (ref 5–7)
PHOSPHATE SERPL-MCNC: 2.8 MG/DL (ref 2.5–4.5)
PLATELET # BLD AUTO: 179 10E9/L (ref 150–450)
PLATELET # BLD AUTO: 179 10E9/L (ref 150–450)
PLATELET # BLD AUTO: 191 10E9/L (ref 150–450)
POTASSIUM SERPL-SCNC: 4 MMOL/L (ref 3.4–5.3)
POTASSIUM SERPL-SCNC: 4.1 MMOL/L (ref 3.4–5.3)
POTASSIUM SERPL-SCNC: 4.2 MMOL/L (ref 3.4–5.3)
PROT SERPL-MCNC: 6.3 G/DL (ref 6.8–8.8)
PROT SERPL-MCNC: 7.6 G/DL (ref 6.8–8.8)
RBC # BLD AUTO: 4.11 10E12/L (ref 3.8–5.2)
RBC # BLD AUTO: 4.39 10E12/L (ref 3.8–5.2)
RBC # BLD AUTO: 4.5 10E12/L (ref 3.8–5.2)
RBC #/AREA URNS AUTO: 1 /HPF (ref 0–2)
SARS-COV-2 RNA SPEC QL NAA+PROBE: NOT DETECTED
SODIUM SERPL-SCNC: 135 MMOL/L (ref 133–144)
SODIUM SERPL-SCNC: 137 MMOL/L (ref 133–144)
SODIUM SERPL-SCNC: 138 MMOL/L (ref 133–144)
SOURCE: ABNORMAL
SP GR UR STRIP: 1.01 (ref 1–1.03)
SPECIMEN SOURCE: NORMAL
TRIGL SERPL-MCNC: 67 MG/DL
TROPONIN I SERPL-MCNC: 0.07 UG/L (ref 0–0.04)
TROPONIN I SERPL-MCNC: 0.07 UG/L (ref 0–0.04)
UROBILINOGEN UR STRIP-MCNC: NORMAL MG/DL (ref 0–2)
WBC # BLD AUTO: 3.9 10E9/L (ref 4–11)
WBC # BLD AUTO: 4 10E9/L (ref 4–11)
WBC # BLD AUTO: 4.3 10E9/L (ref 4–11)
WBC #/AREA URNS AUTO: <1 /HPF (ref 0–5)

## 2020-01-01 PROCEDURE — 97542 WHEELCHAIR MNGMENT TRAINING: CPT | Mod: GO | Performed by: OCCUPATIONAL THERAPIST

## 2020-01-01 PROCEDURE — G0378 HOSPITAL OBSERVATION PER HR: HCPCS

## 2020-01-01 PROCEDURE — 70450 CT HEAD/BRAIN W/O DYE: CPT

## 2020-01-01 PROCEDURE — 93005 ELECTROCARDIOGRAM TRACING: CPT

## 2020-01-01 PROCEDURE — 94375 RESPIRATORY FLOW VOLUME LOOP: CPT | Performed by: INTERNAL MEDICINE

## 2020-01-01 PROCEDURE — 80048 BASIC METABOLIC PNL TOTAL CA: CPT | Performed by: NURSE PRACTITIONER

## 2020-01-01 PROCEDURE — 99285 EMERGENCY DEPT VISIT HI MDM: CPT | Mod: 25

## 2020-01-01 PROCEDURE — 72220 X-RAY EXAM SACRUM TAILBONE: CPT

## 2020-01-01 PROCEDURE — 81001 URINALYSIS AUTO W/SCOPE: CPT | Performed by: EMERGENCY MEDICINE

## 2020-01-01 PROCEDURE — 96361 HYDRATE IV INFUSION ADD-ON: CPT

## 2020-01-01 PROCEDURE — 95874 GUIDE NERV DESTR NEEDLE EMG: CPT | Performed by: PHYSICAL MEDICINE & REHABILITATION

## 2020-01-01 PROCEDURE — 25800030 ZZH RX IP 258 OP 636: Performed by: EMERGENCY MEDICINE

## 2020-01-01 PROCEDURE — U0003 INFECTIOUS AGENT DETECTION BY NUCLEIC ACID (DNA OR RNA); SEVERE ACUTE RESPIRATORY SYNDROME CORONAVIRUS 2 (SARS-COV-2) (CORONAVIRUS DISEASE [COVID-19]), AMPLIFIED PROBE TECHNIQUE, MAKING USE OF HIGH THROUGHPUT TECHNOLOGIES AS DESCRIBED BY CMS-2020-01-R: HCPCS | Performed by: EMERGENCY MEDICINE

## 2020-01-01 PROCEDURE — 99213 OFFICE O/P EST LOW 20 MIN: CPT | Performed by: INTERNAL MEDICINE

## 2020-01-01 PROCEDURE — 99213 OFFICE O/P EST LOW 20 MIN: CPT | Performed by: NURSE PRACTITIONER

## 2020-01-01 PROCEDURE — 71045 X-RAY EXAM CHEST 1 VIEW: CPT

## 2020-01-01 PROCEDURE — 85025 COMPLETE CBC W/AUTO DIFF WBC: CPT | Performed by: EMERGENCY MEDICINE

## 2020-01-01 PROCEDURE — 99213 OFFICE O/P EST LOW 20 MIN: CPT | Mod: 95 | Performed by: NURSE PRACTITIONER

## 2020-01-01 PROCEDURE — 25800030 ZZH RX IP 258 OP 636: Performed by: INTERNAL MEDICINE

## 2020-01-01 PROCEDURE — 73030 X-RAY EXAM OF SHOULDER: CPT | Mod: RT

## 2020-01-01 PROCEDURE — 64642 CHEMODENERV 1 EXTREMITY 1-4: CPT | Performed by: PHYSICAL MEDICINE & REHABILITATION

## 2020-01-01 PROCEDURE — 64611 CHEMODENERV SALIV GLANDS: CPT | Performed by: PHYSICAL MEDICINE & REHABILITATION

## 2020-01-01 PROCEDURE — G0180 MD CERTIFICATION HHA PATIENT: HCPCS | Performed by: FAMILY MEDICINE

## 2020-01-01 PROCEDURE — 99220 ZZC INITIAL OBSERVATION CARE,LEVL III: CPT | Performed by: INTERNAL MEDICINE

## 2020-01-01 PROCEDURE — 97110 THERAPEUTIC EXERCISES: CPT | Mod: GO | Performed by: OCCUPATIONAL THERAPIST

## 2020-01-01 PROCEDURE — 93010 ELECTROCARDIOGRAM REPORT: CPT | Performed by: INTERNAL MEDICINE

## 2020-01-01 PROCEDURE — 84484 ASSAY OF TROPONIN QUANT: CPT | Performed by: EMERGENCY MEDICINE

## 2020-01-01 PROCEDURE — 99213 OFFICE O/P EST LOW 20 MIN: CPT | Performed by: PODIATRIST

## 2020-01-01 PROCEDURE — 80053 COMPREHEN METABOLIC PANEL: CPT | Performed by: EMERGENCY MEDICINE

## 2020-01-01 PROCEDURE — 83605 ASSAY OF LACTIC ACID: CPT | Performed by: EMERGENCY MEDICINE

## 2020-01-01 PROCEDURE — 97110 THERAPEUTIC EXERCISES: CPT | Mod: GP | Performed by: PHYSICAL THERAPIST

## 2020-01-01 PROCEDURE — 80061 LIPID PANEL: CPT | Performed by: INTERNAL MEDICINE

## 2020-01-01 PROCEDURE — 97162 PT EVAL MOD COMPLEX 30 MIN: CPT | Mod: GP | Performed by: PHYSICAL THERAPIST

## 2020-01-01 PROCEDURE — 99214 OFFICE O/P EST MOD 30 MIN: CPT | Performed by: NURSE PRACTITIONER

## 2020-01-01 PROCEDURE — 99215 OFFICE O/P EST HI 40 MIN: CPT | Performed by: PSYCHIATRY & NEUROLOGY

## 2020-01-01 PROCEDURE — 25000132 ZZH RX MED GY IP 250 OP 250 PS 637: Mod: GY | Performed by: STUDENT IN AN ORGANIZED HEALTH CARE EDUCATION/TRAINING PROGRAM

## 2020-01-01 PROCEDURE — 25000128 H RX IP 250 OP 636: Performed by: EMERGENCY MEDICINE

## 2020-01-01 PROCEDURE — 97161 PT EVAL LOW COMPLEX 20 MIN: CPT | Mod: GP

## 2020-01-01 PROCEDURE — 999N000208 ECHOCARDIOGRAM COMPLETE

## 2020-01-01 PROCEDURE — 25000132 ZZH RX MED GY IP 250 OP 250 PS 637: Mod: GY | Performed by: NURSE PRACTITIONER

## 2020-01-01 PROCEDURE — C9803 HOPD COVID-19 SPEC COLLECT: HCPCS

## 2020-01-01 PROCEDURE — 255N000002 HC RX 255 OP 636: Performed by: INTERNAL MEDICINE

## 2020-01-01 PROCEDURE — 85025 COMPLETE CBC W/AUTO DIFF WBC: CPT | Performed by: NURSE PRACTITIONER

## 2020-01-01 PROCEDURE — 96374 THER/PROPH/DIAG INJ IV PUSH: CPT

## 2020-01-01 PROCEDURE — 97165 OT EVAL LOW COMPLEX 30 MIN: CPT | Mod: GO | Performed by: OCCUPATIONAL THERAPIST

## 2020-01-01 PROCEDURE — 25000132 ZZH RX MED GY IP 250 OP 250 PS 637: Mod: GY | Performed by: INTERNAL MEDICINE

## 2020-01-01 PROCEDURE — 93306 TTE W/DOPPLER COMPLETE: CPT | Mod: 26 | Performed by: INTERNAL MEDICINE

## 2020-01-01 PROCEDURE — 99214 OFFICE O/P EST MOD 30 MIN: CPT | Performed by: FAMILY MEDICINE

## 2020-01-01 PROCEDURE — 87040 BLOOD CULTURE FOR BACTERIA: CPT | Performed by: EMERGENCY MEDICINE

## 2020-01-01 PROCEDURE — 36415 COLL VENOUS BLD VENIPUNCTURE: CPT | Performed by: INTERNAL MEDICINE

## 2020-01-01 PROCEDURE — 99217 ZZC OBSERVATION CARE DISCHARGE: CPT | Performed by: STUDENT IN AN ORGANIZED HEALTH CARE EDUCATION/TRAINING PROGRAM

## 2020-01-01 PROCEDURE — 72125 CT NECK SPINE W/O DYE: CPT

## 2020-01-01 RX ORDER — SERTRALINE HYDROCHLORIDE 25 MG/1
25 TABLET, FILM COATED ORAL DAILY
Qty: 90 TABLET | Refills: 0 | Status: SHIPPED | OUTPATIENT
Start: 2020-01-01

## 2020-01-01 RX ORDER — BUPIVACAINE HYDROCHLORIDE 2.5 MG/ML
2 INJECTION, SOLUTION INFILTRATION; PERINEURAL ONCE
Status: COMPLETED | OUTPATIENT
Start: 2020-01-01 | End: 2020-01-01

## 2020-01-01 RX ORDER — ONDANSETRON 4 MG/1
4 TABLET, ORALLY DISINTEGRATING ORAL EVERY 6 HOURS PRN
Status: DISCONTINUED | OUTPATIENT
Start: 2020-01-01 | End: 2020-01-01 | Stop reason: HOSPADM

## 2020-01-01 RX ORDER — CARBIDOPA AND LEVODOPA 25; 100 MG/1; MG/1
1 TABLET ORAL ONCE
Status: COMPLETED | OUTPATIENT
Start: 2020-01-01 | End: 2020-01-01

## 2020-01-01 RX ORDER — MODAFINIL 100 MG/1
TABLET ORAL
Qty: 30 TABLET | Refills: 2 | Status: SHIPPED | OUTPATIENT
Start: 2020-01-01 | End: 2021-01-01

## 2020-01-01 RX ORDER — GLYCOPYRROLATE 1 MG/1
1 TABLET ORAL 3 TIMES DAILY
Status: DISCONTINUED | OUTPATIENT
Start: 2020-01-01 | End: 2020-01-01 | Stop reason: HOSPADM

## 2020-01-01 RX ORDER — ROSUVASTATIN CALCIUM 20 MG/1
20 TABLET, COATED ORAL DAILY
Qty: 90 TABLET | Refills: 3 | Status: SHIPPED | OUTPATIENT
Start: 2020-01-01

## 2020-01-01 RX ORDER — MIRTAZAPINE 7.5 MG/1
TABLET, FILM COATED ORAL
Qty: 90 TABLET | Refills: 1 | Status: SHIPPED | OUTPATIENT
Start: 2020-01-01 | End: 2020-01-01

## 2020-01-01 RX ORDER — BUPIVACAINE HYDROCHLORIDE 2.5 MG/ML
4 INJECTION, SOLUTION INFILTRATION; PERINEURAL ONCE
Status: COMPLETED | OUTPATIENT
Start: 2020-01-01 | End: 2020-01-01

## 2020-01-01 RX ORDER — CARVEDILOL 6.25 MG/1
6.25 TABLET ORAL ONCE
Status: COMPLETED | OUTPATIENT
Start: 2020-01-01 | End: 2020-01-01

## 2020-01-01 RX ORDER — CLOPIDOGREL BISULFATE 75 MG/1
75 TABLET ORAL DAILY
Status: DISCONTINUED | OUTPATIENT
Start: 2020-01-01 | End: 2020-01-01 | Stop reason: HOSPADM

## 2020-01-01 RX ORDER — CARBIDOPA AND LEVODOPA 25; 100 MG/1; MG/1
2 TABLET ORAL AT BEDTIME
Status: DISCONTINUED | OUTPATIENT
Start: 2020-01-01 | End: 2020-01-01 | Stop reason: HOSPADM

## 2020-01-01 RX ORDER — AMOXICILLIN 250 MG
2 CAPSULE ORAL 2 TIMES DAILY PRN
Status: DISCONTINUED | OUTPATIENT
Start: 2020-01-01 | End: 2020-01-01 | Stop reason: HOSPADM

## 2020-01-01 RX ORDER — TIZANIDINE 2 MG/1
6 TABLET ORAL EVERY 8 HOURS PRN
Status: DISCONTINUED | OUTPATIENT
Start: 2020-01-01 | End: 2020-01-01 | Stop reason: HOSPADM

## 2020-01-01 RX ORDER — ONDANSETRON 2 MG/ML
4 INJECTION INTRAMUSCULAR; INTRAVENOUS EVERY 6 HOURS PRN
Status: DISCONTINUED | OUTPATIENT
Start: 2020-01-01 | End: 2020-01-01 | Stop reason: HOSPADM

## 2020-01-01 RX ORDER — CARBIDOPA AND LEVODOPA 25; 100 MG/1; MG/1
1 TABLET ORAL 3 TIMES DAILY
Status: DISCONTINUED | OUTPATIENT
Start: 2020-01-01 | End: 2020-01-01

## 2020-01-01 RX ORDER — MIRTAZAPINE 7.5 MG/1
TABLET, FILM COATED ORAL
Qty: 90 TABLET | Refills: 2 | Status: SHIPPED | OUTPATIENT
Start: 2020-01-01 | End: 2021-01-01

## 2020-01-01 RX ORDER — DOXYCYCLINE 100 MG/1
100 CAPSULE ORAL 2 TIMES DAILY
Qty: 10 CAPSULE | Refills: 0 | Status: SHIPPED | OUTPATIENT
Start: 2020-01-01 | End: 2020-01-01

## 2020-01-01 RX ORDER — SULFAMETHOXAZOLE/TRIMETHOPRIM 800-160 MG
1 TABLET ORAL 2 TIMES DAILY
Qty: 14 TABLET | Refills: 0 | Status: SHIPPED | OUTPATIENT
Start: 2020-01-01 | End: 2020-01-01

## 2020-01-01 RX ORDER — POLYETHYLENE GLYCOL 3350 17 G/17G
1 POWDER, FOR SOLUTION ORAL DAILY
Qty: 507 G | Refills: 1 | Status: SHIPPED | OUTPATIENT
Start: 2020-01-01

## 2020-01-01 RX ORDER — SODIUM CHLORIDE, SODIUM LACTATE, POTASSIUM CHLORIDE, CALCIUM CHLORIDE 600; 310; 30; 20 MG/100ML; MG/100ML; MG/100ML; MG/100ML
INJECTION, SOLUTION INTRAVENOUS CONTINUOUS
Status: DISCONTINUED | OUTPATIENT
Start: 2020-01-01 | End: 2020-01-01 | Stop reason: HOSPADM

## 2020-01-01 RX ORDER — CARBIDOPA AND LEVODOPA 25; 100 MG/1; MG/1
2 TABLET ORAL AT BEDTIME
Status: DISCONTINUED | OUTPATIENT
Start: 2020-01-01 | End: 2020-01-01

## 2020-01-01 RX ORDER — CARBIDOPA AND LEVODOPA 25; 100 MG/1; MG/1
1 TABLET ORAL 3 TIMES DAILY
Status: DISCONTINUED | OUTPATIENT
Start: 2020-01-01 | End: 2020-01-01 | Stop reason: HOSPADM

## 2020-01-01 RX ORDER — LEVOTHYROXINE SODIUM 112 UG/1
112 TABLET ORAL DAILY
Status: DISCONTINUED | OUTPATIENT
Start: 2020-01-01 | End: 2020-01-01 | Stop reason: HOSPADM

## 2020-01-01 RX ORDER — AMOXICILLIN 250 MG
1 CAPSULE ORAL 2 TIMES DAILY PRN
Status: DISCONTINUED | OUTPATIENT
Start: 2020-01-01 | End: 2020-01-01 | Stop reason: HOSPADM

## 2020-01-01 RX ORDER — NALOXONE HYDROCHLORIDE 0.4 MG/ML
.1-.4 INJECTION, SOLUTION INTRAMUSCULAR; INTRAVENOUS; SUBCUTANEOUS
Status: DISCONTINUED | OUTPATIENT
Start: 2020-01-01 | End: 2020-01-01 | Stop reason: HOSPADM

## 2020-01-01 RX ORDER — FENTANYL CITRATE 50 UG/ML
50 INJECTION, SOLUTION INTRAMUSCULAR; INTRAVENOUS ONCE
Status: DISCONTINUED | OUTPATIENT
Start: 2020-01-01 | End: 2020-01-01

## 2020-01-01 RX ORDER — MODAFINIL 100 MG/1
100 TABLET ORAL DAILY
Qty: 30 TABLET | Refills: 1 | Status: SHIPPED | OUTPATIENT
Start: 2020-01-01 | End: 2020-01-01

## 2020-01-01 RX ORDER — TIZANIDINE 2 MG/1
2 TABLET ORAL EVERY 8 HOURS PRN
Status: DISCONTINUED | OUTPATIENT
Start: 2020-01-01 | End: 2020-01-01

## 2020-01-01 RX ORDER — DIAZEPAM 10 MG/2ML
2.5 INJECTION, SOLUTION INTRAMUSCULAR; INTRAVENOUS ONCE
Status: COMPLETED | OUTPATIENT
Start: 2020-01-01 | End: 2020-01-01

## 2020-01-01 RX ORDER — ACETAMINOPHEN 650 MG/1
650 SUPPOSITORY RECTAL EVERY 4 HOURS PRN
Status: DISCONTINUED | OUTPATIENT
Start: 2020-01-01 | End: 2020-01-01 | Stop reason: HOSPADM

## 2020-01-01 RX ORDER — ACETAMINOPHEN 325 MG/1
650 TABLET ORAL EVERY 4 HOURS PRN
Status: DISCONTINUED | OUTPATIENT
Start: 2020-01-01 | End: 2020-01-01 | Stop reason: HOSPADM

## 2020-01-01 RX ORDER — TIZANIDINE 2 MG/1
TABLET ORAL
Qty: 270 TABLET | Refills: 11 | Status: SHIPPED | OUTPATIENT
Start: 2020-01-01

## 2020-01-01 RX ORDER — BUPIVACAINE HYDROCHLORIDE 2.5 MG/ML
3 INJECTION, SOLUTION INFILTRATION; PERINEURAL ONCE
Status: COMPLETED | OUTPATIENT
Start: 2020-01-01 | End: 2020-01-01

## 2020-01-01 RX ADMIN — BUPIVACAINE HYDROCHLORIDE 5 MG: 2.5 INJECTION, SOLUTION INFILTRATION; PERINEURAL at 17:22

## 2020-01-01 RX ADMIN — APIXABAN 2.5 MG: 2.5 TABLET, FILM COATED ORAL at 01:00

## 2020-01-01 RX ADMIN — ACETAMINOPHEN 650 MG: 325 TABLET, FILM COATED ORAL at 01:41

## 2020-01-01 RX ADMIN — HUMAN ALBUMIN MICROSPHERES AND PERFLUTREN 9 ML: 10; .22 INJECTION, SOLUTION INTRAVENOUS at 12:06

## 2020-01-01 RX ADMIN — BUPIVACAINE HYDROCHLORIDE 7.5 MG: 2.5 INJECTION, SOLUTION INFILTRATION; PERINEURAL at 14:00

## 2020-01-01 RX ADMIN — CARBIDOPA AND LEVODOPA 1 TABLET: 25; 100 TABLET ORAL at 07:57

## 2020-01-01 RX ADMIN — CARBIDOPA AND LEVODOPA 2 TABLET: 25; 100 TABLET ORAL at 02:44

## 2020-01-01 RX ADMIN — BUPIVACAINE HYDROCHLORIDE 10 MG: 2.5 INJECTION, SOLUTION INFILTRATION; PERINEURAL at 14:00

## 2020-01-01 RX ADMIN — APIXABAN 2.5 MG: 2.5 TABLET, FILM COATED ORAL at 09:13

## 2020-01-01 RX ADMIN — OMEPRAZOLE 20 MG: 20 CAPSULE, DELAYED RELEASE ORAL at 01:00

## 2020-01-01 RX ADMIN — CLOPIDOGREL BISULFATE 75 MG: 75 TABLET, FILM COATED ORAL at 09:13

## 2020-01-01 RX ADMIN — DIAZEPAM 2.5 MG: 5 INJECTION, SOLUTION INTRAMUSCULAR; INTRAVENOUS at 22:52

## 2020-01-01 RX ADMIN — SODIUM CHLORIDE, POTASSIUM CHLORIDE, SODIUM LACTATE AND CALCIUM CHLORIDE: 600; 310; 30; 20 INJECTION, SOLUTION INTRAVENOUS at 00:26

## 2020-01-01 RX ADMIN — SODIUM CHLORIDE 1000 ML: 9 INJECTION, SOLUTION INTRAVENOUS at 18:30

## 2020-01-01 RX ADMIN — CARVEDILOL 6.25 MG: 6.25 TABLET, FILM COATED ORAL at 13:46

## 2020-01-01 RX ADMIN — CARBIDOPA AND LEVODOPA 1 TABLET: 25; 100 TABLET ORAL at 12:19

## 2020-01-01 RX ADMIN — GLYCOPYRROLATE 1 MG: 1 TABLET ORAL at 09:13

## 2020-01-01 RX ADMIN — DOCUSATE SODIUM AND SENNOSIDES 1 TABLET: 8.6; 5 TABLET, FILM COATED ORAL at 02:56

## 2020-01-01 RX ADMIN — OMEPRAZOLE 20 MG: 20 CAPSULE, DELAYED RELEASE ORAL at 09:13

## 2020-01-01 RX ADMIN — CARBIDOPA AND LEVODOPA 1 TABLET: 25; 100 TABLET ORAL at 12:03

## 2020-01-01 RX ADMIN — LEVOTHYROXINE SODIUM 112 MCG: 112 TABLET ORAL at 07:53

## 2020-01-01 RX ADMIN — Medication 1 MG: at 01:41

## 2020-01-01 RX ADMIN — TIZANIDINE 2 MG: 2 TABLET ORAL at 02:39

## 2020-01-01 RX ADMIN — TIZANIDINE 6 MG: 2 TABLET ORAL at 10:14

## 2020-01-01 ASSESSMENT — ENCOUNTER SYMPTOMS
ARTHRALGIAS: 1
HEMOPTYSIS: 0
HEADACHES: 0
LOSS OF CONSCIOUSNESS: 0
JOINT SWELLING: 1
COUGH DISTURBING SLEEP: 0
MUSCLE WEAKNESS: 1
NAUSEA: 0
NECK PAIN: 1
INSOMNIA: 0
DYSPNEA ON EXERTION: 0
WEAKNESS: 1
MYALGIAS: 1
NECK PAIN: 1
SPEECH CHANGE: 1
NERVOUS/ANXIOUS: 1
DIZZINESS: 0
DISTURBANCES IN COORDINATION: 0
SHORTNESS OF BREATH: 0
SPUTUM PRODUCTION: 1
TINGLING: 0
SNORES LOUDLY: 0
SEIZURES: 0
DEPRESSION: 0
DIZZINESS: 1
DECREASED CONCENTRATION: 0
WHEEZING: 0
DIZZINESS: 1
POSTURAL DYSPNEA: 0
TREMORS: 0
MUSCLE CRAMPS: 1
HEADACHES: 0
MEMORY LOSS: 0
BACK PAIN: 0
NUMBNESS: 0
VOMITING: 0
ARTHRALGIAS: 1
WEAKNESS: 1
PARALYSIS: 0
PANIC: 0
STIFFNESS: 1

## 2020-01-01 ASSESSMENT — PAIN SCALES - GENERAL
PAINLEVEL: NO PAIN (0)
PAINLEVEL: NO PAIN (0)

## 2020-01-01 ASSESSMENT — MIFFLIN-ST. JEOR: SCORE: 985.71

## 2020-01-06 ENCOUNTER — OFFICE VISIT (OUTPATIENT)
Dept: ORTHOPEDICS | Facility: CLINIC | Age: 80
End: 2020-01-06
Attending: PSYCHIATRY & NEUROLOGY
Payer: MEDICARE

## 2020-01-06 DIAGNOSIS — G56.03 BILATERAL CARPAL TUNNEL SYNDROME: Primary | ICD-10-CM

## 2020-01-06 NOTE — NURSING NOTE
Reason For Visit:   Chief Complaint   Patient presents with     Consult     bilateral carpal tunnel syndrome.        Primary MD: Aris Del Real  Ref. MD: Dr Driver    ?  No    Age: 79 year old    Currently working? No.  Date of injury: 6-8 weeks ago.   Date of surgery:   1980's bilateral carpal tunnel   2018 - trigger finger - R hand thumb and pointer finger.   Smoker: No  Request smoking cessation information: No    There were no vitals taken for this visit.    Pain Assessment  Patient Currently in Pain: Denies    QuickDASH Assessment  No flowsheet data found.     Allergies   Allergen Reactions     Ace Inhibitors Cough     Baclofen Other (See Comments)     Constipation , tiredness     Penicillins      10/7/14 Hives per patient -- many many years ago       Nickel Swelling and Rash     Other reaction(s): Unknown     Toshia Madrid ATC

## 2020-01-06 NOTE — LETTER
"1/6/2020       RE: Katherin Fletcher  [address]  [facility] [id number]     Dear Colleague,    Thank you for referring your patient, Katherin Fletcher, to the [facility] at [facility]. Please see a copy of my visit note below.    Date of Service: Jan 6, 2020    Chief Complaint: Finger numbness     History of Present Illness: Katherin Fletcher is a 79 year old right-handed female with history of ALS who presents today for further evaluation of finger numbness. She reports that her bilateral thumbs, index fingers, and middle fingers feel like they have a \"film\" over them. This is worse on the right and is constant. Symptoms first began at least a few months ago. She has history of carpal tunnel release surgery in the late 1980s.  She has not tried using wrist braces. She doesn't remember her symptoms before her prior surgery but it worked well.  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 Of note, patient has ALS and is in a power wheelchair but uses a walker while at home. She uses her hands for transfers. Patient lives with her . He is 93 and has dementia. She notes she can no longer hold her head up on her own anymore so she wears a soft neck brace.       Review of Systems: A 14-point review of systems was obtained on intake reviewed. It is included at the bottom of this note.     Past Medical History:   Diagnosis Date     CAD (coronary artery disease)     s/p CABG 1992 and redo CABG 2009 (LIMA to LAD, SVG to OM and SVG to posterolateral branch of RCA)     Chronic cough     Non-productive. No known definite etiology. Is beeing seen by [facility].      Essential hypertension, benign      Falls frequently 5/28/2018     GERD (gastroesophageal reflux disease)      History of TIA (transient ischemic attack) and stroke 5/7/2018    R sided facial N&T.      Hyperlipidaemia      Hyperlipidaemia LDL goal < " 130      Ischemic cardiomyopathy      MI (myocardial infarction) (H) 11/1996     Mixed hyperlipidemia      Neurodegenerative disorder (H)     Further detailed dx unknown. Managed by Dr. George Pham - [facility].      PAD (peripheral artery disease) (H)      Restless legs syndrome      Unspecified hypothyroidism          Past Surgical History:   Procedure Laterality Date     ARTHROSCOPY SHOULDER RT/LT       BUNIONECTOMY RT/LT       C CABG, ARTERY-VEIN, THREE  1/2009     C CARDIAC SURG PROCEDURE UNLIST       C HAND/FINGER SURGERY UNLISTED       C MRA UPPER EXTREMITY WO&W CONT       C SHOULDER SURG PROC UNLISTED       C STOMACH SURGERY PROCEDURE UNLISTED       CARDIAC SURGERY  1992    CABG,stents x2     CARPAL TUNNEL RELEASE RT/LT       EXCISE GANGLION HAND Right 11/29/2018    Procedure: EXCISE RIGHT VOLAR CARPAL MASS AND RIGHT INDEX TRIGGER FINGER RELEASE;  Surgeon: Minh Goldstein MD;  Location:  OR     HC VASCULAR SURGERY PROCEDURE UNLIST       HYSTERECTOMY, DAMIÁN  32 yo     OPEN REDUCTION INTERNAL FIXATION ANKLE Left 2015    Left Ankle     RELEASE TRIGGER FINGER       RELEASE TRIGGER FINGER Right 11/29/2018    Procedure: RIGHT INDEX TRIGGER FINGER RELEASE;  Surgeon: Minh Goldstein MD;  Location:  OR     REPAIR HAMMER TOE Right 12/19/2017    Procedure: REPAIR HAMMER TOE;  Right 2nd and 3rd toe pinning;  Surgeon: Fernando Wiggins MD;  Location: [facility] OR     STENT  2006     STENT(aka CARDIAC)  2008     VASCULAR SURGERY           Current Outpatient Medications:      acetaminophen (TYLENOL) 500 MG tablet, Take 1-2 tablets (500-1,000 mg) by mouth every 6 hours as needed for mild pain, Disp: , Rfl:      Alfalfa 250 MG TABS, Take 250 mg by mouth every morning , Disp: , Rfl:      apixaban ANTICOAGULANT (ELIQUIS) 5 MG tablet, Take 1 tablet (5 mg) by mouth 2 times daily, Disp: 180 tablet, Rfl: 3     calcium carbonate-vitamin D (CALCIUM + D) 600-200 MG-UNIT TABS, Take 1 tablet by mouth At Bedtime , Disp: ,  Rfl:      carbidopa-levodopa (SINEMET)  MG tablet, Take 1 tablet by mouth 3 times daily, Disp: , Rfl:      carbidopa-levodopa (SINEMET)  MG tablet, Take 2 tablets by mouth At Bedtime, Disp: , Rfl:      carvedilol (COREG) 6.25 MG tablet, Take 1 tablet (6.25 mg) by mouth 2 times daily (with meals), Disp: 180 tablet, Rfl: 3     clopidogrel (PLAVIX) 75 MG tablet, Take 1 tablet (75 mg) by mouth daily, Disp: 90 tablet, Rfl: 3     glycopyrrolate (ROBINUL) 1 MG tablet, Take 1 tablet (1 mg) by mouth 3 times daily, Disp: 270 tablet, Rfl: 1     hyoscyamine (ANASPAZ/LEVSIN) 0.125 MG tablet, Take 1 tablet (125 mcg) by mouth every 8 hours as needed for other (drooling (sialorrhea)), Disp: 90 tablet, Rfl: 1     isosorbide mononitrate (IMDUR) 30 MG 24 hr tablet, Take 1 tablet (30 mg) by mouth daily, Disp: 90 tablet, Rfl: 3     LEVOXYL 112 MCG tablet, TAKE 1 TABLET DAILY, Disp: 90 tablet, Rfl: 4     Multiple Vitamins-Minerals (DAILY MULTI) TABS, Take 1 tablet by mouth every morning , Disp: , Rfl:      nitroGLYcerin (NITROSTAT) 0.4 MG sublingual tablet, DISSOLVE ONE TABLET UNDER THE TONGUE EVERY 5 MINUTES AS NEEDED FOR CHEST PAIN.  DO NOT EXCEED A TOTAL OF 3 DOSES IN 15 MINUTES, Disp: 25 tablet, Rfl: 3     omeprazole (PRILOSEC) 20 MG DR capsule, Take 1 capsule (20 mg) by mouth 2 times daily 30-60 minutes before meal, Disp: 60 capsule, Rfl: 11     rosuvastatin (CRESTOR) 20 MG tablet, Take 1 tablet (20 mg) by mouth daily, Disp: 90 tablet, Rfl: 3     tiZANidine (ZANAFLEX) 2 MG tablet, TAKE 3 TABLETS THREE TIMES A DAY, Disp: 270 tablet, Rfl: 3    Allergies   Allergen Reactions     Ace Inhibitors Cough     Baclofen Other (See Comments)     Constipation , tiredness     Penicillins      10/7/14 Hives per patient -- many many years ago       Nickel Swelling and Rash     Other reaction(s): Unknown       Social History     Socioeconomic History     Marital status:      Spouse name: Not on file     Number of children: Not on  file     Years of education: Not on file     Highest education level: Not on file   Occupational History     Not on file   Social Needs     Financial resource strain: Not on file     Food insecurity:     Worry: Not on file     Inability: Not on file     Transportation needs:     Medical: Not on file     Non-medical: Not on file   Tobacco Use     Smoking status: Former Smoker     Packs/day: 0.50     Years: 22.00     Pack years: 11.00     Types: Cigarettes     Last attempt to quit: 4/15/1992     Years since quittin.7     Smokeless tobacco: Never Used   Substance and Sexual Activity     Alcohol use: Not Currently     Alcohol/week: 1.0 standard drinks     Types: 1 Standard drinks or equivalent per week     Comment: MAYBE ONCE A YEAR     Drug use: No     Sexual activity: Not Currently     Partners: Male   Lifestyle     Physical activity:     Days per week: Not on file     Minutes per session: Not on file     Stress: Not on file   Relationships     Social connections:     Talks on phone: Not on file     Gets together: Not on file     Attends [employer] service: Not on file     Active member of club or organization: Not on file     Attends meetings of clubs or organizations: Not on file     Relationship status: Not on file     Intimate partner violence:     Fear of current or ex partner: Not on file     Emotionally abused: Not on file     Physically abused: Not on file     Forced sexual activity: Not on file   Other Topics Concern     Parent/sibling w/ CABG, MI or angioplasty before 65F 55M? No      Service Not Asked     Blood Transfusions Not Asked     Caffeine Concern Yes     Comment: 1-2 cups      Occupational Exposure Not Asked     Hobby Hazards Not Asked     Sleep Concern No     Stress Concern No     Weight Concern Yes     Special Diet No     Back Care Not Asked     Exercise Yes     Comment: stairs, walking      Bike Helmet Not Asked     Seat Belt Not Asked     Self-Exams Not Asked   Social History  Narrative     Not on file       Family History   Problem Relation Age of Onset     Unknown/Adopted Mother      Unknown/Adopted Father      Other - See Comments Son         on warfarin for one year after afganastan now off and doing well     Other - See Comments Daughter         celiac, brittle bone, arthritis, Raynauds           Physical examination:  Well-developed, well-nourished and in no acute distress. Presents in wheelchair with soft neck brace.  Alert and oriented to surroundings.  On examination of the bilateral upper extremities:     Healed incisions from prior carpal tunnel releases on both sides.   Sensation:  Median: diminished  Radial: intact  Ulnar: intact  Thumb opposition strength: mildly weak bilaterally  Interosseous strength: intact 5/5  Thenar atrophy: Not Present  Interosseous atrophy: Not present on left, perhaps mild atrophy on right   Tinel's over carpal tunnel: Not Present  Mildly positive Tinel's on left at the distal forearm volarly but not directly over carpal tunnel  Tinel's over cubital tunnel: Not Present  Phalen's: Positive  Carpal tunnel compression: Positive on the right, but not the left     5/14/19 EMG: Abnormal study. 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 A left ulnar sensory neuropathy at the wrist cannot be excluded. Clinical correlation is recommended.      Assessment: 79 year old female with ALS and bilateral carpal tunnel syndrome. EMG also showed right ulnar neuropathy as well although this does not appear clinically symptomatic     Plan:     Katherin Fletcher and I had a lengthy discussion about possible treatment options. We discussed three possible treatment options. The first would be to continue bracing.  The second would be to perform a corticosteroid injection. The third is to consider surgery, which would  be an open carpal tunnel release. Katherin states her symptoms are not particularly bothersome at this time. We discussed that carpal tunnel syndrome can be progresssive and responds less well to surgery as it becomes more severe. We also discussed her comorbidiies (in particular, ALS, and her living situation- she lives with her 93 year old  who has dementia and limited ability to help and she has had a number of falls.)   She is not interested in pursuing further treatment for her carpal tunnel syndrome right now other than bracing, and this is certainly understandable. She will wear the braces at [facility] for sleep. She has a good understanding of the different tx options available for CTS and will let us know if she has any other questions or we can be of any further help.     .Gokul Mario MD      Scribe Disclosure:  I, Femi Nathan, am serving as a scribe to document services personally performed by Gokul Mario MD at this visit, based upon the provider's statements to me. All documentation has been reviewed by the aforementioned provider prior to being entered into the official medical record.         Again, thank you for allowing me to participate in the care of your patient.      Sincerely,    Gokul Mario MD

## 2020-01-06 NOTE — LETTER
Date:January 13, 2020      Patient was self referred, no letter generated. Do not send.        [facility] Health Information

## 2020-01-06 NOTE — PROGRESS NOTES
"Date of Service: Jan 6, 2020    Chief Complaint: Finger numbness     History of Present Illness: Katherin Fletcher is a 79 year old right-handed female with history of ALS who presents today for further evaluation of finger numbness. She reports that her bilateral thumbs, index fingers, and middle fingers feel like they have a \"film\" over them. This is worse on the right and is constant. Symptoms first began at least a few months ago. She has history of carpal tunnel release surgery in the late 1980s.  She has not tried using wrist braces. She doesn't remember her symptoms before her prior surgery but it worked well.  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 Of note, patient has ALS and is in a power wheelchair but uses a walker while at home. She uses her hands for transfers. Patient lives with her . He is 93 and has dementia. She notes she can no longer hold her head up on her own anymore so she wears a soft neck brace.       Review of Systems: A 14-point review of systems was obtained on intake reviewed. It is included at the bottom of this note.     Past Medical History:   Diagnosis Date     CAD (coronary artery disease)     s/p CABG 1992 and redo CABG 2009 (LIMA to LAD, SVG to OM and SVG to posterolateral branch of RCA)     Chronic cough     Non-productive. No known definite etiology. Is beeing seen by [facility].      Essential hypertension, benign      Falls frequently 5/28/2018     GERD (gastroesophageal reflux disease)      History of TIA (transient ischemic attack) and stroke 5/7/2018    R sided facial N&T.      Hyperlipidaemia      Hyperlipidaemia LDL goal < 130      Ischemic cardiomyopathy      MI (myocardial infarction) (H) 11/1996     Mixed hyperlipidemia      Neurodegenerative disorder (H)     Further detailed dx unknown. Managed by Dr. George Pham - [facility].      PAD (peripheral artery disease) (H)      Restless legs syndrome      " Unspecified hypothyroidism          Past Surgical History:   Procedure Laterality Date     ARTHROSCOPY SHOULDER RT/LT       BUNIONECTOMY RT/LT       C CABG, ARTERY-VEIN, THREE  1/2009     C CARDIAC SURG PROCEDURE UNLIST       C HAND/FINGER SURGERY UNLISTED       C MRA UPPER EXTREMITY WO&W CONT       C SHOULDER SURG PROC UNLISTED       C STOMACH SURGERY PROCEDURE UNLISTED       CARDIAC SURGERY  1992    CABG,stents x2     CARPAL TUNNEL RELEASE RT/LT       EXCISE GANGLION HAND Right 11/29/2018    Procedure: EXCISE RIGHT VOLAR CARPAL MASS AND RIGHT INDEX TRIGGER FINGER RELEASE;  Surgeon: Minh Goldstein MD;  Location:  OR     HC VASCULAR SURGERY PROCEDURE UNLIST       HYSTERECTOMY, DAMIÁN  32 yo     OPEN REDUCTION INTERNAL FIXATION ANKLE Left 2015    Left Ankle     RELEASE TRIGGER FINGER       RELEASE TRIGGER FINGER Right 11/29/2018    Procedure: RIGHT INDEX TRIGGER FINGER RELEASE;  Surgeon: Minh Goldstein MD;  Location:  OR     REPAIR HAMMER TOE Right 12/19/2017    Procedure: REPAIR HAMMER TOE;  Right 2nd and 3rd toe pinning;  Surgeon: Fernando Wiggins MD;  Location: [facility] OR     STENT  2006     STENT(aka CARDIAC)  2008     VASCULAR SURGERY           Current Outpatient Medications:      acetaminophen (TYLENOL) 500 MG tablet, Take 1-2 tablets (500-1,000 mg) by mouth every 6 hours as needed for mild pain, Disp: , Rfl:      Alfalfa 250 MG TABS, Take 250 mg by mouth every morning , Disp: , Rfl:      apixaban ANTICOAGULANT (ELIQUIS) 5 MG tablet, Take 1 tablet (5 mg) by mouth 2 times daily, Disp: 180 tablet, Rfl: 3     calcium carbonate-vitamin D (CALCIUM + D) 600-200 MG-UNIT TABS, Take 1 tablet by mouth At Bedtime , Disp: , Rfl:      carbidopa-levodopa (SINEMET)  MG tablet, Take 1 tablet by mouth 3 times daily, Disp: , Rfl:      carbidopa-levodopa (SINEMET)  MG tablet, Take 2 tablets by mouth At Bedtime, Disp: , Rfl:      carvedilol (COREG) 6.25 MG tablet, Take 1 tablet (6.25 mg) by mouth 2 times daily (with  meals), Disp: 180 tablet, Rfl: 3     clopidogrel (PLAVIX) 75 MG tablet, Take 1 tablet (75 mg) by mouth daily, Disp: 90 tablet, Rfl: 3     glycopyrrolate (ROBINUL) 1 MG tablet, Take 1 tablet (1 mg) by mouth 3 times daily, Disp: 270 tablet, Rfl: 1     hyoscyamine (ANASPAZ/LEVSIN) 0.125 MG tablet, Take 1 tablet (125 mcg) by mouth every 8 hours as needed for other (drooling (sialorrhea)), Disp: 90 tablet, Rfl: 1     isosorbide mononitrate (IMDUR) 30 MG 24 hr tablet, Take 1 tablet (30 mg) by mouth daily, Disp: 90 tablet, Rfl: 3     LEVOXYL 112 MCG tablet, TAKE 1 TABLET DAILY, Disp: 90 tablet, Rfl: 4     Multiple Vitamins-Minerals (DAILY MULTI) TABS, Take 1 tablet by mouth every morning , Disp: , Rfl:      nitroGLYcerin (NITROSTAT) 0.4 MG sublingual tablet, DISSOLVE ONE TABLET UNDER THE TONGUE EVERY 5 MINUTES AS NEEDED FOR CHEST PAIN.  DO NOT EXCEED A TOTAL OF 3 DOSES IN 15 MINUTES, Disp: 25 tablet, Rfl: 3     omeprazole (PRILOSEC) 20 MG DR capsule, Take 1 capsule (20 mg) by mouth 2 times daily 30-60 minutes before meal, Disp: 60 capsule, Rfl: 11     rosuvastatin (CRESTOR) 20 MG tablet, Take 1 tablet (20 mg) by mouth daily, Disp: 90 tablet, Rfl: 3     tiZANidine (ZANAFLEX) 2 MG tablet, TAKE 3 TABLETS THREE TIMES A DAY, Disp: 270 tablet, Rfl: 3    Allergies   Allergen Reactions     Ace Inhibitors Cough     Baclofen Other (See Comments)     Constipation , tiredness     Penicillins      10/7/14 Hives per patient -- many many years ago       Nickel Swelling and Rash     Other reaction(s): Unknown       Social History     Socioeconomic History     Marital status:      Spouse name: Not on file     Number of children: Not on file     Years of education: Not on file     Highest education level: Not on file   Occupational History     Not on file   Social Needs     Financial resource strain: Not on file     Food insecurity:     Worry: Not on file     Inability: Not on file     Transportation needs:     Medical: Not on file      Non-medical: Not on file   Tobacco Use     Smoking status: Former Smoker     Packs/day: 0.50     Years: 22.00     Pack years: 11.00     Types: Cigarettes     Last attempt to quit: 4/15/1992     Years since quittin.7     Smokeless tobacco: Never Used   Substance and Sexual Activity     Alcohol use: Not Currently     Alcohol/week: 1.0 standard drinks     Types: 1 Standard drinks or equivalent per week     Comment: MAYBE ONCE A YEAR     Drug use: No     Sexual activity: Not Currently     Partners: Male   Lifestyle     Physical activity:     Days per week: Not on file     Minutes per session: Not on file     Stress: Not on file   Relationships     Social connections:     Talks on phone: Not on file     Gets together: Not on file     Attends [employer] service: Not on file     Active member of club or organization: Not on file     Attends meetings of clubs or organizations: Not on file     Relationship status: Not on file     Intimate partner violence:     Fear of current or ex partner: Not on file     Emotionally abused: Not on file     Physically abused: Not on file     Forced sexual activity: Not on file   Other Topics Concern     Parent/sibling w/ CABG, MI or angioplasty before 65F 55M? No      Service Not Asked     Blood Transfusions Not Asked     Caffeine Concern Yes     Comment: 1-2 cups      Occupational Exposure Not Asked     Hobby Hazards Not Asked     Sleep Concern No     Stress Concern No     Weight Concern Yes     Special Diet No     Back Care Not Asked     Exercise Yes     Comment: stairs, walking      Bike Helmet Not Asked     Seat Belt Not Asked     Self-Exams Not Asked   Social History Narrative     Not on file       Family History   Problem Relation Age of Onset     Unknown/Adopted Mother      Unknown/Adopted Father      Other - See Comments Son         on warfarin for one year after afganastan now off and doing well     Other - See Comments Daughter         celiac, brittle bone,  arthritis, Raynauds           Physical examination:  Well-developed, well-nourished and in no acute distress. Presents in wheelchair with soft neck brace.  Alert and oriented to surroundings.  On examination of the bilateral upper extremities:     Healed incisions from prior carpal tunnel releases on both sides.   Sensation:  Median: diminished  Radial: intact  Ulnar: intact  Thumb opposition strength: mildly weak bilaterally  Interosseous strength: intact 5/5  Thenar atrophy: Not Present  Interosseous atrophy: Not present on left, perhaps mild atrophy on right   Tinel's over carpal tunnel: Not Present  Mildly positive Tinel's on left at the distal forearm volarly but not directly over carpal tunnel  Tinel's over cubital tunnel: Not Present  Phalen's: Positive  Carpal tunnel compression: Positive on the right, but not the left     5/14/19 EMG: Abnormal study. 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 A left ulnar sensory neuropathy at the wrist cannot be excluded. Clinical correlation is recommended.      Assessment: 79 year old female with ALS and bilateral carpal tunnel syndrome. EMG also showed right ulnar neuropathy as well although this does not appear clinically symptomatic     Plan:     Katherin Fletcher and I had a lengthy discussion about possible treatment options. We discussed three possible treatment options. The first would be to continue bracing.  The second would be to perform a corticosteroid injection. The third is to consider surgery, which would be an open carpal tunnel release. Katherin states her symptoms are not particularly bothersome at this time. We discussed that carpal tunnel syndrome can be progresssive and responds less well to surgery as it becomes more severe. We also discussed her comorbidiies (in particular, ALS, and her living  situation- she lives with her 93 year old  who has dementia and limited ability to help and she has had a number of falls.)   She is not interested in pursuing further treatment for her carpal tunnel syndrome right now other than bracing, and this is certainly understandable. She will wear the braces at [facility] for sleep. She has a good understanding of the different tx options available for CTS and will let us know if she has any other questions or we can be of any further help.     .Gokul Mario MD      Scribe Disclosure:  I, Femi Nathan, am serving as a scribe to document services personally performed by Gokul Mario MD at this visit, based upon the provider's statements to me. All documentation has been reviewed by the aforementioned provider prior to being entered into the official medical record.

## 2020-01-13 ENCOUNTER — MYC MEDICAL ADVICE (OUTPATIENT)
Dept: ORTHOPEDICS | Facility: CLINIC | Age: 80
End: 2020-01-13

## 2020-01-13 ENCOUNTER — HOSPITAL ENCOUNTER (OUTPATIENT)
Dept: PHYSICAL THERAPY | Facility: CLINIC | Age: 80
Setting detail: THERAPIES SERIES
End: 2020-01-13
Attending: PSYCHIATRY & NEUROLOGY
Payer: MEDICARE

## 2020-01-13 DIAGNOSIS — G56.03 BILATERAL CARPAL TUNNEL SYNDROME: Primary | ICD-10-CM

## 2020-01-13 PROCEDURE — 97110 THERAPEUTIC EXERCISES: CPT | Mod: GP | Performed by: PHYSICAL THERAPIST

## 2020-01-13 PROCEDURE — 97535 SELF CARE MNGMENT TRAINING: CPT | Mod: GP | Performed by: PHYSICAL THERAPIST

## 2020-01-13 NOTE — PROGRESS NOTES
Chief Complaint   Patient presents with     Follow Up     Nail trimming.            Allergies   Allergen Reactions     Ace Inhibitors Cough     Baclofen Other (See Comments)     Constipation , tiredness     Penicillins      10/7/14 Hives per patient -- many many years ago       Nickel Swelling and Rash     Other reaction(s): Unknown         Subjective: Katherin is a 79 year old female who presents to the clinic today for a follow up of elongated nails.  She presents today in her electric wheelchair and wearing a neck brace.  She relates that she does not have a musculature in her neck to hold her head up anymore.  She is in the neck brace most of the time.  She had a fall in December.      Objective  Data Unavailable Data Unavailable Data Unavailable Data Unavailable Data Unavailable 0 lbs 0 oz  DP pulses are 1/4 and PT pulses are non-palpable bilaterally. LE capillary refill time is <3 seconds. Absent pedal hair. Mild non-pitting LE edema.  Dorsally contracted digits 2-5 that are non-reducible, very limited passive ROM. Equinus present.  Gross sensation intact to bilateral LE.   LE skin color, texture and turgor are normal. Toenails are slightly elongated bilaterally. Right 4th toe is hammered and in a rigid position.      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   - Cont with comfortable shoes.   - Pt to return to clinic in 9 weeks.

## 2020-01-13 NOTE — IP AVS SNAPSHOT
MRN:[id number]                      After Visit Summary   1/13/2020    Katherin Fletcher    MRN: [id number]           Visit Information        Provider Department      1/13/2020  1:00 PM Kristne Monteiro, PT [facility], [place], Physical Therapy - Outpatient        Your next 10 appointments already scheduled    Jan 16, 2020 11:30 AM CST  PFT VISIT with  PFL C  [facility] Pulmonary Function Testing ([facility]) [address] [place] [id number]  [phone]      Jan 16, 2020 12:00 PM CST  New ALS/Motor Neuron with Travon Driver MD  [facility] Neurology ([facility]) [address] [place] [id number] [phone]      Jan 16, 2020 12:00 PM CST  (Arrive by 11:45 AM)  Neuro Eval with Eliza Saha PT  [facility] Physical Therapy and Rehab ([facility]) [address] [place] [id number] [phone]      Jan 16, 2020 12:00 PM CST  (Arrive by 11:45 AM)  Neuro Eval with Gerber Dutta OT  [facility] Occupational Therapy and Rehab ([facility]) [address] [place] [id number] [phone]      Jan 16, 2020 12:00 PM CST  (Arrive by 11:45 AM)  Neuro Eval with Tanya Cat, SLP  [facility] Speech and Language ([facility]) [address] [place] [id number] [phone]      Jan 20, 2020  1:40 PM CST  (Arrive by 1:25 PM)  RETURN FOOT/ANKLE with FLOR WarrenM  [facility] Orthopaedic Clinic ([facility]) [address] [place] [id number] [phone]      Jan 21, 2020  1:00 PM CST  Neuro Treatment [facility] Krist[facility]  [facility]) [address], [address]  [place] [address]  [phone]            Further instructions from your care team       Bring your other neck brace to ALS clinic on Thursday.       [facility]    Look at getting a set of portable pedals to use at home for when your legs are acting up at night.          [employer] Information    [employer] gives you secure access to your electronic health record. If you see a primary care provider, you can also send messages to your care team and make appointments. If you have questions, please call your primary care clinic.  If you do not have a primary care provider, please call [phone] and they will assist you.       Care EveryWhere ID    This is your Care EveryWhere ID. This could be used by other organizations to access your [place] medical records  [id number]       Equal Access to Services    JOSELUIS MADRIGAL : Yuly Owens, wilmer phoenix, jeovanny epps, carlo castro. So [facility] [phone].    ATENCIÓN: Si habla español, tiene a taylor disposición servicios gratuitos de asistencia lingüística. Karen al [phone].    We comply with applicable federal and state civil rights laws, including the [place] Human Rights Act. We do not discriminate on the basis of race, color, creed, Hinduism, national origin, marital status, age, disability, sex, sexual orientation, or gender identity.

## 2020-01-13 NOTE — DISCHARGE INSTRUCTIONS
Bring your other neck brace to ALS clinic on Thursday.       [facility].    Look at getting a set of portable pedals to use at home for when your legs are acting up at night.

## 2020-01-15 DIAGNOSIS — G12.21 ALS (AMYOTROPHIC LATERAL SCLEROSIS) (H): Primary | ICD-10-CM

## 2020-01-16 ENCOUNTER — OFFICE VISIT (OUTPATIENT)
Dept: NEUROLOGY | Facility: CLINIC | Age: 80
End: 2020-01-16
Payer: MEDICARE

## 2020-01-16 ENCOUNTER — THERAPY VISIT (OUTPATIENT)
Dept: OCCUPATIONAL THERAPY | Facility: CLINIC | Age: 80
End: 2020-01-16
Payer: MEDICARE

## 2020-01-16 ENCOUNTER — ALLIED HEALTH/NURSE VISIT (OUTPATIENT)
Dept: NUTRITION | Facility: CLINIC | Age: 80
End: 2020-01-16

## 2020-01-16 VITALS
OXYGEN SATURATION: 96 % | HEART RATE: 68 BPM | WEIGHT: 128.4 LBS | DIASTOLIC BLOOD PRESSURE: 73 MMHG | BODY MASS INDEX: 22.03 KG/M2 | SYSTOLIC BLOOD PRESSURE: 105 MMHG

## 2020-01-16 DIAGNOSIS — G12.21 ALS (AMYOTROPHIC LATERAL SCLEROSIS) (H): Primary | ICD-10-CM

## 2020-01-16 DIAGNOSIS — G12.21 ALS (AMYOTROPHIC LATERAL SCLEROSIS) (H): ICD-10-CM

## 2020-01-16 DIAGNOSIS — R25.2 SPASTICITY: ICD-10-CM

## 2020-01-16 DIAGNOSIS — K11.7 SIALORRHEA: ICD-10-CM

## 2020-01-16 DIAGNOSIS — Z78.9 IMPAIRED MOBILITY AND ADLS: ICD-10-CM

## 2020-01-16 DIAGNOSIS — Z78.9 IMPAIRED INSTRUMENTAL ACTIVITIES OF DAILY LIVING (IADL): ICD-10-CM

## 2020-01-16 DIAGNOSIS — Z74.09 IMPAIRED MOBILITY AND ADLS: ICD-10-CM

## 2020-01-16 ASSESSMENT — REVISED AMYOTROPHIC LATERAL SCLEROSIS FUNCTIONAL RATING SCALE (ALSFRS-R)
SALIVATION: MARKED DROOLING; REQUIRES CONSTANT TISSUE OR HANDKERCHIEF
TURNING_IN_BED_AND_ADJUSTING_BED_CLOTHES: SOMEWHAT SLOW AND CLUMSY, BUT NO HELP NEEDED
SPEECH: 2
SALIVATION: 0
CLIMBING_STAIRS: 1
WALKING: WALKS WITH ASSISTANCE
HANDWRITING: 3
GROSS_MOTOR_SUBTOTAL_SCORE: 6
RESPIRATORY_SUBTOTAL_SCORE: 12
ORTHOPENA: 4
SWALLOWING: NORMAL EATING HABITS
ALSFRS_TOTAL_SCORE: 33
SWALLOWING: 4
FINE_MOTOR_SUBTOTAL_SCORE: 9
HANDWRITING: SLOW OR SLOPPY: ALL WORDS ARE LEGIBLE
CLIMBING_STAIRS: NEEDS ASSISTANCE
DRESSING_AND_HYGEINE: INTERMITTENT ASSISTANCE OR SUBSTITUTE METHODS
BULBAR_SUBTOTAL: 6
TURNING_IN_BED_AND_ADJUSTING_BED_CLOTHES: 3
CUTTING_FOOD_AND_HANDLING_UTENSILES: 4
DRESSING_AND_HYGEINE: 2
SPEECH: INTELLIGIBLE WITH REPEATING
SIX_ITEM_SUBTOTAL: 16
WALKING: 2
RESPIRATORY_INSUFFICIENCY: 4
DYSPNEA: 4

## 2020-01-16 ASSESSMENT — ENCOUNTER SYMPTOMS
TINGLING: 0
MUSCLE WEAKNESS: 1
TASTE DISTURBANCE: 0
SMELL DISTURBANCE: 0
POSTURAL DYSPNEA: 0
LOSS OF CONSCIOUSNESS: 0
DEPRESSION: 0
PANIC: 0
DECREASED CONCENTRATION: 0
SPUTUM PRODUCTION: 1
HEMOPTYSIS: 0
SINUS PAIN: 0
WHEEZING: 0
NECK PAIN: 1
DIZZINESS: 1
SHORTNESS OF BREATH: 0
SEIZURES: 0
SNORES LOUDLY: 0
MYALGIAS: 0
HEADACHES: 0
DISTURBANCES IN COORDINATION: 1
NECK MASS: 0
HOARSE VOICE: 1
MEMORY LOSS: 0
COUGH: 1
NERVOUS/ANXIOUS: 1
DYSPNEA ON EXERTION: 0
INSOMNIA: 0
ARTHRALGIAS: 1
BACK PAIN: 0
JOINT SWELLING: 1
COUGH DISTURBING SLEEP: 1
SPEECH CHANGE: 1
TROUBLE SWALLOWING: 0
WEAKNESS: 1
SINUS CONGESTION: 1
MUSCLE CRAMPS: 1
STIFFNESS: 1
TREMORS: 0

## 2020-01-16 ASSESSMENT — PAIN SCALES - GENERAL: PAINLEVEL: NO PAIN (0)

## 2020-01-16 NOTE — PROGRESS NOTES
"    OUTPATIENT OCCUPATIONAL THERAPY CLINIC NOTE  Katherin Fletcher  YOB: 1940  [id number]    Type of visit:  Evaluation            Date of service: 1/16/20 and last OT evaluation in ALS clinic: 6/27/19    Referring provider: Dr. Travon Driver    Others present at visit:  Child(imani), daughter-Ashley    Medical diagnosis:   Amyotrophic lateral sclerosis (ALS)     Date of diagnosis: 2/28/19      Pertinent history of current problem (include personal factors and/or comorbidities that impact the plan of care): Pt with onset of symptoms 4-5 years ago, including spasms in the legs and falls, neck weakness. Pt dx with C5-6 severe spinal stenosis, neuropathy. Hx includes CAD s/p CABG in 1992 and 2009, HTN, HLD, ischemic cardiomyopathy, MI 11/1996, TIA 5/2018 with R facial N/T, PAD, GERD, RLS, hypothyroidism, and recent R volar carpal ganglion cyst and trigger finger release 11/29/18. Recent carpal tunnel-Freedom 2020     Cardio-respiratory status:  Forced vital capacity: 63 % of predicted        Additional Occupational Profile Information (patterns of daily living, interests, values and needs): Pt is a 78 y.o  woman living with her spouse with whom she is a caregiver for at time of dx with ALS.    Height/Weight: 5' 3\" / 128#    Living environment:  House, 2 story, 3 steps in with railing -ramp    Living environment:  [facility]   2 story, bathroom upstairs and on main, bedroom upstairs, 2 steps to  chairlift to upper level  Laundry in basement-spouse does this  Tub/shower main and second levels  Toilet-high and and two bars by toilet (wall and bar on tub), main and upper has vanity and 1 bar upstairs    Living environment barriers:  Tub/shower  Bedroom upstairs-chairlift                Current assistance/living environment:  Lives with spouse-he is older, amb with cane                          Pt is caregiver to spouse who is a  but he also assists with household tasks    Current " mobility equipment:  4 wheeled walker with seat  Scooter   power wheelchair               Current ADL equipment:  Orthotics: soft collar and headmaster collar adapted with with worn padding on chin and ribbon to hold in place   extended tub bench-ALSA  Freedom wrist brace due to carpal tunnel-night use    Technology used: smartphone-Ind     Patient concerns/goals: Pt notes she wants helps with new foam on chin portion of her Headmaster collar to free jawbones from pressure with talking.      Evaluation   Interview completed.   Pain assessment:  Pain denied    Range of motion:  UE: AROM reduced-shoulders to 120 degrees flexion today    Manual muscle testing:   is fairly strong and  lateral and palmar pinch freedom are fairly strong and symmetrical    Cognition:  Appears WFL, not formally assessed    ADL:   Feeding self:  Ind, regular utensils  Grooming: Ind  UE Dressing:  Ind, hooks bra in front  LE Dressing:  Ind, sits to dress, can tie shoes  Showering/bathing: Ind with extended tub bench and grab bars and HHS  Toileting/transfer:  Ind with higher toilet and now has grab bars  Mobility: pt reports she is amb throughout her home with walker and power w/c (not in kitchen)    IADL:   Home management:  Ind, [employer] delivery for meals a few times per week., Ashley brings meals and restaurants deliver has falls, spouse does laundry, spouse helps with oven and take it out  Driving:  No longer, uses [employer]  Occupation: retired   Emergency call System: carries cell phone with her and wears clothing with pockets. Had [employer], cancelled as did not like due to problems with service    Fall Risk Screen:   Has the patient fallen 2 or more times in the last year? Yes      Has the patient fallen and had an injury in the past year? Yes, fell down flight of stairs, hit head, fell 6/14/19 and hit Head early morning getting out of bed to go to the bathroom -slipped in barefeet on carpet. Fell 2 months ago Nov, 2019 and broke 2  "ribs and then fel and bruised other side.       Timed Up and Go Score: defer to PT    Is the patient a fall risk? Yes, department fall risk interventions implemented     Impairments:  Fatigue  Muscle atrophy  Coordination  Balance  Range of motion  Respiratory compromise     Treatment diagnosis:  Impaired mobility  Impaired activities of daily living  Impaired IADL    Assessment of Occupational Performance: 1-3 performance deficits  Identified Performance Deficits (ie: feeding, social skills):  dressing cooking, functional mobility  Clinical Decision Making (Complexity): Low complexity     Recommendations/Plan of care:  Patient would benefit from interventions to enhance safety and independence.  Rehab potential good for stated goals.  Occupational therapy intervention for  Orthotics fabrication fitting, training, management  1 session evaluation & treatment.  Recommend referral to:none    Goals:   Target date: today  Patient, family and/or caregiver will verbalize understanding of evaluation results and implications for functional performance.  Patient, family and/or caregiver will verbalize/demonstrate understanding of AE and splints/adapations to aid hand function for daily living skills.      Educational assessment/barriers to learning:  No barriers noted      Treatment provided this date:   Orthotic fabrication, initial fitting, training and management: 10 min of training in:  Modification to Headmaster collar chin padding-provided with 2\" x 10\" of padded strapping folded x 4 to place on chin portion of collar to replace worn foam. Added adhesive back velcro to secure in position. Pt requires foam to raise chin and provide clearance of jaw bones from splint when talking to reduce jaw pressure and pt noted this was much more comfortable. Provided with additional materials to replace or add to as needed. Dtr to replace ribbon over padding to secure in place as she had foam prior.       Response to " treatment/recommendations: receptive    Goal attainment:  All goals met    Risks and benefits of evaluation/treatment have been explained.  Patient, family and/or caregiver are in agreement with Plan of Care.     Timed Code Treatment Minutes: 10    Total Treatment Time (sum of timed and untimed services): 25    Signature: GRAEME Guzman MSCS   Date: 1/16/20       Certification:  Onset date: 1/16/20  Start of care date: 1/16/20  Certification date from 1/16/20 to1/16/20    I CERTIFY THE NEED FOR THESE SERVICES FURNISHED UNDER        THIS PLAN OF TREATMENT AND WHILE UNDER MY CARE     (Physician attestation of this document indicates review and certification of the therapy plan).

## 2020-01-16 NOTE — PROGRESS NOTES
Service Date: 2020      Aris Del Real MD   [facility]    [address] [place] [address]      RE: Katherin Fletcher    MRN: [id number]   :  1940      Dear Dr. Del Real:       I had the pleasure to see Katherin in followup at the [facility] for her limb-onset amyotrophic lateral sclerosis with predominantly upper motor neuron signs and very slow progression.  In the interim since I last saw her, she got a new power wheelchair that she likes, and she is working with Physical Therapy at [facility] that she has also found beneficial.  She had one fall in 2019.  She was trying to help her  on his cell phone.  She got out of her chair and fell on her side, landing on her ribs.  She had a scapular contusion, but fortunately not a more serious injury.  She uses a soft collar for her head drop that provides a moderate degree of support.  She denies major respiratory issues or dysphagia. She has chronic spastic dysarthria that has not dramatically deteriorated.  She remains for the most fully intelligible.      The most difficult-to-control symptoms are her lower extremity muscle spasms that predominantly affect the calf muscles and her sialorrhea.  For the muscle spasms, she has tried numerous things with not good success.  She remains on tizanidine 6 mg t.i.d. and Sinemet 25/100 mg 5 tablets a day in total.  On occasion, she takes some oxycodone at night.  She tried THC/CBD mix through the [place] [employer] for more than 6 months, but she stopped it because it was not efficient.  I have repeatedly offered her the option of botulinum toxin, and she keeps on declining it.  We have not tried baclofen, but I have been historically reluctant to do this because she is already complaining of sedation and fatigue as side effects of her medications, and adding baclofen will certainly not  make this better, plus it could increase her leg weakness and aggravate falls.  As for secretions, she has tried Robinul up to 2 mg 3 times a day with limited effect.  Again, because she was complaining of sedation, I did not want to put her on amitriptyline or atropine.  I offered her hyoscyamine tablets a month ago. The improvement is very mild and unfortunately she notes pooling of thicker secretions after starting the anaspaz. She is not impressed with the effect.      MEDICATIONS:  Reviewed and as per Epic record.      I did not repeat a physical exam.  Forced vital capacity is 63% predicted for age, height and sex, FEV1 is similar, and MIP is -33 cm of water.  Overall, stable numbers from her previous visit.      Rooming vitals:  Blood pressure 105/73, pulse 68 and regular, O2 sat 96% on room air, weight 58.2 kg.  She endorses no pain.      IMPRESSION:  In summary, Katherin has an upper motor neuron predominant ALS with very slow progression over time.  We discussed a number of practical issues during a 25 minute encounter, of which more than half was counseling.  She will continue working with Physical Therapy.  She has had a substantial amount of weight loss over the past 2 years, and I will have our nutritionist evaluate her to that end.        At this point, I am afraid I do not have many good medical options to address her disturbing muscle spasms and sialorrhea.  I continue to believe that chemodenervation would be the optimal choice to address both problems at this juncture. Alternative options have been tried and have not worked well.  Katherin remains skeptical about Botox.  We will follow her up in 6 months or sooner if needed.      Sincerely,      MD KLAUDIA Allen MD             D: 2020   T: 2020   MT: elizabeth      Name:     KATHERIN RODRIGUEZ   MRN:      [id number]        Account:      [id number]   :      1940           Service Date: 2020       Document: [id number]

## 2020-01-16 NOTE — NURSING NOTE
Chief Complaint   Patient presents with     Consult     [facility] - ALS/MOTOR NEURON       Niraj Pond, EMT

## 2020-01-16 NOTE — LETTER
2020       RE: Katherin Fletcher  [address] [address]     Dear Colleague,    Thank you for referring your patient, Katherin Fletcher, to the [facility] NEUROLOGY [facility]. Please see a copy of my visit note below.    Service Date: 2020      Aris Del Real MD   [facility]    [address] [place] [address]      RE: Katherin Fletcher    MRN: [id number]   :  1940      Dear Dr. Del Real:       I had the pleasure to see Katherin in followup at the [facility] for her limb-onset amyotrophic lateral sclerosis with predominantly upper motor neuron signs and very slow progression.  In the interim since I last saw her, she got a new power wheelchair that she likes, and she is working with Physical Therapy at [facility] that she has also found beneficial.  She had one fall in 2019.  She was trying to help her  on his cell phone.  She got out of her chair and fell on her side, landing on her ribs.  She had a scapular contusion, but fortunately not a more serious injury.  She uses a soft collar for her head drop that provides a moderate degree of support.  She denies major respiratory issues or dysphagia. She has chronic spastic dysarthria that has not dramatically deteriorated.  She remains for the most fully intelligible.      The most difficult-to-control symptoms are her lower extremity muscle spasms that predominantly affect the calf muscles and her sialorrhea.  For the muscle spasms, she has tried numerous things with not good success.  She remains on tizanidine 6 mg t.i.d. and Sinemet 25/100 mg 5 tablets a day in total.  On occasion, she takes some oxycodone at night.  She tried THC/CBD mix through the [place] [employer] for more than 6 months, but she stopped it because it was not efficient.  I have  repeatedly offered her the option of botulinum toxin, and she keeps on declining it.  We have not tried baclofen, but I have been historically reluctant to do this because she is already complaining of sedation and fatigue as side effects of her medications, and adding baclofen will certainly not make this better, plus it could increase her leg weakness and aggravate falls.  As for secretions, she has tried Robinul up to 2 mg 3 times a day with limited effect.  Again, because she was complaining of sedation, I did not want to put her on amitriptyline or atropine.  I offered her hyoscyamine tablets a month ago. The improvement is very mild and unfortunately she notes pooling of thicker secretions after starting the anaspaz. She is not impressed with the effect.      MEDICATIONS:  Reviewed and as per Epic record.      I did not repeat a physical exam.  Forced vital capacity is 63% predicted for age, height and sex, FEV1 is similar, and MIP is -33 cm of water.  Overall, stable numbers from her previous visit.      Rooming vitals:  Blood pressure 105/73, pulse 68 and regular, O2 sat 96% on room air, weight 58.2 kg.  She endorses no pain.      IMPRESSION:  In summary, Katherin has an upper motor neuron predominant ALS with very slow progression over time.  We discussed a number of practical issues during a 25 minute encounter, of which more than half was counseling.  She will continue working with Physical Therapy.  She has had a substantial amount of weight loss over the past 2 years, and I will have our nutritionist evaluate her to that end.        At this point, I am afraid I do not have many good medical options to address her disturbing muscle spasms and sialorrhea.  I continue to believe that chemodenervation would be the optimal choice to address both problems at this juncture. Alternative options have been tried and have not worked well.  Katherin remains skeptical about Botox.  We will follow her up in 6 months or  sooner if needed.      Sincerely,      Travon Driver MD        D: 2020   T: 2020   MT: elizabeth      Name:     ANA RODRIGUEZ   MRN:      [id number]        Account:      [id number]   :      1940           Service Date: 2020      Document: [id number]

## 2020-01-16 NOTE — PROGRESS NOTES
CLINICAL NUTRITION SERVICES - REASSESSMENT NOTE   EVALUATION OF PREVIOUS PLAN OF CARE:   Referring Physician: Neisha  Current diet: general, increased intake of softer foods  Current appetite/intake: fair/good  PEG Tube: No     Monitoring from previous assessment:   -Food intake - Katherin reports that she has been focused on eating softer foods as eating remains difficult with her upper dentures not fitting well.  She has also been eating less secondary to pain with biting her tongue and cheeks when she eats.     She has obtained a food chopper and has been chopping most of her foods (especially meat) which makes it easier to chew meat.      B - large muffin with coffee  L - cheerios or mike crackers with Boost high protein OR cottage cheese with peaches   D - tuna salad, fish or eggs, pasta, cooked vegetables  Snacks - pudding, yogurt    -Liquid meal replacement or supplement - 1-2 Boost Plus/day (she takes 2 Boost Plus 3 times/week).     -Weight trends - down 18 lb x past 6 months  Today - per  lbs        Wt Readings from Last 8 Encounters:   08/17/19 65.8 kg (145 lb)   08/01/19 66.2 kg (146 lb)   06/27/19 67.6 kg (149 lb)   06/12/19 68 kg (150 lb)   04/21/19 69.9 kg (154 lb)   03/28/19 70.3 kg (155 lb)   02/28/19 70.3 kg (155 lb)   01/09/19 70.3 kg (155 lb)      Previous Goals:   1.  Aim for 5-6 small frequent meals, start taking 2 ONS/day - goal not met  2.  Aim for 1700kcal and 67g protein/day - goal not met  3. Weight maintenance - goal not met  Evaluation: Not met     Previous Nutrition Diagnosis:   Inadequate oral intake related to decreased appetite, pain with chewing  as evidenced by pt report and weight loss.   Evaluation: declining    CURRENT NUTRITION DIAGNOSIS   Inadequate oral intake related to difficulty chewing (ill fitting dentures) as evidenced by 13% wt loss x past 6 months    INTERVENTIONS   -General/healthful diet - reviewed calorie and protein needs for maintenance.  Advised  Katherin to aim for at least 1700kcal, 75g protein/day.  Reviewed ideas for soft foods and meal choices. Encouraged soft/pureed foods until she can get dentures to fit properly.  Of note, she has an appointment with her dentist.     -Medical Food Supplement - Suggested she try Boost Plus.  Suggested increasing to taking 2 Boost Plus on a routine basis OR switch to Boost VHC, having 1 daily, with 1 Boost Plus.  Encouraged Katherin to increase daily calorie intake by at least 300kcal.     Goals  1.  Aim for 5-6 small frequent meals, start taking 2 ONS/day  2.  Aim for 1700kcal and 67g protein/day  3. Weight maintenance        Follow-Up Plans: Pt has RD contact information for questions.    Pt encouraged to follow up with RD at next clinic visit in 3 months.     MONITORING AND EVALUATION:  -Food intake  -Liquid meal replacement or supplement  -Weight trends     Jaja Mario, FAIZAN, LD

## 2020-01-18 LAB
EXPTIME-PRE: 5.73 SEC
FEF2575-%PRED-PRE: 69 %
FEF2575-PRE: 1.11 L/SEC
FEF2575-PRED: 1.58 L/SEC
FEFMAX-%PRED-PRE: 51 %
FEFMAX-PRE: 2.54 L/SEC
FEFMAX-PRED: 4.89 L/SEC
FEV1-%PRED-PRE: 63 %
FEV1-PRE: 1.24 L
FEV1FEV6-PRE: 78 %
FEV1FEV6-PRED: 78 %
FEV1FVC-PRE: 76 %
FEV1FVC-PRED: 77 %
FIFMAX-PRE: 2.05 L/SEC
FVC-%PRED-PRE: 63 %
FVC-PRE: 1.63 L
FVC-PRED: 2.57 L
MEP-PRE: 35 CMH2O
MIP-PRE: -33 CMH2O

## 2020-01-20 ENCOUNTER — OFFICE VISIT (OUTPATIENT)
Dept: ORTHOPEDICS | Facility: CLINIC | Age: 80
End: 2020-01-20
Payer: MEDICARE

## 2020-01-20 ENCOUNTER — THERAPY VISIT (OUTPATIENT)
Dept: OCCUPATIONAL THERAPY | Facility: CLINIC | Age: 80
End: 2020-01-20
Attending: ORTHOPAEDIC SURGERY
Payer: MEDICARE

## 2020-01-20 DIAGNOSIS — I73.9 PAD (PERIPHERAL ARTERY DISEASE) (H): Primary | ICD-10-CM

## 2020-01-20 DIAGNOSIS — G62.9 PERIPHERAL POLYNEUROPATHY: ICD-10-CM

## 2020-01-20 DIAGNOSIS — L60.2 LONG TOENAIL: ICD-10-CM

## 2020-01-20 DIAGNOSIS — G56.03 BILATERAL CARPAL TUNNEL SYNDROME: ICD-10-CM

## 2020-01-20 DIAGNOSIS — G56.01 CARPAL TUNNEL SYNDROME OF RIGHT WRIST: ICD-10-CM

## 2020-01-20 PROCEDURE — 97760 ORTHOTIC MGMT&TRAING 1ST ENC: CPT | Mod: GO | Performed by: OCCUPATIONAL THERAPIST

## 2020-01-20 NOTE — PROGRESS NOTES
[facility] Hand Therapy Initial Evaluation     Current Date: 1/20/2020    Diagnosis: B CTS   DOI: 1/13/2020 (MD orders)  DOS: NA  Procedure: NA      Subjective:    Katherin Fletcher being seen for brace for hand  replacement.     and reported as 1/10 on pain scale.  Pertinent medical history includes:  History of fractures, heart problems, high blood pressure, implanted device, numbness/tingling and osteoarthritis.  Medical allergies: other. Other medical allergies details: nickel.  Surgeries include:  Orthopedic surgery, heart surgery and other.  Current medications:  Cardiac medication, high blood pressure medication, muscle relaxants and thyroid medication.   Primary job tasks include:  Computer work and prolonged sitting.  Pain is described as other and is intermittent. Pain is the same all the time. Since onset symptoms are unchanged.  Previous treatment includes other. There was mild improvement following previous treatment.   Patient is retired.     Red flags:  None as reported by patient.    Occupational Profile Information:  Right hand dominant  Prior functional level:  independent-have help in some areas  Patient reports symptoms of pain, stiffness/loss of motion, weakness/loss of strength, edema, numbness and tingling   Barriers include:transportation  Mobility: Uses wheelchair  Transportation: public transportation and medical transportation  Leisure activities/hobbies: [employer]    Functional Outcome Measure:   Upper Extremity Functional Index Score:  SCORE:   Column Totals: /80: (P) 49   (A lower score indicates greater disability.)    Objective:  Pain Level (Scale 0-10):   1/20/2020   At Rest 0/10   At Worst 0/10     Posture  Forward Neck Posture and Rounded Forward Shoulders    Edema  Mild    Sensation  Decreased Median Nerve distribution per pt report    Special Tests  Pain Report:  - none    + mild    ++ moderate    +++ severe    1/20/2020   Median Nerve Compression at Pronator +   Garcia Test  for Lumbrical Incursion  (fist x 30 secs) + after 10 sec   Tinels at Carpal Tunnel =     Strength: contraindicated    Assessment:  Patient presents with symptoms consistent with diagnosis of right wrist Carpal Tunnel Syndrome, with conservative intervention.     Patient's limitations or Problem List includes:  Weakness and Sensory disturbance of the right wrist which interferes with the patient's ability to perform Self Care Tasks (dressing, eating, bathing, hygiene/toileting) and Household Chores as compared to previous level of function.    Rehab Potential:  Good - Return to full activity, some limitations    Patient will benefit from skilled Occupational Therapy to increase ROM and overall strength and decrease edema to return to previous activity level and resume normal daily tasks and to reach their rehab potential.    Barriers to Learning:  No barrier    Communication Issues:  Patient appears to be able to clearly communicate and understand verbal and written communication and follow directions correctly.    Chart Review: Chart Review and Simple history review with patient    Identified Performance Deficits: bathing/showering, toileting, dressing, feeding, personal device care, hygiene and grooming, health management and maintenance, home establishment and management, meal preparation and cleanup and leisure activities    Assessment of Occupational Performance:  3-5 Performance Deficits    Clinical Decision Making (Complexity): Low complexity    Treatment Explanation:  The following has been discussed with the patient:  RX ordered/plan of care  Anticipated outcomes  Possible risks and side effects    Plan:  Frequency:  1x visit  Duration:  1x/only    Treatment Plan:    Orthotic Fabrication:    Static orthosis  Self Care:    Self Care Tasks    Discharge Plan:  Achieve all LTG.  Independent in home treatment program.  Reach maximal therapeutic benefit.    Home Exercise Program:  Forearm based wrist cock-up  orthosis

## 2020-01-20 NOTE — LETTER
DEPARTMENT OF HEALTH AND HUMAN SERVICES  CENTERS FOR MEDICARE & MEDICAID SERVICES    PLAN/UPDATED PLAN OF PROGRESS FOR OUTPATIENT REHABILITATION    PATIENTS NAME:  Katherin Fletcher   : 1940  PR[facility] NUMBER:    [id number]  [id number]  PROVIDER NAME: [facility]  MEDICAL RECORD NUMBER: [id number]   START OF CARE DATE:  SOC Date: 20   TYPE:  PT  PRIMARY/TREATMENT DIAGNOSIS: (Pertinent Medical Diagnosis)  Bilateral carpal tunnel syndrome  Carpal tunnel syndrome of right wrist  VISITS FROM START OF CARE:  Rxs Used: 1     [facility] Initial Evaluation   Current Date: 2020  Diagnosis: B CTS   DOI: 2020 (MD orders)  DOS: NA  Procedure: NA    Subjective:  Katherin Fletcher being seen for brace for hand  replacement.     and reported as 1/10 on pain scale.  Pertinent medical history includes:  History of fractures, heart problems, high blood pressure, implanted device, numbness/tingling and osteoarthritis.  Medical allergies: other. Other medical allergies details: nickel.  Surgeries include:  Orthopedic surgery, heart surgery and other.  Current medications:  Cardiac medication, high blood pressure medication, muscle relaxants and thyroid medication.   Primary job tasks include:  Computer work and prolonged sitting.  Pain is described as other and is intermittent. Pain is the same all the time. Since onset symptoms are unchanged.  Previous treatment includes other. There was mild improvement following previous treatment.   Patient is retired.   Red flags:  None as reported by patient.    Occupational Profile Information:  Right hand dominant  Prior functional level:  independent-have help in some areas  Patient reports symptoms of pain, stiffness/loss of motion, weakness/loss of strength, edema, numbness and tingling   Barriers include:transportation  Mobility: Uses wheelchair  Transportation: public transportation and medical transportation  Leisure activities/hobbies:  computer [employer]    Functional Outcome Measure:   Upper Extremity Functional Index Score:  SCORE:   Column Totals: /80: (P) 49   (A lower score indicates greater disability.)       Katherin Fletcher       Objective:  Pain Level (Scale 0-10):   1/20/2020   At Rest 0/10   At Worst 0/10     Posture  Forward Neck Posture and Rounded Forward Shoulders    Edema  Mild    Sensation  Decreased Median Nerve distribution per pt report    Special Tests  Pain Report:  - none    + mild    ++ moderate    +++ severe    1/20/2020   Median Nerve Compression at Pronator +   Garcia Test for Lumbrical Incursion  (fist x 30 secs) + after 10 sec   Tinels at Carpal Tunnel =     Strength: contraindicated    Assessment:  Patient presents with symptoms consistent with diagnosis of right wrist Carpal Tunnel Syndrome, with conservative intervention.   Patient's limitations or Problem List includes:  Weakness and Sensory disturbance of the right wrist which interferes with the patient's ability to perform Self Care Tasks (dressing, eating, bathing, hygiene/toileting) and Household Chores as compared to previous level of function.    Rehab Potential:  Good - Return to full activity, some limitations    Patient will benefit from skilled Occupational Therapy to increase ROM and overall strength and decrease edema to return to previous activity level and resume normal daily tasks and to reach their rehab potential.    Barriers to Learning:  No barrier    Communication Issues:  Patient appears to be able to clearly communicate and understand verbal and written communication and follow directions correctly.    Chart Review: Chart Review and Simple history review with patient    Identified Performance Deficits: bathing/showering, toileting, dressing, feeding, personal device care, hygiene and grooming, health management and maintenance, home establishment and management, meal preparation and cleanup and leisure activities    Assessment of Occupational  "Performance:  3-5 Performance Deficits  Katherin Fletcher       Clinical Decision Making (Complexity): Low complexity    Treatment Explanation:  The following has been discussed with the patient:  RX ordered/plan of care  Anticipated outcomes  Possible risks and side effects    Plan:  Frequency:  1x visit  Duration:  1x/only    Treatment Plan:    Orthotic Fabrication:    Static orthosis  Self Care:    Self Care Tasks    Discharge Plan:  Achieve all LTG.  Independent in home treatment program.  Reach maximal therapeutic benefit.    Home Exercise Program:  Forearm based wrist cock-up orthosis    Caregiver Signature/Credentials _____________________________ Date ________       Patrizia Eddy, OTR/L, CHT      I have reviewed and certified the need for these services and plan of treatment while under my care.        PHYSICIAN'S SIGNATURE:   _____________________________________ Date___________          Gokul Mario MD     Certification period:  Beginning of Cert date period: 01/20/20 to  End of Cert period date: 01/30/20     Functional Level Progress Report: Please see attached \"Goal Flow sheet for Functional level.\"    ________ Continue Services or       ___X_____ DC Services                Service dates: From  SOC Date: 01/20/20 date to present                         "

## 2020-01-20 NOTE — NURSING NOTE
Reason For Visit:   Chief Complaint   Patient presents with     Follow Up     Nail trimming.       Pain Assessment  Patient Currently in Pain: Yes  Primary Pain Location: (Right great toe)        Allergies   Allergen Reactions     Ace Inhibitors Cough     Baclofen Other (See Comments)     Constipation , tiredness     Penicillins      10/7/14 Hives per patient -- many many years ago       Nickel Swelling and Rash     Other reaction(s): Unknown           Precious Tovar LPN

## 2020-01-20 NOTE — LETTER
1/20/2020       RE: Katherin Fletcher  [address]  [facility] [place] [address]     Dear Colleague,    Thank you for referring your patient, Katherin Fletcher, to the [facility] at [facility]. Please see a copy of my visit note below.    Chief Complaint   Patient presents with     Follow Up     Nail trimming.            Allergies   Allergen Reactions     Ace Inhibitors Cough     Baclofen Other (See Comments)     Constipation , tiredness     Penicillins      10/7/14 Hives per patient -- many many years ago       Nickel Swelling and Rash     Other reaction(s): Unknown         Subjective: Katherin is a 79 year old female who presents to the clinic today for a follow up of elongated nails.  She presents today in her electric wheelchair and wearing a neck brace.  She relates that she does not have a musculature in her neck to hold her head up anymore.  She is in the neck brace most of the time.  S he had a fall in December.      Objective  Data Unavailable Data Unavailable Data Unavailable Data Unavailable Data Unavailable 0 lbs 0 oz  DP pulses are 1/4 and PT pulses are non-palpable bilaterally. LE capillary refill time is <3 seconds. Absent pedal hair. Mild non-pitting LE edema.  Dorsally contracted digits 2-5 that are non-reducible, very limited passive ROM. Equinus present.  Gross sensation intact to bilateral LE.   LE skin color, texture and turgor are normal. Toenails are slightly elongated bilaterally. Right 4th toe is hammered and in a rigid position.      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   - Cont with comfortable shoes.   - Pt to return to clinic in 9 weeks.      Again, thank you for allowing me to participate in the care of your patient.      Sincerely,    Adrian HOFFMAN  JAYASHREE Arroyo

## 2020-01-21 DIAGNOSIS — G12.21 ALS (AMYOTROPHIC LATERAL SCLEROSIS) (H): Primary | ICD-10-CM

## 2020-01-28 DIAGNOSIS — R25.2 SPASTICITY: ICD-10-CM

## 2020-01-28 DIAGNOSIS — G12.21 ALS (AMYOTROPHIC LATERAL SCLEROSIS) (H): ICD-10-CM

## 2020-01-28 RX ORDER — TIZANIDINE 2 MG/1
TABLET ORAL
Qty: 270 TABLET | Refills: 5 | Status: SHIPPED | OUTPATIENT
Start: 2020-01-28 | End: 2020-01-01

## 2020-01-28 NOTE — TELEPHONE ENCOUNTER
Rx Authorization:    Requested Medication/ Dose:2mg    Date last refill ordered: 11/14/19    Quantity ordered: 270tabs    # refills: 3    Date of last clinic visit with ordering provider: 1/16/20    Date of next clinic visit with ordering provider: 7/16/20    All pertinent protocol data (lab date/result):     Include pertinent information from patients message:

## 2020-02-03 PROBLEM — G56.01 CARPAL TUNNEL SYNDROME OF RIGHT WRIST: Status: RESOLVED | Noted: 2020-01-20 | Resolved: 2020-02-03

## 2020-02-19 NOTE — TELEPHONE ENCOUNTER
This was routed to a pool that was not accessed by the department. Patient did seek information via another message. This message is being closed.

## 2020-02-25 NOTE — TELEPHONE ENCOUNTER
[facility] Call Center    Phone Message    May a detailed message be left on voicemail: no     Reason for Call: Other: Pt, Katherin, would like sooner due date than May 28th.  Please call if possible: [phone].  I did schedule and added to waitlist & sent an encounter.     Action Taken: Message routed to:  [facility] ([facility]): Neurology    Travel Screening: Not Applicable

## 2020-02-25 NOTE — TELEPHONE ENCOUNTER
VM left asking for a return call.     Referral placed to [facility]R clinic. The patient can be scheduled with Dr. Celestin as a new patient.

## 2020-02-26 NOTE — TELEPHONE ENCOUNTER
ALS patient. Will route to [facility] to see if there is a sooner appointment available.       VM left for the patient.     Unfortunately, there are no sooner appointments. The patient is on the waitlist and we can call with a sooner appt if one comes available.

## 2020-02-26 NOTE — TELEPHONE ENCOUNTER
[facility] Call Center    Phone Message    May a detailed message be left on voicemail: yes     Reason for Call: Other: Please have Rehana call pt back. Thank you.     Action Taken: Message routed to:  Other:  Neurology    Travel Screening: Not Applicable

## 2020-02-26 NOTE — TELEPHONE ENCOUNTER
VM left for the patient. Requested a call back.     Discussed with [facility]. Would like to offer the patient an appointment for March 20 @ 1020am. Suggested she request to speak to me and I can add the appointment to the schedule.

## 2020-03-19 NOTE — PROGRESS NOTES
"       PM&R Clinic Note   Patient Name: Katherin Fletcher : 1940 Medical Record: [id number]            History of Present Illness:   Katherin Fletcher is a 78 year old female with h/o ALS who presented to clinic for follow up regarding spasticity management. PMH is also significant for A fib, CAD, HLD and TIA in 2018. She was seen in clinic as initial consult (referral from Dr. Corrales) on 10/11/18. Lase seen in 3/2019.    Background;  Her symptoms started 2 years ago with spasms in her legs and falls. Spasms have been progressively worse since then; occurs intermittently during the day and night time up to 3-4 times. They can last 10 minutes to 8 hours and are very painful. Improve with walking and worse at night time. She also reports \"tightness in her bilateral lower extremities\". She was seen and evaluated by Dr. Randolph neurology team; was started on sinemet but doesn't think this has been helpful. She also takes tylenol with codeine which are sometimes beneficial but not all the times. She tied baclofen in the past but it was discontinued due to GI side effects. She also tried gabapentin at higher doses with no results so stopped taking. Tizanidine was prescribed by Dr. Corrales but she has not started that yet. Botox injections were also recommended by Dr. Randolph but she cancelled her appointment as she was concerned about potential side effects.    Starting Dec 2017 she noticed that she \"couldn't hold her head up\". This has also been progressively worse since then. She has been using a soft cervical collar since 2018. Denies any paresthesia in her extremities or groins; no issue with bowel and bladder control. She has some neck pain, located at the \"base of her skull\" with radiation to b/l trap area but not to bilateral upper extremities. No back pain or radicular symptoms in bilateral lower extremities. She denies any issue with her swallowing, however she has noticed difficulty with \"increased " "saliva\" and worsening cough over the past few months. She sleeps ok at night time; appetite is fair and mood is stable.     She was admitted to the hospital in May 2018; presented right hemiparesis and slurred speech. Symptoms resolved and they were attributed to TIA. She had short course of PT, OT and SLP after that but no therapies for her chronic symptoms in the past.     She was followed by Dr. Randolph as mentioned above for posterior neck weakness and ataxic type gait with spasticity. She was started on sinemet which has been beneficial. Also seen by [facility] Ledezma at [facility] regarding possible surgical interventions; per chart review \"surgery was unlikely to help with her symptoms\".     She was seen and evaluated by Dr. Pham at [facility] of neurology in May 2018; EMG of bilateral upper extremities was performed on 6/27/18 which showed \"cervical polyradiculopathy; motor neuron disease can't be excluded\". There was no active denervation in all muscles tested in bilateral upper extremities and cervical paraspinals. Very large MUPs were seen at cervical paraspinals.     C spine MRI on 5/18/18 showed \"multilevel DDD, C5-6 severe spinal canal stenosis with flattening of the spinal cord and severe bilateral neuro-foraminal stenosis from uncinate spurs\".     Clinical swallow evaluation on 10/23/18 showed mild oropharyngeal dysphagia. Completed two sessions for more education on techniques to prevent aspiration and has continued them since then.     She was seen by Dr. Randolph in neurology clinic for follow up on 11/2/18, and then saw Dr. Gonzalez 1/15/19 and Dr. Driver 2/28/19 - blood work and repeat EMG were obtained. Per chart review, \" Given segmental cervical UMN and LMN signs and lumbosacral UMN signs with LMN findings on EMG, she fulfills [place] criteria for clinically probable, lab supported ALS\".     Tizanidine was restarted to help with spasms, Glycopyrrelate was started for drooling, and sinemet dose was " "adjusted with plan to wean her off. Tizanidine dose was increased from 2 to 4 mg tid for better control of spasms.       Interval history;  Notes spasms are in BLE with variable severity and presentation. She is concerned how this will affect her current medication regimen and her capacity to walk. During the interval period she endorses 5 mechanical falls resulting in ED visits but no prolonged hospitalizations or resulting trauma. Last incident was 1 week ago and injured soft tissue to her left foot. Was x-rayed and seen Dr. Arroyo this AM at [facility] noting without fracture. Uses a walker when ambulating at home and uses PWC for community distances. Lives with  and able to help with ambulation, ADLs, and bathroom transfers if needed. Daughter comes once a week, and son comes q2 weeks to help. Had an ALS volunteer assist however unable with current isolation recommendations.     Dr. Driver 1/16/20  \"The most difficult-to-control symptoms are her lower extremity muscle spasms that predominantly affect the calf muscles and her sialorrhea.  For the muscle spasms, she has tried numerous things with not good success.  She remains on tizanidine 6 mg t.i.d. and Sinemet 25/100 mg 5 tablets a day in total.  On occasion, she takes some oxycodone at night.  She tried THC/CBD mix through the [place] [facility] for more than 6 months, but she stopped it because it was not efficient.  I have repeatedly offered her the option of botulinum toxin, and she keeps on declining it.  We have not tried baclofen, but I have been historically reluctant to do this because she is already complaining of sedation and fatigue as side effects of her medications, and adding baclofen will certainly not make this better, plus it could increase her leg weakness and aggravate falls.  As for secretions, she has tried Robinul up to 2 mg 3 times a day with limited effect.  Again, because she was complaining of sedation, I did not " "want to put her on amitriptyline or atropine.  I offered her hyoscyamine tablets a month ago. The improvement is very mild and unfortunately she notes pooling of thicker secretions after starting the anaspaz. She is not impressed with the effect. \"             Past Medical and Surgical History:     Past Medical History:   Diagnosis Date     CAD (coronary artery disease)     s/p CABG 1992 and redo CABG 2009 (LIMA to LAD, SVG to OM and SVG to posterolateral branch of RCA)     Chronic cough     Non-productive. No known definite etiology. Is beeing seen by [facility].      Essential hypertension, benign      Falls frequently 5/28/2018     GERD (gastroesophageal reflux disease)      History of TIA (transient ischemic attack) and stroke 5/7/2018    R sided facial N&T.      Hyperlipidaemia      Hyperlipidaemia LDL goal < 130      Ischemic cardiomyopathy      MI (myocardial infarction) (H) 11/1996     Mixed hyperlipidemia      Neurodegenerative disorder (H)     Further detailed dx unknown. Managed by Dr. George Pham - \[facility]\"" [facility] of Neurology.      PAD (peripheral artery disease) (H)      Restless legs syndrome      Unspecified hypothyroidism      Past Surgical History:   Procedure Laterality Date     ARTHROSCOPY SHOULDER RT/LT       BUNIONECTOMY RT/LT       C CABG, ARTERY-VEIN, THREE  1/2009     C CARDIAC SURG PROCEDURE UNLIST       C HAND/FINGER SURGERY UNLISTED       C MRA UPPER EXTREMITY WO&W CONT       C SHOULDER SURG PROC UNLISTED       C STOMACH SURGERY PROCEDURE UNLISTED       CARDIAC SURGERY  1992    CABG,stents x2     CARPAL TUNNEL RELEASE RT/LT       EXCISE GANGLION HAND Right 11/29/2018    Procedure: EXCISE RIGHT VOLAR CARPAL MASS AND RIGHT INDEX TRIGGER FINGER RELEASE;  Surgeon: Minh Goldstein MD;  Location:  OR      VASCULAR SURGERY PROCEDURE UNLIST       HYSTERECTOMY, DAMIÁN  34 yo     OPEN REDUCTION INTERNAL FIXATION ANKLE Left 2015    Left Ankle     RELEASE TRIGGER FINGER       RELEASE TRIGGER FINGER Right " 11/29/2018    Procedure: RIGHT INDEX TRIGGER FINGER RELEASE;  Surgeon: Minh Goldstein MD;  Location:  OR     REPAIR HAMMER TOE Right 12/19/2017    Procedure: REPAIR HAMMER TOE;  Right 2nd and 3rd toe pinning;  Surgeon: Fernando Wiggins MD;  Location:  OR     STENT  2006     STENT(aka CARDIAC)  2008     VASCULAR SURGERY              Social History:     Marital Status:    Living situation: lives with her  in a house. Her  is 89 and a former . A ramp and a chair lift were recently installed at their house.   Family support: her  was driving but stopped since last visit.  They both use [employer] mobility now.   Vocational History: retired; she was  at [employer]   Tobacco use: quit in 1992  Alcohol use: rarely a glass of wine           Functional history:     Katherin is right hand-dominant; stopped driving in 11/2017 due to her symptoms. She has a power WC for outside and uses her 4WW inside her house. She also has a WC to use if needed. She manages her meds but needs assistance with ADLs and mobility. She uses [employer] mobility for transportation.            Family History:     Family History   Problem Relation Age of Onset     Unknown/Adopted Mother      Unknown/Adopted Father      Other - See Comments Son         on warfarin for one year after afganastan now off and doing well     Other - See Comments Daughter         celiac, brittle bone, arthritis, Raynauds            Medications:     Current Outpatient Medications   Medication Sig Dispense Refill     acetaminophen (TYLENOL) 500 MG tablet Take 1-2 tablets (500-1,000 mg) by mouth every 6 hours as needed for mild pain       Alfalfa 250 MG TABS Take 250 mg by mouth every morning        apixaban ANTICOAGULANT (ELIQUIS) 5 MG tablet Take 1 tablet (5 mg) by mouth 2 times daily 180 tablet 3     calcium carbonate-vitamin D (CALCIUM + D) 600-200 MG-UNIT TABS Take 1 tablet by mouth At Bedtime        carbidopa-levodopa  (SINEMET)  MG tablet Take 1 tablet three times daily and 2 tablets at bedtime       carbidopa-levodopa (SINEMET)  MG tablet Take 2 tablets by mouth At Bedtime       carvedilol (COREG) 6.25 MG tablet Take 1 tablet (6.25 mg) by mouth 2 times daily (with meals) 180 tablet 3     clopidogrel (PLAVIX) 75 MG tablet Take 1 tablet (75 mg) by mouth daily 90 tablet 3     glycopyrrolate (ROBINUL) 1 MG tablet Take 1 tablet (1 mg) by mouth 3 times daily 270 tablet 1     hyoscyamine (ANASPAZ/LEVSIN) 0.125 MG tablet Take 1 tablet (125 mcg) by mouth every 8 hours as needed for other (drooling (sialorrhea)) 90 tablet 1     isosorbide mononitrate (IMDUR) 30 MG 24 hr tablet Take 1 tablet (30 mg) by mouth daily 90 tablet 3     LEVOXYL 112 MCG tablet TAKE 1 TABLET DAILY 90 tablet 4     Multiple Vitamins-Minerals (DAILY MULTI) TABS Take 1 tablet by mouth every morning        nitroGLYcerin (NITROSTAT) 0.4 MG sublingual tablet DISSOLVE ONE TABLET UNDER THE TONGUE EVERY 5 MINUTES AS NEEDED FOR CHEST PAIN.  DO NOT EXCEED A TOTAL OF 3 DOSES IN 15 MINUTES 25 tablet 3     omeprazole (PRILOSEC) 20 MG DR capsule Take 1 capsule (20 mg) by mouth 2 times daily 30-60 minutes before meal 60 capsule 11     rosuvastatin (CRESTOR) 20 MG tablet Take 1 tablet (20 mg) by mouth daily 90 tablet 3     tiZANidine (ZANAFLEX) 2 MG tablet TAKE 3 TABLETS THREE TIMES A  tablet 5            Allergies:     Allergies   Allergen Reactions     Ace Inhibitors Cough     Baclofen Other (See Comments)     Constipation , tiredness     Penicillins      10/7/14 Hives per patient -- many many years ago       Nickel Swelling and Rash     Other reaction(s): Unknown              ROS:     A focused ROS is negative other than the symptoms noted above in the HPI.           Physical Examiniation:   VITAL SIGNS: BP (!) 145/81   Pulse 71   Temp 97.7  F (36.5  C)   Resp 16   SpO2 98%   BMI: Estimated body mass index is 22.03 kg/m  as calculated from the following:     "Height as of 8/17/19: 1.626 m (5' 4.02\").    Weight as of 1/16/20: 58.2 kg (128 lb 6.4 oz).    Gen: NAD, pleasant and cooperative, sitting in a wheelchair  HEENT: C collar in place  Cardio: regular pulse  Pulm: non-labored breathing in room air  Abd: benign  Ext: WWP, L ankle edematous with some tenderness to palpation    Neuro/MSK:    Forward head posture and dropped head  Noted diffuse muscle atrophy at bilateral upper and lower extremities  Also noted sialorrhea and she had to use Kleenex often during my interview and exam  Full strength and sensation exam was not done today but she was able to move all extremities antigravity  Tone was increased in bilateral lower extremities with 1+ out of 4 at the knee flexion and hip abduction, and 2 out of 4 at plantar flexion bilaterally           Assessment/Plan:       ALS (upper motor neuron predominant)      Cervical myelopathy, multi-level DDD at C spine, and svere spinal canal and bilateral  NF stenosis at C5-6 level    Spasticity and spasms in bilateral lower extremities     Sialorrhea     Neck weakness -dropped head    Mild oropharyngeal dysphagia - per clinical swallow evaluation in 10/2018    H/o A fib, CAD, HLD and TIA in 5/2018    Peripheral arterial disease with history of lower leg ulceration - followed by Dr. Romo podiatry team    Bilateral carpal tunnel syndrome. EMG also showed right ulnar neuropathy  - was seen and evaluated by Dr. Mario     S/p right volar carpal ganglion cyst and trigger finger        For spasticity management, again reviewed the treatment options including therapies, medications, and injections. Also reviewed goals for PT to maintain function, strengthen as much as possible, keep normal ROM at joints, and to prevent falls. Strongly recommended to continue regular exercises at home.  Therapies are limited right now due to current situation with coronavirus and needed social isolation.    She had side effects with baclofen in the " past; tizanidine was restarted by Dr. Driver and has continued; current dose is 6 mg 3 times a day.  She is also on Sinemet 5 tablets/day total.  She tried THS/CBD mix through the [facility] for more than 6 months but discontinued due to no benefits.  Other medication options are limited due to risk of sedation and falls.  After multiple conversations with Dr. Driver she agreed with the plan to do Botox injections today.  Reviewed risks and benefits and answered all of her questions.  We will start with calf muscles only and gradually titrate the dose and modified the sites of injections for optimal result.     Discussed the plan for sialorrhea management as well.  This is her second difficult symptom.  She is currently on Robinul 2 mg 3 times a day with limited effect.  Other medication options including amitriptyline or atropine were considered but not restarted due to potential risks.  We discussed botulinum toxin injections for treatment of sialorrhea and answered all of her questions.  She decided not to proceed with salivary gland injections today but will consider it at next visit.        1. Work-up: None today    2. Therapy/equipment/braces: continue regular home exercises.  She has been using a soft cervical collar to help with neck weakness and pain. Discussed the risks of muscle atrophy secondary to disuse.     3. Medications: see above.     4. Interventions: starting chemo-denervation for spasticity management today; and for sialorrhea in the near future.     5. Referral / follow up with other providers: neurology team as scheduled. No new referral today.    6. Follow up: 12 weeks    Darline Celestin MD  Physical Medicine & Rehabilitation    I spent a total of 25 minutes was spent face to face with the patient excluding the procedure.  Greater than 50% of the time was spent in counseling and coordinating care.       BOTULINUM TOXIN PROCEDURE NOTE    Chief Complaint   Patient  presents with     Botox     [facility] NEW BOTOX        BP (!) 145/81   Pulse 71   Temp 97.7  F (36.5  C)   Resp 16   SpO2 98%       Current Outpatient Medications:      acetaminophen (TYLENOL) 500 MG tablet, Take 1-2 tablets (500-1,000 mg) by mouth every 6 hours as needed for mild pain, Disp: , Rfl:      Alfalfa 250 MG TABS, Take 250 mg by mouth every morning , Disp: , Rfl:      apixaban ANTICOAGULANT (ELIQUIS) 5 MG tablet, Take 1 tablet (5 mg) by mouth 2 times daily, Disp: 180 tablet, Rfl: 3     calcium carbonate-vitamin D (CALCIUM + D) 600-200 MG-UNIT TABS, Take 1 tablet by mouth At Bedtime , Disp: , Rfl:      carbidopa-levodopa (SINEMET)  MG tablet, Take 1 tablet three times daily and 2 tablets at bedtime, Disp: , Rfl:      carbidopa-levodopa (SINEMET)  MG tablet, Take 2 tablets by mouth At Bedtime, Disp: , Rfl:      carvedilol (COREG) 6.25 MG tablet, Take 1 tablet (6.25 mg) by mouth 2 times daily (with meals), Disp: 180 tablet, Rfl: 3     clopidogrel (PLAVIX) 75 MG tablet, Take 1 tablet (75 mg) by mouth daily, Disp: 90 tablet, Rfl: 3     glycopyrrolate (ROBINUL) 1 MG tablet, Take 1 tablet (1 mg) by mouth 3 times daily, Disp: 270 tablet, Rfl: 1     hyoscyamine (ANASPAZ/LEVSIN) 0.125 MG tablet, Take 1 tablet (125 mcg) by mouth every 8 hours as needed for other (drooling (sialorrhea)), Disp: 90 tablet, Rfl: 1     isosorbide mononitrate (IMDUR) 30 MG 24 hr tablet, Take 1 tablet (30 mg) by mouth daily, Disp: 90 tablet, Rfl: 3     LEVOXYL 112 MCG tablet, TAKE 1 TABLET DAILY, Disp: 90 tablet, Rfl: 4     Multiple Vitamins-Minerals (DAILY MULTI) TABS, Take 1 tablet by mouth every morning , Disp: , Rfl:      nitroGLYcerin (NITROSTAT) 0.4 MG sublingual tablet, DISSOLVE ONE TABLET UNDER THE TONGUE EVERY 5 MINUTES AS NEEDED FOR CHEST PAIN.  DO NOT EXCEED A TOTAL OF 3 DOSES IN 15 MINUTES, Disp: 25 tablet, Rfl: 3     omeprazole (PRILOSEC) 20 MG DR capsule, Take 1 capsule (20 mg) by mouth 2 times daily 30-60 minutes  before meal, Disp: 60 capsule, Rfl: 11     rosuvastatin (CRESTOR) 20 MG tablet, Take 1 tablet (20 mg) by mouth daily, Disp: 90 tablet, Rfl: 3     tiZANidine (ZANAFLEX) 2 MG tablet, TAKE 3 TABLETS THREE TIMES A DAY, Disp: 270 tablet, Rfl: 5    Current Facility-Administered Medications:      botulinum toxin type A (BOTOX) 100 units injection 400 Units, 400 Units, Intramuscular, Q90 Days, Darline Celestin MD     Allergies   Allergen Reactions     Ace Inhibitors Cough     Baclofen Other (See Comments)     Constipation , tiredness     Penicillins      10/7/14 Hives per patient -- many many years ago       Nickel Swelling and Rash     Other reaction(s): Unknown        PHYSICAL EXAM:    As above    HPI:    Patient denies new medical diagnoses, illnesses, hospitalizations, emergency room visits, and injuries since the previous injection with botulinum neurotoxin.    We reviewed the recommended safety guidelines for  Botox from any vaccine injection, such as the seasonal flu vaccine, by a minimum of 10-14 days with Katherin Fletcher. She acknowledged understanding.    RESPONSE TO PREVIOUS TREATMENT:    N/A - Initial treatment    BENEFITS BY PATIENT REPORT:    N/A - Initial treatment      BOTULINUM NEUROTOXIN INJECTION PROCEDURES:    VERIFICATION OF PATIENT IDENTIFICATION AND PROCEDURE     Initials   Patient Name JQ   Patient  JQ   Procedure Verified by: SHONNA     Prior to the start of the procedure and with procedural staff participation, I verbally confirmed the patient s identity using two indicators, relevant allergies, that the procedure was appropriate and matched the consent or emergent situation, and that the correct equipment/implants were available. Immediately prior to starting the procedure I conducted the Time Out with the procedural staff and re-confirmed the patient s name, procedure, and site/side. (The Joint Commission universal protocol was followed.)  Yes    Sedation (Moderate or Deep):  None      Above assessments performed by:  Resident/Fellow         Ike Castaneda DO          The attending provider was present for the entire procedure documented below.      Darline Celestin MD      INDICATION/S FOR PROCEDURE/S:  Katherin Fletcher is a 79 year old patient with  spasticity affecting the right lower extremity and left lower extremity secondary to a diagnosis of ALS with associated  pain, spasms, loss of joint motion, loss of volitional motor control, difficulty with activities of daily living and difficulty with ambulation and transfers. And sialorrhea      Her baseline symptoms have been recalcitrant to oral medications and conservative therapy.  She is here today for an injection of Botox.      GOAL OF PROCEDURE:  The goal of this procedure is to increase active range of motion, improve volitional motor control, decrease pain , enhance functional independence and decrease excessive drooling  associated with spasticity.    TOTAL DOSE USED: 100 units  Dose Administered:  45 units Botox  2:1 Dilution  Diluent Used:  Preservative Free Normal Saline  Total Volume of Diluent Used:  0.9 ml  Lot # /C3 with Expiration Date:  8/2022  NDC #: Botox 100u ([address]-41)     Was there drug waste? Yes  Amount of drug waste (mL): 55 units of Botox.  Reason for waste:  Single use vial  Multi-dose vial: No    Darline Celestin MD  March 20, 2020      Medication guide was offered to patient and was declined.    CONSENT:  The risks, benefits, and treatment options were discussed with Katherin Fletcher and she agreed to proceed.      Written consent was obtained by J.     EQUIPMENT USED:  Needle-37mm stimulating/recording  EMG/NCS Machine  Ultrasound    SKIN PREPARATION:  Skin preparation was performed using an alcohol wipe.    GUIDANCE DESCRIPTION:  Electro-myographic and Ultrasound guidance were necessary throughout the procedure to accurately identify all areas of spastic muscles while avoiding injection of  non-spastic muscles, neighboring nerves and nearby vascular structures.     AREA/MUSCLE INJECTED:  45 units total 2:1 dilution  Right gastrocnemius medial head 10 units  Right gastrocnemius lateral head 15 units  Left gastrocnemius medial head 10 units  Left  gastrocnemius lateral head 10 units    RESPONSE TO PROCEDURE:  Katherin Fletcher tolerated the procedure well and there were no immediate complications.  She was allowed to recover for an appropriate period of time and was discharged home in stable condition.    FOLLOW UP:  Katherin Fletcher was asked to follow up by phone in 7-14 days with Marissa Davey RN, Care Coordinator, to report her response to this series of injections.  Based on the patient's previous response to this therapy, Katherin Fletcher was rescheduled for the next series of injections in 12 weeks.    PLAN (Medication Changes, Therapy Orders, Work or Disability Issues, etc.): Patient to monitor efficacy of today's injections        Physician Attestation   I spent a total of 25 minutes with the patient, personally observing as the resident/fellow performed the above procedure.     Key findings: see progress note.     Darline Celestin  Date of Service (when I saw the patient): 03/20/20

## 2020-03-20 NOTE — LETTER
3/20/2020       RE: Katherin Fletcher  [address]  [facility] [place] [address]     Dear Colleague,    Thank you for referring your patient, Katherin Fletcher, to the [facility] at [facility]. Please see a copy of my visit note below.    Chief Complaint:   Chief Complaint   Patient presents with     RECHECK     Foot pain           Allergies   Allergen Reactions     Ace Inhibitors Cough     Baclofen Other (See Comments)     Constipation , tiredness     Penicillins      10/7/14 Hives per patient -- many many years ago       Nickel Swelling and Rash     Other reaction(s): Unknown         Subjective: Katherin is a 79 year old female who presents to the clinic today for a follow up of left foot pain. She relates that about a week ago, she bent her toes backward and had pain in the balls of her feet. She relates that the area did not get black or blue. She had minimal swelling in the foot. Relates pain as a 2/10 in the foot with minimal improvement.     Objective  Data Unavailable Data Unavailable Data Unavailable Data Unavailable Data Unavailable 0 lbs 0 oz  No ecchymosis noted to the foot. Some mild edema noted about the forefoot about the 2nd and 3rd met heads. Some pain about the base of the 1st met without 1st ray ROM pain. Pain in the 2nd MTPJ with passive and active ROM.     left foot xrays indicated in 3 weightbearing views.    Impaction fracture of indeterminate age noted to the base of the 1st met. No acute processes noted to the 2nd and 3rd metatarsals.     Assessment: Katherin is a 78 YO woman who recently had forced dorsiflexion of the left lesser digits with pain. On radiographs, she has an age indeterminate first met impaction, however she alaniz snot have clinical s/s to indicate that this is an acute process. She does have ambulatory dysfunction and I would be hesitant to put her in a surgical shoe as this has a high chance of causing a fall. I  would recommend that she keep the area ACE wrapped and use ICE. Pain is currently tolerable.     Plan:   - Pt seen and evaluated.  - XRs taken and discussed with her.  - ACE wrap to foot daily.   - No surgical shoe.   - Pt to return to clinic in 10 weeks.          Again, thank you for allowing me to participate in the care of your patient.      Sincerely,    Adrian Arroyo DPM

## 2020-03-20 NOTE — NURSING NOTE
Reason For Visit:   Chief Complaint   Patient presents with     RECHECK     Foot pain        There were no vitals taken for this visit.    Pain Assessment  Patient Currently in Pain: Yes  0-10 Pain Scale: 2  Primary Pain Location: Foot    Candy Felton ATC

## 2020-03-20 NOTE — LETTER
"3/20/2020       RE: Katherin Fletcher  [address]  [facility] [id number]     Dear Colleague,    Thank you for referring your patient, Katherin Fletcher, to the [facility] PHYSICAL MEDICINE AND REHABILITATION at [facility]. Please see a copy of my visit note below.           PM&R Clinic Note   Patient Name: Katherin Fletcher : 1940 Medical Record: [id number]            History of Present Illness:   Katherin Fletcher is a 78 year old female with h/o ALS who presented to clinic for follow up regarding spasticity management. PMH is also significant for A fib, CAD, HLD and TIA in 2018. She was seen in clinic as initial consult (referral from Dr. Corrales) on 10/11/18. Lase seen in 3/2019.    Background;  Her symptoms started 2 years ago with spasms in her legs and falls. Spasms have been progressively worse since then; occurs intermittently during the day and night time up to 3-4 times. They can last 10 minutes to 8 hours and are very painful. Improve with walking and worse at night time. She also reports \"tightness in her bilateral lower extremities\". She was seen and evaluated by Dr. Randolph neurology team; was started on sinemet but doesn't think this has been helpful. She also takes tylenol with codeine which are sometimes beneficial but not all the times. She tied baclofen in the past but it was discontinued due to GI side effects. She also tried gabapentin at higher doses with no results so stopped taking. Tizanidine was prescribed by Dr. Corrales but she has not started that yet. Botox injections were also recommended by Dr. Randolph but she cancelled her appointment as she was concerned about potential side effects.    Starting Dec 2017 she noticed that she \"couldn't hold her head up\". This has also been progressively worse since then. She has been using a soft cervical collar since 2018. Denies any paresthesia in her extremities or groins; no issue " "with bowel and bladder control. She has some neck pain, located at the \"base of her skull\" with radiation to b/l trap area but not to bilateral upper extremities. No back pain or radicular symptoms in bilateral lower extremities. She denies any issue with her swallowing, however she has noticed difficulty with \"increased saliva\" and worsening cough over the past few months. She sleeps ok at night time; appetite is fair and mood is stable.     She was admitted to the hospital in May 2018; presented right hemiparesis and slurred speech. Symptoms resolved and they were attributed to TIA. She had short course of PT, OT and SLP after that but no therapies for her chronic symptoms in the past.     She was followed by Dr. Randolph as mentioned above for posterior neck weakness and ataxic type gait with spasticity. She was started on sinemet which has been beneficial. Also seen by Dr. Ledezma at [facility] regarding possible surgical interventions; per chart review \"surgery was unlikely to help with her symptoms\".     She was seen and evaluated by Dr. Pham at [facility] in May 2018; EMG of bilateral upper extremities was performed on 6/27/18 which showed \"cervical polyradiculopathy; motor neuron disease can't be excluded\". There was no active denervation in all muscles tested in bilateral upper extremities and cervical paraspinals. Very large MUPs were seen at cervical paraspinals.     C spine MRI on 5/18/18 showed \"multilevel DDD, C5-6 severe spinal canal stenosis with flattening of the spinal cord and severe bilateral neuro-foraminal stenosis from uncinate spurs\".     Clinical swallow evaluation on 10/23/18 showed mild oropharyngeal dysphagia. Completed two sessions for more education on techniques to prevent aspiration and has continued them since then.     She was seen by Dr. Randolph in neurology clinic for follow up on 11/2/18, and then saw Dr. Gonzalez 1/15/19 and Dr. Driver 2/28/19 - blood work and repeat " "EMG were obtained. Per chart review, \" Given segmental cervical UMN and LMN signs and lumbosacral UMN signs with LMN findings on EMG, she fulfills [place] criteria for clinically probable, lab supported ALS\".     Tizanidine was restarted to help with spasms, Glycopyrrelate was started for drooling, and sinemet dose was adjusted with plan to wean her off. Tizanidine dose was increased from 2 to 4 mg tid for better control of spasms.       Interval history;  Notes spasms are in BLE with variable severity and presentation. She is concerned how this will affect her current medication regimen and her capacity to walk. During the interval period she endorses 5 mechanical falls resulting in ED visits but no prolonged hospitalizations or resulting trauma. Last incident was 1 week ago and injured soft tissue to her left foot. Was x-rayed and seen Dr. Arroyo this AM at [facility] noting without fracture. Uses a walker when ambulating at home and uses PWC for community distances. Lives with  and able to help with ambulation, ADLs, and bathroom transfers if needed. Daughter comes once a week, and son comes q2 weeks to help. Had an ALS volunteer assist however unable with current isolation recommendations.     Dr. Driver 1/16/20  \"The most difficult-to-control symptoms are her lower extremity muscle spasms that predominantly affect the calf muscles and her sialorrhea.  For the muscle spasms, she has tried numerous things with not good success.  She remains on tizanidine 6 mg t.i.d. and Sinemet 25/100 mg 5 tablets a day in total.  On occasion, she takes some oxycodone at night.  She tried THC/CBD mix through the [place] [facility] for more than 6 months, but she stopped it because it was not efficient.  I have repeatedly offered her the option of botulinum toxin, and she keeps on declining it.  We have not tried baclofen, but I have been historically reluctant to do this because she is already complaining of " "sedation and fatigue as side effects of her medications, and adding baclofen will certainly not make this better, plus it could increase her leg weakness and aggravate falls.  As for secretions, she has tried Robinul up to 2 mg 3 times a day with limited effect.  Again, because she was complaining of sedation, I did not want to put her on amitriptyline or atropine.  I offered her hyoscyamine tablets a month ago. The improvement is very mild and unfortunately she notes pooling of thicker secretions after starting the anaspaz. She is not impressed with the effect. \"             Past Medical and Surgical History:     Past Medical History:   Diagnosis Date     CAD (coronary artery disease)     s/p CABG 1992 and redo CABG 2009 (LIMA to LAD, SVG to OM and SVG to posterolateral branch of RCA)     Chronic cough     Non-productive. No known definite etiology. Is beeing seen by [facility].      Essential hypertension, benign      Falls frequently 5/28/2018     GERD (gastroesophageal reflux disease)      History of TIA (transient ischemic attack) and stroke 5/7/2018    R sided facial N&T.      Hyperlipidaemia      Hyperlipidaemia LDL goal < 130      Ischemic cardiomyopathy      MI (myocardial infarction) (H) 11/1996     Mixed hyperlipidemia      Neurodegenerative disorder (H)     Further detailed dx unknown. Managed by Dr. George Pham - [facility].      PAD (peripheral artery disease) (H)      Restless legs syndrome      Unspecified hypothyroidism      Past Surgical History:   Procedure Laterality Date     ARTHROSCOPY SHOULDER RT/LT       BUNIONECTOMY RT/LT       C CABG, ARTERY-VEIN, THREE  1/2009     C CARDIAC SURG PROCEDURE UNLIST       C HAND/FINGER SURGERY UNLISTED       C MRA UPPER EXTREMITY WO&W CONT       C SHOULDER SURG PROC UNLISTED       C STOMACH SURGERY PROCEDURE UNLISTED       CARDIAC SURGERY  1992    CABG,stents x2     CARPAL TUNNEL RELEASE RT/LT       EXCISE GANGLION HAND Right 11/29/2018    " Procedure: EXCISE RIGHT VOLAR CARPAL MASS AND RIGHT INDEX TRIGGER FINGER RELEASE;  Surgeon: Minh Goldstein MD;  Location:  OR      VASCULAR SURGERY PROCEDURE UNLIST       HYSTERECTOMY, DAMIÁN  34 yo     OPEN REDUCTION INTERNAL FIXATION ANKLE Left 2015    Left Ankle     RELEASE TRIGGER FINGER       RELEASE TRIGGER FINGER Right 11/29/2018    Procedure: RIGHT INDEX TRIGGER FINGER RELEASE;  Surgeon: Minh Goldstein MD;  Location:  OR     REPAIR HAMMER TOE Right 12/19/2017    Procedure: REPAIR HAMMER TOE;  Right 2nd and 3rd toe pinning;  Surgeon: Fernando Wiggins MD;  Location:  OR     STENT  2006     STENT(aka CARDIAC)  2008     VASCULAR SURGERY              Social History:     Marital Status:    Living situation: lives with her  in a house. Her  is 89 and a former . A ramp and a chair lift were recently installed at their house.   Family support: her  was driving but stopped since last visit.  They both use [employer] mobility now.   Vocational History: retired; she was  at [employer]   Tobacco use: quit in 1992  Alcohol use: rarely a glass of wine           Functional history:     Katherin is right hand-dominant; stopped driving in 11/2017 due to her symptoms. She has a power WC for outside and uses her 4WW inside her house. She also has a WC to use if needed. She manages her meds but needs assistance with ADLs and mobility. She uses metro mobility for transportation.            Family History:     Family History   Problem Relation Age of Onset     Unknown/Adopted Mother      Unknown/Adopted Father      Other - See Comments Son         on warfarin for one year after afganastan now off and doing well     Other - See Comments Daughter         celiac, brittle bone, arthritis, Raynauds            Medications:     Current Outpatient Medications   Medication Sig Dispense Refill     acetaminophen (TYLENOL) 500 MG tablet Take 1-2 tablets (500-1,000 mg) by mouth every 6  hours as needed for mild pain       Alfalfa 250 MG TABS Take 250 mg by mouth every morning        apixaban ANTICOAGULANT (ELIQUIS) 5 MG tablet Take 1 tablet (5 mg) by mouth 2 times daily 180 tablet 3     calcium carbonate-vitamin D (CALCIUM + D) 600-200 MG-UNIT TABS Take 1 tablet by mouth At Bedtime        carbidopa-levodopa (SINEMET)  MG tablet Take 1 tablet three times daily and 2 tablets at bedtime       carbidopa-levodopa (SINEMET)  MG tablet Take 2 tablets by mouth At Bedtime       carvedilol (COREG) 6.25 MG tablet Take 1 tablet (6.25 mg) by mouth 2 times daily (with meals) 180 tablet 3     clopidogrel (PLAVIX) 75 MG tablet Take 1 tablet (75 mg) by mouth daily 90 tablet 3     glycopyrrolate (ROBINUL) 1 MG tablet Take 1 tablet (1 mg) by mouth 3 times daily 270 tablet 1     hyoscyamine (ANASPAZ/LEVSIN) 0.125 MG tablet Take 1 tablet (125 mcg) by mouth every 8 hours as needed for other (drooling (sialorrhea)) 90 tablet 1     isosorbide mononitrate (IMDUR) 30 MG 24 hr tablet Take 1 tablet (30 mg) by mouth daily 90 tablet 3     LEVOXYL 112 MCG tablet TAKE 1 TABLET DAILY 90 tablet 4     Multiple Vitamins-Minerals (DAILY MULTI) TABS Take 1 tablet by mouth every morning        nitroGLYcerin (NITROSTAT) 0.4 MG sublingual tablet DISSOLVE ONE TABLET UNDER THE TONGUE EVERY 5 MINUTES AS NEEDED FOR CHEST PAIN.  DO NOT EXCEED A TOTAL OF 3 DOSES IN 15 MINUTES 25 tablet 3     omeprazole (PRILOSEC) 20 MG DR capsule Take 1 capsule (20 mg) by mouth 2 times daily 30-60 minutes before meal 60 capsule 11     rosuvastatin (CRESTOR) 20 MG tablet Take 1 tablet (20 mg) by mouth daily 90 tablet 3     tiZANidine (ZANAFLEX) 2 MG tablet TAKE 3 TABLETS THREE TIMES A  tablet 5            Allergies:     Allergies   Allergen Reactions     Ace Inhibitors Cough     Baclofen Other (See Comments)     Constipation , tiredness     Penicillins      10/7/14 Hives per patient -- many many years ago       Nickel Swelling and Rash      "Other reaction(s): Unknown              ROS:     A focused ROS is negative other than the symptoms noted above in the HPI.           Physical Examiniation:   VITAL SIGNS: BP (!) 145/81   Pulse 71   Temp 97.7  F (36.5  C)   Resp 16   SpO2 98%   BMI: Estimated body mass index is 22.03 kg/m  as calculated from the following:    Height as of 8/17/19: 1.626 m (5' 4.02\").    Weight as of 1/16/20: 58.2 kg (128 lb 6.4 oz).    Gen: NAD, pleasant and cooperative, sitting in a wheelchair  HEENT: C collar in place  Cardio: regular pulse  Pulm: non-labored breathing in room air  Abd: benign  Ext: WWP, L ankle edematous with some tenderness to palpation    Neuro/MSK:    Forward head posture and dropped head  Noted diffuse muscle atrophy at bilateral upper and lower extremities  Also noted sialorrhea and she had to use Kleenex often during my interview and exam  Full strength and sensation exam was not done today but she was able to move all extremities antigravity  Tone was increased in bilateral lower extremities with 1+ out of 4 at the knee flexion and hip abduction, and 2 out of 4 at plantar flexion bilaterally           Assessment/Plan:       ALS (upper motor neuron predominant)      Cervical myelopathy, multi-level DDD at C spine, and svere spinal canal and bilateral  NF stenosis at C5-6 level    Spasticity and spasms in bilateral lower extremities     Sialorrhea     Neck weakness -dropped head    Mild oropharyngeal dysphagia - per clinical swallow evaluation in 10/2018    H/o A fib, CAD, HLD and TIA in 5/2018    Peripheral arterial disease with history of lower leg ulceration - followed by Dr. Romo podiatry team    Bilateral carpal tunnel syndrome. EMG also showed right ulnar neuropathy  - was seen and evaluated by Dr. Mario     S/p right volar carpal ganglion cyst and trigger finger        For spasticity management, again reviewed the treatment options including therapies, medications, and injections. Also " reviewed goals for PT to maintain function, strengthen as much as possible, keep normal ROM at joints, and to prevent falls. Strongly recommended to continue regular exercises at home.  Therapies are limited right now due to current situation with coronavirus and needed social isolation.    She had side effects with baclofen in the past; tizanidine was restarted by Dr. Driver and has continued; current dose is 6 mg 3 times a day.  She is also on Sinemet 5 tablets/day total.  She tried THS/CBD mix through the [place] [employer] [facility] [employer] for more than 6 months but discontinued due to no benefits.  Other medication options are limited due to risk of sedation and falls.  After multiple conversations with Dr. Driver she agreed with the plan to do Botox injections today.  Reviewed risks and benefits and answered all of her questions.  We will start with calf muscles only and gradually titrate the dose and modified the sites of injections for optimal result.     Discussed the plan for sialorrhea management as well.  This is her second difficult symptom.  She is currently on Robinul 2 mg 3 times a day with limited effect.  Other medication options including amitriptyline or atropine were considered but not restarted due to potential risks.  We discussed botulinum toxin injections for treatment of sialorrhea and answered all of her questions.  She decided not to proceed with salivary gland injections today but will consider it at next visit.        1. Work-up: None today    2. Therapy/equipment/braces: continue regular home exercises.  She has been using a soft cervical collar to help with neck weakness and pain. Discussed the risks of muscle atrophy secondary to disuse.     3. Medications: see above.     4. Interventions: starting chemo-denervation for spasticity management today; and for sialorrhea in the near future.     5. Referral / follow up with other providers: neurology team as scheduled. No new referral  today.    6. Follow up: 12 weeks    Darline Celestin MD  Physical Medicine & Rehabilitation    I spent a total of 25 minutes was spent face to face with the patient excluding the procedure.  Greater than 50% of the time was spent in counseling and coordinating care.       BOTULINUM TOXIN PROCEDURE NOTE    Chief Complaint   Patient presents with     Botox     [facility] NEW BOTOX        BP (!) 145/81   Pulse 71   Temp 97.7  F (36.5  C)   Resp 16   SpO2 98%       Current Outpatient Medications:      acetaminophen (TYLENOL) 500 MG tablet, Take 1-2 tablets (500-1,000 mg) by mouth every 6 hours as needed for mild pain, Disp: , Rfl:      Alfalfa 250 MG TABS, Take 250 mg by mouth every morning , Disp: , Rfl:      apixaban ANTICOAGULANT (ELIQUIS) 5 MG tablet, Take 1 tablet (5 mg) by mouth 2 times daily, Disp: 180 tablet, Rfl: 3     calcium carbonate-vitamin D (CALCIUM + D) 600-200 MG-UNIT TABS, Take 1 tablet by mouth At Bedtime , Disp: , Rfl:      carbidopa-levodopa (SINEMET)  MG tablet, Take 1 tablet three times daily and 2 tablets at bedtime, Disp: , Rfl:      carbidopa-levodopa (SINEMET)  MG tablet, Take 2 tablets by mouth At Bedtime, Disp: , Rfl:      carvedilol (COREG) 6.25 MG tablet, Take 1 tablet (6.25 mg) by mouth 2 times daily (with meals), Disp: 180 tablet, Rfl: 3     clopidogrel (PLAVIX) 75 MG tablet, Take 1 tablet (75 mg) by mouth daily, Disp: 90 tablet, Rfl: 3     glycopyrrolate (ROBINUL) 1 MG tablet, Take 1 tablet (1 mg) by mouth 3 times daily, Disp: 270 tablet, Rfl: 1     hyoscyamine (ANASPAZ/LEVSIN) 0.125 MG tablet, Take 1 tablet (125 mcg) by mouth every 8 hours as needed for other (drooling (sialorrhea)), Disp: 90 tablet, Rfl: 1     isosorbide mononitrate (IMDUR) 30 MG 24 hr tablet, Take 1 tablet (30 mg) by mouth daily, Disp: 90 tablet, Rfl: 3     LEVOXYL 112 MCG tablet, TAKE 1 TABLET DAILY, Disp: 90 tablet, Rfl: 4     Multiple Vitamins-Minerals (DAILY MULTI) TABS, Take 1 tablet by mouth every  morning , Disp: , Rfl:      nitroGLYcerin (NITROSTAT) 0.4 MG sublingual tablet, DISSOLVE ONE TABLET UNDER THE TONGUE EVERY 5 MINUTES AS NEEDED FOR CHEST PAIN.  DO NOT EXCEED A TOTAL OF 3 DOSES IN 15 MINUTES, Disp: 25 tablet, Rfl: 3     omeprazole (PRILOSEC) 20 MG DR capsule, Take 1 capsule (20 mg) by mouth 2 times daily 30-60 minutes before meal, Disp: 60 capsule, Rfl: 11     rosuvastatin (CRESTOR) 20 MG tablet, Take 1 tablet (20 mg) by mouth daily, Disp: 90 tablet, Rfl: 3     tiZANidine (ZANAFLEX) 2 MG tablet, TAKE 3 TABLETS THREE TIMES A DAY, Disp: 270 tablet, Rfl: 5    Current Facility-Administered Medications:      botulinum toxin type A (BOTOX) 100 units injection 400 Units, 400 Units, Intramuscular, Q90 Days, Darline Celestin MD     Allergies   Allergen Reactions     Ace Inhibitors Cough     Baclofen Other (See Comments)     Constipation , tiredness     Penicillins      10/7/14 Hives per patient -- many many years ago       Nickel Swelling and Rash     Other reaction(s): Unknown        PHYSICAL EXAM:    As above    HPI:    Patient denies new medical diagnoses, illnesses, hospitalizations, emergency room visits, and injuries since the previous injection with botulinum neurotoxin.    We reviewed the recommended safety guidelines for  Botox from any vaccine injection, such as the seasonal flu vaccine, by a minimum of 10-14 days with Katherin Fletcher. She acknowledged understanding.    RESPONSE TO PREVIOUS TREATMENT:    N/A - Initial treatment    BENEFITS BY PATIENT REPORT:    N/A - Initial treatment      BOTULINUM NEUROTOXIN INJECTION PROCEDURES:    VERIFICATION OF PATIENT IDENTIFICATION AND PROCEDURE     Initials   Patient Name JQ   Patient  JQ   Procedure Verified by: SHONNA     Prior to the start of the procedure and with procedural staff participation, I verbally confirmed the patient s identity using two indicators, relevant allergies, that the procedure was appropriate and matched the consent or  emergent situation, and that the correct equipment/implants were available. Immediately prior to starting the procedure I conducted the Time Out with the procedural staff and re-confirmed the patient s name, procedure, and site/side. (The Joint Commission universal protocol was followed.)  Yes    Sedation (Moderate or Deep): None      Above assessments performed by:  Resident/Fellow         Ike Castaneda DO          The attending provider was present for the entire procedure documented below.      Darline Celestin MD      INDICATION/S FOR PROCEDURE/S:  Katherin Fletcher is a 79 year old patient with  spasticity affecting the right lower extremity and left lower extremity secondary to a diagnosis of ALS with associated  pain, spasms, loss of joint motion, loss of volitional motor control, difficulty with activities of daily living and difficulty with ambulation and transfers. And sialorrhea      Her baseline symptoms have been recalcitrant to oral medications and conservative therapy.  She is here today for an injection of Botox.      GOAL OF PROCEDURE:  The goal of this procedure is to increase active range of motion, improve volitional motor control, decrease pain , enhance functional independence and decrease excessive drooling  associated with spasticity.    TOTAL DOSE USED: 100 units  Dose Administered:  45 units Botox  2:1 Dilution  Diluent Used:  Preservative Free Normal Saline  Total Volume of Diluent Used:  0.9 ml  Lot # /C3 with Expiration Date:  8/2022  NDC #: Botox 100u ([address]-12)     Was there drug waste? Yes  Amount of drug waste (mL): 55 units of Botox.  Reason for waste:  Single use vial  Multi-dose vial: No    Darline Celestin MD  March 20, 2020      Medication guide was offered to patient and was declined.    CONSENT:  The risks, benefits, and treatment options were discussed with Katherin Fletcher and she agreed to proceed.      Written consent was obtained by SHONNA.     EQUIPMENT  USED:  Needle-37mm stimulating/recording  EMG/NCS Machine  Ultrasound    SKIN PREPARATION:  Skin preparation was performed using an alcohol wipe.    GUIDANCE DESCRIPTION:  Electro-myographic and Ultrasound guidance were necessary throughout the procedure to accurately identify all areas of spastic muscles while avoiding injection of non-spastic muscles, neighboring nerves and nearby vascular structures.     AREA/MUSCLE INJECTED:  45 units total 2:1 dilution  Right gastrocnemius medial head 10 units  Right gastrocnemius lateral head 15 units  Left gastrocnemius medial head 10 units  Left  gastrocnemius lateral head 10 units    RESPONSE TO PROCEDURE:  Katherin Fletcher tolerated the procedure well and there were no immediate complications.  She was allowed to recover for an appropriate period of time and was discharged home in stable condition.    FOLLOW UP:  Katherin Fletcher was asked to follow up by phone in 7-14 days with Marissa Davey RN, Care Coordinator, to report her response to this series of injections.  Based on the patient's previous response to this therapy, Katherin Fletcher was rescheduled for the next series of injections in 12 weeks.    PLAN (Medication Changes, Therapy Orders, Work or Disability Issues, etc.): Patient to monitor efficacy of today's injections        Physician Attestation   I spent a total of 25 minutes with the patient, personally observing as the resident/fellow performed the above procedure.     Key findings: see progress note.     Darline Celestin  Date of Service (when I saw the patient): 03/20/20

## 2020-03-20 NOTE — PROGRESS NOTES
Chief Complaint:   Chief Complaint   Patient presents with     RECHECK     Foot pain           Allergies   Allergen Reactions     Ace Inhibitors Cough     Baclofen Other (See Comments)     Constipation , tiredness     Penicillins      10/7/14 Hives per patient -- many many years ago       Nickel Swelling and Rash     Other reaction(s): Unknown         Subjective: Katherin is a 79 year old female who presents to the clinic today for a follow up of left foot pain. She relates that about a week ago, she bent her toes backward and had pain in the balls of her feet. She relates that the area did not get black or blue. She had minimal swelling in the foot. Relates pain as a 2/10 in the foot with minimal improvement.     Objective  Data Unavailable Data Unavailable Data Unavailable Data Unavailable Data Unavailable 0 lbs 0 oz  No ecchymosis noted to the foot. Some mild edema noted about the forefoot about the 2nd and 3rd met heads. Some pain about the base of the 1st met without 1st ray ROM pain. Pain in the 2nd MTPJ with passive and active ROM.     left foot xrays indicated in 3 weightbearing views.    Impaction fracture of indeterminate age noted to the base of the 1st met. No acute processes noted to the 2nd and 3rd metatarsals.     Assessment: Katherin is a 80 YO woman who recently had forced dorsiflexion of the left lesser digits with pain. On radiographs, she has an age indeterminate first met impaction, however she alaniz snot have clinical s/s to indicate that this is an acute process. She does have ambulatory dysfunction and I would be hesitant to put her in a surgical shoe as this has a high chance of causing a fall. I would recommend that she keep the area ACE wrapped and use ICE. Pain is currently tolerable.     Plan:   - Pt seen and evaluated.  - XRs taken and discussed with her.  - ACE wrap to foot daily.   - No surgical shoe.   - Pt to return to clinic in 10 weeks.

## 2020-03-27 NOTE — TELEPHONE ENCOUNTER
Returned phone call to patient after consulting with Dr. Celestin. Explained that the BOTOX may not be working yet, and it should be by 3 weeks. The spasms may be due to irritating the muscles with the injections. Recommended using ice and/or heat, using topicals for now.   Patient in agreement with this plan.

## 2020-04-06 NOTE — PROGRESS NOTES
Outpatient Physical Therapy Discharge Note     Patient: Katherin Fletcher  : 1940    Beginning/End Dates of Reporting Period:  2019 to 2020 (3 visits)    Referring Provider: Dr Laci Driver    Therapy Diagnosis: LE spasms in context of ALS     Client Self Report: 1 fall since last here. The wheels locked on the walker while I was turning to go into the bedroom. Landed on a bag of clothes. [employer] came and picked me up - no injury.     Objective Measurements:  Objective Measure: Nustep  Details: B LEs and UEs, seat 9, arms 9, WL 7, 8.5 mins, 423 steps.     Goals:  Goal Identifier HEP   Goal Description Pt demo indep with HEP for LE strength, stretching, and endurance training as appropriate for her stage of ALS for management of her LE    Target Date 20   Date Met   2020   Progress: Pt demo indep with HEP as recommended, but with poor follow through for attaining cardio equipment accessibility.     Goal Identifier Falls prevention   Goal Description Pt demo improved functional awareness of prevention of falls including use of PWC more in the house, potential transition to 1st floor bedroom, and other strategies identified throughout treatment sessions.   Target Date 20   Date Met   NOT MET   Progress: Pt did not want to change her current set up in her home despite recommendations. Pt continued to have falls in her home as a result.       Progress Toward Goals:   Progress this reporting period: pt did determine that exercise was an effective method of managing her LE spasms. However, per phone call with pt 2020, she was not able to determine a method of how to access a Nustep regularly, and had not followed through on ordering a portable set of pedals. She did not feel she needed to continue PT from a skill standpoint per conversation with her about what entails skilled therapy. Pt was disappointed that she could not come to clinic and just use the exercise  equipment on her own time. Educ pt that we are not set up for that type of service.    Plan:  Discharge from therapy.    Discharge:    Reason for Discharge: Patient chooses to discontinue therapy.    Equipment Issued: recommended portable exercise pedals.    Discharge Plan: Patient to continue home program.  Other services: Pt via ALS clinic as needed.

## 2020-04-24 NOTE — PROGRESS NOTES
Problem(s) Oriented visit        SUBJECTIVE:                                                    Katherin Fletcher is a 79 year old female who presents to clinic today for the following health issues :    Vaginal irritation, itching an pain which began about a week and a half ago. She reports the area is prone to skin breakdown due to her being wheelchair bound. She has noticed a painful area on her left labia majora and was concerned about an infection. No trauma to the area.No associated symptoms, no fever or chills, not change in bowle or bladder habits.     Problem list, Medication list, Allergies, and Medical/Social/Surgical histories reviewed in EPIC and updated as appropriate.   Additional history: as documented    ROS:  5 point ROS completed and negative except noted above, including Gen, CV, Resp, GI, MS    Histories:   Patient Active Problem List   Diagnosis     Long term current use of anticoagulant therapy     CAD (coronary artery disease)     Hyperlipidemia     Health Care Home     MRSA (methicillin resistant Staphylococcus aureus)     Dysarthria     Disturbances of sensation of smell and taste     Neurologic gait disorder     Cerebellar disease     Essential hypertension     Ischemic cardiomyopathy     Peripheral polyneuropathy     PAD (peripheral artery disease) (H)     Encounter for long-term (current) use of medications     Stroke (H)     Slurred speech     Neurodegenerative disorder (H)     History of TIA (transient ischemic attack) and stroke     Falls frequently     Hypothyroidism, unspecified type     Ataxia     Hypertensive heart disease with heart failure (H)     Chronic obstructive pulmonary disease, unspecified COPD type (H)     Atrial fibrillation, unspecified type (H)     Rib fractures     Past Surgical History:   Procedure Laterality Date     ARTHROSCOPY SHOULDER RT/LT       BUNIONECTOMY RT/LT       C CABG, ARTERY-VEIN, THREE  1/2009     C CARDIAC SURG PROCEDURE UNLIST       C  HAND/FINGER SURGERY UNLISTED       C MRA UPPER EXTREMITY WO&W CONT       C SHOULDER SURG PROC UNLISTED       C STOMACH SURGERY PROCEDURE UNLISTED       CARDIAC SURGERY      CABG,stents x2     CARPAL TUNNEL RELEASE RT/LT       EXCISE GANGLION HAND Right 2018    Procedure: EXCISE RIGHT VOLAR CARPAL MASS AND RIGHT INDEX TRIGGER FINGER RELEASE;  Surgeon: Minh Goldstein MD;  Location:  OR     HC VASCULAR SURGERY PROCEDURE UNLIST       HYSTERECTOMY, DAMIÁN  34 yo     OPEN REDUCTION INTERNAL FIXATION ANKLE Left     Left Ankle     RELEASE TRIGGER FINGER       RELEASE TRIGGER FINGER Right 2018    Procedure: RIGHT INDEX TRIGGER FINGER RELEASE;  Surgeon: Minh Goldstein MD;  Location:  OR     REPAIR HAMMER TOE Right 2017    Procedure: REPAIR HAMMER TOE;  Right 2nd and 3rd toe pinning;  Surgeon: Fernando Wiggins MD;  Location:  OR     STENT  2006     STENT(aka CARDIAC)       VASCULAR SURGERY         Social History     Tobacco Use     Smoking status: Former Smoker     Packs/day: 0.50     Years: 22.00     Pack years: 11.00     Types: Cigarettes     Last attempt to quit: 4/15/1992     Years since quittin.0     Smokeless tobacco: Never Used   Substance Use Topics     Alcohol use: Not Currently     Alcohol/week: 1.0 standard drinks     Types: 1 Standard drinks or equivalent per week     Comment: MAYBE ONCE A YEAR     Family History   Problem Relation Age of Onset     Unknown/Adopted Mother      Unknown/Adopted Father      Other - See Comments Son         on warfarin for one year after afganastan now off and doing well     Other - See Comments Daughter         celiac, brittle bone, arthritis, Raynauds           OBJECTIVE:                                                    /88   Pulse 84   Resp 16   There is no height or weight on file to calculate BMI.   APPEARANCE: = Relaxed and in no distress  Resp effort = Calm regular breathing  Breath Sounds = Good air movement with no rales or  rhonchi in any lung fields  Heart Rate, Rhythm, & sounds (no Murm)  = Regular rate and rhythm with no S3, S4, or murmur appreciated.  Abdomen = Soft, nontender, no masses, & bowel sounds in all quadrants   =  0.3 cm purulent soft tissue infection which drained as I washed the area with soap and water to better visualize the abscess.      ASSESSMENT/PLAN:                                                        Katherin was seen today for vaginal problem.    Diagnoses and all orders for this visit:    Local infection of skin and subcutaneous tissue  -     sulfamethoxazole-trimethoprim (BACTRIM DS) 800-160 MG tablet; Take 1 tablet by mouth 2 times daily for 7 days  The small abscess drained as I washed the area. I provided education about perineal hygiene and washing the area after bowel movements. Please call of return to clinic if you have any change in symptoms or if you develop a fever.        ASSESSMENT/PLAN:       The following health maintenance items are reviewed in Epic and correct as of today:  Health Maintenance   Topic Date Due     COPD ACTION PLAN  1940     ZOSTER IMMUNIZATION (1 of 2) 05/27/1990     MEDICARE ANNUAL WELLNESS VISIT  08/01/2020     FALL RISK ASSESSMENT  08/01/2020     ADVANCE CARE PLANNING  09/19/2024     LIPID  11/14/2024     DTAP/TDAP/TD IMMUNIZATION (4 - Td) 06/04/2026     DEXA  Completed     SPIROMETRY  Completed     PHQ-2  Completed     INFLUENZA VACCINE  Completed     PNEUMOCOCCAL IMMUNIZATION 65+ LOW/MEDIUM RISK  Completed     IPV IMMUNIZATION  Aged Out     MENINGITIS IMMUNIZATION  Aged Out       Selam León NP  [facility]  Family Practice  [facility]  [phone]    For any issues my office # is [phone]

## 2020-04-24 NOTE — PATIENT INSTRUCTIONS
Cellulitis  Cellulitis is an infection of the deep layers of skin. A break in the skin, such as a cut or scratch, can let bacteria under the skin. If the bacteria get to deep layers of the skin, it can be serious. If not treated, cellulitis can get into the bloodstream and lymph nodes. The infection can then spread throughout the body. This causes serious illness.  Cellulitis causes the affected skin to become red, swollen, warm, and sore. The reddened areas have a visible border. An open sore may leak fluid (pus). You may have a fever, chills, and pain.  Cellulitis is treated with antibiotics taken for 7 to 10 days. An open sore may be cleaned and covered with cool wet gauze. Symptoms should get better 1 to 2 days after treatment is started. Make sure to take all the antibiotics for the full number of days until they are gone. Keep taking the medicine even if your symptoms go away.  Home care  Follow these tips:    Limit the use of the part of your body with cellulitis.     If the infection is on your leg, keep your leg raised while sitting. This will help to reduce swelling.    Take all of the antibiotic medicine exactly as directed until it is gone. Do not miss any doses, especially during the first 7 days. Don t stop taking the medicine when your symptoms get better.    Keep the affected area clean and dry.    Wash your hands with soap and warm water before and after touching your skin. Anyone else who touches your skin should also wash his or her hands. Don't share towels.  Follow-up care  Follow up with your healthcare provider, or as advised. If your infection does not go away on the first antibiotic, your healthcare provider will prescribe a different one.  When to seek medical advice  Call your healthcare provider right away if any of these occur:    Red areas that spread    Swelling or pain that gets worse    Fluid leaking from the skin (pus)    Fever higher of 100.4  F (38.0  C) or higher after 2 days  on antibiotics  Date Last Reviewed: 9/1/2016 2000-2019 [employer]. [address] [place] [address], [place] [address]. All rights reserved. This information is not intended as a substitute for professional medical care. Always follow your healthcare professional's instructions.

## 2020-04-29 NOTE — PROGRESS NOTES
BOTULINUM TOXIN PROCEDURE NOTE    Chief Complaint   Patient presents with     Botox     [facility] RETURN BOTOX        /71   Pulse 79   Temp 98.1  F (36.7  C)       Current Outpatient Medications:      acetaminophen (TYLENOL) 500 MG tablet, Take 1-2 tablets (500-1,000 mg) by mouth every 6 hours as needed for mild pain, Disp: , Rfl:      Alfalfa 250 MG TABS, Take 250 mg by mouth every morning , Disp: , Rfl:      apixaban ANTICOAGULANT (ELIQUIS) 5 MG tablet, Take 1 tablet (5 mg) by mouth 2 times daily, Disp: 180 tablet, Rfl: 3     calcium carbonate-vitamin D (CALCIUM + D) 600-200 MG-UNIT TABS, Take 1 tablet by mouth At Bedtime , Disp: , Rfl:      carbidopa-levodopa (SINEMET)  MG tablet, Take 1 tablet three times daily and 2 tablets at bedtime, Disp: , Rfl:      carbidopa-levodopa (SINEMET)  MG tablet, Take 2 tablets by mouth At Bedtime, Disp: , Rfl:      carvedilol (COREG) 6.25 MG tablet, Take 1 tablet (6.25 mg) by mouth 2 times daily (with meals), Disp: 180 tablet, Rfl: 3     clopidogrel (PLAVIX) 75 MG tablet, Take 1 tablet (75 mg) by mouth daily, Disp: 90 tablet, Rfl: 3     glycopyrrolate (ROBINUL) 1 MG tablet, Take 1 tablet (1 mg) by mouth 3 times daily, Disp: 270 tablet, Rfl: 1     hyoscyamine (ANASPAZ/LEVSIN) 0.125 MG tablet, Take 1 tablet (125 mcg) by mouth every 8 hours as needed for other (drooling (sialorrhea)), Disp: 90 tablet, Rfl: 1     isosorbide mononitrate (IMDUR) 30 MG 24 hr tablet, Take 1 tablet (30 mg) by mouth daily, Disp: 90 tablet, Rfl: 3     LEVOXYL 112 MCG tablet, TAKE 1 TABLET DAILY, Disp: 90 tablet, Rfl: 4     Multiple Vitamins-Minerals (DAILY MULTI) TABS, Take 1 tablet by mouth every morning , Disp: , Rfl:      nitroGLYcerin (NITROSTAT) 0.4 MG sublingual tablet, DISSOLVE ONE TABLET UNDER THE TONGUE EVERY 5 MINUTES AS NEEDED FOR CHEST PAIN.  DO NOT EXCEED A TOTAL OF 3 DOSES IN 15 MINUTES, Disp: 25 tablet, Rfl: 3     omeprazole (PRILOSEC) 20 MG DR capsule, Take 1 capsule (20 mg) by  mouth 2 times daily 30-60 minutes before meal, Disp: 60 capsule, Rfl: 11     rosuvastatin (CRESTOR) 20 MG tablet, Take 1 tablet (20 mg) by mouth daily, Disp: 90 tablet, Rfl: 3     tiZANidine (ZANAFLEX) 2 MG tablet, TAKE 3 TABLETS THREE TIMES A DAY, Disp: 270 tablet, Rfl: 5    Current Facility-Administered Medications:      botulinum toxin type A (BOTOX) 100 units injection 400 Units, 400 Units, Intramuscular, Q90 Days, Darline Celestin MD     Allergies   Allergen Reactions     Ace Inhibitors Cough     Baclofen Other (See Comments)     Constipation , tiredness     Penicillins      10/7/14 Hives per patient -- many many years ago       Nickel Swelling and Rash     Other reaction(s): Unknown        PHYSICAL EXAM:  Gen: NAD, sitting in a wheelchair  HEENT: soft C collar in place  Pulm: non-labored breathing in room air  Ext: no edema in bilateral lower extremities, skin was intact   Neuro/MSK:    Forward head posture and dropped head  Noted diffuse muscle atrophy at bilateral upper and lower extremities  Also noted sialorrhea and she had to use Kleenex often during my interview and exam  Full strength and sensation exam was not done today but she was able to move all extremities antigravity  Tone was increased in bilateral lower extremities with 1+ out of 4 at the knee flexion and hip abduction, and 2 out of 4 at plantar flexion bilaterally        HPI:  Katherin Fletcher is a 79 year old female with h/o ALS who is followed in our clinic regarding spasticity management. PMH is also significant for A fib, CAD, HLD and TIA in 5/2018. She was seen in clinic as initial consult (referral from Dr. Corrales) on 10/11/18. Last seen in clinic 3/20/20 for Botox injections and had a phone visit on 4/9/20. Refer to clinic note on 4/9/20 for the details regarding her history.     She unfortunately had no benefits from the first series of injections. Spasms continue to be severe. Usually around 5pm she gets severe spasms at calves  "only which can last about 2 hours. And again around 9-10pm, spasms recur and continue sometimes till 3-4am. She has to \"walk them off\". There are very painful and totally debilitating. She often gets some sense of \"bug crawling\" in her legs before spasms start.     Tizanidine dose was increased from 6 to 8mg tid for 3-4 days, and then to 10mg tid. Some improvement but not adequate; she sometimes takes less.     Patient denies new medical diagnoses, illnesses, hospitalizations, emergency room visits, and injuries since the previous injection with botulinum neurotoxin.    We reviewed the recommended safety guidelines for  Botox from any vaccine injection, such as the seasonal flu vaccine, by a minimum of 10-14 days with Katherin Fletcher. She acknowledged understanding.        RESPONSE TO PREVIOUS TREATMENT:  See above     BENEFITS BY PATIENT REPORT:  See above      BOTULINUM NEUROTOXIN INJECTION PROCEDURES:    VERIFICATION OF PATIENT IDENTIFICATION AND PROCEDURE     Initials   Patient Name ps   Patient  ps   Procedure Verified by: ps     Prior to the start of the procedure and with procedural staff participation, I verbally confirmed the patient s identity using two indicators, relevant allergies, that the procedure was appropriate and matched the consent or emergent situation, and that the correct equipment/implants were available. Immediately prior to starting the procedure I conducted the Time Out with the procedural staff and re-confirmed the patient s name, procedure, and site/side. (The Joint Commission universal protocol was followed.)  Yes    Sedation (Moderate or Deep): None      Above assessments performed by:  Darline Celestin MD      INDICATION/S FOR PROCEDURE/S:  Katherin Fletcher is a 79 year old patient with  spasticity affecting the right lower extremity and left lower extremity secondary to a diagnosis of ALS with associated  pain, spasms, loss of joint motion, loss of volitional " motor control, difficulty with activities of daily living and difficulty with ambulation and transfers. She also has significant sialorrhea secondary to ALS.       Her baseline symptoms have been recalcitrant to oral medications and conservative therapy.  She is here today for an injection of Botox.        GOAL OF PROCEDURE:  The goal of this procedure is to increase active range of motion, improve volitional motor control, decrease pain , enhance functional independence associated with spasticity and decrease excessive drooling      TOTAL DOSE USED: 100 units   Dose Administered: 80 units Botox  2:1 Dilution for bilateral lower extremities   Diluent Used: Bupivacaine 0.25% (Lot: [id number], Exp: 11/2021, NDC: 55150-167-10)    Dose Administered: 20 units 2:1 Dilution for bilateral parotid glands   Diluent Used: Preservative Free Normal Saline    Lot # /[id number] with Expiration Date:  8/2022  NDC #: Botox 100u ([address]-66)     Medication guide was offered to patient and was declined.    CONSENT:  The risks, benefits, and treatment options were discussed with Katherin Fletcher and she agreed to proceed.      Written consent was obtained by PS.       EQUIPMENT USED:  Needle-37mm stimulating/recording  EMG/NCS Machine  Needle-30G  Ultrasound    SKIN PREPARATION:  Skin preparation was performed using an alcohol wipe.    GUIDANCE DESCRIPTION:  Electro-myographic and Ultrasound guidance were necessary throughout the procedure to accurately identify all areas of spastic muscles while avoiding injection of non-spastic muscles, neighboring nerves and nearby vascular structures.     AREA/MUSCLE INJECTED:    Right gastrocnemius 40 units (20 units at each head)  Left gastrocnemius 40 units (20 units at each head)    Right parotid 10 units  Left Parotid 10 units       RESPONSE TO PROCEDURE:  Katherin Fletcher tolerated the procedure well and there were no immediate complications.  She was allowed to recover for an  appropriate period of time and was discharged home in stable condition.    FOLLOW UP:  Katherin Fletcher was asked to follow up by phone in 7-14 days with Marissa Davey, RN, Care Coordinator, to report her response to this series of injections.  Based on the patient's previous response to this therapy, Katherin Fletcher was rescheduled for the next series of injections in 12 weeks.      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 Romo podiatry team    Bilateral carpal tunnel syndrome. EMG also showed right ulnar neuropathy  - was seen and evaluated by Dr. Mario     S/p right volar carpal ganglion cyst and trigger finger           For spasticity management, again reviewed the treatment options including therapies/HEP, medications, and injections. Strongly recommended to continue regular exercises at home as much as possible.  Therapies are limited right now due to current situation with coronavirus and needed social isolation.     She had side effects with baclofen in the past; tizanidine was restarted by Dr. Driver and has continued; current dose is 6 mg 3 times a day.  She is also on Sinemet 5 tablets/day total.  She tried THS/CBD mix through the [place] [facility] for more than 6 months but discontinued due to no benefits.  Other medication options are limited due to risk of sedation and falls.  After multiple conversations with Dr. Driver she agreed with the plan to do Botox injections.  We started at small dose and with calf muscles only with the plan  to gradually titrate the dose and modify the sites of injections for optimal result.      At last visit, discussed the plan for sialorrhea management as well.  This is her second difficult symptom.  She is currently on Robinul 2 mg 3 times a day with limited effect.  Other medication options including amitriptyline or atropine were considered but not restarted due to potential risks.  We discussed botulinum toxin injections for treatment of sialorrhea and answered all of her questions.  She decided to proceed with salivary gland injections today. See procedure note.        She unfortunately had no benefits from the initial Botox injections; no side effects either. Discussed options including repeat Botox injections at higher dose and / or increasing tizanidine dose. Given the current COVID-19 crisis, she prefered to start with tizanidine dose increase. No significant benefit so presented today for repeat Botox injections.          1. Work-up: reviewed labs and all wnl, except for mild leukopenia which has no know clinical significance.      2. Therapy/equipment/braces: continue regular home exercises.  She has been using a soft cervical collar to help with neck weakness and pain. Discussed the risks of muscle atrophy secondary to disuse. She will connect with seating clinic to have a head band for her head rest.      3. Medications: continue tizanidine at 10 tid    4. Interventions: repeat chemo-denervation for spasticity management and sialorrhea. Will adjust the dose as needed at next visit.     5. Referral / follow up with other providers: neurology team as scheduled. No new referral today.     6. Follow up: encouraged her call with questions and follow up as scheduled.       Darline Celestin MD  Physical Medicine & Rehabilitation     I spent a total of 20 minutes was spent face to face with the patient excluding the procedure.  Greater than 50% of the time was spent in counseling and coordinating care.

## 2020-04-30 NOTE — LETTER
4/30/2020       RE: Katherin Fletcher  [address]  [facility] [address]     Dear Colleague,    Thank you for referring your patient, Katherin Fletcher, to the [facility] PHYSICAL MEDICINE AND REHABILITATION at [facility]. Please see a copy of my visit note below.    BOTULINUM TOXIN PROCEDURE NOTE    Chief Complaint   Patient presents with     Botox     [facility] RETURN BOTOX        /71   Pulse 79   Temp 98.1  F (36.7  C)       Current Outpatient Medications:      acetaminophen (TYLENOL) 500 MG tablet, Take 1-2 tablets (500-1,000 mg) by mouth every 6 hours as needed for mild pain, Disp: , Rfl:      Alfalfa 250 MG TABS, Take 250 mg by mouth every morning , Disp: , Rfl:      apixaban ANTICOAGULANT (ELIQUIS) 5 MG tablet, Take 1 tablet (5 mg) by mouth 2 times daily, Disp: 180 tablet, Rfl: 3     calcium carbonate-vitamin D (CALCIUM + D) 600-200 MG-UNIT TABS, Take 1 tablet by mouth At Bedtime , Disp: , Rfl:      carbidopa-levodopa (SINEMET)  MG tablet, Take 1 tablet three times daily and 2 tablets at bedtime, Disp: , Rfl:      carbidopa-levodopa (SINEMET)  MG tablet, Take 2 tablets by mouth At Bedtime, Disp: , Rfl:      carvedilol (COREG) 6.25 MG tablet, Take 1 tablet (6.25 mg) by mouth 2 times daily (with meals), Disp: 180 tablet, Rfl: 3     clopidogrel (PLAVIX) 75 MG tablet, Take 1 tablet (75 mg) by mouth daily, Disp: 90 tablet, Rfl: 3     glycopyrrolate (ROBINUL) 1 MG tablet, Take 1 tablet (1 mg) by mouth 3 times daily, Disp: 270 tablet, Rfl: 1     hyoscyamine (ANASPAZ/LEVSIN) 0.125 MG tablet, Take 1 tablet (125 mcg) by mouth every 8 hours as needed for other (drooling (sialorrhea)), Disp: 90 tablet, Rfl: 1     isosorbide mononitrate (IMDUR) 30 MG 24 hr tablet, Take 1 tablet (30 mg) by mouth daily, Disp: 90 tablet, Rfl: 3     LEVOXYL 112 MCG tablet, TAKE 1 TABLET DAILY, Disp: 90 tablet, Rfl: 4     Multiple Vitamins-Minerals (DAILY MULTI) TABS, Take 1  tablet by mouth every morning , Disp: , Rfl:      nitroGLYcerin (NITROSTAT) 0.4 MG sublingual tablet, DISSOLVE ONE TABLET UNDER THE TONGUE EVERY 5 MINUTES AS NEEDED FOR CHEST PAIN.  DO NOT EXCEED A TOTAL OF 3 DOSES IN 15 MINUTES, Disp: 25 tablet, Rfl: 3     omeprazole (PRILOSEC) 20 MG DR capsule, Take 1 capsule (20 mg) by mouth 2 times daily 30-60 minutes before meal, Disp: 60 capsule, Rfl: 11     rosuvastatin (CRESTOR) 20 MG tablet, Take 1 tablet (20 mg) by mouth daily, Disp: 90 tablet, Rfl: 3     tiZANidine (ZANAFLEX) 2 MG tablet, TAKE 3 TABLETS THREE TIMES A DAY, Disp: 270 tablet, Rfl: 5    Current Facility-Administered Medications:      botulinum toxin type A (BOTOX) 100 units injection 400 Units, 400 Units, Intramuscular, Q90 Days, Darline Celestin MD     Allergies   Allergen Reactions     Ace Inhibitors Cough     Baclofen Other (See Comments)     Constipation , tiredness     Penicillins      10/7/14 Hives per patient -- many many years ago       Nickel Swelling and Rash     Other reaction(s): Unknown        PHYSICAL EXAM:  Gen: NAD, sitting in a wheelchair  HEENT: soft C collar in place  Pulm: non-labored breathing in room air  Ext: no edema in bilateral lower extremities, skin was intact   Neuro/MSK:    Forward head posture and dropped head  Noted diffuse muscle atrophy at bilateral upper and lower extremities  Also noted sialorrhea and she had to use Kleenex often during my interview and exam  Full strength and sensation exam was not done today but she was able to move all extremities antigravity  Tone was increased in bilateral lower extremities with 1+ out of 4 at the knee flexion and hip abduction, and 2 out of 4 at plantar flexion bilaterally        HPI:  Katherin Fletcher is a 79 year old female with h/o ALS who is followed in our clinic regarding spasticity management. PMH is also significant for A fib, CAD, HLD and TIA in 5/2018. She was seen in clinic as initial consult (referral from Dr. Corrales)  "on 10/11/18. Last seen in clinic 3/20/20 for Botox injections and had a phone visit on 20. Refer to clinic note on 20 for the details regarding her history.     She unfortunately had no benefits from the first series of injections. Spasms continue to be severe. Usually around 5pm she gets severe spasms at calves only which can last about 2 hours. And again around 9-10pm, spasms recur and continue sometimes till 3-4am. She has to \"walk them off\". There are very painful and totally debilitating. She often gets some sense of \"bug crawling\" in her legs before spasms start.     Tizanidine dose was increased from 6 to 8mg tid for 3-4 days, and then to 10mg tid. Some improvement but not adequate; she sometimes takes less.     Patient denies new medical diagnoses, illnesses, hospitalizations, emergency room visits, and injuries since the previous injection with botulinum neurotoxin.    We reviewed the recommended safety guidelines for  Botox from any vaccine injection, such as the seasonal flu vaccine, by a minimum of 10-14 days with Katherin Fletcher. She acknowledged understanding.        RESPONSE TO PREVIOUS TREATMENT:  See above     BENEFITS BY PATIENT REPORT:  See above      BOTULINUM NEUROTOXIN INJECTION PROCEDURES:    VERIFICATION OF PATIENT IDENTIFICATION AND PROCEDURE     Initials   Patient Name ps   Patient  ps   Procedure Verified by: ps     Prior to the start of the procedure and with procedural staff participation, I verbally confirmed the patient s identity using two indicators, relevant allergies, that the procedure was appropriate and matched the consent or emergent situation, and that the correct equipment/implants were available. Immediately prior to starting the procedure I conducted the Time Out with the procedural staff and re-confirmed the patient s name, procedure, and site/side. (The Joint Commission universal protocol was followed.)  Yes    Sedation (Moderate or Deep): " None      Above assessments performed by:  Darline Celestin MD      INDICATION/S FOR PROCEDURE/S:  Katherin Fletcher is a 79 year old patient with  spasticity affecting the right lower extremity and left lower extremity secondary to a diagnosis of ALS with associated  pain, spasms, loss of joint motion, loss of volitional motor control, difficulty with activities of daily living and difficulty with ambulation and transfers. She also has significant sialorrhea secondary to ALS.       Her baseline symptoms have been recalcitrant to oral medications and conservative therapy.  She is here today for an injection of Botox.        GOAL OF PROCEDURE:  The goal of this procedure is to increase active range of motion, improve volitional motor control, decrease pain , enhance functional independence associated with spasticity and decrease excessive drooling      TOTAL DOSE USED: 100 units   Dose Administered: 80 units Botox  2:1 Dilution for bilateral lower extremities   Diluent Used: Bupivacaine 0.25% (Lot: [id number], Exp: 11/2021, NDC: 55150-167-10)    Dose Administered: 20 units 2:1 Dilution for bilateral parotid glands   Diluent Used: Preservative Free Normal Saline    Lot # /[id number] with Expiration Date:  8/2022  NDC #: Botox 100u ([address]-69)     Medication guide was offered to patient and was declined.    CONSENT:  The risks, benefits, and treatment options were discussed with Katherin Fletcher and she agreed to proceed.      Written consent was obtained by PS.       EQUIPMENT USED:  Needle-37mm stimulating/recording  EMG/NCS Machine  Needle-30G  Ultrasound    SKIN PREPARATION:  Skin preparation was performed using an alcohol wipe.    GUIDANCE DESCRIPTION:  Electro-myographic and Ultrasound guidance were necessary throughout the procedure to accurately identify all areas of spastic muscles while avoiding injection of non-spastic muscles, neighboring nerves and nearby vascular structures.     AREA/MUSCLE INJECTED:     Right gastrocnemius 40 units (20 units at each head)  Left gastrocnemius 40 units (20 units at each head)    Right parotid 10 units  Left Parotid 10 units       RESPONSE TO PROCEDURE:  Katherin Fletcher tolerated the procedure well and there were no immediate complications.  She was allowed to recover for an appropriate period of time and was discharged home in stable condition.    FOLLOW UP:  Katherin Fletcher was asked to follow up by phone in 7-14 days with Marissa Davey RN, Care Coordinator, to report her response to this series of injections.  Based on the patient's previous response to this therapy, Katherin Fletcher was rescheduled for the next series of injections in 12 weeks.      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 Romo podiatry team    Bilateral carpal tunnel syndrome. EMG also showed right ulnar neuropathy  - was seen and evaluated by Dr. Mario     S/p right volar carpal ganglion cyst and trigger finger           For spasticity management, again reviewed the treatment options including therapies/HEP, medications, and injections. Strongly recommended to continue regular exercises at home as much as possible.  Therapies are limited right now due to current situation with coronavirus and needed social isolation.     She had side effects with baclofen in the past; tizanidine was restarted by Dr. Driver and has continued; current dose is 6 mg 3 times a day.  She is also on Sinemet 5 tablets/day total.  She tried THS/CBD mix through the [place]  [facility] for more than 6 months but discontinued due to no benefits.  Other medication options are limited due to risk of sedation and falls.  After multiple conversations with Dr. Driver she agreed with the plan to do Botox injections.  We started at small dose and with calf muscles only with the plan to gradually titrate the dose and modify the sites of injections for optimal result.      At last visit, discussed the plan for sialorrhea management as well.  This is her second difficult symptom.  She is currently on Robinul 2 mg 3 times a day with limited effect.  Other medication options including amitriptyline or atropine were considered but not restarted due to potential risks.  We discussed botulinum toxin injections for treatment of sialorrhea and answered all of her questions.  She decided to proceed with salivary gland injections today. See procedure note.        She unfortunately had no benefits from the initial Botox injections; no side effects either. Discussed options including repeat Botox injections at higher dose and / or increasing tizanidine dose. Given the current COVID-19 crisis, she prefered to start with tizanidine dose increase. No significant benefit so presented today for repeat Botox injections.          1. Work-up: reviewed labs and all wnl, except for mild leukopenia which has no know clinical significance.      2. Therapy/equipment/braces: continue regular home exercises.  She has been using a soft cervical collar to help with neck weakness and pain. Discussed the risks of muscle atrophy secondary to disuse. She will connect with seating clinic to have a head band for her head rest.      3. Medications: continue tizanidine at 10 tid    4. Interventions: repeat chemo-denervation for spasticity management and sialorrhea. Will adjust the dose as needed at next visit.     5. Referral / follow up with other providers: neurology team as scheduled. No new referral  today.     6. Follow up: encouraged her call with questions and follow up as scheduled.       Darline Celestin MD  Physical Medicine & Rehabilitation     I spent a total of 20 minutes was spent face to face with the patient excluding the procedure.  Greater than 50% of the time was spent in counseling and coordinating care.           Again, thank you for allowing me to participate in the care of your patient.      Sincerely,    Darline Celestin MD

## 2020-05-15 NOTE — PROGRESS NOTES
"Katherin Fletcher is a 79 year old female who is being evaluated via a billable telephone visit.      The patient has been notified of following:     \"This telephone visit will be conducted via a call between you and your physician/provider. We have found that certain health care needs can be provided without the need for a physical exam.  This service lets us provide the care you need with a short phone conversation.  If a prescription is necessary we can send it directly to your pharmacy.  If lab work is needed we can place an order for that and you can then stop by our lab to have the test done at a later time.    Telephone visits are billed at different rates depending on your insurance coverage. During this emergency period, for some insurers they may be billed the same as an in-person visit.  Please reach out to your insurance provider with any questions.    If during the course of the call the physician/provider feels a telephone visit is not appropriate, you will not be charged for this service.\"    Patient has given verbal consent for Telephone visit?  Yes    What phone number would you like to be contacted at? Cell    How would you like to obtain your AVS? [facility]    Phone call duration: 5 minutes    Adrian Arroyo DPM    Chief Complaint:   Chief Complaint   Patient presents with     RECHECK     Patient reports to have right foot pain. complaining of great to ingrown          Allergies   Allergen Reactions     Ace Inhibitors Cough     Baclofen Other (See Comments)     Constipation , tiredness     Penicillins      10/7/14 Hives per patient -- many many years ago       Nickel Swelling and Rash     Other reaction(s): Unknown         Subjective: Katherin is a 79 year old female who presents via telephone for right hallux ingrown nail. Relates that the nail has been painful and leaking pus for a few weeks. Re;ates pain to the digit.    Objective  Data Unavailable Data Unavailable Data Unavailable Data " Unavailable Data Unavailable 0 lbs 0 oz  Paronychia to the right hallux.     Assessment: Right hallux paronychia.     Plan:   - Pt seen and evaluated  - Start doxycycline 100mg BID x 5 days.  - Needs to be seen in clinic.   - Pt to return to clinic in 1 week.

## 2020-05-22 NOTE — PROGRESS NOTES
"Reason For Visit:   Chief Complaint   Patient presents with     Follow Up     Pain, right great toe. Patient stated that she needs her nail taken off.        Pain Assessment  Patient Currently in Pain: Yes  0-10 Pain Scale: (\"low\")  Primary Pain Location: (Right great toe)  Aggravating Factors: Walking        Allergies   Allergen Reactions     Ace Inhibitors Cough     Baclofen Other (See Comments)     Constipation , tiredness     Penicillins      10/7/14 Hives per patient -- many many years ago       Nickel Swelling and Rash     Other reaction(s): Unknown           Precious Tovar LPN    "

## 2020-05-22 NOTE — PROGRESS NOTES
Chief Complaint:   Chief Complaint   Patient presents with     Follow Up     Pain, right great toe. Patient stated that she needs her nail taken off.           Allergies   Allergen Reactions     Ace Inhibitors Cough     Baclofen Other (See Comments)     Constipation , tiredness     Penicillins      10/7/14 Hives per patient -- many many years ago       Nickel Swelling and Rash     Other reaction(s): Unknown         Subjective: Katherin is a 79 year old female who presents to the clinic today for a follow up of right foot ingrown nail. She has taken the abx. Relates pain in the toe with palpation and shoes.    Objective  Data Unavailable Data Unavailable Data Unavailable Data Unavailable Data Unavailable 0 lbs 0 oz  Right lateral hallux nail is incurvated and painful with palpation. Surrounding skin is erythematous. No purulence.   Other nails are elongated.   DP pulse 2/4 and non-palpable PT pulses but all heard on doppler.     Assessment: Onychocryptosis.   Elongated nails.     Plan:   - Pt seen and evaluated  - After discussion with the patient of the procedure, risks, benefits, complications, and expected outcome, a right partial lateral border nail avulsion was decided as the course of treatment. Consent was signed. After skin prep with EtOH, a local block was administered into the right  first toe consisting of  3cc's of  1% lidocaine, without epi. After anesthesia was achieved, a tourniquet was applied for hemostasis. The digit was prepped and draped in sterile technique. A freer was used to separate the nail from the underlying bed and from the eponychium. An English nail anvil was then used to cut the lateral border(s) of the first toe to the eponychium. The offending nail border was then removed. The gutter was inspected for any remaining nail spicules, of which none were found. The area was flushed with saline and the tourniquet removed. The digit was covered with Bacitracin, Adaptic and a dry, sterile  dressing. Pt tolerated the procedure and anesthesia well with no complications. Pt to change the dressing tomorrow with antibiotic ointment and a bandaid. Will return to clinic in 10 to 14 days.   - Nails trimmed x 10.   - Pt to return to clinic in 2 weeks.

## 2020-05-22 NOTE — LETTER
5/22/2020         RE: Katherin Fletcher  [address]  [facility] [place] [facility] [address]        Dear Colleague,    Thank you for referring your patient, Katherin Fletcher, to the [facility]. Please see a copy of my visit note below.    Chief Complaint:   Chief Complaint   Patient presents with     Follow Up     Pain, right great toe. Patient stated that she needs her nail taken off.           Allergies   Allergen Reactions     Ace Inhibitors Cough     Baclofen Other (See Comments)     Constipation , tiredness     Penicillins      10/7/14 Hives per patient -- many many years ago       Nickel Swelling and Rash     Other reaction(s): Unknown         Subjective: Katherin is a 79 year old female who presents to the clinic today for a follow up of right foot ingrown nail. She has taken the abx. Relates pain in the toe with palpation and shoes.    Objective  Data Unavailable Data Unavailable Data Unavailable Data Unavailable Data Unavailable 0 lbs 0 oz  Right lateral hallux nail is incurvated and painful with palpation. Surrounding skin is erythematous. No purulence.   Other nails are elongated.   DP pulse 2/4 and non-palpable PT pulses but all heard on doppler.     Assessment: Onychocryptosis.   Elongated nails.     Plan:   - Pt seen and evaluated  - After discussion with the patient of the procedure, risks, benefits, complications, and expected outcome, a right partial lateral border nail avulsion was decided as the course of treatment. Consent was signed. After skin prep with EtOH, a local block was administered into the right  first toe consisting of  3cc's of  1% lidocaine, without epi. After anesthesia was achieved, a tourniquet was applied for hemostasis. The digit was prepped and draped in sterile technique. A freer was used to separate the nail from the underlying bed and from the eponychium. An English nail anvil was then used to cut the lateral border(s) of the first toe to the  eponychium. The offending nail border was then removed. The gutter was inspected for any remaining nail spicules, of which none were found. The area was flushed with saline and the tourniquet removed. The digit was covered with Bacitracin, Adaptic and a dry, sterile dressing. Pt tolerated the procedure and anesthesia well with no complications. Pt to change the dressing tomorrow with antibiotic ointment and a bandaid. Will return to clinic in 10 to 14 days.   - Nails trimmed x 10.   - Pt to return to clinic in 2 weeks.     Again, thank you for allowing me to participate in the care of your patient.        Sincerely,        Adrian Arroyo DPM

## 2020-05-22 NOTE — NURSING NOTE
[facility]  [address] [address]  [phone]  Dept: [phone]  ______________________________________________________________________________    Patient: Katherin Fletcher   : 1940   MRN: [id number]   May 22, 2020    INVASIVE PROCEDURE SAFETY CHECKLIST    Date: 2020   Procedure: Right lateral hallux partial, temporary nail avulsion  Patient Name: Katherin Fletcher  MRN: [id number]  YOB: 1940    Action: Complete sections as appropriate. Any discrepancy results in a HARD COPY until resolved.     PRE PROCEDURE:  Patient ID verified with 2 identifiers (name and  or MRN): Yes  Procedure and site verified with patient/designee (when able): Yes  Accurate consent documentation in medical record: Yes  H&P (or appropriate assessment) documented in medical record: Yes  H&P must be up to 20 days prior to procedure and updates within 24 hours of procedure as applicable: NA  Relevant diagnostic and radiology test results appropriately labeled and displayed as applicable: Yes  Procedure site(s) marked with provider initials: NA    TIMEOUT:  Time-Out performed immediately prior to starting procedure, including verbal and active participation of all team members addressing the following:Yes  * Correct patient identify  * Confirmed that the correct side and site are marked  * An accurate procedure consent form  * Agreement on the procedure to be done  * Correct patient position  * Relevant images and results are properly labeled and appropriately displayed  * The need to administer antibiotics or fluids for irrigation purposes during the procedure as applicable   * Safety precautions based on patient history or medication use    DURING PROCEDURE: Verification of correct person, site, and procedures any time the responsibility for care of the patient is transferred to another member of the care team.       The following medications were  given:         Prior to injection, verified patient identity using patient's name and date of birth.  Due to injection administration, patient instructed to remain in clinic for 15 minutes  afterwards, and to report any adverse reaction to me immediately.      Medication Name: Lidocaine 1%, 50 mg per 5 ml  NDC:  48838-243-39  Drug Amount Wasted:  2 ml  Vial/Syringe: Multi dose vial  Expiration Date:  07/22      Scribed by Precious Tovar LPN for Dr. Calix on May 22, 2020 based on the provider's   statements to me.     Precious Tovar LPN

## 2020-06-05 NOTE — LETTER
"6/5/2020     RE: Katherin Fletcher  [address]  [place] [address]    Dear Colleague,    Thank you for referring your patient, Katherin Fletcher, to the [facility]. Please see a copy of my visit note below.    Katherin Fletcher is a 80 year old female who is being evaluated via a billable telephone visit.      The patient has been notified of following:     \"This telephone visit will be conducted via a call between you and your physician/provider. We have found that certain health care needs can be provided without the need for a physical exam.  This service lets us provide the care you need with a short phone conversation.  If a prescription is necessary we can send it directly to your pharmacy.  If lab work is needed we can place an order for that and you can then stop by our lab to have the test done at a later time.    Telephone visits are billed at different rates depending on your insurance coverage. During this emergency period, for some insurers they may be billed the same as an in-person visit.  Please reach out to your insurance provider with any questions.    If during the course of the call the physician/provider feels a telephone visit is not appropriate, you will not be charged for this service.\"    Patient has given verbal consent for Telephone visit?  Yes    What phone number would you like to be contacted at? home    How would you like to obtain your AVS? [facility]    Phone call duration: 5 minutes    Adrian Arroyo DPM    Katherin relates that the toe is healing well. She has stopped soaking it. She still covers the toe with a bandaid. She relates no pain to the toe. No redness, foul odor, or pus.     Assessment: Healing hallux s/p PNA.    Plan:  - Can keep the toe uncovered.   - See again at regular visit.     Again, thank you for allowing me to participate in the care of your patient.      Sincerely,        Adrian Arroyo DPM    "

## 2020-06-07 NOTE — PROGRESS NOTES
"Katherin Fletcher is a 80 year old female who is being evaluated via a billable telephone visit.      The patient has been notified of following:     \"This telephone visit will be conducted via a call between you and your physician/provider. We have found that certain health care needs can be provided without the need for a physical exam.  This service lets us provide the care you need with a short phone conversation.  If a prescription is necessary we can send it directly to your pharmacy.  If lab work is needed we can place an order for that and you can then stop by our lab to have the test done at a later time.    Telephone visits are billed at different rates depending on your insurance coverage. During this emergency period, for some insurers they may be billed the same as an in-person visit.  Please reach out to your insurance provider with any questions.    If during the course of the call the physician/provider feels a telephone visit is not appropriate, you will not be charged for this service.\"    Patient has given verbal consent for Telephone visit?  Yes    What phone number would you like to be contacted at? home    How would you like to obtain your AVS? [facility]    Phone call duration: 5 minutes    Adrian Arroyo DPM    Katherin relates that the toe is healing well. She has stopped soaking it. She still covers the toe with a bandaid. She relates no pain to the toe. No redness, foul odor, or pus.     Assessment: Healing hallux s/p PNA.    Plan:  - Can keep the toe uncovered.   - See again at regular visit.       " MARY HOSPITALIST  Progress Note     [facility] Patient Status:  Inpatient    10/6/1925 MRN [id number]   [facility] 8NE-A Attending Kalina Ramirez MD   Hosp Day # 0 PCP Jill Dickson MD     Chief Complaint: a. fib    S: Patient feel data reviewed in Epic.     Medications:   • furosemide  20 mg Intravenous BID (Diuretic)   • aspirin EC  81 mg Oral Daily   • atorvastatin  10 mg Oral Nightly   • escitalopram  10 mg Oral Daily   • Pantoprazole Sodium  40 mg Oral QAM AC   • PEG 3350  17 g O MD    **Certification        PHYSICIAN Certification of Need for Inpatient Hospitalization - Initial Certification    Patient will require inpatient services that will reasonably be expected to span two midnight's based on the clinical documentation in H+P

## 2020-06-11 NOTE — PROGRESS NOTES
Social Work -ALS Clinic Progress Note  [facility]    Data/Intervention:    Patient Name:  Katherin Fletcher  /Age:  1940 (80 year old)    Visit Type: telephone    Collaborated With:    -Katherin  -[facility] Respite and a voluntter    Updates/Concerns:   See Pt's [email or web address] message requesting a HHA for bathing and help with housekeeping. Pt reports that she had a volunteer from [facility] but the person moved away when the covid 19 outbreak started. She indicates that she needs more assistance at home. Her husb is 94. She also would like a home health aide for bathing assistance. She hasn't used the respite program thru [facility] for free HHA 18 hours/month. She is interested in that.    Intervention/Education/Resources Provided:  Reviewed ALS programs and she agreed I could contact them to request they call her. Discussed that is a way to get started with services at home.  She will need more than that as time goes on. She still isn't clear if she is VA eligible thru her spouse. He is 100% service connected. I sent a Mychart note with the phone # for her to contact the VA to establish whether she is eligible. Is eligible, will refer her to the [facility] ALS clinic. If not, recommend a [place] choices assessment thru the county to determine if she is eligible for services at home. If she is not, she will need to pay privately for additional services.    Assessment/Plan:  She has indicated in the past that she has not had access to her husb's income and I indicated that would need to be reported if she is applying for [facility] services. She is aware and indicated that she would get the info. I suspect that due to his income, they may not be eligible for [facility] services but [place].  Encouraged Katherin to contact me if there are any barriers or questions.    Provided patient/family with contact information and encouraged them to contact me between clinic visits as needed.      VLADISLAV Francis, [facility]    [facility]  [phone]

## 2020-06-24 NOTE — LETTER
2020       RE: Katherin Fletcher  [address]  [place] [address]     Dear Colleague,    Thank you for referring your patient, Katherin Fletcher, to the [facility] at [facility]. Please see a copy of my visit note below.    Katherin Fletcher is a 80 year old female who is being evaluated via a billable video visit.          Video-Visit Details    Type of service:  Video Visit    Video Start Time: 10.55 am  Video End Time: 11.14 am    Originating Location (pt. Location): Home    Distant Location (provider location):  [facility]     Platform used for Video Visit: Lv Driver MD          Service Date: 2020      Aris Del Real MD   [facility]   [address] [place] [address]      RE: Katherin Fletcher   MRN: [id number]   : 1940      Dear Dr. Del Real:      I had the pleasure to evaluate Katherin Fletcher via billable video visit today. In person visit was not recommended due to COVID19 pandemic. 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  I last met with her in clinic in 2020.  At this point, Katherin is very frustrated and listed 3 pressing problems:  1. Worsening head drop and neck pain and difficulty finding a good head rest for support in her wheelchair.  2. Thin and thick secretions (see below).  3. Spasms in the muscles of her legs.  She does not endorse any dyspnea on exertion or major orthopnea, and she does not have significant dysphagia.  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  She is largely wheelchair bound.  Her speech is still intelligible.      1. Regarding the neck pain, she is trying to find a different head rest of the wheelchair and she has already spoken to our occupational therapists.    2. Regarding the secretions, she has both problems - thin sialorrhea and thick secretions.   Previously, she tried hyoscyamine and glycopyrrolate and none of the two were very helpful. I referred her for Botox injection of the salivary glands and she had the first one with Dr. Celestin on 04/03/2020.  She cannot have another one prior to 07/30 because they have to be spaced out by 3 months or more.  She does not feel it was particularly effective, but she was not administered the maximal dose on the first pass. She also has great problems with thick secretions and difficulty clearing them.  She was asking for a suctioning device, among others.  She does not have a CoughAssist device and she has not tried any nebulizers or guaifenesin.    3. The spasms in her legs have been historically very difficult to control.  She takes Sinemet 25/100 mg 5 tablets a day, tizanidine 6 mg t.i.d.  We did not try baclofen due to concerns about sedation. THC/CBD mix, tried through the [facility] for more than 6 months, was not effective.  She started doing botulinum toxin injection in her calves 3 months ago, but has not found it beneficial yet.      I did not repeat a physical examination.      MEDICATIONS:  Reviewed and are as per Epic record.      IMPRESSION:  In summary, Katherin continues to be challenged by very uncomfortable symptoms from her slowly progressive ALS.    For her muscle spasms, I really need a referral to Pain Clinic here.  Management of this has been very difficult.  It is possible that a higher botulinum toxin dose will help, but I do need some assistance from them.  I could put her on baclofen, but this can make her overly sedated and she already has this side effect, on the current tizanidine dose.      As for the secretions, I told her that she will have to wait until 07/30 for the next Botox dose and I cannot do much more for the sialorrhea right now.  Anticholinergics were not particularly effective.  For the thick secretions, it is my impression  following face-to-face evaluation  that the patient needs a suctioning device and a CoughAssist device to facilitate their clearance, which is ineffective due to upper airway muscle weakness from the ALS.  This is medically necessary equipment and both should be prescribed.  I may also prescribe her nebulizers, like Mucomyst.       She will continue working with OT and PT about a headrest.  I will see her in followup in our clinic in 6 months or sooner if needed.     [employer] platform was used for video visit. TT spent for patient care 19 minutes, start 10:55 a.m., end 11:14 a.m., more than half was counseling.       Sincerely,         KLAUDIA WILLARD MD             D: 2020   T: 2020   MT: al      Name:     ANA RODRIGUEZ   MRN:      [id number]        Account:      [id number]   :      1940           Service Date: 2020      Document: [id number]

## 2020-06-24 NOTE — PATIENT INSTRUCTIONS
Pain Clinic referral for the leg muscle spasms  I will ask Dr Celestin to call you too  I will order: a cough assist device, nebulizers, and a suction device    You can't do botox before 7/30. You should be tried on a higher dose of Botox next time. This may help    Follow up with the occupational therapist about the head rest.  Follow up in our Clinic in 6 months

## 2020-06-24 NOTE — PROGRESS NOTES
Service Date: 2020      Aris Del Real MD   [facility]   [address] [place] [address]      RE: Katherin Fletcher   MRN: [id number]   : 1940      Dear Dr. Del Real:      I had the pleasure to evaluate Katherin Fletcher via billable video visit today. In person visit was not recommended due to COVID19 pandemic. 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  I last met with her in clinic in 2020.  At this point, Katherin is very frustrated and listed 3 pressing problems:  1. Worsening head drop and neck pain and difficulty finding a good head rest for support in her wheelchair.  2. Thin and thick secretions (see below).  3. Spasms in the muscles of her legs.  She does not endorse any dyspnea on exertion or major orthopnea, and she does not have significant dysphagia.  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  She is largely wheelchair bound.  Her speech is still intelligible.      1. Regarding the neck pain, she is trying to find a different head rest of the wheelchair and she has already spoken to our occupational therapists.    2. Regarding the secretions, she has both problems - thin sialorrhea and thick secretions.  Previously, she tried hyoscyamine and glycopyrrolate and none of the two were very helpful. I referred her for Botox injection of the salivary glands and she had the first one with Dr. Celestin on 2020.  She cannot have another one prior to  because they have to be spaced out by 3 months or more.  She does not feel it was particularly effective, but she was not administered the maximal dose on the first pass. She also has great problems with thick secretions and difficulty clearing them.  She was asking for a suctioning device, among others.  She does not have a CoughAssist device and she has not tried any nebulizers or guaifenesin.    3. The spasms in her legs have been historically very  difficult to control.  She takes Sinemet 25/100 mg 5 tablets a day, tizanidine 6 mg t.i.d.  We did not try baclofen due to concerns about sedation. THC/CBD mix, tried through the [facility] for more than 6 months, was not effective.  She started doing botulinum toxin injection in her calves 3 months ago, but has not found it beneficial yet.      I did not repeat a physical examination.      MEDICATIONS:  Reviewed and are as per Epic record.      IMPRESSION:  In summary, Katherin continues to be challenged by very uncomfortable symptoms from her slowly progressive ALS.    For her muscle spasms, I really need a referral to Pain Clinic here.  Management of this has been very difficult.  It is possible that a higher botulinum toxin dose will help, but I do need some assistance from them.  I could put her on baclofen, but this can make her overly sedated and she already has this side effect, on the current tizanidine dose.      As for the secretions, I told her that she will have to wait until 07/30 for the next Botox dose and I cannot do much more for the sialorrhea right now.  Anticholinergics were not particularly effective.  For the thick secretions, it is my impression  following face-to-face evaluation that the patient needs a suctioning device and a CoughAssist device to facilitate their clearance, which is ineffective due to upper airway muscle weakness from the ALS.  This is medically necessary equipment and both should be prescribed.  I may also prescribe her nebulizers, like Mucomyst.       She will continue working with OT and PT about a headrest.  I will see her in followup in our clinic in 6 months or sooner if needed.     St[employer] platform was used for video visit. TT spent for patient care 19 minutes, start 10:55 a.m., end 11:14 a.m., more than half was counseling.       Sincerely,         KLAUDIA WILLARD MD             D: 06/24/2020   T: 06/24/2020   MT: al      Name:     MICHAEL  ANA   MRN:      [id number]        Account:      [id number]   :      1940           Service Date: 2020      Document: [id number]

## 2020-06-24 NOTE — PROGRESS NOTES
"Katherin Fletcher is a 80 year old female who is being evaluated via a billable video visit.      The patient has been notified of following:     \"This video visit will be conducted via a call between you and your physician/provider. We have found that certain health care needs can be provided without the need for an in-person physical exam.  This service lets us provide the care you need with a video conversation.  If a prescription is necessary we can send it directly to your pharmacy.  If lab work is needed we can place an order for that and you can then stop by our lab to have the test done at a later time.    Video visits are billed at different rates depending on your insurance coverage.  Please reach out to your insurance provider with any questions.    If during the course of the call the physician/provider feels a video visit is not appropriate, you will not be charged for this service.\"    Patient has given verbal consent for Video visit? Yes    Will anyone else be joining your video visit? No        Video-Visit Details    Type of service:  Video Visit    Video Start Time: 10.55 am  Video End Time: 11.14 am    Originating Location (pt. Location): Home    Distant Location (provider location):  [facility] NEUROLOGY     Platform used for Video Visit: Lv Driver MD        "

## 2020-06-30 NOTE — ED PROVIDER NOTES
History     Chief Complaint:  Fall    HPI  Katherin Fletcher is a 80 year old female on Eliquis with a history of CAD, HLD, neurologic gait disorder, HTN, stroke, COPD, and A-fib who presents for evaluation after a fall. The patient uses a walker at home and states she was going to get her medications and lost her balance, falling backwards. She landed on her tailbone and did hit her head on the floor. She denies loss of consciousness. The patient has pain over her tailbone as well as her right shoulder. She has pain in her neck but this is present at baseline and patient is unsure if it has changed. She denies headache, nausea, vomiting, dizziness, or chest pain.       Allergies:  Ace Inhibitors  Baclofen  Penicillins    Medications:    Eliquis  Carbidopa-levodopa   Coreg  Plavix  Hyoscyamine   Imdur  Levoxyl   Nitroglycerin   Omeprazole   Rosuvastatin   Tizanidine    Past Medical History:    CAD  Hyperlipidemia  MRSA   Neurologic gait disorder  Cerebellar disease  Essential hypertension  Ischemic cardiomyopathy  Peripheral polyneuropathy  PAD   Stroke   Neurodegenerative disorder  TIA   Falls frequently  Hypothyroidism  Hypertensive heart disease with heart failure  Chronic obstructive pulmonary disease  Atrial fibrillation  Rib fractures   Chronic cough  MI  Restless leg syndrome    Past Surgical History:    Hysterectomy  Cardiac surgery  Carpal tunnel release rt/lt  Release trigger finger  Bunionectomy rt/lt  Open reduction internal fixation ankle left  Stent placement  CABG  vascular surgery  Stomach surgery  Hand surgery  Repair hammer toe right  Excise Ganglion Hand right  Release trigger finger right    Family History:    History reviewed. No pertinent family history.     Social History:  The patient arrives alone  Smoking: former  Alcohol use: not currently   PCP: Aris Del Real  Marital Status:  [2]      Review of Systems   Cardiovascular: Negative for chest pain.   Gastrointestinal:  Negative for nausea and vomiting.   Musculoskeletal: Positive for arthralgias and neck pain.   Neurological: Negative for dizziness and headaches.   All other systems reviewed and are negative.      Physical Exam     Patient Vitals for the past 24 hrs:   BP Temp Temp src Pulse Heart Rate Resp SpO2 Weight   06/30/20 1500 (!) 116/93 -- -- 92 85 19 97 % --   06/30/20 1346 116/86 -- -- -- -- -- -- --   06/30/20 1345 116/86 -- -- 100 113 16 -- --   06/30/20 1330 -- -- -- -- 89 17 -- --   06/30/20 1315 -- -- -- -- 111 15 -- --   06/30/20 1300 (!) 173/115 -- -- -- 98 15 -- --   06/30/20 1245 (!) 167/125 -- -- -- -- -- -- --   06/30/20 1220 -- -- -- -- -- -- 96 % --   06/30/20 1140 -- -- -- -- -- -- 95 % --   06/30/20 1130 (!) 134/103 -- -- 108 -- -- 97 % --   06/30/20 1125 -- -- -- -- -- -- 97 % --   06/30/20 1123 (!) 164/111 98.8  F (37.1  C) Oral 85 -- 16 99 % 59 kg (130 lb)     Physical Exam    Physical Exam   Constitutional: Pt appears well-developed and well-nourished.  Head: Atraumatic. Head moves freely with normal range of motion. No battles signs. No Racoons eyes.   ENT: Oropharynx is clear and moist. Nose with no deformity. No hemotympanum.  Eyes: Conjunctivae pink. EOMs intact. Pupils are equal, round, and reactive to light.  Neck: Normal range of motion. Midline C Spine tenderness, no step-off or crepitus.   Cardiovascular: Regular rate and rhythm. Normal heart sounds. No concerning  murmur heard. Intact distal pulses: radial pulses 2+ on the right, 2+ on the left.   Pulmonary/Chest: No respiratory distress.  Breath sounds normal. No decreased breath sounds. No wheezes. No rhonchi. No rales. No chest wall tenderness or crepitus.    Abdominal: Soft. Non-tender. No rebound, no guarding.   Musculoskeletal: Non-pitting lower extremity edema. Tender over coccyx with no bruising or erythema. Right shoulder tenderness with no bruising, edema or deformity. Passive range of motion with minimal discomfort. No tenderness.  Distal capillary refill and sensation intact.  Neurological: Oriented to person, place, and time. No focal deficits.   Skin: Skin is warm.     Emergency Department Course   ECG:  ECG taken at 1240, ECG read at 1249  Atrial fibrillation with rapid ventricular response   Cannot rule out anterior infarct age undetermined   St an T wave abnormality consider inferolateral ischemia   Abnormal ECG  Rate 119 bpm. AL interval * ms. QRS duration 84 ms. QT/QTc 262/368 ms. P-R-T axes * 10 172.    Imaging:  Radiology findings were communicated with the patient who voiced understanding of the findings.    CT Cervical Spine wo contrast  IMPRESSION:   1. No acute fracture or traumatic malalignment of the cervical spine.   2. Multilevel degenerative disc disease and uncovertebral and facet   arthropathy, as described  Reading per radiology    CT Head wo contrast   IMPRESSION:     1. No evidence of acute intracranial hemorrhage, mass, or herniation.   2. Diffuse parenchymal volume loss and white matter changes likely due   to chronic microvascular ischemic disease  Reading per radiology    XR Shoulder, 3 views right  IMPRESSION: No proximal humerus fracture or dislocation  Reading per radiology    XR Sacrum and Coccyx 2 views  IMPRESSION: The bones appear somewhat demineralized, limiting   evaluation for subtle or nondisplaced fractures. Moderate anterior   angulation of the coccyx with respect to the body of the sacrum is   most likely chronic, although a superimposed acute injury cannot be   entirely excluded. No displaced fractures are seen. The sacroiliac   joints and pubic symphysis appear normally aligned. Mild bilateral   sacroiliac joint degenerative changes. Multilevel degenerative changes   in the visualized lower lumbar spine. Scattered vascular   calcifications  Reading per radiology    Laboratory:  Laboratory findings were communicated with the patient who voiced understanding of the findings.    CBC: (WBC 4.0, HGB 12.7,  )   BMP: Glucose 90, o/w WNL (Creatinine: 0.64)    Interventions:  1229 Sinemet 1 tablet PO  1346 Coreg 6.25 mg PO    Emergency Department Course:  Past medical records, nursing notes, and vitals reviewed.  1117: I performed an exam of the patient and obtained history, as documented above.     IV was inserted and blood was drawn for laboratory testing, results above.    The patient was sent for a CT and XR while in the emergency department, findings above    1345: I rechecked the patient. Explained findings to patient who is feeling improved. Her HR is now 90 and she is calling her  to go home.    I personally reviewed the laboratory and imaging results with the Patient and answered all related questions prior to discharge.     Findings and plan explained to the Patient. Patient discharged home with instructions regarding supportive care, medications, and reasons to return. The importance of close follow-up was reviewed.   Impression & Plan      Medical Decision Making:  Katherin Fletcher is a 80 year old female who presents to the emergency department today for evaluation of fall. She has neurodegenerative disorder, Neurologic gait disorder and Cerebellar disease. She uses a walker at home but today lost her balance and fell. She notes hitting her head, no evidence of trauma on exam. She is on eliquis and plavix. CT head and CT C spine with no acute injury. XR sacrum/coccyx and XR right shoulder with no acute injury. Lab work is normal. She denies feeling weak/dizzy, noting her ongoing balance problems as the cause of her fall. EKG with a-fib. She did not take her morning medications and I suspect this is the reason for here labile rate 70's - 120's. Coreg and Sinemet given here. We discussed need for follow up and reasons to return here. She is amenable to plan.     Diagnosis:    ICD-10-CM   1. Fall, initial encounter  W19.XXXA   2. Injury of head, initial encounter  S09.90XA   3. Acute pain of  right shoulder  M25.511   4. Pain in the coccyx  M53.3       Disposition:   Discharged to home.      Scribe Disclosure:  I, Elisabet Cummings, am serving as a scribe at 11:09 AM on 6/30/2020 to document services personally performed by Linda Gallego DNP based on my observations and the provider's statements to me.     EMERGENCY DEPARTMENT     Linda Gallego APRN CNP  07/01/20 0848

## 2020-06-30 NOTE — DISCHARGE INSTRUCTIONS
You were given both your Sinemet and your Coreg here today.     Discharge Instructions  Head Injury    You have been seen today for a head injury. Your evaluation included a history and physical examination. You may have had a CT (CAT) scan performed, though most head injuries do not require a scan. Based on this evaluation, your provider today does not feel that your head injury is serious.    Generally, every Emergency Department visit should have a follow-up clinic visit with either a primary or a specialty clinic/provider. Please follow-up as instructed by your emergency provider today.  Return to the Emergency Department if:  You are confused or you are not acting right.  Your headache gets worse or you start to have a really bad headache even with your recommended treatment plan.  You vomit (throw up) more than once.  You have a seizure.  You have trouble walking.  You have weakness or paralysis (cannot move) in an arm or a leg.  You have blood or fluid coming from your ears or nose.  You have new symptoms or anything that worries you.    Sleeping:  It is okay for you to sleep, but someone should wake you up if instructed by your provider, and someone should check on you at your usual time to wake up.     Activity:  Do not drive for at least 24 hours.  Do not drive if you have dizzy spells or trouble concentrating, or remembering things.  Do not return to any contact sports until cleared by your regular provider.     MORE INFORMATION:    Concussion:  A concussion is a minor head injury that may cause temporary problems with the way the brain works. Although concussions are important, they are generally not an emergency or a reason that a person needs to be hospitalized. Some concussion symptoms include confusion, amnesia (forgetful), nausea (sick to your stomach) and vomiting (throwing up), dizziness, fatigue, memory or concentration problems, irritability and sleep problems. For most people, concussions are  mild and temporary but some will have more severe and persistent symptoms that require on-going care and treatment.  CT Scans: Your evaluation today may have included a CT scan (CAT scan) to look for things like bleeding or a skull fracture (broken bone).  CT scans involve radiation and too many CT scans can cause serious health problems like cancer, especially in children.  Because of this, your provider may not have ordered a CT scan today if they think you are at low risk for a serious or life threatening problem.    If you were given a prescription for medicine here today, be sure to read all of the information (including the package insert) that comes with your prescription.  This will include important information about the medicine, its side effects, and any warnings that you need to know about.  The pharmacist who fills the prescription can provide more information and answer questions you may have about the medicine.  If you have questions or concerns that the pharmacist cannot address, please call or return to the Emergency Department.     Remember that you can always come back to the Emergency Department if you are not able to see your regular provider in the amount of time listed above, if you get any new symptoms, or if there is anything that worries you.

## 2020-06-30 NOTE — ED AVS SNAPSHOT
Emergency Department  [address] [facility] [address]  Phone:  [phone]  Fax:  [phone]                                    Katherin Fletcher   MRN: [id number]    Department:   Emergency Department   Date of Visit:  6/30/2020           After Visit Summary Signature Page    I have received my discharge instructions, and my questions have been answered. I have discussed any challenges I see with this plan with the nurse or doctor.    ..........................................................................................................................................  Patient/Patient Representative Signature      ..........................................................................................................................................  Patient Representative Print Name and Relationship to Patient    ..................................................               ................................................  Date                                   Time    ..........................................................................................................................................  Reviewed by Signature/Title    ...................................................              ..............................................  Date                                               Time          22EPIC Rev 08/18

## 2020-06-30 NOTE — ED NOTES
Bed: ED13  Expected date:   Expected time:   Means of arrival:   Comments:  [facility] - 442 - 82F fall LOC back pain negative covid eta 105

## 2020-07-01 NOTE — TELEPHONE ENCOUNTER
DIAGNOSIS: Bilateral carpal tunnel syndrome/emg 2018/Adrian Arroyo DPM/medicare/ortho con  pt states referral should have been for arthritis in multiple joints   APPOINTMENT DATE: 7.14.20   NOTES STATUS DETAILS   OFFICE NOTE from referring provider Internal 6.9.20 COLLEEN Aroryo [facility] Ortho  5.22.20   OFFICE NOTE from other specialist Internal 1.6.20 Margaux Mario, Neuro 11.24.19   DISCHARGE SUMMARY from hospital N/A    DISCHARGE REPORT from the ER N/A    OPERATIVE REPORT N/A    MEDICATION LIST Internal    MRI N/A    CT SCAN N/A    XRAYS (IMAGES & REPORTS) N/A

## 2020-07-13 NOTE — PROGRESS NOTES
"Pt is a 80 year old female w/ ALS referred by Dr Arroyo here today for:     bilateral Wrist/ Hand pain:  Right is worse than left.  Location: whole hand; pain with writing and crocheting; pain is in fingers; stiffness  Duration: years   Trauma/ Fall?  No  Swelling? No   Numbness/ Tingling? No   Weakness? \"some, maybe?\"  Imaging?  No  Treatment? 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    For her muscle spasms, I really need a referral to Pain Clinic here.  Management of this has been very difficult.  It is possible that a higher botulinum toxin dose will help, but I do need some assistance from them.  I could put her on baclofen, but this can make her overly sedated and she already has this side effect, on the current tizanidine dose.      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     Cerebellar disease G93.9     Essential hypertension I10     Ischemic cardiomyopathy I25.5     Peripheral polyneuropathy G62.9     PAD (peripheral artery disease) (H) I73.9     Encounter for long-term (current) use of medications Z79.899     Stroke (H) I63.9     Slurred speech R47.81     Neurodegenerative disorder (H) G31.9     History of TIA (transient ischemic attack) and stroke Z86.73     Falls frequently R29.6     Hypothyroidism, unspecified type E03.9     Ataxia R27.0     Hypertensive heart disease with heart failure (H) I11.0     Chronic " obstructive pulmonary disease, unspecified COPD type (H) J44.9     Atrial fibrillation, unspecified type (H) I48.91     Rib fractures S22.39XA     Past Medical History:   Diagnosis Date     CAD (coronary artery disease)     s/p CABG 1992 and redo CABG 2009 (LIMA to LAD, SVG to OM and SVG to posterolateral branch of RCA)     Chronic cough     Non-productive. No known definite etiology. Is beeing seen by [facility].      Essential hypertension, benign      Falls frequently 5/28/2018     GERD (gastroesophageal reflux disease)      History of TIA (transient ischemic attack) and stroke 5/7/2018    R sided facial N&T.      Hyperlipidaemia      Hyperlipidaemia LDL goal < 130      Ischemic cardiomyopathy      MI (myocardial infarction) (H) 11/1996     Mixed hyperlipidemia      Neurodegenerative disorder (H)     Further detailed dx unknown. Managed by Dr. George Pham - [facility].      PAD (peripheral artery disease) (H)      Restless legs syndrome      Unspecified hypothyroidism       Past Surgical History:   Procedure Laterality Date     ARTHROSCOPY SHOULDER RT/LT       BUNIONECTOMY RT/LT       C CABG, ARTERY-VEIN, THREE  1/2009     C CARDIAC SURG PROCEDURE UNLIST       C HAND/FINGER SURGERY UNLISTED       C MRA UPPER EXTREMITY WO&W CONT       C SHOULDER SURG PROC UNLISTED       C STOMACH SURGERY PROCEDURE UNLISTED       CARDIAC SURGERY  1992    CABG,stents x2     CARPAL TUNNEL RELEASE RT/LT       EXCISE GANGLION HAND Right 11/29/2018    Procedure: EXCISE RIGHT VOLAR CARPAL MASS AND RIGHT INDEX TRIGGER FINGER RELEASE;  Surgeon: Minh Goldstein MD;  Location:  OR      VASCULAR SURGERY PROCEDURE UNLIST       HYSTERECTOMY, DAMIÁN  32 yo     OPEN REDUCTION INTERNAL FIXATION ANKLE Left 2015    Left Ankle     RELEASE TRIGGER FINGER       RELEASE TRIGGER FINGER Right 11/29/2018    Procedure: RIGHT INDEX TRIGGER FINGER RELEASE;  Surgeon: Minh Goldstein MD;  Location:  OR     REPAIR HAMMER TOE Right 12/19/2017     Procedure: REPAIR HAMMER TOE;  Right 2nd and 3rd toe pinning;  Surgeon: Fernando Wiggins MD;  Location: [facility] OR     STENT  2006     STENT(aka CARDIAC)  2008     VASCULAR SURGERY        Current Outpatient Medications   Medication Sig Dispense Refill     acetaminophen (TYLENOL) 500 MG tablet Take 1-2 tablets (500-1,000 mg) by mouth every 6 hours as needed for mild pain       Alfalfa 250 MG TABS Take 250 mg by mouth every morning        apixaban ANTICOAGULANT (ELIQUIS) 5 MG tablet Take 1 tablet (5 mg) by mouth 2 times daily 180 tablet 3     calcium carbonate-vitamin D (CALCIUM + D) 600-200 MG-UNIT TABS Take 1 tablet by mouth At Bedtime        carbidopa-levodopa (SINEMET)  MG tablet Take 1 tablet three times daily and 2 tablets at bedtime       carvedilol (COREG) 6.25 MG tablet Take 1 tablet (6.25 mg) by mouth 2 times daily (with meals) 180 tablet 3     clopidogrel (PLAVIX) 75 MG tablet Take 1 tablet (75 mg) by mouth daily 90 tablet 3     doxycycline hyclate (VIBRAMYCIN) 100 MG capsule Take 1 capsule (100 mg) by mouth 2 times daily 10 capsule 0     glycopyrrolate (ROBINUL) 1 MG tablet Take 1 tablet (1 mg) by mouth 3 times daily 270 tablet 1     hyoscyamine (ANASPAZ/LEVSIN) 0.125 MG tablet Take 1 tablet (125 mcg) by mouth every 8 hours as needed for other (drooling (sialorrhea)) 90 tablet 1     isosorbide mononitrate (IMDUR) 30 MG 24 hr tablet Take 1 tablet (30 mg) by mouth daily 90 tablet 3     LEVOXYL 112 MCG tablet TAKE 1 TABLET DAILY 90 tablet 4     Multiple Vitamins-Minerals (DAILY MULTI) TABS Take 1 tablet by mouth every morning        nitroGLYcerin (NITROSTAT) 0.4 MG sublingual tablet DISSOLVE ONE TABLET UNDER THE TONGUE EVERY 5 MINUTES AS NEEDED FOR CHEST PAIN.  DO NOT EXCEED A TOTAL OF 3 DOSES IN 15 MINUTES 25 tablet 3     omeprazole (PRILOSEC) 20 MG DR capsule Take 1 capsule (20 mg) by mouth 2 times daily 30-60 minutes before meal 60 capsule 11     rosuvastatin (CRESTOR) 20 MG tablet Take 1 tablet  (20 mg) by mouth daily 90 tablet 3     tiZANidine (ZANAFLEX) 2 MG tablet TAKE 3 TABLETS THREE TIMES A  tablet 11      Allergies   Allergen Reactions     Ace Inhibitors Cough     Baclofen Other (See Comments)     Constipation , tiredness     Penicillins      10/7/14 Hives per patient -- many many years ago       Nickel Swelling and Rash     Other reaction(s): Unknown      Social History     Tobacco Use     Smoking status: Former Smoker     Packs/day: 0.50     Years: 22.00     Pack years: 11.00     Types: Cigarettes     Last attempt to quit: 4/15/1992     Years since quittin.2     Smokeless tobacco: Never Used   Substance Use Topics     Alcohol use: Not Currently     Alcohol/week: 1.0 standard drinks     Types: 1 Standard drinks or equivalent per week     Comment: MAYBE ONCE A YEAR     Drug use: No      Family History   Problem Relation Age of Onset     Unknown/Adopted Mother      Unknown/Adopted Father      Other - See Comments Son         on warfarin for one year after afganastan now off and doing well     Other - See Comments Daughter         celiac, brittle bone, arthritis, Raynauds      ROS:   Gen- no fevers/chills   Rheum - no morning stiffness   Derm - no rash/ redness   Neuro - no numbness, no tingling   Remainder of ROS negative.     Exam:   /78   Pulse 83   Resp 16        Bilateral WRIST/HAND:   Inspection: Swelling - YES at DIP/PIP joints; Atrophy - YES - intrinsic hand  Sensation: intact in median, radial, ulnar distribution   ROM:   Wrist: flexion- full/ extension- full/ pronation- full/ supination- full;   radial deviation - full; ulnar deviation- full   Hand: Full flexion at PIP/DIP, finger abduction/ adduction.   Strength: weakness w/  but symmetric  Bony tenderness:   Wrist: Carpal bones: No; Snuffbox: NO   Hand: Metacarpals: YES; Phalanges: YES   Tendons: FDS/FDP/Extensor mechanism intact   Maneuvers: -Tinel's/Phalen; -Finkelstein. -TFCC grind test   Other: + nodules palpated  in palmar aspect of middle finger on L hand; +dupuytren's contractures bilateral, likely prominent due to intrinsic hand muscle wasting         (M19.049) Primary localized osteoarthrosis of hand, unspecified laterality  (primary encounter diagnosis)  Comment: her primary complaint of hand pain is likely from her hand OA; recommended course of hand therapy; f/u prn  Plan: XR Hand Bilateral G/E 3 Views, HAND THERAPY         Occupational Therapy or Physical Therapy            (M24.549) Flexion contracture of joint of hand, unspecified laterality  Comment: she is likely developing dupuytren's contractures of bilateral hands; will have her discuss padding w/ hand therapy; she is not interested in intralesional injections at this time  Plan: HAND THERAPY Occupational Therapy or Physical Therapy            (M65.30) Trigger finger, unspecified finger, unspecified laterality  Comment:   Plan: nodules may be from her dupuytren's contractures vs true trigger finger; pt declined injections; f/u prn    I will see her back in 2 weeks to discuss her L knee and R foot    He Stubbs MD  July 14, 2020  1:44 PM

## 2020-07-14 NOTE — LETTER
"  7/14/2020      RE: Katherin Fletcher  [address] [facility] [facility] [place] [address]       Pt is a 80 year old female w/ ALS referred by Dr Arroyo here today for:     bilateral Wrist/ Hand pain:  Right is worse than left.  Location: whole hand; pain with writing and crocheting; pain is in fingers; stiffness  Duration: years   Trauma/ Fall?  No  Swelling? No   Numbness/ Tingling? No   Weakness? \"some, maybe?\"  Imaging?  No  Treatment? 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    For her muscle spasms, I really need a referral to Pain Clinic here.  Management of this has been very difficult.  It is possible that a higher botulinum toxin dose will help, but I do need some assistance from them.  I could put her on baclofen, but this can make her overly sedated and she already has this side effect, on the current tizanidine dose.      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     Cerebellar disease G93.9     Essential hypertension I10     Ischemic cardiomyopathy I25.5     Peripheral polyneuropathy G62.9     PAD (peripheral artery disease) (H) I73.9     Encounter for long-term (current) use of medications Z79.899     Stroke (H) I63.9     Slurred speech R47.81     Neurodegenerative disorder (H) G31.9     History of TIA (transient ischemic attack) and stroke Z86.73     Falls frequently R29.6     Hypothyroidism, unspecified type E03.9 "     Ataxia R27.0     Hypertensive heart disease with heart failure (H) I11.0     Chronic obstructive pulmonary disease, unspecified COPD type (H) J44.9     Atrial fibrillation, unspecified type (H) I48.91     Rib fractures S22.39XA     Past Medical History:   Diagnosis Date     CAD (coronary artery disease)     s/p CABG 1992 and redo CABG 2009 (LIMA to LAD, SVG to OM and SVG to posterolateral branch of RCA)     Chronic cough     Non-productive. No known definite etiology. Is beeing seen by [facility].      Essential hypertension, benign      Falls frequently 5/28/2018     GERD (gastroesophageal reflux disease)      History of TIA (transient ischemic attack) and stroke 5/7/2018    R sided facial N&T.      Hyperlipidaemia      Hyperlipidaemia LDL goal < 130      Ischemic cardiomyopathy      MI (myocardial infarction) (H) 11/1996     Mixed hyperlipidemia      Neurodegenerative disorder (H)     Further detailed dx unknown. Managed by Dr. George Pham - [facility].      PAD (peripheral artery disease) (H)      Restless legs syndrome      Unspecified hypothyroidism       Past Surgical History:   Procedure Laterality Date     ARTHROSCOPY SHOULDER RT/LT       BUNIONECTOMY RT/LT       C CABG, ARTERY-VEIN, THREE  1/2009     C CARDIAC SURG PROCEDURE UNLIST       C HAND/FINGER SURGERY UNLISTED       C MRA UPPER EXTREMITY WO&W CONT       C SHOULDER SURG PROC UNLISTED       C STOMACH SURGERY PROCEDURE UNLISTED       CARDIAC SURGERY  1992    CABG,stents x2     CARPAL TUNNEL RELEASE RT/LT       EXCISE GANGLION HAND Right 11/29/2018    Procedure: EXCISE RIGHT VOLAR CARPAL MASS AND RIGHT INDEX TRIGGER FINGER RELEASE;  Surgeon: Minh Goldstein MD;  Location:  OR      VASCULAR SURGERY PROCEDURE UNLIST       HYSTERECTOMY, DAMIÁN  34 yo     OPEN REDUCTION INTERNAL FIXATION ANKLE Left 2015    Left Ankle     RELEASE TRIGGER FINGER       RELEASE TRIGGER FINGER Right 11/29/2018    Procedure: RIGHT INDEX TRIGGER FINGER RELEASE;   Surgeon: Minh Goldstein MD;  Location:  OR     REPAIR HAMMER TOE Right 12/19/2017    Procedure: REPAIR HAMMER TOE;  Right 2nd and 3rd toe pinning;  Surgeon: Fernando Wiggins MD;  Location:  OR     STENT  2006     STENT(aka CARDIAC)  2008     VASCULAR SURGERY        Current Outpatient Medications   Medication Sig Dispense Refill     acetaminophen (TYLENOL) 500 MG tablet Take 1-2 tablets (500-1,000 mg) by mouth every 6 hours as needed for mild pain       Alfalfa 250 MG TABS Take 250 mg by mouth every morning        apixaban ANTICOAGULANT (ELIQUIS) 5 MG tablet Take 1 tablet (5 mg) by mouth 2 times daily 180 tablet 3     calcium carbonate-vitamin D (CALCIUM + D) 600-200 MG-UNIT TABS Take 1 tablet by mouth At Bedtime        carbidopa-levodopa (SINEMET)  MG tablet Take 1 tablet three times daily and 2 tablets at bedtime       carvedilol (COREG) 6.25 MG tablet Take 1 tablet (6.25 mg) by mouth 2 times daily (with meals) 180 tablet 3     clopidogrel (PLAVIX) 75 MG tablet Take 1 tablet (75 mg) by mouth daily 90 tablet 3     doxycycline hyclate (VIBRAMYCIN) 100 MG capsule Take 1 capsule (100 mg) by mouth 2 times daily 10 capsule 0     glycopyrrolate (ROBINUL) 1 MG tablet Take 1 tablet (1 mg) by mouth 3 times daily 270 tablet 1     hyoscyamine (ANASPAZ/LEVSIN) 0.125 MG tablet Take 1 tablet (125 mcg) by mouth every 8 hours as needed for other (drooling (sialorrhea)) 90 tablet 1     isosorbide mononitrate (IMDUR) 30 MG 24 hr tablet Take 1 tablet (30 mg) by mouth daily 90 tablet 3     LEVOXYL 112 MCG tablet TAKE 1 TABLET DAILY 90 tablet 4     Multiple Vitamins-Minerals (DAILY MULTI) TABS Take 1 tablet by mouth every morning        nitroGLYcerin (NITROSTAT) 0.4 MG sublingual tablet DISSOLVE ONE TABLET UNDER THE TONGUE EVERY 5 MINUTES AS NEEDED FOR CHEST PAIN.  DO NOT EXCEED A TOTAL OF 3 DOSES IN 15 MINUTES 25 tablet 3     omeprazole (PRILOSEC) 20 MG DR capsule Take 1 capsule (20 mg) by mouth 2 times daily 30-60  minutes before meal 60 capsule 11     rosuvastatin (CRESTOR) 20 MG tablet Take 1 tablet (20 mg) by mouth daily 90 tablet 3     tiZANidine (ZANAFLEX) 2 MG tablet TAKE 3 TABLETS THREE TIMES A  tablet 11      Allergies   Allergen Reactions     Ace Inhibitors Cough     Baclofen Other (See Comments)     Constipation , tiredness     Penicillins      10/7/14 Hives per patient -- many many years ago       Nickel Swelling and Rash     Other reaction(s): Unknown      Social History     Tobacco Use     Smoking status: Former Smoker     Packs/day: 0.50     Years: 22.00     Pack years: 11.00     Types: Cigarettes     Last attempt to quit: 4/15/1992     Years since quittin.2     Smokeless tobacco: Never Used   Substance Use Topics     Alcohol use: Not Currently     Alcohol/week: 1.0 standard drinks     Types: 1 Standard drinks or equivalent per week     Comment: MAYBE ONCE A YEAR     Drug use: No      Family History   Problem Relation Age of Onset     Unknown/Adopted Mother      Unknown/Adopted Father      Other - See Comments Son         on warfarin for one year after afganastan now off and doing well     Other - See Comments Daughter         celiac, brittle bone, arthritis, Raynauds      ROS:   Gen- no fevers/chills   Rheum - no morning stiffness   Derm - no rash/ redness   Neuro - no numbness, no tingling   Remainder of ROS negative.     Exam:   /78   Pulse 83   Resp 16        Bilateral WRIST/HAND:   Inspection: Swelling - YES at DIP/PIP joints; Atrophy - YES - intrinsic hand  Sensation: intact in median, radial, ulnar distribution   ROM:   Wrist: flexion- full/ extension- full/ pronation- full/ supination- full;   radial deviation - full; ulnar deviation- full   Hand: Full flexion at PIP/DIP, finger abduction/ adduction.   Strength: weakness w/  but symmetric  Bony tenderness:   Wrist: Carpal bones: No; Snuffbox: NO   Hand: Metacarpals: YES; Phalanges: YES   Tendons: FDS/FDP/Extensor mechanism intact    Maneuvers: -Tinel's/Phalen; -Finkelstein. -TFCC grind test   Other: + nodules palpated in palmar aspect of middle finger on L hand; +dupuytren's contractures bilateral, likely prominent due to intrinsic hand muscle wasting         (M19.049) Primary localized osteoarthrosis of hand, unspecified laterality  (primary encounter diagnosis)  Comment: her primary complaint of hand pain is likely from her hand OA; recommended course of hand therapy; f/u prn  Plan: XR Hand Bilateral G/E 3 Views, HAND THERAPY         Occupational Therapy or Physical Therapy            (M24.549) Flexion contracture of joint of hand, unspecified laterality  Comment: she is likely developing dupuytren's contractures of bilateral hands; will have her discuss padding w/ hand therapy; she is not interested in intralesional injections at this time  Plan: HAND THERAPY Occupational Therapy or Physical Therapy            (M65.30) Trigger finger, unspecified finger, unspecified laterality  Comment:   Plan: nodules may be from her dupuytren's contractures vs true trigger finger; pt declined injections; f/u prn    I will see her back in 2 weeks to discuss her L knee and R foot    He Stubbs MD  July 14, 2020  1:44 PM

## 2020-07-22 NOTE — PROGRESS NOTES
"   07/22/20 1500   Quick Adds   Quick Adds Certification   General Information (PT: include personal factors and/or comorbidities that impact the POC; OT: include additional occupational profile info)   Rehab Discipline OT   Funding Medicare/   Service Outpatient;Occupational Therapy;Seating/Wheeled Mobility Evaluation   Height 5'4\"   Weight 130   Start Of Care Date 07/22/20   Referring Physician Neisha   Orders Evaluate And Treat As Indicated;Per Therapist Evaluation   Orders Date 06/22/20   Others Present at Evaluation Gerber DAVILA OTR/L   Patient/Caregiver Goals HEadrest mods   Rehabilitation Technology Supplier Karine JOSHUA from [employer]   Current Community Support Personal Care Attendant;Transportation Service;Emergency Call System   Patient role/Employment history Retired;Disabled   Fall Risk Screen   Fall screen completed by OT   Have you fallen 2 or more times in the past year? Yes   Have you fallen and had an injury in the past year? Yes  (hosptal as she hit heard head)   Is patient a fall risk? Yes   Medical History   Onset Of Illness/injury Or Date Of Surgery 6/22/20   Medical Diagnosis ALS   Medical History TIA, MI, HTN, PAD, L ORIF ankle, CABG x2, R trigger finger, Bilateral bunionectomy, Bilateral carpal tunnel   Home Accessibility   Living Environment House   Primary Entrance Level   Primary Entrance Width (inches) ramp   Community ADL   Transportation Car;Transportation Services   Community Mobility Requirements Medical Appointments;Shopping;[employer]   Ambulation   Ambulation Non Ambulatory   Wheelchair Ability   Wheelchair Ability Quick Adds Manual Chair;Wheelchair Use   Manual Wheelchair Propulsion   Manual Wheelchair Propulsion Independent   Wheelchair Use   Ability to Perform Weight Shifts Assist   Bed Confined Without Wheelchair? Yes   Hours in Wheelchair Daily 12   Hours Spent Alone Daily 0   Neuromuscular   Pain Yes   Pain Location cervical neck from flexion   Coordination UE Intact;LE " Impaired   Tone Spasticity   Head and Neck   Head and Neck Position Flexed   Head Control Limited   Tone/Movement of Head and Neck Wears soft collar most of the time, holds head with hand in flexed position.  No other collars have work as they are painful.  Able to use chair to get head to rest back on headrest with tilt but cannot keep if there for functional activties such as eating or safe driving   Problems with Equipment for Mobility   Other per ATP.  Head measurements taken.   Education Assessment   Barriers to Learning Emotional;Physical   Preferred Learning Style Listening;Demonstration   Assessment/Plan   Criteria for Skilled Interventions Met Yes, Treatment Indicated   Treatment Diagnosis impaired participation in MRADLS   Therapy Frequency up to 4 sessions as needed   Planned Therapy Interventions Wheelchair Management/Propulsion Training   Planned Therapy Interventions Comments Educated patient on headrest options to properly support head in neutral position for safe eating and other functional activites.  Trialed [facility] and Savant.  Savant was best fit with headstrap needed.  Measure for fit.  Adjusted her head rest.  Placed large towel roll in lumbar region of chair for thoracic extension.  Cup vargas also wanted and arm rear and lateral supports needed with ajdustments as arms keep falling off of chair.   Risks and benefits of treatment have been explained Yes   Patient/family & other staff in agreement with plan of care Yes   Comments LMN to be written once specs recieved.   Session Time   OT Wheelchair Management Minutes ([id number]) 45   Certification   Certification date from 07/22/20   Certification date to 10/20/20    OT Goal 1   Goal Identifier HEad support   Goal Description Patient/family demontrastes understanding of equipment for reducing/accommodating postural deviations, including benefits/limitations   Target Date 10/20/20   Electronically signed by:  Natividad Mckeon OTR/L, ATP       Occupational Therapist, Assistive   [phone]      fax: [phone]      [email or web address], [address]  [place] [address]

## 2020-07-22 NOTE — PROGRESS NOTES
[facility]                                                                                             OUTPATIENT OCCUPATIONAL THERAPY  EVALUATION  PLAN OF TREATMENT FOR OUTPATIENT REHABILITATION  (COMPLETE FOR INITIAL CLAIMS ONLY)    Patient's Last Name, First Name, M.I.                    YOB: 1940  Katherin Fletcher       Provider s Name: [facility] Medical Record No.  [id number]     Onset Date: 6/22/20   Start of Care Date: 07/22/20     Type: OCCUPATIONAL THERAPY   Medical Diagnosis:  als   Therapy Diagnosis: impaired participation in MRADLS      _______________________________________________________________________  Plan of Treatment/Functional Goals:  Planned Therapy Interventions: Wheelchair Management/Propulsion Training  Educated patient on headrest options to properly support head in neutral position for safe eating and other functional activites.  Trialed [employer] and Savant.  Savant was best fit with headstrap needed.  Measure for fit.  Adjusted her head rest.  Placed large towel roll in lumbar region of chair for thoracic extension.  Cup vargas also wanted and arm rear and lateral supports needed with ajdustments as arms keep falling off of chair.     Goals         Goal Identifier: HEad support  Goal Description: Patient/family demontrastes understanding of equipment for reducing/accommodating postural deviations, including benefits/limitations  Target Date: 10/20/20                                                                                                              Therapy Frequency: up to 4 sessions as needed       Natividad Mckeon OT     _______________________________________________________________________    I CERTIFY THE NEED FOR THESE SERVICES FURNISHED UNDER        THIS PLAN OF TREATMENT AND WHILE UNDER MY CARE     (Physician co-signature  of this document indicates review and certification of the therapy plan).                Certification Period: 07/22/20 to 10/20/20     Referring Physician: Neisha    Initial Assessment        See evaluation for start of care date 07/22/20 in Epic electronic health record.

## 2020-07-28 NOTE — LETTER
7/28/2020      RE: Katherin Fletcher  [address]  [facility] [id number]       Pt is a 80 year old female last seen on 7/14/20 for bilateral hand pain here for :     1) Left Anterior Knee pain :   Duration? Several weeks intermittently  Injury/ Inciting activity? No inciting activity  Pop? Yes  Swelling? Yes   Limited motion? No  Locking/ Catching? No  Giving way/ instability? Once in awhile  Imaging? Today xray  Treatment? Tiger balm, tylenol  She reports spasms in her legs.   Walks with a walker at home; motorized wheelchair out of the house  Most of the pain is in the kneecap area    2) R Foot pain:   Location: In all of her toes -> describes as an achy pain   Duration: Several months  Trauma/ Fall? Had fall today- hurt 5th toe  Able to walk? No  Swelling? Joints of her toes  Bruising? No  Numbness/ Tingling? No  Weakness? No  Instability? No  Snapping/Clicking? No  Imaging? Today xray  Treatment? Tylenol      Past Medical History:   Diagnosis Date     CAD (coronary artery disease)     s/p CABG 1992 and redo CABG 2009 (LIMA to LAD, SVG to OM and SVG to posterolateral branch of RCA)     Chronic cough     Non-productive. No known definite etiology. Is beeing seen by [facility].      Essential hypertension, benign      Falls frequently 5/28/2018     GERD (gastroesophageal reflux disease)      History of TIA (transient ischemic attack) and stroke 5/7/2018    R sided facial N&T.      Hyperlipidaemia      Hyperlipidaemia LDL goal < 130      Ischemic cardiomyopathy      MI (myocardial infarction) (H) 11/1996     Mixed hyperlipidemia      Neurodegenerative disorder (H)     Further detailed dx unknown. Managed by Dr. George Pham - [facility].      PAD (peripheral artery disease) (H)      Restless legs syndrome      Unspecified hypothyroidism       Current Outpatient Medications   Medication Sig Dispense Refill     acetaminophen (TYLENOL) 500 MG tablet Take 1-2 tablets (500-1,000 mg) by mouth  every 6 hours as needed for mild pain       Alfalfa 250 MG TABS Take 250 mg by mouth every morning        apixaban ANTICOAGULANT (ELIQUIS) 5 MG tablet Take 1 tablet (5 mg) by mouth 2 times daily 180 tablet 3     calcium carbonate-vitamin D (CALCIUM + D) 600-200 MG-UNIT TABS Take 1 tablet by mouth At Bedtime        carbidopa-levodopa (SINEMET)  MG tablet Take 1 tablet three times daily and 2 tablets at bedtime       carvedilol (COREG) 6.25 MG tablet Take 1 tablet (6.25 mg) by mouth 2 times daily (with meals) 180 tablet 3     clopidogrel (PLAVIX) 75 MG tablet Take 1 tablet (75 mg) by mouth daily 90 tablet 3     doxycycline hyclate (VIBRAMYCIN) 100 MG capsule Take 1 capsule (100 mg) by mouth 2 times daily 10 capsule 0     glycopyrrolate (ROBINUL) 1 MG tablet Take 1 tablet (1 mg) by mouth 3 times daily 270 tablet 1     hyoscyamine (ANASPAZ/LEVSIN) 0.125 MG tablet Take 1 tablet (125 mcg) by mouth every 8 hours as needed for other (drooling (sialorrhea)) 90 tablet 1     isosorbide mononitrate (IMDUR) 30 MG 24 hr tablet Take 1 tablet (30 mg) by mouth daily 90 tablet 3     LEVOXYL 112 MCG tablet TAKE 1 TABLET DAILY 90 tablet 4     Multiple Vitamins-Minerals (DAILY MULTI) TABS Take 1 tablet by mouth every morning        nitroGLYcerin (NITROSTAT) 0.4 MG sublingual tablet DISSOLVE ONE TABLET UNDER THE TONGUE EVERY 5 MINUTES AS NEEDED FOR CHEST PAIN.  DO NOT EXCEED A TOTAL OF 3 DOSES IN 15 MINUTES 25 tablet 3     omeprazole (PRILOSEC) 20 MG DR capsule Take 1 capsule (20 mg) by mouth 2 times daily 30-60 minutes before meal 60 capsule 11     rosuvastatin (CRESTOR) 20 MG tablet Take 1 tablet (20 mg) by mouth daily 90 tablet 3     tiZANidine (ZANAFLEX) 2 MG tablet TAKE 3 TABLETS THREE TIMES A  tablet 11      Allergies   Allergen Reactions     Ace Inhibitors Cough     Baclofen Other (See Comments)     Constipation , tiredness     Penicillins      10/7/14 Hives per patient -- many many years ago       Nickel Swelling and  Rash     Other reaction(s): Unknown      ROS:   Gen- no fevers/chills   Rheum - no morning stiffness   Derm - no rash/ redness   Neuro - no numbness, no tingling   Remainder of ROS negative.     Exam:   Wt 59 kg (130 lb)   BMI 22.30 kg/m        L Knee:   ROM: 0-130; Crepitus: YES   Effusion: NO ; Swelling: NO   Strength: Full in flexion/ extension   Tenderness: Patella - YES Medial joint line - NO; Lateral joint line - NO; Quad tendon - NO; Patellar tendon- NO; Hamstring - NO.   Cruciates: deferred  Collaterals: varus -neg/valgus -neg.   Patella: patellar compression - pos; single leg bend- deferred   Meniscus: Jennyfer - deferred        R Foot:   Inspection: Swelling - No; Bruising - NO   Sensation: intact   ROM: full at toes    Bony tenderness: diffusely at metatarsals heads on plantar surface; No TTP at 5th phalanx, just at 5th MTP  Maneuvers: + metatarsal Squeeze      Xray knee - 7/28/20 at [facility] [place] - films personally reviewed w/ pt    +PF moderate OA and mild tibiofemoral OA    Xray foot - 7/28/20 at [facility] [place] -  films personally reviewed w/ pt    Neg fx  +previous 1st met surgical hardware  +degenerative changes      (M17.12) Patellofemoral arthritis of left knee  (primary encounter diagnosis)  Comment: will have her work on strengthening and deconditioning for knee; f/u prn  Plan: PHYSICAL THERAPY REFERRAL (Internal)            (M77.41) Metatarsalgia of right foot  Comment:   Plan: recommended a pad insert; precautions given; f/u prn    He Stubbs MD  July 28, 2020  2:14 PM        He Stubbs MD

## 2020-07-28 NOTE — PROGRESS NOTES
Pt is a 80 year old female last seen on 7/14/20 for bilateral hand pain here for :     1) Left Anterior Knee pain :   Duration? Several weeks intermittently  Injury/ Inciting activity? No inciting activity  Pop? Yes  Swelling? Yes   Limited motion? No  Locking/ Catching? No  Giving way/ instability? Once in awhile  Imaging? Today xray  Treatment? Tiger balm, tylenol  She reports spasms in her legs.   Walks with a walker at home; motorized wheelchair out of the house  Most of the pain is in the kneecap area    2) R Foot pain:   Location: In all of her toes -> describes as an achy pain   Duration: Several months  Trauma/ Fall? Had fall today- hurt 5th toe  Able to walk? No  Swelling? Joints of her toes  Bruising? No  Numbness/ Tingling? No  Weakness? No  Instability? No  Snapping/Clicking? No  Imaging? Today xray  Treatment? Tylenol      Past Medical History:   Diagnosis Date     CAD (coronary artery disease)     s/p CABG 1992 and redo CABG 2009 (LIMA to LAD, SVG to OM and SVG to posterolateral branch of RCA)     Chronic cough     Non-productive. No known definite etiology. Is beeing seen by [facility].      Essential hypertension, benign      Falls frequently 5/28/2018     GERD (gastroesophageal reflux disease)      History of TIA (transient ischemic attack) and stroke 5/7/2018    R sided facial N&T.      Hyperlipidaemia      Hyperlipidaemia LDL goal < 130      Ischemic cardiomyopathy      MI (myocardial infarction) (H) 11/1996     Mixed hyperlipidemia      Neurodegenerative disorder (H)     Further detailed dx unknown. Managed by Dr. George Pham - [facility].      PAD (peripheral artery disease) (H)      Restless legs syndrome      Unspecified hypothyroidism       Current Outpatient Medications   Medication Sig Dispense Refill     acetaminophen (TYLENOL) 500 MG tablet Take 1-2 tablets (500-1,000 mg) by mouth every 6 hours as needed for mild pain       Alfalfa 250 MG TABS Take 250 mg by mouth every morning         apixaban ANTICOAGULANT (ELIQUIS) 5 MG tablet Take 1 tablet (5 mg) by mouth 2 times daily 180 tablet 3     calcium carbonate-vitamin D (CALCIUM + D) 600-200 MG-UNIT TABS Take 1 tablet by mouth At Bedtime        carbidopa-levodopa (SINEMET)  MG tablet Take 1 tablet three times daily and 2 tablets at bedtime       carvedilol (COREG) 6.25 MG tablet Take 1 tablet (6.25 mg) by mouth 2 times daily (with meals) 180 tablet 3     clopidogrel (PLAVIX) 75 MG tablet Take 1 tablet (75 mg) by mouth daily 90 tablet 3     doxycycline hyclate (VIBRAMYCIN) 100 MG capsule Take 1 capsule (100 mg) by mouth 2 times daily 10 capsule 0     glycopyrrolate (ROBINUL) 1 MG tablet Take 1 tablet (1 mg) by mouth 3 times daily 270 tablet 1     hyoscyamine (ANASPAZ/LEVSIN) 0.125 MG tablet Take 1 tablet (125 mcg) by mouth every 8 hours as needed for other (drooling (sialorrhea)) 90 tablet 1     isosorbide mononitrate (IMDUR) 30 MG 24 hr tablet Take 1 tablet (30 mg) by mouth daily 90 tablet 3     LEVOXYL 112 MCG tablet TAKE 1 TABLET DAILY 90 tablet 4     Multiple Vitamins-Minerals (DAILY MULTI) TABS Take 1 tablet by mouth every morning        nitroGLYcerin (NITROSTAT) 0.4 MG sublingual tablet DISSOLVE ONE TABLET UNDER THE TONGUE EVERY 5 MINUTES AS NEEDED FOR CHEST PAIN.  DO NOT EXCEED A TOTAL OF 3 DOSES IN 15 MINUTES 25 tablet 3     omeprazole (PRILOSEC) 20 MG DR capsule Take 1 capsule (20 mg) by mouth 2 times daily 30-60 minutes before meal 60 capsule 11     rosuvastatin (CRESTOR) 20 MG tablet Take 1 tablet (20 mg) by mouth daily 90 tablet 3     tiZANidine (ZANAFLEX) 2 MG tablet TAKE 3 TABLETS THREE TIMES A  tablet 11      Allergies   Allergen Reactions     Ace Inhibitors Cough     Baclofen Other (See Comments)     Constipation , tiredness     Penicillins      10/7/14 Hives per patient -- many many years ago       Nickel Swelling and Rash     Other reaction(s): Unknown      ROS:   Gen- no fevers/chills   Rheum - no morning  stiffness   Derm - no rash/ redness   Neuro - no numbness, no tingling   Remainder of ROS negative.     Exam:   Wt 59 kg (130 lb)   BMI 22.30 kg/m        L Knee:   ROM: 0-130; Crepitus: YES   Effusion: NO ; Swelling: NO   Strength: Full in flexion/ extension   Tenderness: Patella - YES Medial joint line - NO; Lateral joint line - NO; Quad tendon - NO; Patellar tendon- NO; Hamstring - NO.   Cruciates: deferred  Collaterals: varus -neg/valgus -neg.   Patella: patellar compression - pos; single leg bend- deferred   Meniscus: Jennyfer - deferred        R Foot:   Inspection: Swelling - No; Bruising - NO   Sensation: intact   ROM: full at toes    Bony tenderness: diffusely at metatarsals heads on plantar surface; No TTP at 5th phalanx, just at 5th MTP  Maneuvers: + metatarsal Squeeze      [place] [facility] [place]    +PF moderate OA and mild tibiofemoral OA    [place] [facility] [place]    Neg fx  +previous 1st met surgical hardware  +degenerative changes      (M17.12) Patellofemoral arthritis of left knee  (primary encounter diagnosis)  Comment: will have her work on strengthening and deconditioning for knee; f/u prn  Plan: PHYSICAL THERAPY REFERRAL (Internal)            (M77.41) Metatarsalgia of right foot  Comment:   Plan: recommended a pad insert; precautions given; f/u prn    He Stubbs MD  July 28, 2020  2:14 PM

## 2020-07-30 NOTE — LETTER
7/30/2020       RE: Katherin Fletcher  [address]  [facility]– [place][note 2] [id number]     Dear Colleague,    Thank you for referring your patient, Katherin Fletcher, to the [facility] PHYSICAL MEDICINE AND REHABILITATION at [facility]. Please see a copy of my visit note below.    BOTULINUM TOXIN PROCEDURE NOTE    Chief Complaint   Patient presents with     Botox     UMP RETURN BOTOX        /77   Pulse 64   Resp 16   SpO2 97%       Current Outpatient Medications:      acetaminophen (TYLENOL) 500 MG tablet, Take 1-2 tablets (500-1,000 mg) by mouth every 6 hours as needed for mild pain, Disp: , Rfl:      Alfalfa 250 MG TABS, Take 250 mg by mouth every morning , Disp: , Rfl:      apixaban ANTICOAGULANT (ELIQUIS) 5 MG tablet, Take 1 tablet (5 mg) by mouth 2 times daily, Disp: 180 tablet, Rfl: 3     calcium carbonate-vitamin D (CALCIUM + D) 600-200 MG-UNIT TABS, Take 1 tablet by mouth At Bedtime , Disp: , Rfl:      carbidopa-levodopa (SINEMET)  MG tablet, Take 1 tablet three times daily and 2 tablets at bedtime, Disp: , Rfl:      carbidopa-levodopa (SINEMET)  MG tablet, Take 2 tablets by mouth At Bedtime, Disp: , Rfl:      carvedilol (COREG) 6.25 MG tablet, Take 1 tablet (6.25 mg) by mouth 2 times daily (with meals), Disp: 180 tablet, Rfl: 3     clopidogrel (PLAVIX) 75 MG tablet, Take 1 tablet (75 mg) by mouth daily, Disp: 90 tablet, Rfl: 3     glycopyrrolate (ROBINUL) 1 MG tablet, Take 1 tablet (1 mg) by mouth 3 times daily, Disp: 270 tablet, Rfl: 1     hyoscyamine (ANASPAZ/LEVSIN) 0.125 MG tablet, Take 1 tablet (125 mcg) by mouth every 8 hours as needed for other (drooling (sialorrhea)), Disp: 90 tablet, Rfl: 1     isosorbide mononitrate (IMDUR) 30 MG 24 hr tablet, Take 1 tablet (30 mg) by mouth daily, Disp: 90 tablet, Rfl: 3     LEVOXYL 112 MCG tablet, TAKE 1 TABLET DAILY, Disp: 90 tablet, Rfl: 4     Multiple Vitamins-Minerals (DAILY MULTI) TABS, Take 1  tablet by mouth every morning , Disp: , Rfl:      nitroGLYcerin (NITROSTAT) 0.4 MG sublingual tablet, DISSOLVE ONE TABLET UNDER THE TONGUE EVERY 5 MINUTES AS NEEDED FOR CHEST PAIN.  DO NOT EXCEED A TOTAL OF 3 DOSES IN 15 MINUTES, Disp: 25 tablet, Rfl: 3     omeprazole (PRILOSEC) 20 MG DR capsule, Take 1 capsule (20 mg) by mouth 2 times daily 30-60 minutes before meal, Disp: 60 capsule, Rfl: 11     rosuvastatin (CRESTOR) 20 MG tablet, Take 1 tablet (20 mg) by mouth daily, Disp: 90 tablet, Rfl: 3     tiZANidine (ZANAFLEX) 2 MG tablet, TAKE 3 TABLETS THREE TIMES A DAY, Disp: 270 tablet, Rfl: 5    Current Facility-Administered Medications:      botulinum toxin type A (BOTOX) 100 units injection 400 Units, 400 Units, Intramuscular, Q90 Days, Darline Celestin MD     Allergies   Allergen Reactions     Ace Inhibitors Cough     Baclofen Other (See Comments)     Constipation , tiredness     Penicillins      10/7/14 Hives per patient -- many many years ago       Nickel Swelling and Rash     Other reaction(s): Unknown        PHYSICAL EXAM:  Gen: NAD, sitting in a wheelchair  HEENT: soft C collar in place  Pulm: non-labored breathing in room air  Ext: no edema in bilateral lower extremities, skin was intact   Neuro/MSK:    Forward head posture and dropped head  Noted diffuse muscle atrophy at bilateral upper and lower extremities  Also noted sialorrhea and she had to use Kleenex often during my interview and exam  Full strength and sensation exam was not done today but she was able to move all extremities antigravity  Tone was increased in bilateral lower extremities with 1+ out of 4 at the knee flexion and hip abduction, and 2 out of 4 at plantar flexion bilaterally        HPI:  Katherin Fletcher is a 80 year old female with h/o ALS who is followed in our clinic regarding spasticity and sialorrhea management. PMH is also significant for A fib, CAD, HLD and TIA in 5/2018. She was seen in clinic as initial consult (referral  "from Dr. Corrales) on 10/11/18. Last seen in clinic 20 for Botox injections. Refer to clinic note on 20 for the details regarding her history.     She had minimal benefits from last series of injections despite the increased dose. No side effects either. Spasms continue to be severe. Usually around 5pm she gets severe spasms at calves only which can last about 2 hours. And again around 9-10pm, spasms recur and continue sometimes till 3-4am. She has to \"walk them off\". There are very painful and totally debilitating. She often gets some sense of \"bug crawling\" in her legs before spasms start.     She takes tizanidine 10mg tid with some benefits.     Patient denies new medical diagnoses, illnesses, hospitalizations, emergency room visits, and injuries since the previous injection with botulinum neurotoxin. She had a fall 3 weeks ago; lost her balance when walking at home.     We reviewed the recommended safety guidelines for  Botox from any vaccine injection, such as the seasonal flu vaccine, by a minimum of 10-14 days with Katherin Fletcher. She acknowledged understanding.        RESPONSE TO PREVIOUS TREATMENT:  See above     BENEFITS BY PATIENT REPORT:  See above      BOTULINUM NEUROTOXIN INJECTION PROCEDURES:    VERIFICATION OF PATIENT IDENTIFICATION AND PROCEDURE     Initials   Patient Name ps   Patient  ps   Procedure Verified by: ps     Prior to the start of the procedure and with procedural staff participation, I verbally confirmed the patient s identity using two indicators, relevant allergies, that the procedure was appropriate and matched the consent or emergent situation, and that the correct equipment/implants were available. Immediately prior to starting the procedure I conducted the Time Out with the procedural staff and re-confirmed the patient s name, procedure, and site/side. (The Joint Commission universal protocol was followed.)  Yes    Sedation (Moderate or Deep): " None      Above assessments performed by:  Darline Celestin MD      INDICATION/S FOR PROCEDURE/S:  Katherin Fletcher is a 80 year old patient with  spasticity affecting the right lower extremity and left lower extremity secondary to a diagnosis of ALS with associated  pain, spasms, loss of joint motion, loss of volitional motor control, difficulty with activities of daily living and difficulty with ambulation and transfers. She also has significant sialorrhea secondary to ALS.       Her baseline symptoms have been recalcitrant to oral medications and conservative therapy.  She is here today for an injection of Botox.        GOAL OF PROCEDURE:  The goal of this procedure is to increase active range of motion, improve volitional motor control, decrease pain , enhance functional independence associated with spasticity and decrease excessive drooling      TOTAL DOSE USED: 200 units   Dose Administered: 150 units Botox  2:1 Dilution for bilateral lower extremities   Diluent Used: Bupivacaine 0.25% (Lot: [id number], Exp: 6/2023, NDC:78469-526-92)    Dose Administered: 50 units 2:1 Dilution for bilateral parotid glands   Diluent Used: Preservative Free Normal Saline    Lot # /[id number] with Expiration Date: 2/2023  NDC #: Botox 100u ([address]-25)     Medication guide was offered to patient and was declined.    CONSENT:  The risks, benefits, and treatment options were discussed with Katherin Fletcher and she agreed to proceed.      Written consent was obtained by PS.       EQUIPMENT USED:  Needle-37mm stimulating/recording  EMG/NCS Machine  Needle-30G  Ultrasound    SKIN PREPARATION:  Skin preparation was performed using an alcohol wipe.    GUIDANCE DESCRIPTION:  Electro-myographic and Ultrasound guidance were necessary throughout the procedure to accurately identify all areas of spastic muscles while avoiding injection of non-spastic muscles, neighboring nerves and nearby vascular structures.     AREA/MUSCLE INJECTED:     Right gastrocnemius 70 units (at medial and lateral heads)  Left gastrocnemius 80 units (at medial and lateral heads)    Right parotid 15 units  Left Parotid 15 units     Right submandibular 10 units  Left submandibular 10 units    RESPONSE TO PROCEDURE:  Katherin Fletcher tolerated the procedure well and there were no immediate complications.  She was allowed to recover for an appropriate period of time and was discharged home in stable condition.    FOLLOW UP:  Katherin Fletcher was asked to follow up by phone in 7-14 days with Marissa Davey RN, Care Coordinator, to report her response to this series of injections.  Based on the patient's previous response to this therapy, Katherin Fletcher was rescheduled for the next series of injections in 12 weeks.      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 Romo podiatry team    Bilateral carpal tunnel syndrome. EMG also showed right ulnar neuropathy  - was seen and evaluated by Dr. Mario     S/p right volar carpal ganglion cyst and trigger finger           We have reviewed the treatment options for spasticity including therapies/HEP, medications, and injections. Strongly recommended to continue regular exercises at home as much as possible.  Therapies are limited right now due to current situation with coronavirus and needed social isolation.     She had side effects with baclofen in the past; tizanidine was restarted by Dr. Driver and has continued; current dose is 6 mg 3 times a day.  She is also on Sinemet 5  tablets/day total.  She tried THS/CBD mix through the [facility] for more than 6 months but discontinued due to no benefits.  Other medication options are limited due to risk of sedation and falls.  After multiple conversations with Dr. Driver she agreed with the plan to do Botox injections.  We started at small dose and with calf muscles only with the plan to gradually titrate the dose and modify the sites of injections for optimal result.      We have also iscussed the plan for sialorrhea management.  This is her second difficult symptom.  She is currently on Robinul 2 mg 3 times a day with limited effect.  Other medication options including amitriptyline or atropine were considered but not restarted due to potential risks. Started botulinum toxin injections at last visit and will titrate the dose gradually.            1. Work-up: none today; should have CMP every 6 months as long as she is on tizanidine (last LFT on 4/30; last BMP 6/30).      2. Therapy/equipment/braces: continue regular home exercises.  She has been using a soft cervical collar to help with neck weakness and pain. Discussed the risks of muscle atrophy secondary to disuse. She will connect with seating clinic to have a head band for her head rest.      3. Medications: continue tizanidine 10 tid    4. Interventions: repeat chemo-denervation for spasticity and sialorrhea management. Increased the dose from 100 to 200 units today.     5. Referral / follow up with other providers: neurology team as scheduled. No new referral today.     6. Follow up: encouraged her call with questions and follow up as scheduled.       Darline Celestin MD  Physical Medicine & Rehabilitation

## 2020-07-30 NOTE — PROGRESS NOTES
"   SEATING AND WHEELED MOBILITY ASSESSMENT  07/22/20 1500   Quick Adds   Quick Adds Certification   General Information    Rehab Discipline OT   Funding Medicare/   Service Outpatient;Occupational Therapy;Seating/Wheeled Mobility Evaluation   Height 5'4\"   Weight 130   Start Of Care Date 07/22/20   Referring Physician Neisha   Orders Evaluate And Treat As Indicated;Per Therapist Evaluation   Orders Date 06/22/20   Others Present at Evaluation Gerber S OTR/L   Patient/Caregiver Goals HEadrest mods   Rehabilitation Technology Supplier Karine JOSHUA from [employer]   Current Community Support Personal Care Attendant;Transportation Service;Emergency Call System   Patient role/Employment history Retired;Disabled   Fall Risk Screen   Fall screen completed by OT   Have you fallen 2 or more times in the past year? Yes   Have you fallen and had an injury in the past year? Yes  (hosptal as she hit heard head)   Is patient a fall risk? Yes   Medical History   Onset Of Illness/injury Or Date Of Surgery 6/22/20   Medical Diagnosis ALS   Medical History TIA, MI, HTN, PAD, L ORIF ankle, CABG x2, R trigger finger, Bilateral bunionectomy, Bilateral carpal tunnel   Home Accessibility   Living Environment House   Primary Entrance Level   Primary Entrance Width (inches) ramp   Community ADL   Transportation Car;Transportation Services   Community Mobility Requirements Medical Appointments;Shopping;Confucianism   Ambulation   Ambulation Non Ambulatory   Wheelchair Ability   Wheelchair Ability Quick Adds Manual Chair;Wheelchair Use   Manual Wheelchair Propulsion   Manual Wheelchair Propulsion Independent   Wheelchair Use   Ability to Perform Weight Shifts Assist   Bed Confined Without Wheelchair? Yes   Hours in Wheelchair Daily 12   Hours Spent Alone Daily 0   Neuromuscular   Pain Yes   Pain Location cervical neck from flexion   Coordination UE Intact;LE Impaired   Tone Spasticity   Head and Neck   Head and Neck Position Flexed   Head " Control Limited   Tone/Movement of Head and Neck Wears soft collar most of the time, holds head with hand in flexed position.  No other collars have work as they are painful.  Able to use chair to get head to rest back on headrest with tilt but cannot keep if there for functional activties such as eating or safe driving   Problems with Equipment for Mobility   Other per ATP.  Head measurements taken.   Education Assessment   Barriers to Learning Emotional;Physical   Preferred Learning Style Listening;Demonstration   Assessment/Plan   Criteria for Skilled Interventions Met Yes, Treatment Indicated   Treatment Diagnosis impaired participation in MRADLS   Therapy Frequency up to 4 sessions as needed   Planned Therapy Interventions Wheelchair Management/Propulsion Training   Planned Therapy Interventions Comments Educated patient on headrest options to properly support head in neutral position for safe eating and other functional activites.  Trialed [facility] and [employer].  Savant was best fit with headstrap needed.  Measure for fit.  Adjusted her head rest.  Placed large towel roll in lumbar region of chair for thoracic extension.  Cup vargas also wanted and arm rear and lateral supports needed with ajdustments as arms keep falling off of chair.   Risks and benefits of treatment have been explained Yes   Patient/family & other staff in agreement with plan of care Yes   Comments Savant Headrest with anterior support- needed to keep her head in an erect, midline and upright position whether she is in the upright, tilted or reclined position. Her head and neck need to be supported as well as the rest of her body.  Patient is no longer able to safely use standard headrest and neck support due to increased weakness and requires a more adjustable and supportive headrest to support her to participate in adls and iadls such as eating.    Adjustable mounting hardware - needed for mounting the requested headrest in the most  appropriate position on the back of the wheelchair for optimal head and neck support and to be able to be removed for transfers.     Lateral and rear/elbow arm supports- needed to safely keep arms on arm rests due to increasing weakness resulting in them falling off of chair.  Hardwared needed to mount and adjust as needed.     Session Time   OT Wheelchair Management Minutes (95086) 45   Certification   Certification date from 07/22/20   Certification date to 10/20/20    OT Goal 1   Goal Identifier HEad support   Goal Description Patient/family demontrastes understanding of equipment for reducing/accommodating postural deviations, including benefits/limitations   Target Date 10/20/20   Electronically signed by:  Natividad Mckeon OTADRIANA/JACKIE, ATP      Occupational Therapist, Assistive   [phone]      fax: [phone]      [email or web address]  Seating Clinic- [facility], [address]  [address]  [place] [address]

## 2020-07-30 NOTE — PROGRESS NOTES
BOTULINUM TOXIN PROCEDURE NOTE    Chief Complaint   Patient presents with     Botox     [facility] RETURN BOTOX        /77   Pulse 64   Resp 16   SpO2 97%       Current Outpatient Medications:      acetaminophen (TYLENOL) 500 MG tablet, Take 1-2 tablets (500-1,000 mg) by mouth every 6 hours as needed for mild pain, Disp: , Rfl:      Alfalfa 250 MG TABS, Take 250 mg by mouth every morning , Disp: , Rfl:      apixaban ANTICOAGULANT (ELIQUIS) 5 MG tablet, Take 1 tablet (5 mg) by mouth 2 times daily, Disp: 180 tablet, Rfl: 3     calcium carbonate-vitamin D (CALCIUM + D) 600-200 MG-UNIT TABS, Take 1 tablet by mouth At Bedtime , Disp: , Rfl:      carbidopa-levodopa (SINEMET)  MG tablet, Take 1 tablet three times daily and 2 tablets at bedtime, Disp: , Rfl:      carbidopa-levodopa (SINEMET)  MG tablet, Take 2 tablets by mouth At Bedtime, Disp: , Rfl:      carvedilol (COREG) 6.25 MG tablet, Take 1 tablet (6.25 mg) by mouth 2 times daily (with meals), Disp: 180 tablet, Rfl: 3     clopidogrel (PLAVIX) 75 MG tablet, Take 1 tablet (75 mg) by mouth daily, Disp: 90 tablet, Rfl: 3     glycopyrrolate (ROBINUL) 1 MG tablet, Take 1 tablet (1 mg) by mouth 3 times daily, Disp: 270 tablet, Rfl: 1     hyoscyamine (ANASPAZ/LEVSIN) 0.125 MG tablet, Take 1 tablet (125 mcg) by mouth every 8 hours as needed for other (drooling (sialorrhea)), Disp: 90 tablet, Rfl: 1     isosorbide mononitrate (IMDUR) 30 MG 24 hr tablet, Take 1 tablet (30 mg) by mouth daily, Disp: 90 tablet, Rfl: 3     LEVOXYL 112 MCG tablet, TAKE 1 TABLET DAILY, Disp: 90 tablet, Rfl: 4     Multiple Vitamins-Minerals (DAILY MULTI) TABS, Take 1 tablet by mouth every morning , Disp: , Rfl:      nitroGLYcerin (NITROSTAT) 0.4 MG sublingual tablet, DISSOLVE ONE TABLET UNDER THE TONGUE EVERY 5 MINUTES AS NEEDED FOR CHEST PAIN.  DO NOT EXCEED A TOTAL OF 3 DOSES IN 15 MINUTES, Disp: 25 tablet, Rfl: 3     omeprazole (PRILOSEC) 20 MG DR capsule, Take 1 capsule (20 mg) by  mouth 2 times daily 30-60 minutes before meal, Disp: 60 capsule, Rfl: 11     rosuvastatin (CRESTOR) 20 MG tablet, Take 1 tablet (20 mg) by mouth daily, Disp: 90 tablet, Rfl: 3     tiZANidine (ZANAFLEX) 2 MG tablet, TAKE 3 TABLETS THREE TIMES A DAY, Disp: 270 tablet, Rfl: 5    Current Facility-Administered Medications:      botulinum toxin type A (BOTOX) 100 units injection 400 Units, 400 Units, Intramuscular, Q90 Days, Darline Celestin MD     Allergies   Allergen Reactions     Ace Inhibitors Cough     Baclofen Other (See Comments)     Constipation , tiredness     Penicillins      10/7/14 Hives per patient -- many many years ago       Nickel Swelling and Rash     Other reaction(s): Unknown        PHYSICAL EXAM:  Gen: NAD, sitting in a wheelchair  HEENT: soft C collar in place  Pulm: non-labored breathing in room air  Ext: no edema in bilateral lower extremities, skin was intact   Neuro/MSK:    Forward head posture and dropped head  Noted diffuse muscle atrophy at bilateral upper and lower extremities  Also noted sialorrhea and she had to use Kleenex often during my interview and exam  Full strength and sensation exam was not done today but she was able to move all extremities antigravity  Tone was increased in bilateral lower extremities with 1+ out of 4 at the knee flexion and hip abduction, and 2 out of 4 at plantar flexion bilaterally        HPI:  Katherin Fletcher is a 80 year old female with h/o ALS who is followed in our clinic regarding spasticity and sialorrhea management. PMH is also significant for A fib, CAD, HLD and TIA in 5/2018. She was seen in clinic as initial consult (referral from Dr. Corrales) on 10/11/18. Last seen in clinic 7/30/20 for Botox injections. Refer to clinic note on 4/9/20 for the details regarding her history.     She had minimal benefits from last series of injections despite the increased dose. No side effects either. Spasms continue to be severe. Usually around 5pm she gets severe  "spasms at calves only which can last about 2 hours. And again around 9-10pm, spasms recur and continue sometimes till 3-4am. She has to \"walk them off\". There are very painful and totally debilitating. She often gets some sense of \"bug crawling\" in her legs before spasms start.     She takes tizanidine 10mg tid with some benefits.     Patient denies new medical diagnoses, illnesses, hospitalizations, emergency room visits, and injuries since the previous injection with botulinum neurotoxin. She had a fall 3 weeks ago; lost her balance when walking at home.     We reviewed the recommended safety guidelines for  Botox from any vaccine injection, such as the seasonal flu vaccine, by a minimum of 10-14 days with Katherin Fletcher. She acknowledged understanding.        RESPONSE TO PREVIOUS TREATMENT:  See above     BENEFITS BY PATIENT REPORT:  See above      BOTULINUM NEUROTOXIN INJECTION PROCEDURES:    VERIFICATION OF PATIENT IDENTIFICATION AND PROCEDURE     Initials   Patient Name ps   Patient  ps   Procedure Verified by: ps     Prior to the start of the procedure and with procedural staff participation, I verbally confirmed the patient s identity using two indicators, relevant allergies, that the procedure was appropriate and matched the consent or emergent situation, and that the correct equipment/implants were available. Immediately prior to starting the procedure I conducted the Time Out with the procedural staff and re-confirmed the patient s name, procedure, and site/side. (The Joint Commission universal protocol was followed.)  Yes    Sedation (Moderate or Deep): None      Above assessments performed by:  Darline Celestin MD      INDICATION/S FOR PROCEDURE/S:  Katherin Fletcher is a 80 year old patient with  spasticity affecting the right lower extremity and left lower extremity secondary to a diagnosis of ALS with associated  pain, spasms, loss of joint motion, loss of volitional motor control, " difficulty with activities of daily living and difficulty with ambulation and transfers. She also has significant sialorrhea secondary to ALS.       Her baseline symptoms have been recalcitrant to oral medications and conservative therapy.  She is here today for an injection of Botox.        GOAL OF PROCEDURE:  The goal of this procedure is to increase active range of motion, improve volitional motor control, decrease pain , enhance functional independence associated with spasticity and decrease excessive drooling      TOTAL DOSE USED: 200 units   Dose Administered: 150 units Botox  2:1 Dilution for bilateral lower extremities   Diluent Used: Bupivacaine 0.25% (Lot: [id number], Exp: 6/2023, NDC:63674-182-05)    Dose Administered: 50 units 2:1 Dilution for bilateral parotid glands   Diluent Used: Preservative Free Normal Saline    Lot # /[id number] with Expiration Date: 2/2023  NDC #: Botox 100u ([address]-54)     Medication guide was offered to patient and was declined.    CONSENT:  The risks, benefits, and treatment options were discussed with Katherin Fletcher and she agreed to proceed.      Written consent was obtained by PS.       EQUIPMENT USED:  Needle-37mm stimulating/recording  EMG/NCS Machine  Needle-30G  Ultrasound    SKIN PREPARATION:  Skin preparation was performed using an alcohol wipe.    GUIDANCE DESCRIPTION:  Electro-myographic and Ultrasound guidance were necessary throughout the procedure to accurately identify all areas of spastic muscles while avoiding injection of non-spastic muscles, neighboring nerves and nearby vascular structures.     AREA/MUSCLE INJECTED:    Right gastrocnemius 70 units (at medial and lateral heads)  Left gastrocnemius 80 units (at medial and lateral heads)    Right parotid 15 units  Left Parotid 15 units     Right submandibular 10 units  Left submandibular 10 units    RESPONSE TO PROCEDURE:  Katherin Fletcher tolerated the procedure well and there were no immediate  complications.  She was allowed to recover for an appropriate period of time and was discharged home in stable condition.    FOLLOW UP:  Katherin Fletcher was asked to follow up by phone in 7-14 days with Marissa Davey RN, Care Coordinator, to report her response to this series of injections.  Based on the patient's previous response to this therapy, Katherin Fletcher was rescheduled for the next series of injections in 12 weeks.      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 Romo podiatry team    Bilateral carpal tunnel syndrome. EMG also showed right ulnar neuropathy  - was seen and evaluated by Dr. Mario     S/p right volar carpal ganglion cyst and trigger finger           We have reviewed the treatment options for spasticity including therapies/HEP, medications, and injections. Strongly recommended to continue regular exercises at home as much as possible.  Therapies are limited right now due to current situation with coronavirus and needed social isolation.     She had side effects with baclofen in the past; tizanidine was restarted by Dr. Driver and has continued; current dose is 6 mg 3 times a day.  She is also on Sinemet 5 tablets/day total.  She tried THS/CBD mix through the [place] [facility] for more than 6 months but discontinued due to no benefits.  Other medication options are limited due to risk of sedation and falls.  After multiple conversations with Dr. Driver she agreed with the plan to do Botox injections.  We started at small dose  and with calf muscles only with the plan to gradually titrate the dose and modify the sites of injections for optimal result.      We have also iscussed the plan for sialorrhea management.  This is her second difficult symptom.  She is currently on Robinul 2 mg 3 times a day with limited effect.  Other medication options including amitriptyline or atropine were considered but not restarted due to potential risks. Started botulinum toxin injections at last visit and will titrate the dose gradually.            1. Work-up: none today; should have CMP every 6 months as long as she is on tizanidine (last LFT on 4/30; last BMP 6/30).      2. Therapy/equipment/braces: continue regular home exercises.  She has been using a soft cervical collar to help with neck weakness and pain. Discussed the risks of muscle atrophy secondary to disuse. She will connect with seating clinic to have a head band for her head rest.      3. Medications: continue tizanidine 10 tid    4. Interventions: repeat chemo-denervation for spasticity and sialorrhea management. Increased the dose from 100 to 200 units today.     5. Referral / follow up with other providers: neurology team as scheduled. No new referral today.     6. Follow up: encouraged her call with questions and follow up as scheduled.       Darline Celestin MD  Physical Medicine & Rehabilitation

## 2020-07-30 NOTE — NURSING NOTE
Chief Complaint   Patient presents with     Botox     ump return botox       Simón Richardson, EMT

## 2020-07-31 NOTE — PATIENT INSTRUCTIONS
Treatment planning:    Dr. Almonte will follow up with your care team at [facility] to discuss treatment planning. We will follow up with you with their recommendations.        Recommended Follow up:  As indicated.       To speak with a nurse, schedule/reschedule/cancel a clinic appointment, or request a medication refill call: [phone]    You can also reach us by [employer]: [email or web address]

## 2020-07-31 NOTE — PROGRESS NOTES
"Katherin Fletcher is a 80 year old female who is being evaluated via a billable video visit.      The patient has been notified of following:     \"This video visit will be conducted via a call between you and your physician/provider. We have found that certain health care needs can be provided without the need for an in-person physical exam.  This service lets us provide the care you need with a video conversation.  If a prescription is necessary we can send it directly to your pharmacy.  If lab work is needed we can place an order for that and you can then stop by our lab to have the test done at a later time.    Video visits are billed at different rates depending on your insurance coverage.  Please reach out to your insurance provider with any questions.    If during the course of the call the physician/provider feels a video visit is not appropriate, you will not be charged for this service.\"    Patient has given verbal consent for Video visit? Yes  How would you like to obtain your AVS? MyChart  If you are dropped from the video visit, the video invite should be resent to: [phone]  Will anyone else be joining your video visit? Candida Thorpe  The patient is an 80-year-old who complains of leg spasms.  The patient also has a history of ALS.  The patient notes that the spasms started approximately 9 years ago.  She has spasms every day once or twice per day.  She takes tizanidine for the spasms and she also sees Dr. Celestin in [facility].  She receives Botox intermittently to help treat her spasms.  She is not sure if the Botox is helpful.  She notes that the Botox is injected into her calves.  She states that she takes 10 mg of tizanidine 3 times daily.  The spasms are present in the evening and do not occur during sleep.  She presents for evaluation and further recommendations for treatment of her restless leg syndrome and leg spasms.  Current Outpatient Medications   Medication     acetaminophen " (TYLENOL) 500 MG tablet     Alfalfa 250 MG TABS     apixaban ANTICOAGULANT (ELIQUIS) 5 MG tablet     calcium carbonate-vitamin D (CALCIUM + D) 600-200 MG-UNIT TABS     carbidopa-levodopa (SINEMET)  MG tablet     carvedilol (COREG) 6.25 MG tablet     clopidogrel (PLAVIX) 75 MG tablet     doxycycline hyclate (VIBRAMYCIN) 100 MG capsule     glycopyrrolate (ROBINUL) 1 MG tablet     hyoscyamine (ANASPAZ/LEVSIN) 0.125 MG tablet     isosorbide mononitrate (IMDUR) 30 MG 24 hr tablet     LEVOXYL 112 MCG tablet     Multiple Vitamins-Minerals (DAILY MULTI) TABS     nitroGLYcerin (NITROSTAT) 0.4 MG sublingual tablet     omeprazole (PRILOSEC) 20 MG DR capsule     rosuvastatin (CRESTOR) 20 MG tablet     tiZANidine (ZANAFLEX) 2 MG tablet     Current Facility-Administered Medications   Medication     botulinum toxin type A (BOTOX) 100 units injection 400 Units     Allergies   Allergen Reactions     Ace Inhibitors Cough     Baclofen Other (See Comments)     Constipation , tiredness     Penicillins      10/7/14 Hives per patient -- many many years ago       Nickel Swelling and Rash     Other reaction(s): Unknown     Past Medical History:   Diagnosis Date     CAD (coronary artery disease)     s/p CABG 1992 and redo CABG 2009 (LIMA to LAD, SVG to OM and SVG to posterolateral branch of RCA)     Chronic cough     Non-productive. No known definite etiology. Is beeing seen by [facility].      Essential hypertension, benign      Falls frequently 5/28/2018     GERD (gastroesophageal reflux disease)      History of TIA (transient ischemic attack) and stroke 5/7/2018    R sided facial N&T.      Hyperlipidaemia      Hyperlipidaemia LDL goal < 130      Ischemic cardiomyopathy      MI (myocardial infarction) (H) 11/1996     Mixed hyperlipidemia      Neurodegenerative disorder (H)     Further detailed dx unknown. Managed by Dr. George Pham - [facility].      PAD (peripheral artery disease) (H)      Restless legs syndrome       Unspecified hypothyroidism      Past Surgical History:   Procedure Laterality Date     ARTHROSCOPY SHOULDER RT/LT       BUNIONECTOMY RT/LT       C CABG, ARTERY-VEIN, THREE  1/2009     C CARDIAC SURG PROCEDURE UNLIST       C HAND/FINGER SURGERY UNLISTED       C MRA UPPER EXTREMITY WO&W CONT       C SHOULDER SURG PROC UNLISTED       C STOMACH SURGERY PROCEDURE UNLISTED       CARDIAC SURGERY  1992    CABG,stents x2     CARPAL TUNNEL RELEASE RT/LT       EXCISE GANGLION HAND Right 11/29/2018    Procedure: EXCISE RIGHT VOLAR CARPAL MASS AND RIGHT INDEX TRIGGER FINGER RELEASE;  Surgeon: Minh Goldstein MD;  Location:  OR     [facility] VASCULAR SURGERY PROCEDURE UNLIST       HYSTERECTOMY, DAMIÁN  34 yo     OPEN REDUCTION INTERNAL FIXATION ANKLE Left 2015    Left Ankle     RELEASE TRIGGER FINGER       RELEASE TRIGGER FINGER Right 11/29/2018    Procedure: RIGHT INDEX TRIGGER FINGER RELEASE;  Surgeon: Minh Goldstein MD;  Location:  OR     REPAIR HAMMER TOE Right 12/19/2017    Procedure: REPAIR HAMMER TOE;  Right 2nd and 3rd toe pinning;  Surgeon: Fernando Wiggins MD;  Location: [facility]     STENT  2006     STENT(aka CARDIAC)  2008     VASCULAR SURGERY       Family History   Problem Relation Age of Onset     Unknown/Adopted Mother      Unknown/Adopted Father      Other - See Comments Son         on warfarin for one year after afganastan now off and doing well     Other - See Comments Daughter         celiac, brittle bone, arthritis, Raynauds     Social History     Socioeconomic History     Marital status:      Spouse name: Not on file     Number of children: Not on file     Years of education: Not on file     Highest education level: Not on file   Occupational History     Not on file   Social Needs     Financial resource strain: Not on file     Food insecurity     Worry: Not on file     Inability: Not on file     Transportation needs     Medical: Not on file     Non-medical: Not on file   Tobacco Use     Smoking status:  Former Smoker     Packs/day: 0.50     Years: 22.00     Pack years: 11.00     Types: Cigarettes     Last attempt to quit: 4/15/1992     Years since quittin.3     Smokeless tobacco: Never Used   Substance and Sexual Activity     Alcohol use: Not Currently     Alcohol/week: 1.0 standard drinks     Types: 1 Standard drinks or equivalent per week     Comment: MAYBE ONCE A YEAR     Drug use: No     Sexual activity: Not Currently     Partners: Male   Lifestyle     Physical activity     Days per week: Not on file     Minutes per session: Not on file     Stress: Not on file   Relationships     Social connections     Talks on phone: Not on file     Gets together: Not on file     Attends Alevism service: Not on file     Active member of club or organization: Not on file     Attends meetings of clubs or organizations: Not on file     Relationship status: Not on file     Intimate partner violence     Fear of current or ex partner: Not on file     Emotionally abused: Not on file     Physically abused: Not on file     Forced sexual activity: Not on file   Other Topics Concern     Parent/sibling w/ CABG, MI or angioplasty before 65F 55M? No      Service Not Asked     Blood Transfusions Not Asked     Caffeine Concern Yes     Comment: 1-2 cups      Occupational Exposure Not Asked     Hobby Hazards Not Asked     Sleep Concern No     Stress Concern No     Weight Concern Yes     Special Diet No     Back Care Not Asked     Exercise Yes     Comment: stairs, walking      Bike Helmet Not Asked     Seat Belt Not Asked     Self-Exams Not Asked   Social History Narrative     Not on file      ROS: 10 point ROS neg other than the symptoms noted above in the HPI.  Physical Examination is not performed during this virtual visit.  The patient is an 79 y/o lady with a chief complaint of leg spasms and RLS.  She has tried baclofen in the past, but seems to be allergic to the medication.  She is not sure if she has had gabapentin or  ropinirole.  I do not note either medication in the MAR.  I will recommend to start with gabapentin 100 mg tid (escalating the dose slowly) and re-evaluating the patient at specific intervals.  If there is no relief, I will consider ropinorole.

## 2020-07-31 NOTE — LETTER
"7/31/2020     RE: Katherin Fletcher  [address]  [facility] [address]     Dear Colleague,    Thank you for referring your patient, Katherin Fletcher, to the [facility] at [facility]. Please see a copy of my visit note below.    Katherin Fletcher is a 80 year old female who is being evaluated via a billable video visit.      The patient has been notified of following:     \"This video visit will be conducted via a call between you and your physician/provider. We have found that certain health care needs can be provided without the need for an in-person physical exam.  This service lets us provide the care you need with a video conversation.  If a prescription is necessary we can send it directly to your pharmacy.  If lab work is needed we can place an order for that and you can then stop by our lab to have the test done at a later time.    Video visits are billed at different rates depending on your insurance coverage.  Please reach out to your insurance provider with any questions.    If during the course of the call the physician/provider feels a video visit is not appropriate, you will not be charged for this service.\"    Patient has given verbal consent for Video visit? Yes  How would you like to obtain your AVS? MyChart  If you are dropped from the video visit, the video invite should be resent to: [phone]  Will anyone else be joining your video visit? Candida Thorpe  The patient is an 80-year-old who complains of leg spasms.  The patient also has a history of ALS.  The patient notes that the spasms started approximately 9 years ago.  She has spasms every day once or twice per day.  She takes tizanidine for the spasms and she also sees Dr. Celestin in PMR.  She receives Botox intermittently to help treat her spasms.  She is not sure if the Botox is helpful.  She notes that the Botox is injected into her " calves.  She states that she takes 10 mg of tizanidine 3 times daily.  The spasms are present in the evening and do not occur during sleep.  She presents for evaluation and further recommendations for treatment of her restless leg syndrome and leg spasms.  Current Outpatient Medications   Medication     acetaminophen (TYLENOL) 500 MG tablet     Alfalfa 250 MG TABS     apixaban ANTICOAGULANT (ELIQUIS) 5 MG tablet     calcium carbonate-vitamin D (CALCIUM + D) 600-200 MG-UNIT TABS     carbidopa-levodopa (SINEMET)  MG tablet     carvedilol (COREG) 6.25 MG tablet     clopidogrel (PLAVIX) 75 MG tablet     doxycycline hyclate (VIBRAMYCIN) 100 MG capsule     glycopyrrolate (ROBINUL) 1 MG tablet     hyoscyamine (ANASPAZ/LEVSIN) 0.125 MG tablet     isosorbide mononitrate (IMDUR) 30 MG 24 hr tablet     LEVOXYL 112 MCG tablet     Multiple Vitamins-Minerals (DAILY MULTI) TABS     nitroGLYcerin (NITROSTAT) 0.4 MG sublingual tablet     omeprazole (PRILOSEC) 20 MG DR capsule     rosuvastatin (CRESTOR) 20 MG tablet     tiZANidine (ZANAFLEX) 2 MG tablet     Current Facility-Administered Medications   Medication     botulinum toxin type A (BOTOX) 100 units injection 400 Units     Allergies   Allergen Reactions     Ace Inhibitors Cough     Baclofen Other (See Comments)     Constipation , tiredness     Penicillins      10/7/14 Hives per patient -- many many years ago       Nickel Swelling and Rash     Other reaction(s): Unknown     Past Medical History:   Diagnosis Date     CAD (coronary artery disease)     s/p CABG 1992 and redo CABG 2009 (LIMA to LAD, SVG to OM and SVG to posterolateral branch of RCA)     Chronic cough     Non-productive. No known definite etiology. Is beeing seen by [facility].      Essential hypertension, benign      Falls frequently 5/28/2018     GERD (gastroesophageal reflux disease)      History of TIA (transient ischemic attack) and stroke 5/7/2018    R sided facial N&T.      Hyperlipidaemia       Hyperlipidaemia LDL goal < 130      Ischemic cardiomyopathy      MI (myocardial infarction) (H) 11/1996     Mixed hyperlipidemia      Neurodegenerative disorder (H)     Further detailed dx unknown. Managed by Dr. George Pham - [facility].      PAD (peripheral artery disease) (H)      Restless legs syndrome      Unspecified hypothyroidism      Past Surgical History:   Procedure Laterality Date     ARTHROSCOPY SHOULDER RT/LT       BUNIONECTOMY RT/LT       C CABG, ARTERY-VEIN, THREE  1/2009     C CARDIAC SURG PROCEDURE UNLIST       C HAND/FINGER SURGERY UNLISTED       C MRA UPPER EXTREMITY WO&W CONT       C SHOULDER SURG PROC UNLISTED       C STOMACH SURGERY PROCEDURE UNLISTED       CARDIAC SURGERY  1992    CABG,stents x2     CARPAL TUNNEL RELEASE RT/LT       EXCISE GANGLION HAND Right 11/29/2018    Procedure: EXCISE RIGHT VOLAR CARPAL MASS AND RIGHT INDEX TRIGGER FINGER RELEASE;  Surgeon: Minh Goldstein MD;  Location:  OR     HC VASCULAR SURGERY PROCEDURE UNLIST       HYSTERECTOMY, DAMIÁN  32 yo     OPEN REDUCTION INTERNAL FIXATION ANKLE Left 2015    Left Ankle     RELEASE TRIGGER FINGER       RELEASE TRIGGER FINGER Right 11/29/2018    Procedure: RIGHT INDEX TRIGGER FINGER RELEASE;  Surgeon: Minh Goldstein MD;  Location:  OR     REPAIR HAMMER TOE Right 12/19/2017    Procedure: REPAIR HAMMER TOE;  Right 2nd and 3rd toe pinning;  Surgeon: Fernando Wiggins MD;  Location:  OR     STENT  2006     STENT(aka CARDIAC)  2008     VASCULAR SURGERY       Family History   Problem Relation Age of Onset     Unknown/Adopted Mother      Unknown/Adopted Father      Other - See Comments Son         on warfarin for one year after afganastan now off and doing well     Other - See Comments Daughter         celiac, brittle bone, arthritis, Raynauds     Social History     Socioeconomic History     Marital status:      Spouse name: Not on file     Number of children: Not on file     Years of education: Not on  file     Highest education level: Not on file   Occupational History     Not on file   Social Needs     Financial resource strain: Not on file     Food insecurity     Worry: Not on file     Inability: Not on file     Transportation needs     Medical: Not on file     Non-medical: Not on file   Tobacco Use     Smoking status: Former Smoker     Packs/day: 0.50     Years: 22.00     Pack years: 11.00     Types: Cigarettes     Last attempt to quit: 4/15/1992     Years since quittin.3     Smokeless tobacco: Never Used   Substance and Sexual Activity     Alcohol use: Not Currently     Alcohol/week: 1.0 standard drinks     Types: 1 Standard drinks or equivalent per week     Comment: MAYBE ONCE A YEAR     Drug use: No     Sexual activity: Not Currently     Partners: Male   Lifestyle     Physical activity     Days per week: Not on file     Minutes per session: Not on file     Stress: Not on file   Relationships     Social connections     Talks on phone: Not on file     Gets together: Not on file     Attends Jehovah's witness service: Not on file     Active member of club or organization: Not on file     Attends meetings of clubs or organizations: Not on file     Relationship status: Not on file     Intimate partner violence     Fear of current or ex partner: Not on file     Emotionally abused: Not on file     Physically abused: Not on file     Forced sexual activity: Not on file   Other Topics Concern     Parent/sibling w/ CABG, MI or angioplasty before 65F 55M? No      Service Not Asked     Blood Transfusions Not Asked     Caffeine Concern Yes     Comment: 1-2 cups      Occupational Exposure Not Asked     Hobby Hazards Not Asked     Sleep Concern No     Stress Concern No     Weight Concern Yes     Special Diet No     Back Care Not Asked     Exercise Yes     Comment: stairs, walking      Bike Helmet Not Asked     Seat Belt Not Asked     Self-Exams Not Asked   Social History Narrative     Not on file      ROS: 10 point ROS  neg other than the symptoms noted above in the HPI.  Physical Examination is not performed during this virtual visit.  The patient is an 79 y/o lady with a chief complaint of leg spasms and RLS.  She has tried baclofen in the past, but seems to be allergic to the medication.  She is not sure if she has had gabapentin or ropinirole.  I do not note either medication in the MAR.  I will recommend to start with gabapentin 100 mg tid (escalating the dose slowly) and re-evaluating the patient at specific intervals.  If there is no relief, I will consider ropinorole.    AVS sent to [facility].     Ronda Diaz LPN    Again, thank you for allowing me to participate in the care of your patient.      Sincerely,    Michoacano Almonte MD

## 2020-08-06 NOTE — PROGRESS NOTES
[facility]        OUTPATIENT PHYSICAL THERAPY FUNCTIONAL EVALUATION  PLAN OF TREATMENT FOR OUTPATIENT REHABILITATION  (COMPLETE FOR INITIAL CLAIMS ONLY)  Patient's Last Name, First Name, M.I.  YOB: 1940  FletcherKatherin  [place]     Provider's Name   [facility]   Medical Record No.  [id number]     Start of Care Date:  08/05/20   Onset Date:  07/28/20(date of order used)   Type:     _X__PT   ____OT  ____SLP Medical Diagnosis:  Patellofemoral arthritis of left knee M17.12      PT Diagnosis:  Deconditioning with knee pain and impaired functional mobility status Visits from SOC:  1                              __________________________________________________________________________________  Plan of Treatment/Functional Goals:  balance training, bed mobility training, gait training, joint mobilization, neuromuscular re-education, ROM, strengthening, stretching, transfer training, manual therapy           GOALS  Pain  Pt will report knee pain no greater than 2/10 with sit <> stand transitions for improved tolerance and QOL.  10/02/20    Transfers  Pt will demo ability to safely perform 5 reps sit <> stand consecutively from w/c using 4WW in 1 min to show improving tolerance with basic transfer skills and improved strength/reduced knee pain.  10/02/20    HEP  Pt will demo/voice understanding of long term HEP to help manage chronic OA knee pain in order to maintain independence with basic mobility skills and optimize QOL.  10/02/20       Therapy Frequency:  1 time/week   Predicted Duration of Therapy Intervention:  8 wks    Gerardo Banks, PT                                    I CERTIFY THE NEED FOR THESE SERVICES FURNISHED UNDER        THIS PLAN OF TREATMENT AND WHILE UNDER MY CARE     (Physician co-signature of this document indicates review and certification of the  therapy plan).                Certification Date From:  08/05/20   Certification Date To:  11/03/20    Referring Provider:  He Stubbs MD     Initial Assessment  See Epic Evaluation- Start of Care Date: 08/05/20

## 2020-08-06 NOTE — PROGRESS NOTES
08/05/20 0949   Quick Adds   Quick Adds Certification   Type of Visit Initial OP PT Evaluation   General Information   Start of Care Date 08/05/20   Referring Physician He Stubbs MD    Orders Evaluate and Treat as Indicated   Additional Orders OT referral for seated/wheeled mobility neck support adjustment and hand therapy referral.   Order Date 07/28/20   Medical Diagnosis Patellofemoral arthritis of left knee M17.12    Onset of illness/injury or Date of Surgery 07/28/20  (date of order used)   Precautions/Limitations fall precautions   Special Instructions L knee patellofemoral OA and deconditioning, ROM, strengthening   Surgical/Medical history reviewed Yes   Pertinent history of current problem (include personal factors and/or comorbidities that impact the POC) Pt presents to PT to address intermittent L>R anterior knee pain ongoing x ~2 yrs.    Did have a recent fall, injured L 5th toe, XR neg for fx.  Knee XR indicating OA.  She indicates she struggles with spasms in LEs, causing knee and LE pain as well.  Started recieving Botox injections back in 06/2020 for this.  Minimal pain reported at rest when sitting in wc, pain is worst when bending/squatting to sit from standing position.  Also deals with arthritis in her hands and feet as well.  PMH including ALS, hx of L TAA ~14 yrs ago.   Prior level of function comment Pt uses 4WW at home for short distances <50' distances, otherwise is in a motorized wheelchair for ALS.  States she is able to complete most functional mobility tasks modified independent yet using her AD/AE.   Previous/Current Treatment Medication(s)  (Tylenol prn for arthritic pain)   Improvement after medication Mild   Current Community Support   (supportive spouse, family, [employer] agency)   Patient role/Employment history Disabled;Retired   Current Assistive Devices Four Wheeled Walker;Power Wheel Chair   ADL Devices Wall Grab Bar;Extended Tub Bench;Other  (hospital bed, power lift  recliner)   Patient/Family Goals Statement Reduce knee pain with transitions stand > sit   Fall Risk Screen   Fall screen completed by PT   Have you fallen 2 or more times in the past year? Yes   Have you fallen and had an injury in the past year? Yes   Is patient a fall risk? Yes;Department fall risk interventions implemented   Fall screen comments Fall risk based off reported frequent fall hx, mobility status.   Pain   Patient currently in pain Other   Pain comments no pain at rest, TTP medial L knee peripatellar region and joint line, mild TTP R knee same area   Cognitive Status Examination   Orientation orientation to person, place and time   Integumentary   Integumentary Comments small bruising noted anterior knees over patella B, pt indicates she bumps her knees often.  Mild swelling B LEs about ankles   Posture   Posture Kyphosis;Protracted shoulders;Forward head position   Posture Comments severe kyphosis and forward head positioning   Range of Motion (ROM)   ROM Comment L knee AAROM: 2-118 deg.  R knee AAROM: 3-124 deg.  Anterior knee tightness with end range flexion B, anterior knee pain L knee with end range flexion.  Reduced L ankle ROM all planes, hx of TAA.   Strength   Strength Comments R: DF 4-/5, knee ext 4+/5 (pain), knee flx 4+/5, hip flexion in sitting 3+/5.  L: DF 4/5, knee ext 4+/5, knee flx 4+/5, hip flexion in sitting 4-/5.   Bed Mobility   Bed Mobility Comments not assessed, pt uses power bed at home   Transfer Skills   Transfer Comments several attempts needed to stand due to feet sliding out from her, able to rise from w/c with 4WW SBA and BUE support on w/c.  Anterior L>R knee pain with transitions stand > sit.   Gait   Gait Comments Amb short distance in clinic x 25' with 4WW SBA, short step length, severe kyphotic posture.  Denies overt knee pain with ambulation.   Balance   Balance Comments Impaired static standing balance and gait stability, relies on walker for balance, limited by  ALS deficits and weakness.   Sensory Examination   Sensory Perception Comments denies n/t in feet   Muscle Tone   Muscle Tone Comments no overt tone noted with PROM assessment in LEs, but pt indicates on/off spasms, just started recieving botox injections in June.   Modality Interventions   Planned Modality Interventions Comments as indicated per therapist discretion   Planned Therapy Interventions   Planned Therapy Interventions balance training;bed mobility training;gait training;joint mobilization;neuromuscular re-education;ROM;strengthening;stretching;transfer training;manual therapy   Clinical Impression   Criteria for Skilled Therapeutic Interventions Met yes, treatment indicated   PT Diagnosis Deconditioning with knee pain and impaired functional mobility status   Influenced by the following impairments Deconditioned status, knee pain L>R, reduced ROM, severe kyphosis, ALS disease progressions, OA with multi joint pains   Functional limitations due to impairments Pain with transitions sit <> stand, limited functional mobility status, elevated fall risk, progressive reliance on AD and/or physical assistance   Clinical Presentation Evolving/Changing   Clinical Presentation Rationale multiple body systems involved, complex PMH   Clinical Decision Making (Complexity) Moderate complexity   Therapy Frequency 1 time/week   Predicted Duration of Therapy Intervention (days/wks) 8 wks   Risk & Benefits of therapy have been explained Yes   Patient, Family & other staff in agreement with plan of care Yes   Clinical Impression Comments Pt demonstrates deconditioned status, proximal hip/core weakness, reduced ROM/tightness, kyphotic posture, as well as reduced ROM/strength in L ankle from previous TAA, all of which are likely contributing to patellafemoral pain.  Guarded prognosis for full recovery of knee pain given progressive ALS dissease and limited ability to follow through with necessary  strengthening/stretching/mobility skills to alleviate symptoms with substatial assistance.   Education Assessment   Barriers to Learning No barriers   GOALS   PT Eval Goals 1;2;3   Goal 1   Goal Identifier Pain   Goal Description Pt will report knee pain no greater than 2/10 with sit <> stand transitions for improved tolerance and QOL.   Target Date 10/02/20   Goal 2   Goal Identifier Transfers   Goal Description Pt will demo ability to safely perform 5 reps sit <> stand consecutively from w/c using 4WW in 1 min to show improving tolerance with basic transfer skills and improved strength/reduced knee pain.   Target Date 10/02/20   Goal 3   Goal Identifier HEP   Goal Description Pt will demo/voice understanding of long term HEP to help manage chronic OA knee pain in order to maintain independence with basic mobility skills and optimize QOL.   Target Date 10/02/20   Total Evaluation Time   PT Chriss, Moderate Complexity Minutes (02409) 44   Therapy Certification   Certification date from 08/05/20   Certification date to 11/03/20   Medical Diagnosis Patellofemoral arthritis of left knee M17.12

## 2020-08-07 NOTE — TELEPHONE ENCOUNTER
Patient thinks she may be having a reaction to her Botox injection from last week.  Lightheaded to the point she needs to sit down, and both her eyelids are drooping.  Yesterday was having some orthopnea.  Unable to get to the ER.  She will call 911 for paramedics.    Tatiana Adames RN  [facility]       Reason for Disposition    Sounds like a life-threatening emergency to the triager    Additional Information    Negative: Severe difficulty breathing (e.g., struggling for each breath, speaks in single words)    Negative: [1] Difficulty breathing or swallowing AND [2] started suddenly after medicine, an allergic food or bee sting    Negative: Shock suspected (e.g., cold/pale/clammy skin, too weak to stand, low BP, rapid pulse)    Negative: Difficult to awaken or acting confused (e.g., disoriented, slurred speech)    Negative: [1] Weakness (i.e., paralysis, loss of muscle strength) of the face, arm or leg on one side of the body AND [2] sudden onset AND [3] present now    Negative: [1] Numbness (i.e., loss of sensation) of the face, arm or leg on one side of the body AND [2] sudden onset AND [3] present now    Negative: [1] Loss of speech or garbled speech AND [2] sudden onset AND [3] present now    Negative: Overdose (accidental or intentional) of medications    Negative: [1] Fainted > 15 minutes ago AND [2] still feels too weak or dizzy to stand    Negative: Heart beating < 50 beats per minute OR > 140 beats per minute    Protocols used: DIZZINESS - [id number]

## 2020-08-10 NOTE — PROGRESS NOTES
"Hand Therapy Initial Evaluation    Current Date:  8/11/2020    Subjective:  Katherin Fletcher is a 80 year old right hand dominant female.    Diagnosis:   Bilateral hand OA, flexion contractures/Dupuytrens, right index and long trigger finger s  DOI:  7/14/2020 (therapy referral)    Patient reports symptoms of pain, stiffness/loss of motion and weakness/loss of strength of bilateral hands which occurred due to gradual onset over the past year with unknown etiology. Since onset symptoms are gradually getting worse.  Special tests:  x-rays.  Previous treatment: using stress ball for gripping.  General health as reported by patient is fair.  Pertinent medical history includes:Heart Problems, High Blood Pressure, History of Fractures, Osteoarthritis, Smoking, Stroke, Thyroid Problems, Progressive Neurological Deficits, ALS. Medical allergies:see list in EMR.  Surgical history: heart: x 2.  Medication history: High Blood Pressure, Muscle Relaxants, Thyroid.    Occupational Profile Information:  Current occupation is Retired  Home Tasks: Computer Work, Prolonged Sitting  Prior functional level:  independent-have help in some areas from   Barriers include:transportation  Mobility: Uses power wheelchair  Transportation: medical transportation    Objective:  Pain Level (Scale 0-10):   8/11/2020   At Rest 0   With Use 3-4     Pain Description:  Date 8/11/2020   Location \"Cords\" in palms and DIP joints   Pain Quality Aching   Frequency intermittent     Pain is worst  daytime   Exacerbated by  using hands   Relieved by NSAIDs   Progression Gradually getting worse     Edema  None on exam or per patient report    Sensation  Reports glove like feeling in all fingers per pt report    ROM  Pain Report:  - none    + mild    ++ moderate    +++ severe   Intrinsic atrophy noted of B hands R > L.  AROM near WNL's finger E/F and Abd/Add but weakness R > L    Observation  - none    + mild    ++ moderate    +++ severe    8/11/2020 "   Eric's nodes at PIP joints R:-  L:-   Heberden's nodes at DIP joints R:++  L:++     Strength:  (Measured in pounds)  Pain Report:  - none    + mild    ++ moderate    +++ severe    8/11/2020 8/11/2020   Trials Right Left   1 27- 28-     Lat Pinch 8/11/2020 8/11/2020   Trials Right Left   1 6- 4-     3 Pt Pinch 8/11/2020 8/11/2020   Trials Right Left   1 4- 4-     Triggering  None today in fingers or thumb on exam    Assessment:  Patient presents with symptoms consistent with diagnosis bilateral hand pain and weakness, with conservative intervention.     Patient's limitations or Problem List includes:  Pain, Decreased ROM/motion, Weakness, Sensory disturbance, Decreased  and Decreased pinch of bilateral hands which interferes with the patient's ability to perform Self Care Tasks (dressing, eating, bathing, hygiene/toileting), Recreational Activities and Household Chores as compared to previous level of function.    Rehab Potential:  Fair - Return to restricted activity due to ALS and muscle weakness    Patient will benefit from skilled Occupational Therapy to increase flexibility, overall strength,  strength and pinch strength and decrease pain to return to previous activity level and resume normal daily tasks and to reach their rehab potential.    Barriers to Learning:  No barrier    Communication Issues:  Patient appears to be able to clearly communicate and understand verbal and written communication and follow directions correctly.    Chart Review: Chart Review and Simple history review with patient    Identified Performance Deficits: bathing/showering, dressing, hygiene and grooming, home establishment and management, meal preparation and cleanup, sleep and leisure activities    Assessment of Occupational Performance:  5 or more Performance Deficits    Clinical Decision Making (Complexity): Low complexity    Treatment Explanation:  The following has been discussed with the patient:  RX  ordered/plan of care  Anticipated outcomes  Possible risks and side effects    Plan:  Frequency:  1 X a month, once daily  Duration:  for 3 months    Treatment Plan:    Modalities:    Paraffin   Therapeutic Exercise:    AROM, AAROM, PROM, Tendon Gliding, Blocking and Isotonics  Therapeutic Activities:   Functional activities   Orthotic Fabrication:    Hand or Forearm based orthoses  Self Care:    Self Care Tasks and Ergonomic Considerations    Discharge Plan:  Achieve all LTG.  Independent in home treatment program.  Reach maximal therapeutic benefit.    Home Exercise Program:  Warmth for morning stiffness  AROM of fingers E/F with 3 fist tendon gliding  AROM finger and thumb Abd/Add  Tracking on table or wheel chair arm rest for finger extension stretch   strengthening with yellow foam wedge  3 point and lateral pinch strengthening with yellow foam wedge    Next Visit:  No follow up scheduled, pt will continue with HEP  Pt will return within 90 days if concerns or questions  Consider night resting orthoses if increased weakness with finger extension, pt is not interested at this time  Hold chart open for three months, if no contact is received from patient, discharge will occur at that time with this note serving as the discharge summary.

## 2020-08-11 NOTE — LETTER
DEPARTMENT OF HEALTH AND HUMAN SERVICES  [facility]    PLAN/UPDATED PLAN OF PROGRESS FOR OUTPATIENT REHABILITATION    PATIENTS NAME:  Katherin Fletcher   OB: 1940  PROVIDER NUMBER:  [id number]  [id number]  PROVIDER NAME: [facility]  MEDICAL RECORD NUMBER: [id number]   START OF CARE DATE:    SOC Date: 08/11/20   TYPE:  OT    PRIMARY/TREATMENT DIAGNOSIS: (Pertinent Medical Diagnosis)     Primary localized osteoarthrosis of hand, unspecified laterality  Flexion contracture of joint of hand, unspecified laterality  Stiffness of joints of both hands    VISITS FROM START OF CARE:  Rxs Used: 1     Hand Therapy Initial Evaluation    Current Date:  8/11/2020    Subjective:  Katherin Fletcher is a 80 year old right hand dominant female.  Diagnosis:   Bilateral hand OA, flexion contractures/Dupuytrens, right index and long trigger finger s  DOI:  7/14/2020 (therapy referral)  Patient reports symptoms of pain, stiffness/loss of motion and weakness/loss of strength of bilateral hands which occurred due to gradual onset over the past year with unknown etiology. Since onset symptoms are gradually getting worse.  Special tests:  x-rays.  Previous treatment: using stress ball for gripping.  General health as reported by patient is fair.  Pertinent medical history includes:Heart Problems, High Blood Pressure, History of Fractures, Osteoarthritis, Smoking, Stroke, Thyroid Problems, Progressive Neurological Deficits, ALS. Medical allergies:see list in EMR.  Surgical history: heart: x 2.  Medication history: High Blood Pressure, Muscle Relaxants, Thyroid.    Occupational Profile Information:  Current occupation is Retired  Home Tasks: Computer Work, Prolonged Sitting  Prior functional level:  independent-have help in some areas from   Barriers include:transportation  Mobility: Uses power wheelchair  Transportation: medical transportation    Objective:  Pain Level (Scale 0-10):    "2020   At Rest 0   With Use 3-4   Pain Description:    PATIENTS NAME:  Katherin Fletcher   OB: 1940  Date 2020   Location \"Cords\" in palms and DIP joints   Pain Quality Aching   Frequency intermittent     Pain is worst  daytime   Exacerbated by  using hands   Relieved by NSAIDs   Progression Gradually getting worse   Edema  None on exam or per patient report  Sensation  Reports glove like feeling in all fingers per pt report  ROM  Pain Report:  - none    + mild    ++ moderate    +++ severe   Intrinsic atrophy noted of B hands R > L.  AROM near WNL's finger E/F and Abd/Add but weakness R > L  Observation  - none    + mild    ++ moderate    +++ severe    2020   Eric's nodes at PIP joints R:-  L:-   Heberden's nodes at DIP joints R:++  L:++   Strength:  (Measured in pounds)  Pain Report:  - none    + mild    ++ moderate    +++ severe    2020   Trials Right Left   1 27- 28-     Lat Pinch 2020   Trials Right Left   1 6- 4-     3 Pt Pinch 2020   Trials Right Left   1 4- 4-   Triggering  None today in fingers or thumb on exam    Assessment:  Patient presents with symptoms consistent with diagnosis bilateral hand pain and weakness, with conservative intervention.     Patient's limitations or Problem List includes:  Pain, Decreased ROM/motion, Weakness, Sensory disturbance, Decreased  and Decreased pinch of bilateral hands which interferes with the patient's ability to perform Self Care Tasks (dressing, eating, bathing, hygiene/toileting), Recreational Activities and Household Chores as compared to previous level of function.  PATIENTS NAME:  Katherin Fletcher   : 1940    Rehab Potential:  Fair - Return to restricted activity due to ALS and muscle weakness    Patient will benefit from skilled Occupational Therapy to increase flexibility, overall strength,  strength and pinch strength and decrease pain to return to previous activity level and " resume normal daily tasks and to reach their rehab potential.    Barriers to Learning:  No barrier    Communication Issues:  Patient appears to be able to clearly communicate and understand verbal and written communication and follow directions correctly.    Chart Review: Chart Review and Simple history review with patient    Identified Performance Deficits: bathing/showering, dressing, hygiene and grooming, home establishment and management, meal preparation and cleanup, sleep and leisure activities    Assessment of Occupational Performance:  5 or more Performance Deficits    Clinical Decision Making (Complexity): Low complexity    Treatment Explanation:  The following has been discussed with the patient:  RX ordered/plan of care  Anticipated outcomes  Possible risks and side effects    Plan:  Frequency:  1 X a month, once daily  Duration:  for 3 months    Treatment Plan:    Modalities:    Paraffin   Therapeutic Exercise:    AROM, AAROM, PROM, Tendon Gliding, Blocking and Isotonics  Therapeutic Activities:   Functional activities   Orthotic Fabrication:    Hand or Forearm based orthoses  Self Care:    Self Care Tasks and Ergonomic Considerations    Discharge Plan:  Achieve all LTG.  Independent in home treatment program.  Reach maximal therapeutic benefit.    Home Exercise Program:  Warmth for morning stiffness  AROM of fingers E/F with 3 fist tendon gliding  AROM finger and thumb Abd/Add  Tracking on table or wheel chair arm rest for finger extension stretch   strengthening with yellow foam wedge  PATIENTS NAME:  Katherin Fletcher   : 1940    3 point and lateral pinch strengthening with yellow foam wedge    Next Visit:  No follow up scheduled, pt will continue with HEP  Pt will return within 90 days if concerns or questions  Consider night resting orthoses if increased weakness with finger extension, pt is not interested at this time  Hold chart open for three months, if no contact is received from  "patient, discharge will occur at that time with this note serving as the discharge summary.      Caregiver Signature/Credentials ______________________________ Date ________       Treating Provider: GREGORY Taveras/JACKIE, GRETCHEN      I have reviewed and certified the need for these services and plan of treatment while under my care.        PHYSICIAN'S SIGNATURE:   _________________________________  Date___________      He Stubbs MD    Certification period: Beginning of Cert date period: 08/11/20 End of Cert period date: 11/08/20     Functional Level Progress Report: Please see attached \"Goal Flow sheet for Functional level.\"    ___X_____ Continue Services or       ________ DC Services                Service dates: SOC Date: 08/11/20  to present                                                                     "

## 2020-08-21 NOTE — PROGRESS NOTES
Called patient today. She had Botox injections by Dr Celestin on 7/30 to bilateral lower extremities for spasticity and to bilateral parotids and submandibulars for excessive drooling. Patient stated that she is still having major spasms in her legs and that she is having difficulty talking. She denies any difficulty with chewing or swallowing. Dr Celestin had increased the Botox dose at the last visit. Patient has a follow up for repeat Botox injections in 12 weeks and will discuss any changes in dosing. Patient will call if new concerns. Dr Celestin notified of above.

## 2020-09-02 NOTE — ED NOTES
Bed: ST03  Expected date:   Expected time:   Means of arrival:   Comments:  Stab coming 88F  Hypotensive 34 systolic, HR 76

## 2020-09-02 NOTE — ED TRIAGE NOTES
Pt called 911 today from home where she lives with her  because she was dizzy. Ems stated pt bp was 35 and 40 systaltically, on arrival bp was 76 but improved with fluids wide open. Pt is alert and orientated x 4 . Reports right arm weakness for the past month that she has not told her primary Md about.

## 2020-09-02 NOTE — ED PROVIDER NOTES
History     Chief Complaint:  Dizziness     HPI  Katherin Fletcher is a 80 year old female on Eliquis with a history of CAD, neurologic gait disorder, hypertension, stroke, COPD, A-fib who presents for dizziness. The patient called 911 today from her home where she lives with her  because she was dizzy. She reports right arm weakness for the past month. EMS states patient bp was 35 and 40 systollically. On arrival, BP was improved at 76.     Patient herself states that she feels in her normal state of health apart from being more tired.  She feels that her normal level of alertness, does not endorse any recent medication changes but states that she is been drinking and eating less over the last several days.    Allergies:  Ace Inhibitors  Baclofen  Penicillins     Medications:    Eliquis  Carbidopa-levodopa   Coreg  Plavix  Hyoscyamine   Imdur  Levoxyl   Nitroglycerin   Omeprazole   Rosuvastatin   Tizanidine     Past Medical History:    CAD  Hyperlipidemia  MRSA   Neurologic gait disorder  Cerebellar disease  Essential hypertension  Ischemic cardiomyopathy  Peripheral polyneuropathy  PAD   Stroke   Neurodegenerative disorder  TIA   Falls frequently  Hypothyroidism  Hypertensive heart disease with heart failure  Chronic obstructive pulmonary disease  Atrial fibrillation  Rib fractures   Chronic cough  MI  Restless leg syndrome     Past Surgical History:    Hysterectomy  Cardiac surgery  Carpal tunnel release rt/lt  Release trigger finger  Bunionectomy rt/lt  Open reduction internal fixation ankle left  Stent placement  CABG  vascular surgery  Stomach surgery  Hand surgery  Repair hammer toe right  Excise Ganglion Hand right  Release trigger finger right     Family History:    History reviewed. No pertinent family history.      Social History:  The patient arrives via EMS.  Smoking: former  Alcohol use: not currently   PCP: Aris Del Real  Marital Status:      Review of Systems   Neurological:  Positive for dizziness and weakness.   All other systems reviewed and are negative.      Physical Exam     Patient Vitals for the past 24 hrs:   BP Temp Temp src Pulse Resp SpO2   09/02/20 2200 119/70 -- -- 68 13 100 %   09/02/20 2123 -- 98.1  F (36.7  C) Oral -- -- --   09/02/20 2102 -- -- -- 66 18 97 %   09/02/20 2100 114/67 -- -- 59 9 95 %   09/02/20 2020 127/81 -- -- 74 24 --   09/02/20 2000 125/75 -- -- 65 12 --   09/02/20 1845 117/75 -- -- 60 22 96 %   09/02/20 1830 99/77 -- -- 56 -- --   09/02/20 1825 101/68 -- -- 58 18 --   09/02/20 1815 -- -- -- 57 23 96 %   09/02/20 1813 -- -- -- -- -- 98 %   09/02/20 1811 93/65 -- -- 76 -- --   09/02/20 1810 93/65 -- -- 74 27 92 %   09/02/20 1807 (!) 79/42 -- -- 74 -- --       Physical Exam  Vitals: reviewed by me  General: Pt seen on [facility], pleasant, cooperative, and alert to conversation, has slurred speech, but normal mentation.  I suspect this is her baseline.  Thin and chronically ill-appearing.  Eyes: Tracking well, clear conjunctiva BL  ENT: Dry mucous membranes, midline trachea.   Lungs:   No tachypnea, no accessory muscle use. No respiratory distress.   CV: Rate as above, regular rhythm.    Abd: Soft, non tender, no guarding, no rebound. Non distended  MSK: no peripheral edema or joint effusion.  No evidence of trauma  Skin: No rash, normal turgor and temperature  Neuro: Slurred speech and no facial droop.  Poor strength in her extremities x4, contractures noted.  Psych: Not RIS, no e/o AH/VH      Emergency Department Course     Imaging:  Radiology findings were communicated with the patient who voiced understanding of the findings.    XR Chest Port 1 View  Mild patchy opacity in the left mid and lower lung could   be related to pneumonia and/or atelectasis. The right lung is clear.   No pneumothorax is identified. Sternotomy. Heart size appears stable.   Pulmonary vascularity is within normal limits. Multiple old right rib   fractures are noted.      Reading per radiology.    Laboratory:  Laboratory findings were communicated with the patient who voiced understanding of the findings.    CBC: WBC 4.3, HGB 12.4,     CMP: Anion gap 2 (L), Glucose 104 (H), Calcium 8.1 (L), Albumin 2.9 (L), Protein total 6.3 (L), AST 47  (H) o/w WNL (Creatinine 0.64)    Lactic Acid (Collected 1813): 1.5     Troponin (Collected 2150): 0.071 (H)   Troponin (Collected 1957): 0.070 (H)    Blood culture x2: pending    UA w/ micro: Mucous present (A), o/w WNL    COVID-19 by PCR: pending    Interventions:  1830 NS, 1 L, IV   2252 Valium, 2.5 mg, IV    Emergency Department Course:     Nursing notes and vitals reviewed.    1811 I performed an exam of the patient as documented above.     1813 IV was inserted and blood was drawn for laboratory testing, results above.    1845 The patient was sent for a XR while in the emergency department, results above.     1855 The patient provided a urine sample here in the emergency department. This was sent for laboratory testing, findings above.    2127 IV was inserted and blood was drawn for laboratory testing, results above.    2215 Patient rechecked and updated.     2220 The patient's nose was swabbed and this sample was sent for COVID testing, findings above.     2227 I spoke with Dr. Treadwell of the hospitalist service regarding patient's presentation, findings, and plan of care.    2233 Findings and plan explained to the Patient who consents to admission. Discussed the patient with Dr. Treadwell, who will admit the patient to a observation bed for further monitoring, evaluation, and treatment.      Impression & Plan      Medical Decision Making:  Katherin Fletcher is a very pleasant 80 year old female with ALS who presents to the emergency room with hypotension and dizziness. She called 911 of her own accord and I think she is likely dehydrated from decreased PO intake. Why she is taking less PO intake is unclear, but she states that she  has simply been more tired lately and not eating. Her blood pressure was very low when she first came in but I see no evidence of sepsis. She has rebounded very well with fluids only. I appreciate the X-ray, I am leaning towards likely atelectasis as she has no fever and is not coughing and I do not think a trial of antibiotics is indicated at this time. Will plan for admission to the care of Dr. Treadwell who has kindly agreed to accept care of the patient as observation for IV fluids and continued monitoring given her past medical history.     Covid-19  Katherin Fletcher was evaluated during a global COVID-19 pandemic, which necessitated consideration that the patient might be at risk for infection with the SARS-CoV-2 virus that causes COVID-19.   Applicable protocols for evaluation were followed during the patient's care.   COVID-19 was considered as part of the patient's evaluation. The plan for testing is:  a test was obtained during this visit.    Diagnosis:    ICD-10-CM    1. Dehydration  E86.0 Asymptomatic COVID-19 Virus (Coronavirus) by PCR   2. Hypotension, unspecified hypotension type  I95.9        Disposition:   Patient is admitted to the care of Dr. Treadwell.     Scribe Disclosure:  I, Angelina Shea, am serving as a scribe at 6:25 PM on 9/2/2020 to document services personally performed by Colton Iniguez MD based on my observations and the provider's statements to me.     Colton Iniguez MD  09/03/20 [id number]

## 2020-09-02 NOTE — PHARMACY-ADMISSION MEDICATION HISTORY
Pharmacy Medication History  Admission medication history interview status for the 9/2/2020  admission is complete. See EPIC admission navigator for prior to admission medications     Medication history sources: Patient, Surescripts and Care Everywhere (patient knows medications well)  Medication history source reliability: Good  Adherence assessment: Good    Significant changes made to the medication list:  Removed:  --doxycyline (patient states completed a few months ago for toe nail infection)    Additional medication history information:   --Confirmed with patient no recent medication changes and compliant with all medications.    Medication reconciliation completed by provider prior to medication history? No    Time spent in this activity: 10 minutes      Prior to Admission medications    Medication Sig Last Dose Taking? Auth Provider   acetaminophen (TYLENOL) 500 MG tablet Take 1-2 tablets (500-1,000 mg) by mouth every 6 hours as needed for mild pain Past Week at Unknown time Yes Aris Del Real MD   Alfalfa 250 MG TABS Take 250 mg by mouth every morning  9/2/2020 at 0800 Yes Reported, Patient   apixaban ANTICOAGULANT (ELIQUIS) 5 MG tablet Take 1 tablet (5 mg) by mouth 2 times daily 9/2/2020 at 0800 Yes Abhijit Boyd MD   calcium carbonate-vitamin D (CALCIUM + D) 600-200 MG-UNIT TABS Take 1 tablet by mouth At Bedtime  9/1/2020 at 2100 Yes Aris Del Real MD   carbidopa-levodopa (SINEMET)  MG tablet Take 1 tablet three times daily and 2 tablets at bedtime 9/2/2020 at 1700 Yes Unknown, Entered By History   carvedilol (COREG) 6.25 MG tablet Take 1 tablet (6.25 mg) by mouth 2 times daily (with meals) 9/2/2020 at 0800 Yes Abhijit Boyd MD   clopidogrel (PLAVIX) 75 MG tablet Take 1 tablet (75 mg) by mouth daily 9/1/2020 at 2100 Yes Abhijit Boyd MD   glycopyrrolate (ROBINUL) 1 MG tablet Take 1 tablet (1 mg) by mouth 3 times daily 9/2/2020 at 1200 Yes Luis A Gonzalez MD    isosorbide mononitrate (IMDUR) 30 MG 24 hr tablet Take 1 tablet (30 mg) by mouth daily 9/2/2020 at 0800 Yes Abhijit Boyd MD   LEVOXYL 112 MCG tablet TAKE 1 TABLET DAILY 9/2/2020 at 0700 Yes Aris Del Real MD   Multiple Vitamins-Minerals (DAILY MULTI) TABS Take 1 tablet by mouth every morning  9/2/2020 at 0800 Yes Reported, Patient   nitroGLYcerin (NITROSTAT) 0.4 MG sublingual tablet DISSOLVE ONE TABLET UNDER THE TONGUE EVERY 5 MINUTES AS NEEDED FOR CHEST PAIN.  DO NOT EXCEED A TOTAL OF 3 DOSES IN 15 MINUTES  at Unknown time Yes Abhijit Boyd MD   omeprazole (PRILOSEC) 20 MG DR capsule Take 1 capsule (20 mg) by mouth 2 times daily 30-60 minutes before meal 9/2/2020 at 0800 Yes Aris Del Real MD   rosuvastatin (CRESTOR) 20 MG tablet Take 1 tablet (20 mg) by mouth daily 9/2/2020 at 0800 Yes Abhijit Boyd MD   tiZANidine (ZANAFLEX) 2 MG tablet TAKE 3 TABLETS THREE TIMES A DAY 9/2/2020 at 1200 Yes Travon Driver MD

## 2020-09-03 PROBLEM — I95.1 ORTHOSTATIC HYPOTENSION: Status: ACTIVE | Noted: 2020-01-01

## 2020-09-03 NOTE — PLAN OF CARE
Pt A&Ox4, VSS, neuro status stable with generalized weakness, up with mod assist of one and walker to BS, voiding well, yankeur suction for saliva as per home regimen due to ALS, tolerating regular diet, zanaflex x1 for LE spasms, telemetry NSR, asymptomatic, pt c/o constipation, miralax prescribed. Pt discharged to home with son. Pt verbalizes understanding of discharge instructions/medications/follow up plan.

## 2020-09-03 NOTE — PLAN OF CARE
"Discharge Planner PT   Patient plan for discharge: Home with spouse; pt has been working with OP PT for L knee pain & plans to continue  Current status: Pt admitted to observation after near-syncopal episode at home, with hypotension BP 79/42 in ED. PMH significant for ALS & related chronic neck pain with spasms. Pt lives with spouse in house with ramp to enter, all needs met main level. Pt ambulates with 4WW inside the home and uses power WC outside the home. Spouse assists with tub transfer/bathing, cooking, cleaning & sometimes with dressing if needed. Pt uses [employer] for transportation.    Currently, pt received seated in WC, agreeable to PT. Alert & oriented x4. Able to don slippers while seated with SBA. Pt needs multiple attempts and CGA with assist to stabilize WC to stand from WC to 4WW, however this is lower than surfaces pt stands from at home per pt. Pt able to stand from EOB with close SBA and 4WW. Ambulated ~30' in room with 4WW and CGA; kyphosis noted, short steps; CGA provided as pt reports LEs feel \"rubbery.\" Discussed recommendation to have spouse close to her for all mobility until feeling stronger & to continue working with OP PT. Pt agreeable with recommendations & seated in chair upon departure.     Barriers to return to prior living situation: none if spouse able to provide SBA for mobility  Recommendations for discharge: Home with SBA of spouse for all mobility & ADLs, resumed OP PT  Rationale for recommendations: Patient appears near/slightly below baseline mobility. Recommend SBA of spouse for mobility as pt reports feeling weak/ LEs feeling \"rubbery\" with mobility. Anticipate pt will recover to baseline mobility with continued mobility with staff & with spouse upon return home. Defer further PT needs to OP PT.       Entered by: Karyn Peters 09/03/2020 11:24 AM       "

## 2020-09-03 NOTE — PROGRESS NOTES
09/03/20 1046   Quick Adds   Type of Visit Initial PT Evaluation   Living Environment   Lives With spouse   Living Arrangements house   Home Accessibility   (ramp to enter, pt stays on main level)   Transportation Anticipated   (pt uses [employer])   Living Environment Comment Pt lives with spouse in house with ramp to enter. House is 2 stories but pt stays on main level. Bathroom iuncludes tub showe with tub tranfer bench and grab bars, RTS with grab bar.,   Self-Care   Usual Activity Tolerance moderate   Current Activity Tolerance fair   Equipment Currently Used at Home grab bar, toilet;grab bar, tub/shower;hospital bed;raised toilet;tub bench;walker, rolling;wheelchair, power   Activity/Exercise/Self-Care Comment Pt ambulates with 4WW inside the home and uses power WC outside the home. Spouse assists with tub transfer, cooking, cleaning, occaisionally with dressing (pt usually mod ind with this)   Functional Level Prior   Ambulation 1-->assistive equipment   Transferring 1-->assistive equipment   Toileting 1-->assistive equipment   Bathing 3-->assistive equipment and person  (tub transfer bench, assist of spouse)   Fall history within last six months yes   Number of times patient has fallen within last six months 2  (LOB turning and backing up per pt)   Which of the above functional risks had a recent onset or change? ambulation;transferring   Prior Functional Level Comment Pt reports she ambulates with 4WW within the home at baseline & uses power WC outside the home. Pt reports she is mod ind with toileting & usually with dressing. spouse assists with tub transfer, cooking, cleaning.   General Information   Onset of Illness/Injury or Date of Surgery - Date 09/02/20   Referring Physician Colton Treadwell,     Patient/Family Goals Statement not stated   Pertinent History of Current Problem (include personal factors and/or comorbidities that impact the POC) Pt admitted to observation after  "near-syncopal episode at home, with hypotension BP 79/42 in ED. PMH significant for ALS & related chronic neck pain with spasms.   Precautions/Limitations fall precautions   General Observations Pt noted to be wearing soft cervical collar   General Info Comments Activity: Ambulate with assist 4x daily   Cognitive Status Examination   Orientation orientation to person, place and time   Level of Consciousness alert   Follows Commands and Answers Questions 100% of the time;able to follow multistep instructions   Personal Safety and Judgment intact   Pain Assessment   Patient Currently in Pain No  (Denies pain at rest)   Integumentary/Edema   Integumentary/Edema no deficits were identifed   Posture    Posture Forward head position;Protracted shoulders;Kyphosis   Range of Motion (ROM)   ROM Comment Pt able to achieve figure four position to don slippers while seated. Limited ROM L ankle due to prior ankle surgery per pt   Strength   Strength Comments B hip flexion 4-/5, B knee extension 4/5, Ankle DF 4+/5 R L NT- pt with limited ROM L ankle due to prior ankle replacement. Pt reports she has had gradual decline in R UE strength   Bed Mobility   Bed Mobility Comments Not assessed, pt reports her bed rails are different then hospital bed & states spouse can assist if needed  (Pt has adjustable bed with bedrails at home)   Transfer Skills   Transfer Comments Sit>stand from WC to 4WW with CGA after multiple attempts, with assist to stabilize WC; sit>stand from EOB with CGA-close SBA to 4WW   Gait   Gait Comments Ambulated in room with 4WW and CGA with flexed posture, slow pace, short steps with min clearance; pt reports LEs feel \"rubbery\"/ weak   Balance   Balance Comments Relies on walker support for balance, some unsteadiness when transitioning hands from chair>walker- needs CGA for safety   Sensory Examination   Sensory Perception Comments denies numbness/tingling   General Therapy Interventions   Planned Therapy " "Interventions other (see comments)  (recommendations for home)   Intervention Comments Discussed recommendation to have spouse close to her for all mobility until feeling stronger & to continue working with OP PT. Adivsed to follow up with OP PT on LE & UE strength concerns. Pt agreeable with recommendations & seated in chair upon departure.    Clinical Impression   Criteria for Skilled Therapeutic Intervention evaluation only   PT Diagnosis generalized weakness   Influenced by the following impairments decreased activity tolerance, generalized weakness   Functional limitations due to impairments decreased independence with bed mobility, transfers, ambuation   Clinical Presentation Stable/Uncomplicated   Clinical Presentation Rationale clinical judgement   Clinical Decision Making (Complexity) Low complexity   Therapy Frequency   (eval only)   Anticipated Discharge Disposition Home with Assist;Home with Outpatient Therapy  (close SBA of spouse for mobility, continued OP PT)   Risk & Benefits of therapy have been explained Yes   Patient, Family & other staff in agreement with plan of care Yes   Clinical Impression Comments Patient appears near/slightly below baseline mobility. Recommend SBA of spouse for mobility as pt reports feeling weak/ LEs feeling \"rubbery\" with mobility. Anticipate pt will recover to baseline mobility with continued mobility with staff & with spouse upon return home. Defer further PT needs to OP PT.   [facility] [employer] TM \"6 Clicks\"   2016, Trustees of [facility], under license to [employer].  All rights reserved.   6 Clicks Short Forms Basic Mobility Inpatient Short Form   [facility] [employer]  \"6 Clicks\" V.2 Basic Mobility Inpatient Short Form   1. Turning from your back to your side while in a flat bed without using bedrails? 3 - A Little   2. Moving from lying on your back to sitting on the side of a flat bed without using bedrails? 2 - A Lot   3. Moving to and from a " bed to a chair (including a wheelchair)? 3 - A Little   4. Standing up from a chair using your arms (e.g., wheelchair, or bedside chair)? 3 - A Little   5. To walk in hospital room? 3 - A Little   6. Climbing 3-5 steps with a railing? 2 - A Lot   Basic Mobility Raw Score (Score out of 24.Lower scores equate to lower levels of function) 16   Total Evaluation Time   Total Evaluation Time (Minutes) 38

## 2020-09-03 NOTE — ED NOTES
[facility]  ED Nurse Handoff Report    ED Chief complaint: Dizziness      ED Diagnosis:   Final diagnoses:   None       Code Status: to be determined by hospitalist    Allergies:   Allergies   Allergen Reactions     Ace Inhibitors Cough     Baclofen Other (See Comments)     Constipation , tiredness     Penicillins      10/7/14 Hives per patient -- many many years ago       Nickel Swelling and Rash     Other reaction(s): Unknown       Patient Story: Pt presents to ED with dizziness.  Pt has hx of afib, ALS, COPD, stroke, and HTN.  When EMS arrived on seen, pt was hypotensive with BP 40 systolically.  Pt has right arm weakness [id number] as well.    Focused Assessment:  Pt is AOx4, at baseline status for movement.  BP improved in ED with IVF.  Pt has muscle cramps when lying in bed in one position for too long, needs frequent repositioning.  Able to stand with 2 assist and walker, and pivot to commode.  Pt is wearing soft neck brace to assist with neck position.    Treatments and/or interventions provided: see MAR  Patient's response to treatments and/or interventions: responded well    To be done/followed up on inpatient unit:  n/a    Does this patient have any cognitive concerns?: Aox4    Activity level - Baseline/Home:  Stand with Assist  Activity Level - Current:   Stand with assist x2    Patient's Preferred language: English   Needed?: No    Isolation: None  Infection: Not Applicable  Bariatric?: No    Vital Signs:   Vitals:    09/02/20 1830 09/02/20 1845 09/02/20 2000 09/02/20 2020   BP: 99/77 117/75 125/75 127/81   Pulse: 56 60 65 74   Resp:  22 12 24   SpO2:  96%         Cardiac Rhythm:     Was the PSS-3 completed:   Yes  What interventions are required if any?               Family Comments:  at home  OBS brochure/video discussed/provided to patient/family: Yes              Name of person given brochure if not patient: n/a              Relationship to patient: n/a    For the  majority of the shift this patient's behavior was Green.   Behavioral interventions performed were tlc.    ED NURSE PHONE NUMBER: [phone]

## 2020-09-03 NOTE — PLAN OF CARE
Discharge Planner OT   Patient plan for discharge: home w/ spouse  Current status: OT orders received and chart reviewed. PT completed OT screen- pt plans to have spouse be nearby/assist w/ ADL's as needed per PT recommendation. Pt has all needed A.E. and DME. No skilled IP OT needs identified.  Barriers to return to prior living situation: defer to PT note  Recommendations for discharge: defer to PT note  Rationale for recommendations: defer to PT note; OT orders completed.       Entered by: Jennifer Matos 09/03/2020 12:51 PM

## 2020-09-03 NOTE — PLAN OF CARE
RECEIVING UNIT ED HANDOFF REVIEW    ED Nurse Handoff Report was reviewed by: Marvin Vernon, RN on September 2, 2020 at 11:50 PM

## 2020-09-03 NOTE — PLAN OF CARE
[id number]:  Pt arrived to the unit from ED. A/ox4, anxious/tearful at times. Slow speech. Pt wears a neck collar when awake. VS stable, Tele SD. C/o spasms in BLEs, tizanidine given prn. Up with assist x2 gb/w pivot to bedside commode. Senna given per pt request.

## 2020-09-03 NOTE — H&P
[facility]    History and Physical - Hospitalist Service       Date of Admission:  9/2/2020    Assessment & Plan   Katherin Fletcher is a 80 year old female with a history of ALS, CAD s/p CABG, HTN, TIA, PAD and chronic neck pain who presented to the ED on 9/2/2020 after a near syncopal episode    Near syncope  Hypotension, suspect hypovolemia  Patient was sitting at the table this afternoon when she began to feel light headed and nearly passed out.  EMS was called and upon arrival she was reportedly hypotensive.  Initial blood pressure in the ER was 79/42.  She was given 1 L of NS and her pressures improved up in to the 110s-120s.  She also notes that her symptoms have improved as well.   Suspect this is related to hypovolemia as patient reports decreased PO intake the past couple of days.    - Admission under observation status on telemetry  - EKG ordered  - Continue IVF with LR at 75 mL/hr  - Orthostatics ordered  - PT/OT/social work consulted to help with disposition     ALS   Chronic neck pain w/spasming related to her ALS   Follows with neurology already.  Patient currently uses as soft collar for comfort and is wearing it in the ED.  She takes Tizanidine TID at home.  In the ER she was complaining of spasming prior to my evaluation and received IV Valium with good relief.  Of note, also receive Botox injections as an outpatient   - PRN Tylenol   - PTA Tizanidine resumed    - PTA Sinemet   - Outpatient follow up as previously planned     CAD s/p CABG  HTN   HLP   PAD   No issues currently.  Initial CABG in 1992 and redo in 2009 (LIMA to LAD, SVG to OM and SVG to posterolateral branch of RCA).   - PTA Eliquis and Plavix  - Holding PTA Crestor as observation status  - Also holding PTA anti-hypertensives due to recent hypotension and orthostasis.  Of note, patient is on Imdur normally and this could be contributing to her presenting symptoms    H/o TIA   No issues currently             Diet: Regular diet  DVT Prophylaxis: Eliquis  Benton Catheter: not present  Code Status: DNR/DNI         Disposition Plan   Expected discharge: Tomorrow, recommended to prior living arrangement once safe disposition plan and symptoms improved.  Entered: Colton Treadwell DO 09/02/2020, 11:14 PM     The patient's care was discussed with the Patient and ED provider.    Colton Treadwell DO  [facility]    ______________________________________________________________________    Chief Complaint   Near syncope    History is obtained from the patient    History of Present Illness   Katherin Fletcher is a 80 year old female ith a history of ALS, CAD s/p CABG, HTN, TIA, PAD and chronic neck pain who presented to the ED on 9/2/2020 after a near syncopal episode.  Patient was sitting at the table this afternoon when she began to feel light headed and nearly passed out.  EMS was called and upon arrival she was reportedly hypotensive.  Initial blood pressure in the ER was 79/42.  She was given 1 L of NS and her pressures improved up in to the 110s-120s.  She also notes that her symptoms have improved as well.  Of note, the patient does admit to decreased PO intake over the past couple of days.  She also complains of some muscle spasming in her neck which is normal for her.  She denies any recent fevers, chills, cough, sore throat, chest pain, SOB, palpitations, abdominal pain, N/V/D, leg pain, leg swelling or difficulties with urination.     Review of Systems    The 10 point Review of Systems is negative other than noted in the HPI    Past Medical History    I have reviewed this patient's medical history and updated it with pertinent information if needed.   Past Medical History:   Diagnosis Date     CAD (coronary artery disease)     s/p CABG 1992 and redo CABG 2009 (LIMA to LAD, SVG to OM and SVG to posterolateral branch of RCA)     Chronic cough     Non-productive. No known definite etiology. Is beeing  seen by [facility].      Essential hypertension, benign      Falls frequently 5/28/2018     GERD (gastroesophageal reflux disease)      History of TIA (transient ischemic attack) and stroke 5/7/2018    R sided facial N&T.      Hyperlipidaemia      Hyperlipidaemia LDL goal < 130      Ischemic cardiomyopathy      MI (myocardial infarction) (H) 11/1996     Mixed hyperlipidemia      Neurodegenerative disorder (H)     Further detailed dx unknown. Managed by Dr. George Pham - [facility].      PAD (peripheral artery disease) (H)      Restless legs syndrome      Unspecified hypothyroidism        Past Surgical History   I have reviewed this patient's surgical history and updated it with pertinent information if needed.  Past Surgical History:   Procedure Laterality Date     ARTHROSCOPY SHOULDER RT/LT       BUNIONECTOMY RT/LT       C CABG, ARTERY-VEIN, THREE  1/2009     C CARDIAC SURG PROCEDURE UNLIST       C HAND/FINGER SURGERY UNLISTED       C MRA UPPER EXTREMITY WO&W CONT       C SHOULDER SURG PROC UNLISTED       C STOMACH SURGERY PROCEDURE UNLISTED       CARDIAC SURGERY  1992    CABG,stents x2     CARPAL TUNNEL RELEASE RT/LT       EXCISE GANGLION HAND Right 11/29/2018    Procedure: EXCISE RIGHT VOLAR CARPAL MASS AND RIGHT INDEX TRIGGER FINGER RELEASE;  Surgeon: Minh Goldstein MD;  Location:  OR     HC VASCULAR SURGERY PROCEDURE UNLIST       HYSTERECTOMY, DAMIÁN  34 yo     OPEN REDUCTION INTERNAL FIXATION ANKLE Left 2015    Left Ankle     RELEASE TRIGGER FINGER       RELEASE TRIGGER FINGER Right 11/29/2018    Procedure: RIGHT INDEX TRIGGER FINGER RELEASE;  Surgeon: Minh Goldstein MD;  Location:  OR     REPAIR HAMMER TOE Right 12/19/2017    Procedure: REPAIR HAMMER TOE;  Right 2nd and 3rd toe pinning;  Surgeon: Fernando Wiggins MD;  Location:  OR     STENT  2006     STENT(aka CARDIAC)  2008     VASCULAR SURGERY         Social History   I have reviewed this patient's social history and updated it with  pertinent information if needed.  Social History     Tobacco Use     Smoking status: Former Smoker     Packs/day: 0.50     Years: 22.00     Pack years: 11.00     Types: Cigarettes     Last attempt to quit: 4/15/1992     Years since quittin.4     Smokeless tobacco: Never Used   Substance Use Topics     Alcohol use: Not Currently     Alcohol/week: 1.0 standard drinks     Types: 1 Standard drinks or equivalent per week     Comment: MAYBE ONCE A YEAR     Drug use: No       Family History   I have reviewed this patient's family history and updated it with pertinent information if needed.  Family History   Problem Relation Age of Onset     Unknown/Adopted Mother      Unknown/Adopted Father      Other - See Comments Son         on warfarin for one year after afganastan now off and doing well     Other - See Comments Daughter         celiac, brittle bone, arthritis, Raynauds       Prior to Admission Medications   Prior to Admission Medications   Prescriptions Last Dose Informant Patient Reported? Taking?   Alfalfa 250 MG TABS 2020 at 0800 Self Yes Yes   Sig: Take 250 mg by mouth every morning    LEVOXYL 112 MCG tablet 2020 at 0700  No Yes   Sig: TAKE 1 TABLET DAILY   Multiple Vitamins-Minerals (DAILY MULTI) TABS 2020 at 0800 Self Yes Yes   Sig: Take 1 tablet by mouth every morning    acetaminophen (TYLENOL) 500 MG tablet Past Week at Unknown time  No Yes   Sig: Take 1-2 tablets (500-1,000 mg) by mouth every 6 hours as needed for mild pain   apixaban ANTICOAGULANT (ELIQUIS) 5 MG tablet 2020 at 0800 Self No Yes   Sig: Take 1 tablet (5 mg) by mouth 2 times daily   calcium carbonate-vitamin D (CALCIUM + D) 600-200 MG-UNIT TABS 2020 at 2100 Self Yes Yes   Sig: Take 1 tablet by mouth At Bedtime    carbidopa-levodopa (SINEMET)  MG tablet 2020 at 2100 Self Yes Yes   Sig: Take 1 tablet three times daily and 2 tablets at bedtime   carvedilol (COREG) 6.25 MG tablet 2020 at 0800 Self No Yes   Sig:  Take 1 tablet (6.25 mg) by mouth 2 times daily (with meals)   clopidogrel (PLAVIX) 75 MG tablet 9/1/2020 at 2100 Self No Yes   Sig: Take 1 tablet (75 mg) by mouth daily   glycopyrrolate (ROBINUL) 1 MG tablet 9/2/2020 at 1200 Self No Yes   Sig: Take 1 tablet (1 mg) by mouth 3 times daily   isosorbide mononitrate (IMDUR) 30 MG 24 hr tablet 9/2/2020 at 0800 Self No Yes   Sig: Take 1 tablet (30 mg) by mouth daily   nitroGLYcerin (NITROSTAT) 0.4 MG sublingual tablet  at Unknown time Self No Yes   Sig: DISSOLVE ONE TABLET UNDER THE TONGUE EVERY 5 MINUTES AS NEEDED FOR CHEST PAIN.  DO NOT EXCEED A TOTAL OF 3 DOSES IN 15 MINUTES   omeprazole (PRILOSEC) 20 MG DR capsule 9/2/2020 at 0800  No Yes   Sig: Take 1 capsule (20 mg) by mouth 2 times daily 30-60 minutes before meal   rosuvastatin (CRESTOR) 20 MG tablet 9/2/2020 at 0800 Self No Yes   Sig: Take 1 tablet (20 mg) by mouth daily   tiZANidine (ZANAFLEX) 2 MG tablet 9/2/2020 at 1200  No Yes   Sig: TAKE 3 TABLETS THREE TIMES A DAY      Facility-Administered Medications Last Administration Doses Remaining   botulinum toxin type A (BOTOX) 100 units injection 400 Units None recorded         Allergies   Allergies   Allergen Reactions     Ace Inhibitors Cough     Baclofen Other (See Comments)     Constipation , tiredness     Penicillins      10/7/14 Hives per patient -- many many years ago       Nickel Swelling and Rash     Other reaction(s): Unknown       Physical Exam   Vital Signs: Temp: 98.1  F (36.7  C) Temp src: Oral BP: 114/67 Pulse: 66   Resp: 18 SpO2: 97 % O2 Device: None (Room air) Oxygen Delivery: 3 LPM  Weight: 0 lbs 0 oz    General Appearance: Resting comfortably.  NAD   Eyes: EOMI.  Normal conjuctiva  HEENT: NC/AT.  Dry mucous membranes  Respiratory: Clear to auscultation.  No respiratory distress  Cardiovascular: RRR.  No obvious murmurs   GI: Bowel sounds present.  Non-tender  Skin: No rashes.  No cyanosis  Musculoskeletal: Soft cervical collar in place.  No edema.   No calf tenderness  Neurologic: Moving all 4 extremities grossly.  Patient had just received Valium prior to my evaluation and was drowsy towards the end of my examination  Psychiatric: Alert but getting drowsy    Data   Data reviewed today: I reviewed all medications, new labs and imaging results over the last 24 hours. I personally reviewed   CXR:  Sternotomy wires noted.  No obvious cardiomegaly.  No pleural effusions.      Recent Labs   Lab 09/02/20 2127 09/02/20 1957   WBC  --  4.3   HGB  --  12.4   MCV  --  91   PLT  --  179   NA  --  137   POTASSIUM  --  4.1   CHLORIDE  --  106   CO2  --  29   BUN  --  8   CR  --  0.64   ANIONGAP  --  2*   GALE  --  8.1*   GLC  --  104*   ALBUMIN  --  2.9*   PROTTOTAL  --  6.3*   BILITOTAL  --  0.5   ALKPHOS  --  90   ALT  --  15   AST  --  47*   TROPI 0.071* 0.070*     Recent Results (from the past 24 hour(s))   XR Chest Port 1 View    Narrative    CHEST ONE VIEW PORTABLE   9/2/2020 6:46 PM     HISTORY:  Weakness.    COMPARISON: 8/17/2019.      Impression    IMPRESSION: Mild patchy opacity in the left mid and lower lung could  be related to pneumonia and/or atelectasis. The right lung is clear.  No pneumothorax is identified. Sternotomy. Heart size appears stable.  Pulmonary vascularity is within normal limits. Multiple old right rib  fractures are noted.    MICHELLE ABR MD

## 2020-09-03 NOTE — DISCHARGE SUMMARY
[facility]  Hospitalist Discharge Summary      Date of Admission:  9/2/2020  Date of Discharge:  9/3/2020  Discharging Provider: Jasper Norton MD      Discharge Diagnoses      Near syncope  Hypotension, suspect hypovolemia, resolved  ALS   Chronic neck pain w/spasming related to her ALS   CAD s/p CABG  HTN   HLP   PAD   H/o TIA   Constipation, likely slow transit    Follow-ups Needed After Discharge   Follow-up Appointments     Follow-up and recommended labs and tests       Follow up with primary care provider, Aris Del Real, within 2-4   weeks, for hospital follow- up.          Follow fluid intake and constipation. Started miralax to take with AM meds in hospital.    Unresulted Labs Ordered in the Past 30 Days of this Admission     Date and Time Order Name Status Description    9/2/2020 2220 Asymptomatic COVID-19 Virus (Coronavirus) by PCR In process     9/2/2020 1813 Blood culture Preliminary     9/2/2020 1813 Blood culture Preliminary       These results will be followed up by Dr. Norton    Discharge Disposition   Discharged to home  Condition at discharge: Stable    Hospital Course   Patient was admitted on 9/2/2020 after presenting with near syncope.  Please see admission H&P for further history.  She was admitted and given several liters of fluid with resolution of her hypotension and her symptoms of lightheadedness.  On the second day of admission she was stating she felt back to her baseline.  She did disclose that she has been having issues with constipation.  Discussed that we would start MiraLAX as hopefully this would give her some more oral fluid.  Encouraged her to and push p.o. fluids.  Patient was seen by physical therapy who cleared her to return to her home with her .  She was discharged in improved condition.    Consultations This Hospital Stay   SOCIAL WORK IP CONSULT  OCCUPATIONAL THERAPY ADULT IP CONSULT  PHYSICAL THERAPY ADULT IP CONSULT    Code Status   No  CPR- Do NOT Intubate    Time Spent on this Encounter   I, Jasper Norton MD, personally saw the patient today and spent less than or equal to 30 minutes discharging this patient.       Jasper Norton MD  [facility]  ______________________________________________________________________    Physical Exam   Vital Signs: Temp: 97.9  F (36.6  C) Temp src: Oral BP: 136/79(seated after PT/mobility) Pulse: 60   Resp: 16 SpO2: 91 % O2 Device: None (Room air) Oxygen Delivery: 3 LPM  Weight: 0 lbs 0 oz  Constitutional: Awake, alert, cooperative, no apparent cardiopulmonary distress. Intermittently tearful  Eyes: Conjunctiva and pupils examined and normal.  HEENT: Moist mucous membranes, normal dentition.  Respiratory: Clear to auscultation bilaterally, no crackles or wheezing.  Cardiovascular: Regular rate and rhythm, normal S1 and S2, and no murmur noted.  GI: Soft, non-distended, non-tender, normal bowel sounds.  Lymph/Hematologic: No anterior cervical or supraclavicular adenopathy.  Skin: No rashes, no cyanosis, no edema noted on exposed skin.  Musculoskeletal: soft cervical collar in place. No calf tenderness.  Neurologic: Cranial nerves 2-12 grossly intact, decreased strength and sensation.  Psychiatric: Alert, oriented to person, place and time, no obvious anxiety or depression.         Primary Care Physician   Aris Del Real    Discharge Orders      Reason for your hospital stay    You were in the hospital to treat dehydration. It is important to keep working on drinking enough fluids.     I will start a medicine called MiraLax also known as polyethylene glycol. This is a powder you dissolve in a cup of water. It will help soften up your stools. If your stools are too hard, you can stop taking the MiraLax for a few days.     Follow-up and recommended labs and tests     Follow up with primary care provider, Aris Del Real, within 2-4 weeks, for hospital follow- up.     Activity    Your  activity upon discharge: activity as tolerated     Diet    Follow this diet upon discharge: Orders Placed This Encounter      Regular Diet Adult       Significant Results and Procedures   Results for orders placed or performed during the hospital encounter of 09/02/20   XR Chest Port 1 View    Narrative    CHEST ONE VIEW PORTABLE   9/2/2020 6:46 PM     HISTORY:  Weakness.    COMPARISON: 8/17/2019.      Impression    IMPRESSION: Mild patchy opacity in the left mid and lower lung could  be related to pneumonia and/or atelectasis. The right lung is clear.  No pneumothorax is identified. Sternotomy. Heart size appears stable.  Pulmonary vascularity is within normal limits. Multiple old right rib  fractures are noted.    MICHELLE BAR MD     *Note: Due to a large number of results and/or encounters for the requested time period, some results have not been displayed. A complete set of results can be found in Results Review.       Discharge Medications   Current Discharge Medication List      START taking these medications    Details   polyethylene glycol (MIRALAX) 17 GM/Dose powder Take 17 g (1 capful) by mouth daily  Qty: 507 g, Refills: 1    Associated Diagnoses: Constipation, unspecified constipation type         CONTINUE these medications which have NOT CHANGED    Details   acetaminophen (TYLENOL) 500 MG tablet Take 1-2 tablets (500-1,000 mg) by mouth every 6 hours as needed for mild pain    Associated Diagnoses: Localized, secondary osteoarthritis of the hand, unspecified laterality; Spondylosis of cervical region without myelopathy or radiculopathy; Primary osteoarthritis of both feet      Alfalfa 250 MG TABS Take 250 mg by mouth every morning       apixaban ANTICOAGULANT (ELIQUIS) 5 MG tablet Take 1 tablet (5 mg) by mouth 2 times daily  Qty: 180 tablet, Refills: 3    Associated Diagnoses: Cerebrovascular accident (CVA), unspecified mechanism (H)      calcium carbonate-vitamin D (CALCIUM + D) 600-200 MG-UNIT TABS  Take 1 tablet by mouth At Bedtime     Associated Diagnoses: Preventative health care      carbidopa-levodopa (SINEMET)  MG tablet Take 1 tablet three times daily and 2 tablets at bedtime      carvedilol (COREG) 6.25 MG tablet Take 1 tablet (6.25 mg) by mouth 2 times daily (with meals)  Qty: 180 tablet, Refills: 3    Associated Diagnoses: Benign essential hypertension      clopidogrel (PLAVIX) 75 MG tablet Take 1 tablet (75 mg) by mouth daily  Qty: 90 tablet, Refills: 3      glycopyrrolate (ROBINUL) 1 MG tablet Take 1 tablet (1 mg) by mouth 3 times daily  Qty: 270 tablet, Refills: 1    Associated Diagnoses: ALS (amyotrophic lateral sclerosis) (H); Sialorrhea      isosorbide mononitrate (IMDUR) 30 MG 24 hr tablet Take 1 tablet (30 mg) by mouth daily  Qty: 90 tablet, Refills: 3      LEVOXYL 112 MCG tablet TAKE 1 TABLET DAILY  Qty: 90 tablet, Refills: 4    Associated Diagnoses: Encounter for medication refill      Multiple Vitamins-Minerals (DAILY MULTI) TABS Take 1 tablet by mouth every morning       nitroGLYcerin (NITROSTAT) 0.4 MG sublingual tablet DISSOLVE ONE TABLET UNDER THE TONGUE EVERY 5 MINUTES AS NEEDED FOR CHEST PAIN.  DO NOT EXCEED A TOTAL OF 3 DOSES IN 15 MINUTES  Qty: 25 tablet, Refills: 3      omeprazole (PRILOSEC) 20 MG DR capsule Take 1 capsule (20 mg) by mouth 2 times daily 30-60 minutes before meal  Qty: 60 capsule, Refills: 11    Associated Diagnoses: Gastroesophageal reflux disease with esophagitis      rosuvastatin (CRESTOR) 20 MG tablet Take 1 tablet (20 mg) by mouth daily  Qty: 90 tablet, Refills: 3    Associated Diagnoses: Mixed hyperlipidemia      tiZANidine (ZANAFLEX) 2 MG tablet TAKE 3 TABLETS THREE TIMES A DAY  Qty: 270 tablet, Refills: 11    Associated Diagnoses: Spasticity; ALS (amyotrophic lateral sclerosis) (H)           Allergies   Allergies   Allergen Reactions     Ace Inhibitors Cough     Baclofen Other (See Comments)     Constipation , tiredness     Penicillins      10/7/14  Hives per patient -- many many years ago       Nickel Swelling and Rash     Other reaction(s): Unknown

## 2020-09-04 NOTE — PROGRESS NOTES
Clinic Care Coordination Contact    Situation: Patient chart reviewed by care coordinator.    Background: Patient went to the ED at [facility] on 9/2/20 for near syncope from dehydration. She was also constipated. Pt was given fluids and prescribed Miralax to take with AM meds. Pt cleared patient to go back home with her . Patient was discussed to push fluids at home.    Assessment: follow up in 2-4 weeks with PCP     Plan/Recommendations: COVID test was negative.  Care Coordinator entered get well loop order per protocol for this. No care coordination needs at this time. Will schedule no outreach    Toshia Escudero, MSJAMAICA, [facility]  Clinic Care Coordinator  [email or web address]  [phone]

## 2020-09-08 NOTE — TELEPHONE ENCOUNTER
Called patient to check on her blood pressure.  Patient states she has not taken her BP yet this am.  She says she  can not take it until her  gets home this afternoon  She feels about the same. Advised her to push fluids. She wants to see Dr Del Real and also needs to schedule with [employer] for ride. Appointment was made for Thurs. Sept 10.  She will check her BP and let us know if it stays low.

## 2020-09-10 PROBLEM — F32.A DEPRESSION, UNSPECIFIED DEPRESSION TYPE: Status: ACTIVE | Noted: 2020-01-01

## 2020-09-10 NOTE — PROGRESS NOTES
SUBJECTIVE:                                                      Patient presents for Hospital Followup Visit:    Hospital:  [facility]      Date of Admission: 9/2/2020  Date of Discharge: 9/3/2020  Transitional Care Management Phone Call: Yes  Reason(s) for Admission: Near Syncope            Problems taking medications regularly:  None       Medication changes since discharge: Start Miralax Daily.       Problems adhering to non-medication therapy:  None    Summary of hospitalization:  [facility] discharge summary reviewed  Diagnostic Tests/Treatments reviewed.  Follow up needed: none  Other Healthcare Providers Involved in Patient s Care:         Neurology, Pulmonology, Physical therapy, Occupational therapy, Cardiology  Update since discharge: stable.     Patient presents to clinic by herself. Was brought here by [facility]. Admitted for 1 day due to near syncope likely due to dehydration. States she called on call doctor earlier this week after feeling flushed after taking her Coreg. Has taken this medication for years without side effects. Blood pressure at home is around the same as it is here in the clinic today (130's/80's). Denies chest pain, pressure.   Does have chronic atrial fibrillation. States she sees cardiologist.     COPD stable and sees pulmonology for management.    States she feels very depressed due to neurodegenerative disorder. Frustrated that she is unable to do many things and that she is dependent on others. Unsure if she is interested in therapy but would be willing to try antidepressant medication.       ROS:  Constitutional, HEENT, cardiovascular, pulmonary, gi and gu systems are negative, except as otherwise noted.    OBJECTIVE:                                                      GENERAL APPEARANCE: wheelchair-bound deconditioned adult woman in no acute distress  RESP: lungs clear to auscultation - no rales, rhonchi or wheezes; normal work of breathing  CV:  irregularly irregular rhythm  SKIN: pale, dry skin  NEURO: decreased tone, persistent spasm of right lower leg  PSYCH: tearful, depressed    ASSESSMENT/PLAN:                                                      (I11.0) Hypertensive heart disease with heart failure (H)  (primary encounter diagnosis)  Comment: controlled  Plan: Continue Coreg, Imdur. Contact office if has any further side effects    (G31.9) Neurodegenerative disorder (H)  Comment: no changes  Plan: continue current plan of care    (J44.9) Chronic obstructive pulmonary disease, unspecified COPD type (H)  Comment: stable  Plan: managed by Pulmonology    (I48.91) Atrial fibrillation, unspecified type (H)  Comment: currently active, asymptomatic  Plan: Managed by Cardiology    (F32.9) Depression, unspecified depression type  Comment: new  Plan: sertraline (ZOLOFT) 25 MG tablet        Follow-up 2 weeks        Post Discharge Medication Reconciliation: discharge medications reconciled and changed, per note/orders.  Issues to address: Issues identified were:   medication concern - Coreg; new medication for depression.  Plan of care communicated with patient      Type of Medical Decision Making Face-to-Face Visit within 7 Days Face-to-Face Visit within 14 days   Moderate Complexity [id number]   High Complexity [id number]

## 2020-09-10 NOTE — PATIENT INSTRUCTIONS
Start Sertraline 1 tab once daily for depression    Restart Coreg. If you have symptoms again, then stop    Follow-up in 2 weeks

## 2020-09-14 PROBLEM — I73.89 OTHER SPECIFIED PERIPHERAL VASCULAR DISEASES (H): Status: ACTIVE | Noted: 2020-01-01

## 2020-09-14 NOTE — LETTER
9/14/2020      RE: Katherin Fletcher  [address]  [place] [place] [address]       Reason For Visit:   Chief Complaint   Patient presents with     Left Foot - Minor Surgery     Nail clipping     Right Foot - Minor Surgery     Nail clipping        /87   Wt 59 kg (130 lb)   BMI 22.30 kg/m           Yady Vail ATC    Chief Complaint   Patient presents with     Left Foot - Minor Surgery     Nail clipping     Right Foot - Minor Surgery     Nail clipping             Allergies   Allergen Reactions     Ace Inhibitors Cough     Baclofen Other (See Comments)     Constipation , tiredness     Penicillins      10/7/14 Hives per patient -- many many years ago       Nickel Swelling and Rash     Other reaction(s): Unknown         Subjective: Katherin is a 79 year old female who presents to the clinic today for a follow up of elongated nails.  She presents today in her electric wheelchair and wearing a neck brace.  She relates that her nails are long and interfere with her wearing shoes.      Objective  Data Unavailable Data Unavailable Data Unavailable Data Unavailable Data Unavailable 0 lbs 0 oz  DP pulses are 1/4 and PT pulses are non-palpable bilaterally. LE capillary refill time is <3 seconds. Absent pedal hair. Mild non-pitting LE edema.  Dorsally contracted digits 2-5 that are non-reducible, very limited passive ROM. Equinus present.  Gross sensation intact to bilateral LE.   LE skin color, texture and turgor are normal. Toenails are slightly elongated bilaterally. Right 4th toe is hammered and in a rigid position.      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   - Cont with comfortable shoes.   - Pt to return to clinic in 12 weeks.      Adrian Arroyo DPM

## 2020-09-14 NOTE — PROGRESS NOTES
Reason For Visit:   Chief Complaint   Patient presents with     Left Foot - Minor Surgery     Nail clipping     Right Foot - Minor Surgery     Nail clipping        /87   Wt 59 kg (130 lb)   BMI 22.30 kg/m           Yady Vail ATC    Chief Complaint   Patient presents with     Left Foot - Minor Surgery     Nail clipping     Right Foot - Minor Surgery     Nail clipping             Allergies   Allergen Reactions     Ace Inhibitors Cough     Baclofen Other (See Comments)     Constipation , tiredness     Penicillins      10/7/14 Hives per patient -- many many years ago       Nickel Swelling and Rash     Other reaction(s): Unknown         Subjective: Katherin is a 79 year old female who presents to the clinic today for a follow up of elongated nails.  She presents today in her electric wheelchair and wearing a neck brace.  She relates that her nails are long and interfere with her wearing shoes.      Objective  Data Unavailable Data Unavailable Data Unavailable Data Unavailable Data Unavailable 0 lbs 0 oz  DP pulses are 1/4 and PT pulses are non-palpable bilaterally. LE capillary refill time is <3 seconds. Absent pedal hair. Mild non-pitting LE edema.  Dorsally contracted digits 2-5 that are non-reducible, very limited passive ROM. Equinus present.  Gross sensation intact to bilateral LE.   LE skin color, texture and turgor are normal. Toenails are slightly elongated bilaterally. Right 4th toe is hammered and in a rigid position.      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   - Cont with comfortable shoes.   - Pt to return to clinic in 12 weeks.

## 2020-09-16 NOTE — LETTER
"9/16/2020       RE: Katherin Fletcher  [address]  [facility] [facility] [place] [address]     Dear Colleague,    Thank you for referring your patient, Katherin Fletcher, to the [facility] NEUROLOG[facility] at [facility]. Please see a copy of my visit note below.    Katherin Fletcher is a 80 year old female who is being evaluated via a billable video visit.      The patient has been notified of following:     \"This video visit will be conducted via a call between you and your physician/provider. We have found that certain health care needs can be provided without the need for an in-person physical exam.  This service lets us provide the care you need with a video conversation.  If a prescription is necessary we can send it directly to your pharmacy.  If lab work is needed we can place an order for that and you can then stop by our lab to have the test done at a later time.    Video visits are billed at different rates depending on your insurance coverage.  Please reach out to your insurance provider with any questions.    If during the course of the call the physician/provider feels a video visit is not appropriate, you will not be charged for this service.\"    Patient has given verbal consent for Video visit? YES  How would you like to obtain your AVS? MyChart  If you are dropped from the video visit, the video invite should be resent to: [phone]   Will anyone else be joining your video visit? no        Video-Visit Details    Type of service:  Video Visit    Video Start Time: 10.15 am  Video End Time: 10.45 am    Originating Location (pt. Location):Home    Distant Location (provider location):  [facility]     Platform used for Video Visit: [place], [place]          Service Date: 09/16/2020      I had the pleasure to evaluate Katherin via billable video visit today. This visit was scheduled urgently due to numerous recent symptoms that she described to me by " Amy message. In person visit was not recommended due to COVID19 pandemic.  Andria has slowly progressive limb onset ALS with predominantly upper motor neuron signs. She continues with quiet problematic leg muscle cramps and spasms. She is on botulinum toxin injections done at our [facility] by Dr Celestin. Last injection was in 7/2020. She asks if the next one can be done sooner than 10/2020 or if the dose can be increased. She also takes carbidopa/levodopa 4-5 times a day and tizanidine 6 mg tid. Even all those drugs are not enough to control the problem.  Andria feels weaker walking. She also points out further loss of muscle bulk and strength at the right arm, to the point she now needs to help move it with her left hand and has to use both hands to drink from a cup.   She reports difficulty chewing and emptying her mouth completely.She needs her food pureed. Fluid intake is limited and occasionally results in dehydration. Not infrequently, she will cough with solids or liquids. She has lost a considerable amount of weight, although she cannot quantify it. She has no gastrostomy tube.   She is complaining of poor sleep- about 4 hours every night. Her tizanidine and the carbidopa are making her sleep during the day. She reports reduced energy and feelings of depression without thoughts of hurting herself or others. She endorses hopelessness and helplessness and anger that she cannot do much of anything. She recently saw her PCP and sertraline was started for depression at 25 mg daily.   In the past, she tried medical cannabis, but she found it ineffective and stopped it after 6 months.  She also has sialorrhea for which she underwent botulinum toxin injections about 2 months ago which she did not find particularly helpful.  She does not take Robinul at this point.  She does not have any home care or therapies at home.        MEDICATIONS:  Reviewed and are as per Epic record.      IMPRESSION:  In summary, Katherin  Chance presents with the above issues in the context of slowly progressive ALS.  My recommendations are the followin.  Depression and sleep disruption.  I absolutely agree that this should be addressed.  However, I would have a different preference in terms of antidepressant choice.  Sertraline is a great antidepressant, but in a patient with reduced appetite and trouble falling asleep at night, I would probably prefer mirtazapine.  If you concur, I would like to switch Katherin and start her on mirtazapine 7.5 mg at bedtime for 1 week, then 15 mg at bedtime on the second week and then 22.5 mg.    2. Her excessive daytime sleepiness is almost certainly a side effect of medication.  However, we cannot stop those medications because she will simply become miserable from her muscle spasms.  I proposed reducing the morning tizanidine dose to 4 mg and continue taking 6 mg at noon and 6 in the evening.  Taking 6 in the morning is probably too much for her.  To counteract the sleepiness caused by the medication, I think the best option would be adding a low dose stimulant in the morning like modafinil.  I will attempt it and see how it works.   3.  Increasing hand weakness.  This is from the ALS and there is no treatment that can reverse it.  The patient would qualify for home care services because she is technically homebound and we will enter those orders along with home PT, OT and speech services.     4.  Bulbar symptoms.  She will be evaluated by a speech therapist.  I think it is imperative that she also talks to our nutritionist because I think she is falling short of her caloric needs. I brought up discussion about gastrostomy tube with her; She does not want to do this yet, but we will discuss it again during or November appointment.   5. Leg spasms.  She cannot get Botox before 3 months because she will develop resistance.  I will allow to PM&R to manage this.  I would continue tizanidine and levodopa as  above.  Again, I do not want to add more muscle relaxant medications like benzodiazepines or baclofen, because this approach will worsen her sedation.     TT spent on video call 30 minutes due to multiple issues (start of call 10.15 am, end 10.45 am). I will see Ana in follow up during her scheduled appointment with me in 2020. Thank you for allowing me to participate in her care.       Sincerely,         TRAVON WILLARD MD             D: 2020   T: 2020   MT: siva      Name:     ANA RODRIGUEZ   MRN:      [id number]        Account:      [id number]   :      1940           Service Date: 2020      Document: [id number]        Again, thank you for allowing me to participate in the care of your patient.      Sincerely,    Travon Willard MD

## 2020-09-16 NOTE — PROGRESS NOTES
"Katherin Fletcher is a 80 year old female who is being evaluated via a billable video visit.      The patient has been notified of following:     \"This video visit will be conducted via a call between you and your physician/provider. We have found that certain health care needs can be provided without the need for an in-person physical exam.  This service lets us provide the care you need with a video conversation.  If a prescription is necessary we can send it directly to your pharmacy.  If lab work is needed we can place an order for that and you can then stop by our lab to have the test done at a later time.    Video visits are billed at different rates depending on your insurance coverage.  Please reach out to your insurance provider with any questions.    If during the course of the call the physician/provider feels a video visit is not appropriate, you will not be charged for this service.\"    Patient has given verbal consent for Video visit? YES  How would you like to obtain your AVS? MyChart  If you are dropped from the video visit, the video invite should be resent to: [phone]   Will anyone else be joining your video visit? no        Video-Visit Details    Type of service:  Video Visit    Video Start Time: 10.15 am  Video End Time: 10.45 am    Originating Location (pt. Location):Home    Distant Location (provider location):  [facility] NEUROLOGY     Platform used for Video Visit: [employer] EMT        "

## 2020-09-21 NOTE — PROGRESS NOTES
Service Date: 09/16/2020      I had the pleasure to evaluate Katherin via billable video visit today. This visit was scheduled urgently due to numerous recent symptoms that she described to me by Invenshurehart message. In person visit was not recommended due to COVID19 pandemic.  Andria has slowly progressive limb onset ALS with predominantly upper motor neuron signs. She continues with quiet problematic leg muscle cramps and spasms. She is on botulinum toxin injections done at [facility] by Dr Celestin. Last injection was in 7/2020. She asks if the next one can be done sooner than 10/2020 or if the dose can be increased. She also takes carbidopa/levodopa 4-5 times a day and tizanidine 6 mg tid. Even all those drugs are not enough to control the problem.  Andria feels weaker walking. She also points out further loss of muscle bulk and strength at the right arm, to the point she now needs to help move it with her left hand and has to use both hands to drink from a cup.   She reports difficulty chewing and emptying her mouth completely.She needs her food pureed. Fluid intake is limited and occasionally results in dehydration. Not infrequently, she will cough with solids or liquids. She has lost a considerable amount of weight, although she cannot quantify it. She has no gastrostomy tube.   She is complaining of poor sleep- about 4 hours every night. Her tizanidine and the carbidopa are making her sleep during the day. She reports reduced energy and feelings of depression without thoughts of hurting herself or others. She endorses hopelessness and helplessness and anger that she cannot do much of anything. She recently saw her PCP and sertraline was started for depression at 25 mg daily.   In the past, she tried medical cannabis, but she found it ineffective and stopped it after 6 months.  She also has sialorrhea for which she underwent botulinum toxin injections about 2 months ago which she did not find particularly helpful.   She does not take Robinul at this point.  She does not have any home care or therapies at home.        MEDICATIONS:  Reviewed and are as per Epic record.      IMPRESSION:  In summary, Katherin Fletcher presents with the above issues in the context of slowly progressive ALS.  My recommendations are the followin.  Depression and sleep disruption.  I absolutely agree that this should be addressed.  However, I would have a different preference in terms of antidepressant choice.  Sertraline is a great antidepressant, but in a patient with reduced appetite and trouble falling asleep at night, I would probably prefer mirtazapine.  If you concur, I would like to switch Katherin and start her on mirtazapine 7.5 mg at bedtime for 1 week, then 15 mg at bedtime on the second week and then 22.5 mg.    2. Her excessive daytime sleepiness is almost certainly a side effect of medication.  However, we cannot stop those medications because she will simply become miserable from her muscle spasms.  I proposed reducing the morning tizanidine dose to 4 mg and continue taking 6 mg at noon and 6 in the evening.  Taking 6 in the morning is probably too much for her.  To counteract the sleepiness caused by the medication, I think the best option would be adding a low dose stimulant in the morning like modafinil.  I will attempt it and see how it works.   3.  Increasing hand weakness.  This is from the ALS and there is no treatment that can reverse it.  The patient would qualify for home care services because she is technically homebound and we will enter those orders along with home PT, OT and speech services.     4.  Bulbar symptoms.  She will be evaluated by a speech therapist.  I think it is imperative that she also talks to our nutritionist because I think she is falling short of her caloric needs. I brought up discussion about gastrostomy tube with her; She does not want to do this yet, but we will discuss it again during or November  appointment.   5. Leg spasms.  She cannot get Botox before 3 months because she will develop resistance.  I will allow to PM&R to manage this.  I would continue tizanidine and levodopa as above.  Again, I do not want to add more muscle relaxant medications like benzodiazepines or baclofen, because this approach will worsen her sedation.     TT spent on video call 30 minutes due to multiple issues (start of call 10.15 am, end 10.45 am). I will see Katherin in follow up during her scheduled appointment with me in 2020. Thank you for allowing me to participate in her care.       Sincerely,         KLAUDIA WILLARD MD             D: 2020   T: 2020   MT: siva      Name:     KATHERIN RODRIGUEZ   MRN:      [id number]        Account:      [id number]   :      1940           Service Date: 2020      Document: [id number]

## 2020-09-24 NOTE — PROGRESS NOTES
Problem(s) Oriented visit        SUBJECTIVE:                                                    Katherin Fletcher is a 80 year old female who presents to clinic today for the following health issues :        1. Essential hypertension      2. Neurodegenerative disorder (H)      3. Disturbances of sensation of smell and taste      4. Cerebellar disease    We spent a half hour discussing her diagnosis and her journey.       Problem list, Medication list, Allergies, and Medical/Social/Surgical histories reviewed in EPIC and updated as appropriate.   Additional history: as documented    ROS:  General and CV completed and negative except as noted above    Histories:   Patient Active Problem List   Diagnosis     Long term current use of anticoagulant therapy     CAD (coronary artery disease)     Hyperlipidemia     Health Care Home     MRSA (methicillin resistant Staphylococcus aureus)     Dysarthria     Disturbances of sensation of smell and taste     Neurologic gait disorder     Cerebellar disease     Essential hypertension     Ischemic cardiomyopathy     Peripheral polyneuropathy     PAD (peripheral artery disease) (H)     Encounter for long-term (current) use of medications     Stroke (H)     Slurred speech     Neurodegenerative disorder (H)     History of TIA (transient ischemic attack) and stroke     Falls frequently     Hypothyroidism, unspecified type     Ataxia     Hypertensive heart disease with heart failure (H)     Chronic obstructive pulmonary disease, unspecified COPD type (H)     Atrial fibrillation, unspecified type (H)     Rib fractures     Orthostatic hypotension     Depression, unspecified depression type     Other specified peripheral vascular diseases (H)     Past Surgical History:   Procedure Laterality Date     ARTHROSCOPY SHOULDER RT/LT       BUNIONECTOMY RT/LT       C CABG, ARTERY-VEIN, THREE  1/2009     C CARDIAC SURG PROCEDURE UNLIST       C HAND/FINGER SURGERY UNLISTED       C MRA UPPER  "EXTREMITY WO&W CONT       C SHOULDER SURG PROC UNLISTED       C STOMACH SURGERY PROCEDURE UNLISTED       CARDIAC SURGERY      CABG,stents x2     CARPAL TUNNEL RELEASE RT/LT       EXCISE GANGLION HAND Right 2018    Procedure: EXCISE RIGHT VOLAR CARPAL MASS AND RIGHT INDEX TRIGGER FINGER RELEASE;  Surgeon: Minh Goldstein MD;  Location:  OR      VASCULAR SURGERY PROCEDURE UNLIST       HYSTERECTOMY, DAMIÁN  32 yo     OPEN REDUCTION INTERNAL FIXATION ANKLE Left     Left Ankle     RELEASE TRIGGER FINGER       RELEASE TRIGGER FINGER Right 2018    Procedure: RIGHT INDEX TRIGGER FINGER RELEASE;  Surgeon: Minh Goldstein MD;  Location:  OR     REPAIR HAMMER TOE Right 2017    Procedure: REPAIR HAMMER TOE;  Right 2nd and 3rd toe pinning;  Surgeon: Fernando Wiggins MD;  Location:  OR     STENT       STENT(aka CARDIAC)       VASCULAR SURGERY         Social History     Tobacco Use     Smoking status: Former Smoker     Packs/day: 0.50     Years: 22.00     Pack years: 11.00     Types: Cigarettes     Last attempt to quit: 4/15/1992     Years since quittin.4     Smokeless tobacco: Never Used   Substance Use Topics     Alcohol use: Not Currently     Alcohol/week: 1.0 standard drinks     Types: 1 Standard drinks or equivalent per week     Comment: MAYBE ONCE A YEAR     Family History   Problem Relation Age of Onset     Unknown/Adopted Mother      Unknown/Adopted Father      Other - See Comments Son         on warfarin for one year after afganastan now off and doing well     Other - See Comments Daughter         celiac, brittle bone, arthritis, Raynauds           OBJECTIVE:                                                    /68   Pulse 72   Temp 98.7  F (37.1  C)   Resp 16   Ht 1.626 m (5' 4\")   SpO2 96%   BMI 22.31 kg/m    Body mass index is 22.31 kg/m .   APPEARANCE: = alert, moderate distress, fatigued and hard to hold her neck up     ASSESSMENT/PLAN:                        "                                 Katherin was seen today for recheck medication.    Diagnoses and all orders for this visit:    Essential hypertension  -     Cancel: C ART PRESS WAVEFORM ANALYS CENTRAL ART PRESSURE    Neurodegenerative disorder (H)    Disturbances of sensation of smell and taste    Cerebellar disease      She feels ok and sad  Feels she is getting good care.  Needs nothing today.  She will call with any needs.  There are no Patient Instructions on file for this visit.    The following health maintenance items are reviewed in Epic and correct as of today:  Health Maintenance   Topic Date Due     COPD ACTION PLAN  1940     DEPRESSION ACTION PLAN  1940     ZOSTER IMMUNIZATION (1 of 2) 05/27/1990     PHQ-9  04/11/2019     MEDICARE ANNUAL WELLNESS VISIT  08/01/2020     FALL RISK ASSESSMENT  08/01/2020     INFLUENZA VACCINE (1) 09/01/2020     ADVANCE CARE PLANNING  09/19/2024     LIPID  11/14/2024     DTAP/TDAP/TD IMMUNIZATION (4 - Td) 06/04/2026     DEXA  Completed     SPIROMETRY  Completed     PNEUMOCOCCAL IMMUNIZATION 65+ LOW/MEDIUM RISK  Completed     IPV IMMUNIZATION  Aged Out     MENINGITIS IMMUNIZATION  Aged Out     HEPATITIS B IMMUNIZATION  Aged Out       Aris Del Real MD  [facility]  Family Practice  [facility]  [phone]    For any issues my office # is [phone]

## 2020-10-08 NOTE — PATIENT INSTRUCTIONS
Acute Pharyngitis - Sore Throat   Without other symptoms I have low concern for Covid-19 or strep throat.  Please call our clinic if you start having new or worsening symptoms.    What you should do:  Saline gargles:  1/2 tsp of salt in 8 ounces of warm water.  Gargle as often as needed, at least every 3-4 hours or so.  Tylenol, ibuprofen, or naproxen, can be used as needed for the pain.  Throat sprays like Chloraseptic (available over the counter without a prescription) may also help you.  Frequent swallows of cool liquid will soothe the throat.   Lozenges or popsicles may also be helpful.    How will you know this plan is not working - warning signs:  If your temperature is not below 101 after 2-3 days, or if you are developing so much swelling you have trouble swallowing or breathing.    Who should you see if the plan is not working?  Please make an appointment to see me again or contact the clinic.    When should you be seen again?  If your symptoms fail to improve, or you develop any of the warning signs.

## 2020-10-08 NOTE — LETTER
My Depression Action Plan  Name: Katherin Fletcher   Date of Birth 1940  Date: 10/8/2020    My doctor: Aris Del Real   My clinic: [facility] [address] [place] [address] [phone]          GREEN    ZONE   Good Control    What it looks like:     Things are going generally well. You have normal ups and downs. You may even feel depressed from time to time, but bad moods usually last less than a day.   What you need to do:  1. Continue to care for yourself (see self care plan)  2. Check your depression survival kit and update it as needed  3. Follow your physician s recommendations including any medication.  4. Do not stop taking medication unless you consult with your physician first.           YELLOW         ZONE Getting Worse    What it looks like:     Depression is starting to interfere with your life.     It may be hard to get out of bed; you may be starting to isolate yourself from others.    Symptoms of depression are starting to last most all day and this has happened for several days.     You may have suicidal thoughts but they are not constant.   What you need to do:     1. Call your care team. Your response to treatment will improve if you keep your care team informed of your progress. Yellow periods are signs an adjustment may need to be made.     2. Continue your self-care.  Just get dressed and ready for the day.  Don't give yourself time to talk yourself out of it.    3. Talk to someone in your support network.    4. Open up your Depression Self-Care Plan/Wellness Kit.           RED    ZONE Medical Alert - Get Help    What it looks like:     Depression is seriously interfering with your life.     You may experience these or other symptoms: You can t get out of bed most days, can t work or engage in other necessary activities, you have trouble taking care of basic hygiene, or basic responsibilities, thoughts of suicide or death that will not go  away, self-injurious behavior.     What you need to do:  1. Call your care team and request a same-day appointment. If they are not available (weekends or after hours) call your local crisis line, emergency room or 911.            Depression Self-Care Plan / Wellness Kit    Self-Care for Depression  Here s the deal. Your body and mind are really not as separate as most people think.  What you do and think affects how you feel and how you feel influences what you do and think. This means if you do things that people who feel good do, it will help you feel better.  Sometimes this is all it takes.  There is also a place for medication and therapy depending on how severe your depression is, so be sure to consult with your medical provider and/ or Behavioral Health Consultant if your symptoms are worsening or not improving.     In order to better manage my stress, I will:    Exercise  Get some form of exercise, every day. This will help reduce pain and release endorphins, the  feel good  chemicals in your brain. This is almost as good as taking antidepressants!  This is not the same as joining a gym and then never going! (they count on that by the way ) It can be as simple as just going for a walk or doing some gardening, anything that will get you moving.      Hygiene   Maintain good hygiene (get out of bed in the morning, make your bed, brush your teeth, take a shower, and get dressed like you were going to work, even if you are unemployed).  If your clothes don't fit try to get ones that do.    Diet  Strive to eat foods that are good for me, drink plenty of water, and avoid excessive sugar, caffeine, alcohol, and other mood-altering substances.  Some foods that are helpful in depression are: complex carbohydrates, B vitamins, flaxseed, fish or fish oil, fresh fruits and vegetables.    Psychotherapy  Agree to participate in Individual Therapy (if recommended).    Medication  If prescribed medications, I agree to take  them.  Missing doses can result in serious side effects.  I understand that drinking alcohol, or other illicit drug use, may cause potential side effects.  I will not stop my medication abruptly without first discussing it with my provider.    Staying Connected With Others  Stay in touch with my friends, family members, and my primary care provider/team.    Use your imagination  Be creative.  We all have a creative side; it doesn t matter if it s oil painting, sand castles, or mud pies! This will also kick up the endorphins.    Witness Beauty  (AKA stop and smell the roses) Take a look outside, even in mid-winter. Notice colors, textures. Watch the squirrels and birds.     Service to others  Be of service to others.  There is always someone else in need.  By helping others we can  get out of ourselves  and remember the really important things.  This also provides opportunities for practicing all the other parts of the program.    Humor  Laugh and be silly!  Adjust your TV habits for less news and crime-drama and more comedy.    Control your stress  Try breathing deep, massage therapy, biofeedback, and meditation. Find time to relax each day.     Crisis Text Line  http://www.crisistextline.org    The Crisis Text Line serves anyone, in any type of crisis, providing access to free, 24/7 support and information via the medium people already use and trust:    Here's how it works:  1.  Text 174-530 from anywhere in the [place], anytime, about any type of crisis.  2.  A live, trained Crisis Counselor receives the text and responds quickly.  3.  The volunteer Crisis Counselor will help you move from a 'hot moment to a cool moment'.    My support system    Clinic Contact:  Phone number:    Contact 1:  Phone number:    Contact 2:  Phone number:    Mosque/:  Phone number:    Therapist:  Phone number:    Local crisis center:    Phone number:    Other community support:  Phone number:

## 2020-10-08 NOTE — PROGRESS NOTES
"Problem(s) Oriented visit      Video-Visit Details    Type of service:  Video Visit    Video Start Time (time video started): 1430    Video End Time (time video stopped): 1436    Originating Location (pt. Location): Home    Distant Location (provider location):  [facility]     Mode of Communication:  Video Conference via [facility]    Physician has received verbal consent for a Video Visit from the patient? Yes    This was a virtual video-visit conducted during COVID-19 outbreak in regulation with social distancing and quarantine recommendations of the CDC and [place] department of health and human services. A two way audio/video connection was used in real time with patient's consent.    EVONNE Enrique CNP    SUBJECTIVE:                                                    Katherin Fletcher is a 80 year old female who presents to clinic today for the following health issues :    Acute Illness   Acute illness concerns: Sore throat  Onset: 3 days ago    Fever: no    Chills/Sweats: no    Headache (location?): no    Sinus Pressure:no    Conjunctivitis:  no    Ear Pain: no    Rhinorrhea: no    Congestion: no    Sore Throat: YES     Cough: \"not worse than usual\"    Wheeze: no    Decreased Appetite: no    Nausea: no    Vomiting: no    Diarrhea:  no    Dysuria/Freq.: no    Fatigue/Achiness: no    Sick/Strep Exposure: no     Therapies Tried and outcome: Tylenol helps some   and daughter have seen her and they have been out in community. Neither of them have any symptoms    Problem list, Medication list, Allergies, and Medical/Social/Surgical histories reviewed in [facility] and updated as appropriate.   Additional history: as documented    ROS:  6 point ROS completed and negative except noted above, including Gen, HEENT, CV, Resp, GI    Histories:   Patient Active Problem List   Diagnosis     Long term current use of anticoagulant therapy     CAD (coronary artery disease)     Hyperlipidemia     Health " Care Home     MRSA (methicillin resistant Staphylococcus aureus)     Dysarthria     Disturbances of sensation of smell and taste     Neurologic gait disorder     Cerebellar disease     Essential hypertension     Ischemic cardiomyopathy     Peripheral polyneuropathy     PAD (peripheral artery disease) (H)     Encounter for long-term (current) use of medications     Stroke (H)     Slurred speech     Neurodegenerative disorder (H)     History of TIA (transient ischemic attack) and stroke     Falls frequently     Hypothyroidism, unspecified type     Ataxia     Hypertensive heart disease with heart failure (H)     Chronic obstructive pulmonary disease, unspecified COPD type (H)     Atrial fibrillation, unspecified type (H)     Rib fractures     Orthostatic hypotension     Depression, unspecified depression type     Other specified peripheral vascular diseases (H)     Past Surgical History:   Procedure Laterality Date     ARTHROSCOPY SHOULDER RT/LT       BUNIONECTOMY RT/LT       C CABG, ARTERY-VEIN, THREE  1/2009     C CARDIAC SURG PROCEDURE UNLIST       C HAND/FINGER SURGERY UNLISTED       C MRA UPPER EXTREMITY WO&W CONT       C SHOULDER SURG PROC UNLISTED       C STOMACH SURGERY PROCEDURE UNLISTED       CARDIAC SURGERY  1992    CABG,stents x2     CARPAL TUNNEL RELEASE RT/LT       EXCISE GANGLION HAND Right 11/29/2018    Procedure: EXCISE RIGHT VOLAR CARPAL MASS AND RIGHT INDEX TRIGGER FINGER RELEASE;  Surgeon: Minh Goldstein MD;  Location:  OR      VASCULAR SURGERY PROCEDURE UNLIST       HYSTERECTOMY, DAMIÁN  34 yo     OPEN REDUCTION INTERNAL FIXATION ANKLE Left 2015    Left Ankle     RELEASE TRIGGER FINGER       RELEASE TRIGGER FINGER Right 11/29/2018    Procedure: RIGHT INDEX TRIGGER FINGER RELEASE;  Surgeon: Minh Goldstein MD;  Location:  OR     REPAIR HAMMER TOE Right 12/19/2017    Procedure: REPAIR HAMMER TOE;  Right 2nd and 3rd toe pinning;  Surgeon: Fernando Wiggins MD;  Location:  OR     STENT  2006      STENT(aka CARDIAC)  2008     VASCULAR SURGERY         Social History     Tobacco Use     Smoking status: Former Smoker     Packs/day: 0.50     Years: 22.00     Pack years: 11.00     Types: Cigarettes     Quit date: 4/15/1992     Years since quittin.5     Smokeless tobacco: Never Used   Substance Use Topics     Alcohol use: Not Currently     Alcohol/week: 1.0 standard drinks     Types: 1 Standard drinks or equivalent per week     Comment: MAYBE ONCE A YEAR     Family History   Problem Relation Age of Onset     Unknown/Adopted Mother      Unknown/Adopted Father      Other - See Comments Son         on warfarin for one year after afganastan now off and doing well     Other - See Comments Daughter         celiac, brittle bone, arthritis, Raynauds           OBJECTIVE:                                                    No vitals taken due to telehealth visit    APPEARANCE: = Elderly female in no acute distress  Conj/Eyelids = noninjected and lids and lashes are without inflammation  Ears/Nose = External structures and Nares have usual shape and form  Resp effort = Calm regular breathing, no coughing  Mood/Affect = Cooperative and interested     ASSESSMENT/PLAN:                                                      Katherin was seen today for throat pain.    Diagnoses and all orders for this visit:    Sore throat  Symptomatic treatments discussed with patient. Recommend that extra visitors do not come over until she is feeling better for 3 days. Recommend follow-up if symptoms worsening or if new symptoms develop.      See Patient Instructions  Patient Instructions   Acute Pharyngitis - Sore Throat   Without other symptoms I have low concern for Covid-19 or strep throat.  Please call our clinic if you start having new or worsening symptoms.    What you should do:  Saline gargles:  1/2 tsp of salt in 8 ounces of warm water.  Gargle as often as needed, at least every 3-4 hours or so.  Tylenol, ibuprofen, or naproxen,  can be used as needed for the pain.  Throat sprays like Chloraseptic (available over the counter without a prescription) may also help you.  Frequent swallows of cool liquid will soothe the throat.   Lozenges or popsicles may also be helpful.    How will you know this plan is not working - warning signs:  If your temperature is not below 101 after 2-3 days, or if you are developing so much swelling you have trouble swallowing or breathing.    Who should you see if the plan is not working?  Please make an appointment to see me again or contact the clinic.    When should you be seen again?  If your symptoms fail to improve, or you develop any of the warning signs.         The following health maintenance items are reviewed in Epic and correct as of today:  Health Maintenance   Topic Date Due     COPD ACTION PLAN  1940     DEPRESSION ACTION PLAN  1940     ZOSTER IMMUNIZATION (1 of 2) 05/27/1990     PHQ-9  04/11/2019     MEDICARE ANNUAL WELLNESS VISIT  08/01/2020     FALL RISK ASSESSMENT  08/01/2020     INFLUENZA VACCINE (1) 09/01/2020     ADVANCE CARE PLANNING  09/19/2024     LIPID  11/14/2024     DTAP/TDAP/TD IMMUNIZATION (4 - Td) 06/04/2026     DEXA  Completed     SPIROMETRY  Completed     Pneumococcal Vaccine: 65+ Years  Completed     Pneumococcal Vaccine: Pediatrics (0 to 5 Years) and At-Risk Patients (6 to 64 Years)  Aged Out     IPV IMMUNIZATION  Aged Out     MENINGITIS IMMUNIZATION  Aged Out     HEPATITIS B IMMUNIZATION  Aged Out       EVONNE Enrique CNP  [facility]  Family Practice  [facility]  [phone]    For any issues my office # is [phone]

## 2020-11-05 NOTE — LETTER
11/5/2020       RE: Katherin Fletcher  [address]  [facility] [id number]     Dear Colleague,    Thank you for referring your patient, Katherin Fletcher, to the [facility] PHYSICAL [facility]ICINE [facility]IL[facility] CLINIC [place] at [facility]. Please see a copy of my visit note below.    BOTULINUM TOXIN PROCEDURE NOTE    Chief Complaint   Patient presents with     Botox     UMP RETURN BOTOX      /80   Pulse 63   Temp 98.4  F (36.9  C)   Resp 16   SpO2 96%     Current Outpatient Medications:      acetaminophen (TYLENOL) 500 MG tablet, Take 1-2 tablets (500-1,000 mg) by mouth every 6 hours as needed for mild pain, Disp: , Rfl:      Alfalfa 250 MG TABS, Take 250 mg by mouth every morning , Disp: , Rfl:      apixaban ANTICOAGULANT (ELIQUIS) 5 MG tablet, Take 1 tablet (5 mg) by mouth 2 times daily, Disp: 180 tablet, Rfl: 3     calcium carbonate-vitamin D (CALCIUM + D) 600-200 MG-UNIT TABS, Take 1 tablet by mouth At Bedtime , Disp: , Rfl:      carbidopa-levodopa (SINEMET)  MG tablet, Take 1 tablet three times daily and 2 tablets at bedtime, Disp: , Rfl:      carbidopa-levodopa (SINEMET)  MG tablet, Take 2 tablets by mouth At Bedtime, Disp: , Rfl:      carvedilol (COREG) 6.25 MG tablet, Take 1 tablet (6.25 mg) by mouth 2 times daily (with meals), Disp: 180 tablet, Rfl: 3     clopidogrel (PLAVIX) 75 MG tablet, Take 1 tablet (75 mg) by mouth daily, Disp: 90 tablet, Rfl: 3     glycopyrrolate (ROBINUL) 1 MG tablet, Take 1 tablet (1 mg) by mouth 3 times daily, Disp: 270 tablet, Rfl: 1     hyoscyamine (ANASPAZ/LEVSIN) 0.125 MG tablet, Take 1 tablet (125 mcg) by mouth every 8 hours as needed for other (drooling (sialorrhea)), Disp: 90 tablet, Rfl: 1     isosorbide mononitrate (IMDUR) 30 MG 24 hr tablet, Take 1 tablet (30 mg) by mouth daily, Disp: 90 tablet, Rfl: 3     LEVOXYL 112 MCG tablet, TAKE 1 TABLET DAILY, Disp: 90 tablet, Rfl: 4     Multiple  Vitamins-Minerals (DAILY MULTI) TABS, Take 1 tablet by mouth every morning , Disp: , Rfl:      nitroGLYcerin (NITROSTAT) 0.4 MG sublingual tablet, DISSOLVE ONE TABLET UNDER THE TONGUE EVERY 5 MINUTES AS NEEDED FOR CHEST PAIN.  DO NOT EXCEED A TOTAL OF 3 DOSES IN 15 MINUTES, Disp: 25 tablet, Rfl: 3     omeprazole (PRILOSEC) 20 MG DR capsule, Take 1 capsule (20 mg) by mouth 2 times daily 30-60 minutes before meal, Disp: 60 capsule, Rfl: 11     rosuvastatin (CRESTOR) 20 MG tablet, Take 1 tablet (20 mg) by mouth daily, Disp: 90 tablet, Rfl: 3     tiZANidine (ZANAFLEX) 2 MG tablet, TAKE 3 TABLETS THREE TIMES A DAY, Disp: 270 tablet, Rfl: 5    Current Facility-Administered Medications:      botulinum toxin type A (BOTOX) 100 units injection 400 Units, 400 Units, Intramuscular, Q90 Days, Darline Celestin MD     Allergies   Allergen Reactions     Ace Inhibitors Cough     Baclofen Other (See Comments)     Constipation , tiredness     Penicillins      10/7/14 Hives per patient -- many many years ago       Nickel Swelling and Rash     Other reaction(s): Unknown        PHYSICAL EXAM:  Gen: NAD, sitting in a wheelchair  HEENT: soft C collar in place  Pulm: non-labored breathing in room air  Ext: no edema in bilateral lower extremities, skin was intact   Neuro/MSK:   Forward head posture and dropped head  Dysarthric and spastic speech   Noted diffuse muscle atrophy at bilateral upper and lower extremities  Also noted sialorrhea and she had to use Kleenex often during my interview and exam  Full strength and sensation exam was not done today  Tone was increased in bilateral lower extremities with 1+ out of 4 at the knee flexion and hip abduction, and 2 out of 4 at plantar flexion bilaterally    HPI:  Kathrein Fletcher is a 80 year old female with h/o ALS who is followed in our clinic regarding spasticity and sialorrhea management. PMH is also significant for A fib, CAD, HLD and TIA in 5/2018. She was seen in clinic as initial  consult (referral from Dr. Corrales) on 10/11/18. Last seen in clinic 20 for Botox injections. Refer to clinic note on 20 for the details regarding her history.     She was hospitalized for one day -9/3 with near syncope and hypotension.    Saw Dr. Driver  virtually - tizanidine dose was adjusted to help drowsiness as potential side effects (now taking ). She is also on levadopa. Sertraline was switched to remeron.     Patient denies new medical diagnoses, illnesses, hospitalizations, emergency room visits, and injuries since the previous injection with botulinum neurotoxin. She had a fall 3 weeks ago; lost her balance when walking at home.     We reviewed the recommended safety guidelines for  Botox from any vaccine injection, such as the seasonal flu vaccine, by a minimum of 10-14 days with Katherin Fletcher. She acknowledged understanding.    RESPONSE TO PREVIOUS TREATMENT:  Reported about 15% improvement of her spasms in bilateral lower extremities following previous series of injections. Similar improvement of her drooling. Both lasted for about 8 weeks with gradual decline in benefits. No side effects.     BOTULINUM NEUROTOXIN INJECTION PROCEDURES:    VERIFICATION OF PATIENT IDENTIFICATION AND PROCEDURE     Initials   Patient Name ps   Patient  ps   Procedure Verified by: ps     Prior to the start of the procedure and with procedural staff participation, I verbally confirmed the patient s identity using two indicators, relevant allergies, that the procedure was appropriate and matched the consent or emergent situation, and that the correct equipment/implants were available. Immediately prior to starting the procedure I conducted the Time Out with the procedural staff and re-confirmed the patient s name, procedure, and site/side. (The Joint Commission universal protocol was followed.)  Yes    Sedation (Moderate or Deep): None    Above assessments performed by:  Darline  Sabi ROBERSON    INDICATION/S FOR PROCEDURE/S:  Katherin Fletcher is a 80 year old patient with  spasticity affecting the right lower extremity and left lower extremity secondary to a diagnosis of ALS with associated  pain, spasms, loss of joint motion, loss of volitional motor control, difficulty with activities of daily living and difficulty with ambulation and transfers. She also has significant sialorrhea secondary to ALS.       Her baseline symptoms have been recalcitrant to oral medications and conservative therapy.  She is here today for an injection of Botox.      GOAL OF PROCEDURE:  The goal of this procedure is to increase active range of motion, improve volitional motor control, decrease pain , enhance functional independence associated with spasticity and decrease excessive drooling      TOTAL DOSE USED: 300 units   Dose Administered: 200 units Botox  2:1 Dilution for bilateral lower extremities   Diluent Used: Bupivacaine 0.25% (Lot: [id number], Exp: 6/2023, NDC:11074-878-03)    Dose Administered: 100 units 2:1 Dilution for bilateral parotid glands   Diluent Used: Preservative Free Normal Saline    Lot # /[id number] with Expiration Date: 6/2023  NDC #: Botox 100u ([address]-34)     Medication guide was offered to patient and was declined.    CONSENT:  The risks, benefits, and treatment options were discussed with Katherin Fletcher and she agreed to proceed.      Written consent was obtained by PS.     EQUIPMENT USED:  Needle-37mm stimulating/recording  EMG/NCS Machine  Needle-30G  Ultrasound    SKIN PREPARATION:  Skin preparation was performed using an alcohol wipe.    GUIDANCE DESCRIPTION:  Electro-myographic and Ultrasound guidance were necessary throughout the procedure to accurately identify all areas of spastic muscles while avoiding injection of non-spastic muscles, neighboring nerves and nearby vascular structures.     AREA/MUSCLE INJECTED:    Right gastrocnemius 100 units (at medial and lateral  heads)  Left gastrocnemius 100 units (at medial and lateral heads)    Right parotid  30 units  Left Parotid 30 units     Right submandibular 20 units  Left submandibular 20 units    RESPONSE TO PROCEDURE:  Katherin Fletcher tolerated the procedure well and there were no immediate complications.  She was allowed to recover for an appropriate period of time and was discharged home in stable condition.    FOLLOW UP:  Katherin Fletcher was asked to follow up by phone in 7-14 days with Marissa Davey RN, Care Coordinator, to report her response to this series of injections.  Based on the patient's previous response to this therapy, Katherin Fletcher was rescheduled for the next series of injections in 12 weeks.    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 Romo podiatry team    Bilateral carpal tunnel syndrome. EMG also showed right ulnar neuropathy  - was seen and evaluated by Dr. Mario     S/p right volar carpal ganglion cyst and trigger finger     We have reviewed the treatment options for spasticity including therapies/HEP, medications, and injections. Strongly recommended to continue regular exercises at home as much as possible.  Therapies are limited right now due to current situation with coronavirus and needed social isolation.     She had side effects with baclofen in the past; tizanidine was restarted by Dr. Driver and has continued; current dose is 4/6/6mg.  She is also on Sinemet 5 tablets/day total.  She tried THS/CBD mix through the [facility] for more than 6 months but discontinued due to no benefits.  Other medication options are limited due to risk of sedation and falls.  After multiple conversations with Dr. Driver she agreed with the plan to do Botox injections.  We started at small dose and with calf muscles only with the plan to gradually titrate the dose and modify the sites of injections for optimal result.      We have also iscussed the plan for sialorrhea management.  This is her second difficult symptom.  She is currently on Robinul 2 mg 3 times a day with limited effect.  Other medication options including amitriptyline or atropine were considered but not restarted due to potential risks.       1. Work-up: none today; should have CMP every 6 months as long as she is on tizanidine (last CMP 9/2 with slightly elevated AST).      2. Therapy/equipment/braces: continue regular home exercises.  She has been using a soft cervical collar to help with neck weakness and pain. Discussed the risks of muscle atrophy secondary to disuse. She has a new head rest with head band which has been beneficial. But it causes some neck pain due to stretch. Recommended to use topical agents like icy hot patch and OTC TENS unit to help with neck pain. She will also contact seating clinic to get a wider head band for comfort.      3. Medications: continue tizanidine and sinemet.     4. Interventions: repeat chemo-denervation for spasticity and sialorrhea management. Increased the dose from 200 to 300 units today.     5. Referral / follow up with other providers: neurology team as scheduled. No new referral today.     6. Follow up: in 12 weeks    Darline Celestin MD  Physical Medicine & Rehabilitation

## 2020-11-05 NOTE — PROGRESS NOTES
BOTULINUM TOXIN PROCEDURE NOTE    Chief Complaint   Patient presents with     Botox     [facility] RETURN BOTOX        /80   Pulse 63   Temp 98.4  F (36.9  C)   Resp 16   SpO2 96%       Current Outpatient Medications:      acetaminophen (TYLENOL) 500 MG tablet, Take 1-2 tablets (500-1,000 mg) by mouth every 6 hours as needed for mild pain, Disp: , Rfl:      Alfalfa 250 MG TABS, Take 250 mg by mouth every morning , Disp: , Rfl:      apixaban ANTICOAGULANT (ELIQUIS) 5 MG tablet, Take 1 tablet (5 mg) by mouth 2 times daily, Disp: 180 tablet, Rfl: 3     calcium carbonate-vitamin D (CALCIUM + D) 600-200 MG-UNIT TABS, Take 1 tablet by mouth At Bedtime , Disp: , Rfl:      carbidopa-levodopa (SINEMET)  MG tablet, Take 1 tablet three times daily and 2 tablets at bedtime, Disp: , Rfl:      carbidopa-levodopa (SINEMET)  MG tablet, Take 2 tablets by mouth At Bedtime, Disp: , Rfl:      carvedilol (COREG) 6.25 MG tablet, Take 1 tablet (6.25 mg) by mouth 2 times daily (with meals), Disp: 180 tablet, Rfl: 3     clopidogrel (PLAVIX) 75 MG tablet, Take 1 tablet (75 mg) by mouth daily, Disp: 90 tablet, Rfl: 3     glycopyrrolate (ROBINUL) 1 MG tablet, Take 1 tablet (1 mg) by mouth 3 times daily, Disp: 270 tablet, Rfl: 1     hyoscyamine (ANASPAZ/LEVSIN) 0.125 MG tablet, Take 1 tablet (125 mcg) by mouth every 8 hours as needed for other (drooling (sialorrhea)), Disp: 90 tablet, Rfl: 1     isosorbide mononitrate (IMDUR) 30 MG 24 hr tablet, Take 1 tablet (30 mg) by mouth daily, Disp: 90 tablet, Rfl: 3     LEVOXYL 112 MCG tablet, TAKE 1 TABLET DAILY, Disp: 90 tablet, Rfl: 4     Multiple Vitamins-Minerals (DAILY MULTI) TABS, Take 1 tablet by mouth every morning , Disp: , Rfl:      nitroGLYcerin (NITROSTAT) 0.4 MG sublingual tablet, DISSOLVE ONE TABLET UNDER THE TONGUE EVERY 5 MINUTES AS NEEDED FOR CHEST PAIN.  DO NOT EXCEED A TOTAL OF 3 DOSES IN 15 MINUTES, Disp: 25 tablet, Rfl: 3     omeprazole (PRILOSEC) 20 MG DR capsule,  Take 1 capsule (20 mg) by mouth 2 times daily 30-60 minutes before meal, Disp: 60 capsule, Rfl: 11     rosuvastatin (CRESTOR) 20 MG tablet, Take 1 tablet (20 mg) by mouth daily, Disp: 90 tablet, Rfl: 3     tiZANidine (ZANAFLEX) 2 MG tablet, TAKE 3 TABLETS THREE TIMES A DAY, Disp: 270 tablet, Rfl: 5    Current Facility-Administered Medications:      botulinum toxin type A (BOTOX) 100 units injection 400 Units, 400 Units, Intramuscular, Q90 Days, Darline Celestin MD     Allergies   Allergen Reactions     Ace Inhibitors Cough     Baclofen Other (See Comments)     Constipation , tiredness     Penicillins      10/7/14 Hives per patient -- many many years ago       Nickel Swelling and Rash     Other reaction(s): Unknown        PHYSICAL EXAM:  Gen: NAD, sitting in a wheelchair  HEENT: soft C collar in place  Pulm: non-labored breathing in room air  Ext: no edema in bilateral lower extremities, skin was intact   Neuro/MSK:   Forward head posture and dropped head  Dysarthric and spastic speech   Noted diffuse muscle atrophy at bilateral upper and lower extremities  Also noted sialorrhea and she had to use Kleenex often during my interview and exam  Full strength and sensation exam was not done today  Tone was increased in bilateral lower extremities with 1+ out of 4 at the knee flexion and hip abduction, and 2 out of 4 at plantar flexion bilaterally        HPI:  Katherin Fletcher is a 80 year old female with h/o ALS who is followed in our clinic regarding spasticity and sialorrhea management. PMH is also significant for A fib, CAD, HLD and TIA in 5/2018. She was seen in clinic as initial consult (referral from Dr. Corrales) on 10/11/18. Last seen in clinic 7/30/20 for Botox injections. Refer to clinic note on 4/9/20 for the details regarding her history.     She was hospitalized for one day 9/2-9/3 with near syncope and hypotension.    Saw Dr. Driver 9/16 virtually - tizanidine dose was adjusted to help drowsiness as  potential side effects (now taking ). She is also on levadopa. Sertraline was switched to remeron.       Patient denies new medical diagnoses, illnesses, hospitalizations, emergency room visits, and injuries since the previous injection with botulinum neurotoxin. She had a fall 3 weeks ago; lost her balance when walking at home.     We reviewed the recommended safety guidelines for  Botox from any vaccine injection, such as the seasonal flu vaccine, by a minimum of 10-14 days with Katherin Fletcher. She acknowledged understanding.        RESPONSE TO PREVIOUS TREATMENT:  Reported about 15% improvement of her spasms in bilateral lower extremities following previous series of injections. Similar improvement of her drooling. Both lasted for about 8 weeks with gradual decline in benefits. No side effects.       BOTULINUM NEUROTOXIN INJECTION PROCEDURES:    VERIFICATION OF PATIENT IDENTIFICATION AND PROCEDURE     Initials   Patient Name ps   Patient  ps   Procedure Verified by: ps     Prior to the start of the procedure and with procedural staff participation, I verbally confirmed the patient s identity using two indicators, relevant allergies, that the procedure was appropriate and matched the consent or emergent situation, and that the correct equipment/implants were available. Immediately prior to starting the procedure I conducted the Time Out with the procedural staff and re-confirmed the patient s name, procedure, and site/side. (The Joint Commission universal protocol was followed.)  Yes    Sedation (Moderate or Deep): None      Above assessments performed by:  Darline Celestin MD      INDICATION/S FOR PROCEDURE/S:  Katherin Fletcher is a 80 year old patient with  spasticity affecting the right lower extremity and left lower extremity secondary to a diagnosis of ALS with associated  pain, spasms, loss of joint motion, loss of volitional motor control, difficulty with activities of daily living  and difficulty with ambulation and transfers. She also has significant sialorrhea secondary to ALS.       Her baseline symptoms have been recalcitrant to oral medications and conservative therapy.  She is here today for an injection of Botox.        GOAL OF PROCEDURE:  The goal of this procedure is to increase active range of motion, improve volitional motor control, decrease pain , enhance functional independence associated with spasticity and decrease excessive drooling      TOTAL DOSE USED: 300 units   Dose Administered: 200 units Botox  2:1 Dilution for bilateral lower extremities   Diluent Used: Bupivacaine 0.25% (Lot: [id number], Exp: 6/2023, NDC:17696-617-91)    Dose Administered: 100 units 2:1 Dilution for bilateral parotid glands   Diluent Used: Preservative Free Normal Saline    Lot # /[id number] with Expiration Date: 6/2023  NDC #: Botox 100u ([address]-78)     Medication guide was offered to patient and was declined.    CONSENT:  The risks, benefits, and treatment options were discussed with Katherin Fletcher and she agreed to proceed.      Written consent was obtained by PS.       EQUIPMENT USED:  Needle-37mm stimulating/recording  EMG/NCS Machine  Needle-30G  Ultrasound    SKIN PREPARATION:  Skin preparation was performed using an alcohol wipe.    GUIDANCE DESCRIPTION:  Electro-myographic and Ultrasound guidance were necessary throughout the procedure to accurately identify all areas of spastic muscles while avoiding injection of non-spastic muscles, neighboring nerves and nearby vascular structures.     AREA/MUSCLE INJECTED:    Right gastrocnemius 100 units (at medial and lateral heads)  Left gastrocnemius 100 units (at medial and lateral heads)    Right parotid  30 units  Left Parotid 30 units     Right submandibular 20 units  Left submandibular 20 units    RESPONSE TO PROCEDURE:  Katherin Fletcher tolerated the procedure well and there were no immediate complications.  She was allowed to recover  for an appropriate period of time and was discharged home in stable condition.    FOLLOW UP:  Katherin Fletcher was asked to follow up by phone in 7-14 days with Marissa Davey, RN, Care Coordinator, to report her response to this series of injections.  Based on the patient's previous response to this therapy, Katherin Fletcher was rescheduled for the next series of injections in 12 weeks.      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 Romo podiatry team    Bilateral carpal tunnel syndrome. EMG also showed right ulnar neuropathy  - was seen and evaluated by Dr. Mario     S/p right volar carpal ganglion cyst and trigger finger        We have reviewed the treatment options for spasticity including therapies/HEP, medications, and injections. Strongly recommended to continue regular exercises at home as much as possible.  Therapies are limited right now due to current situation with coronavirus and needed social isolation.     She had side effects with baclofen in the past; tizanidine was restarted by Dr. Driver and has continued; current dose is 4/6/6mg.  She is also on Sinemet 5 tablets/day total.  She tried THS/CBD mix through the [place] [facility] for more than 6 months but discontinued due to no benefits.  Other medication options are limited due to risk of sedation and falls.  After multiple conversations with Dr. Driver she agreed with the plan to do Botox injections.  We started at small dose and with calf muscles only with the plan to gradually  titrate the dose and modify the sites of injections for optimal result.      We have also iscussed the plan for sialorrhea management.  This is her second difficult symptom.  She is currently on Robinul 2 mg 3 times a day with limited effect.  Other medication options including amitriptyline or atropine were considered but not restarted due to potential risks.             1. Work-up: none today; should have CMP every 6 months as long as she is on tizanidine (last CMP 9/2 with slightly elevated AST).      2. Therapy/equipment/braces: continue regular home exercises.  She has been using a soft cervical collar to help with neck weakness and pain. Discussed the risks of muscle atrophy secondary to disuse. She has a new head rest with head band which has been beneficial. But it causes some neck pain due to stretch. Recommended to use topical agents like icy hot patch and OTC TENS unit to help with neck pain. She will also contact seating clinic to get a wider head band for comfort.      3. Medications: continue tizanidine and sinemet.     4. Interventions: repeat chemo-denervation for spasticity and sialorrhea management. Increased the dose from 200 to 300 units today.     5. Referral / follow up with other providers: neurology team as scheduled. No new referral today.     6. Follow up: in 12 weeks      Darline Celestin MD  Physical Medicine & Rehabilitation

## 2020-11-11 NOTE — PROGRESS NOTES
Echo 11/11/2020 noted. To be reviewed at OV 11/16/2020.  Patient has a history of ischemic CM, mild MR, paroxysmal A. Fib, CAD (s/p CABG in 1992 w/ re-do in 2009)    Echo: Left ventricular systolic function is moderately reduced. The visual ejection fraction is estimated at 40%. Diastolic Doppler findings (E/E' ratio and/or other parameters) suggest left ventricular filling pressures are increased. Severe Distal anteroseptal apical hypokinesis. The right ventricular systolic function is mildly reduced. There is severe biatrial enlargement. Compared to prior study, changes are noted.    Previous echo was 2018: EF 45-50%, apical akinesis noted.    Will message Dr. Boyd to review prior to OV.

## 2020-11-12 PROBLEM — E46 PROTEIN-CALORIE MALNUTRITION (H): Status: ACTIVE | Noted: 2020-01-01

## 2020-11-12 NOTE — NURSING NOTE
Chief Complaint   Patient presents with     Follow Up     [facility] RETURN ALS/MOTOR NEURON        [facility]

## 2020-11-12 NOTE — PATIENT INSTRUCTIONS
For the excessive saliva: Please take Robinul (glycopyrrolate) as instructed, ie 1 pill three times a day. If that is not enough increase the dose to 2 pills three times a day.    Your breathing capacity has declined significantly from the last time we measured it (which was 1/2020). This means that your breathing muscles are getting quite weak from the ALS. I would recommend starting a BiPAP machine at night to improve your sleep and oxygenation.    Please send a MyChart if you would like us to order the BiPAP machine for you    I think you should discuss with your family about advance directives/end of life choices at this point.    A discussion with palliative care team would be very useful in that regard; I will refer you.    Continue the medications you are currently taking without any dose changes.    I will try to renew your home care therapy orders.    I am not sure that putting a feeding tube at this point would help. In addition the procedure may be risky with so low breathing muscle strength.     Follow up in 3 months

## 2020-11-12 NOTE — LETTER
2020       RE: Katherin Fletcher  [address] [facility] [id number]     Dear Colleague,    Thank you for referring your patient, Katherin Fletcher, to the [facility] [place] [facility]. Please see a copy of my visit note below.    Service Date: 2020      Aris Del Real MD   [facility]    [address] [place] [address]      RE: Katherin Fletcher    MRN: [id number]   : 1940      Dear Dr. Del Real:       I had the pleasure to evaluate Katherin in person today at the [facility] at the [facility]. 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  Our last virtual visit was in 2020.  She has noticed substantial progression of her symptoms in the last few months, including much more weakness of her upper extremities. She cannot lift her right arm anymore. She needs assistance with all the ADLs from her daughter or home care.  She cannot hold a pen or feed herself, etc.  She is paraplegic, and she has prominent spasticity, and leg spasms are challenging to treat.  She is on Botox every 3 months administered by our [facility], and her last injection was a week ago.  Lately, her muscle spasms and pain are a bit better controlled  than they were a few months ago.  She is taking tizanidine 4 mg in the morning, 6 at noon and 6 in the evening and levodopa, among others.  During her last visit, we discussed issues with sleep disruption and depression.  I put her on mirtazapine, and she is now at 22.5 mg at bedtime that she finds helpful.  For her excessive daytime sleepiness, I prescribed her modafinil, and she also finds it helpful.  She is more alert during the day.      Her bulbar symptoms have also worsened.  She has a home care case, and this included PT and OT.  PT has discharged her.  She has not  seen Speech therapy via home care, although I have requested this. She is still intelligible, but has to repeat herself to others and occasionally uses augmentative communication.  Sialorrhea and thick secretions are extremely bothersome.  Again, she is on an aggressive treatment regimen, and she is getting Botox every 3 months with the last injection a week ago.  She still feels that even the higher dose of Botox she received last week was not very helpful.  She is on glycopyrrolate, too.  She has been instructed to take 1-2 pills 3 times a day, but she only takes actually 1 pill twice a day, and I verified this with her daughter.  She does not use any BiPAP.  She feels more dyspneic lately when talking or transferring, although she denies clear orthopnea.      MEDICATIONS:  Reviewed and are as per Epic record.      PFT Latest Ref Rng & Units 11/12/2020   FVC L 0.95   FEV1 L 0.72   FVC% % 37   FEV1% % 37     MIP low -36.     PHYSICAL EXAMINATION:  /65   Pulse 91   Resp 16   Wt 53.1 kg (117 lb)   SpO2 95%   BMI 20.08 kg/m      On a focused examination, she has very weak neck flexion that is now 3/5 or less, and so is neck extension.  She has moderate weakness of orbicularis oculi and oris.  Tongue shows probably mild fasciculations, although not striking atrophy.  Lateral tongue movements are slow, and there is a positive jaw jerk.  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  She has less than antigravity right shoulder abduction and about 3 to 4- on the left.  Her biceps strength is 3 to 4- on the right, 4 on the left.  Triceps is similar.  Wrist extension is 4/5.  Intrinsic hand muscles are 4 on both sides.  Leg strength is about antigravity for hip flexion, 4 distally.  She has moderate leg spasticity, but the tone in the arm is not spastic.      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  We have not had a  chance for an in-person visit since 2020. I spent about 40 minutes with her today, of which more than half was counseling.  Perhaps the most disconcerting finding is the  major drop in her forced vital capacity that is now 37% of normal.  She does have mild dyspnea when transferring or talking.  I would recommend starting noninvasive ventilation at night.  I also discussed the poor prognosis that this number confers and that it is about time for her to discuss advance directives and end-of-life choices.  She seems to be oriented more towards comfort care.  I will refer her to Palliative Care Medicine for further discussion of this topic.      Regarding her bulbar symptoms, she will continue Botox for her sialorrhea.  I asked her to increase her Robinul up to 2 mg t.i.d. as necessary.  If that is not enough, one may consider low-dose radiation of the salivary glands or a scopolamine patch.      I will renew her home care orders.  She does have needs among all domains, including Speech, PT and OT.  She will continue modafinil for excessive daytime sleepiness, but it is possible that disrupted nighttime sleep and hypoventilation are contributing to this symptom, and I hope that this will get better after starting BiPAP.      I will see Katherin in followup in 3 months or sooner if needed.  Because she is a very high-risk patient for COVID19 complications (age over 80, hypoventilation and bulbar dysfunction due to ALS) I would rather the future visits are done remotely if possible.      Thank you for allowing me to participate in her care.        Sincerely,      MD KLAUDIA Allen MD             D: 2020   T: 2020   MT: elizabeth      Name:     KATHERIN RODRIGUEZ   MRN:      [id number]        Account:      [id number]   :      1940           Service Date: 2020      Document: [id number]

## 2020-11-12 NOTE — PROGRESS NOTES
Service Date: 2020      Aris Del Real MD   [facility]    [address] [place] [address]      RE: Kahterin Fletcher    MRN: [id number]   : 1940      Dear Dr. Del Real:       I had the pleasure to evaluate Katherin in person today at the [facility] at the [facility]. 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  Our last virtual visit was in 2020.  She has noticed substantial progression of her symptoms in the last few months, including much more weakness of her upper extremities. She cannot lift her right arm anymore. She needs assistance with all the ADLs from her daughter or home care.  She cannot hold a pen or feed herself, etc.  She is paraplegic, and she has prominent spasticity, and leg spasms are challenging to treat.  She is on Botox every 3 months administered by our [facility], and her last injection was a week ago.  Lately, her muscle spasms and pain are a bit better controlled  than they were a few months ago.  She is taking tizanidine 4 mg in the morning, 6 at noon and 6 in the evening and levodopa, among others.  During her last visit, we discussed issues with sleep disruption and depression.  I put her on mirtazapine, and she is now at 22.5 mg at bedtime that she finds helpful.  For her excessive daytime sleepiness, I prescribed her modafinil, and she also finds it helpful.  She is more alert during the day.      Her bulbar symptoms have also worsened.  She has a home care case, and this included PT and OT.  PT has discharged her.  She has not seen Speech therapy via home care, although I have requested this. She is still intelligible, but has to repeat herself to others and occasionally uses augmentative communication.  Sialorrhea and thick secretions are extremely bothersome.  Again, she is on an aggressive treatment regimen, and she is getting Botox every 3  months with the last injection a week ago.  She still feels that even the higher dose of Botox she received last week was not very helpful.  She is on glycopyrrolate, too.  She has been instructed to take 1-2 pills 3 times a day, but she only takes actually 1 pill twice a day, and I verified this with her daughter.  She does not use any BiPAP.  She feels more dyspneic lately when talking or transferring, although she denies clear orthopnea.      MEDICATIONS:  Reviewed and are as per Epic record.      PFT Latest Ref Rng & Units 11/12/2020   FVC L 0.95   FEV1 L 0.72   FVC% % 37   FEV1% % 37     MIP low -36.     PHYSICAL EXAMINATION:  /65   Pulse 91   Resp 16   Wt 53.1 kg (117 lb)   SpO2 95%   BMI 20.08 kg/m      On a focused examination, she has very weak neck flexion that is now 3/5 or less, and so is neck extension.  She has moderate weakness of orbicularis oculi and oris.  Tongue shows probably mild fasciculations, although not striking atrophy.  Lateral tongue movements are slow, and there is a positive jaw jerk.  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  She has less than antigravity right shoulder abduction and about 3 to 4- on the left.  Her biceps strength is 3 to 4- on the right, 4 on the left.  Triceps is similar.  Wrist extension is 4/5.  Intrinsic hand muscles are 4 on both sides.  Leg strength is about antigravity for hip flexion, 4 distally.  She has moderate leg spasticity, but the tone in the arm is not spastic.      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  We have not had a chance for an in-person visit since 01/2020. I spent about 40 minutes with her today, of which more than half was counseling.  Perhaps the most disconcerting finding is the  major drop in her forced vital capacity that is now 37% of normal.  She does have mild dyspnea when transferring or talking.  I would recommend  starting noninvasive ventilation at night.  I also discussed the poor prognosis that this number confers and that it is about time for her to discuss advance directives and end-of-life choices.  She seems to be oriented more towards comfort care.  I will refer her to Palliative Care Medicine for further discussion of this topic.      Regarding her bulbar symptoms, she will continue Botox for her sialorrhea.  I asked her to increase her Robinul up to 2 mg t.i.d. as necessary.  If that is not enough, one may consider low-dose radiation of the salivary glands or a scopolamine patch.      I will renew her home care orders.  She does have needs among all domains, including Speech, PT and OT.  She will continue modafinil for excessive daytime sleepiness, but it is possible that disrupted nighttime sleep and hypoventilation are contributing to this symptom, and I hope that this will get better after starting BiPAP.      I will see Ana in followup in 3 months or sooner if needed.  Because she is a very high-risk patient for COVID19 complications (age over 80, hypoventilation and bulbar dysfunction due to ALS) I would rather the future visits are done remotely if possible.      Thank you for allowing me to participate in her care.        Sincerely,      Travon Willard MD     ADDENDUM (2021): Patient called with increasing secretions in the oral cavity which are very uncomfortable, and continue despite pharmacological measures outlined above. At this point, I recommend regular use of a suction machine. This equipment is medically necessary and will be prescribed for the patient.       TRAVON WILLARD MD             D: 2020   T: 2020   MT: elizabeth      Name:     ANA RODRIGUEZ   MRN:      [id number]        Account:      [id number]   :      1940           Service Date: 2020      Document: [id number]

## 2020-11-13 NOTE — TELEPHONE ENCOUNTER
I am ok with the request for SN, and home care with physical, speech, and occupational therapies for Katherin Fletcher. GM

## 2020-11-13 NOTE — TELEPHONE ENCOUNTER
Regarding homecare orders, planning to recertify Katherin and will add the PT and ST orders, also request ok for SN for medications and HHA for showering assist  Thank you, Fiona Schilling OTRL

## 2020-11-13 NOTE — TELEPHONE ENCOUNTER
My Chart message from patient:  Would I be able to get a flu shot when I come in?     Thanks   Katherin     Reply to patient:    Good Morning, Ms. Fletcher,    I'm sorry, but the heart clinic does not offer flu shots. Those would have to set up with your family doctor or a local pharmacy.    Team 2 RNs  [phone]

## 2020-11-16 NOTE — LETTER
11/16/2020    Aris Del Real MD  [address]  [facility] [address]    RE: Katherin Fletcher       Dear Colleague,    I had the pleasure of seeing Katherin Fletcher in the [facility].    HPI and Plan:     I had the pleasure seeing Ms. Fletcher in follow-up at the [facility] today.  She is a very pleasant 80-year-old female who was I last saw in 07/2019.     Her cardiac history includes coronary artery disease with history of 2 prior coronary artery bypass surgeries. She underwent  saphenous vein grafting to the LAD and OM in 1992 for an anterior wall myocardial infarction.  She then had a PCI of the circumflex and the vein graft to the LAD in 2005 again after having another myocardial infarction.      She then presented 01/2009 again with another  non-ST elevation MI and had a redo bypass x3 with a LIMA to the LAD and saphenous vein grafting to the OM1 and PDA.  She has a known mild to moderate ischemic cardiomyopathy.  In addition, she has a history of paroxysmal atrial fibrillation and is on Eliquis for thromboembolic prophylaxis.     She also has known peripheral arterial disease and due to nonhealing wound in 2010, she underwent peripheral angiography initially in January at which time she had an angioplasty to multiple vessels in the left lower extremity along with stenting of the distal left superficial femoral and above-the-knee popliteal artery.  She underwent repeat angiography with PTA to the left below-the-knee popliteal artery and anterior tibial artery in 07/2010.  Most recently she underwent successful PTA of the left popliteal artery with a drug-coated balloon for a nonhealing wound on 5/4/2017.      Unfortunately she has been diagnosed with ALS which has been rapidly progressive and has severe functional limitations secondary to this.  She has fallen multiple occasions and is essentially wheelchair-bound.  She does walk  occasionally with a walker.     From a cardiovascular standpoint she remains asymptomatic.  She specifically denies any chest discomfort syncope or presyncope.  She does have dyspnea at rest and with exertion but this is attributed to reduced vital capacity secondary to her ALS.  She also has occasional palpitations but these symptoms have also been present for several years.     Physical exam is dictated below.     Her last echocardiogram on 11/11/2020 demonstrated moderately reduced left ventricular systolic function with an LVEF estimated at 40%.  Apical akinesis was noted.  I personally reviewed this echocardiogram and compared it to her prior study from 2018.  There has been a mild interval deterioration in left ventricular systolic function but the findings are otherwise similar.    Lipids on 11/11/2020 demonstrated total cholesterol 138 HDL 49 LDL 76 and triglycerides 67.       IMPRESSION:     1) Coronary artery disease as described above  2) Moderate ischemic cardiomyopathy  3) Paroxysmal atrial fibrillation  4) PAD  4) ALS     PLAN  -The patient is stable overall from a cardiovascular standpoint.  There is no evidence of angina or congestive heart failure.  Her medications are appropriate and I will make no changes today.  -Assuming her clinical status remains stable, I will have her follow-up virtually  in approximately 1 year at which point an echocardiogram will be performed.       CURRENT MEDICATIONS:  Current Outpatient Medications   Medication Sig Dispense Refill     acetaminophen (TYLENOL) 500 MG tablet Take 1-2 tablets (500-1,000 mg) by mouth every 6 hours as needed for mild pain       Alfalfa 250 MG TABS Take 250 mg by mouth every morning        apixaban ANTICOAGULANT (ELIQUIS) 5 MG tablet Take 1 tablet (5 mg) by mouth 2 times daily 180 tablet 0     calcium carbonate-vitamin D (CALCIUM + D) 600-200 MG-UNIT TABS Take 1 tablet by mouth At Bedtime        carbidopa-levodopa (SINEMET)  MG tablet  Take 1 tablet three times daily and 2 tablets at bedtime       carvedilol (COREG) 6.25 MG tablet Take 1 tablet (6.25 mg) by mouth 2 times daily (with meals) 180 tablet 3     clopidogrel (PLAVIX) 75 MG tablet Take 1 tablet (75 mg) by mouth daily 90 tablet 3     glycopyrrolate (ROBINUL) 1 MG tablet Take 1 tablet (1 mg) by mouth 3 times daily 270 tablet 1     isosorbide mononitrate (IMDUR) 30 MG 24 hr tablet Take 1 tablet (30 mg) by mouth daily 90 tablet 3     LEVOXYL 112 MCG tablet TAKE 1 TABLET DAILY 90 tablet 4     mirtazapine (REMERON) 7.5 MG tablet Take 1 tablet in the evening for 1 week, then 2 tablets in the evening for 1 week, then 3 tablets in the evening. 90 tablet 1     modafinil (PROVIGIL) 100 MG tablet Take 1 tablet (100 mg) by mouth daily 30 tablet 1     Multiple Vitamins-Minerals (DAILY MULTI) TABS Take 1 tablet by mouth every morning        nitroGLYcerin (NITROSTAT) 0.4 MG sublingual tablet DISSOLVE ONE TABLET UNDER THE TONGUE EVERY 5 MINUTES AS NEEDED FOR CHEST PAIN.  DO NOT EXCEED A TOTAL OF 3 DOSES IN 15 MINUTES 25 tablet 3     omeprazole (PRILOSEC) 20 MG DR capsule Take 1 capsule (20 mg) by mouth 2 times daily 30-60 minutes before meal 60 capsule 11     polyethylene glycol (MIRALAX) 17 GM/Dose powder Take 17 g (1 capful) by mouth daily 507 g 1     rosuvastatin (CRESTOR) 20 MG tablet Take 1 tablet (20 mg) by mouth daily 90 tablet 3     sertraline (ZOLOFT) 25 MG tablet Take 1 tablet (25 mg) by mouth daily (Patient not taking: Reported on 9/24/2020) 90 tablet 0     tiZANidine (ZANAFLEX) 2 MG tablet TAKE 3 TABLETS THREE TIMES A  tablet 11       ALLERGIES     Allergies   Allergen Reactions     Ace Inhibitors Cough     Baclofen Other (See Comments)     Constipation , tiredness     Penicillins      10/7/14 Hives per patient -- many many years ago       Nickel Swelling and Rash     Other reaction(s): Unknown       PAST MEDICAL HISTORY:  Past Medical History:   Diagnosis Date     CAD (coronary artery  disease)     s/p CABG 1992 and redo CABG 2009 (LIMA to LAD, SVG to OM and SVG to posterolateral branch of RCA)     Chronic cough     Non-productive. No known definite etiology. Is beeing seen by [facility].      Essential hypertension, benign      Falls frequently 5/28/2018     GERD (gastroesophageal reflux disease)      History of TIA (transient ischemic attack) and stroke 5/7/2018    R sided facial N&T.      Hyperlipidaemia      Hyperlipidaemia LDL goal < 130      Ischemic cardiomyopathy      MI (myocardial infarction) (H) 11/1996     Mixed hyperlipidemia      Neurodegenerative disorder (H)     Further detailed dx unknown. [facility]      PAD (peripheral artery disease) (H)      Restless legs syndrome      Unspecified hypothyroidism        PAST SURGICAL HISTORY:  Past Surgical History:   Procedure Laterality Date     ARTHROSCOPY SHOULDER RT/LT       BUNIONECTOMY RT/LT       C CABG, ARTERY-VEIN, THREE  1/2009     C CARDIAC SURG PROCEDURE UNLIST       C HAND/FINGER SURGERY UNLISTED       C MRA UPPER EXTREMITY WO&W CONT       C SHOULDER SURG PROC UNLISTED       C STOMACH SURGERY PROCEDURE UNLISTED       CARDIAC SURGERY  1992    CABG,stents x2     CARPAL TUNNEL RELEASE RT/LT       EXCISE GANGLION HAND Right 11/29/2018    Procedure: EXCISE RIGHT VOLAR CARPAL MASS AND RIGHT INDEX TRIGGER FINGER RELEASE;  Surgeon: Minh Goldstein MD;  Location:  OR      VASCULAR SURGERY PROCEDURE UNLIST       HYSTERECTOMY, DAMIÁN  34 yo     OPEN REDUCTION INTERNAL FIXATION ANKLE Left 2015    Left Ankle     RELEASE TRIGGER FINGER       RELEASE TRIGGER FINGER Right 11/29/2018    Procedure: RIGHT INDEX TRIGGER FINGER RELEASE;  Surgeon: Minh Goldstein MD;  Location:  OR     REPAIR HAMMER TOE Right 12/19/2017    Procedure: REPAIR HAMMER TOE;  Right 2nd and 3rd toe pinning;  Surgeon: Fernando Wiggins MD;  Location:  OR     STENT  2006     STENT(aka CARDIAC)  2008     VASCULAR SURGERY          FAMILY HISTORY:  Family History   Problem Relation Age of Onset     Unknown/Adopted Mother      Unknown/Adopted Father      Other - See Comments Son         on warfarin for one year after afganastan now off and doing well     Other - See Comments Daughter         celiac, brittle bone, arthritis, Raynauds       SOCIAL HISTORY:  Social History     Socioeconomic History     Marital status:      Spouse name: Not on file     Number of children: Not on file     Years of education: Not on file     Highest education level: Not on file   Occupational History     Not on file   Social Needs     Financial resource strain: Not on file     Food insecurity     Worry: Not on file     Inability: Not on file     Transportation needs     Medical: Not on file     Non-medical: Not on file   Tobacco Use     Smoking status: Former Smoker     Packs/day: 0.50     Years: 22.00     Pack years: 11.00     Types: Cigarettes     Quit date: 4/15/1992     Years since quittin.6     Smokeless tobacco: Never Used   Substance and Sexual Activity     Alcohol use: Not Currently     Alcohol/week: 1.0 standard drinks     Types: 1 Standard drinks or equivalent per week     Comment: MAYBE ONCE A YEAR     Drug use: No     Sexual activity: Not Currently     Partners: Male   Lifestyle     Physical activity     Days per week: Not on file     Minutes per session: Not on file     Stress: Not on file   Relationships     Social connections     Talks on phone: Not on file     Gets together: Not on file     Attends Sikhism service: Not on file     Active member of club or organization: Not on file     Attends meetings of clubs or organizations: Not on file     Relationship status: Not on file     Intimate partner violence     Fear of current or ex partner: Not on file     Emotionally abused: Not on file     Physically abused: Not on file     Forced sexual activity: Not on file   Other Topics Concern     Parent/sibling w/ CABG, MI or angioplasty  before 65F 55M? No      Service Not Asked     Blood Transfusions Not Asked     Caffeine Concern Yes     Comment: 1-2 cups      Occupational Exposure Not Asked     Hobby Hazards Not Asked     Sleep Concern No     Stress Concern No     Weight Concern Yes     Special Diet No     Back Care Not Asked     Exercise Yes     Comment: stairs, walking      Bike Helmet Not Asked     Seat Belt Not Asked     Self-Exams Not Asked   Social History Narrative     Not on file       Review of Systems:  Skin:          Eyes:         ENT:         Respiratory:          Cardiovascular:         Gastroenterology:        Genitourinary:         Musculoskeletal:         Neurologic:         Psychiatric:         Heme/Lymph/Imm:         Endocrine:           Physical Exam:  Vitals: There were no vitals taken for this visit.    Constitutional:  cooperative, alert and oriented, well developed, well nourished, in no acute distress        Skin:  warm and dry to the touch, no apparent skin lesions or masses noted          Head:  normocephalic, no masses or lesions        Eyes:  pupils equal and round        Lymph:      ENT:  no pallor or cyanosis, dentition good        Neck:  carotid pulses are full and equal bilaterally        Respiratory:  clear to auscultation         Cardiac: regular rhythm                pulses full and equal, no bruits auscultated                                        GI:  abdomen soft        Extremities and Muscular Skeletal:  no deformities, clubbing, cyanosis, erythema observed              Neurological:  no gross motor deficits        Psych:  Alert and Oriented x 3          Thank you for allowing me to participate in the care of your patient.    Sincerely,     Abhijit Boyd MD     [facility]

## 2020-11-16 NOTE — PROGRESS NOTES
HPI and Plan:     I had the pleasure seeing Ms. Fletcher in follow-up at the [facility] heart today.  She is a very pleasant 80-year-old female who was I last saw in 07/2019.     Her cardiac history includes coronary artery disease with history of 2 prior coronary artery bypass surgeries. She underwent  saphenous vein grafting to the LAD and OM in 1992 for an anterior wall myocardial infarction.  She then had a PCI of the circumflex and the vein graft to the LAD in 2005 again after having another myocardial infarction.      She then presented 01/2009 again with another  non-ST elevation MI and had a redo bypass x3 with a LIMA to the LAD and saphenous vein grafting to the OM1 and PDA.  She has a known mild to moderate ischemic cardiomyopathy.  In addition, she has a history of paroxysmal atrial fibrillation and is on Eliquis for thromboembolic prophylaxis.     She also has known peripheral arterial disease and due to nonhealing wound in 2010, she underwent peripheral angiography initially in January at which time she had an angioplasty to multiple vessels in the left lower extremity along with stenting of the distal left superficial femoral and above-the-knee popliteal artery.  She underwent repeat angiography with PTA to the left below-the-knee popliteal artery and anterior tibial artery in 07/2010.  Most recently she underwent successful PTA of the left popliteal artery with a drug-coated balloon for a nonhealing wound on 5/4/2017.      Unfortunately she has been diagnosed with ALS which has been rapidly progressive and has severe functional limitations secondary to this.  She has fallen multiple occasions and is essentially wheelchair-bound.  She does walk occasionally with a walker.     From a cardiovascular standpoint she remains asymptomatic.  She specifically denies any chest discomfort syncope or presyncope.  She does have dyspnea at rest and with exertion but this is attributed to reduced  vital capacity secondary to her ALS.  She also has occasional palpitations but these symptoms have also been present for several years.     Physical exam is dictated below.     Her last echocardiogram on 11/11/2020 demonstrated moderately reduced left ventricular systolic function with an LVEF estimated at 40%.  Apical akinesis was noted.  I personally reviewed this echocardiogram and compared it to her prior study from 2018.  There has been a mild interval deterioration in left ventricular systolic function but the findings are otherwise similar.    Lipids on 11/11/2020 demonstrated total cholesterol 138 HDL 49 LDL 76 and triglycerides 67.       IMPRESSION:     1) Coronary artery disease as described above  2) Moderate ischemic cardiomyopathy  3) Paroxysmal atrial fibrillation  4) PAD  4) ALS     PLAN  -The patient is stable overall from a cardiovascular standpoint.  There is no evidence of angina or congestive heart failure.  Her medications are appropriate and I will make no changes today.  -Assuming her clinical status remains stable, I will have her follow-up virtually  in approximately 1 year at which point an echocardiogram will be performed.       CURRENT MEDICATIONS:  Current Outpatient Medications   Medication Sig Dispense Refill     acetaminophen (TYLENOL) 500 MG tablet Take 1-2 tablets (500-1,000 mg) by mouth every 6 hours as needed for mild pain       Alfalfa 250 MG TABS Take 250 mg by mouth every morning        apixaban ANTICOAGULANT (ELIQUIS) 5 MG tablet Take 1 tablet (5 mg) by mouth 2 times daily 180 tablet 0     calcium carbonate-vitamin D (CALCIUM + D) 600-200 MG-UNIT TABS Take 1 tablet by mouth At Bedtime        carbidopa-levodopa (SINEMET)  MG tablet Take 1 tablet three times daily and 2 tablets at bedtime       carvedilol (COREG) 6.25 MG tablet Take 1 tablet (6.25 mg) by mouth 2 times daily (with meals) 180 tablet 3     clopidogrel (PLAVIX) 75 MG tablet Take 1 tablet (75 mg) by mouth daily  90 tablet 3     glycopyrrolate (ROBINUL) 1 MG tablet Take 1 tablet (1 mg) by mouth 3 times daily 270 tablet 1     isosorbide mononitrate (IMDUR) 30 MG 24 hr tablet Take 1 tablet (30 mg) by mouth daily 90 tablet 3     LEVOXYL 112 MCG tablet TAKE 1 TABLET DAILY 90 tablet 4     mirtazapine (REMERON) 7.5 MG tablet Take 1 tablet in the evening for 1 week, then 2 tablets in the evening for 1 week, then 3 tablets in the evening. 90 tablet 1     modafinil (PROVIGIL) 100 MG tablet Take 1 tablet (100 mg) by mouth daily 30 tablet 1     Multiple Vitamins-Minerals (DAILY MULTI) TABS Take 1 tablet by mouth every morning        nitroGLYcerin (NITROSTAT) 0.4 MG sublingual tablet DISSOLVE ONE TABLET UNDER THE TONGUE EVERY 5 MINUTES AS NEEDED FOR CHEST PAIN.  DO NOT EXCEED A TOTAL OF 3 DOSES IN 15 MINUTES 25 tablet 3     omeprazole (PRILOSEC) 20 MG DR capsule Take 1 capsule (20 mg) by mouth 2 times daily 30-60 minutes before meal 60 capsule 11     polyethylene glycol (MIRALAX) 17 GM/Dose powder Take 17 g (1 capful) by mouth daily 507 g 1     rosuvastatin (CRESTOR) 20 MG tablet Take 1 tablet (20 mg) by mouth daily 90 tablet 3     sertraline (ZOLOFT) 25 MG tablet Take 1 tablet (25 mg) by mouth daily (Patient not taking: Reported on 9/24/2020) 90 tablet 0     tiZANidine (ZANAFLEX) 2 MG tablet TAKE 3 TABLETS THREE TIMES A  tablet 11       ALLERGIES     Allergies   Allergen Reactions     Ace Inhibitors Cough     Baclofen Other (See Comments)     Constipation , tiredness     Penicillins      10/7/14 Hives per patient -- many many years ago       Nickel Swelling and Rash     Other reaction(s): Unknown       PAST MEDICAL HISTORY:  Past Medical History:   Diagnosis Date     CAD (coronary artery disease)     s/p CABG 1992 and redo CABG 2009 (LIMA to LAD, SVG to OM and SVG to posterolateral branch of RCA)     Chronic cough     Non-productive. No known definite etiology. Is beeing seen by [facility].      Essential hypertension, benign       Falls frequently 5/28/2018     GERD (gastroesophageal reflux disease)      History of TIA (transient ischemic attack) and stroke 5/7/2018    R sided facial N&T.      Hyperlipidaemia      Hyperlipidaemia LDL goal < 130      Ischemic cardiomyopathy      MI (myocardial infarction) (H) 11/1996     Mixed hyperlipidemia      Neurodegenerative disorder (H)     Further detailed dx unknown. Managed by Dr. George Pham - [facility]"" [facility].      PAD (peripheral artery disease) (H)      Restless legs syndrome      Unspecified hypothyroidism        PAST SURGICAL HISTORY:  Past Surgical History:   Procedure Laterality Date     ARTHROSCOPY SHOULDER RT/LT       BUNIONECTOMY RT/LT       C CABG, ARTERY-VEIN, THREE  1/2009     C CARDIAC SURG PROCEDURE UNLIST       C HAND/FINGER SURGERY UNLISTED       C MRA UPPER EXTREMITY WO&W CONT       C SHOULDER SURG PROC UNLISTED       C STOMACH SURGERY PROCEDURE UNLISTED       CARDIAC SURGERY  1992    CABG,stents x2     CARPAL TUNNEL RELEASE RT/LT       EXCISE GANGLION HAND Right 11/29/2018    Procedure: EXCISE RIGHT VOLAR CARPAL MASS AND RIGHT INDEX TRIGGER FINGER RELEASE;  Surgeon: Minh Goldstein MD;  Location:  OR      VASCULAR SURGERY PROCEDURE UNLIST       HYSTERECTOMY, DAMIÁN  34 yo     OPEN REDUCTION INTERNAL FIXATION ANKLE Left 2015    Left Ankle     RELEASE TRIGGER FINGER       RELEASE TRIGGER FINGER Right 11/29/2018    Procedure: RIGHT INDEX TRIGGER FINGER RELEASE;  Surgeon: Minh Goldstein MD;  Location:  OR     REPAIR HAMMER TOE Right 12/19/2017    Procedure: REPAIR HAMMER TOE;  Right 2nd and 3rd toe pinning;  Surgeon: Fernando Wiggins MD;  Location:  OR     STENT  2006     STENT(aka CARDIAC)  2008     VASCULAR SURGERY         FAMILY HISTORY:  Family History   Problem Relation Age of Onset     Unknown/Adopted Mother      Unknown/Adopted Father      Other - See Comments Son         on warfarin for one year after afganastan now off and doing well     Other - See  Comments Daughter         celiac, brittle bone, arthritis, Raynauds       SOCIAL HISTORY:  Social History     Socioeconomic History     Marital status:      Spouse name: Not on file     Number of children: Not on file     Years of education: Not on file     Highest education level: Not on file   Occupational History     Not on file   Social Needs     Financial resource strain: Not on file     Food insecurity     Worry: Not on file     Inability: Not on file     Transportation needs     Medical: Not on file     Non-medical: Not on file   Tobacco Use     Smoking status: Former Smoker     Packs/day: 0.50     Years: 22.00     Pack years: 11.00     Types: Cigarettes     Quit date: 4/15/1992     Years since quittin.6     Smokeless tobacco: Never Used   Substance and Sexual Activity     Alcohol use: Not Currently     Alcohol/week: 1.0 standard drinks     Types: 1 Standard drinks or equivalent per week     Comment: MAYBE ONCE A YEAR     Drug use: No     Sexual activity: Not Currently     Partners: Male   Lifestyle     Physical activity     Days per week: Not on file     Minutes per session: Not on file     Stress: Not on file   Relationships     Social connections     Talks on phone: Not on file     Gets together: Not on file     Attends [employer]ism service: Not on file     Active member of club or organization: Not on file     Attends meetings of clubs or organizations: Not on file     Relationship status: Not on file     Intimate partner violence     Fear of current or ex partner: Not on file     Emotionally abused: Not on file     Physically abused: Not on file     Forced sexual activity: Not on file   Other Topics Concern     Parent/sibling w/ CABG, MI or angioplasty before 65F 55M? No      Service Not Asked     Blood Transfusions Not Asked     Caffeine Concern Yes     Comment: 1-2 cups      Occupational Exposure Not Asked     Hobby Hazards Not Asked     Sleep Concern No     Stress Concern No      Weight Concern Yes     Special Diet No     Back Care Not Asked     Exercise Yes     Comment: stairs, walking      Bike Helmet Not Asked     Seat Belt Not Asked     Self-Exams Not Asked   Social History Narrative     Not on file       Review of Systems:  Skin:          Eyes:         ENT:         Respiratory:          Cardiovascular:         Gastroenterology:        Genitourinary:         Musculoskeletal:         Neurologic:         Psychiatric:         Heme/Lymph/Imm:         Endocrine:           Physical Exam:  Vitals: There were no vitals taken for this visit.    Constitutional:  cooperative, alert and oriented, well developed, well nourished, in no acute distress        Skin:  warm and dry to the touch, no apparent skin lesions or masses noted          Head:  normocephalic, no masses or lesions        Eyes:  pupils equal and round        Lymph:      ENT:  no pallor or cyanosis, dentition good        Neck:  carotid pulses are full and equal bilaterally        Respiratory:  clear to auscultation         Cardiac: regular rhythm                pulses full and equal, no bruits auscultated                                        GI:  abdomen soft        Extremities and Muscular Skeletal:  no deformities, clubbing, cyanosis, erythema observed              Neurological:  no gross motor deficits        Psych:  Alert and Oriented x 3        CC  Freedomal Gerardo Boyd MD  [address]  [place] [id number]

## 2020-11-16 NOTE — LETTER
11/16/2020    Aris Del Real MD  [address]  [facility] [address]    RE: Katherin Fletcher       Dear Colleague,    I had the pleasure of seeing Katherin Fletcher in the [facility].    HPI and Plan:     I had the pleasure seeing Ms. Fletcher in follow-up at the [facility] heart today.  She is a very pleasant 80-year-old female who was I last saw in 07/2019.     Her cardiac history includes coronary artery disease with history of 2 prior coronary artery bypass surgeries. She underwent  saphenous vein grafting to the LAD and OM in 1992 for an anterior wall myocardial infarction.  She then had a PCI of the circumflex and the vein graft to the LAD in 2005 again after having another myocardial infarction.      She then presented 01/2009 again with another  non-ST elevation MI and had a redo bypass x3 with a LIMA to the LAD and saphenous vein grafting to the OM1 and PDA.  She has a known mild to moderate ischemic cardiomyopathy.  In addition, she has a history of paroxysmal atrial fibrillation and is on Eliquis for thromboembolic prophylaxis.     She also has known peripheral arterial disease and due to nonhealing wound in 2010, she underwent peripheral angiography initially in January at which time she had an angioplasty to multiple vessels in the left lower extremity along with stenting of the distal left superficial femoral and above-the-knee popliteal artery.  She underwent repeat angiography with PTA to the left below-the-knee popliteal artery and anterior tibial artery in 07/2010.  Most recently she underwent successful PTA of the left popliteal artery with a drug-coated balloon for a nonhealing wound on 5/4/2017.      Unfortunately she has been diagnosed with ALS which has been rapidly progressive and has severe functional limitations secondary to this.  She has fallen multiple occasions and is essentially wheelchair-bound.  She does walk  occasionally with a walker.     From a cardiovascular standpoint she remains asymptomatic.  She specifically denies any chest discomfort syncope or presyncope.  She does have dyspnea at rest and with exertion but this is attributed to reduced vital capacity secondary to her ALS.  She also has occasional palpitations but these symptoms have also been present for several years.     Physical exam is dictated below.     Her last echocardiogram on 11/11/2020 demonstrated moderately reduced left ventricular systolic function with an LVEF estimated at 40%.  Apical akinesis was noted.  I personally reviewed this echocardiogram and compared it to her prior study from 2018.  There has been a mild interval deterioration in left ventricular systolic function but the findings are otherwise similar.    Lipids on 11/11/2020 demonstrated total cholesterol 138 HDL 49 LDL 76 and triglycerides 67.       IMPRESSION:     1) Coronary artery disease as described above  2) Moderate ischemic cardiomyopathy  3) Paroxysmal atrial fibrillation  4) PAD  4) ALS     PLAN  -The patient is stable overall from a cardiovascular standpoint.  There is no evidence of angina or congestive heart failure.  Her medications are appropriate and I will make no changes today.  -Assuming her clinical status remains stable, I will have her follow-up virtually  in approximately 1 year at which point an echocardiogram will be performed.       CURRENT MEDICATIONS:  Current Outpatient Medications   Medication Sig Dispense Refill     acetaminophen (TYLENOL) 500 MG tablet Take 1-2 tablets (500-1,000 mg) by mouth every 6 hours as needed for mild pain       Alfalfa 250 MG TABS Take 250 mg by mouth every morning        apixaban ANTICOAGULANT (ELIQUIS) 5 MG tablet Take 1 tablet (5 mg) by mouth 2 times daily 180 tablet 0     calcium carbonate-vitamin D (CALCIUM + D) 600-200 MG-UNIT TABS Take 1 tablet by mouth At Bedtime        carbidopa-levodopa (SINEMET)  MG tablet  Take 1 tablet three times daily and 2 tablets at bedtime       carvedilol (COREG) 6.25 MG tablet Take 1 tablet (6.25 mg) by mouth 2 times daily (with meals) 180 tablet 3     clopidogrel (PLAVIX) 75 MG tablet Take 1 tablet (75 mg) by mouth daily 90 tablet 3     glycopyrrolate (ROBINUL) 1 MG tablet Take 1 tablet (1 mg) by mouth 3 times daily 270 tablet 1     isosorbide mononitrate (IMDUR) 30 MG 24 hr tablet Take 1 tablet (30 mg) by mouth daily 90 tablet 3     LEVOXYL 112 MCG tablet TAKE 1 TABLET DAILY 90 tablet 4     mirtazapine (REMERON) 7.5 MG tablet Take 1 tablet in the evening for 1 week, then 2 tablets in the evening for 1 week, then 3 tablets in the evening. 90 tablet 1     modafinil (PROVIGIL) 100 MG tablet Take 1 tablet (100 mg) by mouth daily 30 tablet 1     Multiple Vitamins-Minerals (DAILY MULTI) TABS Take 1 tablet by mouth every morning        nitroGLYcerin (NITROSTAT) 0.4 MG sublingual tablet DISSOLVE ONE TABLET UNDER THE TONGUE EVERY 5 MINUTES AS NEEDED FOR CHEST PAIN.  DO NOT EXCEED A TOTAL OF 3 DOSES IN 15 MINUTES 25 tablet 3     omeprazole (PRILOSEC) 20 MG DR capsule Take 1 capsule (20 mg) by mouth 2 times daily 30-60 minutes before meal 60 capsule 11     polyethylene glycol (MIRALAX) 17 GM/Dose powder Take 17 g (1 capful) by mouth daily 507 g 1     rosuvastatin (CRESTOR) 20 MG tablet Take 1 tablet (20 mg) by mouth daily 90 tablet 3     sertraline (ZOLOFT) 25 MG tablet Take 1 tablet (25 mg) by mouth daily (Patient not taking: Reported on 9/24/2020) 90 tablet 0     tiZANidine (ZANAFLEX) 2 MG tablet TAKE 3 TABLETS THREE TIMES A  tablet 11       ALLERGIES     Allergies   Allergen Reactions     Ace Inhibitors Cough     Baclofen Other (See Comments)     Constipation , tiredness     Penicillins      10/7/14 Hives per patient -- many many years ago       Nickel Swelling and Rash     Other reaction(s): Unknown       PAST MEDICAL HISTORY:  Past Medical History:   Diagnosis Date     CAD (coronary artery  disease)     s/p CABG 1992 and redo CABG 2009 (LIMA to LAD, SVG to OM and SVG to posterolateral branch of RCA)     Chronic cough     Non-productive. No known definite etiology. Is beeing seen by [facility].      Essential hypertension, benign      Falls frequently 5/28/2018     GERD (gastroesophageal reflux disease)      History of TIA (transient ischemic attack) and stroke 5/7/2018    R sided facial N&T.      Hyperlipidaemia      Hyperlipidaemia LDL goal < 130      Ischemic cardiomyopathy      MI (myocardial infarction) (H) 11/1996     Mixed hyperlipidemia      Neurodegenerative disorder (H)     Further detailed dx unknown. Managed by Dr. George Pham - [facility].      PAD (peripheral artery disease) (H)      Restless legs syndrome      Unspecified hypothyroidism        PAST SURGICAL HISTORY:  Past Surgical History:   Procedure Laterality Date     ARTHROSCOPY SHOULDER RT/LT       BUNIONECTOMY RT/LT       C CABG, ARTERY-VEIN, THREE  1/2009     C CARDIAC SURG PROCEDURE UNLIST       C HAND/FINGER SURGERY UNLISTED       C MRA UPPER EXTREMITY WO&W CONT       C SHOULDER SURG PROC UNLISTED       C STOMACH SURGERY PROCEDURE UNLISTED       CARDIAC SURGERY  1992    CABG,stents x2     CARPAL TUNNEL RELEASE RT/LT       EXCISE GANGLION HAND Right 11/29/2018    Procedure: EXCISE RIGHT VOLAR CARPAL MASS AND RIGHT INDEX TRIGGER FINGER RELEASE;  Surgeon: Minh Goldstein MD;  Location:  OR      VASCULAR SURGERY PROCEDURE UNLIST       HYSTERECTOMY, DAMIÁN  32 yo     OPEN REDUCTION INTERNAL FIXATION ANKLE Left 2015    Left Ankle     RELEASE TRIGGER FINGER       RELEASE TRIGGER FINGER Right 11/29/2018    Procedure: RIGHT INDEX TRIGGER FINGER RELEASE;  Surgeon: Minh Goldstein MD;  Location:  OR     REPAIR HAMMER TOE Right 12/19/2017    Procedure: REPAIR HAMMER TOE;  Right 2nd and 3rd toe pinning;  Surgeon: Fernando Wiggins MD;  Location:  OR     STENT  2006     STENT(aka CARDIAC)  2008     VASCULAR SURGERY          FAMILY HISTORY:  Family History   Problem Relation Age of Onset     Unknown/Adopted Mother      Unknown/Adopted Father      Other - See Comments Son         on warfarin for one year after afganastan now off and doing well     Other - See Comments Daughter         celiac, brittle bone, arthritis, Raynauds       SOCIAL HISTORY:  Social History     Socioeconomic History     Marital status:      Spouse name: Not on file     Number of children: Not on file     Years of education: Not on file     Highest education level: Not on file   Occupational History     Not on file   Social Needs     Financial resource strain: Not on file     Food insecurity     Worry: Not on file     Inability: Not on file     Transportation needs     Medical: Not on file     Non-medical: Not on file   Tobacco Use     Smoking status: Former Smoker     Packs/day: 0.50     Years: 22.00     Pack years: 11.00     Types: Cigarettes     Quit date: 4/15/1992     Years since quittin.6     Smokeless tobacco: Never Used   Substance and Sexual Activity     Alcohol use: Not Currently     Alcohol/week: 1.0 standard drinks     Types: 1 Standard drinks or equivalent per week     Comment: MAYBE ONCE A YEAR     Drug use: No     Sexual activity: Not Currently     Partners: Male   Lifestyle     Physical activity     Days per week: Not on file     Minutes per session: Not on file     Stress: Not on file   Relationships     Social connections     Talks on phone: Not on file     Gets together: Not on file     Attends Worship service: Not on file     Active member of club or organization: Not on file     Attends meetings of clubs or organizations: Not on file     Relationship status: Not on file     Intimate partner violence     Fear of current or ex partner: Not on file     Emotionally abused: Not on file     Physically abused: Not on file     Forced sexual activity: Not on file   Other Topics Concern     Parent/sibling w/ CABG, MI or angioplasty  before 65F 55M? No      Service Not Asked     Blood Transfusions Not Asked     Caffeine Concern Yes     Comment: 1-2 cups      Occupational Exposure Not Asked     Hobby Hazards Not Asked     Sleep Concern No     Stress Concern No     Weight Concern Yes     Special Diet No     Back Care Not Asked     Exercise Yes     Comment: stairs, walking      Bike Helmet Not Asked     Seat Belt Not Asked     Self-Exams Not Asked   Social History Narrative     Not on file       Review of Systems:  Skin:          Eyes:         ENT:         Respiratory:          Cardiovascular:         Gastroenterology:        Genitourinary:         Musculoskeletal:         Neurologic:         Psychiatric:         Heme/Lymph/Imm:         Endocrine:           Physical Exam:  Vitals: There were no vitals taken for this visit.    Constitutional:  cooperative, alert and oriented, well developed, well nourished, in no acute distress        Skin:  warm and dry to the touch, no apparent skin lesions or masses noted          Head:  normocephalic, no masses or lesions        Eyes:  pupils equal and round        Lymph:      ENT:  no pallor or cyanosis, dentition good        Neck:  carotid pulses are full and equal bilaterally        Respiratory:  clear to auscultation         Cardiac: regular rhythm                pulses full and equal, no bruits auscultated                                        GI:  abdomen soft        Extremities and Muscular Skeletal:  no deformities, clubbing, cyanosis, erythema observed              Neurological:  no gross motor deficits        Psych:  Alert and Oriented x 3        CC  Abhijit Boyd MD  [address]  [place] [id number]                  Thank you for allowing me to participate in the care of your patient.      Sincerely,     Abhijit Boyd MD     [facility]    cc:   Abhijit Boyd MD  [address]  [place] [address]

## 2020-12-14 NOTE — LETTER
12/14/2020         RE: Katherin Fletcher  [address]  [facility] [address]        Dear Colleague,    Thank you for referring your patient, Katherin Fletcher, to the [facility] [place]. Please see a copy of my visit note below.    Reason For Visit:   Chief Complaint   Patient presents with     RECHECK     toe nail care        There were no vitals taken for this visit.         Yady Vail ATC    Chief Complaint   Patient presents with     RECHECK     toe nail care             Allergies   Allergen Reactions     Ace Inhibitors Cough     Baclofen Other (See Comments)     Constipation , tiredness     Penicillins      10/7/14 Hives per patient -- many many years ago       Nickel Swelling and Rash     Other reaction(s): Unknown         Subjective: Katherin is a 79 year old female who presents to the clinic today for a follow up of elongated nails.  She presents today in her electric wheelchair and wearing a neck brace.  She relates that her nails are long and interfere with her wearing shoes.      Objective  Data Unavailable Data Unavailable Data Unavailable Data Unavailable Data Unavailable 0 lbs 0 oz  DP pulses are 1/4 and PT pulses are non-palpable bilaterally. LE capillary refill time is <3 seconds. Absent pedal hair. Mild non-pitting LE edema.  Dorsally contracted digits 2-5 that are non-reducible, very limited passive ROM. Equinus present.  Gross sensation intact to bilateral LE.   LE skin color, texture and turgor are normal. Toenails are slightly elongated bilaterally. Right 4th toe is hammered and in a rigid position.      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   - Cont with comfortable shoes.   - Pt to return to clinic in 12 weeks.        Again, thank you for allowing me to participate in  the care of your patient.        Sincerely,        Adrian Arroyo DPM

## 2020-12-14 NOTE — NURSING NOTE
Reason For Visit:   Chief Complaint   Patient presents with     RECHECK     toe nail care        There were no vitals taken for this visit.    Pain Assessment  Patient Currently in Pain: No(no pain per pt)    Belgica Barrera, ATC

## 2020-12-16 NOTE — PROGRESS NOTES
Reason For Visit:   Chief Complaint   Patient presents with     RECHECK     toe nail care        There were no vitals taken for this visit.         Yady Vail, ATC    Chief Complaint   Patient presents with     RECHECK     toe nail care             Allergies   Allergen Reactions     Ace Inhibitors Cough     Baclofen Other (See Comments)     Constipation , tiredness     Penicillins      10/7/14 Hives per patient -- many many years ago       Nickel Swelling and Rash     Other reaction(s): Unknown         Subjective: Katherin is a 79 year old female who presents to the clinic today for a follow up of elongated nails.  She presents today in her electric wheelchair and wearing a neck brace.  She relates that her nails are long and interfere with her wearing shoes.      Objective  Data Unavailable Data Unavailable Data Unavailable Data Unavailable Data Unavailable 0 lbs 0 oz  DP pulses are 1/4 and PT pulses are non-palpable bilaterally. LE capillary refill time is <3 seconds. Absent pedal hair. Mild non-pitting LE edema.  Dorsally contracted digits 2-5 that are non-reducible, very limited passive ROM. Equinus present.  Gross sensation intact to bilateral LE.   LE skin color, texture and turgor are normal. Toenails are slightly elongated bilaterally. Right 4th toe is hammered and in a rigid position.      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   - Cont with comfortable shoes.   - Pt to return to clinic in 12 weeks.

## 2020-12-30 NOTE — TELEPHONE ENCOUNTER
Reporting unwitnessed fall, Katherin slid off the edge of the bed today at 9am, her feet slipped while putting on slippers.  pts  called 911 and they aided her back up, but she declined to go into ER. She reported pain in right shoulder, no change in movement.  Will continue to monitor with homecare  Fiona Schilling OTRL

## 2021-01-01 ENCOUNTER — MEDICAL CORRESPONDENCE (OUTPATIENT)
Dept: FAMILY MEDICINE | Facility: CLINIC | Age: 81
End: 2021-01-01

## 2021-01-01 ENCOUNTER — TELEPHONE (OUTPATIENT)
Dept: CARDIOLOGY | Facility: CLINIC | Age: 81
End: 2021-01-01

## 2021-01-01 ENCOUNTER — MYC MEDICAL ADVICE (OUTPATIENT)
Dept: CARDIOLOGY | Facility: CLINIC | Age: 81
End: 2021-01-01

## 2021-01-01 ENCOUNTER — APPOINTMENT (OUTPATIENT)
Dept: CT IMAGING | Facility: CLINIC | Age: 81
DRG: 291 | End: 2021-01-01
Attending: EMERGENCY MEDICINE
Payer: MEDICARE

## 2021-01-01 ENCOUNTER — TELEPHONE (OUTPATIENT)
Dept: NEUROLOGY | Facility: CLINIC | Age: 81
End: 2021-01-01

## 2021-01-01 ENCOUNTER — OFFICE VISIT (OUTPATIENT)
Dept: NEUROLOGY | Facility: CLINIC | Age: 81
End: 2021-01-01
Payer: MEDICARE

## 2021-01-01 ENCOUNTER — MYC MEDICAL ADVICE (OUTPATIENT)
Dept: FAMILY MEDICINE | Facility: CLINIC | Age: 81
End: 2021-01-01

## 2021-01-01 ENCOUNTER — HOSPITAL ENCOUNTER (INPATIENT)
Facility: CLINIC | Age: 81
LOS: 1 days | DRG: 291 | End: 2021-02-15
Attending: EMERGENCY MEDICINE | Admitting: HOSPITALIST
Payer: MEDICARE

## 2021-01-01 ENCOUNTER — HOSPITAL ENCOUNTER (EMERGENCY)
Facility: CLINIC | Age: 81
Discharge: HOME | DRG: 291 | End: 2021-02-13
Attending: EMERGENCY MEDICINE | Admitting: EMERGENCY MEDICINE
Payer: MEDICARE

## 2021-01-01 ENCOUNTER — HEALTH MAINTENANCE LETTER (OUTPATIENT)
Age: 81
End: 2021-01-01

## 2021-01-01 ENCOUNTER — APPOINTMENT (OUTPATIENT)
Dept: GENERAL RADIOLOGY | Facility: CLINIC | Age: 81
DRG: 291 | End: 2021-01-01
Attending: EMERGENCY MEDICINE
Payer: MEDICARE

## 2021-01-01 VITALS
HEART RATE: 72 BPM | OXYGEN SATURATION: 90 % | TEMPERATURE: 97.4 F | SYSTOLIC BLOOD PRESSURE: 98 MMHG | DIASTOLIC BLOOD PRESSURE: 54 MMHG | RESPIRATION RATE: 18 BRPM

## 2021-01-01 VITALS
SYSTOLIC BLOOD PRESSURE: 107 MMHG | BODY MASS INDEX: 20.36 KG/M2 | TEMPERATURE: 96.8 F | OXYGEN SATURATION: 87 % | RESPIRATION RATE: 19 BRPM | DIASTOLIC BLOOD PRESSURE: 66 MMHG | WEIGHT: 118.61 LBS | HEART RATE: 80 BPM

## 2021-01-01 VITALS
DIASTOLIC BLOOD PRESSURE: 81 MMHG | HEART RATE: 77 BPM | SYSTOLIC BLOOD PRESSURE: 133 MMHG | OXYGEN SATURATION: 95 % | RESPIRATION RATE: 16 BRPM

## 2021-01-01 DIAGNOSIS — I25.10 CAD (CORONARY ARTERY DISEASE): Primary | ICD-10-CM

## 2021-01-01 DIAGNOSIS — G12.21 ALS (AMYOTROPHIC LATERAL SCLEROSIS) (H): Primary | ICD-10-CM
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
## 2021-01-01 DIAGNOSIS — Z23 HIGH PRIORITY FOR COVID-19 VIRUS VACCINATION: Primary | ICD-10-CM

## 2021-01-01 DIAGNOSIS — F32.89 OTHER DEPRESSION: ICD-10-CM

## 2021-01-01 DIAGNOSIS — G12.21 ALS (AMYOTROPHIC LATERAL SCLEROSIS) (H): ICD-10-CM

## 2021-01-01 DIAGNOSIS — I10 BENIGN ESSENTIAL HYPERTENSION: ICD-10-CM

## 2021-01-01 DIAGNOSIS — R68.89 EXCESSIVE ORAL SECRETIONS: Primary | ICD-10-CM

## 2021-01-01 DIAGNOSIS — Z66 DNR (DO NOT RESUSCITATE): ICD-10-CM

## 2021-01-01 DIAGNOSIS — R79.89 ELEVATED TROPONIN: ICD-10-CM

## 2021-01-01 DIAGNOSIS — I63.9 CEREBROVASCULAR ACCIDENT (CVA), UNSPECIFIED MECHANISM (H): ICD-10-CM

## 2021-01-01 DIAGNOSIS — I50.9 ACUTE CONGESTIVE HEART FAILURE, UNSPECIFIED HEART FAILURE TYPE (H): ICD-10-CM

## 2021-01-01 DIAGNOSIS — J96.01 ACUTE RESPIRATORY FAILURE WITH HYPOXIA (H): ICD-10-CM

## 2021-01-01 DIAGNOSIS — R57.0 CARDIOGENIC SHOCK (H): ICD-10-CM

## 2021-01-01 DIAGNOSIS — G47.429 NARCOLEPSY DUE TO UNDERLYING CONDITION WITHOUT CATAPLEXY: ICD-10-CM

## 2021-01-01 DIAGNOSIS — S09.90XA INJURY OF HEAD, INITIAL ENCOUNTER: ICD-10-CM

## 2021-01-01 LAB
ALBUMIN SERPL-MCNC: 2.4 G/DL (ref 3.4–5)
ALP SERPL-CCNC: 103 U/L (ref 40–150)
ALT SERPL W P-5'-P-CCNC: 9 U/L (ref 0–50)
ANION GAP SERPL CALCULATED.3IONS-SCNC: <1 MMOL/L (ref 3–14)
AST SERPL W P-5'-P-CCNC: 45 U/L (ref 0–45)
BASOPHILS # BLD AUTO: 0 10E9/L (ref 0–0.2)
BASOPHILS NFR BLD AUTO: 0.3 %
BILIRUB SERPL-MCNC: 0.3 MG/DL (ref 0.2–1.3)
BUN SERPL-MCNC: 24 MG/DL (ref 7–30)
CALCIUM SERPL-MCNC: 8.8 MG/DL (ref 8.5–10.1)
CHLORIDE SERPL-SCNC: 99 MMOL/L (ref 94–109)
CO2 SERPL-SCNC: 37 MMOL/L (ref 20–32)
CREAT SERPL-MCNC: 0.65 MG/DL (ref 0.52–1.04)
DIFFERENTIAL METHOD BLD: ABNORMAL
EOSINOPHIL # BLD AUTO: 0 10E9/L (ref 0–0.7)
EOSINOPHIL NFR BLD AUTO: 0 %
ERYTHROCYTE [DISTWIDTH] IN BLOOD BY AUTOMATED COUNT: 13.7 % (ref 10–15)
EXPTIME-PRE: 5.9 SEC
FEF2575-%PRED-PRE: 13 %
FEF2575-PRE: 0.2 L/SEC
FEF2575-PRED: 1.55 L/SEC
FEFMAX-%PRED-PRE: 24 %
FEFMAX-PRE: 1.15 L/SEC
FEFMAX-PRED: 4.79 L/SEC
FEV1-%PRED-PRE: 23 %
FEV1-PRE: 0.45 L
FEV1FEV6-PRE: 62 %
FEV1FEV6-PRED: 78 %
FEV1FVC-PRE: 64 %
FEV1FVC-PRED: 77 %
FIFMAX-PRE: 0.8 L/SEC
FVC-%PRED-PRE: 27 %
FVC-PRE: 0.7 L
FVC-PRED: 2.54 L
GFR SERPL CREATININE-BSD FRML MDRD: 83 ML/MIN/{1.73_M2}
GLUCOSE SERPL-MCNC: 156 MG/DL (ref 70–99)
HCT VFR BLD AUTO: 32 % (ref 35–47)
HGB BLD-MCNC: 9.7 G/DL (ref 11.7–15.7)
IMM GRANULOCYTES # BLD: 0 10E9/L (ref 0–0.4)
IMM GRANULOCYTES NFR BLD: 0.3 %
LABORATORY COMMENT REPORT: NORMAL
LACTATE BLD-SCNC: 1.3 MMOL/L (ref 0.7–2)
LYMPHOCYTES # BLD AUTO: 0.6 10E9/L (ref 0.8–5.3)
LYMPHOCYTES NFR BLD AUTO: 11 %
MCH RBC QN AUTO: 28.4 PG (ref 26.5–33)
MCHC RBC AUTO-ENTMCNC: 30.3 G/DL (ref 31.5–36.5)
MCV RBC AUTO: 94 FL (ref 78–100)
MONOCYTES # BLD AUTO: 0.5 10E9/L (ref 0–1.3)
MONOCYTES NFR BLD AUTO: 8.2 %
NEUTROPHILS # BLD AUTO: 4.6 10E9/L (ref 1.6–8.3)
NEUTROPHILS NFR BLD AUTO: 80.2 %
NRBC # BLD AUTO: 0 10*3/UL
NRBC BLD AUTO-RTO: 0 /100
NT-PROBNP SERPL-MCNC: 3170 PG/ML (ref 0–1800)
PLATELET # BLD AUTO: 213 10E9/L (ref 150–450)
POTASSIUM SERPL-SCNC: 4 MMOL/L (ref 3.4–5.3)
PROT SERPL-MCNC: 6.2 G/DL (ref 6.8–8.8)
RBC # BLD AUTO: 3.41 10E12/L (ref 3.8–5.2)
SARS-COV-2 RNA RESP QL NAA+PROBE: NEGATIVE
SODIUM SERPL-SCNC: 135 MMOL/L (ref 133–144)
SPECIMEN SOURCE: NORMAL
TROPONIN I SERPL-MCNC: 0.12 UG/L (ref 0–0.04)
WBC # BLD AUTO: 5.7 10E9/L (ref 4–11)

## 2021-01-01 PROCEDURE — 258N000003 HC RX IP 258 OP 636: Performed by: EMERGENCY MEDICINE

## 2021-01-01 PROCEDURE — 99284 EMERGENCY DEPT VISIT MOD MDM: CPT

## 2021-01-01 PROCEDURE — 94660 CPAP INITIATION&MGMT: CPT

## 2021-01-01 PROCEDURE — 96360 HYDRATION IV INFUSION INIT: CPT

## 2021-01-01 PROCEDURE — 83605 ASSAY OF LACTIC ACID: CPT | Performed by: EMERGENCY MEDICINE

## 2021-01-01 PROCEDURE — 99207 PR APP CREDIT; MD BILLING SHARED VISIT: CPT | Performed by: HOSPITALIST

## 2021-01-01 PROCEDURE — 5A09357 ASSISTANCE WITH RESPIRATORY VENTILATION, LESS THAN 24 CONSECUTIVE HOURS, CONTINUOUS POSITIVE AIRWAY PRESSURE: ICD-10-PCS | Performed by: HOSPITALIST

## 2021-01-01 PROCEDURE — 999N000157 HC STATISTIC RCP TIME EA 10 MIN

## 2021-01-01 PROCEDURE — 210N000002 HC R&B HEART CARE

## 2021-01-01 PROCEDURE — 87040 BLOOD CULTURE FOR BACTERIA: CPT | Performed by: EMERGENCY MEDICINE

## 2021-01-01 PROCEDURE — 72125 CT NECK SPINE W/O DYE: CPT | Mod: MF

## 2021-01-01 PROCEDURE — 258N000003 HC RX IP 258 OP 636: Performed by: HOSPITALIST

## 2021-01-01 PROCEDURE — 99292 CRITICAL CARE ADDL 30 MIN: CPT

## 2021-01-01 PROCEDURE — 94375 RESPIRATORY FLOW VOLUME LOOP: CPT | Performed by: INTERNAL MEDICINE

## 2021-01-01 PROCEDURE — C9803 HOPD COVID-19 SPEC COLLECT: HCPCS

## 2021-01-01 PROCEDURE — 70450 CT HEAD/BRAIN W/O DYE: CPT | Mod: ME

## 2021-01-01 PROCEDURE — 87635 SARS-COV-2 COVID-19 AMP PRB: CPT | Performed by: EMERGENCY MEDICINE

## 2021-01-01 PROCEDURE — 71045 X-RAY EXAM CHEST 1 VIEW: CPT

## 2021-01-01 PROCEDURE — 80053 COMPREHEN METABOLIC PANEL: CPT | Performed by: EMERGENCY MEDICINE

## 2021-01-01 PROCEDURE — 83880 ASSAY OF NATRIURETIC PEPTIDE: CPT | Performed by: EMERGENCY MEDICINE

## 2021-01-01 PROCEDURE — 99291 CRITICAL CARE FIRST HOUR: CPT | Performed by: HOSPITALIST

## 2021-01-01 PROCEDURE — 0011A PR COVID VAC MODERNA 100 MCG/0.5 ML IM: CPT | Performed by: FAMILY MEDICINE

## 2021-01-01 PROCEDURE — 99214 OFFICE O/P EST MOD 30 MIN: CPT | Performed by: PSYCHIATRY & NEUROLOGY

## 2021-01-01 PROCEDURE — 93005 ELECTROCARDIOGRAM TRACING: CPT

## 2021-01-01 PROCEDURE — 85025 COMPLETE CBC W/AUTO DIFF WBC: CPT | Performed by: EMERGENCY MEDICINE

## 2021-01-01 PROCEDURE — 99291 CRITICAL CARE FIRST HOUR: CPT | Mod: 25

## 2021-01-01 PROCEDURE — G0179 MD RECERTIFICATION HHA PT: HCPCS | Performed by: FAMILY MEDICINE

## 2021-01-01 PROCEDURE — 91301 PR COVID VAC MODERNA 100 MCG/0.5 ML IM: CPT | Performed by: FAMILY MEDICINE

## 2021-01-01 PROCEDURE — 36415 COLL VENOUS BLD VENIPUNCTURE: CPT

## 2021-01-01 PROCEDURE — 84484 ASSAY OF TROPONIN QUANT: CPT | Performed by: EMERGENCY MEDICINE

## 2021-01-01 PROCEDURE — 250N000011 HC RX IP 250 OP 636: Performed by: HOSPITALIST

## 2021-01-01 RX ORDER — LANOLIN ALCOHOL/MO/W.PET/CERES
3 CREAM (GRAM) TOPICAL
Status: DISCONTINUED | OUTPATIENT
Start: 2021-01-01 | End: 2021-01-01 | Stop reason: HOSPADM

## 2021-01-01 RX ORDER — NALOXONE HYDROCHLORIDE 0.4 MG/ML
0.2 INJECTION, SOLUTION INTRAMUSCULAR; INTRAVENOUS; SUBCUTANEOUS
Status: DISCONTINUED | OUTPATIENT
Start: 2021-01-01 | End: 2021-01-01 | Stop reason: HOSPADM

## 2021-01-01 RX ORDER — BISACODYL 10 MG
10 SUPPOSITORY, RECTAL RECTAL DAILY PRN
Status: DISCONTINUED | OUTPATIENT
Start: 2021-01-01 | End: 2021-01-01 | Stop reason: HOSPADM

## 2021-01-01 RX ORDER — AMOXICILLIN 250 MG
2 CAPSULE ORAL 2 TIMES DAILY PRN
Status: DISCONTINUED | OUTPATIENT
Start: 2021-01-01 | End: 2021-01-01 | Stop reason: HOSPADM

## 2021-01-01 RX ORDER — LIDOCAINE 40 MG/G
CREAM TOPICAL
Status: DISCONTINUED | OUTPATIENT
Start: 2021-01-01 | End: 2021-01-01

## 2021-01-01 RX ORDER — SODIUM CHLORIDE 9 MG/ML
INJECTION, SOLUTION INTRAVENOUS CONTINUOUS
Status: DISCONTINUED | OUTPATIENT
Start: 2021-01-01 | End: 2021-01-01 | Stop reason: HOSPADM

## 2021-01-01 RX ORDER — POLYETHYLENE GLYCOL 3350 17 G/17G
17 POWDER, FOR SOLUTION ORAL DAILY PRN
Status: DISCONTINUED | OUTPATIENT
Start: 2021-01-01 | End: 2021-01-01 | Stop reason: HOSPADM

## 2021-01-01 RX ORDER — AMOXICILLIN 250 MG
1 CAPSULE ORAL 2 TIMES DAILY PRN
Status: DISCONTINUED | OUTPATIENT
Start: 2021-01-01 | End: 2021-01-01 | Stop reason: HOSPADM

## 2021-01-01 RX ORDER — MIRTAZAPINE 7.5 MG/1
TABLET, FILM COATED ORAL
Qty: 90 TABLET | Refills: 2 | Status: SHIPPED | OUTPATIENT
Start: 2021-01-01

## 2021-01-01 RX ORDER — GUAIFENESIN 600 MG/1
1200 TABLET, EXTENDED RELEASE ORAL 2 TIMES DAILY
Qty: 60 TABLET | Refills: 1 | Status: SHIPPED | OUTPATIENT
Start: 2021-01-01

## 2021-01-01 RX ORDER — NALOXONE HYDROCHLORIDE 0.4 MG/ML
0.4 INJECTION, SOLUTION INTRAMUSCULAR; INTRAVENOUS; SUBCUTANEOUS
Status: DISCONTINUED | OUTPATIENT
Start: 2021-01-01 | End: 2021-01-01 | Stop reason: HOSPADM

## 2021-01-01 RX ORDER — ONDANSETRON 2 MG/ML
4 INJECTION INTRAMUSCULAR; INTRAVENOUS EVERY 6 HOURS PRN
Status: DISCONTINUED | OUTPATIENT
Start: 2021-01-01 | End: 2021-01-01 | Stop reason: HOSPADM

## 2021-01-01 RX ORDER — CLOPIDOGREL BISULFATE 75 MG/1
75 TABLET ORAL DAILY
Qty: 90 TABLET | Refills: 3 | Status: SHIPPED | OUTPATIENT
Start: 2021-01-01

## 2021-01-01 RX ORDER — LEVOTHYROXINE SODIUM 112 UG/1
112 TABLET ORAL DAILY
COMMUNITY

## 2021-01-01 RX ORDER — MODAFINIL 100 MG/1
100 TABLET ORAL DAILY
Qty: 30 TABLET | Refills: 2 | Status: SHIPPED | OUTPATIENT
Start: 2021-01-01

## 2021-01-01 RX ORDER — CARVEDILOL 6.25 MG/1
3.12 TABLET ORAL 2 TIMES DAILY WITH MEALS
Qty: 1 TABLET | Refills: 0 | COMMUNITY
Start: 2021-01-01

## 2021-01-01 RX ORDER — ACETAMINOPHEN 325 MG/1
650 TABLET ORAL EVERY 4 HOURS PRN
Status: DISCONTINUED | OUTPATIENT
Start: 2021-01-01 | End: 2021-01-01 | Stop reason: HOSPADM

## 2021-01-01 RX ORDER — NITROGLYCERIN 0.4 MG/1
0.4 TABLET SUBLINGUAL EVERY 5 MIN PRN
Status: DISCONTINUED | OUTPATIENT
Start: 2021-01-01 | End: 2021-01-01

## 2021-01-01 RX ORDER — CARBOXYMETHYLCELLULOSE SODIUM 5 MG/ML
1 SOLUTION/ DROPS OPHTHALMIC
Status: DISCONTINUED | OUTPATIENT
Start: 2021-01-01 | End: 2021-01-01 | Stop reason: HOSPADM

## 2021-01-01 RX ORDER — ONDANSETRON 4 MG/1
4 TABLET, ORALLY DISINTEGRATING ORAL EVERY 6 HOURS PRN
Status: DISCONTINUED | OUTPATIENT
Start: 2021-01-01 | End: 2021-01-01 | Stop reason: HOSPADM

## 2021-01-01 RX ORDER — ACETAMINOPHEN 650 MG/1
650 SUPPOSITORY RECTAL EVERY 4 HOURS PRN
Status: DISCONTINUED | OUTPATIENT
Start: 2021-01-01 | End: 2021-01-01 | Stop reason: HOSPADM

## 2021-01-01 RX ORDER — HYDROMORPHONE HYDROCHLORIDE 1 MG/ML
0.2 INJECTION, SOLUTION INTRAMUSCULAR; INTRAVENOUS; SUBCUTANEOUS
Status: DISCONTINUED | OUTPATIENT
Start: 2021-01-01 | End: 2021-01-01 | Stop reason: HOSPADM

## 2021-01-01 RX ORDER — IPRATROPIUM BROMIDE AND ALBUTEROL SULFATE 2.5; .5 MG/3ML; MG/3ML
3 SOLUTION RESPIRATORY (INHALATION) EVERY 4 HOURS PRN
Status: DISCONTINUED | OUTPATIENT
Start: 2021-01-01 | End: 2021-01-01 | Stop reason: HOSPADM

## 2021-01-01 RX ADMIN — SODIUM CHLORIDE: 9 INJECTION, SOLUTION INTRAVENOUS at 01:20

## 2021-01-01 RX ADMIN — SODIUM CHLORIDE 1000 ML: 9 INJECTION, SOLUTION INTRAVENOUS at 20:55

## 2021-01-01 RX ADMIN — HYDROMORPHONE HYDROCHLORIDE 0.2 MG: 1 INJECTION, SOLUTION INTRAMUSCULAR; INTRAVENOUS; SUBCUTANEOUS at 07:20

## 2021-01-01 RX ADMIN — HYDROMORPHONE HYDROCHLORIDE 0.2 MG: 1 INJECTION, SOLUTION INTRAMUSCULAR; INTRAVENOUS; SUBCUTANEOUS at 01:20

## 2021-01-01 ASSESSMENT — PAIN SCALES - GENERAL: PAINLEVEL: NO PAIN (0)

## 2021-01-01 ASSESSMENT — ENCOUNTER SYMPTOMS
DYSURIA: 0
SHORTNESS OF BREATH: 1
WEAKNESS: 1
FREQUENCY: 0
FEVER: 0
ABDOMINAL PAIN: 0
COUGH: 1
CHILLS: 0

## 2021-01-01 ASSESSMENT — ACTIVITIES OF DAILY LIVING (ADL)
ADLS_ACUITY_SCORE: 22

## 2021-01-15 NOTE — TELEPHONE ENCOUNTER
01/15/21  Called patient and discussed her mychart message from today regarding when will be able to get COVID vaccine.   Patient has ALS and states was told by neuro office to work with PCP to get vaccine.   Advised at this time, unfortunately we still have no conclusive info on when COVID vaccine will be available to our high risk patients.   Patient will await more information.  Cornelia Ball RN

## 2021-02-02 NOTE — TELEPHONE ENCOUNTER
Received refill request for plavix from refill team. Patient's last vascular procedure was in 2017 - peripheral angiogram.  Patient has a history of paroxysmal A. Fib, CAD (s/p CABG in 1992 w/ re-do in 2009), PAD./ angioplasty, CVA and  ALS     Will message Dr. Boyd to review for long-term plavix and eliquis therapy

## 2021-02-02 NOTE — TELEPHONE ENCOUNTER
Telephone Encounter by Hattie Maxwell at 04/16/18 10:46 AM     Author:  Hattie Maxwell Service:  (none) Author Type:       Filed:  04/16/18 10:46 AM Encounter Date:  4/16/2018 Status:  Signed     :  Hattie Maxwell ()              JENNIFER WELLINGTON    Patient Age: 11 year old    ACCT STATUS: COPAY  MESSAGE:[JD1.1T]   Mom dropping off form to be completed.  Will give form to Peds.  Please call mom when ready for .[JD1.1M]    Next and Last Visit with Provider and Department  Next visit with DAVID KING is on No match found  Next visit with PEDIATRICS is on No match found  Last visit with DAVID KING was on 03/28/2018 at 10:15 AM in [facility]  Last visit with PEDIATRICS was on 03/28/2018 at 10:15 AM in [facility]     WEIGHT AND HEIGHT: As of 03/28/2018 weight is 96.2 lbs.(43.636 kg). Height is 4' 11.921\"(1.522 m).   BMI is 18.84 kg/(m^2) calculated from:     Height 4' 11.921\" (1.522 m) as of 3/28/18     Weight 96 lb 3.2 oz (43.636 kg) as of 3/28/18      No Known Allergies  Current outpatient prescriptions       Medication  Sig Dispense Refill   • ALBUTEROL Use  via nebulizer as needed.     • Cetirizine HCl (ZYRTEC ALLERGY) 10 MG CAPS Take 1 Tab by mouth daily.     • Beclomethasone Dipropionate (QVAR) 80 MCG/ACT inhaler Inhale 2 Puffs by mouth 2 (two) times daily. 1 Inhaler 5   • PROAIR  (90 BASE) MCG/ACT inhaler Inhale 2 Puffs by mouth every 4 (four) hours as needed for Wheezing, Shortness of Breath or Cough. 2 Inhaler 0      PHARMACY to use:            Pharmacy preference(s) on file:    MEIJER OSWEGO  [facility] [address] [facility].    CALL BACK INFO:[JD1.1T] Ok to leave response (including medical information) with family member or on answering machine[JD1.1M]  ROUTING:[JD1.1T] Patient's physician/staff[JD1.1M]        PCP: David King MD         INS: Payor: [employer] / Plan: B [id number] / Product Type: *No Product  Yes thanks.    type* / Note: This is the primary coverage, but no account was found for this location or the patient's primary location.   ADDRESS:  [address] [place] [id number][JD1.1T]       Revision History        User Key Date/Time User Provider Type Action    > JD1.1 04/16/18 10:46 AM Hattie Maxwell  Sign    M - Manual, T - Template

## 2021-02-10 NOTE — TELEPHONE ENCOUNTER
Er Dr. Boyd's reply Patient called. Patient agrees to decrease Carvedilol to 3.125 mg twice daily and stop imdur.   Patient will call PCP regarding SaO2.     Patient agrees with plan and will continue to monitor and record BP and call in a couple of days with an update.

## 2021-02-10 NOTE — TELEPHONE ENCOUNTER
Let's decrease the coreg to 3.125 MG BID and stop the imdur. She should also see her PCP if her O2 levels are truly that low. Thanks.

## 2021-02-10 NOTE — TELEPHONE ENCOUNTER
My Chart message 2-9-2021  Dr Boyd,  I am having issues with low blood pressure and I am wondering if my medication for high blood pressure is the cause.  Low meaning 80/48 or so.  Plus low oxygen- 78.     Please advise, Katherin Fletcher

## 2021-02-11 NOTE — NURSING NOTE
Chief Complaint   Patient presents with     RECHECK     [facility] RETURN ALS/MOTOR NEURON     Domingo Avila

## 2021-02-11 NOTE — PROGRESS NOTES
Service Date: 2021        Aris Del Real MD   [facility]    [address] [place], [place] [id number]      RE: Katherin Fletcher   MRN: [id number]   : 1940        Dear Dr. Del Real:      I had the pleasure of seeing Katherin at the [facility] today.  Katherin has advanced ALS, likely end-stage.  She had PFT today and forced vital capacity is below 30% at 27% and so is FEV1 at 23%.  She is mostly in a wheelchair and a few times may get up and walk carefully with a walker.  She has problematic combined thick and thin secretions.  She is getting Botox every 3 months, but it has not been very helpful.  She has another appointment on 2021 for another injection.  Most problematic are the thick secretions now.  She used to take Robinul up to 2 mg t.i.d. until last  when she ran out.  It was causing excessive mucus, although thin sialorrhea was diminished.  She does not take Mucinex.  She was recently prescribed a suction device by us.  I also had sent her a cough assist device and documented the necessity of this equipment, but for unclear reasons she either has not used it, returned it or did not receive it.  We were awaiting her decision about starting BiPAP, but she did not call us back so noninvasive ventilation has not been started.      She does cough occasionally when eating, but this is not particularly disturbing.  She is having chronic leg muscle spasms and pain, treated with anti-spasticity medications including tizanidine and she has also been on levodopa and she gets periodic botulinum toxin injection in her legs that have been modestly helpful.  She has signed a POLST in 2019 and she wished to have selective treatment, but no CPR, intubation or other more aggressive maneuvers.  She is on home care, receiving PT, OT and Speech.  She recently had an episode while  evaluated by Speech Therapy in home care where she became tachycardic, anxious, and dyspneic.  O2 saturation dropped to 78 and there was hypotension as well; paramedics had to be called.  Hypotension was addressed recently by her primary care doctor and some of her blood pressure medications were reduced.        PFT Latest Ref Rng & Units 2/11/2021   FVC L 0.70   FEV1 L 0.45   FVC% % 27   FEV1% % 23     /81   Pulse 77   Resp 16   SpO2 95%      Neuro exam was not repeated.    IMPRESSION:  In summary, Katherin has end-stage ALS.  We discussed a number of practical issues. I would recommend starting Mucinex to control the thick secretions.  Cough assist device and noninvasive ventilation (BiPAP) are medically necessary equipment and I attest to the necessity of both.  We will prescribe her both devices.  Hopefully, the cough assist will help with clearance of thick secretions in addition to the Mucinex and suction.  I think she should still continue taking Robinul 1-2 mg t.i.d. because without it, I am quite confident that her sialorrhea will get markedly worse.  It is okay to continue the Botox injections for now.    She has prominent head drop, which is tilted to the left.  This is due to neck weakness from ALS.  She is using a soft collar, but it is not helpful.  She has tried harder collars in the past.  They are very uncomfortable in her jaw. I am afraid I don't have an optimal solution for this problem. She showed me an orthosis device that is available through online shopping.  This orthosis, however, appears appropriate for patients with torticollis and normal neck strength, and I do not think it would suit her well (by applying it, her head will simply fall forward rather than to the left).     She will be seen in followup in our clinic as necessary.  I think her life expectancy is very short in the range of less than 6 months at this point. She is considering enrollment into Hospice, and I think it is  a reasonable choice.      Thank you for allowing me to participate in her care.     BILLING: MDM level 5 (high) based on A) Problems assessed: One chronic illness which poses threat to life or bodily function (end stage ALS) and B) Risk: High due to decision to not resuscitate or de-escalate care due to poor prognosis (patient considering Hospice).    Sincerely,        MD KLAUDIA Mooney MD             D: 2021   T: 2021   MT: maday      Name:     ANA RODRIGUEZ   MRN:      [id number]        Account:      [id number]   :      1940           Service Date: 2021      Document: [id number]

## 2021-02-11 NOTE — LETTER
2021       RE: Katherin Fletcher  [address]  [facility] [address]     Dear Colleague,    Thank you for referring your patient, Katherin Fletcher, to the [facility] [place] [facility]. Please see a copy of my visit note below.    Service Date: 2021        Aris Del Real MD   [facility]    [address]   [place], [place] [id number]      RE: Katherin Fletcher   MRN: [id number]   : 1940        Dear Dr. Del Real:      I had the pleasure of seeing Katherin at the [facility] today.  Katherin has advanced ALS, likely end-stage.  She had PFT today and forced vital capacity is below 30% at 27% and so is FEV1 at 23%.  She is mostly in a wheelchair and a few times may get up and walk carefully with a walker.  She has problematic combined thick and thin secretions.  She is getting Botox every 3 months, but it has not been very helpful.  She has another appointment on 2021 for another injection.  Most problematic are the thick secretions now.  She used to take Robinul up to 2 mg t.i.d. until last  when she ran out.  It was causing excessive mucus, although thin sialorrhea was diminished.  She does not take Mucinex.  She was recently prescribed a suction device by us.  I also had sent her a cough assist device and documented the necessity of this equipment, but for unclear reasons she either has not used it, returned it or did not receive it.  We were awaiting her decision about starting BiPAP, but she did not call us back so noninvasive ventilation has not been started.      She does cough occasionally when eating, but this is not particularly disturbing.  She is having chronic leg muscle spasms and pain, treated with anti-spasticity medications including tizanidine and she has also been on  levodopa and she gets periodic botulinum toxin injection in her legs that have been modestly helpful.  She has signed a POLST in 09/2019 and she wished to have selective treatment, but no CPR, intubation or other more aggressive maneuvers.  She is on home care, receiving PT, OT and Speech.  She recently had an episode while evaluated by Speech Therapy in home care where she became tachycardic, anxious, and dyspneic.  O2 saturation dropped to 78 and there was hypotension as well; paramedics had to be called.  Hypotension was addressed recently by her primary care doctor and some of her blood pressure medications were reduced.        PFT Latest Ref Rng & Units 2/11/2021   FVC L 0.70   FEV1 L 0.45   FVC% % 27   FEV1% % 23     /81   Pulse 77   Resp 16   SpO2 95%      Neuro exam was not repeated.    IMPRESSION:  In summary, Katherin has end-stage ALS.  We discussed a number of practical issues. I would recommend starting Mucinex to control the thick secretions.  Cough assist device and noninvasive ventilation (BiPAP) are medically necessary equipment and I attest to the necessity of both.  We will prescribe her both devices.  Hopefully, the cough assist will help with clearance of thick secretions in addition to the Mucinex and suction.  I think she should still continue taking Robinul 1-2 mg t.i.d. because without it, I am quite confident that her sialorrhea will get markedly worse.  It is okay to continue the Botox injections for now.    She has prominent head drop, which is tilted to the left.  This is due to neck weakness from ALS.  She is using a soft collar, but it is not helpful.  She has tried harder collars in the past.  They are very uncomfortable in her jaw. I am afraid I don't have an optimal solution for this problem. She showed me an orthosis device that is available through online shopping.  This orthosis, however, appears appropriate for patients with torticollis and normal neck strength, and I do  not think it would suit her well (by applying it, her head will simply fall forward rather than to the left).     She will be seen in followup in our clinic as necessary.  I think her life expectancy is very short in the range of less than 6 months at this point. She is considering enrollment into Hospice, and I think it is a reasonable choice.      Thank you for allowing me to participate in her care.     BILLING: MDM level 5 (high) based on A) Problems assessed: One chronic illness which poses threat to life or bodily function (end stage ALS) and B) Risk: High due to decision to not resuscitate or de-escalate care due to poor prognosis (patient considering Hospice).    Sincerely,        Travon Driver MD                 D: 2021   T: 2021   MT: maday      Name:     ANA RODRIGUEZ   MRN:      [id number]        Account:      [id number]   :      1940           Service Date: 2021      Document: [id number]

## 2021-02-11 NOTE — PATIENT INSTRUCTIONS
Follow up as needed  Start using BiPAP for breathing problems (if you can tolerate it)  We will send you a cough assist device- this can help with mucus/thick secretions  I will prescribe Mucinex to dissolve the thick secretions  I will ask our therapists about this torticollis orthosis for your neck

## 2021-02-12 NOTE — TELEPHONE ENCOUNTER
Nutrition Services:     Called Katherin today. Spoke with her daughter, Ruchi.  Introduced her to [employer] [facility] [place].    She expresses her interested in receiving [facility] meals as meal preparation has become difficult.    RD filled out various portions of the application form, signed and sent to Katherin to complete the form.    Sent pre addressed/stampled envelope for Katherin to return form to [facility] for review.      Advised Ruchi and Katherin to reach out to RD with further assistance on completing the form prn.     Jaja Mario RD, [facility]  [phone]

## 2021-02-13 NOTE — ED PROVIDER NOTES
History   Chief Complaint:  Fall     HPI   Katherin Fletcher is a 80 year old female with history of CAD, ALS, MI, and stroke who presents via EMS for evaluation after a fall. At approximately 0900, the patient states that she experienced a mechanical fall when she tripped over the scale on the bathroom floor. She states that she did hit the front side of her head but did not lose consciousness. She reports lying on the ground for a while before she was able to receive help and EMS was alerted. She denies any new chest pain, shortness of breath, neck pain, back pain, arm pain, leg pain, numbness, or any other symptoms. Of note, she is anticoagulated.    Review of Systems   All other systems reviewed and are negative.      Allergies:  Ace inhibitors  Baclofen  Penicillins    Medications:  Eliquis  Sinemet  Coreg  Plavix  Robinul  Mucinex  Levoxyl  Remeron  Provigil  Nitrostat  Crestor  Zoloft  Zanaflex    Past Medical History:    CAD  Chronic cough  Hypertension  Frequent falls  GERD  TIA  Hyperlipidemia  Ischemic cardiomyopathy  MI  PAD  Neurodegenerative disorder  Restless leg syndrome  Hypothyroidism   MRSA  Stroke  Long-term current use of medications  Atrial fibrillation  Rib fractures  Protein-calorie malnutrition   Neuropathy    Past Surgical History:    Bilateral shoulder arthroscopy  Bilateral bunionectomy  Cardiac surgery  Trigger finger release  Hysterectomy  CABG stents x 2  ORIF right ankle  Repair hammer toe right  Vascular surgery     Social History:  The patient presents via EMS and denies any tobacco use.    Physical Exam     Patient Vitals for the past 24 hrs:   BP Temp Temp src Pulse Resp SpO2   02/13/21 1330 -- -- -- -- -- 90 %   02/13/21 1200 -- -- -- -- -- 99 %   02/13/21 1050 110/71 97.4  F (36.3  C) Temporal 76 18 90 %       Physical Exam  General: Resting on the bed.  Head: No obvious trauma to head.  Ears, Nose, Throat:  External ears normal.  Nose normal.  Clear TMs  Eyes:   Conjunctivae clear.  Pupils are equal, round, and reactive.   Neck: Normal range of motion.  Neck supple. Wearing soft brace from home.  Non tender c spine.    CV: Regular rate and rhythm.  No murmurs.      Respiratory: Effort normal and breath sounds normal.  No wheezing or crackles.   Gastrointestinal: Soft.  No distension. There is no tenderness.    Musculoskeletal: Normal range of motion.  Non tender extremities to palpations.    Neuro: Alert. Moving all extremities appropriately.  Normal speech.  GCS 15.  CN II-XII grossly intact.  Gross muscle strength intact of the proximal and distal bilateral upper and lower extremities.  Sensation intact to light touch in all 4 extremities.    Skin: Skin is warm and dry.  No rash noted. Pressure sore noted to coccyx.      Emergency Department Course     Imaging:    CT Head without contrast:  HEAD CT:   1.  No acute abnormality.   2.  Mild generalized volume loss and presumed chronic small vessel   ischemic change.     CT Cervical spine without contrast:  CERVICAL SPINE CT:   1.  No CT evidence for acute fracture or post traumatic subluxation. as per radiology.    Imaging independently reviewed and agree with radiologist interpretation.     Emergency Department Course:    Reviewed:  I reviewed nursing notes, vitals, past medical history and care everywhere.    Assessments:  1048 I obtained history and examined the patient as noted above.     1128 I rechecked the patient and explained findings.     1202 I rechecked the patient again after he oxygen saturation was noted to be 91%..    Disposition:  The patient was discharged to home.       Impression & Plan     Medical Decision Makin-year-old female presents after a fall.  She reports tripping and falling.  Patient did have some lower oxygen saturations.  This was discussed with the patient.  She says it secondary to ALS and not new.  Her ALS provider is setting up a CoughAssist and BiPAP for her at home.  Per patient  she has end-stage ALS and has less than 1 year to live, likely less than 6 months.  Per her wishes she wishes to have a scan and to be discharged home.  She is meeting with hospice at the end of this month.  In regards to the fall this appears to be mechanical in nature.  She states that she tripped on the scale.  She denies any other injuries.  She denies any preceding syncopal symptoms, chest pain, shortness of breath, etc.  Broad differential was pursued include not limited to skull fracture, contusion, concussion, intracranial hemorrhage, mass, cervical fracture dislocation, etc.  Head CT shows no evidence acute intracranial hemorrhage, mass or herniation.  There is no soft tissue contusion or laceration that requires repair.  CT cervical spine shows no evidence acute fracture or traumatic subluxation.  Patient on head to toe exam otherwise has no other acute pains or issues.  She wishes to go home.  We contacted her family who is agreeable with discharge and will drop off clothing for her.  Family came to the ER to facilitate bringing her home.  At this point she appears to be neurologically intact.  She has someone to call if she were to have any issues at home or decompensation.  Patient wishes to go home which seems reasonable in accordance with her wishes given her end-stage disease.  She was given home meds in the ER to help keep her comfortable.  At this point patient was discharged to home with family.      Covid-19  Katherin Fletcher was evaluated during a global COVID-19 pandemic, which necessitated consideration that the patient might be at risk for infection with the SARS-CoV-2 virus that causes COVID-19.   Applicable protocols for evaluation were followed during the patient's care.     Diagnosis:    ICD-10-CM    1. Fall, initial encounter  W19.XXXA    2. Injury of head, initial encounter  S09.90XA        Scribe Disclosure:  Tra SANFORD, am serving as a scribe at 10:48 AM on 2/13/2021 to  document services personally performed by Ronda Bella MD based on my observations and the provider's statements to me.          Ronda Bella MD  02/13/21 1581

## 2021-02-13 NOTE — ED NOTES
Bed: ED09  Expected date:   Expected time:   Means of arrival:   Comments:  421  80 F fall/hit head/vss/neg covid  1043

## 2021-02-14 PROBLEM — R57.0 CARDIOGENIC SHOCK (H): Status: ACTIVE | Noted: 2021-01-01

## 2021-02-14 PROBLEM — I50.9 ACUTE CONGESTIVE HEART FAILURE, UNSPECIFIED HEART FAILURE TYPE (H): Status: ACTIVE | Noted: 2021-01-01

## 2021-02-14 PROBLEM — R79.89 ELEVATED TROPONIN: Status: ACTIVE | Noted: 2021-01-01

## 2021-02-14 PROBLEM — J96.01 ACUTE RESPIRATORY FAILURE WITH HYPOXIA (H): Status: ACTIVE | Noted: 2021-01-01

## 2021-02-14 PROBLEM — Z66 DNR (DO NOT RESUSCITATE): Status: ACTIVE | Noted: 2021-01-01

## 2021-02-15 NOTE — ED PROVIDER NOTES
History   Chief Complaint:  Respiratory Distress       HPI   Katherin Fletcher is a 80 year old female with history of ALS, COPD, CAD, atrial fibrillation on Eliquis, and stroke who presents via EMS with shortness of breath.  The patient's  reported to EMS that the patient has been somewhat woozy the past day or so.  She had a mechanical fall yesterday, tripping over her bathroom scale.  She was evaluated here and had CT scans of the head and cervical spine that were negative for acute abnormalities.  She does have a cough with phlegm which is difficult to clear due to her ALS.  She saw her neurologist, Dr. Driver, last week who recommended BiPAP and cough assist device but she is not on oxygen at home.  Today she became progressively more short of breath.  On EMS arrival her oxygen saturation was 66% on room air.  She improved to 70% with 4L NC and further improved to 91% with a nonrebreather.  She feels better with the oxygen in place.  She denies any fevers or chills.  She lives with her  who has not been ill.  She denies any chest pain, abdominal pain, or urinary symptoms.  She is DNR/DNI and would not like heroic measures but is amenable to other treatments, including antibiotics.      Plan as per Dr. Driver' note from 2/11/21:  IMPRESSION:  In summary, Katherin has end-stage ALS.  We discussed a number of practical issues. I would recommend starting Mucinex to control the thick secretions.  Cough assist device and noninvasive ventilation (BiPAP) are medically necessary equipment and I attest to the necessity of both.  We will prescribe her both devices.  Hopefully, the cough assist will help with clearance of thick secretions in addition to the Mucinex and suction.  I think she should still continue taking Robinul 1-2 mg t.i.d. because without it, I am quite confident that her sialorrhea will get markedly worse.  It is okay to continue the Botox injections for now.    She has  prominent head drop, which is tilted to the left.  This is due to neck weakness from ALS.  She is using a soft collar, but it is not helpful.  She has tried harder collars in the past.  They are very uncomfortable in her jaw. I am afraid I don't have an optimal solution for this problem. She showed me an orthosis device that is available through online shopping.  This orthosis, however, appears appropriate for patients with torticollis and normal neck strength, and I do not think it would suit her well (by applying it, her head will simply fall forward rather than to the left).      She will be seen in followup in our clinic as necessary.  I think her life expectancy is very short in the range of less than 6 months at this point. She is considering enrollment into Hospice, and I think it is a reasonable choice.     Review of Systems   Constitutional: Negative for chills and fever.   Respiratory: Positive for cough and shortness of breath.    Cardiovascular: Negative for chest pain.   Gastrointestinal: Negative for abdominal pain.   Genitourinary: Negative for dysuria and frequency.   Neurological: Positive for weakness.   All other systems reviewed and are negative.      Allergies:  Ace Inhibitors  Baclofen  Penicillins  Nickel    Medications:  Eliquis  Carvedilol   Plavix  Nitroglycerin   Sinemet  Robinul  Provigil   Omeprazole   Rosuvastatin   Sertraline   Tizanidine   Levothyroxine   Mirtazapine   Tylenol  Beckham     Past Medical History:    Coronary artery disease   Myocardial infarction   Ischemic cardiomyopathy   Hypertension  Hyperlipidemia   Atrial fibrillation   Stroke   Peripheral artery disease   Amyotrophic lateral sclerosis  Chronic obstructive pulmonary disease   Restless leg syndrome   Hypothyroidism   Gastroesophageal reflux disease    Depression       Past Surgical History:    Shoulder arthroscopy  Bunionectomy  Coronary artery bypass graft   Hand surgery  Carpal tunnel release  Trigger finger  release   Open reduction with internal fixation, left ankle  Hysterectomy   Ganglion cyst excision  Hammer toe repair    Family History:    Celiac disease - daughter  Raynaud's - daughter    Social History:  Presents to the ED alone  Living Situation: lives with her    PCP: Aris Del Real     Physical Exam     Patient Vitals for the past 24 hrs:   BP Temp Temp src Pulse Resp SpO2 Weight   02/14/21 2345 104/65 -- -- 67 -- 95 % --   02/14/21 2330 97/69 -- -- 68 -- 96 % --   02/14/21 2315 90/68 -- -- 72 -- 96 % --   02/14/21 2300 (!) 80/52 -- -- 71 18 95 % --   02/14/21 2230 (!) 78/42 -- -- 71 -- 94 % --   02/14/21 2220 (!) 79/50 -- -- 71 -- 96 % --   02/14/21 2215 -- -- -- 87 -- 96 % --   02/14/21 2210 (!) 74/47 -- -- 72 -- 94 % --   02/14/21 2200 (!) 84/55 -- -- 73 -- 94 % --   02/14/21 2150 97/57 -- -- 77 -- 99 % --   02/14/21 2140 91/57 -- -- 85 -- -- --   02/14/21 2130 (!) 79/54 -- -- 80 -- 93 % --   02/14/21 2120 (!) 84/59 -- -- 98 -- 94 % --   02/14/21 2110 94/54 -- -- 80 -- 96 % --   02/14/21 2100 (!) 87/57 -- -- 83 -- 99 % --   02/14/21 2055 (!) 89/53 -- -- 96 -- 100 % --   02/14/21 2050 -- -- -- 75 -- 100 % --   02/14/21 2045 (!) 86/51 -- -- 74 -- 100 % --   02/14/21 2040 (!) 79/57 -- -- 76 -- 100 % --   02/14/21 2035 (!) 87/55 97.6  F (36.4  C) Oral 78 -- 96 % 53.8 kg (118 lb 9.7 oz)     Physical Exam  Nursing note and vitals reviewed.    Constitutional:  Very cachetic and weak. Speaks very slowly and labored due to her ALS.  HENT:     Nose normal.  No discharge.      Oral mucosa is moist.  Eyes:    Conjunctivae are normal without injection.     Pupils are equal.  Cardiovascular:  Normal rate, regular rhythm with normal S1 and S2.      Normal heart sounds and peripheral pulses 2+ and equal.       No murmur or stefanie.  Pulmonary:  Effort is slightly labored, I do not hear any obvious rales or wheezes.  She is not retracting.  GI:    Soft. No distension and no mass. No tenderness.   Musculoskeletal:   Normal range of motion. No extremity deformity.     No edema and no tenderness.    Neurological:   Alert and oriented. Significant generalized weakness in all extremities.     Exhibits poor muscle tone.      GCS eye subscore is 4. GCS verbal subscore is 5.      GCS motor subscore is 6.   Skin:    Skin is warm and dry. No rash noted. No diaphoresis.      Mild skin breakdown of presacral area per nurse  Psychiatric:   Behavior is normal.  Thought processes are clear.     Emergency Department Course   ECG:  ECG taken at 2037, ECG read at 2040  Atrial fibrillation. Anterior infarct, age undetermined. Abnormal ECG.   Rate 76 bpm. GA interval * ms. QRS duration 92 ms. QT/QTc 420/472 ms. P-R-T axes * 4 105.     Imaging:  Chest X-ray, Portable (1 view):  IMPRESSION: Mild hazy increased density in both lower lungs, increased in prominence since the previous exam. These findings may be related to pneumonia and/or edema. Heart size appears stable. There is mild pulmonary venous congestion. Sternotomy. No pneumothorax.  Report per radiology.      Laboratory:  CBC: RBC 3.41 (L), HGB 9.7 (L), HCT 32 (L), MCHC 30.3 (L), otherwise WNL (WBC 5.7, )    CMP: CO2 37 (H), Anion Gap <1 (L), Glucose 156 (H), Albumin 2.4 (L), Total Protein 6.2 (L), otherwise WNL (Creatinine 0.65)   Troponin I: 0.125 (HH)    Pro-BNP: 3170 (H)   Lactic Acid: 1.3  Blood Culture: pending x 2     COVID19 Virus PCR, nasopharyngeal: pending        Interventions:  (2055) Normal Saline, 1 liter, IV bolus    Emergency Department Course:  (2028) The patient was brought directly into a critical care room by [facility] secondary to the acuity of the patient's condition.  I met the patient in the room on arrival and performed an exam of the patient as documented above.   (2029) The patient was placed on continuous pulse oximetry and cardiac monitoring.   (2037) EKG was done, interpretation as above.   (2037) A peripheral IV was established. Blood was  drawn from the patient. This was sent for laboratory testing, findings above.     (2125) The patient had a portable chest x-ray done in the emergency department.   (2146) I spoke with the patient's , Caio, via phone.  Patient's presentation and workup thus far discussed.  He confirms that the patient would not want heroic measures and would want to be kept comfortable.  (2155) I reevaluated the patient and discussed treatment options.  (2213) The patient was placed on BiPAP by respiratory therapy.  (2225) Findings and plan explained to the patient who consents to admission.   (2232) I discussed the patient with Dr. Al of the hospitalist service, who will admit the patient to an [facility] bed for further monitoring, evaluation, and treatment.     Medical Decision Making:  Patient comes in with advanced stage ALS, DNR/DNI.  She is more short of breath the last day or 2 with low saturations.  She was placed on oxygen by EMS and when she arrived she was in the 80s.  She was continued on a nonrebreather with saturations in the low 90s.  Her lungs sounded fairly clear, she was so weak from her ALS that it took a long time for her to be able to get words out but she did states she did not want anything heroic but would be okay with the oxygen and antibiotics if needed.  She has not been reporting fevers, I did get labs and started a sepsis work-up and her electrolytes and renal function are normal, liver function tests are normal.  Lactic acid came back normal at 1.3 and her white count is 5.7.  Hemoglobin is at 9.7 and there is been no reports of bleeding.  Glucose 156.  However her BNP came back high at 3170 and her troponin is up at 0.125.  She has a known coronary disease history but does not really describe significant chest pain recently.  I am concerned that she may have had an ischemic event causing acute congestive heart failure.  Her chest x-ray shows some increased haziness in both lower lung fields and I  think there is some evidence of some vascular congestion consistent with heart failure.  The patient's blood pressures are low in the 70s and 80s with one reading in the 90s.  After a liter of fluids her blood pressures did not really improve.  It was elected to put her on BiPAP at that point and the patient was agreeable to this.  Prior to that time though I did talk to her about her low blood pressure and the fact that we might need to put a central line in and give her medication to support the blood pressure and she did not want to have this done.  I talked with her and 89-year-old  and he agreed that she would not want that.  After she was on the BiPAP, her oxygen levels were in the low 90s and she seemed to be resting much more comfortably.  However her blood pressures were still in the 70s systolic.  I did send off a Covid test and it is pending.  Her blood pressure slowly came up now to around 100-110 systolic.  She pulled off the BiPAP because it was no longer comfortable for her.  Her  and 2 adult children are in the room and I gave them an update and they agreed with her assessment and plan.  I think this patient can go [place] [facility] now that she is not on BiPAP.  I talked with Dr. Al who will be following her and getting serial troponins.  She is on a nonrebreather at this time with oxygen levels around 90%.  Being that she is DNR/DNI and mostly comfort care, mild hypoxemia would be acceptable.    Critical Care Time: was 65 minutes for this patient excluding procedures    Covid-19  Katherin Fletcher was evaluated during a global COVID-19 pandemic, which necessitated consideration that the patient might be at risk for infection with the SARS-CoV-2 virus that causes COVID-19.   Applicable protocols for evaluation were followed during the patient's care.   COVID-19 was considered as part of the patient's evaluation. The plan for testing is:  a test was obtained during this  visit.    Diagnosis:    ICD-10-CM    1. Acute respiratory failure with hypoxia (H)  J96.01 Blood culture     Blood culture     Asymptomatic SARS-CoV-2 COVID-19 Virus (Coronavirus) by PCR   2. Elevated troponin  R77.8    3. Acute congestive heart failure, unspecified heart failure type (H)  I50.9    4. DNR (do not resuscitate)  Z66    5. Cardiogenic shock (H)  R57.0          Scribe Disclosure:  I, Andria Christianson, am serving as a scribe at 8:31 PM on 2/14/2021 to document services personally performed by Thais Roberts MD based on my observations and the provider's statements to me.             Thais Roberts MD  02/15/21 0001

## 2021-02-15 NOTE — ED NOTES
Patients family now at bedside. Patient complains of pain in her back and coccyx. Patient repositioned with assist of RN. MD spoke with family per request. Patient complained that Bipap was bothering her and she wanted the mask off. Patient was placed back on 10L o2 via non rebreather

## 2021-02-15 NOTE — TELEPHONE ENCOUNTER
----- Message from Reyna Lagunas RN sent at 2/10/2021 12:55 PM CST -----  Regarding: Call for BP update after changing meds.

## 2021-02-15 NOTE — ED NOTES
RN spoke to patients  and updated him on patients results and further plans of care/testing to come.  agreed to this. Patient also spoke to her  on RN phone.

## 2021-02-15 NOTE — TELEPHONE ENCOUNTER
When reviewing chart to call for BP update, noted Hospitalized 2-14-21 with anticipated discharge 2-15-21 for mechanical fall when she tripped over the scale on the bathroom floor. She states that she did hit the front side of her head but did not lose consciousness.   BP in ED was 110/71.   Will await Patient to call later this week with BP update.

## 2021-02-15 NOTE — ED NOTES
Respiratory arrived to place bipap on patient per MD request. Patient agrees to plan for Bipap use. MD also spoke to patients  and updated him on plans of care.

## 2021-02-15 NOTE — H&P
[facility]    History and Physical  Hospitalist       Date of Admission:  2/14/2021  Date of Service (when I saw the patient): 02/14/21    ASSESSMENT  Katherin Fletcher is a markedly pleasant 80 year old woman with extensive past medical history that is most notable for progressive ALS, as well as CAD status post CABG, chronic systolic CHF, atrial fibrillation on Eliquis, and COPD, among others;  who presents with acute hypoxemic respiratory failure suspected due to decompensated acute systolic CHF and myonecrosis.    PLAN    Acute hypoxemic respiratory failure: causing hypotension and impending cardiogenic shock. BIPAP started in the ED. Note is made of progressive ALS: Neurology note from 2/11/2021 documents as follows:    IMPRESSION:  In summary, Katherin has end-stage ALS.  We discussed a number of practical issues. I would recommend starting Mucinex to control the thick secretions.  Cough assist device and noninvasive ventilation (BiPAP) are medically necessary equipment and I attest to the necessity of both.  We will prescribe her both devices.  Hopefully, the cough assist will help with clearance of thick secretions in addition to the Mucinex and suction.  I think she should still continue taking Robinul 1-2 mg t.i.d. because without it, I am quite confident that her sialorrhea will get markedly worse.  It is okay to continue the Botox injections for now.    She has prominent head drop, which is tilted to the left.  This is due to neck weakness from ALS.  She is using a soft collar, but it is not helpful.  She has tried harder collars in the past.  They are very uncomfortable in her jaw. I am afraid I don't have an optimal solution for this problem. She showed me an orthosis device that is available through online shopping.  This orthosis, however, appears appropriate for patients with torticollis and normal neck strength, and I do not think it would suit her well (by applying it,  her head will simply fall forward rather than to the left).        She will be seen in followup in our clinic as necessary.  I think her life expectancy is very short in the range of less than 6 months at this point. She is considering enrollment into Hospice, and I think it is a reasonable choice.     Now she presents with progressive cough, dyspnea and abdominal and chest pain. Note is made of history of CAD as well as chronic systolic CHF, with most recent TTE 11/2020 showing LVEF 40% with reduced RV function, severe TREVON and anteroseptal hypokinesis reported. Her clinical presentation seems most consistent with acute decompensated CHF exacerbation; she has myonecrosis and ACS is possible as the cause, noting EKG that shows biphasic anterior T waves (vs demand ischemia due to the CHF). Pneumonia and COPD exacerbation seem less likely clinically although of course, she is at very high risk for aspiration. She is on Eliquis, making PE less likely.      Care goal discussions have been held tonight and she wishes to take off the BIPAP mask started in the ED.      -- Inpatient. She and her family agree to a Comfort Measures Only based care plan started overnight.    -- I will carefully order a low dose of IV Dilaudid IV PRN tonight for any worsening air hunger or pain; if she were to develop anxiety or delirium with hallucinations, we will treat those conditions as well. Oxygen ordered as needed for comfort. Multiple family members plan to come in to see her tonight.    -- In AM we will place a consult to Social Work services for disposition planning (possibly to hospice care).    Anemia: Acute, mild. HGB 10 and was 12 on 9/2020. Cause unclear, without evident active bleeding. Care plan for now overnight as above.    CAD: Reportedly she had initial CABG in 1992, requiring revision in 2009 (LIMA to LAD, SVG to OM and SVG to posterolateral branch of RCA). She also has hypertension and dyslipidemia.    -- Holding  outpatient Plavix, Coreg, and Crestor for now    AFib, chronic: Holding Eliquis for now    COPD: Reportedly by history. No signs of wheezing at present. Prn nebs ordered for now    PAD and Prior TIA: By history; holding anticoagulation as above for now    Suspected severe protein calorie malnutrition: Noted    Stage 2 coccygeal pressure injury: Present on admission. Note.    This patient is critically ill due to acute respiratory failure requiring BIPAP and I have spent 70 minutes today in bedside administration, review of laboratory, radiology, and microbiology data, medication administration, and coordination of care with the ED team    Rule Out COVID-19 infection  This patient was evaluated during a global COVID-19 pandemic, which necessitated consideration that the patient might be at risk for infection with the SARS-CoV-2 virus that causes COVID-19. Applicable protocols for evaluation were followed during the patient's care. Low suspicion for infection.   -follow up COVID-19 PCR test result  -no current indication for precautions    Chief Complaint   Cough    History is obtained from the patient, her , son and daughter at the bedside, and the ED physician whom I have spoken with    History of Present Illness   Katherin Fletcher is a markedly pleasant 80 year old woman who presents with cough; this started a few days ago and has been constant and progressively worsening. Due to her ALS it has been very hard to cough up any secretions. She complains to me today of ongoing dizziness, as well as acute onset pain in her lower chest radiating from her upper abdomen, as well as ongoing soreness in her lower back from bedsores. In addition today she developed acute dyspnea that has been constant since onset. Yesterday she felt so weak she fell at home that she tripped over something and fell in the bathroom. This afternoon the dyspnea acutely worsens further and EMS were called. She denies any other acute  "complaints to me.    On EMS arrival her oxygen saturation was 66% on room air.  She improved to 70% with 4L NC and further improved to 91% with a nonrebreather. She tells me that oxygen definitely relieves the dyspnea tonight.    In the ED, Blood pressure (!) 74/47, pulse 87, temperature 97.6  F (36.4  C), temperature source Oral, weight 53.8 kg (118 lb 9.7 oz), SpO2 96 %, not currently breastfeeding.    She was in marked respiratory distress on arrival in the ED and required BIPAP to keep saturations above 90%.    CBC and CMP were notable for HGB 9.7, CO2 37, alb 2.4, tprot 6.2, Glucose 156, otherwise were within the normal reference range. Lactate 1.3, WBC 5.7. BNP elevated at 3170. Troponin 0.125. EKG showed atrial fibrillation with biphasic anterior T waves Blood cultures were sent.     CXR showed: \"IMPRESSION: Mild hazy increased density in both lower lungs, increased in prominence since the previous exam. These findings may be related to pneumonia and/or edema. Heart size appears stable. There is mild pulmonary venous congestion. Sternotomy. No pneumothorax.\"    CT Head and cervical spine showed: \"IMPRESSION:  HEAD CT:  1.  No acute abnormality.  2.  Mild generalized volume loss and presumed chronic small vessel  ischemic change.     CERVICAL SPINE CT:  1.  No CT evidence for acute fracture or post traumatic subluxation.\"    The case was discussed with her and her  and other family in the ED and she expressed the wish for BIPAP for comfort trial overnight while her family arrive, but no further aggressive care measures.    PHYSICAL EXAM  Blood pressure (!) 74/47, pulse 87, temperature 97.6  F (36.4  C), temperature source Oral, weight 53.8 kg (118 lb 9.7 oz), SpO2 96 %, not currently breastfeeding.  Constitutional: Alert and oriented to person, place and time; breathing appears labored; extremely frail and cacechtic  HEENT: neck is deformed to the left and muscles contracted; dry mucus " membranes  Respiratory: lungs have diffuse faint crackles without wheezes o auscultation bilaterally  Cardiovascular: Irregular S1 S2   GI: abdomen soft non tender non distended bowel sounds positive though very faint  Lymph/Hematologic: pale, no cervical lymphadenopathy  Skin: no rash, poor turgor  Musculoskeletal: no clubbing, cyanosis or edema  Neurologic: no nystagmus or tremor; multiple contractions  Psychiatric: appropriate affect, insight and judgment     DVT Prophylaxis: Comfort care  Code Status: DNR / DNI    Disposition: Expected discharge pending hospice discussions    Pedrito Al MD    Past Medical History    I have reviewed this patient's medical history and updated it with pertinent information if needed.   Past Medical History:   Diagnosis Date     ALS (amyotrophic lateral sclerosis) (H)      CAD (coronary artery disease)     s/p CABG 1992 and redo CABG 2009 (LIMA to LAD, SVG to OM and SVG to posterolateral branch of RCA)     Chronic cough     Non-productive. No known definite etiology. Is beeing seen by [facility].      Essential hypertension, benign      Falls frequently 05/28/2018     GERD (gastroesophageal reflux disease)      History of TIA (transient ischemic attack) and stroke 05/07/2018    R sided facial N&T.      Hyperlipidaemia      Hyperlipidaemia LDL goal < 130      Ischemic cardiomyopathy      MI (myocardial infarction) (H) 11/1996     Mixed hyperlipidemia      Neurodegenerative disorder (H)     Further detailed dx unknown. Managed by Dr. George Pham - [facility].      PAD (peripheral artery disease) (H)      Restless legs syndrome      Unspecified hypothyroidism        Past Surgical History   I have reviewed this patient's surgical history and updated it with pertinent information if needed.  Past Surgical History:   Procedure Laterality Date     ARTHROSCOPY SHOULDER RT/LT       BUNIONECTOMY RT/LT       C CABG, ARTERY-VEIN, THREE  1/2009     C CARDIAC SURG PROCEDURE  UNLIST       C HAND/FINGER SURGERY UNLISTED       C MRA UPPER EXTREMITY WO&W CONT       C SHOULDER SURG PROC UNLISTED       C STOMACH SURGERY PROCEDURE UNLISTED       CARDIAC SURGERY  1992    CABG,stents x2     CARPAL TUNNEL RELEASE RT/LT       EXCISE GANGLION HAND Right 11/29/2018    Procedure: EXCISE RIGHT VOLAR CARPAL MASS AND RIGHT INDEX TRIGGER FINGER RELEASE;  Surgeon: Minh Goldstein MD;  Location:  OR     HC VASCULAR SURGERY PROCEDURE UNLIST       HYSTERECTOMY, DAMIÁN  32 yo     OPEN REDUCTION INTERNAL FIXATION ANKLE Left 2015    Left Ankle     RELEASE TRIGGER FINGER       RELEASE TRIGGER FINGER Right 11/29/2018    Procedure: RIGHT INDEX TRIGGER FINGER RELEASE;  Surgeon: Minh Goldstein MD;  Location:  OR     REPAIR HAMMER TOE Right 12/19/2017    Procedure: REPAIR HAMMER TOE;  Right 2nd and 3rd toe pinning;  Surgeon: Fernando Wiggins MD;  Location:  OR     STENT  2006     STENT(aka CARDIAC)  2008     VASCULAR SURGERY         Prior to Admission Medications   Prior to Admission Medications   Prescriptions Last Dose Informant Patient Reported? Taking?   Alfalfa 250 MG TABS  Self Yes Yes   Sig: Take 250 mg by mouth every morning    Multiple Vitamins-Minerals (DAILY MULTI) TABS  Self Yes Yes   Sig: Take 1 tablet by mouth every morning    acetaminophen (TYLENOL) 500 MG tablet prn at prn  No Yes   Sig: Take 1-2 tablets (500-1,000 mg) by mouth every 6 hours as needed for mild pain   apixaban ANTICOAGULANT (ELIQUIS) 5 MG tablet   No Yes   Sig: Take 1 tablet (5 mg) by mouth 2 times daily   calcium carbonate-vitamin D (CALCIUM + D) 600-200 MG-UNIT TABS  Self Yes Yes   Sig: Take 1 tablet by mouth At Bedtime    carbidopa-levodopa (SINEMET)  MG tablet  Self Yes No   Sig: Take 1 tablet three times daily and 2 tablets at bedtime   carvedilol (COREG) 6.25 MG tablet   Yes Yes   Sig: Take 0.5 tablets (3.125 mg) by mouth 2 times daily (with meals)   clopidogrel (PLAVIX) 75 MG tablet   No Yes   Sig: Take 1 tablet  (75 mg) by mouth daily   glycopyrrolate (ROBINUL) 1 MG tablet  Self No Yes   Sig: Take 1 tablet (1 mg) by mouth 3 times daily   guaiFENesin (MUCINEX) 600 MG 12 hr tablet   No Yes   Sig: Take 2 tablets (1,200 mg) by mouth 2 times daily   levothyroxine (SYNTHROID/LEVOTHROID) 112 MCG tablet   Yes Yes   Sig: Take 112 mcg by mouth daily   mirtazapine (REMERON) 7.5 MG tablet   No Yes   Sig: Take 3 tablets in the evening   modafinil (PROVIGIL) 100 MG tablet   No Yes   Sig: Take 1 tablet (100 mg) by mouth daily   nitroGLYcerin (NITROSTAT) 0.4 MG sublingual tablet  Self No Yes   Sig: DISSOLVE ONE TABLET UNDER THE TONGUE EVERY 5 MINUTES AS NEEDED FOR CHEST PAIN.  DO NOT EXCEED A TOTAL OF 3 DOSES IN 15 MINUTES   omeprazole (PRILOSEC) 20 MG DR capsule   No Yes   Sig: TAKE 1 CAPSULE TWICE A DAY 30 TO 60 MINUTES BEFORE MEAL   polyethylene glycol (MIRALAX) 17 GM/Dose powder   No Yes   Sig: Take 17 g (1 capful) by mouth daily   rosuvastatin (CRESTOR) 20 MG tablet   No Yes   Sig: Take 1 tablet (20 mg) by mouth daily   sertraline (ZOLOFT) 25 MG tablet   No Yes   Sig: Take 1 tablet (25 mg) by mouth daily   tiZANidine (ZANAFLEX) 2 MG tablet   No Yes   Sig: TAKE 3 TABLETS THREE TIMES A DAY      Facility-Administered Medications Last Administration Doses Remaining   botulinum toxin type A (BOTOX) 100 units injection 400 Units None recorded         Allergies   Allergies   Allergen Reactions     Ace Inhibitors Cough     Baclofen Other (See Comments)     Constipation , tiredness     Penicillins      10/7/14 Hives per patient -- many many years ago       Nickel Swelling and Rash     Other reaction(s): Unknown       Social History   I have reviewed this patient's social history and updated it with pertinent information if needed. Katherin Fletcher  reports that she quit smoking about 28 years ago. Her smoking use included cigarettes. She has a 11.00 pack-year smoking history. She has never used smokeless tobacco. She reports previous  alcohol use of about 1.0 standard drinks of alcohol per week. She reports that she does not use drugs.    Family History   Family history assessed and, except as above, is non-contributory.    Family History   Problem Relation Age of Onset     Unknown/Adopted Mother      Unknown/Adopted Father      Other - See Comments Son         on warfarin for one year after afganastan now off and doing well     Other - See Comments Daughter         celiac, brittle bone, arthritis, Raynauds       Review of Systems   The 10 point Review of Systems is negative other than noted in the HPI or here.     Primary Care Physician   Aris Del Real    Data   Labs Ordered and Resulted from Time of ED Arrival Up to the Time of Departure from the ED   CBC WITH PLATELETS DIFFERENTIAL - Abnormal; Notable for the following components:       Result Value    RBC Count 3.41 (*)     Hemoglobin 9.7 (*)     Hematocrit 32.0 (*)     MCHC 30.3 (*)     Absolute Lymphocytes 0.6 (*)     All other components within normal limits   COMPREHENSIVE METABOLIC PANEL - Abnormal; Notable for the following components:    Carbon Dioxide 37 (*)     Anion Gap <1 (*)     Glucose 156 (*)     Albumin 2.4 (*)     Protein Total 6.2 (*)     All other components within normal limits   NT PROBNP INPATIENT - Abnormal; Notable for the following components:    N-Terminal Pro BNP Inpatient 3,170 (*)     All other components within normal limits   TROPONIN I - Abnormal; Notable for the following components:    Troponin I ES 0.125 (*)     All other components within normal limits   LACTIC ACID WHOLE BLOOD   PULSE OXIMETRY NURSING   CARDIAC CONTINUOUS MONITORING   STRICT INTAKE AND OUTPUT   PERIPHERAL IV CATHETER   VITAL SIGNS   BLOOD CULTURE   BLOOD CULTURE       Data reviewed today:  I personally reviewed the EKG tracing showing Afib with biphasic anterior T waves.    Recent Results (from the past 24 hour(s))   XR Chest Port 1 View    Narrative    CHEST ONE VIEW PORTABLE    2/14/2021 9:26 PM     HISTORY: ALS. Increased shortness of breath.    COMPARISON: 9/2/2020.      Impression    IMPRESSION: Mild hazy increased density in both lower lungs, increased  in prominence since the previous exam. These findings may be related  to pneumonia and/or edema. Heart size appears stable. There is mild  pulmonary venous congestion. Sternotomy. No pneumothorax.    MICHELLE BAR MD

## 2021-02-15 NOTE — ED NOTES
Bed: ST02  Expected date:   Expected time:   Means of arrival:   Comments:  419 80F Resp distress 91% on 10 LPM O2, UNK COVID

## 2021-02-15 NOTE — ED NOTES
[facility]  ED Nurse Handoff Report    ED Chief complaint: Respiratory Distress      ED Diagnosis:   Final diagnoses:   Acute respiratory failure with hypoxia (H)   Elevated troponin   Acute congestive heart failure, unspecified heart failure type (H)   DNR (do not resuscitate)   Cardiogenic shock (H)       Code Status: DNR / DNI    Allergies:   Allergies   Allergen Reactions     Ace Inhibitors Cough     Baclofen Other (See Comments)     Constipation , tiredness     Penicillins      10/7/14 Hives per patient -- many many years ago       Nickel Swelling and Rash     Other reaction(s): Unknown       Patient Story: Patient arrives to the ED today with complaints of respiratory distress which began today around 1600 and worsened since onset. Patient has advanced ALS and currently is DNR/DNI with plans for home hospice care however has not been able to get it set up yet. Patients  called EMS and upon their arrival patients o2 saturations were 66% on room air. Patient was placed on 6L non rebreather in route and sats returned to the mid 90s. Patient has a congested cough which is mildly productive in nature. Patient denies chest pain however she was found to have an elevated troponin and BNP. Additionally patients blood pressures have been in the 70-90s systolic while in the ED  After speaking with the patient and family it was decided that no further interventions are wanted at this time. Patient did agree to bipap for comfort.   Focused Assessment:    Resp: Patient has low o2 satutations on room air with a mildly productive cough. Patient has been requiring supplemental o2 while in the ED and has been placed on Bipap for comfort.  Neuro: patient has advanced stage ALS and has limited mobility of her legs, head and neck. Patient is able to speak with effort and speech is quite garbled.   Cardiac: patient denies chest pain but was found to have an elevated troponin and BNP while in the ED. Patients  blood pressures have been soft in the 70s-90s systolic while in the ED however no further interventions are desired at this time.   GI:WDL   :WDL   Musculoskeletal: patient has ALS and has limited mobility of her upper extemities and little to no movement of her lower extremities head or neck.   Treatments and/or interventions provided: Oxygen, Bipap, fluids  Patient's response to treatments and/or interventions: interventions have provided patient with comfort     To be done/followed up on inpatient unit:  Comfort cares, hospice     Does this patient have any cognitive concerns?: none     Activity level - Baseline/Home:  Total Care  Activity Level - Current:   Total Care    Patient's Preferred language: English   Needed?: No    Isolation: Contact   Infection: MRSA  Patient tested for COVID 19 prior to admission: YES  Bariatric?: No    Vital Signs:   Vitals:    02/14/21 2150 02/14/21 2200 02/14/21 2210 02/14/21 2215   BP: 97/57 (!) 84/55 (!) 74/47    Pulse: 77 73 72 87   Temp:       TempSrc:       SpO2: 99% 94% 94% 96%   Weight:           Cardiac Rhythm:Cardiac Rhythm: Normal sinus rhythm(Patient reports that she does go in and out of A fib )    Was the PSS-3 completed:   Yes  What interventions are required if any?               Family Comments: Patients family is aware of patients condition and situation at this time. MD spoke to patients family and patient regarding the decision for comfort care  OBS brochure/video discussed/provided to patient/family: N/A              Name of person given brochure if not patient: n.a               Relationship to patient: n.a     For the majority of the shift this patient's behavior was Green.   Behavioral interventions performed were n.a .    ED NURSE PHONE NUMBER: [phone]

## 2021-02-15 NOTE — DEATH PRONOUNCEMENT
MD DEATH PRONOUNCEMENT    Called to pronounce Katherin Fletcher dead.    Physical Exam: Unresponsive to noxious stimuli, Spontaneous respirations absent, Breath sounds absent, Carotid pulse absent and Heart sounds absent    Patient was pronounced dead at 09:30 AM, February 15, 2021.    No data filed        Active Problems:    Cardiogenic shock (H)    DNR (do not resuscitate)    Elevated troponin    Acute respiratory failure with hypoxia (H)    Acute congestive heart failure, unspecified heart failure type (H)       Infectious disease present?: NO    Communicable disease present? (examples: HIV, chicken pox, TB, Ebola, CJD) :  NO    Multi-drug resistant organism present? (example: MRSA): YES    Please consider an autopsy if any of the following exist:  NO Unexpected or unexplained death during or following any dental, medical, or surgical diagnostic treatment procedures.   NO Death of mother at or up to seven days after delivery.     NO All  and pediatric deaths.     NO Death where the cause is sufficiently obscure to delay completion of the death certificate.   NO Deaths in which autopsy would confirm a suspected illness/condition that would affect surviving family members or recipients of transplanted organs.     The following deaths must be reported to the 's Office:  NO A death that may be due entirely or in part to any factors other than natural disease (recent surgery, recent trauma, suspected abuse/neglect).   NO A death that may be an accident, suicide, or homicide.     NO Any sudden, unexpected death in which there is no prior history of significant heart disease or any other condition associated with sudden death.   NO A death under suspicious, unusual, or unexpected circumstances.    NO Any death which is apparently due to natural causes but in which the  does not have a personal physician familiar with the patient s medical history, social, or environmental situation or the  circumstances of the terminal event.   NO Any death apparently due to Sudden Infant Death Syndrome.     NO Deaths that occur during, in association with, or as consequences of a diagnostic, therapeutic, or anesthetic procedure.   NO Any death in which a fracture of a major bone has occurred within the past (6) six months.   NO A death of persons note seen by their physician within 120 days of demise.     NO Any death in which the  was an inmate of a public institution or was in the custody of Law Enforcement personnel.   NO  All unexpected deaths of children   NO Solid organ donors   NO Unidentified bodies   PENDING Deaths of persons whose bodies are to be cremated or otherwise disposed of so that the bodies will later be unavailable for examination;   NO Deaths unattended by a physician outside of a licensed healthcare facility or licensed residential hospice program   YES Deaths occurring within 24 hours of arrival to a health care facility if death is unexpected.    NO Deaths associated with the decedent s employment.   NO Deaths attributed to acts of terrorism.   NO Any death in which there is uncertainty as to whether it is a medical examiner s care should be discussed with the medical investigator.        Body disposition: Body released to the morgue.    EVONNE Dangelo CNP  Text Page

## 2021-02-15 NOTE — ED NOTES
Patients significant other and son are at bedside. Patient states that she still feels about the same as she felt before.

## 2021-02-15 NOTE — ED NOTES
DATE:  2/14/2021   TIME OF RECEIPT FROM LAB:  9:40 PM  LAB TEST:  Troponin  LAB VALUE:  0.125  RESULTS GIVEN WITH READ-BACK TO (PROVIDER): Dr. Roberts   TIME LAB VALUE REPORTED TO PROVIDER:  9:42

## 2021-02-15 NOTE — PROGRESS NOTES
Put the Pt. On BiPAP was not pulling good volume, changed to HFNC, sat. Didn't improve notified the Nurse.

## 2021-02-15 NOTE — DISCHARGE SUMMARY
[facility]    Discharge Summary  Hospitalist    Date of Admission:  2/14/2021  Date of Discharge:  2/15/2021  Discharging Provider: Oscar Dozier MD discharged due to Death  Date of Service (when I saw the patient): 02/15/21      History of Present Illness   Katherin Fletcher 80 year old woman with extensive past medical history that is most notable for progressive ALS, as well as CAD status post CABG, chronic systolic CHF, atrial fibrillation on Eliquis, and COPD, among others who presented with acute hypoxemic respiratory failure suspected due to decompensated acute systolic CHF and myonecrosis.    Hospital Course   Katherin Fletcher was admitted on 2/14/2021.  The following problems were addressed during her hospitalization:    Acute hypoxemic respiratory failure  Decompensated CHF: causing hypotension and impending cardiogenic shock. BIPAP started in the ED.  Note is made of progressive ALS per Neurology documentation from 2/11/2021 stating end-stage ALS.    Please see H/P Note from Dr Al on Patient's presentation last evening for details.  -Goals of Care discussions had been held on admission and Patient wished to be taken off the BIPAP mask started in the ED.   -Further discussion cite that Patient and her Family agreed to Comfort Measures Only.   -Low-dose IV Dilaudid PRN was ordered for air hunger, O2 ordered for comfort.  It was stated that multiple Family members had plan to see the patient last evening.  RN notes indicate that SO and Son visited last evening.     -This morning progressive hypoxia and decreased mentation,  Exam  Gen: unresponsive to voice, excess oral secretions; suctioned, appeared comfortable.   Resp:  O2 per NC, nonlabored  Cor: Irregular  Adomen soft     Family members were called however did not yet arrive at the time of Patient's death at 9:30 AM, February 15, 2021. Nursing note Patient passed peacefully.  Subsequently, family members  arrived and had visitation at bedside.     Please see death pronouncement by Hospital [facility].    MD Kirit  Internal Medicine  [facility]       [IOscar MD, personally saw the patient today and spent greater than 30 minutes before Patient death on the unit/floor in management of care]     ADDENDUM; additional diagnosis noted in admission note: severe protein malnutrition.

## 2021-02-15 NOTE — ED NOTES
Patient complains of coccyx pain. RN rolled patient to assess site. Patient has some skin breakdown on her coccyx area. Meplex dressing obtained from 8th floor and placed on site.

## 2021-02-15 NOTE — ED NOTES
Patient is on 10L o2 via non rebreather mask. Patient was cathed for urine sample, COVID swab, blood work and cultures were obtained and sent. Patient resting in bed with HOB raised per request. Patient assisted in clearing sputum from her mouth using tissues. Patient has been able to cough up scant amounts of thick white sputum.

## 2021-02-15 NOTE — PLAN OF CARE
A&Ox4. Speech slow, garbled and hoarse. /75 (BP Location: Left arm)   Pulse 80   Temp 97.3  F (36.3  C) (Axillary)   Resp 19   Wt 53.8 kg (118 lb 9.7 oz)   SpO2 92%   BMI 20.36 kg/m   No telemetry ordered. Irregular apical pulse. Pt on 15 L non-rebreather. Oral suctioning PRN. Pt able to do independently. Pt has difficulty clearing secretions. PRN dilaudid IVP given for pain. Q2H repo. Bed bound. Wound and blanchable redness to coccyx. Mepilex in place. Purewick in place. Has not voided since arriving to floor. Bladder scanned for 140. NS infusing at 10 ml/hr. Plan for social work consult. Comfort cares.

## 2021-02-16 ENCOUNTER — MEDICAL CORRESPONDENCE (OUTPATIENT)
Dept: FAMILY MEDICINE | Facility: CLINIC | Age: 81
End: 2021-02-16

## 2021-02-17 ENCOUNTER — MEDICAL CORRESPONDENCE (OUTPATIENT)
Dept: FAMILY MEDICINE | Facility: CLINIC | Age: 81
End: 2021-02-17

## 2021-02-20 LAB
BACTERIA SPEC CULT: NO GROWTH
BACTERIA SPEC CULT: NO GROWTH
SPECIMEN SOURCE: NORMAL
SPECIMEN SOURCE: NORMAL

## 2021-02-25 ENCOUNTER — MEDICAL CORRESPONDENCE (OUTPATIENT)
Dept: FAMILY MEDICINE | Facility: CLINIC | Age: 81
End: 2021-02-25

## 2022-04-04 NOTE — PROGRESS NOTES
[facility]    Outpatient Physical Therapy Discharge Note  Patient: Katherin Fletcher  : 1940    Beginning/End Dates of Reporting Period:  20 to 9/15/20    Referring Provider: He Stubbs MD     Therapy Diagnosis: Deconditioning with knee pain and impaired functional mobility status     Client Self Report: No pain at rest, continued knee pain with transitions stand > sit.  Hurts more when she has spasms. Feels weak today.    Goals:  Goal Identifier Pain   Goal Description Pt will report knee pain no greater than 2/10 with sit <> stand transitions for improved tolerance and QOL.   Target Date 10/02/20   Date Met      Progress (detail required for progress note):   Goal not met/assessed - did not follow-up with ongoing OP PT visits due to medical status.     Goal Identifier Transfers   Goal Description Pt will demo ability to safely perform 5 reps sit <> stand consecutively from w/c using 4WW in 1 min to show improving tolerance with basic transfer skills and improved strength/reduced knee pain.   Target Date 10/02/20   Date Met      Progress (detail required for progress note):  Goal not met/assessed - did not follow-up with ongoing OP PT visits due to medical status.     Goal Identifier HEP   Goal Description Pt will demo/voice understanding of long term HEP to help manage chronic OA knee pain in order to maintain independence with basic mobility skills and optimize QOL.   Target Date 10/02/20   Date Met      Progress (detail required for progress note):  Goal not met/assessed - did not follow-up with ongoing OP PT visits due to medical status.       Plan:  Discharge from therapy.    Discharge: Patient did not f/u with any further OP PT visits due to medical status.    Reason for Discharge: Patient has failed to schedule further appointments.    Equipment Issued: HEP    Discharge Plan: Patient to  continue home program.

## 2023-04-20 NOTE — PLAN OF CARE
Problem: Patient Care Overview  Goal: Plan of Care/Patient Progress Review  Outcome: No Change  A&O x4. Anxious and restless at times. Neuros intact except slurred speech and numbness to R side of face. VSS. Tele Afib with CVR. Mech soft diet, tolerating well. Up A1/gb/walker. C/o pain to BLE, restless, PRN meds given. Potential discharge to ARU.        Statement Selected

## 2024-05-24 NOTE — PROGRESS NOTES
Closure device placed in the left femoral artery. Site closed by Angio-Seal. Sheath removed, angioseal deployed Received fax from [facility] pharmacy that rx for Atropine solut OP is on back order.  Per Dr Junior patient can try Ipratropium nasal spray.   Sent rx to pharmacy.    (3) walks occasionally

## 2024-06-07 NOTE — NURSING NOTE
Reason For Visit:   Chief Complaint   Patient presents with     Follow Up     Nail trimming. Pain, right 4th toe. Patient stated that her right 4th toe is still sore since Dr. Arroyo trimmed the callous.        Pain Assessment  Patient Currently in Pain: Yes  Primary Pain Location: (Right 4th toe. )        Allergies   Allergen Reactions     Ace Inhibitors Cough     Baclofen Other (See Comments)     Constipation , tiredness     Penicillins      10/7/14 Hives per patient -- many many years ago       Nickel Swelling and Rash     Other reaction(s): Unknown           Precious Tovar LPN     Regular Diet - No restrictions

## 2025-07-08 NOTE — PROGRESS NOTES
Patient contacted upon receiving referral from Neurology to further assess her spasticity needs in the PMR Department. VM message was left with this writer's contact information, in order for her to contact us to schedule.  
36.7

## (undated) DEVICE — SU VICRYL 3-0 SH 27" J316H

## (undated) DEVICE — SU VICRYL 3-0 RB-1 27" UND J215H

## (undated) DEVICE — SOL WATER IRRIG 1000ML BOTTLE 2F7114

## (undated) DEVICE — BLADE SAW OSCIL/SAG STRK MICRO 9X31X0.38MM 2296-003-225

## (undated) DEVICE — DRAPE STERI TOWEL LG 1010

## (undated) DEVICE — GLOVE PROTEXIS POWDER FREE 8.5 ORTHOPEDIC 2D73ET85

## (undated) DEVICE — LINEN GOWN XLG 5407

## (undated) DEVICE — SOL NACL 0.9% IRRIG 1000ML BOTTLE 2F7124

## (undated) DEVICE — LINEN TOWEL PACK X5 5464

## (undated) DEVICE — PACK LOWER EXTREMITY CUSTOM ASC

## (undated) DEVICE — GLOVE PROTEXIS BLUE W/NEU-THERA 8.5  2D73EB85

## (undated) DEVICE — TOURNIQUET SGL  BLADDER 30"X4" BLUE 5921030135

## (undated) DEVICE — GLOVE PROTEXIS POWDER FREE 8.0 ORTHOPEDIC 2D73ET80

## (undated) DEVICE — CAST PADDING 3" UNSTERILE 9043

## (undated) DEVICE — PREP SKIN SCRUB TRAY 4461A

## (undated) DEVICE — SU ETHILON 3-0 FS-1 18" 669H

## (undated) DEVICE — SU ETHILON 3-0 PS-1 18" 1663G

## (undated) DEVICE — SU MONOCRYL 3-0 PS-2 27" Y427H

## (undated) DEVICE — CAST PLASTER SPLINT 3X15" 7393

## (undated) DEVICE — ESU GROUND PAD ADULT W/CORD E7507

## (undated) DEVICE — BLADE KNIFE SURG 10 371110

## (undated) DEVICE — DRSG KERLIX FLUFFS X5

## (undated) DEVICE — BNDG COBAN 3"X5YDS STERILE 2083S

## (undated) DEVICE — BNDG ELASTIC 3"X5YDS UNSTERILE 6611-30

## (undated) DEVICE — GOWN XLG DISP 9545

## (undated) DEVICE — DRSG XEROFORM 1X8"

## (undated) DEVICE — PREP DURAPREP 26ML APL 8630

## (undated) DEVICE — DRAPE C-ARM OEC MINI VIEW 6800   00-901917-01

## (undated) DEVICE — GLOVE PROTEXIS POWDER FREE SMT 7.5  2D72PT75X

## (undated) DEVICE — PACK HAND WRIST SOP15HWFSP

## (undated) DEVICE — SPONGE BALL KERLIX ROUND XL W/O STRING LATEX 4935

## (undated) RX ORDER — BUPIVACAINE HYDROCHLORIDE 5 MG/ML
INJECTION, SOLUTION EPIDURAL; INTRACAUDAL
Status: DISPENSED
Start: 2017-12-19

## (undated) RX ORDER — CLOPIDOGREL BISULFATE 75 MG/1
TABLET ORAL
Status: DISPENSED
Start: 2017-05-04

## (undated) RX ORDER — ACETAMINOPHEN 325 MG/1
TABLET ORAL
Status: DISPENSED
Start: 2017-12-19

## (undated) RX ORDER — HYDRALAZINE HYDROCHLORIDE 20 MG/ML
INJECTION INTRAMUSCULAR; INTRAVENOUS
Status: DISPENSED
Start: 2017-05-04

## (undated) RX ORDER — LIDOCAINE HYDROCHLORIDE 5 MG/ML
INJECTION, SOLUTION INFILTRATION; INTRAVENOUS
Status: DISPENSED
Start: 2018-11-29

## (undated) RX ORDER — BUPIVACAINE HYDROCHLORIDE 2.5 MG/ML
INJECTION, SOLUTION EPIDURAL; INFILTRATION; INTRACAUDAL
Status: DISPENSED
Start: 2020-01-01

## (undated) RX ORDER — FENTANYL CITRATE 50 UG/ML
INJECTION, SOLUTION INTRAMUSCULAR; INTRAVENOUS
Status: DISPENSED
Start: 2017-05-04

## (undated) RX ORDER — NALOXONE HYDROCHLORIDE 0.4 MG/ML
INJECTION, SOLUTION INTRAMUSCULAR; INTRAVENOUS; SUBCUTANEOUS
Status: DISPENSED
Start: 2017-05-04

## (undated) RX ORDER — VANCOMYCIN HYDROCHLORIDE 500 MG/10ML
INJECTION, POWDER, LYOPHILIZED, FOR SOLUTION INTRAVENOUS
Status: DISPENSED
Start: 2017-12-19

## (undated) RX ORDER — CLINDAMYCIN PHOSPHATE 900 MG/50ML
INJECTION, SOLUTION INTRAVENOUS
Status: DISPENSED
Start: 2018-11-29

## (undated) RX ORDER — FLUMAZENIL 0.1 MG/ML
INJECTION, SOLUTION INTRAVENOUS
Status: DISPENSED
Start: 2017-05-04

## (undated) RX ORDER — BUPIVACAINE HYDROCHLORIDE 5 MG/ML
INJECTION, SOLUTION EPIDURAL; INTRACAUDAL
Status: DISPENSED
Start: 2018-11-29

## (undated) RX ORDER — LIDOCAINE HYDROCHLORIDE 10 MG/ML
INJECTION, SOLUTION EPIDURAL; INFILTRATION; INTRACAUDAL; PERINEURAL
Status: DISPENSED
Start: 2017-12-19

## (undated) RX ORDER — PROPOFOL 10 MG/ML
INJECTION, EMULSION INTRAVENOUS
Status: DISPENSED
Start: 2018-11-29

## (undated) RX ORDER — POTASSIUM CHLORIDE 1500 MG/1
TABLET, EXTENDED RELEASE ORAL
Status: DISPENSED
Start: 2017-05-04

## (undated) RX ORDER — HEPARIN SODIUM 1000 [USP'U]/ML
INJECTION, SOLUTION INTRAVENOUS; SUBCUTANEOUS
Status: DISPENSED
Start: 2017-05-04

## (undated) RX ORDER — LIDOCAINE HYDROCHLORIDE 10 MG/ML
INJECTION, SOLUTION EPIDURAL; INFILTRATION; INTRACAUDAL; PERINEURAL
Status: DISPENSED
Start: 2020-01-01

## (undated) RX ORDER — BUPIVACAINE HYDROCHLORIDE 2.5 MG/ML
INJECTION, SOLUTION EPIDURAL; INFILTRATION; INTRACAUDAL
Status: DISPENSED
Start: 2017-12-19

## (undated) RX ORDER — ATROPINE SULFATE 0.1 MG/ML
INJECTION INTRAVENOUS
Status: DISPENSED
Start: 2017-05-04

## (undated) RX ORDER — LIDOCAINE HYDROCHLORIDE 20 MG/ML
INJECTION, SOLUTION EPIDURAL; INFILTRATION; INTRACAUDAL; PERINEURAL
Status: DISPENSED
Start: 2017-12-19

## (undated) RX ORDER — FENTANYL CITRATE 50 UG/ML
INJECTION, SOLUTION INTRAMUSCULAR; INTRAVENOUS
Status: DISPENSED
Start: 2018-11-29